# Patient Record
Sex: MALE | Race: WHITE | NOT HISPANIC OR LATINO | Employment: OTHER | URBAN - METROPOLITAN AREA
[De-identification: names, ages, dates, MRNs, and addresses within clinical notes are randomized per-mention and may not be internally consistent; named-entity substitution may affect disease eponyms.]

---

## 2018-06-28 ENCOUNTER — TRANSCRIBE ORDERS (OUTPATIENT)
Dept: ADMINISTRATIVE | Facility: HOSPITAL | Age: 83
End: 2018-06-28

## 2018-06-28 ENCOUNTER — OFFICE VISIT (OUTPATIENT)
Dept: LAB | Facility: HOSPITAL | Age: 83
End: 2018-06-28
Payer: MEDICARE

## 2018-06-28 ENCOUNTER — APPOINTMENT (OUTPATIENT)
Dept: LAB | Facility: HOSPITAL | Age: 83
End: 2018-06-28
Payer: MEDICARE

## 2018-06-28 ENCOUNTER — HOSPITAL ENCOUNTER (OUTPATIENT)
Dept: RADIOLOGY | Facility: HOSPITAL | Age: 83
Discharge: HOME | End: 2018-06-28
Payer: MEDICARE

## 2018-06-28 ENCOUNTER — HOSPITAL ENCOUNTER (OUTPATIENT)
Dept: NON INVASIVE DIAGNOSTICS | Facility: HOSPITAL | Age: 83
Discharge: HOME | End: 2018-06-28
Payer: MEDICARE

## 2018-06-28 DIAGNOSIS — Z00.00 PHYSICAL EXAM: ICD-10-CM

## 2018-06-28 DIAGNOSIS — Z00.00 ROUTINE GENERAL MEDICAL EXAMINATION AT A HEALTH CARE FACILITY: Primary | ICD-10-CM

## 2018-06-28 DIAGNOSIS — Z00.00 PHYSICAL EXAM: Primary | ICD-10-CM

## 2018-06-28 DIAGNOSIS — Z00.00 ROUTINE GENERAL MEDICAL EXAMINATION AT A HEALTH CARE FACILITY: ICD-10-CM

## 2018-06-28 LAB
ALBUMIN SERPL BCP-MCNC: 3.4 G/DL (ref 3.5–5.7)
ALP SERPL-CCNC: 81 U/L (ref 55–165)
ALT SERPL W P-5'-P-CCNC: 21 U/L (ref 7–52)
ANION GAP SERPL CALCULATED.3IONS-SCNC: 5 MMOL/L (ref 4–13)
AST SERPL W P-5'-P-CCNC: 21 U/L (ref 13–39)
ATRIAL RATE: 60 BPM
BILIRUB DIRECT SERPL-MCNC: 0.1 MG/DL (ref 0–0.2)
BILIRUB SERPL-MCNC: 0.3 MG/DL (ref 0.2–1)
BUN SERPL-MCNC: 9 MG/DL (ref 7–25)
CALCIUM SERPL-MCNC: 9.3 MG/DL (ref 8.6–10.5)
CHLORIDE SERPL-SCNC: 100 MMOL/L (ref 98–107)
CHOLEST SERPL-MCNC: 162 MG/DL (ref 0–200)
CO2 SERPL-SCNC: 33 MMOL/L (ref 21–31)
CREAT SERPL-MCNC: 0.89 MG/DL (ref 0.7–1.3)
EST. AVERAGE GLUCOSE BLD GHB EST-MCNC: 171 MG/DL
GFR SERPL CREATININE-BSD FRML MDRD: 80 ML/MIN/1.73SQ M
GLUCOSE P FAST SERPL-MCNC: 175 MG/DL (ref 65–99)
HBA1C MFR BLD: 7.6 % (ref 4.2–6.3)
HDLC SERPL-MCNC: 73 MG/DL (ref 40–60)
LDLC SERPL CALC-MCNC: 73 MG/DL (ref 0–100)
NONHDLC SERPL-MCNC: 89 MG/DL
P AXIS: 8 DEGREES
POTASSIUM SERPL-SCNC: 3.7 MMOL/L (ref 3.5–5.5)
PR INTERVAL: 202 MS
PROT SERPL-MCNC: 7.4 G/DL (ref 6.4–8.9)
PSA SERPL-MCNC: 1 NG/ML (ref 0–4)
QRS AXIS: -50 DEGREES
QRSD INTERVAL: 116 MS
QT INTERVAL: 440 MS
QTC INTERVAL: 440 MS
SODIUM SERPL-SCNC: 138 MMOL/L (ref 134–143)
T WAVE AXIS: 23 DEGREES
TRIGL SERPL-MCNC: 82 MG/DL (ref 44–166)
VENTRICULAR RATE: 60 BPM

## 2018-06-28 PROCEDURE — G0103 PSA SCREENING: HCPCS

## 2018-06-28 PROCEDURE — 83036 HEMOGLOBIN GLYCOSYLATED A1C: CPT

## 2018-06-28 PROCEDURE — 93005 ELECTROCARDIOGRAM TRACING: CPT

## 2018-06-28 PROCEDURE — 36415 COLL VENOUS BLD VENIPUNCTURE: CPT

## 2018-06-28 PROCEDURE — 80053 COMPREHEN METABOLIC PANEL: CPT

## 2018-06-28 PROCEDURE — 71046 X-RAY EXAM CHEST 2 VIEWS: CPT

## 2018-06-28 PROCEDURE — 82248 BILIRUBIN DIRECT: CPT

## 2018-06-28 PROCEDURE — 80061 LIPID PANEL: CPT

## 2018-08-28 ENCOUNTER — APPOINTMENT (OUTPATIENT)
Dept: LAB | Facility: HOSPITAL | Age: 83
End: 2018-08-28
Payer: MEDICARE

## 2018-08-28 ENCOUNTER — TRANSCRIBE ORDERS (OUTPATIENT)
Dept: ADMINISTRATIVE | Facility: HOSPITAL | Age: 83
End: 2018-08-28

## 2018-08-28 DIAGNOSIS — E13.9 OTHER SPECIFIED DIABETES MELLITUS WITHOUT COMPLICATION, WITHOUT LONG-TERM CURRENT USE OF INSULIN (HCC): Primary | ICD-10-CM

## 2018-08-28 DIAGNOSIS — E13.9 OTHER SPECIFIED DIABETES MELLITUS WITHOUT COMPLICATION, WITHOUT LONG-TERM CURRENT USE OF INSULIN (HCC): ICD-10-CM

## 2018-08-28 LAB
EST. AVERAGE GLUCOSE BLD GHB EST-MCNC: 174 MG/DL
GLUCOSE P FAST SERPL-MCNC: 109 MG/DL (ref 65–99)
HBA1C MFR BLD: 7.7 % (ref 4.2–6.3)

## 2018-08-28 PROCEDURE — 83036 HEMOGLOBIN GLYCOSYLATED A1C: CPT

## 2018-08-28 PROCEDURE — 82947 ASSAY GLUCOSE BLOOD QUANT: CPT

## 2018-08-28 PROCEDURE — 36415 COLL VENOUS BLD VENIPUNCTURE: CPT

## 2018-09-10 ENCOUNTER — TRANSCRIBE ORDERS (OUTPATIENT)
Dept: ADMINISTRATIVE | Facility: HOSPITAL | Age: 83
End: 2018-09-10

## 2018-09-10 DIAGNOSIS — R19.4 CHANGE IN BOWEL HABIT: ICD-10-CM

## 2018-09-10 DIAGNOSIS — R63.4 ABNORMAL WEIGHT LOSS: Primary | ICD-10-CM

## 2018-09-13 ENCOUNTER — HOSPITAL ENCOUNTER (OUTPATIENT)
Dept: CT IMAGING | Facility: HOSPITAL | Age: 83
Discharge: HOME | End: 2018-09-13
Attending: PHYSICIAN ASSISTANT
Payer: MEDICARE

## 2018-09-13 DIAGNOSIS — R19.4 CHANGE IN BOWEL HABIT: ICD-10-CM

## 2018-09-13 DIAGNOSIS — R63.4 ABNORMAL WEIGHT LOSS: ICD-10-CM

## 2018-09-13 PROCEDURE — 74178 CT ABD&PLV WO CNTR FLWD CNTR: CPT

## 2018-09-13 RX ADMIN — IOHEXOL 80 ML: 350 INJECTION, SOLUTION INTRAVENOUS at 09:58

## 2018-09-14 ENCOUNTER — TRANSCRIBE ORDERS (OUTPATIENT)
Dept: ADMINISTRATIVE | Facility: HOSPITAL | Age: 83
End: 2018-09-14

## 2018-09-14 ENCOUNTER — APPOINTMENT (OUTPATIENT)
Dept: LAB | Facility: HOSPITAL | Age: 83
End: 2018-09-14
Payer: MEDICARE

## 2018-09-14 DIAGNOSIS — E10.8 TYPE 1 DIABETES MELLITUS WITH COMPLICATION (HCC): ICD-10-CM

## 2018-09-14 DIAGNOSIS — R63.4 WEIGHT LOSS: ICD-10-CM

## 2018-09-14 DIAGNOSIS — R63.4 WEIGHT LOSS: Primary | ICD-10-CM

## 2018-09-14 LAB
ERYTHROCYTE [DISTWIDTH] IN BLOOD BY AUTOMATED COUNT: 14.5 % (ref 11.6–15.1)
HCT VFR BLD AUTO: 37.2 % (ref 42–52)
HGB BLD-MCNC: 12.2 G/DL (ref 12–17)
MCH RBC QN AUTO: 31.4 PG (ref 26.8–34.3)
MCHC RBC AUTO-ENTMCNC: 32.9 G/DL (ref 31.4–37.4)
MCV RBC AUTO: 95 FL (ref 82–98)
PLATELET # BLD AUTO: 312 THOUSANDS/UL (ref 149–390)
PMV BLD AUTO: 9.1 FL (ref 8.9–12.7)
RBC # BLD AUTO: 3.9 MILLION/UL (ref 3.88–5.62)
T3 SERPL-MCNC: 1 NG/ML (ref 0.6–1.8)
T4 SERPL-MCNC: 7.4 UG/DL (ref 4.7–13.3)
TSH SERPL DL<=0.05 MIU/L-ACNC: 1.55 UIU/ML (ref 0.45–5.33)
WBC # BLD AUTO: 7.3 THOUSAND/UL (ref 4.31–10.16)

## 2018-09-14 PROCEDURE — 36415 COLL VENOUS BLD VENIPUNCTURE: CPT

## 2018-09-14 PROCEDURE — 84443 ASSAY THYROID STIM HORMONE: CPT

## 2018-09-14 PROCEDURE — 84436 ASSAY OF TOTAL THYROXINE: CPT

## 2018-09-14 PROCEDURE — 84480 ASSAY TRIIODOTHYRONINE (T3): CPT

## 2018-09-14 PROCEDURE — 85027 COMPLETE CBC AUTOMATED: CPT

## 2018-09-17 ENCOUNTER — TELEPHONE (OUTPATIENT)
Dept: GASTROENTEROLOGY | Facility: CLINIC | Age: 83
End: 2018-09-17

## 2018-09-17 NOTE — TELEPHONE ENCOUNTER
Patient will be new to the practice  Per ana [facility], the pt has to be seen as soon as possible for an EUS consult  Please assist in scheduling the pt

## 2018-09-19 ENCOUNTER — APPOINTMENT (OUTPATIENT)
Dept: LAB | Facility: MEDICAL CENTER | Age: 83
End: 2018-09-19
Attending: INTERNAL MEDICINE
Payer: MEDICARE

## 2018-09-19 ENCOUNTER — OFFICE VISIT (OUTPATIENT)
Dept: GASTROENTEROLOGY | Facility: MEDICAL CENTER | Age: 83
End: 2018-09-19
Payer: MEDICARE

## 2018-09-19 VITALS
WEIGHT: 121 LBS | HEIGHT: 67 IN | TEMPERATURE: 96.8 F | HEART RATE: 71 BPM | BODY MASS INDEX: 18.99 KG/M2 | DIASTOLIC BLOOD PRESSURE: 82 MMHG | SYSTOLIC BLOOD PRESSURE: 126 MMHG

## 2018-09-19 DIAGNOSIS — K86.9 PANCREATIC LESION: ICD-10-CM

## 2018-09-19 DIAGNOSIS — K86.9 PANCREATIC LESION: Primary | ICD-10-CM

## 2018-09-19 LAB — CEA SERPL-MCNC: 2.1 NG/ML (ref 0–3)

## 2018-09-19 PROCEDURE — 82378 CARCINOEMBRYONIC ANTIGEN: CPT

## 2018-09-19 PROCEDURE — 86301 IMMUNOASSAY TUMOR CA 19-9: CPT

## 2018-09-19 PROCEDURE — 99204 OFFICE O/P NEW MOD 45 MIN: CPT | Performed by: INTERNAL MEDICINE

## 2018-09-19 PROCEDURE — 36415 COLL VENOUS BLD VENIPUNCTURE: CPT

## 2018-09-19 RX ORDER — ASPIRIN 325 MG
325 TABLET ORAL DAILY
COMMUNITY
End: 2018-11-12

## 2018-09-19 NOTE — LETTER
September 19, 2018     Nick Zamora, [address] [facility]    Patient: Ivan Cobb   YOB: 1935   Date of Visit: 9/19/2018       Dear Dr Christina Grajeda: Thank you for referring Maria L Decker to me for evaluation  Below are my notes for this consultation  If you have questions, please do not hesitate to call me  I look forward to following your patient along with you           Sincerely,        Aida Adames MD        CC: Georgette Sever, MD

## 2018-09-19 NOTE — ASSESSMENT & PLAN NOTE
This gentleman has a pancreatic lesion seen on imaging study  I reviewed the CT scan myself and with the patient as well  It appears that his pancreatic duct is dilated and there is a lesion at the tail end of the pancreas  I would recommend doing an EUS with FN A  This can be scheduled for this week possibly on Friday  The procedure was discussed with the patient at length and he is agreeable to proceed  The possible risks and complications were discussed with the patient as well

## 2018-09-19 NOTE — PROGRESS NOTES
Assessment/Plan:    Pancreatic lesion  This gentleman has a pancreatic lesion seen on imaging study  I reviewed the CT scan myself and with the patient as well  It appears that his pancreatic duct is dilated and there is a lesion at the tail end of the pancreas  I would recommend doing an EUS with FN A  This can be scheduled for this week possibly on Friday  The procedure was discussed with the patient at length and he is agreeable to proceed  The possible risks and complications were discussed with the patient as well  Diagnoses and all orders for this visit:    Pancreatic lesion  -     Case request operating room: LINEAR ENDOSCOPIC U/S; Standing  -     Cancer antigen 19-9; Future  -     CEA; Future  -     Case request operating room: LINEAR ENDOSCOPIC U/S    Other orders  -     metFORMIN (GLUCOPHAGE) 500 mg tablet; daily  -     aspirin 325 mg tablet; Take 325 mg by mouth daily  -     Diet NPO; Sips with meds; Standing  -     Void on call to OR; Standing  -     Insert peripheral IV; Standing          Subjective:      Patient ID: Nasir Jones is a 82 y o  male  HPI    24-year-old gentleman who presents to us for evaluation for a pancreatic lesion was seen on imaging study  He was seen by GI regarding weight loss  He has had a significant amount of weight loss approximately 40-50 lb over the past few months  He also has been diagnosed with new diabetes  He does not have any abdominal pain  No nausea vomiting  No diarrhea constipation rectal bleeding  He does not have history of alcoholism or smoking  He has had no history of pancreatitis  His brother passed away from pancreatic cancer  He has never had an endoscopy or colonoscopy done      The following portions of the patient's history were reviewed and updated as appropriate: allergies, current medications, past family history, past medical history, past social history, past surgical history and problem list     Review of Systems Constitutional: Positive for unexpected weight change (Weight loss)  HENT: Negative  Eyes: Negative  Respiratory: Negative  Cardiovascular: Negative  Gastrointestinal:        See HPI   Endocrine: Negative  Genitourinary: Negative  Musculoskeletal: Negative  Skin: Negative  Allergic/Immunologic: Negative  Neurological: Negative  Hematological: Negative  Psychiatric/Behavioral: Negative  All other systems reviewed and are negative  Objective:      /82 (BP Location: Left arm, Patient Position: Sitting, Cuff Size: Adult)   Pulse 71   Temp (!) 96 8 °F (36 °C) (Tympanic)   Ht 5' 7" (1 702 m)   Wt 54 9 kg (121 lb)   BMI 18 95 kg/m²          Physical Exam   Constitutional: He is oriented to person, place, and time  Vital signs are normal  He appears well-developed and well-nourished  HENT:   Head: Normocephalic and atraumatic  Eyes: Conjunctivae are normal  Pupils are equal, round, and reactive to light  No scleral icterus  Neck: Normal range of motion  Cardiovascular: Normal rate, regular rhythm and normal heart sounds  Pulmonary/Chest: Effort normal and breath sounds normal  No respiratory distress  Abdominal: Soft  Normal appearance and bowel sounds are normal  He exhibits no distension, no ascites and no mass  There is no hepatosplenomegaly  There is no tenderness  No hernia  Musculoskeletal: Normal range of motion  Lymphadenopathy:     He has no cervical adenopathy  Neurological: He is alert and oriented to person, place, and time  Skin: Skin is warm  Psychiatric: He has a normal mood and affect   His behavior is normal  Thought content normal

## 2018-09-20 ENCOUNTER — ANESTHESIA EVENT (OUTPATIENT)
Dept: GASTROENTEROLOGY | Facility: HOSPITAL | Age: 83
End: 2018-09-20
Payer: MEDICARE

## 2018-09-20 ENCOUNTER — TELEPHONE (OUTPATIENT)
Dept: GASTROENTEROLOGY | Facility: CLINIC | Age: 83
End: 2018-09-20

## 2018-09-20 LAB — CANCER AG19-9 SERPL-ACNC: 16 U/ML (ref 0–35)

## 2018-09-20 NOTE — ANESTHESIA PREPROCEDURE EVALUATION
Review of Systems/Medical History  Patient summary reviewed        Cardiovascular   Pulmonary       GI/Hepatic            Endo/Other  Diabetes ,      GYN       Hematology   Musculoskeletal       Neurology   Psychology           Physical Exam    Airway  Comment: Poor mouth opening  Mallampati score: III  TM Distance: >3 FB  Neck ROM: full     Dental       Cardiovascular      Pulmonary      Other Findings        Anesthesia Plan  ASA Score- 2     Anesthesia Type- IV sedation with anesthesia with ASA Monitors  Additional Monitors:   Airway Plan:         Plan Factors-    Induction- intravenous  Postoperative Plan-     Informed Consent- Anesthetic plan and risks discussed with patient  I personally reviewed this patient with the CRNA  Discussed and agreed on the Anesthesia Plan with the CRNA  Romaine Carvajal

## 2018-09-20 NOTE — TELEPHONE ENCOUNTER
EUS scheduled with Dr Kidd in [place] on 9/21/2018  I gave Tom Chen (spouse)verbal instructions  Per Dr Kidd pt can have clears up to 10am  Pt Diabetic

## 2018-09-21 ENCOUNTER — ANESTHESIA (OUTPATIENT)
Dept: GASTROENTEROLOGY | Facility: HOSPITAL | Age: 83
End: 2018-09-21
Payer: MEDICARE

## 2018-09-21 ENCOUNTER — HOSPITAL ENCOUNTER (OUTPATIENT)
Facility: HOSPITAL | Age: 83
Setting detail: OUTPATIENT SURGERY
Discharge: HOME | End: 2018-09-21
Attending: INTERNAL MEDICINE | Admitting: INTERNAL MEDICINE
Payer: MEDICARE

## 2018-09-21 VITALS
HEIGHT: 67 IN | DIASTOLIC BLOOD PRESSURE: 81 MMHG | TEMPERATURE: 97 F | BODY MASS INDEX: 18.99 KG/M2 | SYSTOLIC BLOOD PRESSURE: 154 MMHG | OXYGEN SATURATION: 100 % | WEIGHT: 121 LBS | HEART RATE: 50 BPM | RESPIRATION RATE: 16 BRPM

## 2018-09-21 DIAGNOSIS — K86.9 PANCREATIC LESION: Primary | ICD-10-CM

## 2018-09-21 DIAGNOSIS — K86.9 PANCREATIC LESION: ICD-10-CM

## 2018-09-21 LAB — GLUCOSE SERPL-MCNC: 131 MG/DL (ref 65–140)

## 2018-09-21 PROCEDURE — 88173 CYTOPATH EVAL FNA REPORT: CPT | Performed by: INTERNAL MEDICINE

## 2018-09-21 PROCEDURE — 82948 REAGENT STRIP/BLOOD GLUCOSE: CPT

## 2018-09-21 PROCEDURE — 88342 IMHCHEM/IMCYTCHM 1ST ANTB: CPT | Performed by: PATHOLOGY

## 2018-09-21 PROCEDURE — 88305 TISSUE EXAM BY PATHOLOGIST: CPT | Performed by: PATHOLOGY

## 2018-09-21 PROCEDURE — 43238 EGD US FINE NEEDLE BX/ASPIR: CPT | Performed by: INTERNAL MEDICINE

## 2018-09-21 PROCEDURE — 88341 IMHCHEM/IMCYTCHM EA ADD ANTB: CPT | Performed by: PATHOLOGY

## 2018-09-21 PROCEDURE — 88361 TUMOR IMMUNOHISTOCHEM/COMPUT: CPT | Performed by: PATHOLOGY

## 2018-09-21 PROCEDURE — 43239 EGD BIOPSY SINGLE/MULTIPLE: CPT | Performed by: INTERNAL MEDICINE

## 2018-09-21 PROCEDURE — 88172 CYTP DX EVAL FNA 1ST EA SITE: CPT | Performed by: INTERNAL MEDICINE

## 2018-09-21 PROCEDURE — 88313 SPECIAL STAINS GROUP 2: CPT | Performed by: PATHOLOGY

## 2018-09-21 RX ORDER — PROPOFOL 10 MG/ML
INJECTION, EMULSION INTRAVENOUS AS NEEDED
Status: DISCONTINUED | OUTPATIENT
Start: 2018-09-21 | End: 2018-09-21 | Stop reason: SURG

## 2018-09-21 RX ORDER — CIPROFLOXACIN 2 MG/ML
400 INJECTION, SOLUTION INTRAVENOUS ONCE
Status: DISCONTINUED | OUTPATIENT
Start: 2018-09-21 | End: 2018-09-21 | Stop reason: HOSPADM

## 2018-09-21 RX ORDER — SODIUM CHLORIDE 9 MG/ML
100 INJECTION, SOLUTION INTRAVENOUS CONTINUOUS
Status: DISCONTINUED | OUTPATIENT
Start: 2018-09-21 | End: 2018-09-21 | Stop reason: HOSPADM

## 2018-09-21 RX ORDER — PROPOFOL 10 MG/ML
INJECTION, EMULSION INTRAVENOUS CONTINUOUS PRN
Status: DISCONTINUED | OUTPATIENT
Start: 2018-09-21 | End: 2018-09-21 | Stop reason: SURG

## 2018-09-21 RX ORDER — CIPROFLOXACIN 500 MG/1
500 TABLET, FILM COATED ORAL EVERY 12 HOURS SCHEDULED
Qty: 6 TABLET | Refills: 0 | Status: SHIPPED | OUTPATIENT
Start: 2018-09-21 | End: 2018-09-24

## 2018-09-21 RX ADMIN — PROPOFOL 70 MG: 10 INJECTION, EMULSION INTRAVENOUS at 12:35

## 2018-09-21 RX ADMIN — SODIUM CHLORIDE 100 ML/HR: 0.9 INJECTION, SOLUTION INTRAVENOUS at 12:06

## 2018-09-21 RX ADMIN — PROPOFOL 100 MCG/KG/MIN: 10 INJECTION, EMULSION INTRAVENOUS at 12:37

## 2018-09-21 RX ADMIN — SODIUM CHLORIDE: 0.9 INJECTION, SOLUTION INTRAVENOUS at 12:32

## 2018-09-21 RX ADMIN — CIPROFLOXACIN 400 MG: 2 INJECTION INTRAVENOUS at 12:40

## 2018-09-21 NOTE — ANESTHESIA POSTPROCEDURE EVALUATION
Post-Op Assessment Note      CV Status:  Stable    Mental Status:  Alert and awake    Hydration Status:  Euvolemic    PONV Controlled:  Controlled    Airway Patency:  Patent    Post Op Vitals Reviewed: Yes          Staff: CRNA, Anesthesiologist           /90 (09/21/18 1350)    Temp     Pulse 59 (09/21/18 1350)   Resp 16 (09/21/18 1350)    SpO2 100 % (09/21/18 1350)

## 2018-09-21 NOTE — OP NOTE
OPERATIVE REPORT  PATIENT NAME: Raphael Richards    :  1935  MRN: [id number]  Pt Location: [facility] ROOM 01    SURGERY DATE: 2018    Surgeon(s) and Role:     Mis Beckwith MD - Primary    Preop Diagnosis:  Pancreatic lesion [K86 9]    Post-Op Diagnosis Codes:     * Pancreatic lesion [K86 9]    Procedure(s) (LRB):  LINEAR ENDOSCOPIC U/S (N/A)    Specimen(s):  ID Type Source Tests Collected by Time Destination   1 : pancreatic tail lesion Other FNA NON-GYNECOLOGIC CYTOLOGY, FINE NEEDLE ASPIRATION Darshana Joseph MD 2018 1252    2 : gastric body, gastritis, r/o h pylori Tissue Stomach TISSUE EXAM Darshana Joseph MD 2018 1256    3 : esophagitis Tissue Esophagus TISSUE EXAM Darshana Joseph MD 2018 1258      ENDOSCOPIC ULTRASOUND    SEDATION: Monitored anesthesia care, check anesthesia records    ASA Class: 3    INDICATIONS:  Pancreatic lesion  CONSENT:  Informed consent was obtained for the procedure, including sedation after explaining the risks and benefits of the procedure  Risks including but not limited to bleeding, perforation, infection, and missed lesion  PREPARATION:   Telemetry, pulse oximetry, blood pressure were monitored throughout the procedure  Patient was identified by myself both verbally and by visual inspection of ID band  Ciprofloxacin was given prior to the procedure  DESCRIPTION:   Patient was placed in the left lateral decubitus position and was sedated with the above medication  The gastroscope was introduced in to the oropharynx and the esophagus was intubated under direct visualization  Scope was passed down the esophagus up to 2nd part of the duodenum  A careful inspection was made as the gastroscope was withdrawn, including a retroflexed view of the stomach; findings and interventions are described below  FINDINGS:    EGD FINDINGS:     #1  Esophagus- mild esophagitis seen in the mid and distal esophagus with sloughing of the mucosa  Biopsies were done      #2  Stomach- stomach appeared to be normal   However the antrum was angulated  Biopsies were done in the gastric body  #3  Duodenum- the antrum was angulated but no evidence of mass or ulcer  The duodenal bulb and the 2nd portion were normal   The ampulla could not be clearly visualized however no mass was seen  EUS FINDINGS:  The linear echo endoscope was used  The pancreatic parenchyma was visualized and appeared to be hyperechoic and atrophic  The pancreatic duct was dilated throughout its length measuring approximately 7 mm  It tapered 2 words the tail where it led to a hypoechoic triangular lesion measuring 3 5 cm  There was a cystic component in the middle of the lesion as well  The splenic artery ran adjacent to this lesion  This was sampled using FNA 25 gauge needle and 5 passes were obtained  In the head of the pancreas the duct had hyperechoic foci which could represent either mucin globules or stones  No mass lesion was seen  The common bile duct was normal caliber  It measured 4 mm  The visualized areas of the liver was normal   The gallbladder was normal   The celiac axis was normal without any lymphadenopathy  IMPRESSIONS:      1  Pancreatic duct dilation with pancreatic atrophy, hyperechoic material within the head of the pancreas as well as a lesion in the tail of the pancreas  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

## 2018-09-21 NOTE — DISCHARGE INSTR - AVS FIRST PAGE
OPERATIVE REPORT  PATIENT NAME: Yoselyn Early    :  1935  MRN: [id number]  Pt Location: [facility] ROOM 01    SURGERY DATE: 2018    Surgeon(s) and Role:     Rosalinda Trotter MD - Primary    Preop Diagnosis:  Pancreatic lesion [K86 9]    Post-Op Diagnosis Codes:     * Pancreatic lesion [K86 9]    Procedure(s) (LRB):  LINEAR ENDOSCOPIC U/S (N/A)    Specimen(s):  ID Type Source Tests Collected by Time Destination   1 : pancreatic tail lesion Other FNA NON-GYNECOLOGIC CYTOLOGY, FINE NEEDLE ASPIRATION Tani Pitts MD 2018 1252    2 : gastric body, gastritis, r/o h pylori Tissue Stomach TISSUE EXAM Tani Pitts MD 2018 1256    3 : esophagitis Tissue Esophagus TISSUE EXAM Tani Pitts MD 2018 1258      ENDOSCOPIC ULTRASOUND    SEDATION: Monitored anesthesia care, check anesthesia records    ASA Class: 3    INDICATIONS:  Pancreatic lesion  CONSENT:  Informed consent was obtained for the procedure, including sedation after explaining the risks and benefits of the procedure  Risks including but not limited to bleeding, perforation, infection, and missed lesion  PREPARATION:   Telemetry, pulse oximetry, blood pressure were monitored throughout the procedure  Patient was identified by myself both verbally and by visual inspection of ID band  Ciprofloxacin was given prior to the procedure  DESCRIPTION:   Patient was placed in the left lateral decubitus position and was sedated with the above medication  The gastroscope was introduced in to the oropharynx and the esophagus was intubated under direct visualization  Scope was passed down the esophagus up to 2nd part of the duodenum  A careful inspection was made as the gastroscope was withdrawn, including a retroflexed view of the stomach; findings and interventions are described below  FINDINGS:    EGD FINDINGS:     #1  Esophagus- mild esophagitis seen in the mid and distal esophagus with sloughing of the mucosa  Biopsies were done      #2  Stomach- stomach appeared to be normal   However the antrum was angulated  Biopsies were done in the gastric body  #3  Duodenum- the antrum was angulated but no evidence of mass or ulcer  The duodenal bulb and the 2nd portion were normal   The ampulla could not be clearly visualized however no mass was seen  EUS FINDINGS:  The linear echo endoscope was used  The pancreatic parenchyma was visualized and appeared to be hyperechoic and atrophic  The pancreatic duct was dilated throughout its length measuring approximately 7 mm  It tapered 2 words the tail where it led to a hypoechoic triangular lesion measuring 3 5 cm  There was a cystic component in the middle of the lesion as well  The splenic artery ran adjacent to this lesion  This was sampled using FNA 25 gauge needle and 5 passes were obtained  In the head of the pancreas the duct had hyperechoic foci which could represent either mucin globules or stones  No mass lesion was seen  The common bile duct was normal caliber  It measured 4 mm  The visualized areas of the liver was normal   The gallbladder was normal   The celiac axis was normal without any lymphadenopathy  IMPRESSIONS:      1  Pancreatic duct dilation with pancreatic atrophy, hyperechoic material within the head of the pancreas as well as a lesion in the tail of the pancreas  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

## 2018-09-21 NOTE — H&P (VIEW-ONLY)
Assessment/Plan:    Pancreatic lesion  This gentleman has a pancreatic lesion seen on imaging study  I reviewed the CT scan myself and with the patient as well  It appears that his pancreatic duct is dilated and there is a lesion at the tail end of the pancreas  I would recommend doing an EUS with FN A  This can be scheduled for this week possibly on Friday  The procedure was discussed with the patient at length and he is agreeable to proceed  The possible risks and complications were discussed with the patient as well  Diagnoses and all orders for this visit:    Pancreatic lesion  -     Case request operating room: LINEAR ENDOSCOPIC U/S; Standing  -     Cancer antigen 19-9; Future  -     CEA; Future  -     Case request operating room: LINEAR ENDOSCOPIC U/S    Other orders  -     metFORMIN (GLUCOPHAGE) 500 mg tablet; daily  -     aspirin 325 mg tablet; Take 325 mg by mouth daily  -     Diet NPO; Sips with meds; Standing  -     Void on call to OR; Standing  -     Insert peripheral IV; Standing          Subjective:      Patient ID: Quinten Grullon is a 82 y o  male  HPI    79-year-old gentleman who presents to us for evaluation for a pancreatic lesion was seen on imaging study  He was seen by GI regarding weight loss  He has had a significant amount of weight loss approximately 40-50 lb over the past few months  He also has been diagnosed with new diabetes  He does not have any abdominal pain  No nausea vomiting  No diarrhea constipation rectal bleeding  He does not have history of alcoholism or smoking  He has had no history of pancreatitis  His brother passed away from pancreatic cancer  He has never had an endoscopy or colonoscopy done      The following portions of the patient's history were reviewed and updated as appropriate: allergies, current medications, past family history, past medical history, past social history, past surgical history and problem list     Review of Systems Constitutional: Positive for unexpected weight change (Weight loss)  HENT: Negative  Eyes: Negative  Respiratory: Negative  Cardiovascular: Negative  Gastrointestinal:        See HPI   Endocrine: Negative  Genitourinary: Negative  Musculoskeletal: Negative  Skin: Negative  Allergic/Immunologic: Negative  Neurological: Negative  Hematological: Negative  Psychiatric/Behavioral: Negative  All other systems reviewed and are negative  Objective:      /82 (BP Location: Left arm, Patient Position: Sitting, Cuff Size: Adult)   Pulse 71   Temp (!) 96 8 °F (36 °C) (Tympanic)   Ht 5' 7" (1 702 m)   Wt 54 9 kg (121 lb)   BMI 18 95 kg/m²          Physical Exam   Constitutional: He is oriented to person, place, and time  Vital signs are normal  He appears well-developed and well-nourished  HENT:   Head: Normocephalic and atraumatic  Eyes: Conjunctivae are normal  Pupils are equal, round, and reactive to light  No scleral icterus  Neck: Normal range of motion  Cardiovascular: Normal rate, regular rhythm and normal heart sounds  Pulmonary/Chest: Effort normal and breath sounds normal  No respiratory distress  Abdominal: Soft  Normal appearance and bowel sounds are normal  He exhibits no distension, no ascites and no mass  There is no hepatosplenomegaly  There is no tenderness  No hernia  Musculoskeletal: Normal range of motion  Lymphadenopathy:     He has no cervical adenopathy  Neurological: He is alert and oriented to person, place, and time  Skin: Skin is warm  Psychiatric: He has a normal mood and affect   His behavior is normal  Thought content normal

## 2018-09-22 ENCOUNTER — APPOINTMENT (EMERGENCY)
Dept: RADIOLOGY | Facility: HOSPITAL | Age: 83
End: 2018-09-22
Payer: MEDICARE

## 2018-09-22 ENCOUNTER — HOSPITAL ENCOUNTER (EMERGENCY)
Facility: HOSPITAL | Age: 83
Discharge: HOME | End: 2018-09-22
Payer: MEDICARE

## 2018-09-22 VITALS
RESPIRATION RATE: 18 BRPM | HEIGHT: 67 IN | TEMPERATURE: 98 F | HEART RATE: 62 BPM | BODY MASS INDEX: 18.99 KG/M2 | OXYGEN SATURATION: 97 % | SYSTOLIC BLOOD PRESSURE: 159 MMHG | DIASTOLIC BLOOD PRESSURE: 87 MMHG | WEIGHT: 121 LBS

## 2018-09-22 DIAGNOSIS — K62.5 BRIGHT RED RECTAL BLEEDING: Primary | ICD-10-CM

## 2018-09-22 LAB
ABO GROUP BLD: NORMAL
ALBUMIN SERPL BCP-MCNC: 2.9 G/DL (ref 3.5–5.7)
ALP SERPL-CCNC: 70 U/L (ref 55–165)
ALT SERPL W P-5'-P-CCNC: 11 U/L (ref 7–52)
ANION GAP SERPL CALCULATED.3IONS-SCNC: 6 MMOL/L (ref 4–13)
APTT PPP: 28 SECONDS (ref 24–36)
AST SERPL W P-5'-P-CCNC: 14 U/L (ref 13–39)
BASOPHILS # BLD AUTO: 0 THOUSANDS/ΜL (ref 0–0.1)
BASOPHILS NFR BLD AUTO: 0 % (ref 0–2)
BILIRUB SERPL-MCNC: 0.4 MG/DL (ref 0.2–1)
BLD GP AB SCN SERPL QL: NEGATIVE
BUN SERPL-MCNC: 11 MG/DL (ref 7–25)
CALCIUM SERPL-MCNC: 8.3 MG/DL (ref 8.6–10.5)
CHLORIDE SERPL-SCNC: 101 MMOL/L (ref 98–107)
CO2 SERPL-SCNC: 28 MMOL/L (ref 21–31)
CREAT SERPL-MCNC: 0.85 MG/DL (ref 0.7–1.3)
EOSINOPHIL # BLD AUTO: 0.2 THOUSAND/ΜL (ref 0–0.61)
EOSINOPHIL NFR BLD AUTO: 2 % (ref 0–5)
ERYTHROCYTE [DISTWIDTH] IN BLOOD BY AUTOMATED COUNT: 14.2 % (ref 11.5–14.5)
GFR SERPL CREATININE-BSD FRML MDRD: 82 ML/MIN/1.73SQ M
GLUCOSE SERPL-MCNC: 240 MG/DL (ref 65–99)
HCT VFR BLD AUTO: 36.1 % (ref 36.5–49.3)
HGB BLD-MCNC: 12 G/DL (ref 14–18)
INR PPP: 1.03 (ref 0.9–1.5)
LIPASE SERPL-CCNC: <10 U/L (ref 11–82)
LYMPHOCYTES # BLD AUTO: 1.7 THOUSANDS/ΜL (ref 0.6–4.47)
LYMPHOCYTES NFR BLD AUTO: 19 % (ref 21–51)
MCH RBC QN AUTO: 31.8 PG (ref 26–34)
MCHC RBC AUTO-ENTMCNC: 33.3 G/DL (ref 31–37)
MCV RBC AUTO: 96 FL (ref 81–99)
MONOCYTES # BLD AUTO: 0.6 THOUSAND/ΜL (ref 0.17–1.22)
MONOCYTES NFR BLD AUTO: 7 % (ref 2–12)
NEUTROPHILS # BLD AUTO: 6.6 THOUSANDS/ΜL (ref 1.4–6.5)
NEUTS SEG NFR BLD AUTO: 73 % (ref 42–75)
NRBC BLD AUTO-RTO: 0 /100 WBCS
PLATELET # BLD AUTO: 308 THOUSANDS/UL (ref 149–390)
PMV BLD AUTO: 8.7 FL (ref 8.6–11.7)
POTASSIUM SERPL-SCNC: 4.1 MMOL/L (ref 3.5–5.5)
PROT SERPL-MCNC: 6.4 G/DL (ref 6.4–8.9)
PROTHROMBIN TIME: 12 SECONDS (ref 10.1–12.9)
RBC # BLD AUTO: 3.77 MILLION/UL (ref 4.3–5.9)
RH BLD: NEGATIVE
SODIUM SERPL-SCNC: 135 MMOL/L (ref 134–143)
SPECIMEN EXPIRATION DATE: NORMAL
WBC # BLD AUTO: 9.2 THOUSAND/UL (ref 4.8–10.8)

## 2018-09-22 PROCEDURE — C9113 INJ PANTOPRAZOLE SODIUM, VIA: HCPCS

## 2018-09-22 PROCEDURE — 86900 BLOOD TYPING SEROLOGIC ABO: CPT

## 2018-09-22 PROCEDURE — 80053 COMPREHEN METABOLIC PANEL: CPT

## 2018-09-22 PROCEDURE — 86901 BLOOD TYPING SEROLOGIC RH(D): CPT

## 2018-09-22 PROCEDURE — 85730 THROMBOPLASTIN TIME PARTIAL: CPT

## 2018-09-22 PROCEDURE — 85610 PROTHROMBIN TIME: CPT

## 2018-09-22 PROCEDURE — 99285 EMERGENCY DEPT VISIT HI MDM: CPT

## 2018-09-22 PROCEDURE — 93005 ELECTROCARDIOGRAM TRACING: CPT

## 2018-09-22 PROCEDURE — 85025 COMPLETE CBC W/AUTO DIFF WBC: CPT

## 2018-09-22 PROCEDURE — 86850 RBC ANTIBODY SCREEN: CPT

## 2018-09-22 PROCEDURE — 96365 THER/PROPH/DIAG IV INF INIT: CPT

## 2018-09-22 PROCEDURE — 71045 X-RAY EXAM CHEST 1 VIEW: CPT

## 2018-09-22 PROCEDURE — 36415 COLL VENOUS BLD VENIPUNCTURE: CPT

## 2018-09-22 PROCEDURE — 83690 ASSAY OF LIPASE: CPT

## 2018-09-22 RX ORDER — SODIUM CHLORIDE 9 MG/ML
125 INJECTION, SOLUTION INTRAVENOUS CONTINUOUS
Status: DISCONTINUED | OUTPATIENT
Start: 2018-09-22 | End: 2018-09-23 | Stop reason: HOSPADM

## 2018-09-22 RX ORDER — OMEPRAZOLE 20 MG/1
20 CAPSULE, DELAYED RELEASE ORAL 2 TIMES DAILY
Qty: 28 CAPSULE | Refills: 0 | Status: SHIPPED | OUTPATIENT
Start: 2018-09-22 | End: 2018-10-10

## 2018-09-22 RX ORDER — PANTOPRAZOLE SODIUM 40 MG/1
40 INJECTION, POWDER, FOR SOLUTION INTRAVENOUS ONCE
Status: COMPLETED | OUTPATIENT
Start: 2018-09-22 | End: 2018-09-22

## 2018-09-22 RX ADMIN — PANTOPRAZOLE SODIUM 40 MG: 40 INJECTION, POWDER, FOR SOLUTION INTRAVENOUS at 21:53

## 2018-09-22 RX ADMIN — SODIUM CHLORIDE 8 MG/HR: 9 INJECTION, SOLUTION INTRAVENOUS at 21:53

## 2018-09-22 RX ADMIN — SODIUM CHLORIDE 125 ML/HR: 0.9 INJECTION, SOLUTION INTRAVENOUS at 21:53

## 2018-09-23 NOTE — DISCHARGE INSTRUCTIONS
Gastrointestinal Bleeding   WHAT YOU NEED TO KNOW:   Gastrointestinal (GI) bleeding may occur in any part of your digestive tract  This includes your esophagus, stomach, intestines, rectum, or anus  Bleeding may be mild to severe  Your bleeding may begin suddenly, or start slowly and last for a longer period of time  Bleeding that lasts for a longer period of time is called chronic GI bleeding  DISCHARGE INSTRUCTIONS:   Call 911 for any of the following:   · You have shortness of breath or trouble breathing  · You faint or lose consciousness  · You have chest pain  Return to the emergency department if:   · You feel dizzy or are too weak to stand  · Your heart is beating faster than usual      · You vomit blood, or your vomit looks like coffee grounds  · You have blood in your bowel movement  · You have abdominal pain or swelling  Contact your healthcare provider if:   · You have bowel movements that are tarry or black  · You have nausea or are vomiting  · You have heartburn  · You have questions or concerns about your condition or care  Activity:  Rest as directed  Ask when you can return to your usual activities, such as work  Slowly do more each day  Nutrition:  Ask if you need to be on a special diet  A special diet can help treat GI conditions and prevent problems such as GI bleeding  Eat small meals more often while your digestive system heals  Avoid or limit caffeine and spicy foods  Also avoid foods that cause heartburn, nausea, or diarrhea  Prevent GI bleeding:   · Manage GI conditions as directed  Examples of GI conditions include gastroesophageal reflux, peptic ulcer disease, and ulcerative colitis  Take all medicines for these conditions as directed  · Limit or do not take NSAIDs  Ask your healthcare provider if it is safe for you to take NSAIDs  NSAIDs can increase your risk for ulcers and GI bleeding  · Do not drink alcohol    Alcohol can cause ulcers and esophageal varices  Esophageal varices are swollen blood vessels in your esophagus  Over time the blood vessels become weak and may bleed  · Do not smoke  Nicotine and other chemicals in cigarettes and cigars can increase your risk for ulcers  Ask your healthcare provider for information if you currently smoke and need help to quit  E-cigarettes or smokeless tobacco still contain nicotine  Talk to your healthcare provider before you use these products  Follow up with your healthcare provider as directed: You may need to return for a colonoscopy, endoscopy, or other tests  These tests can make sure you do not have more bleeding  Write down your questions so you remember to ask them during your visits  © 2017 [facility] Information is for End User's use only and may not be sold, redistributed or otherwise used for commercial purposes  All illustrations and images included in CareNotes® are the copyrighted property of [employer]  or [employer]  The above information is an  only  It is not intended as medical advice for individual conditions or treatments  Talk to your doctor, nurse or pharmacist before following any medical regimen to see if it is safe and effective for you  Gastrointestinal Bleeding   WHAT YOU NEED TO KNOW:   What do I need to know about gastrointestinal (GI) bleeding? GI bleeding may occur in any part of your digestive tract  This includes your esophagus, stomach, intestines, rectum, or anus  Bleeding may be mild to severe  Your bleeding may begin suddenly, or start slowly and last for a longer period of time  Bleeding that lasts for a longer period of time is called chronic GI bleeding  What causes GI bleeding? The cause of your GI bleeding may not be known   The following are common causes:  · Inflammation, ulcers, or infection in your digestive tract    · Swollen blood vessels in your digestive tract that break open and bleed    · Tears in the lining of your esophagus caused by forceful, repeated vomiting    · Crohn disease, colitis, cancer, or diverticulosis     · Hemorrhoids or a tear in the lining of your anus  What are the signs and symptoms of GI bleeding? Symptoms depend on where the bleeding is, what is causing it, and how much blood you have lost  You may have any of the following:  · Blood in your vomit, or vomit that looks like coffee grounds     · Dark or bright red blood in your bowel movements    · Bleeding from your rectum    · Cramping or pain in your abdomen    · Fatigue, weakness, or dizziness    · Shortness of breath    · Pale skin or gums, and sweaty or clammy skin    · Faster heartbeat than usual     · Urinating less than usual or not at all    · Fainting or loss of consciousness  How is GI bleeding diagnosed? You may need treatment and monitoring in the hospital  Tell the healthcare provider if you take blood thinner medicine  You may need medicine to reverse the effects of blood thinner medicine  You may need any of the following to find the cause of GI bleeding:  · Blood tests  may be done to measure your blood cell levels  This information will tell healthcare providers how much blood you have lost  Blood tests will also check for infection and get information about your overall health  · A sample of your bowel movement  can be tested for blood or infection  · X-ray or CT  pictures may show bleeding or problems in your digestive tract  Contrast liquid may be given to help your digestive tract show up better in pictures  Tell a healthcare provider if you have ever had an allergic reaction to contrast liquid  · An endoscopy  is a procedure to find the cause of bleeding in your esophagus, stomach, or small intestine  A capsule endoscopy may be done as an outpatient procedure  Ask your healthcare provider for more information about a capsule endoscopy       · A colonoscopy  is a procedure to find the cause of bleeding in your intestines or rectum  How is GI bleeding treated? Your bleeding may get better without treatment  If bleeding is severe or causes symptoms, you may need any of the following:  · Treatment during endoscopy or colonoscopy  may be done  Medicine may be injected into your esophagus, stomach, or intestines to stop bleeding  Heat or an electrical current may also be applied to stop bleeding  Other procedures, such as banding, may be used  Banding uses a plastic band to cut off the blood supply to a blood vessel  This stops the bleeding in your digestive tract  · Surgery  may be needed to find and stop GI bleeding  What can I do to prevent GI bleeding? · Manage GI conditions as directed  Examples of GI conditions include gastroesophageal reflux, peptic ulcer disease, and ulcerative colitis  Take all medicines for these conditions as directed  · Limit or do not take NSAIDs  Ask your healthcare provider if it is safe for you to take NSAIDs  NSAIDs can increase your risk for ulcers and GI bleeding  · Do not drink alcohol  Alcohol can cause ulcers and esophageal varices  Esophageal varices are swollen blood vessels in your esophagus  Over time the blood vessels become weak and may bleed  · Do not smoke  Nicotine and other chemicals in cigarettes and cigars can increase your risk for ulcers  Ask your healthcare provider for information if you currently smoke and need help to quit  E-cigarettes or smokeless tobacco still contain nicotine  Talk to your healthcare provider before you use these products  Call 911 for any of the following:   · You have shortness of breath or trouble breathing  · You faint or lose consciousness  · You have chest pain  When should I seek immediate care? · You feel dizzy or are too weak to stand  · Your heart is beating faster than usual      · You vomit blood, or your vomit looks like coffee grounds      · You have blood in your bowel movement  · You have abdominal pain or swelling  When should I contact my healthcare provider? · You have bowel movements that are tarry or black  · You have nausea or are vomiting  · You have heartburn  · You have questions or concerns about your condition or care  CARE AGREEMENT:   You have the right to help plan your care  Learn about your health condition and how it may be treated  Discuss treatment options with your caregivers to decide what care you want to receive  You always have the right to refuse treatment  The above information is an  only  It is not intended as medical advice for individual conditions or treatments  Talk to your doctor, nurse or pharmacist before following any medical regimen to see if it is safe and effective for you  © 2017 [facility] Information is for End User's use only and may not be sold, redistributed or otherwise used for commercial purposes  All illustrations and images included in CareNotes® are the copyrighted property of [employer] M [employer] Inc  or [employer]  Rectal Bleeding   WHAT YOU NEED TO KNOW:   Rectal bleeding can be caused by constipation, hemorrhoids, or anal fissures  It may also be caused by polyps, tumors, or medical conditions, such as colitis or diverticulitis  DISCHARGE INSTRUCTIONS:   Medicines:   · Pain medicine: You may be given medicine to take away or decrease pain  Do not wait until the pain is severe before you take your medicine  · Iron supplement:  Iron helps your body make more red blood cells  · Steroids: This medicine decreases inflammation in your rectum  It may be applied as a cream, ointment, or lotion  · Take your medicine as directed  Contact your healthcare provider if you think your medicine is not helping or if you have side effects  Tell him of her if you are allergic to any medicine  Keep a list of the medicines, vitamins, and herbs you take   Include the amounts, and when and why you take them  Bring the list or the pill bottles to follow-up visits  Carry your medicine list with you in case of an emergency  Follow up with your healthcare provider as directed:  Write down your questions so you remember to ask them during your visits  Drink liquids as directed:  Ask your healthcare provider how much liquid to drink each day and which liquids are best for you  This will help prevent dehydration and constipation  Contact your healthcare provider if:   · You have a fever  · Your rectal bleeding stopped for a time, but has started again  · You have nausea  · You have cold, sweaty, pale skin  · You have changes in your bowel movements, such as diarrhea  · You have questions or concerns about your condition or care  Return to the emergency department if:   · You are breathing faster than usual     · You are dizzy, lightheaded, or feel faint  · You are confused or cannot think clearly  · You urinate less than usual or not at all  · Your rectal bleeding is constant or heavy  · You have severe abdominal pain or cramping  © 2017 [facility] Information is for End User's use only and may not be sold, redistributed or otherwise used for commercial purposes  All illustrations and images included in CareNotes® are the copyrighted property of [employer] M [employer]  or [employer]  The above information is an  only  It is not intended as medical advice for individual conditions or treatments  Talk to your doctor, nurse or pharmacist before following any medical regimen to see if it is safe and effective for you  Rectal Bleeding   WHAT YOU NEED TO KNOW:   What can cause rectal bleeding?    · Constipation    · Hemorrhoids (swollen blood vessels in your rectum)    · Anal fissures (tears in the tissue inside your anus)    · Medical conditions, such as cancer, colitis, or diverticulitis     · Growths, such as tumors or polyps    · Medical treatments, such as radiation or rectal surgery  What increases my risk for rectal bleeding? · Older age    · Certain medicines, such as blood thinners and NSAIDs    · Medical conditions, such as inflammatory bowel disease, liver disease, or HIV  What other signs and symptoms may happen with rectal bleeding? You may have pain in your rectum or anus  You may also have abdominal pain or cramping  How is the cause of rectal bleeding diagnosed? · Rectal exam:  Your healthcare provider may gently insert a gloved finger into your anus  He will collect a bowel movement sample and send it to a lab for tests  · Blood tests: You may need blood taken to check for anemia (low amount of red blood cells)  · CT scan: This test is also called a CAT scan  An x-ray machine uses a computer to take pictures of the organs and blood vessels in your abdomen  The pictures may show problems that could cause bleeding  You may be given a dye before the pictures are taken to help healthcare providers see the pictures better  Tell the healthcare provider if you have ever had an allergic reaction to contrast dye  · Colonoscopy: This is a procedure to look inside your lower bowel  It may show where the bleeding is coming from and what is causing it  A tube with a light on the end will be put into your anus and then moved into your colon  If your healthcare provider finds a growth, he may remove it  · Endoscopy: This is a procedure to look inside your upper bowel  It may show where the bleeding is coming from and what is causing it  A tube with a light on the end is inserted into your throat and moved down into your stomach and upper bowel  If your healthcare provider finds a growth, he may remove it  He may put a shot of medicine in bleeding areas to narrow the blood vessels and stop the bleeding  Heat, laser, or electric currents may also be used to make the blood clot    How is rectal bleeding treated? · Medicine:      ¨ Pain medicine: You may be given a prescription medicine to decrease pain  Do not wait until the pain is severe before you take this medicine  ¨ Vasoconstrictors: This medicine decreases the size of your blood vessels and may help stop the bleeding  ¨ Iron supplement:  Iron helps your body make more red blood cells  ¨ Steroids: This medicine decreases inflammation in your rectum  It may be applied as a cream, ointment, or lotion  · IV:  You may need an IV if you are dehydrated and need extra liquids  · Blood transfusion:  You will get whole or parts of blood through an IV during a transfusion  Blood is tested for diseases, such as hepatitis and HIV, to be sure it is safe  · Surgery: You may need surgery to remove hemorrhoids, tumors, or polyps  What are the risks of rectal bleeding? · You may have abdominal pain or damage to nearby organs and blood vessels with surgery  Even with treatment, rectal bleeding may continue  Or, it may go away for a time and start again  · Without treatment, you may continue to have pain and cramping  You may develop anemia  You may need a blood transfusion  You may lose a large amount of blood  This can be life-threatening  How can I manage my symptoms? Ask your healthcare provider how much liquid to drink each day and which liquids are best for you  This will help prevent dehydration and constipation  When should I contact my healthcare provider? · You have a fever  · Your rectal bleeding stopped for a time, but has started again  · You have nausea  · You have cold, sweaty, pale skin  · You have changes in your bowel movements, such as diarrhea  · You have questions or concerns about your condition or care  When should I seek immediate care or call 911? · You are breathing faster than usual     · You are dizzy, lightheaded, or feel faint  · You are confused or cannot think clearly      · You urinate less than usual or not at all  · Your rectal bleeding is constant or heavy  · You have severe abdominal pain or cramping  CARE AGREEMENT:   You have the right to help plan your care  Learn about your health condition and how it may be treated  Discuss treatment options with your caregivers to decide what care you want to receive  You always have the right to refuse treatment  The above information is an  only  It is not intended as medical advice for individual conditions or treatments  Talk to your doctor, nurse or pharmacist before following any medical regimen to see if it is safe and effective for you  © 2017 [address] [employer] Information is for End User's use only and may not be sold, redistributed or otherwise used for commercial purposes  All illustrations and images included in CareNotes® are the copyrighted property of [employer]  or [employer]

## 2018-09-23 NOTE — ED PROVIDER NOTES
History  Chief Complaint   Patient presents with    Rectal Bleeding     Pt had endoscopy for biopsy of possible panctreatic CA  About 30 mins ago, pt had BM with bright red blood and also blood in his underwear  Pt has no hx of GI bleed  Jerman Benton is an 53-year-old male was brought to the emergency department by his daughter due to rectal bleeding that started today  Patient denies constipation or diarrhea  There was no abdominal pain  Patient underwent esophageal biopsy yesterday  Procedure was uneventful  Patient denies vomiting  History provided by:  Patient   used: No    Black or Bloody Stool   Quality:  Bright red  Amount:  Scant  Duration: Today  Timing:  Constant  Chronicity:  New  Context: defecation    Similar prior episodes: no    Relieved by:  Nothing  Worsened by:  Nothing  Ineffective treatments:  None tried  Associated symptoms: no abdominal pain, no dizziness, no epistaxis, no fever, no hematemesis, no light-headedness, no loss of consciousness, no recent illness and no vomiting    Risk factors: anticoagulant use        Prior to Admission Medications   Prescriptions Last Dose Informant Patient Reported? Taking? Multiple Vitamins-Minerals (CENTRUM SILVER PO)   Yes Yes   Sig: Take 1 tablet by mouth daily   aspirin 325 mg tablet   Yes Yes   Sig: Take 325 mg by mouth daily   ciprofloxacin (CIPRO) 500 mg tablet   No Yes   Sig: Take 1 tablet (500 mg total) by mouth every 12 (twelve) hours for 3 days   metFORMIN (GLUCOPHAGE) 500 mg tablet   Yes Yes   Sig: daily      Facility-Administered Medications: None       Past Medical History:   Diagnosis Date    Diabetes mellitus ([facility] [address] )     Type II    Weight loss        Past Surgical History:   Procedure Laterality Date    [place] EDG US EXAM SURGICAL ALTER STOM DUODENUM/JEJUNUM N/A 9/21/2018    Procedure: LINEAR ENDOSCOPIC U/S;  Surgeon: Julio Mann MD;  Location: [facility];   Service: Gastroenterology    VASCULAR SURGERY History reviewed  No pertinent family history  I have reviewed and agree with the history as documented  Social History   Substance Use Topics    Smoking status: Former Smoker     Years: 25 00    Smokeless tobacco: Never Used      Comment: quit about 20 years ago as of 9/21/2018    Alcohol use Yes      Comment: 1 beer a day        Review of Systems   Constitutional: Negative for fever  HENT: Negative for nosebleeds  Eyes: Negative  Respiratory: Negative  Gastrointestinal: Positive for blood in stool  Negative for abdominal pain, hematemesis and vomiting  Endocrine: Negative  Genitourinary: Negative  Musculoskeletal: Negative  Skin: Negative  Allergic/Immunologic: Negative  Neurological: Negative for dizziness, loss of consciousness and light-headedness  Hematological: Negative  Psychiatric/Behavioral: Negative  Physical Exam  Physical Exam   Constitutional: He is oriented to person, place, and time  He appears well-developed  He appears cachectic  No distress  HENT:   Head: Normocephalic and atraumatic  Right Ear: External ear normal    Left Ear: External ear normal    Nose: Nose normal    Mouth/Throat: Oropharynx is clear and moist  No oropharyngeal exudate  Eyes: Conjunctivae and EOM are normal  Pupils are equal, round, and reactive to light  Right eye exhibits no discharge  Left eye exhibits no discharge  No scleral icterus  Neck: Normal range of motion  Neck supple  No tracheal deviation present  No thyromegaly present  Cardiovascular: Normal rate, regular rhythm, normal heart sounds and intact distal pulses  Pulmonary/Chest: Effort normal and breath sounds normal  No respiratory distress  Abdominal: Soft  Bowel sounds are normal  He exhibits no distension  There is no tenderness  Genitourinary: Rectal exam shows guaiac positive stool  Musculoskeletal: Normal range of motion  He exhibits no edema, tenderness or deformity     Lymphadenopathy: He has no cervical adenopathy  Neurological: He is alert and oriented to person, place, and time  No cranial nerve deficit or sensory deficit  He exhibits normal muscle tone  Coordination normal    Skin: Skin is warm and dry  No rash noted  He is not diaphoretic  No erythema  No pallor  Psychiatric: He has a normal mood and affect  His behavior is normal  Judgment and thought content normal    Nursing note and vitals reviewed        Vital Signs  ED Triage Vitals [09/22/18 2058]   Temperature Pulse Respirations Blood Pressure SpO2   98 °F (36 7 °C) 86 18 154/77 97 %      Temp Source Heart Rate Source Patient Position - Orthostatic VS BP Location FiO2 (%)   Temporal Monitor -- Left arm --      Pain Score       No Pain           Vitals:    09/22/18 2058   BP: 154/77   Pulse: 86       Visual Acuity      ED Medications  Medications   sodium chloride 0 9 % infusion (125 mL/hr Intravenous New Bag 9/22/18 2153)   pantoprazole (PROTONIX) 80 mg in sodium chloride 0 9 % 100 mL infusion (8 mg/hr Intravenous New Bag 9/22/18 2153)   pantoprazole (PROTONIX) injection 40 mg (40 mg Intravenous Given 9/22/18 2153)       Diagnostic Studies  Results Reviewed     Procedure Component Value Units Date/Time    Comprehensive metabolic panel [[id number]]  (Abnormal) Collected:  09/22/18 2123    Lab Status:  Final result Specimen:  Blood from Arm, Right Updated:  09/22/18 2154     Sodium 135 mmol/L      Potassium 4 1 mmol/L      Chloride 101 mmol/L      CO2 28 mmol/L      ANION GAP 6 mmol/L      BUN 11 mg/dL      Creatinine 0 85 mg/dL      Glucose 240 (H) mg/dL      Calcium 8 3 (L) mg/dL      AST 14 U/L      ALT 11 U/L      Alkaline Phosphatase 70 U/L      Total Protein 6 4 g/dL      Albumin 2 9 (L) g/dL      Total Bilirubin 0 40 mg/dL      eGFR 82 ml/min/1 73sq m     Narrative:         National Kidney Disease Education Program recommendations are as follows:  GFR calculation is accurate only with a steady state creatinine  Chronic Kidney disease less than 60 ml/min/1 73 sq  meters  Kidney failure less than 15 ml/min/1 73 sq  meters      Lipase [[id number]]  (Abnormal) Collected:  09/22/18 2123    Lab Status:  Final result Specimen:  Blood from Arm, Right Updated:  09/22/18 2154     Lipase <10 (L) u/L     Protime-INR [[id number]]  (Normal) Collected:  09/22/18 2123    Lab Status:  Final result Specimen:  Blood from Arm, Right Updated:  09/22/18 2153     Protime 12 0 seconds      INR 1 03    APTT [[id number]]  (Normal) Collected:  09/22/18 2123    Lab Status:  Final result Specimen:  Blood from Arm, Right Updated:  09/22/18 2153     PTT 28 seconds     CBC and differential [[id number]]  (Abnormal) Collected:  09/22/18 2123    Lab Status:  Final result Specimen:  Blood from Arm, Right Updated:  09/22/18 2133     WBC 9 20 Thousand/uL      RBC 3 77 (L) Million/uL      Hemoglobin 12 0 (L) g/dL      Hematocrit 36 1 (L) %      MCV 96 fL      MCH 31 8 pg      MCHC 33 3 g/dL      RDW 14 2 %      MPV 8 7 fL      Platelets 138 Thousands/uL      nRBC 0 /100 WBCs      Neutrophils Relative 73 %      Lymphocytes Relative 19 (L) %      Monocytes Relative 7 %      Eosinophils Relative 2 %      Basophils Relative 0 %      Neutrophils Absolute 6 60 (H) Thousands/µL      Lymphocytes Absolute 1 70 Thousands/µL      Monocytes Absolute 0 60 Thousand/µL      Eosinophils Absolute 0 20 Thousand/µL      Basophils Absolute 0 00 Thousands/µL                  XR chest 1 view portable   ED Interpretation by Meenu Faria MD (09/22 2208)   No infiltrate                 Procedures  ECG 12 Lead Documentation  Date/Time: 9/22/2018 9:31 PM  Performed by: JONATHAN Montague  Authorized by: JONATHAN Montague     Indications / Diagnosis:  Rectal bleeding  ECG reviewed by me, the ED Provider: yes    Patient location:  ED  Previous ECG:     Previous ECG:  Compared to current    Comparison ECG info:  June 28, 2018    Comparison to cardiac monitor: Yes    Interpretation:     Interpretation: abnormal    Rate:     ECG rate:  65 beats per minute    ECG rate assessment: normal    Rhythm:     Rhythm: sinus rhythm    Ectopy:     Ectopy: none    QRS:     QRS axis:  Normal    QRS intervals:  Normal  Conduction:     Conduction: abnormal      Abnormal conduction: incomplete RBBB and LAFB    ST segments:     ST segments:  Non-specific  T waves:     T waves: non-specific             Phone Contacts  ED Phone Contact    ED Course  ED Course as of Sep 22 2220   Sat Sep 22, 2018   2211 Patient is resting comfortably on the stretcher with no further rectal bleeding noted  Daughters at the bedside and I discussed the results of the hemoglobin and hematocrit the results with the patient and the daughter  At this time Protonix IV still infusing but we will finish that and discharge the patient  I advised the patient to come back to the emergency department if the rectal bleeding becomes worse  The patient and his daughter agreed                                  MDM  Number of Diagnoses or Management Options  Bright red rectal bleeding: new and requires workup     Amount and/or Complexity of Data Reviewed  Clinical lab tests: ordered and reviewed  Tests in the radiology section of CPT®: ordered and reviewed  Tests in the medicine section of CPT®: ordered and reviewed  Decide to obtain previous medical records or to obtain history from someone other than the patient: yes  Obtain history from someone other than the patient: yes  Review and summarize past medical records: yes  Independent visualization of images, tracings, or specimens: yes    Risk of Complications, Morbidity, and/or Mortality  Presenting problems: moderate  Diagnostic procedures: moderate  Management options: moderate    Patient Progress  Patient progress: improved    CritCare Time    Disposition  Final diagnoses:   Bright red rectal bleeding     Time reflects when diagnosis was documented in both MDM as applicable and the Disposition within this note Time User Action Codes Description Comment    9/22/2018 10:16 PM Miranda Ferreira Add [K62 5] Bright red rectal bleeding       ED Disposition     ED Disposition Condition Comment    Discharge  Brody Beckwith discharge to home/self care  Condition at discharge: Good        Follow-up Information     Follow up With Specialties Details Why [address] [place] [address], DO Family Medicine In 2 days  [address] [place] [address]  [phone]            Patient's Medications   Discharge Prescriptions    OMEPRAZOLE (PRILOSEC) 20 MG DELAYED RELEASE CAPSULE    Take 1 capsule (20 mg total) by mouth 2 (two) times a day for 14 days       Start Date: 9/22/2018 End Date: 10/6/2018       Order Dose: 20 mg       Quantity: 28 capsule    Refills: 0     No discharge procedures on file      ED Provider  Electronically Signed by           Chris Abernathy MD  09/22/18 0106

## 2018-09-24 ENCOUNTER — TELEPHONE (OUTPATIENT)
Dept: GASTROENTEROLOGY | Facility: CLINIC | Age: 83
End: 2018-09-24

## 2018-09-24 LAB
ATRIAL RATE: 65 BPM
P AXIS: -18 DEGREES
PR INTERVAL: 216 MS
QRS AXIS: -43 DEGREES
QRSD INTERVAL: 112 MS
QT INTERVAL: 414 MS
QTC INTERVAL: 430 MS
T WAVE AXIS: 43 DEGREES
VENTRICULAR RATE: 65 BPM

## 2018-09-24 NOTE — TELEPHONE ENCOUNTER
Dr Mj Canchola pt    pts wife call in to advise the day after Shadi's EUS on 9/21 he started experiencing rectal bleeding  Please call back to discuss   [phone]

## 2018-09-24 NOTE — TELEPHONE ENCOUNTER
SHARATHI: Spoke with patients wife [facility]  Pt h/o pancreatic lesion    Patient was in ED after EUS on 9/21  He experienced BRBPR on 9/22/2018  This has resolved since his ED visit and he is feeling well  They will call back if this symptom reoccurs

## 2018-09-27 ENCOUNTER — TELEPHONE (OUTPATIENT)
Dept: GASTROENTEROLOGY | Facility: AMBULARY SURGERY CENTER | Age: 83
End: 2018-09-27

## 2018-09-27 NOTE — TELEPHONE ENCOUNTER
Tissue exam dated 9/21 just needs to be reviewed  Should patient be taking something instead of his asprin? Please advise, thank you

## 2018-09-27 NOTE — TELEPHONE ENCOUNTER
DR MCKEON'S PT    Pt spouse called requesting the biopsy results  Caller also stated pt went to the ER for rectal bleeding, they took him off of Asprin and would like to know if pt should be taking another medication instead

## 2018-09-27 NOTE — TELEPHONE ENCOUNTER
Florentino Grant,    The pathology lab called and let me know that the FNA sample had to be sent out for special staining as there was a lot of inflammation making a diagnosis difficult  The esophageal biopsy showed a papilloma - would you like to call him with this result or may I call him back? His spouse called earlier for the result  Thanks!     José Miguel Ramon

## 2018-10-01 NOTE — TELEPHONE ENCOUNTER
Please let her know that the sample had to be sent out, we still don't have a result  I'm not sure why the pt is on aspirin & she should check with his PCP regarding that    Jayjay Sanchez

## 2018-10-01 NOTE — TELEPHONE ENCOUNTER
Pt's daughter Rani Boss called looking for results from pt's EUS  Rani Garcia also stated pt wanted to know if he should go back on the aspirin or should he be taking something different since the ED has instructed him to stop taking aspirin  Rani Garcia also stated that they are still waiting for Dr Tom Haas' office to call them   Rani Garcia would like to be called to please advise and can be reached at [phone]

## 2018-10-02 ENCOUNTER — TELEPHONE (OUTPATIENT)
Dept: GASTROENTEROLOGY | Facility: CLINIC | Age: 83
End: 2018-10-02

## 2018-10-02 NOTE — TELEPHONE ENCOUNTER
----- Message from Davida Cartwright RN sent at 10/2/2018 11:59 AM EDT -----  Can you find out what happened to pt's cytology results from his 9/21 procedure? There is nothing in Media or Labs  He's probably going to see Dr vJ Villalobos in 2 weeks  Thank you!

## 2018-10-05 ENCOUNTER — TELEPHONE (OUTPATIENT)
Dept: GASTROENTEROLOGY | Facility: CLINIC | Age: 83
End: 2018-10-05

## 2018-10-05 DIAGNOSIS — K86.9 PANCREATIC LESION: Primary | ICD-10-CM

## 2018-10-05 NOTE — TELEPHONE ENCOUNTER
The FNA was (-) for cancer  I will discuss w/ Dr Bonnie Jones if he wants any further testing    Christian Yost

## 2018-10-05 NOTE — TELEPHONE ENCOUNTER
Left message for patient's daughter  I would recommend IgG4 and follow up with Dr Amanda Chilel  Thanks

## 2018-10-05 NOTE — TELEPHONE ENCOUNTER
Pt's daughter Lucy Rios called again looking for the results asking if someone can please give them a call back as they are getting very anxious

## 2018-10-09 ENCOUNTER — HOSPITAL ENCOUNTER (OUTPATIENT)
Dept: MRI IMAGING | Facility: HOSPITAL | Age: 83
Discharge: HOME | End: 2018-10-09
Payer: MEDICARE

## 2018-10-09 DIAGNOSIS — K86.9 PANCREATIC LESION: ICD-10-CM

## 2018-10-09 PROCEDURE — 74183 MRI ABD W/O CNTR FLWD CNTR: CPT

## 2018-10-09 PROCEDURE — A9585 GADOBUTROL INJECTION: HCPCS | Performed by: INTERNAL MEDICINE

## 2018-10-09 RX ADMIN — GADOBUTROL 5.5 ML: 604.72 INJECTION INTRAVENOUS at 12:29

## 2018-10-10 ENCOUNTER — TELEPHONE (OUTPATIENT)
Dept: GASTROENTEROLOGY | Facility: AMBULARY SURGERY CENTER | Age: 83
End: 2018-10-10

## 2018-10-10 ENCOUNTER — PREP FOR PROCEDURE (OUTPATIENT)
Dept: GASTROENTEROLOGY | Facility: HOSPITAL | Age: 83
End: 2018-10-10

## 2018-10-10 DIAGNOSIS — K20.90 ESOPHAGITIS: Primary | ICD-10-CM

## 2018-10-10 RX ORDER — OMEPRAZOLE 20 MG/1
20 CAPSULE, DELAYED RELEASE ORAL DAILY
Qty: 30 CAPSULE | Refills: 2 | Status: SHIPPED | OUTPATIENT
Start: 2018-10-10 | End: 2018-11-12

## 2018-10-10 NOTE — TELEPHONE ENCOUNTER
DR Indira Fitzgerald called requesting to speak to doctor regarding pt care  [phone]   Dr epifanio mejiat

## 2018-10-12 ENCOUNTER — TELEPHONE (OUTPATIENT)
Dept: GASTROENTEROLOGY | Facility: CLINIC | Age: 83
End: 2018-10-12

## 2018-10-12 NOTE — TELEPHONE ENCOUNTER
----- Message from Rhonda Vazquez MD sent at 10/10/2018  4:14 PM EDT -----  Please schedule EGD with me in 3 months  This can be done up at minus  The order is in

## 2018-10-12 NOTE — TELEPHONE ENCOUNTER
EGD scheduled with Dr Kidd in [place] on 1/15/2019  I gave pt verbal instructions/mailed  Pt is Diabetic

## 2018-10-15 PROBLEM — D37.8 NEOPLASM OF UNCERTAIN BEHAVIOR OF TAIL OF PANCREAS: Status: ACTIVE | Noted: 2018-09-19

## 2018-10-15 PROBLEM — E11.9 DIABETES MELLITUS (HCC): Status: ACTIVE | Noted: 2018-10-15

## 2018-10-15 PROBLEM — R63.4 WEIGHT LOSS: Status: ACTIVE | Noted: 2018-10-15

## 2018-10-16 ENCOUNTER — APPOINTMENT (OUTPATIENT)
Dept: LAB | Facility: CLINIC | Age: 83
End: 2018-10-16
Payer: MEDICARE

## 2018-10-16 ENCOUNTER — TRANSCRIBE ORDERS (OUTPATIENT)
Dept: LAB | Facility: CLINIC | Age: 83
End: 2018-10-16

## 2018-10-16 ENCOUNTER — OFFICE VISIT (OUTPATIENT)
Dept: SURGICAL ONCOLOGY | Facility: CLINIC | Age: 83
End: 2018-10-16
Payer: MEDICARE

## 2018-10-16 VITALS
RESPIRATION RATE: 16 BRPM | TEMPERATURE: 96.8 F | HEIGHT: 67 IN | HEART RATE: 73 BPM | SYSTOLIC BLOOD PRESSURE: 140 MMHG | DIASTOLIC BLOOD PRESSURE: 82 MMHG | BODY MASS INDEX: 19.78 KG/M2 | WEIGHT: 126 LBS

## 2018-10-16 DIAGNOSIS — D37.8 NEOPLASM OF UNCERTAIN BEHAVIOR OF TAIL OF PANCREAS: Primary | ICD-10-CM

## 2018-10-16 DIAGNOSIS — K86.9 PANCREATIC LESION: ICD-10-CM

## 2018-10-16 LAB
BASOPHILS # BLD AUTO: 0.04 THOUSANDS/ΜL (ref 0–0.1)
BASOPHILS NFR BLD AUTO: 1 % (ref 0–1)
EOSINOPHIL # BLD AUTO: 0.19 THOUSAND/ΜL (ref 0–0.61)
EOSINOPHIL NFR BLD AUTO: 3 % (ref 0–6)
ERYTHROCYTE [DISTWIDTH] IN BLOOD BY AUTOMATED COUNT: 13.6 % (ref 11.6–15.1)
HCT VFR BLD AUTO: 38.5 % (ref 36.5–49.3)
HGB BLD-MCNC: 12.4 G/DL (ref 12–17)
IMM GRANULOCYTES # BLD AUTO: 0.02 THOUSAND/UL (ref 0–0.2)
IMM GRANULOCYTES NFR BLD AUTO: 0 % (ref 0–2)
LYMPHOCYTES # BLD AUTO: 1.79 THOUSANDS/ΜL (ref 0.6–4.47)
LYMPHOCYTES NFR BLD AUTO: 23 % (ref 14–44)
MCH RBC QN AUTO: 31.4 PG (ref 26.8–34.3)
MCHC RBC AUTO-ENTMCNC: 32.2 G/DL (ref 31.4–37.4)
MCV RBC AUTO: 98 FL (ref 82–98)
MONOCYTES # BLD AUTO: 0.65 THOUSAND/ΜL (ref 0.17–1.22)
MONOCYTES NFR BLD AUTO: 8 % (ref 4–12)
NEUTROPHILS # BLD AUTO: 5.02 THOUSANDS/ΜL (ref 1.85–7.62)
NEUTS SEG NFR BLD AUTO: 65 % (ref 43–75)
NRBC BLD AUTO-RTO: 0 /100 WBCS
PLATELET # BLD AUTO: 273 THOUSANDS/UL (ref 149–390)
PMV BLD AUTO: 10.7 FL (ref 8.9–12.7)
RBC # BLD AUTO: 3.95 MILLION/UL (ref 3.88–5.62)
WBC # BLD AUTO: 7.71 THOUSAND/UL (ref 4.31–10.16)

## 2018-10-16 PROCEDURE — 82787 IGG 1 2 3 OR 4 EACH: CPT

## 2018-10-16 PROCEDURE — 85025 COMPLETE CBC W/AUTO DIFF WBC: CPT

## 2018-10-16 PROCEDURE — 36415 COLL VENOUS BLD VENIPUNCTURE: CPT

## 2018-10-16 PROCEDURE — 99205 OFFICE O/P NEW HI 60 MIN: CPT | Performed by: SURGERY

## 2018-10-16 PROCEDURE — 82784 ASSAY IGA/IGD/IGG/IGM EACH: CPT

## 2018-10-16 NOTE — PROGRESS NOTES
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        Chief Complaint   Patient presents with    New patient consultation     Pt is here for consultation regarding panc tail mass found on recent imaging  Pt reports decreased appetite and 40lb weight loss over pass several months  Return in about 2 weeks (around 10/30/2018)  No history exists  History of Present Illness:   51-year-old male who had an approximately 40 lb weight loss  He underwent a CT on September 13, 2018  In indeterminate mass was seen in the tail of the pancreas near the splenic hilum  Patient underwent MRI on October 9, 2018  This was concerning for a pancreatic tail mass involving the splenic hilum  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  No metastasis were seen  The mass measured 3 6 x 2 2 cm  The main pancreatic duct was dilated up to 7 mm  I personally reviewed the films  He comes in now to discuss further therapy  He underwent EUS on September 21, 2018  This revealed pancreatic duct dilatation  There was hyperechoic material in the head of the pancreas  A pancreatic tail lesion was seen measuring up to 3 5 cm in maximal dimension  There was a cystic component in the middle  Biopsy was performed  Pathology was negative for malignant cells  There was a concern for autoimmune pancreatitis  CEA was normal at 2 1  CA 19-9 was normal at 16  He does have a family history of pancreas cancer  No abdominal pain, nausea or vomiting  He is doing better at this time  He has gained approximately 5 lb      Review of Systems  Complete ROS Surg Onc:   Constitutional: The patient denies new or recent history of general fatigue,  no change in appetite  There is a 40 lb weight loss  Eyes: No complaints of visual problems, no scleral icterus  ENT: no complaints of ear pain, no hoarseness, no difficulty swallowing,  no tinnitus and no new masses in head, oral cavity, or neck  Cardiovascular: No complaints of chest pain, no palpitations, no ankle edema  Respiratory: No complaints of shortness of breath, no cough  Gastrointestinal: No complaints of jaundice, no bloody stools, no pale stools  Genitourinary: No complaints of dysuria, no hematuria, no nocturia, no frequent urination, no urethral discharge  Musculoskeletal: No complaints of weakness, paralysis, joint stiffness or arthralgias  Integumentary: No complaints of rash, no new lesions  Neurological: No complaints of convulsions, no seizures, no dizziness  Hematologic/Lymphatic: No complaints of easy bruising  Endocrine:  No hot or cold intolerance  No polydipsia, polyphagia, or polyuria  Allergy/immunology:  No environmental allergies  No food allergies  Not immunocompromised  Skin:  No pallor or rash  No wound  Patient Active Problem List   Diagnosis    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    Esophagitis    Diabetes mellitus ([address] )    Weight loss     Past Medical History:   Diagnosis Date    Diabetes mellitus ([address] )     Type II    Weight loss      Past Surgical History:   Procedure Laterality Date    IN EDG US EXAM SURGICAL ALTER STOM DUODENUM/JEJUNUM N/A 9/21/2018    Procedure: LINEAR ENDOSCOPIC U/S;  Surgeon: Cristhian Bingham MD;  [facility];   Service: Gastroenterology    VASCULAR SURGERY      WRIST GANGLION EXCISION       Family History   Problem Relation Age of Onset    No Known Problems Mother     No Known Problems Father     Pancreatic cancer Brother 61    Breast cancer Daughter 36        38s     Social History     Social History    Marital status: /Civil [employer]     Spouse name: N/A    Number of children: N/A    Years of education: N/A     Occupational History    Not on file  Social History Main Topics    Smoking status: Former Smoker     Years: 25 00    Smokeless tobacco: Never Used      Comment: quit about 20 years ago as of 9/21/2018    Alcohol use Yes      Comment: 1 beer a day    Drug use: No    Sexual activity: Not on file     Other Topics Concern    Not on file     Social History Narrative    No narrative on file       Current Outpatient Prescriptions:     metFORMIN (GLUCOPHAGE) 500 mg tablet, daily, Disp: , Rfl:     Multiple Vitamins-Minerals (CENTRUM SILVER PO), Take 1 tablet by mouth daily, Disp: , Rfl:     omeprazole (PriLOSEC) 20 mg delayed release capsule, Take 1 capsule (20 mg total) by mouth daily for 30 days, Disp: 30 capsule, Rfl: 2    aspirin 325 mg tablet, Take 325 mg by mouth daily, Disp: , Rfl:   No Known Allergies  Vitals:    10/16/18 1259   BP: 140/82   Pulse: 73   Resp: 16   Temp: (!) 96 8 °F (36 °C)       Physical Exam   Constitutional: General appearance: The Patient is well-developed and well-nourished who appears the stated age in no acute distress  Patient is pleasant and talkative  HEENT:  Normocephalic  Sclerae are anicteric  Mucous membranes are moist  Neck is supple without adenopathy  No JVD  Chest: The lungs are clear to auscultation  Cardiac: Heart is regular rate  Abdomen: Abdomen is soft, non-tender, non-distended and without masses  Extremities: There is no clubbing or cyanosis  There is no edema  Symmetric  Neuro: Grossly nonfocal  Gait is normal      Lymphatic: No evidence of cervical adenopathy bilaterally  No evidence of axillary adenopathy bilaterally  No evidence of inguinal adenopathy bilaterally  Skin: Warm, anicteric  Psych:  Patient is pleasant and talkative    Breasts:      Pathology:   Non-gynecologic cytology  Final result 9/21/2018   Final Diagnosis A-C  Pancreas, Tail Lesion:  Specimen Adequacy: Satisfactory for evaluation      Interpretation: Negative for malignant cells     Diagnosis: Inflammatory process  See note     Note: The aspirate is composed of foamy histiocytes and neutrophils with associated small vessels suggestive of granulation tissue  Immunohistochemical stains submitted for review confirm the inflammatory nature of the specimen (CD68, CD45, CD10, vimentin positive; beta-catenin, PAX8 and EMA negative)  No epithelial cells are present except for rare cells of gastric epithelium  No pancreatic tissue is sampled  These findings appear to correlate with the imaging description of a rind-like mass surrounding the pancreatic tail  Although this biopsy is not diagnostic of a specific entity, autoimmune pancreatitis warrants clinical consideration      This case was sent to Urban Soulier, MD for expert consultation  Dr Kalee Schuler, a pathology expert at [facility], has reviewed this case and above are exactly her diagnoses  I agree with Dr Fabio Viveros opinion, her original report is on file in the Medical Records Department, [place], [place]  Labs:      Imaging  Xr Chest 1 View Portable    Result Date: 9/22/2018  Narrative: INDICATION:  GI bleed post esophageal biopsy, no chest complaints  ORDERING PROVIDER:  JONATHAN PORTER  TECHNIQUE:  Frontal chest was obtained at 22:01 hours  COMPARISON:  06/28/2018 XR  FINDINGS:  The cardiomediastinal silhouette is normal in size  There is no consolidation or atelectasis in either lung  There are no pleural effusions  There is no pneumothorax  No acute osseous process  Impression: No acute pulmonary abnormality  Signed by Phillip Monteiro MD    Mri Abdomen W Wo Contrast And Mrcp    Result Date: 10/11/2018  Narrative: INDICATION:  Disease of pancreas, unspecified  Excessive weight loss, 40 pounds in 6-12 months   ORDERING PROVIDER: Helio Tipton  TECHNIQUE:  Multiplanar unenhanced MRCP imaging followed by dynamic enhanced imaging through the abdomen following intravenous contrast   3D reformatted images used for MRCP  Gadavist, 5 5 mL was administered intravenously  COMPARISON:  CT abdomen and pelvis 09/13/2018  FINDINGS: 1 cm cyst in the caudate lobe of the liver  Noncirrhotic liver without fatty infiltration  Several cysts identified in the left kidney  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  No focal masses in the kidneys, spleen, adrenal glands, and pancreas  No ascites or lymphadenopathy in the upper abdomen  Contracted gallbladder  No biliary dilatation  The main pancreatic duct is diffusely dilated measuring up to 7 mm without side branch dilatation  Common duct measures 3 mm and tapers smoothly without strictures or filling defects  There is no intrahepatic biliary dilatation  No pancreatic divisum noted  As noted on the recent CT scan there is an enhancing soft tissue density with a central cystic region along the undersurface of the tail the pancreas and abutting the splenic hilum which measures approximately 3 6 x 2 2 cm  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  Impression: Findings concerning for a pancreatic tail mass involving the splenic hilum  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  No definite metastatic disease  The pancreatic duct is diffusely dilated into the head of the pancreas of unclear etiology  Signed by Kim Robbins MD    I reviewed the above laboratory and imaging data  Discussion/Summary:   33-year-old male with a 3 5 cm pancreatic tail mass  I have recommended obtaining an IgG 4 level  This will hopefully rule out autoimmune pancreatitis  If this is negative, I would consider surgical resection with laparoscopic distal pancreatectomy    His spleen may also need to be removed as part of this procedure  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

## 2018-10-16 NOTE — LETTER
October 16, 2018     Valerie Landon, [address] [facility]    Patient: Burney Oppenheim   YOB: 1935   Date of Visit: 10/16/2018       Dear Dr Angy Jain: Thank you for referring Palmer Romberg to me for evaluation  Below are my notes for this consultation  If you have questions, please do not hesitate to call me  I look forward to following your patient along with you  Sincerely,        Leidy Stark MD        CC: MD Leidy Cordova MD  10/16/2018  1:52 PM  Sign at close encounter               Surgical Oncology Consult       [address] [facility]  1935  [id number]  [address] [facility] SURGICAL ONCOLOGY [address] [address]    Diagnoses and all orders for this visit:    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        Chief Complaint   Patient presents with    New patient consultation     Pt is here for consultation regarding panc tail mass found on recent imaging  Pt reports decreased appetite and 40lb weight loss over pass several months  Return in about 2 weeks (around 10/30/2018)  No history exists  History of Present Illness:   82-year-old male who had an approximately 40 lb weight loss  He underwent a CT on September 13, 2018  In indeterminate mass was seen in the tail of the pancreas near the splenic hilum  Patient underwent MRI on October 9, 2018  This was concerning for a pancreatic tail mass involving the splenic hilum  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  No metastasis were seen  The mass measured 3 6 x 2 2 cm  The main pancreatic duct was dilated up to 7 mm  I personally reviewed the films  He comes in now to discuss further therapy  He underwent EUS on September 21, 2018  This revealed pancreatic duct dilatation    There was hyperechoic material in the head of the pancreas  A pancreatic tail lesion was seen measuring up to 3 5 cm in maximal dimension  There was a cystic component in the middle  Biopsy was performed  Pathology was negative for malignant cells  There was a concern for autoimmune pancreatitis  CEA was normal at 2 1  CA 19-9 was normal at 16  He does have a family history of pancreas cancer  No abdominal pain, nausea or vomiting  He is doing better at this time  He has gained approximately 5 lb  Review of Systems  Complete ROS Surg Onc:   Constitutional: The patient denies new or recent history of general fatigue,  no change in appetite  There is a 40 lb weight loss  Eyes: No complaints of visual problems, no scleral icterus  ENT: no complaints of ear pain, no hoarseness, no difficulty swallowing,  no tinnitus and no new masses in head, oral cavity, or neck  Cardiovascular: No complaints of chest pain, no palpitations, no ankle edema  Respiratory: No complaints of shortness of breath, no cough  Gastrointestinal: No complaints of jaundice, no bloody stools, no pale stools  Genitourinary: No complaints of dysuria, no hematuria, no nocturia, no frequent urination, no urethral discharge  Musculoskeletal: No complaints of weakness, paralysis, joint stiffness or arthralgias  Integumentary: No complaints of rash, no new lesions  Neurological: No complaints of convulsions, no seizures, no dizziness  Hematologic/Lymphatic: No complaints of easy bruising  Endocrine:  No hot or cold intolerance  No polydipsia, polyphagia, or polyuria  Allergy/immunology:  No environmental allergies  No food allergies  Not immunocompromised  Skin:  No pallor or rash  No wound            Patient Active Problem List   Diagnosis    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 )    Weight loss     Past Medical History:   Diagnosis Date    Diabetes mellitus ([address] )     Type II    Weight loss Past Surgical History:   Procedure Laterality Date    MS EDG US EXAM SURGICAL ALTER STOM DUODENUM/JEJUNUM N/A 9/21/2018    Procedure: LINEAR ENDOSCOPIC U/S;  Surgeon: Kate Dias MD;  [facility]; Service: Gastroenterology    VASCULAR SURGERY      WRIST GANGLION EXCISION       Family History   Problem Relation Age of Onset    No Known Problems Mother     No Known Problems Father     Pancreatic cancer Brother 61    Breast cancer Daughter 36        38s     Social History     Social History    Marital status: /Civil Union     Spouse name: N/A    Number of children: N/A    Years of education: N/A     Occupational History    Not on file  Social History Main Topics    Smoking status: Former Smoker     Years: 25 00    Smokeless tobacco: Never Used      Comment: quit about 20 years ago as of 9/21/2018    Alcohol use Yes      Comment: 1 beer a day    Drug use: No    Sexual activity: Not on file     Other Topics Concern    Not on file     Social History Narrative    No narrative on file       Current Outpatient Prescriptions:     metFORMIN (GLUCOPHAGE) 500 mg tablet, daily, Disp: , Rfl:     Multiple Vitamins-Minerals (CENTRUM SILVER PO), Take 1 tablet by mouth daily, Disp: , Rfl:     omeprazole (PriLOSEC) 20 mg delayed release capsule, Take 1 capsule (20 mg total) by mouth daily for 30 days, Disp: 30 capsule, Rfl: 2    aspirin 325 mg tablet, Take 325 mg by mouth daily, Disp: , Rfl:   No Known Allergies  Vitals:    10/16/18 1259   BP: 140/82   Pulse: 73   Resp: 16   Temp: (!) 96 8 °F (36 °C)       Physical Exam   Constitutional: General appearance: The Patient is well-developed and well-nourished who appears the stated age in no acute distress  Patient is pleasant and talkative  HEENT:  Normocephalic  Sclerae are anicteric  Mucous membranes are moist  Neck is supple without adenopathy  No JVD  Chest: The lungs are clear to auscultation  Cardiac: Heart is regular rate  Abdomen: Abdomen is soft, non-tender, non-distended and without masses  Extremities: There is no clubbing or cyanosis  There is no edema  Symmetric  Neuro: Grossly nonfocal  Gait is normal      Lymphatic: No evidence of cervical adenopathy bilaterally  No evidence of axillary adenopathy bilaterally  No evidence of inguinal adenopathy bilaterally  Skin: Warm, anicteric  Psych:  Patient is pleasant and talkative  Breasts:      Pathology:   Non-gynecologic cytology  Final result 9/21/2018   Final Diagnosis   A-C  Pancreas, Tail Lesion:  Specimen Adequacy: Satisfactory for evaluation      Interpretation: Negative for malignant cells     Diagnosis: Inflammatory process  See note     Note: The aspirate is composed of foamy histiocytes and neutrophils with associated small vessels suggestive of granulation tissue  Immunohistochemical stains submitted for review confirm the inflammatory nature of the specimen (CD68, CD45, CD10, vimentin positive; beta-catenin, PAX8 and EMA negative)  No epithelial cells are present except for rare cells of gastric epithelium  No pancreatic tissue is sampled  These findings appear to correlate with the imaging description of a rind-like mass surrounding the pancreatic tail  Although this biopsy is not diagnostic of a specific entity, autoimmune pancreatitis warrants clinical consideration      This case was sent to Nicolasa Stout MD for expert consultation  Dr Selena Ovalles, a pathology expert at [facility], has reviewed this case and above are exactly her diagnoses  I agree with Dr Teja Suazo opinion, her original report is on file in the Medical Records Department, [place], [place]  Labs:      Imaging  Xr Chest 1 View Portable    Result Date: 9/22/2018  Narrative: INDICATION:  GI bleed post esophageal biopsy, no chest complaints  ORDERING PROVIDER:  JONATHAN PORTER   TECHNIQUE:  Frontal chest was obtained at 22:01 hours  COMPARISON:  06/28/2018 XR  FINDINGS:  The cardiomediastinal silhouette is normal in size  There is no consolidation or atelectasis in either lung  There are no pleural effusions  There is no pneumothorax  No acute osseous process  Impression: No acute pulmonary abnormality  Signed by Dung Luevano MD    Mri Abdomen W Wo Contrast And Mrcp    Result Date: 10/11/2018  Narrative: INDICATION:  Disease of pancreas, unspecified  Excessive weight loss, 40 pounds in 6-12 months  ORDERING PROVIDER:  Anthony Mayer  TECHNIQUE:  Multiplanar unenhanced MRCP imaging followed by dynamic enhanced imaging through the abdomen following intravenous contrast   3D reformatted images used for MRCP  Gadavist, 5 5 mL was administered intravenously  COMPARISON:  CT abdomen and pelvis 09/13/2018  FINDINGS: 1 cm cyst in the caudate lobe of the liver  Noncirrhotic liver without fatty infiltration  Several cysts identified in the left kidney  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  No focal masses in the kidneys, spleen, adrenal glands, and pancreas  No ascites or lymphadenopathy in the upper abdomen  Contracted gallbladder  No biliary dilatation  The main pancreatic duct is diffusely dilated measuring up to 7 mm without side branch dilatation  Common duct measures 3 mm and tapers smoothly without strictures or filling defects  There is no intrahepatic biliary dilatation  No pancreatic divisum noted  As noted on the recent CT scan there is an enhancing soft tissue density with a central cystic region along the undersurface of the tail the pancreas and abutting the splenic hilum which measures approximately 3 6 x 2 2 cm  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  Impression: Findings concerning for a pancreatic tail mass involving the splenic hilum    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  No definite metastatic disease  The pancreatic duct is diffusely dilated into the head of the pancreas of unclear etiology  Signed by Cierra Dias MD    I reviewed the above laboratory and imaging data  Discussion/Summary:   82-year-old male with a 3 5 cm pancreatic tail mass  I have recommended obtaining an IgG 4 level  This will hopefully rule out autoimmune pancreatitis  If this is negative, I would consider surgical resection with laparoscopic distal pancreatectomy  His spleen may also need to be removed as part of this procedure  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

## 2018-10-18 LAB
IGG SERPL-MCNC: 1304 MG/DL (ref 700–1600)
IGG1 SER-MCNC: 781 MG/DL (ref 248–810)
IGG2 SER-MCNC: 436 MG/DL (ref 130–555)
IGG3 SER-MCNC: 110 MG/DL (ref 15–102)
IGG4 SER-MCNC: 42 MG/DL (ref 2–96)

## 2018-10-30 ENCOUNTER — TRANSCRIBE ORDERS (OUTPATIENT)
Dept: LAB | Facility: CLINIC | Age: 83
End: 2018-10-30

## 2018-10-30 ENCOUNTER — OFFICE VISIT (OUTPATIENT)
Dept: SURGICAL ONCOLOGY | Facility: CLINIC | Age: 83
End: 2018-10-30
Payer: MEDICARE

## 2018-10-30 ENCOUNTER — APPOINTMENT (OUTPATIENT)
Dept: LAB | Facility: CLINIC | Age: 83
End: 2018-10-30
Payer: MEDICARE

## 2018-10-30 VITALS
BODY MASS INDEX: 19.46 KG/M2 | SYSTOLIC BLOOD PRESSURE: 142 MMHG | DIASTOLIC BLOOD PRESSURE: 80 MMHG | TEMPERATURE: 97.8 F | WEIGHT: 124 LBS | HEIGHT: 67 IN | RESPIRATION RATE: 16 BRPM | HEART RATE: 69 BPM

## 2018-10-30 DIAGNOSIS — Z01.818 PREOP EXAMINATION: Primary | ICD-10-CM

## 2018-10-30 DIAGNOSIS — Z01.818 PREOP EXAMINATION: ICD-10-CM

## 2018-10-30 DIAGNOSIS — D37.8 NEOPLASM OF UNCERTAIN BEHAVIOR OF TAIL OF PANCREAS: Primary | ICD-10-CM

## 2018-10-30 LAB
ALBUMIN SERPL BCP-MCNC: 2.8 G/DL (ref 3.5–5)
ALP SERPL-CCNC: 83 U/L (ref 46–116)
ALT SERPL W P-5'-P-CCNC: 21 U/L (ref 12–78)
ANION GAP SERPL CALCULATED.3IONS-SCNC: 9 MMOL/L (ref 4–13)
APTT PPP: 31 SECONDS (ref 24–36)
AST SERPL W P-5'-P-CCNC: 22 U/L (ref 5–45)
BASOPHILS # BLD AUTO: 0.03 THOUSANDS/ΜL (ref 0–0.1)
BASOPHILS NFR BLD AUTO: 0 % (ref 0–1)
BILIRUB SERPL-MCNC: 0.2 MG/DL (ref 0.2–1)
BUN SERPL-MCNC: 9 MG/DL (ref 5–25)
CALCIUM SERPL-MCNC: 8.8 MG/DL (ref 8.3–10.1)
CHLORIDE SERPL-SCNC: 103 MMOL/L (ref 100–108)
CO2 SERPL-SCNC: 29 MMOL/L (ref 21–32)
CREAT SERPL-MCNC: 0.76 MG/DL (ref 0.6–1.3)
EOSINOPHIL # BLD AUTO: 0.09 THOUSAND/ΜL (ref 0–0.61)
EOSINOPHIL NFR BLD AUTO: 1 % (ref 0–6)
ERYTHROCYTE [DISTWIDTH] IN BLOOD BY AUTOMATED COUNT: 13.4 % (ref 11.6–15.1)
EST. AVERAGE GLUCOSE BLD GHB EST-MCNC: 134 MG/DL
GFR SERPL CREATININE-BSD FRML MDRD: 86 ML/MIN/1.73SQ M
GLUCOSE SERPL-MCNC: 163 MG/DL (ref 65–140)
HBA1C MFR BLD: 6.3 % (ref 4.2–6.3)
HCT VFR BLD AUTO: 38.7 % (ref 36.5–49.3)
HGB BLD-MCNC: 12.5 G/DL (ref 12–17)
IMM GRANULOCYTES # BLD AUTO: 0.02 THOUSAND/UL (ref 0–0.2)
IMM GRANULOCYTES NFR BLD AUTO: 0 % (ref 0–2)
INR PPP: 0.92 (ref 0.86–1.17)
LYMPHOCYTES # BLD AUTO: 1.46 THOUSANDS/ΜL (ref 0.6–4.47)
LYMPHOCYTES NFR BLD AUTO: 20 % (ref 14–44)
MCH RBC QN AUTO: 31.9 PG (ref 26.8–34.3)
MCHC RBC AUTO-ENTMCNC: 32.3 G/DL (ref 31.4–37.4)
MCV RBC AUTO: 99 FL (ref 82–98)
MONOCYTES # BLD AUTO: 0.6 THOUSAND/ΜL (ref 0.17–1.22)
MONOCYTES NFR BLD AUTO: 8 % (ref 4–12)
NEUTROPHILS # BLD AUTO: 5.29 THOUSANDS/ΜL (ref 1.85–7.62)
NEUTS SEG NFR BLD AUTO: 71 % (ref 43–75)
NRBC BLD AUTO-RTO: 0 /100 WBCS
PLATELET # BLD AUTO: 308 THOUSANDS/UL (ref 149–390)
PMV BLD AUTO: 10.6 FL (ref 8.9–12.7)
POTASSIUM SERPL-SCNC: 3.7 MMOL/L (ref 3.5–5.3)
PROT SERPL-MCNC: 7.1 G/DL (ref 6.4–8.2)
PROTHROMBIN TIME: 12.1 SECONDS (ref 11.8–14.2)
RBC # BLD AUTO: 3.92 MILLION/UL (ref 3.88–5.62)
SODIUM SERPL-SCNC: 141 MMOL/L (ref 136–145)
WBC # BLD AUTO: 7.49 THOUSAND/UL (ref 4.31–10.16)

## 2018-10-30 PROCEDURE — 85610 PROTHROMBIN TIME: CPT | Performed by: SURGERY

## 2018-10-30 PROCEDURE — 36415 COLL VENOUS BLD VENIPUNCTURE: CPT | Performed by: SURGERY

## 2018-10-30 PROCEDURE — 83036 HEMOGLOBIN GLYCOSYLATED A1C: CPT | Performed by: SURGERY

## 2018-10-30 PROCEDURE — 85025 COMPLETE CBC W/AUTO DIFF WBC: CPT | Performed by: SURGERY

## 2018-10-30 PROCEDURE — 86901 BLOOD TYPING SEROLOGIC RH(D): CPT | Performed by: SURGERY

## 2018-10-30 PROCEDURE — 85730 THROMBOPLASTIN TIME PARTIAL: CPT | Performed by: SURGERY

## 2018-10-30 PROCEDURE — 86900 BLOOD TYPING SEROLOGIC ABO: CPT | Performed by: SURGERY

## 2018-10-30 PROCEDURE — 86850 RBC ANTIBODY SCREEN: CPT | Performed by: SURGERY

## 2018-10-30 PROCEDURE — 80053 COMPREHEN METABOLIC PANEL: CPT | Performed by: SURGERY

## 2018-10-30 PROCEDURE — 99215 OFFICE O/P EST HI 40 MIN: CPT | Performed by: SURGERY

## 2018-10-30 NOTE — LETTER
October 30, 2018     Anival Mora, [facility]    Patient: Abe Jung   YOB: 1935   Date of Visit: 10/30/2018       Dear Dr Fredi Collins: Thank you for referring Veto Delatorre to me for evaluation  Below are my notes for this consultation  If you have questions, please do not hesitate to call me  I look forward to following your patient along with you  Sincerely,        Evelyne Sandhu MD        CC: MD Evelyne Cook MD  10/30/2018 12:25 PM  Sign at close encounter               Surgical Oncology Follow Up       [address] [facility]  1935  [id number]  [address] [facility] SURGICAL ONCOLOGY [address] [address]    Diagnoses and all orders for this visit:    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  -     Case request operating room: LAPAROSCOPIC PANCREATECTOMY DISTAL, POSSIBLE OPEN, SPLENECTOMY; Standing  -     Prepare RBC  -     Type and screen  -     Comprehensive metabolic panel  -     CBC and differential  -     APTT  -     Protime-INR  -     HEMOGLOBIN A1C W/ EAG ESTIMATION  -     Ambulatory referral to [facility]; Future    Other orders  -     Incentive spirometry; Standing  -     Insert and maintain IV line; Standing  -     Void On-Call to O R ; Standing  -     Place sequential compression device; Standing  -     Nursing communcation Please give pre-op Carbohydrate drink to patient 2-4 hours prior to surgery (Drink is provided by [facility]); Standing  -     ceFAZolin (ANCEF) 1,000 mg in dextrose 5 % 100 mL IVPB; Infuse 1,000 mg into a venous catheter once         Chief Complaint   Patient presents with    Follow-up     Pt is here for further discuss treatment plan  No Follow-up on file  No history exists             History of Present Illness: 73-year-old male who had an approximately 40 lb weight loss  He underwent a CT on September 13, 2018  In indeterminate mass was seen in the tail of the pancreas near the splenic hilum  Patient underwent MRI on October 9, 2018  This was concerning for a pancreatic tail mass involving the splenic hilum  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  No metastasis were seen  The mass measured 3 6 x 2 2 cm  The main pancreatic duct was dilated up to 7 mm  I personally reviewed the films  He underwent EUS on September 21, 2018  This revealed pancreatic duct dilatation  There was hyperechoic material in the head of the pancreas  A pancreatic tail lesion was seen measuring up to 3 5 cm in maximal dimension  There was a cystic component in the middle  Biopsy was performed  Pathology was negative for malignant cells  There was a concern for autoimmune pancreatitis  IgG 4 was ultimately normal   His IgG 3 was only mildly elevated  CEA was normal at 2 1  CA 19-9 was normal at 16  He does have a family history of pancreas cancer  No abdominal pain, nausea or vomiting  He is doing better at this time  his weight is stable  Review of Systems  Complete ROS Surg Onc:   Complete ROS Surg Onc:   Constitutional: The patient denies new or recent history of general fatigue,  no change in appetite  His weight is stable, but he has lost weight  Eyes: No complaints of visual problems, no scleral icterus  ENT: no complaints of ear pain, no hoarseness, no difficulty swallowing,  no tinnitus and no new masses in head, oral cavity, or neck  Cardiovascular: No complaints of chest pain, no palpitations, no ankle edema  Respiratory: No complaints of shortness of breath, no cough  Gastrointestinal: No complaints of jaundice, no bloody stools, no pale stools  Genitourinary: No complaints of dysuria, no hematuria, no nocturia, no frequent urination, no urethral discharge     Musculoskeletal: No complaints of weakness, paralysis, joint stiffness or arthralgias  Integumentary: No complaints of rash, no new lesions  Neurological: No complaints of convulsions, no seizures, no dizziness  Hematologic/Lymphatic: No complaints of easy bruising  Endocrine:  No hot or cold intolerance  No polydipsia, polyphagia, or polyuria  Allergy/immunology:  No environmental allergies  No food allergies  Not immunocompromised  Skin:  No pallor or rash  No wound  Patient Active Problem List   Diagnosis    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    Esophagitis    Diabetes mellitus ([facility] )    Weight loss     Past Medical History:   Diagnosis Date    Diabetes mellitus ([address] )     Type II    Weight loss      Past Surgical History:   Procedure Laterality Date    [place] EDG US EXAM SURGICAL ALTER STOM DUODENUM/JEJUNUM N/A 9/21/2018    Procedure: LINEAR ENDOSCOPIC U/S;  Surgeon: Toni Mcgrath MD;  [facility]; Service: Gastroenterology    VASCULAR SURGERY      WRIST GANGLION EXCISION       Family History   Problem Relation Age of Onset    No Known Problems Mother     No Known Problems Father     Pancreatic cancer Brother 61    Breast cancer Daughter 36        38s     Social History     Social History    Marital status: /Civil Union     Spouse name: N/A    Number of children: N/A    Years of education: N/A     Occupational History    Not on file       Social History Main Topics    Smoking status: Former Smoker     Years: 25 00    Smokeless tobacco: Never Used      Comment: quit about 20 years ago as of 9/21/2018    Alcohol use Yes      Comment: 1 beer a day    Drug use: No    Sexual activity: Not on file     Other Topics Concern    Not on file     Social History Narrative    No narrative on file       Current Outpatient Prescriptions:     aspirin 325 mg tablet, Take 325 mg by mouth daily, Disp: , Rfl:     metFORMIN (GLUCOPHAGE) 500 mg tablet, daily, Disp: , Rfl:     Multiple Vitamins-Minerals (CENTRUM SILVER PO), Take 1 tablet by mouth daily, Disp: , Rfl:     omeprazole (PriLOSEC) 20 mg delayed release capsule, Take 1 capsule (20 mg total) by mouth daily for 30 days, Disp: 30 capsule, Rfl: 2  No Known Allergies  Vitals:    10/30/18 1133   BP: 142/80   Pulse: 69   Resp: 16   Temp: 97 8 °F (36 6 °C)       Physical Exam  Constitutional: General appearance: The Patient is well-developed and well-nourished who appears the stated age in no acute distress  Patient is pleasant and talkative  HEENT:  Normocephalic  Sclerae are anicteric  Mucous membranes are moist  Neck is supple without adenopathy  No JVD  Chest: The lungs are clear to auscultation  Cardiac: Heart is regular rate  Abdomen: Abdomen is soft, non-tender, non-distended and without masses  Extremities: There is no clubbing or cyanosis  There is no edema  Symmetric  Neuro: Grossly nonfocal  Gait is normal      Lymphatic: No evidence of cervical adenopathy bilaterally  No evidence of axillary adenopathy bilaterally  No evidence of inguinal adenopathy bilaterally  Skin: Warm, anicteric  Psych:  Patient is pleasant and talkative  Breasts:        Pathology:  [unfilled]    Labs:   Ref Range & Units 10/16/18  1:58 PM Flag   IgG 1 248 - 810 mg/dL 781     IgG 2 130 - 555 mg/dL 436     IgG 3 15 - 102 mg/dL 110   H    IgG 4 2 - 96 mg/dL 42     IgG 700 - 1600 mg/dL 1304           Imaging  Mri Abdomen W Wo Contrast And Mrcp    Result Date: 10/11/2018  Narrative: INDICATION:  Disease of pancreas, unspecified  Excessive weight loss, 40 pounds in 6-12 months  ORDERING PROVIDER:  Herb Mcneill  TECHNIQUE:  Multiplanar unenhanced MRCP imaging followed by dynamic enhanced imaging through the abdomen following intravenous contrast   3D reformatted images used for MRCP  Gadavist, 5 5 mL was administered intravenously  COMPARISON:  CT abdomen and pelvis 09/13/2018  FINDINGS: 1 cm cyst in the caudate lobe of the liver  Noncirrhotic liver without fatty infiltration  Several cysts identified in the left kidney  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  No focal masses in the kidneys, spleen, adrenal glands, and pancreas  No ascites or lymphadenopathy in the upper abdomen  Contracted gallbladder  No biliary dilatation  The main pancreatic duct is diffusely dilated measuring up to 7 mm without side branch dilatation  Common duct measures 3 mm and tapers smoothly without strictures or filling defects  There is no intrahepatic biliary dilatation  No pancreatic divisum noted  As noted on the recent CT scan there is an enhancing soft tissue density with a central cystic region along the undersurface of the tail the pancreas and abutting the splenic hilum which measures approximately 3 6 x 2 2 cm  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  Impression: Findings concerning for a pancreatic tail mass involving the splenic hilum  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  No definite metastatic disease  The pancreatic duct is diffusely dilated into the head of the pancreas of unclear etiology  Signed by Jeramy Christensen MD    I reviewed the above laboratory and imaging data  Discussion/Summary: 53-year-old male with a 3 5 cm pancreatic tail mass  His biopsies were benign  His IgG 4 level is normal  His imaging findings are 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 and this lesion may be related to IPMN  The main duct is only 7 mm in maximal dimension, so this will need to be observed  I would not recommend total pancreatectomy based on the imaging findings  We discussed treatment options including observation versus resection  I have recommended resection based on the size of this lesion and the fact that he has never had pancreatitis    He does have a family history of pancreas cancer this is certainly concerning for an underlying malignancy  If he chose observation, and this was malignant then a potentially curable state at this time would progressed to an on curable situation  After some discussion, he would like to have this surgically removed  I explained the risks of laparoscopic distal pancreatectomy/splenectomy, possible open surgery to include bleeding, infection, recurrence, need for further surgery, wound complications, adjacent organ injury, pancreatic fistula, sepsis, mi, DVT, stroke, pulmonary embolism, and death  Informed consent was obtained  We will schedule this at our earliest mutual convenience once he has obtained medical clearance  He and his family are agreeable to this    All their questions were answered

## 2018-10-30 NOTE — PROGRESS NOTES
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  -     Case request operating room: LAPAROSCOPIC PANCREATECTOMY DISTAL, POSSIBLE OPEN, SPLENECTOMY; Standing  -     Prepare RBC  -     Type and screen  -     Comprehensive metabolic panel  -     CBC and differential  -     APTT  -     Protime-INR  -     HEMOGLOBIN A1C W/ EAG ESTIMATION  -     Ambulatory referral to [facility]; Future    Other orders  -     Incentive spirometry; Standing  -     Insert and maintain IV line; Standing  -     Void On-Call to O R ; Standing  -     Place sequential compression device; Standing  -     Nursing communcation Please give pre-op Carbohydrate drink to patient 2-4 hours prior to surgery (Drink is provided by [facility]); Standing  -     ceFAZolin (ANCEF) 1,000 mg in dextrose 5 % 100 mL IVPB; Infuse 1,000 mg into a venous catheter once         Chief Complaint   Patient presents with    Follow-up     Pt is here for further discuss treatment plan  No Follow-up on file  No history exists  History of Present Illness: 79-year-old male who had an approximately 40 lb weight loss  He underwent a CT on September 13, 2018  In indeterminate mass was seen in the tail of the pancreas near the splenic hilum  Patient underwent MRI on October 9, 2018  This was concerning for a pancreatic tail mass involving the splenic hilum  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  No metastasis were seen  The mass measured 3 6 x 2 2 cm  The main pancreatic duct was dilated up to 7 mm  I personally reviewed the films  He underwent EUS on September 21, 2018    This revealed pancreatic duct dilatation  There was hyperechoic material in the head of the pancreas  A pancreatic tail lesion was seen measuring up to 3 5 cm in maximal dimension  There was a cystic component in the middle  Biopsy was performed  Pathology was negative for malignant cells  There was a concern for autoimmune pancreatitis  IgG 4 was ultimately normal   His IgG 3 was only mildly elevated  CEA was normal at 2 1  CA 19-9 was normal at 16  He does have a family history of pancreas cancer  No abdominal pain, nausea or vomiting  He is doing better at this time  his weight is stable  Review of Systems  Complete ROS Surg Onc:   Complete ROS Surg Onc:   Constitutional: The patient denies new or recent history of general fatigue,  no change in appetite  His weight is stable, but he has lost weight  Eyes: No complaints of visual problems, no scleral icterus  ENT: no complaints of ear pain, no hoarseness, no difficulty swallowing,  no tinnitus and no new masses in head, oral cavity, or neck  Cardiovascular: No complaints of chest pain, no palpitations, no ankle edema  Respiratory: No complaints of shortness of breath, no cough  Gastrointestinal: No complaints of jaundice, no bloody stools, no pale stools  Genitourinary: No complaints of dysuria, no hematuria, no nocturia, no frequent urination, no urethral discharge  Musculoskeletal: No complaints of weakness, paralysis, joint stiffness or arthralgias  Integumentary: No complaints of rash, no new lesions  Neurological: No complaints of convulsions, no seizures, no dizziness  Hematologic/Lymphatic: No complaints of easy bruising  Endocrine:  No hot or cold intolerance  No polydipsia, polyphagia, or polyuria  Allergy/immunology:  No environmental allergies  No food allergies  Not immunocompromised  Skin:  No pallor or rash  No wound          Patient Active Problem List   Diagnosis    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    Esophagitis    Diabetes mellitus ([facility] )    Weight loss     Past Medical History:   Diagnosis Date    Diabetes mellitus ([facility] )     Type II    Weight loss      Past Surgical History:   Procedure Laterality Date    [place] EDG US EXAM SURGICAL ALTER STOM DUODENUM/JEJUNUM N/A 9/21/2018    Procedure: LINEAR ENDOSCOPIC U/S;  Surgeon: Shantal Carroll MD;  Location:  GI LAB; Service: Gastroenterology    VASCULAR SURGERY      WRIST GANGLION EXCISION       Family History   Problem Relation Age of Onset    No Known Problems Mother     No Known Problems Father     Pancreatic cancer Brother 61    Breast cancer Daughter 36        38s     Social History     Social History    Marital status: /Civil Union     Spouse name: N/A    Number of children: N/A    Years of education: N/A     Occupational History    Not on file  Social History Main Topics    Smoking status: Former Smoker     Years: 25 00    Smokeless tobacco: Never Used      Comment: quit about 20 years ago as of 9/21/2018    Alcohol use Yes      Comment: 1 beer a day    Drug use: No    Sexual activity: Not on file     Other Topics Concern    Not on file     Social History Narrative    No narrative on file       Current Outpatient Prescriptions:     aspirin 325 mg tablet, Take 325 mg by mouth daily, Disp: , Rfl:     metFORMIN (GLUCOPHAGE) 500 mg tablet, daily, Disp: , Rfl:     Multiple Vitamins-Minerals (CENTRUM SILVER PO), Take 1 tablet by mouth daily, Disp: , Rfl:     omeprazole (PriLOSEC) 20 mg delayed release capsule, Take 1 capsule (20 mg total) by mouth daily for 30 days, Disp: 30 capsule, Rfl: 2  No Known Allergies  Vitals:    10/30/18 1133   BP: 142/80   Pulse: 69   Resp: 16   Temp: 97 8 °F (36 6 °C)       Physical Exam  Constitutional: General appearance: The Patient is well-developed and well-nourished who appears the stated age in no acute distress  Patient is pleasant and talkative  HEENT:  Normocephalic    Sclerae are anicteric  Mucous membranes are moist  Neck is supple without adenopathy  No JVD  Chest: The lungs are clear to auscultation  Cardiac: Heart is regular rate  Abdomen: Abdomen is soft, non-tender, non-distended and without masses  Extremities: There is no clubbing or cyanosis  There is no edema  Symmetric  Neuro: Grossly nonfocal  Gait is normal      Lymphatic: No evidence of cervical adenopathy bilaterally  No evidence of axillary adenopathy bilaterally  No evidence of inguinal adenopathy bilaterally  Skin: Warm, anicteric  Psych:  Patient is pleasant and talkative  Breasts:        Pathology:  [unfilled]    Labs:   Ref Range & Units 10/16/18  1:58 PM Flag   IgG 1 248 - 810 mg/dL 781     IgG 2 130 - 555 mg/dL 436     IgG 3 15 - 102 mg/dL 110   H    IgG 4 2 - 96 mg/dL 42     IgG 700 - 1600 mg/dL 1304           Imaging  Mri Abdomen W Wo Contrast And Mrcp    Result Date: 10/11/2018  Narrative: INDICATION:  Disease of pancreas, unspecified  Excessive weight loss, 40 pounds in 6-12 months  ORDERING PROVIDER:  Jude Christie  TECHNIQUE:  Multiplanar unenhanced MRCP imaging followed by dynamic enhanced imaging through the abdomen following intravenous contrast   3D reformatted images used for MRCP  Gadavist, 5 5 mL was administered intravenously  COMPARISON:  CT abdomen and pelvis 09/13/2018  FINDINGS: 1 cm cyst in the caudate lobe of the liver  Noncirrhotic liver without fatty infiltration  Several cysts identified in the left kidney  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  No focal masses in the kidneys, spleen, adrenal glands, and pancreas  No ascites or lymphadenopathy in the upper abdomen  Contracted gallbladder  No biliary dilatation  The main pancreatic duct is diffusely dilated measuring up to 7 mm without side branch dilatation  Common duct measures 3 mm and tapers smoothly without strictures or filling defects  There is no intrahepatic biliary dilatation     No pancreatic divisum noted  As noted on the recent CT scan there is an enhancing soft tissue density with a central cystic region along the undersurface of the tail the pancreas and abutting the splenic hilum which measures approximately 3 6 x 2 2 cm  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  Impression: Findings concerning for a pancreatic tail mass involving the splenic hilum  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  No definite metastatic disease  The pancreatic duct is diffusely dilated into the head of the pancreas of unclear etiology  Signed by Stevan Vaughn MD    I reviewed the above laboratory and imaging data  Discussion/Summary: 59-year-old male with a 3 5 cm pancreatic tail mass  His biopsies were benign  His IgG 4 level is normal  His imaging findings are 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 and this lesion may be related to IPMN  The main duct is only 7 mm in maximal dimension, so this will need to be observed  I would not recommend total pancreatectomy based on the imaging findings  We discussed treatment options including observation versus resection  I have recommended resection based on the size of this lesion and the fact that he has never had pancreatitis  He does have a family history of pancreas cancer this is certainly concerning for an underlying malignancy  If he chose observation, and this was malignant then a potentially curable state at this time would progressed to an on curable situation  After some discussion, he would like to have this surgically removed  I explained the risks of laparoscopic distal pancreatectomy/splenectomy, possible open surgery to include bleeding, infection, recurrence, need for further surgery, wound complications, adjacent organ injury, pancreatic fistula, sepsis, mi, DVT, stroke, pulmonary embolism, and death    Informed consent was obtained  We will schedule this at our earliest mutual convenience once he has obtained medical clearance  He and his family are agreeable to this    All their questions were answered

## 2018-10-31 LAB
ABO GROUP BLD: NORMAL
BLD GP AB SCN SERPL QL: NEGATIVE
RH BLD: NEGATIVE
SPECIMEN EXPIRATION DATE: NORMAL

## 2018-11-07 ENCOUNTER — HOSPITAL ENCOUNTER (OUTPATIENT)
Dept: SURGERY | Facility: HOSPITAL | Age: 83
Discharge: HOME | End: 2018-11-07
Payer: MEDICARE

## 2018-11-07 VITALS
HEART RATE: 70 BPM | HEIGHT: 67 IN | TEMPERATURE: 96.9 F | OXYGEN SATURATION: 96 % | RESPIRATION RATE: 18 BRPM | BODY MASS INDEX: 19.46 KG/M2 | WEIGHT: 124 LBS | DIASTOLIC BLOOD PRESSURE: 86 MMHG | SYSTOLIC BLOOD PRESSURE: 187 MMHG

## 2018-11-07 PROCEDURE — G0009 ADMIN PNEUMOCOCCAL VACCINE: HCPCS

## 2018-11-07 PROCEDURE — 90670 PCV13 VACCINE IM: CPT | Performed by: SURGERY

## 2018-11-07 PROCEDURE — 90734 MENACWYD/MENACWYCRM VACC IM: CPT | Performed by: SURGERY

## 2018-11-07 PROCEDURE — 90472 IMMUNIZATION ADMIN EACH ADD: CPT

## 2018-11-07 RX ADMIN — NEISSERIA MENINGITIDIS GROUP A CAPSULAR POLYSACCHARIDE DIPHTHERIA TOXOID CONJUGATE ANTIGEN, NEISSERIA MENINGITIDIS GROUP C CAPSULAR POLYSACCHARIDE DIPHTHERIA TOXOID CONJUGATE ANTIGEN, NEISSERIA MENINGITIDIS GROUP Y CAPSULAR POLYSACCHARIDE DIPHTHERIA TOXOID CONJUGATE ANTIGEN, AND NEISSERIA MENINGITIDIS GROUP W-135 CAPSULAR POLYSACCHARIDE DIPHTHERIA TOXOID CONJUGATE ANTIGEN 0.5 ML: 4; 4; 4; 4 INJECTION, SOLUTION INTRAMUSCULAR at 12:31

## 2018-11-07 RX ADMIN — PNEUMOCOCCAL 13-VALENT CONJUGATE VACCINE 0.5 ML: 2.2; 2.2; 2.2; 2.2; 2.2; 4.4; 2.2; 2.2; 2.2; 2.2; 2.2; 2.2; 2.2 INJECTION, SUSPENSION INTRAMUSCULAR at 12:54

## 2018-11-07 NOTE — DISCHARGE INSTRUCTIONS
Meningococcal Vaccine for Adults   WHAT YOU NEED TO KNOW:   What is the meningococcal vaccine? The meningococcal vaccine is an injection given to protect you from certain types of meningococcal disease  Meningococcal disease is an infection caused by meningococci bacteria  The infection may cause serious disease, such as meningitis  Meningitis causes swelling of the fluid and lining that covers your brain and spinal cord  Meningococcal disease is spread from person to person through the air  The vaccine begins to protect you 1 to 2 weeks after you get it  The vaccine may protect you for 3 to 5 years  When should I get the meningococcal vaccine? Your healthcare provider will tell you how many doses you need and when to get each dose  You will need at least 1 dose if you have a low risk for meningococcal disease  You will need 2 or more doses if you have a high risk  You will also need a booster dose every 5 years if you have a high risk  Any of the following can increase your risk for meningococcal disease:  · A damaged or removed spleen    · Persistent complement component deficiency (PCCD)    · You take a medicine called eculizumab (S  ·   · oliris®)    · HIV infection    · Working as a microbiologist who is exposed to meningococcus germs    ·  service    · Living in or traveling to areas where meningococcal infection is common    · Exposure to the infection during an outbreak of the disease    · Living in student housing if you did not receive the vaccine on or after your 16th birthday  Who should not get the meningococcal vaccine or should wait to get it? · You should not get the vaccine  if you have had an allergic reaction to the vaccine or any component of the vaccine, such as thimerosal (mercury)  · You should wait to get the vaccine  if you are sick or have a fever of 101°F (38 3°C) or higher  What are the risks of the meningococcal vaccine?   The most common problems are redness, warmth, swelling, or pain where the shot was given  You may feel tired, or you may get a headache, mild fever, or chills  You may also have muscle or joint pain, or nausea or diarrhea  These symptoms may last up to 7 days  Rarely, you may have a severe allergic reaction to the vaccine  This can be life-threatening  Call 911 for any of the following:   · Your mouth and throat are swollen  · You are wheezing or have trouble breathing  · You have chest pain or your heart is beating faster than normal for you  · You feel like you are going to faint  When should I seek immediate care? · Your face is red or swollen  · You have hives that spread over your body  · You feel weak or dizzy  When should I contact my healthcare provider? · You have increased pain, redness, or swelling around the area where the shot was given  · You have questions or concerns about the meningococcal vaccine  CARE AGREEMENT:   You have the right to help plan your care  Learn about your health condition and how it may be treated  Discuss treatment options with your caregivers to decide what care you want to receive  You always have the right to refuse treatment  The above information is an  only  It is not intended as medical advice for individual conditions or treatments  Talk to your doctor, nurse or pharmacist before following any medical regimen to see if it is safe and effective for you  © 2017 [address] Information is for End User's use only and may not be sold, redistributed or otherwise used for commercial purposes  All illustrations and images included in CareNotes® are the copyrighted property of [employer]  or Schuyler Bernardo  Pneumococcal Vaccine for Adults   WHAT YOU NEED TO KNOW:   What is the pneumococcal vaccine? The pneumococcal vaccine is an injection given to protect you from pneumococcal disease   Pneumococcal disease is an infection caused by pneumococcal bacteria  The infection may cause pneumonia or an ear infection  Pneumococcal disease is spread from person to person through coughing and sneezing  You may be given the pneumococcal conjugate vaccine (PCV) or the pneumococcal polysaccharide vaccine (PPSV)  Who should get the pneumococcal vaccine? Adults aged 72 years or older  usually receive 1 dose of each pneumococcal vaccine at least 1 year apart  Your healthcare provider will tell you if you need more vaccine doses and when to get them  Adults aged 23 to 59 at high risk  for pneumococcal disease will need 1 or more doses of the vaccine  If you are Native [facility] or [facility], ask your healthcare provider if you need the vaccine  Any of the following can increase your risk for pneumococcal disease:     A damaged or removed spleen, or sickle cell disease    A weak immune system caused by conditions such as HIV, cancer, or kidney failure    A cerebrospinal fluid leak    Heart, lung, or liver disease, alcoholism, or diabetes    A cochlear implant    Lung problems from asthma or from smoking cigarettes    Living in a nursing home or long-term care facility  Who should not get the pneumococcal vaccine or should wait to get it? You should not get the vaccine  if you have had an allergic reaction to it or other vaccines in the past      You should wait to get the vaccine  if you are sick or have a fever  What are the risks of the pneumococcal vaccine? The area where the vaccine was given may be red, tender, or swollen  You may get a fever and have muscle pain  You may still get pneumococcal disease, even after you get the vaccine  You may have an allergic reaction to the vaccine  This can be life-threatening  Women should get the vaccine before they become pregnant, if possible  Talk to your healthcare provider about risks to you or your baby if you get the vaccine while you are pregnant  Call 911 for any of the following:    Your mouth and throat are swollen  You are wheezing or have trouble breathing  You have chest pain or your heart is beating faster than normal for you  You feel like you are going to faint  When should I seek immediate care? Your face is red or swollen  You have hives that spread over your body  You feel weak or dizzy  When should I contact my healthcare provider? You have a fever  You have swollen or painful lymph nodes in your neck  You have increased pain, redness, or swelling around the area where the shot was given  You have questions or concerns about the pneumococcal vaccine  CARE AGREEMENT:   You have the right to help plan your care  Learn about your health condition and how it may be treated  Discuss treatment options with your caregivers to decide what care you want to receive  You always have the right to refuse treatment  The above information is an  only  It is not intended as medical advice for individual conditions or treatments  Talk to your doctor, nurse or pharmacist before following any medical regimen to see if it is safe and effective for you  © 2017 [address] Information is for End User's use only and may not be sold, redistributed or otherwise used for commercial purposes  All illustrations and images included in CareNotes® are the copyrighted property of A D A M [employer] Inc  or Schuyler Chang

## 2018-11-07 NOTE — PROGRESS NOTES
Patient ordered: Frank Flores and ActHib  ActiHib not in stock  Spoke with Dr Jalil Cortez office regarding unavailability of ActHib  Provider said ok to be given after procedure  Visited patient and discussed treatment plan change  Patient had no concerns at current time  Instructed patient to contact provider with any future questions or concerns

## 2018-11-12 ENCOUNTER — ANESTHESIA EVENT (OUTPATIENT)
Dept: PERIOP | Facility: HOSPITAL | Age: 83
DRG: 421 | End: 2018-11-12
Payer: MEDICARE

## 2018-11-19 ENCOUNTER — ANESTHESIA (OUTPATIENT)
Dept: PERIOP | Facility: HOSPITAL | Age: 83
DRG: 421 | End: 2018-11-19
Payer: MEDICARE

## 2018-11-19 ENCOUNTER — HOSPITAL ENCOUNTER (INPATIENT)
Facility: HOSPITAL | Age: 83
LOS: 4 days | Discharge: HOME HEALTH | DRG: 421 | End: 2018-11-23
Attending: SURGERY | Admitting: SURGERY
Payer: MEDICARE

## 2018-11-19 DIAGNOSIS — D37.8 NEOPLASM OF UNCERTAIN BEHAVIOR OF TAIL OF PANCREAS: ICD-10-CM

## 2018-11-19 LAB
ABO GROUP BLD: NORMAL
ANION GAP SERPL CALCULATED.3IONS-SCNC: 7 MMOL/L (ref 4–13)
BASE EXCESS BLDA CALC-SCNC: -3 MMOL/L (ref -2–3)
BASE EXCESS BLDA CALC-SCNC: 1 MMOL/L (ref -2–3)
BASOPHILS # BLD MANUAL: 0 THOUSAND/UL (ref 0–0.1)
BASOPHILS NFR MAR MANUAL: 0 % (ref 0–1)
BLD GP AB SCN SERPL QL: NEGATIVE
BUN SERPL-MCNC: 11 MG/DL (ref 5–25)
CA-I BLD-SCNC: 1.03 MMOL/L (ref 1.12–1.32)
CA-I BLD-SCNC: 1.14 MMOL/L (ref 1.12–1.32)
CALCIUM SERPL-MCNC: 7.2 MG/DL (ref 8.3–10.1)
CHLORIDE SERPL-SCNC: 109 MMOL/L (ref 100–108)
CO2 SERPL-SCNC: 23 MMOL/L (ref 21–32)
CREAT SERPL-MCNC: 0.82 MG/DL (ref 0.6–1.3)
EOSINOPHIL # BLD MANUAL: 0 THOUSAND/UL (ref 0–0.4)
EOSINOPHIL NFR BLD MANUAL: 0 % (ref 0–6)
ERYTHROCYTE [DISTWIDTH] IN BLOOD BY AUTOMATED COUNT: 15.9 % (ref 11.6–15.1)
GFR SERPL CREATININE-BSD FRML MDRD: 83 ML/MIN/1.73SQ M
GLUCOSE SERPL-MCNC: 163 MG/DL (ref 65–140)
GLUCOSE SERPL-MCNC: 192 MG/DL (ref 65–140)
GLUCOSE SERPL-MCNC: 195 MG/DL (ref 65–140)
GLUCOSE SERPL-MCNC: 247 MG/DL (ref 65–140)
HCO3 BLDA-SCNC: 22.3 MMOL/L (ref 24–30)
HCO3 BLDA-SCNC: 26 MMOL/L (ref 24–30)
HCT VFR BLD AUTO: 34 % (ref 36.5–49.3)
HCT VFR BLD CALC: 28 % (ref 36.5–49.3)
HCT VFR BLD CALC: 32 % (ref 36.5–49.3)
HGB BLD-MCNC: 10.8 G/DL (ref 12–17)
HGB BLDA-MCNC: 10.9 G/DL (ref 12–17)
HGB BLDA-MCNC: 9.5 G/DL (ref 12–17)
LYMPHOCYTES # BLD AUTO: 0.15 THOUSAND/UL (ref 0.6–4.47)
LYMPHOCYTES # BLD AUTO: 1 % (ref 14–44)
MCH RBC QN AUTO: 31.1 PG (ref 26.8–34.3)
MCHC RBC AUTO-ENTMCNC: 31.8 G/DL (ref 31.4–37.4)
MCV RBC AUTO: 98 FL (ref 82–98)
MONOCYTES # BLD AUTO: 0.46 THOUSAND/UL (ref 0–1.22)
MONOCYTES NFR BLD: 3 % (ref 4–12)
NEUTROPHILS # BLD MANUAL: 14.86 THOUSAND/UL (ref 1.85–7.62)
NEUTS BAND NFR BLD MANUAL: 2 % (ref 0–8)
NEUTS SEG NFR BLD AUTO: 94 % (ref 43–75)
NRBC BLD AUTO-RTO: 0 /100 WBCS
PCO2 BLD: 24 MMOL/L (ref 21–32)
PCO2 BLD: 27 MMOL/L (ref 21–32)
PCO2 BLD: 41 MM HG (ref 42–50)
PCO2 BLD: 42.9 MM HG (ref 42–50)
PH BLD: 7.34 [PH] (ref 7.3–7.4)
PH BLD: 7.39 [PH] (ref 7.3–7.4)
PLATELET # BLD AUTO: 134 THOUSANDS/UL (ref 149–390)
PLATELET BLD QL SMEAR: ADEQUATE
PMV BLD AUTO: 11 FL (ref 8.9–12.7)
PO2 BLD: 129 MM HG (ref 35–45)
PO2 BLD: 137 MM HG (ref 35–45)
POIKILOCYTOSIS BLD QL SMEAR: PRESENT
POLYCHROMASIA BLD QL SMEAR: PRESENT
POTASSIUM BLD-SCNC: 3.3 MMOL/L (ref 3.5–5.3)
POTASSIUM BLD-SCNC: 3.5 MMOL/L (ref 3.5–5.3)
POTASSIUM SERPL-SCNC: 4.2 MMOL/L (ref 3.5–5.3)
RBC # BLD AUTO: 3.47 MILLION/UL (ref 3.88–5.62)
RBC MORPH BLD: PRESENT
RH BLD: NEGATIVE
SAO2 % BLD FROM PO2: 99 % (ref 95–98)
SAO2 % BLD FROM PO2: 99 % (ref 95–98)
SODIUM BLD-SCNC: 140 MMOL/L (ref 136–145)
SODIUM BLD-SCNC: 142 MMOL/L (ref 136–145)
SODIUM SERPL-SCNC: 139 MMOL/L (ref 136–145)
SPECIMEN EXPIRATION DATE: NORMAL
SPECIMEN SOURCE: ABNORMAL
SPECIMEN SOURCE: ABNORMAL
WBC # BLD AUTO: 15.48 THOUSAND/UL (ref 4.31–10.16)

## 2018-11-19 PROCEDURE — 0FBG0ZX EXCISION OF PANCREAS, OPEN APPROACH, DIAGNOSTIC: ICD-10-PCS | Performed by: SURGERY

## 2018-11-19 PROCEDURE — 88342 IMHCHEM/IMCYTCHM 1ST ANTB: CPT | Performed by: PATHOLOGY

## 2018-11-19 PROCEDURE — 48100 BIOPSY OF PANCREAS OPEN: CPT | Performed by: SURGERY

## 2018-11-19 PROCEDURE — 86850 RBC ANTIBODY SCREEN: CPT | Performed by: SURGERY

## 2018-11-19 PROCEDURE — 04L20ZZ OCCLUSION OF GASTRIC ARTERY, OPEN APPROACH: ICD-10-PCS | Performed by: SURGERY

## 2018-11-19 PROCEDURE — P9021 RED BLOOD CELLS UNIT: HCPCS

## 2018-11-19 PROCEDURE — 88313 SPECIAL STAINS GROUP 2: CPT | Performed by: PATHOLOGY

## 2018-11-19 PROCEDURE — 82948 REAGENT STRIP/BLOOD GLUCOSE: CPT

## 2018-11-19 PROCEDURE — 88307 TISSUE EXAM BY PATHOLOGIST: CPT | Performed by: PATHOLOGY

## 2018-11-19 PROCEDURE — 88341 IMHCHEM/IMCYTCHM EA ADD ANTB: CPT | Performed by: PATHOLOGY

## 2018-11-19 PROCEDURE — 88331 PATH CONSLTJ SURG 1 BLK 1SPC: CPT | Performed by: PATHOLOGY

## 2018-11-19 PROCEDURE — 86901 BLOOD TYPING SEROLOGIC RH(D): CPT | Performed by: SURGERY

## 2018-11-19 PROCEDURE — 85007 BL SMEAR W/DIFF WBC COUNT: CPT | Performed by: SURGERY

## 2018-11-19 PROCEDURE — 84132 ASSAY OF SERUM POTASSIUM: CPT

## 2018-11-19 PROCEDURE — 82330 ASSAY OF CALCIUM: CPT

## 2018-11-19 PROCEDURE — 30233N1 TRANSFUSION OF NONAUTOLOGOUS RED BLOOD CELLS INTO PERIPHERAL VEIN, PERCUTANEOUS APPROACH: ICD-10-PCS | Performed by: SURGERY

## 2018-11-19 PROCEDURE — 80048 BASIC METABOLIC PNL TOTAL CA: CPT | Performed by: SURGERY

## 2018-11-19 PROCEDURE — 86920 COMPATIBILITY TEST SPIN: CPT

## 2018-11-19 PROCEDURE — 82803 BLOOD GASES ANY COMBINATION: CPT

## 2018-11-19 PROCEDURE — 82947 ASSAY GLUCOSE BLOOD QUANT: CPT

## 2018-11-19 PROCEDURE — 84295 ASSAY OF SERUM SODIUM: CPT

## 2018-11-19 PROCEDURE — C9290 INJ, BUPIVACAINE LIPOSOME: HCPCS | Performed by: ANESTHESIOLOGY

## 2018-11-19 PROCEDURE — 85014 HEMATOCRIT: CPT

## 2018-11-19 PROCEDURE — 85027 COMPLETE CBC AUTOMATED: CPT | Performed by: SURGERY

## 2018-11-19 PROCEDURE — 86900 BLOOD TYPING SEROLOGIC ABO: CPT | Performed by: SURGERY

## 2018-11-19 RX ORDER — SODIUM CHLORIDE 9 MG/ML
INJECTION, SOLUTION INTRAVENOUS CONTINUOUS PRN
Status: DISCONTINUED | OUTPATIENT
Start: 2018-11-19 | End: 2018-11-19 | Stop reason: SURG

## 2018-11-19 RX ORDER — MAGNESIUM HYDROXIDE 1200 MG/15ML
LIQUID ORAL AS NEEDED
Status: DISCONTINUED | OUTPATIENT
Start: 2018-11-19 | End: 2018-11-19 | Stop reason: HOSPADM

## 2018-11-19 RX ORDER — ROCURONIUM BROMIDE 10 MG/ML
INJECTION, SOLUTION INTRAVENOUS AS NEEDED
Status: DISCONTINUED | OUTPATIENT
Start: 2018-11-19 | End: 2018-11-19 | Stop reason: SURG

## 2018-11-19 RX ORDER — MEPERIDINE HYDROCHLORIDE 25 MG/ML
12.5 INJECTION INTRAMUSCULAR; INTRAVENOUS; SUBCUTANEOUS
Status: DISCONTINUED | OUTPATIENT
Start: 2018-11-19 | End: 2018-11-19 | Stop reason: HOSPADM

## 2018-11-19 RX ORDER — METOCLOPRAMIDE HYDROCHLORIDE 5 MG/ML
10 INJECTION INTRAMUSCULAR; INTRAVENOUS ONCE AS NEEDED
Status: DISCONTINUED | OUTPATIENT
Start: 2018-11-19 | End: 2018-11-19 | Stop reason: HOSPADM

## 2018-11-19 RX ORDER — SODIUM CHLORIDE 9 MG/ML
100 INJECTION, SOLUTION INTRAVENOUS CONTINUOUS
Status: DISCONTINUED | OUTPATIENT
Start: 2018-11-19 | End: 2018-11-20

## 2018-11-19 RX ORDER — HYDROMORPHONE HCL/PF 1 MG/ML
0.4 SYRINGE (ML) INJECTION
Status: DISCONTINUED | OUTPATIENT
Start: 2018-11-19 | End: 2018-11-19 | Stop reason: HOSPADM

## 2018-11-19 RX ORDER — EPHEDRINE SULFATE 50 MG/ML
INJECTION, SOLUTION INTRAVENOUS AS NEEDED
Status: DISCONTINUED | OUTPATIENT
Start: 2018-11-19 | End: 2018-11-19 | Stop reason: SURG

## 2018-11-19 RX ORDER — ONDANSETRON 2 MG/ML
INJECTION INTRAMUSCULAR; INTRAVENOUS AS NEEDED
Status: DISCONTINUED | OUTPATIENT
Start: 2018-11-19 | End: 2018-11-19 | Stop reason: SURG

## 2018-11-19 RX ORDER — SODIUM CHLORIDE, SODIUM LACTATE, POTASSIUM CHLORIDE, CALCIUM CHLORIDE 600; 310; 30; 20 MG/100ML; MG/100ML; MG/100ML; MG/100ML
75 INJECTION, SOLUTION INTRAVENOUS CONTINUOUS
Status: DISCONTINUED | OUTPATIENT
Start: 2018-11-19 | End: 2018-11-19

## 2018-11-19 RX ORDER — LABETALOL HYDROCHLORIDE 5 MG/ML
5 INJECTION, SOLUTION INTRAVENOUS ONCE
Status: COMPLETED | OUTPATIENT
Start: 2018-11-19 | End: 2018-11-19

## 2018-11-19 RX ORDER — ALBUMIN, HUMAN INJ 5% 5 %
SOLUTION INTRAVENOUS CONTINUOUS PRN
Status: DISCONTINUED | OUTPATIENT
Start: 2018-11-19 | End: 2018-11-19 | Stop reason: SURG

## 2018-11-19 RX ORDER — MAGNESIUM SULFATE HEPTAHYDRATE 40 MG/ML
INJECTION, SOLUTION INTRAVENOUS AS NEEDED
Status: DISCONTINUED | OUTPATIENT
Start: 2018-11-19 | End: 2018-11-19 | Stop reason: SURG

## 2018-11-19 RX ORDER — BUPIVACAINE HYDROCHLORIDE 2.5 MG/ML
INJECTION, SOLUTION INFILTRATION; PERINEURAL AS NEEDED
Status: DISCONTINUED | OUTPATIENT
Start: 2018-11-19 | End: 2018-11-19 | Stop reason: SURG

## 2018-11-19 RX ORDER — HYDROMORPHONE HCL/PF 1 MG/ML
0.5 SYRINGE (ML) INJECTION
Status: DISCONTINUED | OUTPATIENT
Start: 2018-11-19 | End: 2018-11-22

## 2018-11-19 RX ORDER — FENTANYL CITRATE 50 UG/ML
INJECTION, SOLUTION INTRAMUSCULAR; INTRAVENOUS AS NEEDED
Status: DISCONTINUED | OUTPATIENT
Start: 2018-11-19 | End: 2018-11-19 | Stop reason: SURG

## 2018-11-19 RX ORDER — PROPOFOL 10 MG/ML
INJECTION, EMULSION INTRAVENOUS AS NEEDED
Status: DISCONTINUED | OUTPATIENT
Start: 2018-11-19 | End: 2018-11-19 | Stop reason: SURG

## 2018-11-19 RX ORDER — LIDOCAINE HYDROCHLORIDE 10 MG/ML
INJECTION, SOLUTION INFILTRATION; PERINEURAL AS NEEDED
Status: DISCONTINUED | OUTPATIENT
Start: 2018-11-19 | End: 2018-11-19 | Stop reason: SURG

## 2018-11-19 RX ORDER — HYDRALAZINE HYDROCHLORIDE 20 MG/ML
INJECTION INTRAMUSCULAR; INTRAVENOUS AS NEEDED
Status: DISCONTINUED | OUTPATIENT
Start: 2018-11-19 | End: 2018-11-19 | Stop reason: SURG

## 2018-11-19 RX ORDER — DOCUSATE SODIUM 100 MG/1
100 CAPSULE, LIQUID FILLED ORAL 2 TIMES DAILY
Status: DISCONTINUED | OUTPATIENT
Start: 2018-11-19 | End: 2018-11-23 | Stop reason: HOSPADM

## 2018-11-19 RX ORDER — HYDROMORPHONE HCL/PF 1 MG/ML
0.2 SYRINGE (ML) INJECTION
Status: DISCONTINUED | OUTPATIENT
Start: 2018-11-19 | End: 2018-11-19 | Stop reason: HOSPADM

## 2018-11-19 RX ORDER — ONDANSETRON 2 MG/ML
4 INJECTION INTRAMUSCULAR; INTRAVENOUS EVERY 4 HOURS PRN
Status: DISCONTINUED | OUTPATIENT
Start: 2018-11-19 | End: 2018-11-23 | Stop reason: HOSPADM

## 2018-11-19 RX ORDER — CALCIUM CHLORIDE 100 MG/ML
INJECTION INTRAVENOUS; INTRAVENTRICULAR AS NEEDED
Status: DISCONTINUED | OUTPATIENT
Start: 2018-11-19 | End: 2018-11-19 | Stop reason: SURG

## 2018-11-19 RX ORDER — HYDROMORPHONE HYDROCHLORIDE 2 MG/ML
INJECTION, SOLUTION INTRAMUSCULAR; INTRAVENOUS; SUBCUTANEOUS AS NEEDED
Status: DISCONTINUED | OUTPATIENT
Start: 2018-11-19 | End: 2018-11-19 | Stop reason: SURG

## 2018-11-19 RX ORDER — ONDANSETRON 2 MG/ML
4 INJECTION INTRAMUSCULAR; INTRAVENOUS ONCE AS NEEDED
Status: DISCONTINUED | OUTPATIENT
Start: 2018-11-19 | End: 2018-11-19 | Stop reason: HOSPADM

## 2018-11-19 RX ADMIN — SUGAMMADEX 400 MG: 100 INJECTION, SOLUTION INTRAVENOUS at 14:50

## 2018-11-19 RX ADMIN — PROPOFOL 75 MG: 10 INJECTION, EMULSION INTRAVENOUS at 11:03

## 2018-11-19 RX ADMIN — CALCIUM CHLORIDE 0.2 G: 100 INJECTION PARENTERAL at 14:49

## 2018-11-19 RX ADMIN — PROPOFOL 20 MG: 10 INJECTION, EMULSION INTRAVENOUS at 15:13

## 2018-11-19 RX ADMIN — ROCURONIUM BROMIDE 40 MG: 10 INJECTION INTRAVENOUS at 11:03

## 2018-11-19 RX ADMIN — MAGNESIUM SULFATE IN WATER 1 G: 40 INJECTION, SOLUTION INTRAVENOUS at 13:44

## 2018-11-19 RX ADMIN — SODIUM CHLORIDE, SODIUM LACTATE, POTASSIUM CHLORIDE, AND CALCIUM CHLORIDE: .6; .31; .03; .02 INJECTION, SOLUTION INTRAVENOUS at 11:34

## 2018-11-19 RX ADMIN — HYDROMORPHONE HYDROCHLORIDE 0.5 MG: 2 INJECTION, SOLUTION INTRAMUSCULAR; INTRAVENOUS; SUBCUTANEOUS at 12:50

## 2018-11-19 RX ADMIN — FENTANYL CITRATE 50 MCG: 50 INJECTION, SOLUTION INTRAMUSCULAR; INTRAVENOUS at 11:03

## 2018-11-19 RX ADMIN — BUPIVACAINE 20 ML: 13.3 INJECTION, SUSPENSION, LIPOSOMAL INFILTRATION at 17:32

## 2018-11-19 RX ADMIN — ROCURONIUM BROMIDE 20 MG: 10 INJECTION INTRAVENOUS at 11:50

## 2018-11-19 RX ADMIN — ALBUMIN HUMAN: 0.05 INJECTION, SOLUTION INTRAVENOUS at 13:04

## 2018-11-19 RX ADMIN — LIDOCAINE HYDROCHLORIDE 50 MG: 10 INJECTION, SOLUTION INFILTRATION; PERINEURAL at 11:03

## 2018-11-19 RX ADMIN — PHENYLEPHRINE HYDROCHLORIDE 50 MCG/MIN: 10 INJECTION INTRAVENOUS at 12:57

## 2018-11-19 RX ADMIN — ONDANSETRON 4 MG: 2 INJECTION INTRAMUSCULAR; INTRAVENOUS at 14:38

## 2018-11-19 RX ADMIN — SODIUM CHLORIDE, SODIUM LACTATE, POTASSIUM CHLORIDE, AND CALCIUM CHLORIDE: .6; .31; .03; .02 INJECTION, SOLUTION INTRAVENOUS at 10:56

## 2018-11-19 RX ADMIN — PROPOFOL 30 MG: 10 INJECTION, EMULSION INTRAVENOUS at 11:13

## 2018-11-19 RX ADMIN — BUPIVACAINE HYDROCHLORIDE 30 ML: 2.5 INJECTION, SOLUTION INFILTRATION; PERINEURAL at 15:13

## 2018-11-19 RX ADMIN — EPHEDRINE SULFATE 15 MG: 50 INJECTION, SOLUTION INTRAMUSCULAR; INTRAVENOUS; SUBCUTANEOUS at 12:13

## 2018-11-19 RX ADMIN — PROPOFOL 50 MG: 10 INJECTION, EMULSION INTRAVENOUS at 14:59

## 2018-11-19 RX ADMIN — ALBUMIN HUMAN: 0.05 INJECTION, SOLUTION INTRAVENOUS at 13:05

## 2018-11-19 RX ADMIN — ROCURONIUM BROMIDE 20 MG: 10 INJECTION INTRAVENOUS at 12:45

## 2018-11-19 RX ADMIN — FENTANYL CITRATE 50 MCG: 50 INJECTION, SOLUTION INTRAMUSCULAR; INTRAVENOUS at 11:15

## 2018-11-19 RX ADMIN — SODIUM CHLORIDE: 0.9 INJECTION, SOLUTION INTRAVENOUS at 11:30

## 2018-11-19 RX ADMIN — CEFAZOLIN SODIUM 1000 MG: 2 SOLUTION INTRAVENOUS at 11:25

## 2018-11-19 RX ADMIN — HYDROMORPHONE HYDROCHLORIDE 0.5 MG: 2 INJECTION, SOLUTION INTRAMUSCULAR; INTRAVENOUS; SUBCUTANEOUS at 11:42

## 2018-11-19 RX ADMIN — LABETALOL HYDROCHLORIDE 5 MG: 5 INJECTION, SOLUTION INTRAVENOUS at 15:37

## 2018-11-19 RX ADMIN — SODIUM CHLORIDE: 9 INJECTION INTRAMUSCULAR; INTRAVENOUS; SUBCUTANEOUS at 16:00

## 2018-11-19 RX ADMIN — SODIUM CHLORIDE: 0.9 INJECTION, SOLUTION INTRAVENOUS at 13:54

## 2018-11-19 RX ADMIN — PROPOFOL 25 MG: 10 INJECTION, EMULSION INTRAVENOUS at 11:04

## 2018-11-19 RX ADMIN — HYDRALAZINE HYDROCHLORIDE 10 MG: 20 INJECTION INTRAMUSCULAR; INTRAVENOUS at 11:58

## 2018-11-19 RX ADMIN — ALBUMIN HUMAN: 0.05 INJECTION, SOLUTION INTRAVENOUS at 13:00

## 2018-11-19 NOTE — ANESTHESIA PROCEDURE NOTES
Arterial Line Insertion  Date/Time: 11/19/2018 11:17 AM  Performed by: Anthony French  Authorized by: Larissa Barrientos   Consent: Verbal consent obtained  Written consent obtained  Risks and benefits: risks, benefits and alternatives were discussed  Consent given by: patient  Patient understanding: patient states understanding of the procedure being performed  Patient consent: the patient's understanding of the procedure matches consent given  Procedure consent: procedure consent matches procedure scheduled  Relevant documents: relevant documents present and verified  Patient identity confirmed: arm band  Preparation: Patient was prepped and draped in the usual sterile fashion  Indications: hemodynamic monitoring  Orientation:  Right  Location: radial artery  Sedation:  Patient sedated: yes  Analgesia: see MAR for details  Vitals: Vital signs were monitored during sedation  Procedure Details:  Kanu's test normal: yes  Needle gauge: 20  Number of attempts: 1    Post-procedure:  Post-procedure: dressing applied  Waveform: good waveform  Post-procedure CNS: unchanged    Comments: Placed by JACOBO Martin

## 2018-11-19 NOTE — OP NOTE
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]    Procedure(s) (LRB):  pancreatic biopsy (N/A)  LAPAROTOMY EXPLORATORY (N/A)   Diagnostic laparoscopy    Specimen(s):  ID Type Source Tests Collected by Time Destination   1 : Pancreatic Biospy Tissue Pancreas TISSUE EXAM Chilo Raines MD 2018 1346    2 : Pancreatic Biospy Tissue Pancreas TISSUE EXAM Chilo Raines MD 2018 1411        Estimated Blood Loss:   450 mL    Drains:  Closed/Suction Drain Left LUQ Bulb 19 Fr  (Active)   Site Description Unable to view 2018  3:24 PM   Dressing Status Clean;Dry; Intact 2018  3:24 PM   Drainage Appearance Serosanguineous 2018  4:00 PM   Status To bulb suction 2018  4:00 PM   Output (mL) 20 mL 2018  4:00 PM   Number of days: 0       NG/OG/Enteral Tube 18 Fr Left nares (Active)   Placement Reverification Auscultation 2018  3:24 PM   Site Assessment Intact; Clean;Dry 2018  3:24 PM   Status Suction-low continuous 2018  3:24 PM   Drainage Appearance None 2018  3:24 PM   Number of days: 0       Urethral Catheter Latex 16 Fr  (Active)   Site Assessment Clean;Skin intact; Patent 2018  3:24 PM   Collection Container Standard drainage bag 2018  3:24 PM   Securement Method Securing device (Describe) 2018  3:24 PM   Number of days: 0       Anesthesia Type:   General w/ Epidural    Operative Indications:  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 [D408]  82-year-old male with a mass in the tail of the pancreas  He has a family history of pancreas cancer    He wished to have resection  Risks and benefits were explained  Informed consent was obtained  Patient was brought to the operating room  Operative Findings:  Firm mass in the tail of the pancreas  Firm mass that appeared to be involving his middle colic vessels as well as the pancreas  Frozen section on the biopsy revealed lymphocytes and no pancreatic tissue  Autoimmune pancreatitis was a possibility  No evidence of metastatic disease  Complications:   None    Procedure and Technique:  Patient was brought to the operating room and identified  General anesthesia was achieved  Patient was placed in the lithotomy position after a Lofton catheter was placed  Patient was then prepped and draped in the usual sterile fashion  A time-out was performed  We initially started by placing our incision for our hand port  Using sharp dissection this was taken through the skin, through the fascia into peritoneal cavity  We started to divide the gastrocolic omentum in an avascular plane     Once this was started we placed our hand port and under direct vision placed a 12 mm left mid abdominal port and 2 5 mm left upper quadrant ports  We started by completely mobilizing the stomach off the spleen by dividing the short gastrics  This was done with the Harmonic scalpel  There was some bleeding that was clipped and ultimately this had to be suture ligated with 3 0 Prolene suture  At this point a portion of the stomach was still densely adherent to the pancreas  And when we were able to visualize this through the hand port incision it was unclear if the stomach was going to be able to be dissected away from the pancreas  Also on the CT the mass appeared to be in the hilum of the spleen but the more firm mass appeared to be in the body of the pancreas and was involving the middle colic vessels   At this point I could not even identify a suitable place to divide the pancreas if distal pancreatectomy was going to be performed  I felt resecting this could be somewhat risky as he was already being transfused for some bleeding when the short gastrics were being taken down  At this time we aborted the laparoscopic portion of the procedure and opened  Started to mobilize the stomach a little more away from the pancreas  At this time there was some more bleeding from some short gastrics these were ultimately clipped and suture ligated with 3 0 Prolene suture  At this time I felt the best course of action would be biopsy of 1 of these masses  There was no evidence of metastatic disease and there was noted definitive diagnosis of cancer  I did not think he would tolerate resection based on the intraoperative findings  I did call Dr Delvis Corbett into the operating room  He did scrub in and he concurred that biopsy would be the safest approach and resection would be extremely difficult for the patient to recover from based on these findings  At this time, several Brad-Cut biopsies of the firm mass were done through the pancreas  Frozen section was negative  At this time since there was a question of autoimmune pancreatitis several more cores were obtained and these were sent for permanent section  The wound was now copiously irrigated and there was excellent hemostasis  FloSeal was placed in the hilum of the spleen as well as the area of the short gastrics and our biopsy site  A 19 Western Marlen Gomez drain was brought out through 1 of the 5 mm port sites and placed adjacent to the pancreas  This was secured to the skin using 3 0 nylon suture  The Bookwalter retractor was removed  Fascia was approximated with 1 PDS suture starting at either end of the incision and secured centrally  Subcutaneous tissue was irrigated  Skin was approximated with a running 4 Monocryl suture in subcuticular fashion  The 12 mm port site was closed with 0 Vicryl suture in a figure-of-eight fashion    All port site incisions were closed with 4 Monocryl suture in a subcuticular fashion and histocryl was used on the skin  Patient was extubated having tolerated the procedure well and taken to the recovery room in stable condition  I was present and participated in all aspects of this procedure         I was present for the entire procedure    Patient Disposition:  PACU     SIGNATURE: Sedrick Aden MD  DATE: November 19, 2018  TIME: 4:07 PM

## 2018-11-19 NOTE — H&P (VIEW-ONLY)
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  -     Case request operating room: LAPAROSCOPIC PANCREATECTOMY DISTAL, POSSIBLE OPEN, SPLENECTOMY; Standing  -     Prepare RBC  -     Type and screen  -     Comprehensive metabolic panel  -     CBC and differential  -     APTT  -     Protime-INR  -     HEMOGLOBIN A1C W/ EAG ESTIMATION  -     Ambulatory referral to [facility]; Future    Other orders  -     Incentive spirometry; Standing  -     Insert and maintain IV line; Standing  -     Void On-Call to O R ; Standing  -     Place sequential compression device; Standing  -     Nursing communcation Please give pre-op Carbohydrate drink to patient 2-4 hours prior to surgery (Drink is provided by [facility]); Standing  -     ceFAZolin (ANCEF) 1,000 mg in dextrose 5 % 100 mL IVPB; Infuse 1,000 mg into a venous catheter once         Chief Complaint   Patient presents with    Follow-up     Pt is here for further discuss treatment plan  No Follow-up on file  No history exists  History of Present Illness: 82-year-old male who had an approximately 40 lb weight loss  He underwent a CT on September 13, 2018  In indeterminate mass was seen in the tail of the pancreas near the splenic hilum  Patient underwent MRI on October 9, 2018  This was concerning for a pancreatic tail mass involving the splenic hilum  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  No metastasis were seen  The mass measured 3 6 x 2 2 cm  The main pancreatic duct was dilated up to 7 mm  I personally reviewed the films  He underwent EUS on September 21, 2018    This revealed pancreatic duct dilatation  There was hyperechoic material in the head of the pancreas  A pancreatic tail lesion was seen measuring up to 3 5 cm in maximal dimension  There was a cystic component in the middle  Biopsy was performed  Pathology was negative for malignant cells  There was a concern for autoimmune pancreatitis  IgG 4 was ultimately normal   His IgG 3 was only mildly elevated  CEA was normal at 2 1  CA 19-9 was normal at 16  He does have a family history of pancreas cancer  No abdominal pain, nausea or vomiting  He is doing better at this time  his weight is stable  Review of Systems  Complete ROS Surg Onc:   Complete ROS Surg Onc:   Constitutional: The patient denies new or recent history of general fatigue,  no change in appetite  His weight is stable, but he has lost weight  Eyes: No complaints of visual problems, no scleral icterus  ENT: no complaints of ear pain, no hoarseness, no difficulty swallowing,  no tinnitus and no new masses in head, oral cavity, or neck  Cardiovascular: No complaints of chest pain, no palpitations, no ankle edema  Respiratory: No complaints of shortness of breath, no cough  Gastrointestinal: No complaints of jaundice, no bloody stools, no pale stools  Genitourinary: No complaints of dysuria, no hematuria, no nocturia, no frequent urination, no urethral discharge  Musculoskeletal: No complaints of weakness, paralysis, joint stiffness or arthralgias  Integumentary: No complaints of rash, no new lesions  Neurological: No complaints of convulsions, no seizures, no dizziness  Hematologic/Lymphatic: No complaints of easy bruising  Endocrine:  No hot or cold intolerance  No polydipsia, polyphagia, or polyuria  Allergy/immunology:  No environmental allergies  No food allergies  Not immunocompromised  Skin:  No pallor or rash  No wound          Patient Active Problem List   Diagnosis    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    Esophagitis    Diabetes mellitus ([facility] [address] )    Weight loss     Past Medical History:   Diagnosis Date    Diabetes mellitus ([facility] [address] )     Type II    Weight loss      Past Surgical History:   Procedure Laterality Date    [place] EDG US EXAM SURGICAL ALTER STOM DUODENUM/JEJUNUM N/A 9/21/2018    Procedure: LINEAR ENDOSCOPIC U/S;  Surgeon: Marleny Asher MD;  Location:  GI LAB; Service: Gastroenterology    VASCULAR SURGERY      WRIST GANGLION EXCISION       Family History   Problem Relation Age of Onset    No Known Problems Mother     No Known Problems Father     Pancreatic cancer Brother 61    Breast cancer Daughter 36        38s     Social History     Social History    Marital status: /Civil Union     Spouse name: N/A    Number of children: N/A    Years of education: N/A     Occupational History    Not on file  Social History Main Topics    Smoking status: Former Smoker     Years: 25 00    Smokeless tobacco: Never Used      Comment: quit about 20 years ago as of 9/21/2018    Alcohol use Yes      Comment: 1 beer a day    Drug use: No    Sexual activity: Not on file     Other Topics Concern    Not on file     Social History Narrative    No narrative on file       Current Outpatient Prescriptions:     aspirin 325 mg tablet, Take 325 mg by mouth daily, Disp: , Rfl:     metFORMIN (GLUCOPHAGE) 500 mg tablet, daily, Disp: , Rfl:     Multiple Vitamins-Minerals (CENTRUM SILVER PO), Take 1 tablet by mouth daily, Disp: , Rfl:     omeprazole (PriLOSEC) 20 mg delayed release capsule, Take 1 capsule (20 mg total) by mouth daily for 30 days, Disp: 30 capsule, Rfl: 2  No Known Allergies  Vitals:    10/30/18 1133   BP: 142/80   Pulse: 69   Resp: 16   Temp: 97 8 °F (36 6 °C)       Physical Exam  Constitutional: General appearance: The Patient is well-developed and well-nourished who appears the stated age in no acute distress  Patient is pleasant and talkative  HEENT:  Normocephalic    Sclerae are anicteric  Mucous membranes are moist  Neck is supple without adenopathy  No JVD  Chest: The lungs are clear to auscultation  Cardiac: Heart is regular rate  Abdomen: Abdomen is soft, non-tender, non-distended and without masses  Extremities: There is no clubbing or cyanosis  There is no edema  Symmetric  Neuro: Grossly nonfocal  Gait is normal      Lymphatic: No evidence of cervical adenopathy bilaterally  No evidence of axillary adenopathy bilaterally  No evidence of inguinal adenopathy bilaterally  Skin: Warm, anicteric  Psych:  Patient is pleasant and talkative  Breasts:        Pathology:  [unfilled]    Labs:   Ref Range & Units 10/16/18  1:58 PM Flag   IgG 1 248 - 810 mg/dL 781     IgG 2 130 - 555 mg/dL 436     IgG 3 15 - 102 mg/dL 110   H    IgG 4 2 - 96 mg/dL 42     IgG 700 - 1600 mg/dL 1304           Imaging  Mri Abdomen W Wo Contrast And Mrcp    Result Date: 10/11/2018  Narrative: INDICATION:  Disease of pancreas, unspecified  Excessive weight loss, 40 pounds in 6-12 months  ORDERING PROVIDER:  Bassam Nava  TECHNIQUE:  Multiplanar unenhanced MRCP imaging followed by dynamic enhanced imaging through the abdomen following intravenous contrast   3D reformatted images used for MRCP  Gadavist, 5 5 mL was administered intravenously  COMPARISON:  CT abdomen and pelvis 09/13/2018  FINDINGS: 1 cm cyst in the caudate lobe of the liver  Noncirrhotic liver without fatty infiltration  Several cysts identified in the left kidney  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  No focal masses in the kidneys, spleen, adrenal glands, and pancreas  No ascites or lymphadenopathy in the upper abdomen  Contracted gallbladder  No biliary dilatation  The main pancreatic duct is diffusely dilated measuring up to 7 mm without side branch dilatation  Common duct measures 3 mm and tapers smoothly without strictures or filling defects  There is no intrahepatic biliary dilatation     No pancreatic divisum noted  As noted on the recent CT scan there is an enhancing soft tissue density with a central cystic region along the undersurface of the tail the pancreas and abutting the splenic hilum which measures approximately 3 6 x 2 2 cm  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  Impression: Findings concerning for a pancreatic tail mass involving the splenic hilum  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  No definite metastatic disease  The pancreatic duct is diffusely dilated into the head of the pancreas of unclear etiology  Signed by Sherwin Viveros MD    I reviewed the above laboratory and imaging data  Discussion/Summary: 82-year-old male with a 3 5 cm pancreatic tail mass  His biopsies were benign  His IgG 4 level is normal  His imaging findings are 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 and this lesion may be related to IPMN  The main duct is only 7 mm in maximal dimension, so this will need to be observed  I would not recommend total pancreatectomy based on the imaging findings  We discussed treatment options including observation versus resection  I have recommended resection based on the size of this lesion and the fact that he has never had pancreatitis  He does have a family history of pancreas cancer this is certainly concerning for an underlying malignancy  If he chose observation, and this was malignant then a potentially curable state at this time would progressed to an on curable situation  After some discussion, he would like to have this surgically removed  I explained the risks of laparoscopic distal pancreatectomy/splenectomy, possible open surgery to include bleeding, infection, recurrence, need for further surgery, wound complications, adjacent organ injury, pancreatic fistula, sepsis, mi, DVT, stroke, pulmonary embolism, and death    Informed consent was obtained  We will schedule this at our earliest mutual convenience once he has obtained medical clearance  He and his family are agreeable to this    All their questions were answered

## 2018-11-19 NOTE — ANESTHESIA PREPROCEDURE EVALUATION
Review of Systems/Medical History  Patient summary reviewed  Chart reviewed  No history of anesthetic complications     Cardiovascular  Negative cardio ROS Exercise tolerance (METS): >4,     Pulmonary  Negative pulmonary ROS        GI/Hepatic    GI malignancy,   Comment: Pancreatic mass, 40-50 lb weight loss over a year, denies associated abdominal pain, GERD, or early satiety    Completed carbohydrate drink at 9 am 11/19     Negative  ROS        Endo/Other  Diabetes type 2 Oral agent,      GYN       Hematology  Negative hematology ROS      Musculoskeletal  Negative musculoskeletal ROS        Neurology  Negative neurology ROS      Psychology   Negative psychology ROS              Physical Exam    Airway    Mallampati score: I  TM Distance: >3 FB  Neck ROM: full     Dental       Cardiovascular  Comment: Negative ROS,     Pulmonary      Other Findings        Anesthesia Plan  ASA Score- 3     Anesthesia Type- general and regional with ASA Monitors  Additional Monitors: arterial line  Airway Plan: ETT  Comment: PLAN: GETA, 2 PIVs, post induction arterial line  Discussed postoperative TAP vs rectus sheath blocks if procedure converted to open  Carmen Askew MD, have personally seen and evaluated the patient prior to anesthetic care  I have reviewed the pre-anesthetic record, medical history, allergies, medications and any other medical records if appropriate to the anesthetic care  Risks/benefits and alternatives discussed with patient including possible PONV, sore throat, and possibility of rare anesthetic and surgical emergencies  If a CRNA is involved in the case, I have reviewed the CRNA assessment, if present, and agree        Plan Factors-  Patient did not smoke on day of surgery  Induction- intravenous  Postoperative Plan- Plan for postoperative opioid use  Planned trial extubation    Informed Consent- Anesthetic plan and risks discussed with patient    I personally reviewed this patient with the CRNA  Discussed and agreed on the Anesthesia Plan with the DIMITRI Ledezma

## 2018-11-19 NOTE — ANESTHESIA PROCEDURE NOTES
Peripheral Block    Patient location during procedure: OR  Start time: 11/19/2018 3:13 PM  Reason for block: at surgeon's request and post-op pain management  Staffing  Anesthesiologist: Ciarra Lyle  Performed: anesthesiologist   Preanesthetic Checklist  Completed: patient identified, site marked, surgical consent, pre-op evaluation, timeout performed, IV checked, risks and benefits discussed and monitors and equipment checked  Peripheral Block  Patient position: supine  Prep: ChloraPrep  Patient monitoring: continuous pulse ox and frequent blood pressure checks  Anesthesia block type: rectus sheath  Laterality: bilateral  Injection technique: single-shot  Procedures: ultrasound guided  Ultrasound permanent image saved  Local infiltration: bupivacaine  Infiltration strength: 0 25 %  Dose: 30 mL  Needle  Needle type: Stimuplex   Needle gauge: 22 G  Needle length: 10 cm  Needle localization: ultrasound guidance  Assessment  Injection assessment: incremental injection, local visualized surrounding nerve on ultrasound and negative aspiration for heme  Paresthesia pain: none  Heart rate change: no  Slow fractionated injection: no  Post-procedure:  site cleaned  patient tolerated the procedure well with no immediate complications  Additional Notes  Bilateral rectus sheath blocks with 15 cc 0 25% bupivacaine and 10 cc exparel per side  Ultrasound images placed in chart

## 2018-11-20 ENCOUNTER — APPOINTMENT (INPATIENT)
Dept: RADIOLOGY | Facility: HOSPITAL | Age: 83
DRG: 421 | End: 2018-11-20
Payer: MEDICARE

## 2018-11-20 LAB
ANION GAP SERPL CALCULATED.3IONS-SCNC: 8 MMOL/L (ref 4–13)
BASOPHILS # BLD AUTO: 0.01 THOUSANDS/ΜL (ref 0–0.1)
BASOPHILS NFR BLD AUTO: 0 % (ref 0–1)
BUN SERPL-MCNC: 10 MG/DL (ref 5–25)
CALCIUM SERPL-MCNC: 7.3 MG/DL (ref 8.3–10.1)
CHLORIDE SERPL-SCNC: 109 MMOL/L (ref 100–108)
CO2 SERPL-SCNC: 24 MMOL/L (ref 21–32)
CREAT SERPL-MCNC: 0.83 MG/DL (ref 0.6–1.3)
EOSINOPHIL # BLD AUTO: 0 THOUSAND/ΜL (ref 0–0.61)
EOSINOPHIL NFR BLD AUTO: 0 % (ref 0–6)
ERYTHROCYTE [DISTWIDTH] IN BLOOD BY AUTOMATED COUNT: 16.3 % (ref 11.6–15.1)
GFR SERPL CREATININE-BSD FRML MDRD: 83 ML/MIN/1.73SQ M
GLUCOSE SERPL-MCNC: 155 MG/DL (ref 65–140)
HCT VFR BLD AUTO: 34.1 % (ref 36.5–49.3)
HGB BLD-MCNC: 10.8 G/DL (ref 12–17)
IMM GRANULOCYTES # BLD AUTO: 0.04 THOUSAND/UL (ref 0–0.2)
IMM GRANULOCYTES NFR BLD AUTO: 0 % (ref 0–2)
LYMPHOCYTES # BLD AUTO: 0.96 THOUSANDS/ΜL (ref 0.6–4.47)
LYMPHOCYTES NFR BLD AUTO: 8 % (ref 14–44)
MAGNESIUM SERPL-MCNC: 1.9 MG/DL (ref 1.6–2.6)
MCH RBC QN AUTO: 31.2 PG (ref 26.8–34.3)
MCHC RBC AUTO-ENTMCNC: 31.7 G/DL (ref 31.4–37.4)
MCV RBC AUTO: 99 FL (ref 82–98)
MONOCYTES # BLD AUTO: 0.91 THOUSAND/ΜL (ref 0.17–1.22)
MONOCYTES NFR BLD AUTO: 7 % (ref 4–12)
NEUTROPHILS # BLD AUTO: 10.5 THOUSANDS/ΜL (ref 1.85–7.62)
NEUTS SEG NFR BLD AUTO: 85 % (ref 43–75)
NRBC BLD AUTO-RTO: 0 /100 WBCS
PLATELET # BLD AUTO: 117 THOUSANDS/UL (ref 149–390)
PMV BLD AUTO: 11.5 FL (ref 8.9–12.7)
POTASSIUM SERPL-SCNC: 4.6 MMOL/L (ref 3.5–5.3)
RBC # BLD AUTO: 3.46 MILLION/UL (ref 3.88–5.62)
SODIUM SERPL-SCNC: 141 MMOL/L (ref 136–145)
WBC # BLD AUTO: 12.42 THOUSAND/UL (ref 4.31–10.16)

## 2018-11-20 PROCEDURE — G8988 SELF CARE GOAL STATUS: HCPCS

## 2018-11-20 PROCEDURE — G8978 MOBILITY CURRENT STATUS: HCPCS

## 2018-11-20 PROCEDURE — 97167 OT EVAL HIGH COMPLEX 60 MIN: CPT

## 2018-11-20 PROCEDURE — G8979 MOBILITY GOAL STATUS: HCPCS

## 2018-11-20 PROCEDURE — 71045 X-RAY EXAM CHEST 1 VIEW: CPT

## 2018-11-20 PROCEDURE — 80048 BASIC METABOLIC PNL TOTAL CA: CPT | Performed by: SURGERY

## 2018-11-20 PROCEDURE — 94760 N-INVAS EAR/PLS OXIMETRY 1: CPT

## 2018-11-20 PROCEDURE — G8987 SELF CARE CURRENT STATUS: HCPCS

## 2018-11-20 PROCEDURE — 99024 POSTOP FOLLOW-UP VISIT: CPT | Performed by: SURGERY

## 2018-11-20 PROCEDURE — 85025 COMPLETE CBC W/AUTO DIFF WBC: CPT | Performed by: SURGERY

## 2018-11-20 PROCEDURE — 97163 PT EVAL HIGH COMPLEX 45 MIN: CPT

## 2018-11-20 PROCEDURE — 83735 ASSAY OF MAGNESIUM: CPT | Performed by: SURGERY

## 2018-11-20 RX ORDER — OXYCODONE HYDROCHLORIDE 10 MG/1
10 TABLET ORAL EVERY 4 HOURS PRN
Status: DISCONTINUED | OUTPATIENT
Start: 2018-11-20 | End: 2018-11-23 | Stop reason: HOSPADM

## 2018-11-20 RX ORDER — ACETAMINOPHEN 325 MG/1
650 TABLET ORAL EVERY 6 HOURS PRN
Status: DISCONTINUED | OUTPATIENT
Start: 2018-11-20 | End: 2018-11-23 | Stop reason: HOSPADM

## 2018-11-20 RX ORDER — OXYCODONE HYDROCHLORIDE 5 MG/1
5 TABLET ORAL EVERY 4 HOURS PRN
Status: DISCONTINUED | OUTPATIENT
Start: 2018-11-20 | End: 2018-11-23 | Stop reason: HOSPADM

## 2018-11-20 RX ORDER — DEXTROSE, SODIUM CHLORIDE, AND POTASSIUM CHLORIDE 5; .45; .15 G/100ML; G/100ML; G/100ML
100 INJECTION INTRAVENOUS CONTINUOUS
Status: DISCONTINUED | OUTPATIENT
Start: 2018-11-20 | End: 2018-11-20

## 2018-11-20 RX ADMIN — DOCUSATE SODIUM 100 MG: 100 CAPSULE, LIQUID FILLED ORAL at 08:42

## 2018-11-20 RX ADMIN — DOCUSATE SODIUM 100 MG: 100 CAPSULE, LIQUID FILLED ORAL at 17:57

## 2018-11-20 RX ADMIN — DEXTROSE, SODIUM CHLORIDE, AND POTASSIUM CHLORIDE 100 ML/HR: 5; .45; .15 INJECTION INTRAVENOUS at 06:32

## 2018-11-20 RX ADMIN — DEXTROSE, SODIUM CHLORIDE, AND POTASSIUM CHLORIDE 100 ML/HR: 5; .45; .15 INJECTION INTRAVENOUS at 16:45

## 2018-11-20 RX ADMIN — ACETAMINOPHEN 650 MG: 325 TABLET, FILM COATED ORAL at 15:40

## 2018-11-20 RX ADMIN — ENOXAPARIN SODIUM 40 MG: 40 INJECTION SUBCUTANEOUS at 08:43

## 2018-11-20 NOTE — OCCUPATIONAL THERAPY NOTE
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    Esophagitis    [facility] mellitus ([facility] [address] [facility]    Weight loss     Past Medical History  Past Medical History:   Diagnosis Date    Diabetes mellitus ([address] )     Type II    Weight loss      Past Surgical History  Past Surgical History:   Procedure Laterality Date    [place] EDG US EXAM SURGICAL ALTER STOM DUODENUM/JEJUNUM N/A 9/21/2018    Procedure: LINEAR ENDOSCOPIC U/S;  Surgeon: Mg Staton MD;  Location: [facility]; Service: Gastroenterology    TONSILLECTOMY      VASCULAR SURGERY      phlebitis many years ago    WRIST GANGLION EXCISION           11/20/18 0918   Note Type   Note type Eval only   Restrictions/Precautions   Weight Bearing Precautions Per Order No   Other Precautions Bed Alarm;Multiple lines;Telemetry; Fall Risk   Pain Assessment   Pain Assessment No/denies pain   Pain Score No Pain   Home Living   Type of Home House  (1STE, sunken living room with 1 step)   Home Layout One level   Bathroom Shower/Tub (Two bathrooms: 1 with walk-in, other tub/shower combo)   Bathroom Toilet Standard   Bathroom Equipment Grab bars in shower; [address] (Pt did not use DME PTA)   Additional Comments Pt lives in a [facility] with 1STE and sunken living room with 1 step  Prior Function   Level of [place] Independent with ADLs and functional mobility   Lives With Spouse   ADL Assistance Independent   IADLs Independent   Falls in the last 6 months 1 to 4   Vocational Retired   Comments Pt lives with his wife and was I with ADLs, IADLs and functional mobility PTA    Pt had one fall when his dog pulled him on the leash   Lifestyle   Autonomy Pt was I with ADLs, IADLs and functional mobility PTA   Reciprocal Relationships Wife   Service to Others Worked as bell   Psychosocial   Psychosocial (WDL) X   Patient Behaviors/Mood Anxious   Subjective   Subjective Pt initially saying "not today" upon therapist entry, however brightens with approach  Pt c/o shoulder stiffness   ADL   Eating Assistance 7  Independent   Grooming Assistance 5  Supervision/Setup   UB Bathing Assistance 4  Minimal Assistance   LB Bathing Assistance 2  Maximal Assistance   UB Dressing Assistance 4  Minimal [address] 2  Maximal Assistance   LB Dressing Deficit Don/doff R sock; Don/doff L sock   Toileting Assistance  4  Minimal Assistance   Bed Mobility   Supine to Sit 5  Supervision   Additional items Increased time required;Assist x 1   Sit to Supine 5  Supervision   Additional items Increased time required   Transfers   Sit to Stand 4  Minimal assistance   Additional items Verbal cues; Increased time required;Assist x 1  (VCs for safe hand placement)   Stand to Sit 4  Minimal assistance   Additional items Assist x 1; Increased time required   Toilet transfer 3  Moderate assistance   Additional items Assist x 1; Increased time required;Standard toilet;Verbal cues  (Grab bar on left)   Functional Mobility   Functional Mobility 4  Minimal assistance   Additional Comments Ambulated from bed to bathroom with rw and Mark    Pt req VCs for safe use of walker and assistance to manage lines   Additional items Rolling walker   Balance   Static Sitting Fair +   Dynamic Sitting Fair   Static Standing Fair   Dynamic Standing Fair -   Ambulatory Fair -   Activity Tolerance   Activity Tolerance Patient limited by fatigue   Medical Staff Made Aware Per RN Sasha Jung, pt medically cleared for OT eval   Nurse Made Aware Yes   RUE Assessment   RUE Assessment WFL   LUE Assessment   LUE Assessment WFL   Hand Function   Gross Motor Coordination Functional   Fine Motor Coordination Functional   Sensation   Light Touch No apparent deficits   Sharp/Dull No apparent deficits   Stereognosis No apparent deficits   Proprioception   Proprioception No apparent deficits   Cognition   Overall Cognitive Status WFL   Arousal/Participation Alert; Cooperative;Responsive   Attention Within functional limits   Orientation Level Oriented X4   Memory Within functional limits   Following Commands Follows one step commands without difficulty   Assessment   Limitation Decreased ADL status; Decreased endurance;Decreased self-care trans;Decreased high-level ADLs   Prognosis Fair   Assessment Pt is a 82year old male seen for OT eval s/p admission to [facility] for a pancreatic biopsy following an approximately 40lb weight loss and MRI showing concern for 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  Comorbidities include a PMH of esophagitis, and DM  Pt with active OT orders and up and OOBAT tolerated orders  Pt lives with his wife in a 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  Pt is currently demonstrating the following occupational deficits: Mark UB bathing, maxA LB bathing, Mark UB dressing, maxA LB dressing, Mark toileting, and min-modA for functional transfers  These deficits that are impacting pt's baseline areas of occupation are a result of the following impairments: pain, endurance, activity tolerance, functional mobility, balance and decreased I w/ ADLS/IADLS  The following Occupational Performance Areas to address include: grooming, bathing/shower, toilet hygiene, dressing and functional mobility  Pt scored overall 55/100 on the Barthel Index  Based on the aforementioned OT evaluation, functional performance deficits, and assessments, pt has been identified as a high complexity evaluation  Recommend home with family support upon D/C   Pt to continue to benefit from acute immediate OT services to address the following goals 3-5x/week to  w/in 7-10 days:    Plan   Treatment Interventions ADL retraining;Functional transfer training; Endurance training;Equipment evaluation/education;Continued evaluation; Energy conservation; Activityengagement   Goal Expiration Date 11/30/18   OT Frequency 3-5x/wk   Recommendation   OT Discharge Recommendation Home with family support   Equipment Recommended Raised toilet seat   OT - OK to Discharge Yes   Barthel Index   Feeding 10   Bathing 0   Grooming Score 5   Dressing Score 5   Bladder Score 10   Bowels Score 10   Toilet Use Score 5   Transfers (Bed/Chair) Score 10   Mobility (Level Surface) Score 0   Stairs Score 0   Barthel Index Score 55     Goals:  Pt will complete all functional transfers at [place] with use of DME and AD as necessary  Pt will complete LBD at supervision with use of DME and AD as necessary  Pt will complete UB and LB bathing at supervision with use of DME and AD as necessary      Johana Tse, HARLEY, OTR/L

## 2018-11-20 NOTE — PROGRESS NOTES
PGY1 Post-Op Check Note     S:    Pt doing well post-operatively  Denies any complaints  O:     Vitals:    11/19/18 1830   BP: 145/79   Pulse: 74   Resp: 18   Temp: 98 2 °F (36 8 °C)   SpO2: 100%        I/O       11/18 0701 - 11/19 0700 11/19 0701 - 11/20 0700    P  O   0    I V  (mL/kg)  5300 (94 3)    Blood  350    IV Piggyback  500    Total Intake(mL/kg)  6150 (109 4)    Urine (mL/kg/hr)  475    Drains  100    Blood  550    Total Output   1125    Net   +5025                PE:  GEN: NAD  HEENT: MMM  CV: RRR  Lung: normal effort  Ab: Soft, NT/ND, incisions c/d/i, R KERLINE with serosanguinous output  Extrem: No CCE  Neuro:  A+Ox3, motor and sensation grossly intact       Lab Results   Component Value Date    WBC 7 49 10/30/2018    HGB 9 5 (L) 11/19/2018    HCT 28 (L) 11/19/2018    MCV 99 (H) 10/30/2018     10/30/2018     Lab Results   Component Value Date    GLUCOSE 192 (H) 11/19/2018    CALCIUM 8 8 10/30/2018    K 3 7 10/30/2018    CO2 24 11/19/2018     10/30/2018    BUN 9 10/30/2018    CREATININE 0 76 10/30/2018           A/P:   81 y/o M s/p exploratory laparotomy, pancreatic mass biopsies  --NPO/NGT  --NS @125  --Pain control w/ PCA  --[place]  --A-line

## 2018-11-20 NOTE — MEDICAL STUDENT
Progress Note - General Surgery   Ruth Vazquez 82 y o  male MRN: [id number]  Unit/Bed#: [facility] 633-01 Encounter: [id number]    Assessment:  82 y o  M post-op day #1 from exploratory laparotomy with pancreatic mass biopsies    Plan:  F/u AM labs  NPO/NGT  Continue KERLINE drain  Continue PCA for pain control  Continue IVF  D/c Lofton  Incentive spirometry  Encourage ambulation  F/u PT/OT consult  F/u case management  DVT prophylaxis: lovenox and SCDs    Subjective/Objective   Subjective: No acute events overnight  Pt currently has no abdominal pain  He has not been passing flatus and has not had a BM  Pt has not yet ambulated  He denies N/V/D, CP/SOB, fever, chills  Objective:    Blood pressure 148/78, pulse 74, temperature 98 1 °F (36 7 °C), temperature source Oral, resp  rate 18, height 5' 7" (1 702 m), weight 56 2 kg (124 lb), SpO2 99 %  ,Body mass index is 19 42 kg/m²        Intake/Output Summary (Last 24 hours) at 11/20/18 0514  Last data filed at 11/20/18 0319   Gross per 24 hour   Intake             6150 ml   Output             1775 ml   Net             4375 ml       Invasive Devices     Peripheral Intravenous Line            Peripheral IV 09/22/18 Right Antecubital 58 days    Peripheral IV 11/19/18 Left Arm less than 1 day    Peripheral IV 11/19/18 Right Arm less than 1 day          Drain            Closed/Suction Drain Left LUQ Bulb 19 Fr  less than 1 day    NG/OG/Enteral Tube 18 Fr Left nares less than 1 day    Urethral Catheter Latex 16 Fr  less than 1 day                Physical Exam:  Gen: NAD  CV: RRR, no murmur  Lung: no respiratory distress, CTAB  Abd: incision sites dry and intact without drainage, abd soft, NT, ND, bowel sounds quiet, KERLINE drain in place draining serosanguinous fluid, NG tube in place with minimal fluid in canister  : Lofton in place draining clear urine  Neuro: A&Ox3    Lab, Imaging and other studies:  CBC:   Lab Results   Component Value Date    WBC 15 48 (H) 11/19/2018    HGB 10 8 (L) 11/19/2018    HCT 34 0 (L) 11/19/2018    MCV 98 11/19/2018     (L) 11/19/2018    MCH 31 1 11/19/2018    MCHC 31 8 11/19/2018    RDW 15 9 (H) 11/19/2018    MPV 11 0 11/19/2018    NRBC 0 11/19/2018   , CMP:   Lab Results   Component Value Date    SODIUM 139 11/19/2018    K 4 2 11/19/2018     (H) 11/19/2018    CO2 23 11/19/2018    CO2 24 11/19/2018    BUN 11 11/19/2018    CREATININE 0 82 11/19/2018    GLUCOSE 192 (H) 11/19/2018    CALCIUM 7 2 (L) 11/19/2018    EGFR 83 11/19/2018   Tissue exam 11/19 preliminary result: dense fibrotic tissue with dystrophic calcifications and foci of lymphocytic infiltrate, no pancreatic tissue identified, no definitive evidence of carcinoma identified    VTE Pharmacologic Prophylaxis: Enoxaparin (Lovenox)  VTE Mechanical Prophylaxis: sequential compression device

## 2018-11-20 NOTE — RESTORATIVE TECHNICIAN NOTE
Restorative Specialist Mobility Note       Activity: Ambulate in diehl, Ambulate in room, Bathroom privileges, Dangle, Stand at bedside (Educated/encouraged pt to ambulate with assistance 3-4 x's/day  Bed alarm on   Pt callbell, phone/tray within reach )     Assistive Device: Front wheel walker          Dunia GARCIA, Restorative Technician, [employer] [facility]

## 2018-11-20 NOTE — PROGRESS NOTES
Progress Note - Oncology Surgery   [facility]  82 y o  male MRN: [id number]  Unit/Bed#: [facility] 633-01 Encounter: [id number]    Assessment:  83M with neoplasm of tail of pancreas now POD #1 s/p lap converted open pancreatic mass biopsies and exploratory laparotomy    Plan:  Continue NPO with NG tube  Continue PCA  Discontinue Lofton  Switch IV fluids to D5 half normal saline +20 of potassium at 100    Subjective/Objective   Chief Complaint:     Subjective:  No acute events overnight  Patient denies nausea or vomiting  No flatus or bowel movements at this time  Objective:     Blood pressure 148/78, pulse 74, temperature 98 1 °F (36 7 °C), temperature source Oral, resp  rate 18, height 5' 7" (1 702 m), weight 56 2 kg (124 lb), SpO2 99 %  ,Body mass index is 19 42 kg/m²  Intake/Output Summary (Last 24 hours) at 11/20/18 0555  Last data filed at 11/20/18 0319   Gross per 24 hour   Intake             6150 ml   Output             1775 ml   Net             4375 ml       Invasive Devices     Peripheral Intravenous Line            Peripheral IV 09/22/18 Right Antecubital 58 days    Peripheral IV 11/19/18 Left Arm less than 1 day    Peripheral IV 11/19/18 Right Arm less than 1 day          Drain            Closed/Suction Drain Left LUQ Bulb 19 Fr  less than 1 day    NG/OG/Enteral Tube 18 Fr Left nares less than 1 day    Urethral Catheter Latex 16 Fr  less than 1 day                Physical Exam: General: AAOx3  Respiratory: BS b/l  Abdomen: Soft, NT, no rebound/guarding  Incision c/d/i  KERLINE:  With 220 cc serosanguineous out  Heart: RRR, S1s2  Ext: Warm no cyanosis       Lab, Imaging and other studies:  I have personally reviewed pertinent lab results    , CBC:   Lab Results   Component Value Date    WBC 15 48 (H) 11/19/2018    HGB 10 8 (L) 11/19/2018    HCT 34 0 (L) 11/19/2018    MCV 98 11/19/2018     (L) 11/19/2018    MCH 31 1 11/19/2018    MCHC 31 8 11/19/2018    RDW 15 9 (H) 11/19/2018    MPV 11 0 11/19/2018 NRBC 0 11/19/2018   , CMP:   Lab Results   Component Value Date    SODIUM 139 11/19/2018    K 4 2 11/19/2018     (H) 11/19/2018    CO2 23 11/19/2018    CO2 24 11/19/2018    BUN 11 11/19/2018    CREATININE 0 82 11/19/2018    GLUCOSE 192 (H) 11/19/2018    CALCIUM 7 2 (L) 11/19/2018    EGFR 83 11/19/2018     VTE Pharmacologic Prophylaxis: Heparin  VTE Mechanical Prophylaxis: sequential compression device

## 2018-11-20 NOTE — PHYSICAL THERAPY NOTE
Physical Therapy Initial Evaluation   Sukumar Lozano, PT   11/20/18 1056   Note Type   Note type Eval only   Pain Assessment   Pain Assessment No/denies pain   Pain Score No Pain   Home Living   Type of Home House  (1 RADHA)   Home Layout One level;Stairs to enter without rails  (with sunken living room with 1 step into LR  )   Bathroom Shower/Tub Tub/shower unit  (2 BR's  2nd with walk-in shower  )   [address] bars in shower; [address]; [address]  (From father  No DME use PTA  Pt indep  )   Additional Comments Wife in good health  Pt does not need to physically assist her at home  Prior Function   Level of [place] Independent with ADLs and functional mobility   Lives With Spouse   Receives Help From (Has not needed help  Used  last week  )   ADL Assistance Independent   IADLs Independent   Falls in the last 6 months 1 to 4  (Dog pulled leash   )   Vocational Retired   Comments Retired   Walks dog regularly up until about 1-2 wks ago  Restrictions/Precautions   Weight Bearing Precautions Per Order No   Braces or Orthoses Other (Comment)  (none)   Other Precautions Bed Alarm; Chair Alarm;Multiple lines;Telemetry; Fall Risk   General   Additional Pertinent History dx: adm for ex-lap pancreatic tail mass removal 11/19/18  PMH: DM, 40 lb wt loss, pancreatic tail mass  Family/Caregiver Present No   Cognition   Overall Cognitive Status WFL   Arousal/Participation Alert   Attention Within functional limits   Orientation Level Oriented X4   Memory Within functional limits   Following Commands Follows one step commands with increased time or repetition   Comments Decreased safety awareness with re: to RW use      RUE Assessment   RUE Assessment WFL   LUE Assessment   LUE Assessment WFL   RLE Assessment   RLE Assessment WNL   LLE Assessment   LLE Assessment WNL   Coordination Movements are Fluid and Coordinated 1   Sensation WFL   Bed Mobility   Supine to Sit 5  Supervision   Additional items Assist x 1; Increased time required   Additional Comments Pt remained seated in recliner with chair alarm engaged upon completion of PT eval      Transfers   Sit to Stand 5  Supervision   Additional items Assist x 1;Verbal cues   Stand to Sit 5  Supervision   Additional items Assist x 1;Verbal cues   Toilet transfer 4  Minimal assistance   Additional items Assist x 1; Increased time required  (VC's due to poor safety with re: to use of RW  )   Additional Comments Pt reported he preferred use of RW over [facility] or no device  Pt also appeared to need RW at this time  Ambulation/Elevation   Gait pattern Excessively slow; Step to;Short stride   Gait Assistance 5  Supervision   Additional items Assist x 1;Verbal cues   Assistive Device Rolling walker   Distance 120'  (Pt declined ambulation of any greater distance  )   Balance   Static Sitting Fair +   Dynamic Sitting Fair   Static Standing Fair   Dynamic Standing Fair -   Ambulatory Fair -   Endurance Deficit   Endurance Deficit Yes   Endurance Deficit Description fatigue   Activity Tolerance   Activity Tolerance Patient limited by fatigue   Medical Staff Made Aware RN consented to allow pt to participate in PT eval      Nurse Made Aware Yes, RN made aware of slight pt wheezing with ambulation also  ( made aware of PT rec's for d/c  )   Assessment   Prognosis Good   Problem List Decreased endurance; Impaired balance;Decreased mobility; Decreased safety awareness;Decreased skin integrity   Assessment Pt is 82 y o  male seen for PT evaluation s/p admit to [facility] on 11/19/2018 w/ 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  Pt underwent ex-lap , removal of pancreatic tail mass on 11/18/18  PT consulted to assess pt's functional mobility and d/c needs  Order placed for PT eval and tx, w/ up and OOB as tolerated order  Comorbidities affecting pt's physical performance at time of assessment include: pt with hx of DM&  40 lb recent weight loss  PTA, pt was independent w/ all functional mobility w/ no need for an assistive device for ambulation ( although he has several walker from his father), ambulates community distances and elevations, has 1 RADHA, lives w/ his elderly wife in 1 level home, retired and up until several weeks ago walked his 120 lb dog daily 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    Personal factors affecting pt at time of IE include: ambulating w/ assistive device, stairs to enter home, inability to navigate community distances, limited home support, positive fall history, inability to perform IADLs and is functioning at a below baseline level of mobility at this time  Please find objective findings from PT assessment regarding body systems outlined above with impairments and limitations including impaired balance, decreased endurance, gait deviations, decreased functional mobility tolerance, decreased safety awareness, SOB upon exertion and had slight wheezing with ambulation ( O2 sat's 94% on room air ) The following objective measures performed on IE also reveal limitations: Barthel Index: 55/100  Pt's clinical presentation is currently unstable/unpredictable seen in pt's presentation of pt having a below baseline level of mobility, pt with the need for telemetry, pt with pending and abnormal lab values, pt with limited assistance available to him at home ( wife 81 yo)  Pt to benefit from continued PT tx to address deficits as defined above and maximize level of functional independent mobility and consistency  From PT/mobility standpoint, recommendation at time of d/c would be Home PT with family support pending progress in order to facilitate return to PLOF  Goals   Patient Goals To go home when I'm ready      STG Expiration Date 11/30/18   Short Term Goal #1 Pt completes bed mobility activities independently   Pt completes transfers independently with good safety awareness noted with use of RW  Pt ambulates  independently 250' with RW, good safety awareness/ technique  Pt ambulates up/down 1 step independently with RW  Pt independent in completion of seated LE ex's for DVT prevention  Treatment Day 0   Plan   Treatment/Interventions Functional transfer training;LE strengthening/ROM; Patient/family training;Equipment eval/education; Bed mobility;Gait training;Spoke to nursing;Spoke to case management   PT Frequency Other (Comment)  (3-5x/wk)   Recommendation   Recommendation Home PT; Home with family support   Equipment Recommended Es Espinoza  (Has his own RW at home already    )   PT - OK to Discharge No   Barthel Index   Feeding 10   Bathing 0   Grooming Score 5   Dressing Score 5   Bladder Score 10   Bowels Score 10   Toilet Use Score 5   Transfers (Bed/Chair) Score 10   Mobility (Level Surface) Score 0   Stairs Score 0   Barthel Index Score 55

## 2018-11-20 NOTE — UTILIZATION REVIEW
Initial Clinical Review    Age/Sex: 82 y o  male    Surgery Date: 11/19/2018    Procedure:   pancreatic biopsy (N/A)  LAPAROTOMY EXPLORATORY (N/A)   Diagnostic laparoscopy    Anesthesia:  General     Admission Orders: Date/Time/Statement: 11/19/18 @ 1516     Orders Placed This Encounter   Procedures    Inpatient Admission     Standing Status:   Standing     Number of Occurrences:   1     Order Specific Question:   Admitting Physician     Answer:   Chao Hess     Order Specific Question:   Level of Care     Answer:   Level 2 Stepdown / HOT [14]     Order Specific Question:   Bed request comments     Answer:   p6 if able     Order Specific Question:   Estimated length of stay     Answer:   More than 2 Midnights     Order Specific Question:   Certification     Answer:   I certify that inpatient services are medically necessary for this patient for a duration of greater than two midnights  See H&P and MD Progress Notes for additional information about the patient's course of treatment         Vital Signs: /80   Pulse 84   Temp 98 4 °F (36 9 °C)   Resp 18   Ht 5' 7" (1 702 m)   Wt 56 2 kg (124 lb)   SpO2 99%   BMI 19 42 kg/m²     Scheduled Meds:  Current Facility-Administered Medications:  acetaminophen 650 mg Oral Q6H PRN    docusate sodium 100 mg Oral BID    enoxaparin 40 mg Subcutaneous Daily    HYDROmorphone 0 5 mg Intravenous Q1H PRN    ondansetron 4 mg Intravenous Q4H PRN    oxyCODONE 10 mg Oral Q4H PRN    oxyCODONE 5 mg Oral Q4H PRN      Continuous Infusions:  dextrose 5 % and sodium chloride 0 45 % with KCl 20 mEq/L 100 mL/hr       Nursing orders - VS q4 - post opcare - UP & OOB as toelrated - Lofton cath  D/c am 11/20 - voiding trial - KERLINE drain - SCD's to le's-  Diet clear liquids  - PT/OT eval

## 2018-11-20 NOTE — PLAN OF CARE
Problem: OCCUPATIONAL THERAPY ADULT  Goal: Performs self-care activities at highest level of function for planned discharge setting  See evaluation for individualized goals  Treatment Interventions: ADL retraining, Functional transfer training, Endurance training, Equipment evaluation/education, Continued evaluation, Energy conservation, Activityengagement  Equipment Recommended: Raised toilet seat       See flowsheet documentation for full assessment, interventions and recommendations  Limitation: Decreased ADL status, Decreased endurance, Decreased self-care trans, Decreased high-level ADLs  Prognosis: Fair  Assessment: Pt is a 82year old male seen for OT eval s/p admission to [facility] for a pancreatic biopsy following an approximately 40lb weight loss and MRI showing concern for 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  Comorbidities include a PMH of esophagitis, and DM  Pt with active OT orders and up and OOBAT tolerated orders  Pt lives with his wife in a [facility] with 1STE and a sunken living room with 1 step  Pt was I with ADLs, IADLS, and functional mobility without use of DME PTA  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  Pt is currently demonstrating the following occupational deficits: Mark UB bathing, maxA LB bathing, Mark UB dressing, maxA LB dressing, Mark toileting, and min-modA for functional transfers  These deficits that are impacting pt's baseline areas of occupation are a result of the following impairments: pain, endurance, activity tolerance, functional mobility, balance and decreased I w/ ADLS/IADLS  The following Occupational Performance Areas to address include: grooming, bathing/shower, toilet hygiene, dressing and functional mobility  Pt scored overall 55/100 on the Barthel Index  Based on the aforementioned OT evaluation, functional performance deficits, and assessments, pt has been identified as a high complexity evaluation   Recommend home with family support upon D/C   Pt to continue to benefit from acute immediate OT services to address the following goals 3-5x/week to  w/in 7-10 days:      OT Discharge Recommendation: Home with family support  OT - OK to Discharge: Yes      Comments: HARLEY Perez, OTR/L

## 2018-11-20 NOTE — SOCIAL WORK
Met with pt, pt's wife, and dtr and discussed role of CM  Pt lives with his wife in a 1-story home with ramp at entrance  Pt is independent in ADLs  Pt denies having any DME or prior HHC  Preference for pharmacy is [facility] in [place], [place]  No MH/D&A tx hx  No POA  Main contact: Wife- Fermin Vasquez ([phone])  CM reviewed d/c planning process including the following: identifying help at home, patient preference for d/c planning needs, Discharge Lounge, [facility] Meds to Bed program, availability of treatment team to discuss questions or concerns patient and/or family may have regarding understanding medications and recognizing signs and symptoms once discharged  CM also encouraged patient to follow up with all recommended appointments after discharge  Patient advised of importance for patient and family to participate in managing patients medical well being  Patient/caregiver received discharge checklist  Content reviewed  Patient/caregiver encouraged to participate in discharge plan of care prior to discharge home  CM discussed HHC recommendations  CM offered choice of [facility]  agency  Pt requesting referral to [facility]  Referral made via [place]

## 2018-11-21 LAB
AMYLASE FLD QL: 22 U/L
ANION GAP SERPL CALCULATED.3IONS-SCNC: 4 MMOL/L (ref 4–13)
BASOPHILS # BLD AUTO: 0.01 THOUSANDS/ΜL (ref 0–0.1)
BASOPHILS NFR BLD AUTO: 0 % (ref 0–1)
BUN SERPL-MCNC: 11 MG/DL (ref 5–25)
CALCIUM SERPL-MCNC: 8.4 MG/DL (ref 8.3–10.1)
CHLORIDE SERPL-SCNC: 109 MMOL/L (ref 100–108)
CO2 SERPL-SCNC: 26 MMOL/L (ref 21–32)
CREAT SERPL-MCNC: 0.55 MG/DL (ref 0.6–1.3)
EOSINOPHIL # BLD AUTO: 0.01 THOUSAND/ΜL (ref 0–0.61)
EOSINOPHIL NFR BLD AUTO: 0 % (ref 0–6)
ERYTHROCYTE [DISTWIDTH] IN BLOOD BY AUTOMATED COUNT: 15.9 % (ref 11.6–15.1)
GFR SERPL CREATININE-BSD FRML MDRD: 98 ML/MIN/1.73SQ M
GLUCOSE SERPL-MCNC: 111 MG/DL (ref 65–140)
HCT VFR BLD AUTO: 30.1 % (ref 36.5–49.3)
HGB BLD-MCNC: 9.9 G/DL (ref 12–17)
IMM GRANULOCYTES # BLD AUTO: 0.1 THOUSAND/UL (ref 0–0.2)
IMM GRANULOCYTES NFR BLD AUTO: 1 % (ref 0–2)
LYMPHOCYTES # BLD AUTO: 1.11 THOUSANDS/ΜL (ref 0.6–4.47)
LYMPHOCYTES NFR BLD AUTO: 10 % (ref 14–44)
MCH RBC QN AUTO: 31.5 PG (ref 26.8–34.3)
MCHC RBC AUTO-ENTMCNC: 32.9 G/DL (ref 31.4–37.4)
MCV RBC AUTO: 96 FL (ref 82–98)
MONOCYTES # BLD AUTO: 0.5 THOUSAND/ΜL (ref 0.17–1.22)
MONOCYTES NFR BLD AUTO: 5 % (ref 4–12)
NEUTROPHILS # BLD AUTO: 9.13 THOUSANDS/ΜL (ref 1.85–7.62)
NEUTS SEG NFR BLD AUTO: 84 % (ref 43–75)
NRBC BLD AUTO-RTO: 0 /100 WBCS
PLATELET # BLD AUTO: 105 THOUSANDS/UL (ref 149–390)
PMV BLD AUTO: 12.3 FL (ref 8.9–12.7)
POTASSIUM SERPL-SCNC: 4 MMOL/L (ref 3.5–5.3)
RBC # BLD AUTO: 3.14 MILLION/UL (ref 3.88–5.62)
SODIUM SERPL-SCNC: 139 MMOL/L (ref 136–145)
WBC # BLD AUTO: 10.86 THOUSAND/UL (ref 4.31–10.16)

## 2018-11-21 PROCEDURE — 80048 BASIC METABOLIC PNL TOTAL CA: CPT | Performed by: SURGERY

## 2018-11-21 PROCEDURE — 85025 COMPLETE CBC W/AUTO DIFF WBC: CPT | Performed by: SURGERY

## 2018-11-21 PROCEDURE — 99024 POSTOP FOLLOW-UP VISIT: CPT | Performed by: SURGERY

## 2018-11-21 PROCEDURE — 82150 ASSAY OF AMYLASE: CPT | Performed by: SURGERY

## 2018-11-21 RX ADMIN — DOCUSATE SODIUM 100 MG: 100 CAPSULE, LIQUID FILLED ORAL at 18:14

## 2018-11-21 RX ADMIN — Medication 1 SPRAY: at 12:24

## 2018-11-21 RX ADMIN — DOCUSATE SODIUM 100 MG: 100 CAPSULE, LIQUID FILLED ORAL at 09:53

## 2018-11-21 RX ADMIN — ENOXAPARIN SODIUM 40 MG: 40 INJECTION SUBCUTANEOUS at 09:53

## 2018-11-21 NOTE — PROGRESS NOTES
Progress Note - Oncology Surgery   Sondra Castillo 82 y o  male MRN: [id number]  Unit/Bed#: [facility] 633-01 Encounter: [id number]    Assessment:  82-year-old male with a pancreatic mass, status post exploratory laparotomy with biopsy    Plan:  Pain control as needed  Advanced diet as tolerated, patient can attempt to eat what he needs  Out of bed and ambulation  Dispo planning  Consider EKG for irregular heartbeat felt on exam   - if continues would recommend inpatient cardiology consult versus outpatient follow-up with PCP as patient has no history of heart disease or arrhythmia    Subjective/Objective   Chief Complaint:     Subjective:  Patient had an episode of desaturation overnight requiring him to be put on 4 L of oxygen nasal cannula  Other than this patient had no other events  He was satting at 99% on 4 L this morning turned on to 2 L and still satting at 97% patient is on oxygen at home  No nausea or vomiting, or fevers or chills  Patient states that he is passing some flatus however having no bowel movements  Objective:     Blood pressure 127/70, pulse 74, temperature 100 04 °F (37 8 °C), resp  rate (!) 24, height 5' 7" (1 702 m), weight 56 2 kg (124 lb), SpO2 97 %  ,Body mass index is 19 42 kg/m²  Intake/Output Summary (Last 24 hours) at 11/21/18 7016  Last data filed at 11/21/18 7549   Gross per 24 hour   Intake          2486 87 ml   Output             2652 ml   Net          -165 13 ml       Invasive Devices     Peripheral Intravenous Line            Peripheral IV 09/22/18 Right Antecubital 59 days    Peripheral IV 11/19/18 Left Arm 1 day    Peripheral IV 11/19/18 Right Arm 1 day          Drain            Closed/Suction Drain Left LUQ Bulb 19 Fr  1 day                Physical Exam: General: AAOx3  Respiratory: BS b/l, no respiratory distress, clear to auscultation  Abdomen: Soft, NT, no rebound/guarding   Incision c/d/i  KERLINE - 277 cc ss output  Heart:  Irregular pulse felt on radial pulse exam, S1 and S2 auscultated, tachycardic to 118  Ext: Warm no cyanosis       Lab, Imaging and other studies:  I have personally reviewed pertinent lab results    , CBC:   Lab Results   Component Value Date    WBC 12 42 (H) 11/20/2018    HGB 10 8 (L) 11/20/2018    HCT 34 1 (L) 11/20/2018    MCV 99 (H) 11/20/2018     (L) 11/20/2018    MCH 31 2 11/20/2018    MCHC 31 7 11/20/2018    RDW 16 3 (H) 11/20/2018    MPV 11 5 11/20/2018    NRBC 0 11/20/2018   , CMP:   Lab Results   Component Value Date    SODIUM 141 11/20/2018    K 4 6 11/20/2018     (H) 11/20/2018    CO2 24 11/20/2018    BUN 10 11/20/2018    CREATININE 0 83 11/20/2018    CALCIUM 7 3 (L) 11/20/2018    EGFR 83 11/20/2018     VTE Pharmacologic Prophylaxis: Heparin  VTE Mechanical Prophylaxis: sequential compression device

## 2018-11-21 NOTE — PROGRESS NOTES
Subjective: [facility] paged for concern over increasing oxygen requirement (3L NC) and decreased SaO2 (mid-80s from mid/low 90s)  Patient is s/p open pancreatic biopsy (11/19)  NGT removed earlier today and started on clear liquid diet, IVF still @ 100cc/hr  Arrived bedside to assess patient who is sitting upright comfortably in bed on 3L NC  Patient complaining of persistent bilateral shoulder pain since procedure  He does sound hoarse and states his voice has been like this since the procedure  Denies chest pain or shortness of breath  Has been tolerating clear liquid diet  Denies fever/chills  Objective:  Temp:  [99 14 °F (37 3 °C)] 99 14 °F (37 3 °C)  HR:  [83-93] 83  Resp:  [20-28] 28  BP: (103-143)/(55-67) 103/55  I/O       11/19 0701 - 11/20 0700 11/20 0701 - 11/21 0700    P  O  0 720    I V  (mL/kg) 5300 2 (94 3) 1326 7 (23 6)    Blood 350     NG/GT  0    IV Piggyback 500     Total Intake(mL/kg) 6150 2 (109 4) 2046 7 (36 4)    Urine (mL/kg/hr) 1375 850 (1 1)    Drains 100 242    Blood 550     Total Output 2025 1092    Net +4125 2 +954 7              Physical exam  GEN: NAD  +Hoarseness  HEENT: MMM  CV: RRR  Chest: normal effort  No increased work of breathing  No crepitus  Ab: Soft, NT/ND  Midline incision c/d/i  KERLINE drain with SS drainage, emptied and returned to suction  Extrem: No lower extremity edema or calf tenderness  Neuro:  A+Ox3, motor and sensation grossly intact    Assessment/Plan:   Increase to 4L NC  Respiratory protocol ordered  Portable CXR  D/c IVF  Emptied KERLINE drain and returned to bulb suction  Encourage incentive spirometry

## 2018-11-21 NOTE — RESTORATIVE TECHNICIAN NOTE
Restorative Specialist Mobility Note       Activity: Ambulate in diehl, Ambulate in room, Bathroom privileges, Chair, Dangle, Stand at bedside (Educated/encouraged pt to ambulate with assistance 3-4 x's/day  Chair alarm on   Pt callbell, phone/tray within reach )     Assistive Device: Front wheel walker       [employer] BS, Restorative Technician, [employer]

## 2018-11-22 LAB
ABO GROUP BLD BPU: NORMAL
ABO GROUP BLD BPU: NORMAL
ANION GAP SERPL CALCULATED.3IONS-SCNC: 5 MMOL/L (ref 4–13)
BPU ID: NORMAL
BPU ID: NORMAL
BUN SERPL-MCNC: 10 MG/DL (ref 5–25)
CALCIUM SERPL-MCNC: 8.3 MG/DL (ref 8.3–10.1)
CHLORIDE SERPL-SCNC: 106 MMOL/L (ref 100–108)
CO2 SERPL-SCNC: 26 MMOL/L (ref 21–32)
CREAT SERPL-MCNC: 0.49 MG/DL (ref 0.6–1.3)
CROSSMATCH: NORMAL
CROSSMATCH: NORMAL
ERYTHROCYTE [DISTWIDTH] IN BLOOD BY AUTOMATED COUNT: 14.9 % (ref 11.6–15.1)
GFR SERPL CREATININE-BSD FRML MDRD: 102 ML/MIN/1.73SQ M
GLUCOSE SERPL-MCNC: 100 MG/DL (ref 65–140)
HCT VFR BLD AUTO: 30.4 % (ref 36.5–49.3)
HGB BLD-MCNC: 9.8 G/DL (ref 12–17)
MCH RBC QN AUTO: 30.9 PG (ref 26.8–34.3)
MCHC RBC AUTO-ENTMCNC: 32.2 G/DL (ref 31.4–37.4)
MCV RBC AUTO: 96 FL (ref 82–98)
PLATELET # BLD AUTO: 104 THOUSANDS/UL (ref 149–390)
PMV BLD AUTO: 12 FL (ref 8.9–12.7)
POTASSIUM SERPL-SCNC: 3.7 MMOL/L (ref 3.5–5.3)
RBC # BLD AUTO: 3.17 MILLION/UL (ref 3.88–5.62)
SODIUM SERPL-SCNC: 137 MMOL/L (ref 136–145)
UNIT DISPENSE STATUS: NORMAL
UNIT DISPENSE STATUS: NORMAL
UNIT PRODUCT CODE: NORMAL
UNIT PRODUCT CODE: NORMAL
UNIT RH: NORMAL
UNIT RH: NORMAL
WBC # BLD AUTO: 8.43 THOUSAND/UL (ref 4.31–10.16)

## 2018-11-22 PROCEDURE — 85027 COMPLETE CBC AUTOMATED: CPT | Performed by: SURGERY

## 2018-11-22 PROCEDURE — 80048 BASIC METABOLIC PNL TOTAL CA: CPT | Performed by: SURGERY

## 2018-11-22 RX ORDER — POTASSIUM CHLORIDE 20 MEQ/1
40 TABLET, EXTENDED RELEASE ORAL ONCE
Status: COMPLETED | OUTPATIENT
Start: 2018-11-22 | End: 2018-11-22

## 2018-11-22 RX ORDER — OXYCODONE HYDROCHLORIDE AND ACETAMINOPHEN 5; 325 MG/1; MG/1
1 TABLET ORAL EVERY 4 HOURS PRN
Qty: 20 TABLET | Refills: 0 | Status: SHIPPED | OUTPATIENT
Start: 2018-11-22 | End: 2018-12-02

## 2018-11-22 RX ADMIN — DOCUSATE SODIUM 100 MG: 100 CAPSULE, LIQUID FILLED ORAL at 17:37

## 2018-11-22 RX ADMIN — DOCUSATE SODIUM 100 MG: 100 CAPSULE, LIQUID FILLED ORAL at 09:28

## 2018-11-22 RX ADMIN — POTASSIUM CHLORIDE 40 MEQ: 1500 TABLET, EXTENDED RELEASE ORAL at 13:02

## 2018-11-22 RX ADMIN — ENOXAPARIN SODIUM 40 MG: 40 INJECTION SUBCUTANEOUS at 09:28

## 2018-11-22 NOTE — PROGRESS NOTES
Progress Note - Oncology Surgery   David Alonso 82 y o  male MRN: [id number]  Unit/Bed#: [facility] 633-01 Encounter: [id number]    Assessment:  51-year-old male with a pancreatic mass status post exploratory laparotomy with biopsy    Plan:  Continue Advance Diet as tolerated  KERLINE Amylase normal  Wean O2  Dispo Planning - possible [facility]NA if patient needs to have drain on discharge    Subjective/Objective   Chief Complaint:     Subjective:  No acute events overnight  Patient is passing flatus now, states lots of it  The patient denies having any bowel movements  States that he is ambulating well, no issues  Pain is well controlled    Objective:     Blood pressure 151/94, pulse 96, temperature 97 6 °F (36 4 °C), resp  rate 18, height 5' 7" (1 702 m), weight 56 2 kg (124 lb), SpO2 97 %  ,Body mass index is 19 42 kg/m²  Intake/Output Summary (Last 24 hours) at 11/22/18 0718  Last data filed at 11/22/18 0654   Gross per 24 hour   Intake              660 ml   Output             2055 ml   Net            -1395 ml       Invasive Devices     Peripheral Intravenous Line            Peripheral IV 09/22/18 Right Antecubital 60 days    Peripheral IV 11/19/18 Left Arm 2 days    Peripheral IV 11/19/18 Right Arm 2 days          Drain            Closed/Suction Drain Left LUQ Bulb 19 Fr  2 days                Physical Exam: General: AAOx3  Respiratory: BS b/l  Abdomen: Soft, NT, no rebound/guarding  Incision c/d/i  KERLINE drain approximately 105 cc serosanguineous, mostly serous  Heart: RRR, S1s2  Ext: Warm no cyanosis       Lab, Imaging and other studies:  I have personally reviewed pertinent lab results    , CBC:   Lab Results   Component Value Date    WBC 8 43 11/22/2018    HGB 9 8 (L) 11/22/2018    HCT 30 4 (L) 11/22/2018    MCV 96 11/22/2018     (L) 11/22/2018    MCH 30 9 11/22/2018    MCHC 32 2 11/22/2018    RDW 14 9 11/22/2018    MPV 12 0 11/22/2018   , CMP:   Lab Results   Component Value Date    SODIUM 137 11/22/2018    K 3 7 11/22/2018     11/22/2018    CO2 26 11/22/2018    BUN 10 11/22/2018    CREATININE 0 49 (L) 11/22/2018    CALCIUM 8 3 11/22/2018    EGFR 102 11/22/2018     VTE Pharmacologic Prophylaxis: Heparin  VTE Mechanical Prophylaxis: sequential compression device

## 2018-11-23 VITALS
TEMPERATURE: 98.6 F | OXYGEN SATURATION: 96 % | SYSTOLIC BLOOD PRESSURE: 156 MMHG | DIASTOLIC BLOOD PRESSURE: 93 MMHG | BODY MASS INDEX: 19.46 KG/M2 | WEIGHT: 124 LBS | HEART RATE: 89 BPM | RESPIRATION RATE: 19 BRPM | HEIGHT: 67 IN

## 2018-11-23 LAB
ANION GAP SERPL CALCULATED.3IONS-SCNC: 4 MMOL/L (ref 4–13)
BASOPHILS # BLD AUTO: 0.01 THOUSANDS/ΜL (ref 0–0.1)
BASOPHILS NFR BLD AUTO: 0 % (ref 0–1)
BUN SERPL-MCNC: 12 MG/DL (ref 5–25)
CALCIUM SERPL-MCNC: 8.3 MG/DL (ref 8.3–10.1)
CHLORIDE SERPL-SCNC: 104 MMOL/L (ref 100–108)
CO2 SERPL-SCNC: 27 MMOL/L (ref 21–32)
CREAT SERPL-MCNC: 0.52 MG/DL (ref 0.6–1.3)
EOSINOPHIL # BLD AUTO: 0.02 THOUSAND/ΜL (ref 0–0.61)
EOSINOPHIL NFR BLD AUTO: 0 % (ref 0–6)
ERYTHROCYTE [DISTWIDTH] IN BLOOD BY AUTOMATED COUNT: 14.6 % (ref 11.6–15.1)
GFR SERPL CREATININE-BSD FRML MDRD: 100 ML/MIN/1.73SQ M
GLUCOSE SERPL-MCNC: 131 MG/DL (ref 65–140)
HCT VFR BLD AUTO: 29.7 % (ref 36.5–49.3)
HGB BLD-MCNC: 9.6 G/DL (ref 12–17)
IMM GRANULOCYTES # BLD AUTO: 0.04 THOUSAND/UL (ref 0–0.2)
IMM GRANULOCYTES NFR BLD AUTO: 1 % (ref 0–2)
LYMPHOCYTES # BLD AUTO: 0.94 THOUSANDS/ΜL (ref 0.6–4.47)
LYMPHOCYTES NFR BLD AUTO: 13 % (ref 14–44)
MCH RBC QN AUTO: 31.3 PG (ref 26.8–34.3)
MCHC RBC AUTO-ENTMCNC: 32.3 G/DL (ref 31.4–37.4)
MCV RBC AUTO: 97 FL (ref 82–98)
MONOCYTES # BLD AUTO: 0.6 THOUSAND/ΜL (ref 0.17–1.22)
MONOCYTES NFR BLD AUTO: 9 % (ref 4–12)
NEUTROPHILS # BLD AUTO: 5.48 THOUSANDS/ΜL (ref 1.85–7.62)
NEUTS SEG NFR BLD AUTO: 77 % (ref 43–75)
NRBC BLD AUTO-RTO: 0 /100 WBCS
PMV BLD AUTO: 11.8 FL (ref 8.9–12.7)
POTASSIUM SERPL-SCNC: 3.6 MMOL/L (ref 3.5–5.3)
RBC # BLD AUTO: 3.07 MILLION/UL (ref 3.88–5.62)
SODIUM SERPL-SCNC: 135 MMOL/L (ref 136–145)
WBC # BLD AUTO: 7.09 THOUSAND/UL (ref 4.31–10.16)

## 2018-11-23 PROCEDURE — 85025 COMPLETE CBC W/AUTO DIFF WBC: CPT | Performed by: SURGERY

## 2018-11-23 PROCEDURE — 99024 POSTOP FOLLOW-UP VISIT: CPT | Performed by: SURGERY

## 2018-11-23 PROCEDURE — 80048 BASIC METABOLIC PNL TOTAL CA: CPT | Performed by: SURGERY

## 2018-11-23 RX ADMIN — DOCUSATE SODIUM 100 MG: 100 CAPSULE, LIQUID FILLED ORAL at 09:08

## 2018-11-23 NOTE — PROGRESS NOTES
Patient informed he was running low grade temperature of 100 3  He refused offer of tylenol  Will continue to monitor temperature

## 2018-11-23 NOTE — DISCHARGE INSTRUCTIONS
Please call the office when you leave to schedule an appointment to be seen in 2 weeks    Activity:    Do not lift more than 10 pounds (a gallon of milk) for 1-2 weeks post-operatively                 Walking is encouraged  Normal daily activities including climbing steps are okay  Do not engage in strenuous activity or contact sports for 4-6 weeks post-operatively    Diet:   You may return to your normal heart healthy diet  Wound Care: It is okay to shower  Wash incision gently with soap and water and pat dry  Do not soak incisions in bath water or swim for two weeks  Do not apply any creams or ointments  Pain Medication:   Please take as directed  No driving while taking narcotic pain medications    Other:  If you have questions after discharge please call the office      If you have increased pain, fever >101 5, increased drainage, redness or a bad smell at your surgery site, please call us immediately or come directly to the Emergency Room

## 2018-11-23 NOTE — PROGRESS NOTES
Progress Note - Oncology Surgery   Jordyn Perfect 82 y o  male MRN: [id number]  Unit/Bed#: [facility] 633-01 Encounter: [id number]    Assessment:  61-year-old male with a pancreatic mass status post exploratory laparotomy with biopsy    Plan:  Anticipating discharge home today  Continue carb controlled diet  Out of bed/ambulate  Encourage incentive spirometry  DVT prophylaxis    Subjective/Objective   Chief Complaint:     Subjective:  Patient with low-grade temperature of 100 3 at approximately 10:00 p m  However, he states that he is feeling much better this morning  Denies abdominal pain  Tolerating diet  Denies chest pain shortness of breath  Objective:     Blood pressure 136/84, pulse 99, temperature 98 3 °F (36 8 °C), temperature source Oral, resp  rate 19, height 5' 7" (1 702 m), weight 56 2 kg (124 lb), SpO2 96 %  ,Body mass index is 19 42 kg/m²  Intake/Output Summary (Last 24 hours) at 11/23/18 0557  Last data filed at 11/23/18 0446   Gross per 24 hour   Intake              600 ml   Output             1415 ml   Net             -815 ml       Invasive Devices     Peripheral Intravenous Line            Peripheral IV 09/22/18 Right Antecubital 61 days    Peripheral IV 11/19/18 Left Arm 3 days                Physical Exam:   General: AAOx3, comfortable  Respiratory:  Normal effort, no increased work of breathing  Abdomen: Soft, NT, no rebound/guarding   Incision c/d/i  Heart: RRR, S1s2  Ext: Warm no cyanosis       Lab, Imaging and other studies:  Lab Results   Component Value Date    GLUCOSE 192 (H) 11/19/2018    CALCIUM 8 3 11/22/2018    K 3 7 11/22/2018    CO2 26 11/22/2018     11/22/2018    BUN 10 11/22/2018    CREATININE 0 49 (L) 11/22/2018     Lab Results   Component Value Date    WBC 8 43 11/22/2018    HGB 9 8 (L) 11/22/2018    HCT 30 4 (L) 11/22/2018    MCV 96 11/22/2018     (L) 11/22/2018     VTE Pharmacologic Prophylaxis: Heparin  VTE Mechanical Prophylaxis: sequential compression device

## 2018-11-27 DIAGNOSIS — K20.90 ESOPHAGITIS: Primary | ICD-10-CM

## 2018-11-27 RX ORDER — OMEPRAZOLE 20 MG/1
20 CAPSULE, DELAYED RELEASE ORAL DAILY
Qty: 30 CAPSULE | Refills: 2 | Status: ON HOLD | OUTPATIENT
Start: 2018-11-27 | End: 2019-01-19

## 2018-11-27 NOTE — TELEPHONE ENCOUNTER
Called and spoke to visiting nurse, let them know that Omeprazole was sent to patient's pharmacy, and for patient to take Omeprazole until his visit with Dr Felipa Eden  She demonstrated understanding

## 2018-11-27 NOTE — TELEPHONE ENCOUNTER
Please let him know he had esophagitis on EGD in September  He can continue omeprazole until he sees Dr Cindy Pacheco  I will send to pharmacy   Thank you

## 2018-11-27 NOTE — TELEPHONE ENCOUNTER
Spoke to pts daughter Jannette Lowery pt is rescheduled and she would like a follow up with [facility] so I scheduled pt for 12/24/18 in [facility] would like a call back regarding her fathers omeprazole and should he still be taking it ? Pt states there were no refills and threw bottle away  Please advise?  [phone]

## 2018-12-01 NOTE — DISCHARGE SUMMARY
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]    Discharge Diagnosis: Same    Attending: Dr Rochelle Montes De Oca Physician(s): None    Procedures Performed:   1  Diagnostic laparoscopy, hand-assisted,  pancreatic biopsy    Pathology:   Benign pancreatic tissue with chronic pancreatitis (acinar atrophy, dense fibrosis, islet aggregation) and separate fragment of lymphoid tissue  Portion of calcified vessel wall  No evidence of malignancy  History of Present Illness:  79-year-old male who had an approximately 40 lb weight loss  Frank Mercury underwent a CT on September 13, 2018   An indeterminate mass was seen in the tail of the pancreas near the splenic hilum   Patient underwent MRI on October 9, 2018   This was concerning for a pancreatic tail mass involving the splenic hilum   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   No metastasis were seen   The mass measured 3 6 x 2 2 cm   The main pancreatic duct was dilated up to 7 mm   I personally reviewed the films   He underwent EUS on September 21, 2018   This revealed pancreatic duct dilatation   There was hyperechoic material in the head of the pancreas   A pancreatic tail lesion was seen measuring up to 3 5 cm in maximal dimension   There was a cystic component in the middle   Biopsy was performed  Pathology was negative for malignant cells  Donnell Has was a concern for autoimmune pancreatitis  IgG 4 was ultimately normal   His IgG 3 was only mildly elevated  Claudette Bushy was normal at 2 1   CA 19-9 was normal at 12   He does have a family history of pancreas cancer   No abdominal pain, nausea or vomiting   He is doing better at this time   His weight is stable      Hospital Course:   Patient was taken to the operating room on 11/19/2018 with Dr Jv Villalobos for diagnostic laparoscopy with pancreatic biopsy  Operative findings include a firm mass in the tail of the pancreas that appeared to be involving the middle colic vessels as well as the pancreas  Frozen section of the biopsy revealed lymphocytes with no pancreatic tissue  Autoimmune pancreatitis was a possibility  No evidence of metastatic disease was evident  The procedure was without complication and the patient was extubated and taken to the PACU in stable condition  For further details of the procedure please refer to the full operative report  Postoperatively, the patient had required some supplemental oxygen that was subsequently weaned without difficulty, his diet was advanced swiftly and his pain was well controlled  A KERLINE amylase was checked on postoperative day 2 and was found to be normal   By postoperative day 3, the drain output was minimal and subsequently removed  At this time we turned our attention to discharge planning  On postoperative day 4, the day of discharge, the patient's pain was well controlled, he was tolerating his diet, he was ambulating without difficulty, and voiding spontaneously  The patient was discharged with instructions to follow up with Dr Louis Romo in the office for review of his final pathology  Condition at Discharge: good     Discharge instructions/Information to patient and family:   See after visit summary for information provided to patient and family  Provisions for Follow-Up Care:  See after visit summary for information related to follow-up care and any pertinent home health orders  Disposition: Home    Planned Readmission: No    Discharge Statement   I spent 15 minutes discharging the patient  This time was spent on the day of discharge  I had direct contact with the patient on the day of discharge  Additional documentation is required if more than 30 minutes were spent on discharge       Discharge Medications:  See after visit summary for reconciled discharge medications provided to patient and family

## 2018-12-03 PROBLEM — K85.90 PANCREATITIS: Status: ACTIVE | Noted: 2018-12-03

## 2018-12-03 PROBLEM — K86.1 OTHER CHRONIC PANCREATITIS (HCC): Status: ACTIVE | Noted: 2018-12-03

## 2018-12-04 ENCOUNTER — OFFICE VISIT (OUTPATIENT)
Dept: SURGICAL ONCOLOGY | Facility: CLINIC | Age: 83
End: 2018-12-04

## 2018-12-04 VITALS
HEIGHT: 67 IN | DIASTOLIC BLOOD PRESSURE: 86 MMHG | SYSTOLIC BLOOD PRESSURE: 144 MMHG | RESPIRATION RATE: 16 BRPM | WEIGHT: 122 LBS | TEMPERATURE: 98.6 F | BODY MASS INDEX: 19.15 KG/M2

## 2018-12-04 DIAGNOSIS — K86.1 OTHER CHRONIC PANCREATITIS (HCC): Primary | ICD-10-CM

## 2018-12-04 PROCEDURE — 99024 POSTOP FOLLOW-UP VISIT: CPT | Performed by: SURGERY

## 2018-12-04 NOTE — LETTER
December 4, 2018     Darya Fabian, [address] [facility]    Patient: Jennifer Jeffery   YOB: 1935   Date of Visit: 12/4/2018       Dear Dr Jalen Mills: Thank you for referring Luís Ferrer to me for evaluation  Below are my notes for this consultation  If you have questions, please do not hesitate to call me  I look forward to following your patient along with you  Sincerely,        Miquel Waldron MD        CC: MD Miquel Simeon MD  12/4/2018  4:19 PM  Sign at close encounter               Surgical Oncology Follow Up       [address] [facility]  1935  [id number]  [address] [facility] SURGICAL ONCOLOGY [address] [address]    Diagnoses and all orders for this visit:    Other chronic pancreatitis ([facility] [address] )  -     MRI abdomen w wo contrast; Future  -     BUN; Future  -     Creatinine, serum; Future        Chief Complaint   Patient presents with    Post-op     Pt is here for post-op visit  Return in about 2 months (around 2/4/2019) for Office Visit  No history exists  History of Present Illness:  Patient returns in follow-up of his exploratory laparotomy with pancreatic biopsy  Final pathology was benign  It was not felt that he would tolerate resection based on the intraoperative findings  He comes in now to discuss further therapy  He is not having any significant pain, nausea or vomiting  He is feeling tired and his appetite is only fair  Review of Systems  Complete ROS Surg Onc:   Complete ROS Surg Onc:   Constitutional: The patient has fatigue and a poor appetite  Eyes: No complaints of visual problems, no scleral icterus  ENT: no complaints of ear pain, no hoarseness, no difficulty swallowing,  no tinnitus and no new masses in head, oral cavity, or neck  Cardiovascular: No complaints of chest pain, no palpitations, no ankle edema  Respiratory: No complaints of shortness of breath, no cough  Gastrointestinal: No complaints of jaundice, no bloody stools, no pale stools  Genitourinary: No complaints of dysuria, no hematuria, no nocturia, no frequent urination, no urethral discharge  Musculoskeletal: No complaints of weakness, paralysis, joint stiffness or arthralgias  Integumentary: No complaints of rash, no new lesions  Neurological: No complaints of convulsions, no seizures, no dizziness  Hematologic/Lymphatic: No complaints of easy bruising  Endocrine:  No hot or cold intolerance  No polydipsia, polyphagia, or polyuria  Allergy/immunology:  No environmental allergies  No food allergies  Not immunocompromised  Skin:  No pallor or rash  No wound  Patient Active Problem List   Diagnosis    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    Esophagitis    Diabetes mellitus ([facility] [address] )    Weight loss    Other chronic pancreatitis ([facility] [address] )     Past Medical History:   Diagnosis Date    Diabetes mellitus ([facility] [address] )     Type II    Weight loss      Past Surgical History:   Procedure Laterality Date    LAPAROTOMY N/A 11/19/2018    Procedure: LAPAROTOMY EXPLORATORY;  Surgeon: Dory Holden MD;  [facility]: [facility];  Service: Surgical Oncology    CO EDG US EXAM SURGICAL ALTER STOM DUODENUM/JEJUNUM N/A 9/21/2018    Procedure: LINEAR ENDOSCOPIC U/S;  Surgeon: Sandy Block MD;  Location: [facility];   Service: Gastroenterology    CO LAP,DIAGNOSTIC ABDOMEN N/A 11/19/2018    Procedure: pancreatic biopsy;  Surgeon: Dory Holden MD;  Location: [facility];  Service: Surgical Oncology    TONSILLECTOMY      VASCULAR SURGERY      phlebitis many years ago    WRIST GANGLION EXCISION       Family History   Problem Relation Age of Onset    No Known Problems Mother     No Known Problems Father     Pancreatic cancer Brother 61    Breast cancer Daughter 39 45s     Social History     Social History    Marital status: /Civil Union     Spouse name: N/A    Number of children: N/A    Years of education: N/A     Occupational History    Not on file  Social History Main Topics    Smoking status: Former Smoker     Years: 25 00    Smokeless tobacco: Never Used      Comment: quit about 20 years ago as of 9/21/2018    Alcohol use Yes      Comment: 1 beer a day    Drug use: No    Sexual activity: Not on file     Other Topics Concern    Not on file     Social History Narrative    No narrative on file       Current Outpatient Prescriptions:     metFORMIN (GLUCOPHAGE) 500 mg tablet, daily, Disp: , Rfl:     omeprazole (PriLOSEC) 20 mg delayed release capsule, Take 1 capsule (20 mg total) by mouth daily, Disp: 30 capsule, Rfl: 2  No Known Allergies  Vitals:    12/04/18 1533   BP: 144/86   Resp: 16   Temp: 98 6 °F (37 °C)       Physical Exam  Constitutional: General appearance: The Patient is well-developed and well-nourished who appears the stated age in no acute distress  Patient is pleasant and talkative  HEENT:  Normocephalic  Sclerae are anicteric  Mucous membranes are moist      Abdomen: Abdomen is soft, non-tender, non-distended and without masses  Incision is C/D/I  Extremities: There is no clubbing or cyanosis  There is no edema  Symmetric  Neuro: Grossly nonfocal  Gait is normal       Skin: Warm, anicteric  Psych:  Patient is pleasant and talkative  Breasts:        Pathology:  Final Diagnosis   A  & B  Pancreas, biopsy:  - Benign pancreatic tissue with chronic pancreatitis (acinar atrophy, dense fibrosis, islet aggregation) and separate    fragment of lymphoid tissue  Portion of calcified vessel wall  No evidence of malignancy    - See note       Interpretation performed at [address]      Electronically signed by Neptali Gallagher DO on 11/29/2018 at  2:19 PM   Preliminary result electronically signed by Liz Valadez DO on 11/23/2018 at 10:57 AM   Comments: This is an appended report  These results have been appended to a previously preliminary verified report  Comments: This is an appended report  These results have been appended to a previously preliminary verified report  Note   This case was sent to [facility] for expert consultation  Below is exactly Dr Chino Cerrato diagnoses  The original report is on file in the Medical Records Department, [facility], [address] [place] [address]   Interpretation and Diagnosis     Pancreas (biopsy, [id number], 11/19/2018): - Benign pancreatic tissue with chronic pancreatitis (acinar atrophy, dense fibrosis, islet aggregation) and separate fragment of lymphoid tissue  Portion of calcified vessel wall  No evidence of malignancy      Note: Submitted immunostains were reviewed  No increase in IgG4 positive cells or other histologic features of IgG4-related disease (duct centric inflammatory infiltrate, storiform fibrosis, or obliterative phlebitis) are identified      Tawny Emery MD, PhD      Comments: This is an appended report  These results have been appended to a previously preliminary verified report  Labs:      Imaging  Xr Chest Portable    Result Date: 11/20/2018  Narrative: CHEST INDICATION:   SOB, increasing O2 requirement  COMPARISON:  9/22/2018 EXAM PERFORMED/VIEWS:  XR CHEST PORTABLE FINDINGS:  Free intraperitoneal air corresponding with recent surgery  Drain in the left upper quadrant  Cardiomediastinal silhouette appears unremarkable  Minimal bibasilar atelectasis  Small left effusion  No pneumothorax  Osseous structures appear within normal limits for patient age  Impression: Minimal bibasilar atelectasis  Small left effusion  Workstation performed: [id number]     I reviewed the above laboratory and imaging data      Discussion/Summary:  82-year-old male with a 3 5 cm pancreatic tail mass  His biopsies continue to be benign  His IgG 4 level is normal   Based on the intraoperative findings, I do not believe he would tolerate pancreatectomy well  This may be chronic pancreatitis or may still be autoimmune pancreatitis  I recommended that he see Dr Bharati Grant to see if a short course of steroids may be of benefit  Since it has been nearly 2 months since his last imaging I would plan on repeating his imaging to make sure there is no progression of disease  I will tentatively see him back in February assuming he is getting some type of steroid treatment  I also counseled him on eating smaller frequent meals and using nutritional supplements as needed  He and his family are agreeable to this  All their questions were answered

## 2018-12-04 NOTE — PROGRESS NOTES
Surgical Oncology Follow Up       [address] [place] [address],[address] [place] [address]  [facility] [address] [facility]  1935  [id number]  [address] [place] [address]  [facility] SURGICAL ONCOLOGY [address] [address]    Diagnoses and all orders for this visit:    Other chronic pancreatitis ([address] )  -     MRI abdomen w wo contrast; Future  -     BUN; Future  -     Creatinine, serum; Future        Chief Complaint   Patient presents with    Post-op     Pt is here for post-op visit  Return in about 2 months (around 2/4/2019) for Office Visit  No history exists  History of Present Illness:  Patient returns in follow-up of his exploratory laparotomy with pancreatic biopsy  Final pathology was benign  It was not felt that he would tolerate resection based on the intraoperative findings  He comes in now to discuss further therapy  He is not having any significant pain, nausea or vomiting  He is feeling tired and his appetite is only fair  Review of Systems  Complete ROS Surg Onc:   Complete ROS Surg Onc:   Constitutional: The patient has fatigue and a poor appetite  Eyes: No complaints of visual problems, no scleral icterus  ENT: no complaints of ear pain, no hoarseness, no difficulty swallowing,  no tinnitus and no new masses in head, oral cavity, or neck  Cardiovascular: No complaints of chest pain, no palpitations, no ankle edema  Respiratory: No complaints of shortness of breath, no cough  Gastrointestinal: No complaints of jaundice, no bloody stools, no pale stools  Genitourinary: No complaints of dysuria, no hematuria, no nocturia, no frequent urination, no urethral discharge  Musculoskeletal: No complaints of weakness, paralysis, joint stiffness or arthralgias  Integumentary: No complaints of rash, no new lesions     Neurological: No complaints of convulsions, no seizures, no dizziness  Hematologic/Lymphatic: No complaints of easy bruising  Endocrine:  No hot or cold intolerance  No polydipsia, polyphagia, or polyuria  Allergy/immunology:  No environmental allergies  No food allergies  Not immunocompromised  Skin:  No pallor or rash  No wound  Patient Active Problem List   Diagnosis    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    Esophagitis    [facility]    Weight loss    Other chronic pancreatitis ([facility] )     Past Medical History:   Diagnosis Date    Diabetes mellitus ([facility] )     Type II    Weight loss      Past Surgical History:   Procedure Laterality Date    LAPAROTOMY N/A 11/19/2018    Procedure: LAPAROTOMY EXPLORATORY;  Surgeon: Braden Fuentes MD;  Location: [facility];  Service: Surgical Oncology    OK EDG US EXAM SURGICAL ALTER STOM DUODENUM/JEJUNUM N/A 9/21/2018    Procedure: LINEAR ENDOSCOPIC U/S;  Surgeon: Cathryn Bradley MD;  Location: [facility]; Service: Gastroenterology    OK LAP,DIAGNOSTIC ABDOMEN N/A 11/19/2018    Procedure: pancreatic biopsy;  Surgeon: Braden Fuentes MD;  [facility];  Service: Surgical Oncology    TONSILLECTOMY      VASCULAR SURGERY      phlebitis many years ago    WRIST GANGLION EXCISION       Family History   Problem Relation Age of Onset    No Known Problems Mother     No Known Problems Father     Pancreatic cancer Brother 61    Breast cancer Daughter 36        38s     Social History     Social History    Marital status: /Civil Union     Spouse name: N/A    Number of children: N/A    Years of education: N/A     Occupational History    Not on file       Social History Main Topics    Smoking status: Former Smoker     Years: 25 00    Smokeless tobacco: Never Used      Comment: quit about 20 years ago as of 9/21/2018    Alcohol use Yes      Comment: 1 beer a day    Drug use: No    Sexual activity: Not on file     Other Topics Concern    Not on file     Social History Narrative    No narrative on file       Current Outpatient Prescriptions:     metFORMIN (GLUCOPHAGE) 500 mg tablet, daily, Disp: , Rfl:     omeprazole (PriLOSEC) 20 mg delayed release capsule, Take 1 capsule (20 mg total) by mouth daily, Disp: 30 capsule, Rfl: 2  No Known Allergies  Vitals:    12/04/18 1533   BP: 144/86   Resp: 16   Temp: 98 6 °F (37 °C)       Physical Exam  Constitutional: General appearance: The Patient is well-developed and well-nourished who appears the stated age in no acute distress  Patient is pleasant and talkative  HEENT:  Normocephalic  Sclerae are anicteric  Mucous membranes are moist      Abdomen: Abdomen is soft, non-tender, non-distended and without masses  Incision is C/D/I  Extremities: There is no clubbing or cyanosis  There is no edema  Symmetric  Neuro: Grossly nonfocal  Gait is normal       Skin: Warm, anicteric  Psych:  Patient is pleasant and talkative  Breasts:        Pathology:  Final Diagnosis   A  & B  Pancreas, biopsy:  - Benign pancreatic tissue with chronic pancreatitis (acinar atrophy, dense fibrosis, islet aggregation) and separate    fragment of lymphoid tissue  Portion of calcified vessel wall  No evidence of malignancy  - See note       Interpretation performed at [address]      Electronically signed by Walter Barreto DO on 11/29/2018 at  2:19 PM   Preliminary result electronically signed by Walter Barreto DO on 11/23/2018 at 10:57 AM   Comments: This is an appended report  These results have been appended to a previously preliminary verified report  Comments: This is an appended report  These results have been appended to a previously preliminary verified report  Note   This case was sent to [facility] for expert consultation  Below is exactly Dr Nhung Christianson diagnoses    The original report is on file in the Medical Records Department, [address] [facility]  Interpretation and Diagnosis     Pancreas (biopsy, [id number], 11/19/2018): - Benign pancreatic tissue with chronic pancreatitis (acinar atrophy, dense fibrosis, islet aggregation) and separate fragment of lymphoid tissue  Portion of calcified vessel wall  No evidence of malignancy      Note: Submitted immunostains were reviewed  No increase in IgG4 positive cells or other histologic features of IgG4-related disease (duct centric inflammatory infiltrate, storiform fibrosis, or obliterative phlebitis) are identified      Silva Edmond MD, PhD      Comments: This is an appended report  These results have been appended to a previously preliminary verified report  Labs:      Imaging  Xr Chest Portable    Result Date: 11/20/2018  Narrative: CHEST INDICATION:   SOB, increasing O2 requirement  COMPARISON:  9/22/2018 EXAM PERFORMED/VIEWS:  XR CHEST PORTABLE FINDINGS:  Free intraperitoneal air corresponding with recent surgery  Drain in the left upper quadrant  Cardiomediastinal silhouette appears unremarkable  Minimal bibasilar atelectasis  Small left effusion  No pneumothorax  Osseous structures appear within normal limits for patient age  Impression: Minimal bibasilar atelectasis  Small left effusion  Workstation performed: [id number]     I reviewed the above laboratory and imaging data  Discussion/Summary:  51-year-old male with a 3 5 cm pancreatic tail mass  His biopsies continue to be benign  His IgG 4 level is normal   Based on the intraoperative findings, I do not believe he would tolerate pancreatectomy well  This may be chronic pancreatitis or may still be autoimmune pancreatitis  I recommended that he see Dr Felipa Eden to see if a short course of steroids may be of benefit  Since it has been nearly 2 months since his last imaging I would plan on repeating his imaging to make sure there is no progression of disease    I will tentatively see him back in February assuming he is getting some type of steroid treatment  I also counseled him on eating smaller frequent meals and using nutritional supplements as needed  He and his family are agreeable to this  All their questions were answered

## 2018-12-07 ENCOUNTER — LAB REQUISITION (OUTPATIENT)
Dept: LAB | Facility: HOSPITAL | Age: 83
End: 2018-12-07
Payer: MEDICARE

## 2018-12-07 DIAGNOSIS — K86.1 OTHER CHRONIC PANCREATITIS (HCC): ICD-10-CM

## 2018-12-07 LAB
BUN SERPL-MCNC: 8 MG/DL (ref 7–25)
CREAT SERPL-MCNC: 0.57 MG/DL (ref 0.7–1.3)
GFR SERPL CREATININE-BSD FRML MDRD: 96 ML/MIN/1.73SQ M

## 2018-12-07 PROCEDURE — 82565 ASSAY OF CREATININE: CPT | Performed by: SURGERY

## 2018-12-07 PROCEDURE — 84520 ASSAY OF UREA NITROGEN: CPT | Performed by: SURGERY

## 2018-12-14 ENCOUNTER — HOSPITAL ENCOUNTER (OUTPATIENT)
Dept: MRI IMAGING | Facility: HOSPITAL | Age: 83
Discharge: HOME | End: 2018-12-14
Payer: MEDICARE

## 2018-12-14 DIAGNOSIS — K86.1 OTHER CHRONIC PANCREATITIS (HCC): ICD-10-CM

## 2018-12-14 PROCEDURE — A9585 GADOBUTROL INJECTION: HCPCS | Performed by: RADIOLOGY

## 2018-12-14 PROCEDURE — 74183 MRI ABD W/O CNTR FLWD CNTR: CPT

## 2018-12-14 RX ADMIN — GADOBUTROL 5 ML: 604.72 INJECTION INTRAVENOUS at 16:41

## 2018-12-17 DIAGNOSIS — D37.8 NEOPLASM OF UNCERTAIN BEHAVIOR OF TAIL OF PANCREAS: Primary | ICD-10-CM

## 2018-12-31 ENCOUNTER — TELEPHONE (OUTPATIENT)
Dept: GASTROENTEROLOGY | Facility: AMBULARY SURGERY CENTER | Age: 83
End: 2018-12-31

## 2018-12-31 NOTE — TELEPHONE ENCOUNTER
DR GOEL'S PT    Pt daughter Soraida May) called requesting advise regarding ot state of health  Pt is experiencing loss of appetite, weakness, and breathing heavily

## 2018-12-31 NOTE — TELEPHONE ENCOUNTER
Patient is 82 yrs old with history of benign lesion of pancreatic tail and diabetes  Patient daughter called in original encounter, ramana spoke with patient's son  Patient has been having yellow liquid diarrhea for at least 2 days  "He can not keep food in - every time he eats he has diarrhea " Son states he is "weaker than normal" and is mostly sleeping  He has a "wet" cough and has periods of tachypnea  No pain and unknown fever  Patient has no means to check his sugar - he is taking metformin  Due to patient's symptoms, I suggested he go to the ER  Son agrees

## 2019-01-01 ENCOUNTER — APPOINTMENT (EMERGENCY)
Dept: RADIOLOGY | Facility: HOSPITAL | Age: 84
DRG: 853 | End: 2019-01-01
Payer: MEDICARE

## 2019-01-01 ENCOUNTER — HOSPITAL ENCOUNTER (INPATIENT)
Facility: HOSPITAL | Age: 84
LOS: 18 days | Discharge: NURSING, REHAB OR LONG-TERM CARE | DRG: 853 | End: 2019-01-19
Attending: EMERGENCY MEDICINE | Admitting: INTERNAL MEDICINE
Payer: MEDICARE

## 2019-01-01 DIAGNOSIS — K86.1 OTHER CHRONIC PANCREATITIS (HCC): ICD-10-CM

## 2019-01-01 DIAGNOSIS — I48.92 ATRIAL FLUTTER (HCC): ICD-10-CM

## 2019-01-01 DIAGNOSIS — R06.02 SHORTNESS OF BREATH: ICD-10-CM

## 2019-01-01 DIAGNOSIS — D47.2 MGUS (MONOCLONAL GAMMOPATHY OF UNKNOWN SIGNIFICANCE): ICD-10-CM

## 2019-01-01 DIAGNOSIS — Z43.1 ENCOUNTER FOR PEG (PERCUTANEOUS ENDOSCOPIC GASTROSTOMY) (HCC): ICD-10-CM

## 2019-01-01 DIAGNOSIS — R09.02 HYPOXEMIA: ICD-10-CM

## 2019-01-01 DIAGNOSIS — E86.0 DEHYDRATION: ICD-10-CM

## 2019-01-01 DIAGNOSIS — A41.9 SEVERE SEPSIS (HCC): Primary | ICD-10-CM

## 2019-01-01 DIAGNOSIS — R65.20 SEVERE SEPSIS (HCC): Primary | ICD-10-CM

## 2019-01-01 DIAGNOSIS — R13.12 OROPHARYNGEAL DYSPHAGIA: Chronic | ICD-10-CM

## 2019-01-01 DIAGNOSIS — J18.9 PNEUMONIA OF BOTH LOWER LOBES DUE TO INFECTIOUS ORGANISM: ICD-10-CM

## 2019-01-01 DIAGNOSIS — E43 SEVERE PROTEIN-CALORIE MALNUTRITION (HCC): ICD-10-CM

## 2019-01-01 DIAGNOSIS — E11.8 TYPE 2 DIABETES MELLITUS WITH COMPLICATION, WITHOUT LONG-TERM CURRENT USE OF INSULIN (HCC): ICD-10-CM

## 2019-01-01 DIAGNOSIS — D37.8 NEOPLASM OF UNCERTAIN BEHAVIOR OF TAIL OF PANCREAS: ICD-10-CM

## 2019-01-01 DIAGNOSIS — K20.90 ESOPHAGITIS: ICD-10-CM

## 2019-01-01 DIAGNOSIS — J18.9 BILATERAL PNEUMONIA: ICD-10-CM

## 2019-01-01 DIAGNOSIS — R13.10 DYSPHAGIA, UNSPECIFIED TYPE: ICD-10-CM

## 2019-01-01 DIAGNOSIS — R65.20 SEVERE SEPSIS (HCC): ICD-10-CM

## 2019-01-01 DIAGNOSIS — E11.9 TYPE 2 DIABETES MELLITUS WITHOUT COMPLICATION, WITHOUT LONG-TERM CURRENT USE OF INSULIN (HCC): ICD-10-CM

## 2019-01-01 DIAGNOSIS — A41.9 SEVERE SEPSIS (HCC): ICD-10-CM

## 2019-01-01 LAB
ALBUMIN SERPL BCP-MCNC: 1.9 G/DL (ref 3.5–5)
ALP SERPL-CCNC: 101 U/L (ref 46–116)
ALT SERPL W P-5'-P-CCNC: 34 U/L (ref 12–78)
ANION GAP SERPL CALCULATED.3IONS-SCNC: 10 MMOL/L (ref 4–13)
ANISOCYTOSIS BLD QL SMEAR: PRESENT
APTT PPP: 34 SECONDS (ref 26–38)
AST SERPL W P-5'-P-CCNC: 45 U/L (ref 5–45)
BASE EX.OXY STD BLDV CALC-SCNC: 53.7 % (ref 60–80)
BASE EXCESS BLDV CALC-SCNC: -0.9 MMOL/L
BASOPHILS # BLD MANUAL: 0 THOUSAND/UL (ref 0–0.1)
BASOPHILS NFR MAR MANUAL: 0 % (ref 0–1)
BILIRUB SERPL-MCNC: 0.47 MG/DL (ref 0.2–1)
BILIRUB UR QL STRIP: NEGATIVE
BUN SERPL-MCNC: 28 MG/DL (ref 5–25)
BURR CELLS BLD QL SMEAR: PRESENT
CALCIUM SERPL-MCNC: 8.1 MG/DL (ref 8.3–10.1)
CHLORIDE SERPL-SCNC: 95 MMOL/L (ref 100–108)
CLARITY UR: ABNORMAL
CO2 SERPL-SCNC: 26 MMOL/L (ref 21–32)
COLOR UR: ABNORMAL
CREAT SERPL-MCNC: 0.99 MG/DL (ref 0.6–1.3)
EOSINOPHIL # BLD MANUAL: 0 THOUSAND/UL (ref 0–0.4)
EOSINOPHIL NFR BLD MANUAL: 0 % (ref 0–6)
ERYTHROCYTE [DISTWIDTH] IN BLOOD BY AUTOMATED COUNT: 14.3 % (ref 11.6–15.1)
GFR SERPL CREATININE-BSD FRML MDRD: 71 ML/MIN/1.73SQ M
GLUCOSE SERPL-MCNC: 202 MG/DL (ref 65–140)
GLUCOSE UR STRIP-MCNC: NEGATIVE MG/DL
HCO3 BLDV-SCNC: 24.1 MMOL/L (ref 24–30)
HCT VFR BLD AUTO: 29 % (ref 36.5–49.3)
HGB BLD-MCNC: 9.4 G/DL (ref 12–17)
HGB UR QL STRIP.AUTO: NEGATIVE
HYPERCHROMIA BLD QL SMEAR: PRESENT
INR PPP: 1.04 (ref 0.86–1.17)
KETONES UR STRIP-MCNC: NEGATIVE MG/DL
LACTATE SERPL-SCNC: 3.7 MMOL/L (ref 0.5–2)
LEUKOCYTE ESTERASE UR QL STRIP: NEGATIVE
LIPASE SERPL-CCNC: 15 U/L (ref 73–393)
LYMPHOCYTES # BLD AUTO: 0.3 THOUSAND/UL (ref 0.6–4.47)
LYMPHOCYTES # BLD AUTO: 2 % (ref 14–44)
MCH RBC QN AUTO: 29.9 PG (ref 26.8–34.3)
MCHC RBC AUTO-ENTMCNC: 32.4 G/DL (ref 31.4–37.4)
MCV RBC AUTO: 92 FL (ref 82–98)
MONOCYTES # BLD AUTO: 0.15 THOUSAND/UL (ref 0–1.22)
MONOCYTES NFR BLD: 1 % (ref 4–12)
MYELOCYTES NFR BLD MANUAL: 1 % (ref 0–1)
NEUTROPHILS # BLD MANUAL: 14.45 THOUSAND/UL (ref 1.85–7.62)
NEUTS BAND NFR BLD MANUAL: 3 % (ref 0–8)
NEUTS SEG NFR BLD AUTO: 93 % (ref 43–75)
NITRITE UR QL STRIP: NEGATIVE
NRBC BLD AUTO-RTO: 0 /100 WBCS
O2 CT BLDV-SCNC: 9.4 ML/DL
PCO2 BLDV: 41.1 MM HG (ref 42–50)
PH BLDV: 7.39 [PH] (ref 7.3–7.4)
PH UR STRIP.AUTO: 6 [PH] (ref 4.5–8)
PLATELET # BLD AUTO: 210 THOUSANDS/UL (ref 149–390)
PLATELET BLD QL SMEAR: ADEQUATE
PMV BLD AUTO: 11.1 FL (ref 8.9–12.7)
PO2 BLDV: 31.8 MM HG (ref 35–45)
POIKILOCYTOSIS BLD QL SMEAR: PRESENT
POLYCHROMASIA BLD QL SMEAR: PRESENT
POTASSIUM SERPL-SCNC: 3.3 MMOL/L (ref 3.5–5.3)
PROCALCITONIN SERPL-MCNC: 17.1 NG/ML
PROT SERPL-MCNC: 6.8 G/DL (ref 6.4–8.2)
PROT UR STRIP-MCNC: ABNORMAL MG/DL
PROTHROMBIN TIME: 13.7 SECONDS (ref 11.8–14.2)
RBC # BLD AUTO: 3.14 MILLION/UL (ref 3.88–5.62)
RBC MORPH BLD: PRESENT
SODIUM SERPL-SCNC: 131 MMOL/L (ref 136–145)
SP GR UR STRIP.AUTO: 1.02 (ref 1–1.03)
TOXIC GRANULES BLD QL SMEAR: PRESENT
UROBILINOGEN UR QL STRIP.AUTO: 1 E.U./DL
WBC # BLD AUTO: 15.05 THOUSAND/UL (ref 4.31–10.16)

## 2019-01-01 PROCEDURE — 85730 THROMBOPLASTIN TIME PARTIAL: CPT | Performed by: EMERGENCY MEDICINE

## 2019-01-01 PROCEDURE — 82805 BLOOD GASES W/O2 SATURATION: CPT | Performed by: EMERGENCY MEDICINE

## 2019-01-01 PROCEDURE — 87449 NOS EACH ORGANISM AG IA: CPT | Performed by: INTERNAL MEDICINE

## 2019-01-01 PROCEDURE — 96360 HYDRATION IV INFUSION INIT: CPT

## 2019-01-01 PROCEDURE — 87631 RESP VIRUS 3-5 TARGETS: CPT | Performed by: INTERNAL MEDICINE

## 2019-01-01 PROCEDURE — 85007 BL SMEAR W/DIFF WBC COUNT: CPT | Performed by: EMERGENCY MEDICINE

## 2019-01-01 PROCEDURE — 99223 1ST HOSP IP/OBS HIGH 75: CPT | Performed by: INTERNAL MEDICINE

## 2019-01-01 PROCEDURE — 84145 PROCALCITONIN (PCT): CPT | Performed by: EMERGENCY MEDICINE

## 2019-01-01 PROCEDURE — 85027 COMPLETE CBC AUTOMATED: CPT | Performed by: EMERGENCY MEDICINE

## 2019-01-01 PROCEDURE — 36415 COLL VENOUS BLD VENIPUNCTURE: CPT | Performed by: EMERGENCY MEDICINE

## 2019-01-01 PROCEDURE — 83690 ASSAY OF LIPASE: CPT | Performed by: EMERGENCY MEDICINE

## 2019-01-01 PROCEDURE — 93005 ELECTROCARDIOGRAM TRACING: CPT

## 2019-01-01 PROCEDURE — 80053 COMPREHEN METABOLIC PANEL: CPT | Performed by: EMERGENCY MEDICINE

## 2019-01-01 PROCEDURE — 87040 BLOOD CULTURE FOR BACTERIA: CPT | Performed by: EMERGENCY MEDICINE

## 2019-01-01 PROCEDURE — 83605 ASSAY OF LACTIC ACID: CPT | Performed by: EMERGENCY MEDICINE

## 2019-01-01 PROCEDURE — 99285 EMERGENCY DEPT VISIT HI MDM: CPT

## 2019-01-01 PROCEDURE — 81001 URINALYSIS AUTO W/SCOPE: CPT

## 2019-01-01 PROCEDURE — 85610 PROTHROMBIN TIME: CPT | Performed by: EMERGENCY MEDICINE

## 2019-01-01 PROCEDURE — 71046 X-RAY EXAM CHEST 2 VIEWS: CPT

## 2019-01-01 RX ORDER — HEPARIN SODIUM 5000 [USP'U]/ML
5000 INJECTION, SOLUTION INTRAVENOUS; SUBCUTANEOUS EVERY 8 HOURS SCHEDULED
Status: DISCONTINUED | OUTPATIENT
Start: 2019-01-02 | End: 2019-01-19 | Stop reason: HOSPADM

## 2019-01-01 RX ORDER — PANTOPRAZOLE SODIUM 40 MG/1
40 TABLET, DELAYED RELEASE ORAL
Status: DISCONTINUED | OUTPATIENT
Start: 2019-01-02 | End: 2019-01-10

## 2019-01-01 RX ADMIN — CEFTRIAXONE 1000 MG: 1 INJECTION, POWDER, FOR SOLUTION INTRAMUSCULAR; INTRAVENOUS at 23:07

## 2019-01-01 RX ADMIN — SODIUM CHLORIDE 1000 ML: 0.9 INJECTION, SOLUTION INTRAVENOUS at 21:44

## 2019-01-02 ENCOUNTER — TELEPHONE (OUTPATIENT)
Dept: GASTROENTEROLOGY | Facility: MEDICAL CENTER | Age: 84
End: 2019-01-02

## 2019-01-02 DIAGNOSIS — Z71.89 COMPLEX CARE COORDINATION: Primary | ICD-10-CM

## 2019-01-02 PROBLEM — E87.6 HYPOKALEMIA: Status: ACTIVE | Noted: 2019-01-02

## 2019-01-02 PROBLEM — J96.90 RESPIRATORY FAILURE (HCC): Status: ACTIVE | Noted: 2019-01-02

## 2019-01-02 PROBLEM — R13.10 DYSPHAGIA: Status: ACTIVE | Noted: 2019-01-02

## 2019-01-02 PROBLEM — N17.9 ACUTE KIDNEY INJURY (HCC): Status: ACTIVE | Noted: 2019-01-02

## 2019-01-02 LAB
ANION GAP SERPL CALCULATED.3IONS-SCNC: 6 MMOL/L (ref 4–13)
ATRIAL RATE: 95 BPM
BACTERIA UR QL AUTO: ABNORMAL /HPF
BASOPHILS # BLD MANUAL: 0 THOUSAND/UL (ref 0–0.1)
BASOPHILS NFR MAR MANUAL: 0 % (ref 0–1)
BILIRUB UR QL STRIP: NEGATIVE
BUN SERPL-MCNC: 23 MG/DL (ref 5–25)
BURR CELLS BLD QL SMEAR: PRESENT
C DIFF TOX GENS STL QL NAA+PROBE: NORMAL
CALCIUM SERPL-MCNC: 7.4 MG/DL (ref 8.3–10.1)
CHLORIDE SERPL-SCNC: 100 MMOL/L (ref 100–108)
CLARITY UR: ABNORMAL
CO2 SERPL-SCNC: 26 MMOL/L (ref 21–32)
COLOR UR: YELLOW
CREAT SERPL-MCNC: 0.62 MG/DL (ref 0.6–1.3)
EOSINOPHIL # BLD MANUAL: 0 THOUSAND/UL (ref 0–0.4)
EOSINOPHIL NFR BLD MANUAL: 0 % (ref 0–6)
ERYTHROCYTE [DISTWIDTH] IN BLOOD BY AUTOMATED COUNT: 14.2 % (ref 11.6–15.1)
FINE GRAN CASTS URNS QL MICRO: ABNORMAL /LPF
FLUAV AG SPEC QL: NORMAL
FLUBV AG SPEC QL: NORMAL
GFR SERPL CREATININE-BSD FRML MDRD: 93 ML/MIN/1.73SQ M
GLUCOSE SERPL-MCNC: 136 MG/DL (ref 65–140)
GLUCOSE SERPL-MCNC: 145 MG/DL (ref 65–140)
GLUCOSE SERPL-MCNC: 160 MG/DL (ref 65–140)
GLUCOSE UR STRIP-MCNC: NEGATIVE MG/DL
HCT VFR BLD AUTO: 26.3 % (ref 36.5–49.3)
HGB BLD-MCNC: 8.6 G/DL (ref 12–17)
HGB UR QL STRIP.AUTO: ABNORMAL
HYALINE CASTS #/AREA URNS LPF: ABNORMAL /LPF
HYPERCHROMIA BLD QL SMEAR: PRESENT
KETONES UR STRIP-MCNC: NEGATIVE MG/DL
L PNEUMO1 AG UR QL IA.RAPID: NEGATIVE
LACTATE SERPL-SCNC: 1.7 MMOL/L (ref 0.5–2)
LACTATE SERPL-SCNC: 2.1 MMOL/L (ref 0.5–2)
LEUKOCYTE ESTERASE UR QL STRIP: NEGATIVE
LYMPHOCYTES # BLD AUTO: 0.31 THOUSAND/UL (ref 0.6–4.47)
LYMPHOCYTES # BLD AUTO: 3 % (ref 14–44)
MAGNESIUM SERPL-MCNC: 1.7 MG/DL (ref 1.6–2.6)
MCH RBC QN AUTO: 30.2 PG (ref 26.8–34.3)
MCHC RBC AUTO-ENTMCNC: 32.7 G/DL (ref 31.4–37.4)
MCV RBC AUTO: 92 FL (ref 82–98)
MONOCYTES # BLD AUTO: 0.1 THOUSAND/UL (ref 0–1.22)
MONOCYTES NFR BLD: 1 % (ref 4–12)
MUCOUS THREADS UR QL AUTO: ABNORMAL
NEUTROPHILS # BLD MANUAL: 9.77 THOUSAND/UL (ref 1.85–7.62)
NEUTS BAND NFR BLD MANUAL: 1 % (ref 0–8)
NEUTS SEG NFR BLD AUTO: 94 % (ref 43–75)
NITRITE UR QL STRIP: NEGATIVE
NON-SQ EPI CELLS URNS QL MICRO: ABNORMAL /HPF
NRBC BLD AUTO-RTO: 0 /100 WBCS
P AXIS: 61 DEGREES
PH UR STRIP.AUTO: 6 [PH] (ref 4.5–8)
PLASMA CELLS NFR BLD: 1 % (ref 0–0)
PLATELET # BLD AUTO: 177 THOUSANDS/UL (ref 149–390)
PLATELET # BLD AUTO: 199 THOUSANDS/UL (ref 149–390)
PLATELET BLD QL SMEAR: ADEQUATE
PMV BLD AUTO: 10.9 FL (ref 8.9–12.7)
PMV BLD AUTO: 10.9 FL (ref 8.9–12.7)
POIKILOCYTOSIS BLD QL SMEAR: PRESENT
POLYCHROMASIA BLD QL SMEAR: PRESENT
POTASSIUM SERPL-SCNC: 3.1 MMOL/L (ref 3.5–5.3)
PR INTERVAL: 172 MS
PROCALCITONIN SERPL-MCNC: 13.89 NG/ML
PROT UR STRIP-MCNC: ABNORMAL MG/DL
QRS AXIS: -50 DEGREES
QRSD INTERVAL: 118 MS
QT INTERVAL: 340 MS
QTC INTERVAL: 427 MS
RBC # BLD AUTO: 2.85 MILLION/UL (ref 3.88–5.62)
RBC #/AREA URNS AUTO: ABNORMAL /HPF
RBC MORPH BLD: PRESENT
RSV B RNA SPEC QL NAA+PROBE: NORMAL
S PNEUM AG UR QL: POSITIVE
SODIUM SERPL-SCNC: 132 MMOL/L (ref 136–145)
SP GR UR STRIP.AUTO: 1.01 (ref 1–1.03)
T WAVE AXIS: 67 DEGREES
UROBILINOGEN UR QL STRIP.AUTO: 0.2 E.U./DL
VENTRICULAR RATE: 95 BPM
WBC # BLD AUTO: 10.28 THOUSAND/UL (ref 4.31–10.16)
WBC #/AREA URNS AUTO: ABNORMAL /HPF

## 2019-01-02 PROCEDURE — 99222 1ST HOSP IP/OBS MODERATE 55: CPT | Performed by: INTERNAL MEDICINE

## 2019-01-02 PROCEDURE — 92610 EVALUATE SWALLOWING FUNCTION: CPT

## 2019-01-02 PROCEDURE — 85007 BL SMEAR W/DIFF WBC COUNT: CPT | Performed by: INTERNAL MEDICINE

## 2019-01-02 PROCEDURE — G8996 SWALLOW CURRENT STATUS: HCPCS

## 2019-01-02 PROCEDURE — 93010 ELECTROCARDIOGRAM REPORT: CPT | Performed by: INTERNAL MEDICINE

## 2019-01-02 PROCEDURE — 36415 COLL VENOUS BLD VENIPUNCTURE: CPT | Performed by: INTERNAL MEDICINE

## 2019-01-02 PROCEDURE — 82948 REAGENT STRIP/BLOOD GLUCOSE: CPT

## 2019-01-02 PROCEDURE — 83605 ASSAY OF LACTIC ACID: CPT | Performed by: INTERNAL MEDICINE

## 2019-01-02 PROCEDURE — 87493 C DIFF AMPLIFIED PROBE: CPT | Performed by: INTERNAL MEDICINE

## 2019-01-02 PROCEDURE — 99232 SBSQ HOSP IP/OBS MODERATE 35: CPT | Performed by: PHYSICIAN ASSISTANT

## 2019-01-02 PROCEDURE — G8997 SWALLOW GOAL STATUS: HCPCS

## 2019-01-02 PROCEDURE — 85049 AUTOMATED PLATELET COUNT: CPT | Performed by: INTERNAL MEDICINE

## 2019-01-02 PROCEDURE — 94760 N-INVAS EAR/PLS OXIMETRY 1: CPT

## 2019-01-02 PROCEDURE — 84145 PROCALCITONIN (PCT): CPT | Performed by: INTERNAL MEDICINE

## 2019-01-02 PROCEDURE — 85027 COMPLETE CBC AUTOMATED: CPT | Performed by: INTERNAL MEDICINE

## 2019-01-02 PROCEDURE — 83735 ASSAY OF MAGNESIUM: CPT | Performed by: INTERNAL MEDICINE

## 2019-01-02 PROCEDURE — 80048 BASIC METABOLIC PNL TOTAL CA: CPT | Performed by: INTERNAL MEDICINE

## 2019-01-02 RX ORDER — OMEGA-3S/DHA/EPA/FISH OIL/D3 300MG-1000
400 CAPSULE ORAL DAILY
Status: DISCONTINUED | OUTPATIENT
Start: 2019-01-02 | End: 2019-01-19 | Stop reason: HOSPADM

## 2019-01-02 RX ORDER — POTASSIUM CHLORIDE 20 MEQ/1
40 TABLET, EXTENDED RELEASE ORAL ONCE
Status: DISCONTINUED | OUTPATIENT
Start: 2019-01-02 | End: 2019-01-03

## 2019-01-02 RX ORDER — ALBUTEROL SULFATE 2.5 MG/3ML
2.5 SOLUTION RESPIRATORY (INHALATION) EVERY 4 HOURS PRN
Status: DISCONTINUED | OUTPATIENT
Start: 2019-01-02 | End: 2019-01-06

## 2019-01-02 RX ADMIN — HEPARIN SODIUM 5000 UNITS: 5000 INJECTION INTRAVENOUS; SUBCUTANEOUS at 00:19

## 2019-01-02 RX ADMIN — PANCRELIPASE 24000 UNITS: 24000; 76000; 120000 CAPSULE, DELAYED RELEASE PELLETS ORAL at 16:24

## 2019-01-02 RX ADMIN — HEPARIN SODIUM 5000 UNITS: 5000 INJECTION INTRAVENOUS; SUBCUTANEOUS at 05:39

## 2019-01-02 RX ADMIN — CEFTRIAXONE 1000 MG: 1 INJECTION, POWDER, FOR SOLUTION INTRAMUSCULAR; INTRAVENOUS at 23:40

## 2019-01-02 RX ADMIN — INSULIN LISPRO 1 UNITS: 100 INJECTION, SOLUTION INTRAVENOUS; SUBCUTANEOUS at 16:24

## 2019-01-02 RX ADMIN — HEPARIN SODIUM 5000 UNITS: 5000 INJECTION INTRAVENOUS; SUBCUTANEOUS at 21:30

## 2019-01-02 RX ADMIN — PANTOPRAZOLE SODIUM 40 MG: 40 TABLET, DELAYED RELEASE ORAL at 05:39

## 2019-01-02 RX ADMIN — AZITHROMYCIN MONOHYDRATE 500 MG: 500 INJECTION, POWDER, LYOPHILIZED, FOR SOLUTION INTRAVENOUS at 00:03

## 2019-01-02 NOTE — ASSESSMENT & PLAN NOTE
· With recent hospitalization in November for surgical procedure, however still would be considered low risk Hcap  · Strep pneumo antigen positive   Will d/c azithromycin and continue with Rocephin

## 2019-01-02 NOTE — ASSESSMENT & PLAN NOTE
· Per biopsy report from surgical procedure 11/2018 "benign pancreatic tissue with chronic pancreatitis      · Patient with weight loss and diarrhea after procedure    Follows with Dr Marisa King and was to follow up 12/24/2018, however missed appointment  · Will consult GI for evaluation given worsening of symptoms  · Given recent hospitalization will check C diff PCR

## 2019-01-02 NOTE — RESPIRATORY THERAPY NOTE
RT Protocol Note  Jordyn Major 82 y o  male MRN: [id number]  Unit/Bed#: ED 23 Encounter: [id number]    Assessment    Principal Problem:    Sepsis ([facility] )  Active Problems:    Diabetes mellitus ([facility] )    Weight loss    Other chronic pancreatitis (HCC)    Pneumonia    Acute kidney injury ([facility] )      Home Pulmonary Medications:  NA       Past Medical History:   Diagnosis Date    Diabetes mellitus ([place] [address] )     Type II    Weight loss      Social History     Social History    Marital status: /Civil Union     Spouse name: N/A    Number of children: N/A    Years of education: N/A     Social History Main Topics    Smoking status: Former Smoker     Years: 25 00    Smokeless tobacco: Never Used      Comment: quit about 20 years ago as of 9/21/2018    Alcohol use Yes      Comment: 1 beer a day    Drug use: No    Sexual activity: Not Asked     Other Topics Concern    None     Social History Narrative    None       Subjective         Objective    Physical Exam:   Assessment Type: Assess only  General Appearance: Awake  Respiratory Pattern: Normal  Chest Assessment: Chest expansion symmetrical  Bilateral Breath Sounds: Diminished, Clear    Vitals:  Blood pressure 130/63, pulse 77, temperature (!) 97 4 °F (36 3 °C), temperature source Oral, resp  rate (!) 30, weight 51 kg (112 lb 7 oz), SpO2 94 %  Imaging and other studies: I have personally reviewed pertinent reports  Plan    Respiratory Plan: No distress/Pulmonary history        Resp Comments: Pt not in any respiratory distress noted at this time  Breathe sounds are diminished and clear  Pt is r/o flu and pneumonia  No home use of respiratory medications or pulmonary history  Will start pt on udns prn at this time

## 2019-01-02 NOTE — ASSESSMENT & PLAN NOTE
· Await CXR final read, but appears bilateral opacities appearing as infiltrates    Also with procalcitonin 17 1 on admission  · With recent hospitalization in November for surgical procedure, however still would be considered low risk Hcap  · Received azithromycin and ceftriaxone in ED, and will continue for now  · Follow-up blood cultures x2, check Legionella and strep urine antigens, sputum culture  · Will check influenza/RSV PCR  · Respiratory protocol, incentive spirometry  · Trend WBC, temperature curve

## 2019-01-02 NOTE — ASSESSMENT & PLAN NOTE
· Per biopsy report from surgical procedure 11/2018 "benign pancreatic tissue with chronic pancreatitis      · Patient with weight loss and diarrhea after procedure    Follows with Dr Jessica Galarza and was to follow up 12/24/2018, however missed appointment  · Suspect diarrhea may be in part due to malabsorption  · F/U C diff PCR  · GI consulted

## 2019-01-02 NOTE — UTILIZATION REVIEW
Initial Clinical Review    Admission: Date/Time/Statement: 1/1/19 @ 2256     Orders Placed This Encounter   Procedures    Inpatient Admission (expected length of stay for this patient is greater than two midnights)     Standing Status:   Standing     Number of Occurrences:   1     Order Specific Question:   Admitting Physician     Answer:   Janifer Felty [[id number]]     Order Specific Question:   Level of Care     Answer:   Med Surg [[id number]]     Order Specific Question:   Estimated length of stay     Answer:   More than 2 Midnights     Order Specific Question:   Certification     Answer:   I certify that inpatient services are medically necessary for this patient for a duration of greater than two midnights  See H&P and MD Progress Notes for additional information about the patient's course of treatment  ED: Date/Time/Mode of Arrival:   ED Arrival Information     Expected Arrival Acuity Means of Arrival Escorted By Service Admission Type    - 1/1/2019 20:55 Emergent Wheelchair Self General Medicine Emergency    Arrival Complaint    dehydration,weakness        Chief Complaint:   Chief Complaint   Patient presents with    Weakness - Generalized     pt started about a week ago deteriorating and becoming increasingly weak  pt family reports he has not been eating or drinking   Dehydration     History of Illness: Edie Tang is a 82 y o  male who has past medical history significant for pancreatic mass suspected chronic pancreatitis versus autoimmune pancreatitis and type 2 diabetes  He presents from home with worsening weight loss of approximately 5 weeks and also with worsening cough that was described as wet and nonproductive associated with some worsening shortness of breath  Per family he did not receive a flu shot this year, and reportedly some sick contacts were present at home  He denies any fever or chills at home  He does admit to diarrhea described as yellowish" without blood being seen it    In ED, patient was noted to desaturate to as low as 84% requiring up to 5 L nasal cannula  He received 1L NS, and received treatment with azithromycin and ceftriaxone for presumed pneumonia        ED Vital Signs:   ED Triage Vitals   Temperature Pulse Respirations Blood Pressure SpO2   01/01/19 2108 01/01/19 2105 01/01/19 2105 01/01/19 2105 01/01/19 2105   (!) 97 4 °F (36 3 °C) 96 18 143/68 94 %      Temp Source Heart Rate Source Patient Position - Orthostatic VS BP Location FiO2 (%)   01/01/19 2108 01/01/19 2105 01/01/19 2105 01/01/19 2105 --   Oral Monitor Lying Right arm       Pain Score       01/01/19 2105       No Pain        Wt Readings from Last 1 Encounters:   01/01/19 51 kg (112 lb 7 oz)     Vital Signs (abnormal):   01/02/19 0400  --  72   27  110/59  96 %  None (Room air)  Lying   01/02/19 0300  --  78   30  109/57  96 %  Nasal cannula  Lying   01/02/19 0130  --  84   28  114/58  95 %  Nasal cannula  Lying   01/01/19 2300  --  94   30  134/68   85 %  --  --   01/01/19 2108   97 4 °F (36 3 °C)  --  --  --  --  --  --     Abnormal Labs:   01/01/19 2136   pH, Evert 7 386    pCO2, Evert 41 1     pO2, Evert 31 8     HCO3, Evert 24 1    Base Excess, Evert -0 9    O2 Content, Evert 9 4    O2 HGB, VENOUS 53 7       Na 131, 132  K 3 3, 3 1  Cl 95  BUN 28  Glucose 202  Randy 8 1, 7 4  Alb 1 9  Lipase 15  Lactic acid 3 7, 2 1  WBC 15 05, 10 28  H/H 9 4/29 0, 8 6/26 3  Procalcitonin 17 10, 13 89    Color, UA Libby   Clarity, UA Cloudy   SL AMB SPECIFIC GRAVITY_URINE 1 020   Glucose, UA Negat    Ketones, UA Negat    Blood, UA Negat    Nitrite, UA Negat    Leukocytes, UA Negat      pH, UA 6 0   POCT URINE PROTEIN 100 (  Bilirubin, UA Negat    SL AMB POCT UROBILINOGEN 1 0   RBC, UA 2-4   WBC, UA 2-4   Bacteria, UA Moder       Hyaline Casts, UA 0-3   Fine granular casts 0-     +strep pneumoniae urine     Diagnostic Test Results:     1/1 CXR - Lower lobe infiltrates bilaterally     ED Treatment:   Medication Administration from 01/01/2019 2055 to 01/02/2019 5965    Date/Time Order Dose Route Action   01/01/2019 2144 sodium chloride 0 9 % bolus 1,000 mL 1,000 mL Intravenous New Bag   01/01/2019 2307 ceftriaxone (ROCEPHIN) 1 g/50 mL in dextrose IVPB 1,000 mg Intravenous New Bag   01/02/2019 0003 azithromycin (ZITHROMAX) 500 mg in sodium chloride 0 9% 250mL IVPB 500 mg 500 mg Intravenous New Bag   01/02/2019 0539 pantoprazole (PROTONIX) EC tablet 40 mg 40 mg Oral Given   01/02/2019 0539 heparin (porcine) subcutaneous injection 5,000 Units 5,000 Units Subcutaneous Given   01/02/2019 0019 heparin (porcine) subcutaneous injection 5,000 Units 5,000 Units Subcutaneous Given        Past Medical/Surgical History: Active Ambulatory Problems     Diagnosis Date Noted    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    Esophagitis 10/10/2018    Diabetes mellitus ([facility] ) 10/15/2018    Weight loss 10/15/2018    Other chronic pancreatitis ([place] [address] ) 12/03/2018     Resolved Ambulatory Problems     Diagnosis Date Noted    No Resolved Ambulatory Problems     Past Medical History:   Diagnosis Date    Diabetes mellitus ([facility] )     Weight loss      Admitting Diagnosis: Dehydration [E86 0]  Severe protein-calorie malnutrition (HCC) [E43]  Weakness [R53 1]  Bilateral pneumonia [J18 9]  [facility] ) [A41 9, R65 20]  Other chronic pancreatitis ([place] [address] ) [K86 1]  Type 2 diabetes mellitus with complication, without long-term current use of insulin ([place] [address] ) [E11 8]    Age/Sex: 82 y o  male    Assessment/Plan:   Pneumonia   Assessment & Plan     · Await CXR final read, but appears bilateral opacities appearing as infiltrates    Also with procalcitonin 17 1 on admission  · With recent hospitalization in November for surgical procedure, however still would be considered low risk Hcap  · Received azithromycin and ceftriaxone in ED, and will continue for now  · Follow-up blood cultures x2, check Legionella and strep urine antigens, sputum culture  · Will check influenza/RSV PCR  · Respiratory protocol, incentive spirometry  · Trend WBC, temperature curve    * Sepsis ([address] )   Assessment & Plan     · As evidenced by tachycardia and leukocytosis present on admission with pneumonia as suspected source  Also with lactate 3 7  · Receiving 1 L normal saline at present time  · Trend lactic until less than 2, treatment for suspected pneumonia as below    Acute kidney injury [facility])   Assessment & Plan     · Present on admission, Cr 0 99 with baseline 0 5  In setting of presumed pneumonia and also with diarrhea reported  · Is presently receiving 1 L normal saline  · Trend creatinine daily    Other chronic pancreatitis [facility])   Assessment & Plan     · Per biopsy report from surgical procedure 11/2018 "benign pancreatic tissue with chronic pancreatitis      · Patient with weight loss and diarrhea after procedure  Follows with Dr Constantine Kent and was to follow up 12/24/2018, however missed appointment  · Will consult GI for evaluation given worsening of symptoms  · Given recent hospitalization will check C diff PCR    Weight loss   Assessment & Plan     · Per family weight loss of approximately 12 lb over 5 weeks with associated diarrhea  · Will obtain nutrition consult, dietary supplements in the meantime      Diabetes mellitus [facility])   Assessment & Plan             Lab Results   Component Value Date     HGBA1C 6 3 10/30/2018      Hold home metformin, began SSI with Accu-Cheks while inpatient  Carb controlled diet, however will liberalize secondary to cachexia          VTE Prophylaxis: Heparin  / sequential compression device   Code Status: Level 1 - Full Code as discussed with patient at bedside  Anticipated Length of Stay:  Patient will be admitted on an Inpatient basis with an anticipated length of stay of  Greater than 2 midnights  Justification for Hospital Stay: Please see detailed plans noted above      Admission Orders:  Scheduled Meds:   Current Facility-Administered Medications:  albuterol 2 5 mg Nebulization Q4H PRN   cefTRIAXone 1,000 mg Intravenous Q24H   And      azithromycin 500 mg Intravenous Q24H   heparin (porcine) 5,000 Units Subcutaneous Q8H [facility]   pantoprazole 40 mg Oral Early Morning   potassium chloride 40 mEq Oral Once     PRN Meds:   albuterol    SCDs  Incentive spirometry hourly [place]  Up w/ assist   Sputum c&S   C diff toxin   Influenza culture  ADA diet   Cons GI

## 2019-01-02 NOTE — TELEPHONE ENCOUNTER
Dr Amber Avilez pts daughter Rony Vickers called wanting to let Dr Constantine Kent know that pt has been hospitalized and would like to speak to someone regarding pt's situation   Rony Vickers can be reached at [phone] cell or work [phone]

## 2019-01-02 NOTE — ASSESSMENT & PLAN NOTE
Lab Results   Component Value Date    HGBA1C 6 3 10/30/2018       Blood Sugar Average: Last 72 hrs:     · Hold home metformin, began SSI with Accu-Cheks while inpatient    Carb controlled diet, however will liberalize secondary to cachexia

## 2019-01-02 NOTE — ASSESSMENT & PLAN NOTE
· Per family weight loss of approximately 12 lb over 5 weeks with associated diarrhea  · Will obtain nutrition consult, dietary supplements in the meantime

## 2019-01-02 NOTE — ASSESSMENT & PLAN NOTE
· Present on admission, Cr 0 99 with baseline 0 5    In setting of presumed pneumonia and also with diarrhea reported  · Is presently receiving 1 L normal saline  · Trend creatinine daily

## 2019-01-02 NOTE — ASSESSMENT & PLAN NOTE
· Present on admission, Cr 0 99 with baseline 0 5    In setting of presumed pneumonia and also with diarrhea reported  · Creatinine improved with IVFs

## 2019-01-02 NOTE — DISCHARGE INSTR - DIET
Trials of puree and thin liquid with SLP ONLY  Small tsps alternating food/liquid  Swallowing exercises

## 2019-01-02 NOTE — CONSULTS
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    Esophagitis    Diabetes mellitus ([facility] )    Weight loss    Other chronic pancreatitis ([facility]    Sepsis ([facility]    Pneumonia    Acute kidney injury ([facility] )       A/P:   Pancreatic mass  -H/o pancreatic mass; -ve for malignancy on biopsy, negative for autoimmune pancreatitis; dilation of pancreatic duct; Normal CA 19-9 and CEA  S/p EUS with biopsy and diagnostic laparotomy with biopsy  -Repeat CT scan of the abdomen scheduled  -Patient has a h/o diarrhea with steatorrhea, does not have diarrhea currently  -Have started creon 16640Q BID; if patient develops constipation can go down on the dose  -Vit D supplementation  -Patient is scheduled for outpatient follow up with Dr Claire Glaser where he will receive a repeat EUS+ possible colonoscopy  Patient was initially not agreeable to a colonoscopy but after discussion with him due to his significant ongoing weight loss he said he will consider it  -No further interventions at this time; outpatient appointment has been scheduled      HPI: Mima Pereyra is a 82 y o  male who presents with PMH of pancreatic mass, Diabetes mellitus on metformin who is currently admitted for pneumonia (positive S pneumo antigen)  He was diagnosed with diabetes earlier this year and had significant weight loss which prompted a CT scan to diagnose an occult malignancy  A mass was found in the tail of the pancreas near the hilum- 3 6x2 2 cms  Pancreatic duct was found to be dilated to upto 7mm   He underwent an EUS with biopsy which showed dilated ducts that tapered towards the tail- mild esophagitis  He also underwent an MRI that showed similar findings  He underwent a diagnostic laparotomy with pancreatic biopsy which showed no evidence of metastatic disease  Biopsy was negative for malignancy, autoimmune pancreatitis, CEA and CA 19-9 was normal  He underwent a diagnostic laparotomy with pancreatic biopsy which showed no evidence of a metastatic disease  Currently the patient has no abdominal pain, nausea, vomiting or diarrhea  He does report occasional diarrhea in the past  He is still losing weight and has lost about 40 lbs in the last few months and an additional 10 pounds in the last month  His stools as reported by his wife are yellow and float in the toilet  He has not had diarrhea recently, no blood in the stools  History obtained from patient    PMH:  Past Medical History:   Diagnosis Date    Diabetes mellitus [facility] )     Type II    Weight loss         PSH:  Past Surgical History:   Procedure Laterality Date    LAPAROTOMY N/A 11/19/2018    Procedure: LAPAROTOMY EXPLORATORY;  Surgeon: Mandie Cavazos MD;  [facility];  Service: Surgical Oncology    UT EDG US EXAM SURGICAL ALTER STOM DUODENUM/JEJUNUM N/A 9/21/2018    Procedure: LINEAR ENDOSCOPIC U/S;  Surgeon: Elkin Bear MD;  Location[facility];   Service: Gastroenterology    UT LAP,DIAGNOSTIC ABDOMEN N/A 11/19/2018    Procedure: pancreatic biopsy;  Surgeon: Mandie Cavazos MD;  [facility];  Service: Surgical Oncology    TONSILLECTOMY      VASCULAR SURGERY      phlebitis many years ago    WRIST GANGLION EXCISION         Allergies: No Known Allergies    Family History:   Family History   Problem Relation Age of Onset    No Known Problems Mother     No Known Problems Father     Pancreatic cancer Brother 61    Breast cancer Daughter 36        38s       Social History:   History   Smoking Status    Former Smoker    Years: 25 00   Smokeless Tobacco    Never Used Comment: quit about 20 years ago as of 9/21/2018    History   Alcohol Use    Yes     Comment: 1 beer a day    History   Drug Use No        Home Medications:   Prior to Admission medications    Medication Sig Start Date End Date Taking? Authorizing Provider   metFORMIN (GLUCOPHAGE) 500 mg tablet daily 9/5/18  Yes Historical Provider, MD   omeprazole (PriLOSEC) 20 mg delayed release capsule Take 1 capsule (20 mg total) by mouth daily 11/27/18  Yes Gibson Ziegler PA-C       ROS:   Review of Systems   Respiratory: Positive for cough and shortness of breath  Gastrointestinal: Positive for diarrhea  All other systems reviewed and are negative  Vitals:   Vitals:    01/02/19 0400 01/02/19 0500 01/02/19 0600 01/02/19 0714   BP: 110/59 116/59 111/70 139/64   BP Location: Left arm Left arm Left arm Right arm   Pulse: 72 74 78 71   Resp: (!) 27 22 22 20   Temp:    98 3 °F (36 8 °C)   TempSrc:    Oral   SpO2: 96% 96% 97% 95%   Weight:         Temp  Min: 97 4 °F (36 3 °C)  Max: 98 3 °F (36 8 °C)  IBW: -88 kg  Body mass index is 17 61 kg/m²  PHYSICAL EXAM:  Physical Exam   Constitutional: He is oriented to person, place, and time  He appears cachectic  HENT:   Head: Normocephalic  Eyes: Pupils are equal, round, and reactive to light  Cardiovascular: Normal rate, regular rhythm and normal heart sounds  Pulmonary/Chest:   Coarse breath sounds   Abdominal: Soft  Bowel sounds are normal    Scaphoid shape  Laparotomy scar seen  Non tender   Neurological: He is alert and oriented to person, place, and time  Skin: Skin is warm         Labs:     Results from last 7 days  Lab Units 01/02/19  0431 01/02/19  0023 01/01/19  2136   WBC Thousand/uL 10 28*  --  15 05*   HEMOGLOBIN g/dL 8 6*  --  9 4*   HEMATOCRIT % 26 3*  --  29 0*   PLATELETS Thousands/uL 177 199 210   MONO PCT % 1*  --  1*       Results from last 7 days  Lab Units 01/02/19  0431 01/01/19  2137   POTASSIUM mmol/L 3 1* 3 3*   CHLORIDE mmol/L 100 95* CO2 mmol/L 26 26   BUN mg/dL 23 28*   CREATININE mg/dL 0 62 0 99   CALCIUM mg/dL 7 4* 8 1*   ALK PHOS U/L  --  101   ALT U/L  --  34   AST U/L  --  45       Results from last 7 days  Lab Units 01/02/19  0431   MAGNESIUM mg/dL 1 7     No results found for: PHOS     Results from last 7 days  Lab Units 01/01/19  2137   INR  1 04   PTT seconds 34     No results found for: TROPONINT  ABG:No results found for: PHART, WWG0LZL, PO2ART, IBK6ZQI, D1EAISAX, BEART, SOURCE    Imaging: I have personally reviewed pertinent reports  EKG: This was personally reviewed by myself     Micro:  Blood Culture: No results found for: BLOODCX  Urine Culture: No results found for: URINECX  Sputum Culture: No components found for: SPUTUMCX  Wound Culure: No results found for: Sujey Coleman MD  1/2/2019 11:55 AM

## 2019-01-02 NOTE — ASSESSMENT & PLAN NOTE
· As evidenced by tachycardia and leukocytosis present on admission with pneumonia as suspected source  Also with lactate 3 7    · Receiving 1 L normal saline at present time  · Trend lactic until less than 2, treatment for suspected pneumonia as below

## 2019-01-02 NOTE — H&P
H&P- Sondra Castillo 1935, 82 y o  male MRN: [id number]    Unit/Bed#: ED 23 Encounter: [id number]    Primary Care Provider: Vianey Kahn DO   Date and time admitted to hospital: 1/1/2019  8:59 PM        Pneumonia   Assessment & Plan    · Await CXR final read, but appears bilateral opacities appearing as infiltrates  Also with procalcitonin 17 1 on admission  · With recent hospitalization in November for surgical procedure, however still would be considered low risk Hcap  · Received azithromycin and ceftriaxone in ED, and will continue for now  · Follow-up blood cultures x2, check Legionella and strep urine antigens, sputum culture  · Will check influenza/RSV PCR  · Respiratory protocol, incentive spirometry  · Trend WBC, temperature curve     * Sepsis ([facility] )   Assessment & Plan    · As evidenced by tachycardia and leukocytosis present on admission with pneumonia as suspected source  Also with lactate 3 7  · Receiving 1 L normal saline at present time  · Trend lactic until less than 2, treatment for suspected pneumonia as below     Acute kidney injury [facility])   Assessment & Plan    · Present on admission, Cr 0 99 with baseline 0 5  In setting of presumed pneumonia and also with diarrhea reported  · Is presently receiving 1 L normal saline  · Trend creatinine daily     Other chronic pancreatitis [facility])   Assessment & Plan    · Per biopsy report from surgical procedure 11/2018 "benign pancreatic tissue with chronic pancreatitis      · Patient with weight loss and diarrhea after procedure    Follows with Dr Bharati Grant and was to follow up 12/24/2018, however missed appointment  · Will consult GI for evaluation given worsening of symptoms  · Given recent hospitalization will check C diff PCR     Weight loss   Assessment & Plan    · Per family weight loss of approximately 12 lb over 5 weeks with associated diarrhea  · Will obtain nutrition consult, dietary supplements in the meantime     Diabetes mellitus ([facility] ) Assessment & Plan    Lab Results   Component Value Date    HGBA1C 6 3 10/30/2018       No results for input(s): POCGLU in the last 72 hours  Blood Sugar Average: Last 72 hrs:     Hold home metformin, began SSI with Accu-Cheks while inpatient  Carb controlled diet, however will liberalize secondary to cachexia         VTE Prophylaxis: Heparin  / sequential compression device   Code Status: Level 1 - Full Code as discussed with patient at bedside  POLST: There is no POLST form on file for this patient (pre-hospital)    Anticipated Length of Stay:  Patient will be admitted on an Inpatient basis with an anticipated length of stay of  Greater than 2 midnights  Justification for Hospital Stay: Please see detailed plans noted above  Chief Complaint:     Weight loss/weakness  History of Present Illness:  Isabell Mcgarry is a 82 y o  male who has past medical history significant for pancreatic mass suspected chronic pancreatitis versus autoimmune pancreatitis and type 2 diabetes  He presents from home with worsening weight loss of approximately 5 weeks and also with worsening cough that was described as wet and nonproductive associated with some worsening shortness of breath  Per family he did not receive a flu shot this year, and reportedly some sick contacts were present at home  He denies any fever or chills at home  He does admit to diarrhea described as yellowish" without blood being seen it  In ED, patient was noted to desaturate to as low as 84% requiring up to 5 L nasal cannula  He received 1L NS, and received treatment with azithromycin and ceftriaxone for presumed pneumonia  Currently, patient is reporting he does not have shortness of breath or abdominal pain  He continues to report cough, however remains nonproductive      Review of Systems:    Constitutional:  Denies fever or chills but reports fatigue and weight loss  Eyes:  Denies change in visual acuity   HENT:  Denies nasal congestion or sore throat   Respiratory:  Denies shortness of breath but reports cough  Cardiovascular:  Denies chest pain or edema   GI:  Denies abdominal pain, nausea, vomiting, bloody stools but reports diarrhea  :  Denies dysuria   Musculoskeletal:  Denies back pain or joint pain   Integument:  Denies rash   Neurologic:  Denies headache, focal weakness or sensory changes   Endocrine:  Denies polyuria or polydipsia   Lymphatic:  Denies swollen glands   Psychiatric:  Denies depression or anxiety     Past Medical and Surgical History:   Past Medical History:   Diagnosis Date    Diabetes mellitus ([facility] )     Type II    Weight loss      Past Surgical History:   Procedure Laterality Date    LAPAROTOMY N/A 11/19/2018    Procedure: LAPAROTOMY EXPLORATORY;  Surgeon: Sueellen Habermann, MD;  [facility];  Service: Surgical Oncology    DC EDG US EXAM SURGICAL ALTER STOM DUODENUM/JEJUNUM N/A 9/21/2018    Procedure: LINEAR ENDOSCOPIC U/S;  Surgeon: Mg Staton MD;  [facility];   Service: Gastroenterology    DC LAP,DIAGNOSTIC ABDOMEN N/A 11/19/2018    Procedure: pancreatic biopsy;  Surgeon: Sueellen Habermann, MD;  [facility];  Service: Surgical Oncology    TONSILLECTOMY      VASCULAR SURGERY      phlebitis many years ago    WRIST GANGLION EXCISION         Meds/Allergies:    Current Facility-Administered Medications:     azithromycin (ZITHROMAX) 500 mg in sodium chloride 0 9% 250mL IVPB 500 mg, 500 mg, Intravenous, Once, Nakul Quan MD, 500 mg at 01/02/19 0003    ceftriaxone (ROCEPHIN) 1 g/50 mL in dextrose IVPB, 1,000 mg, Intravenous, Q24H **AND** azithromycin (ZITHROMAX) 500 mg in sodium chloride 0 9% 250mL IVPB 500 mg, 500 mg, Intravenous, Q24H, Dalia Graves MD    heparin (porcine) subcutaneous injection 5,000 Units, 5,000 Units, Subcutaneous, Q8H [address] **AND** Platelet count, , , Once, Dalia Graves MD    pantoprazole (PROTONIX) EC tablet 40 mg, 40 mg, Oral, Early Morning, Dalia Graves MD    Current Outpatient Prescriptions:     metFORMIN (GLUCOPHAGE) 500 mg tablet, daily, Disp: , Rfl:     omeprazole (PriLOSEC) 20 mg delayed release capsule, Take 1 capsule (20 mg total) by mouth daily, Disp: 30 capsule, Rfl: 2      Allergies: No Known Allergies  History:  Marital Status: /Civil Union   Occupation: Retired  Patient Pre-hospital Living Situation: Home with family  Patient Pre-hospital Level of Mobility: not discussed  Patient Pre-hospital Diet Restrictions: None  Substance Use History:   History   Alcohol Use    Yes     Comment: 1 beer a day     History   Smoking Status    Former Smoker    Years: 25 00   Smokeless Tobacco    Never Used     Comment: quit about 20 years ago as of 9/21/2018     History   Drug Use No       Family History:  Family History   Problem Relation Age of Onset    No Known Problems Mother     No Known Problems Father     Pancreatic cancer Brother 61    Breast cancer Daughter 36        38s       Physical Exam:     Vitals:   Blood Pressure: 130/63 (01/02/19 0003)  Pulse: 77 (01/02/19 0003)  Temperature: (!) 97 4 °F (36 3 °C) (01/01/19 2108)  Temp Source: Oral (01/01/19 2108)  Respirations: (!) 30 (01/02/19 0003)  Weight - Scale: 51 kg (112 lb 7 oz) (01/01/19 2106)  SpO2: 94 % (01/02/19 0003)    Constitutional:  Cachetic, no acute distress, non-toxic appearance   Eyes:  PERRL, conjunctiva normal   HENT:  Atraumatic, external ears normal, nose normal, oropharynx mildly dry, no pharyngeal exudates  Neck- normal range of motion, no tenderness, supple   Respiratory:  No respiratory distress, normal breath sounds, no rales, no wheezing   Cardiovascular:  Normal rate, normal rhythm, no murmurs, no gallops, no rubs   GI:  Soft, nondistended, normal bowel sounds, nontender, no organomegaly, no mass, no rebound, no guarding   :  No costovertebral angle tenderness   Musculoskeletal:  No edema, no tenderness, no deformities   Back- no tenderness  Integument:  Well hydrated, no rash   Lymphatic:  No lymphadenopathy noted   Neurologic:  Alert &awake, communicative, CN 2-12 normal, normal motor function, normal sensory function, no focal deficits noted   Psychiatric:  Speech and behavior appropriate       Lab Results: I have personally reviewed pertinent reports  Results from last 7 days  Lab Units 01/01/19  2136   WBC Thousand/uL 15 05*   HEMOGLOBIN g/dL 9 4*   HEMATOCRIT % 29 0*   PLATELETS Thousands/uL 210   LYMPHO PCT % 2*   MONO PCT % 1*   EOS PCT % 0       Results from last 7 days  Lab Units 01/01/19  2137   POTASSIUM mmol/L 3 3*   CHLORIDE mmol/L 95*   CO2 mmol/L 26   BUN mg/dL 28*   CREATININE mg/dL 0 99   CALCIUM mg/dL 8 1*   ALK PHOS U/L 101   ALT U/L 34   AST U/L 45       Results from last 7 days  Lab Units 01/01/19  2137   INR  1 04       EKG: Sinus rhythm with sinus arrhythmia, incomplete RBBB    Imaging: I have personally reviewed pertinent reports  and I have personally reviewed pertinent films in PACS    CXR 01/01/2019:  Await radiologist read, however appears new bilateral opacities concerning for pneumonia    Mri Abdomen W Wo Contrast    Result Date: 12/16/2018  Narrative: INDICATION:  Chronic pancreatitis  ORDERING PROVIDER:  Pramod Dozier  TECHNIQUE:  Multiplanar unenhanced MR imaging followed by dynamic enhanced MR imaging through the abdomen following intravenous contrast   Gadavist 5 mL was administered intravenously  COMPARISON:  MR abdomen 10/9/18  FINDINGS: Liver morphology is normal   No cholelithiasis or cholecystitis  No biliary dilatation  The common bile duct is patent without filling defect, dilatation, or stricture identified  There are a few scattered bilateral subcentimeter renal cortical cysts seen in both kidneys, with no suspicious renal mass or hydronephrosis detected  Again seen is the patient's diffusely abnormal pancreas, which has diffuse scattered main pancreatic ductal ectasia and diffuse pancreatic atrophy   Finding in the tail of the pancreas on today's examination appears much more cystic, and fluid-filled than what was seen on prior examinations, now measuring roughly 4 1 x 3 4 cm on axial images 13 and 14 of series 9001  There is some developing rim enhancement about this collection, could reflect a developing pancreatic tail pseudocyst   Please note that the majority of the pulse sequences are rather significantly degraded by extensive patient motion during the study, I would recommend following this patient with CT instead of MRI in the future  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 complication of the patient's prior episodes of pancreatitis  No focal masses in the spleen and adrenal glands  No lymphadenopathy or ascites  There is some mild patchy left basilar atelectasis at the left lung base  Impression: 1  Finding in the patient's pancreatic tail is much more cystic and fluid-filled on today's examination than on the prior studies, favored to reflect a developing pseudocyst now measuring 4 1 x 3 4 cm  Cystic neoplasm seems much less likely based upon the appearance now  The patient's splenic vein remains occluded and there may be some very mild residual inflammatory changes about the pancreatic tail  Please note the majority of these pulse sequence images are degraded by significant motion, would recommend following this patient with CT in the future, not MRI  Recommend a follow-up CT in 6-8 weeks to reevaluate, and confirm ongoing evolution of this pancreatic tail cystic finding and rule out any underlying lesion  2  No cholelithiasis or cholecystitis  No biliary dilatation  The common bile duct is patent without filling defect, dilatation, or stricture identified  3    Moderate diffuse pancreatic parenchymal atrophy, with associated diffuse main pancreatic ductal ectasia, likely from old episodes of pancreatitis   Signed by Earl Palma        ** Please Note: Dragon 360 Dictation voice to text software was used in the creation of this document   **

## 2019-01-02 NOTE — ED ATTENDING ATTESTATION
Giovani Reyes MD, saw and evaluated the patient  All available labs and X-rays were ordered by me or the resident and have been reviewed by myself  I discussed the patient with the resident / non-physician and agree with the resident's / non-physician practitioner's findings and plan as documented in the resident's / non-physician practicitioner's note, except where noted  At this point, I agree with the current assessment done in the ED  Chief Complaint   Patient presents with    Weakness - Generalized     pt started about a week ago deteriorating and becoming increasingly weak  pt family reports he has not been eating or drinking   Dehydration     This is an 72-year-old male presenting for evaluation of weight loss  History is primarily from the daughter, son-in-law, wife  From what I can understand the patient has been diagnosed the pancreatic mass  They want to do a surgery but were unable to do the surgery for some reason  Instead the 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  This was done about 5 weeks ago  He since that even before then though has been having gradually decreasing appetite, increased weight loss  His weight has shifted down words to the latest being 111 lb  He feels dehydrated, feels weak  He has had the appearance of more tachypnea  Lastly he has been having cough for the last couple days after he was in contact with multiple sick people at home  The cough sounds wet  He denies any rashes  Denies new medications  He was supposed to have follow-up with GI on December 24th but went to the wrong location for his appointment and so missed it  Because he was much worse in terms of his hydration status and overall appearance he was brought in today for evaluation    PMH:  - Weight loss  - DM  - pancreatitis  PSH:  - Laparotomy  - pancreatic buipsy  - vascular surgery  - EGD  Former smoekr  Daily alcohol  No drugs  PE:  Vitals: 01/04/19 2000 01/04/19 2229 01/05/19 0759 01/05/19 1108   BP:  161/88 155/75 126/77   BP Location:  Right arm     Pulse:  90 95 92   Resp:  18 18 18   Temp:  97 7 °F (36 5 °C) 97 5 °F (36 4 °C) 98 3 °F (36 8 °C)   TempSrc:  Oral     SpO2: 97% 92% 95% 91%   Weight:       Height:       General: VSS, NAD, awake, alert  cachexia, well-developed  Appears stated age  Speaking normally in full sentences  Head: Normocephalic, atraumatic, nontender  Eyes: PERRL, EOM-I  No diplopia  No hyphema  No subconjunctival hemorrhages  Symmetrical lids  ENT: Atraumatic external nose and ears  Dry mucous membranes  No malocclusion  No stridor  Normal phonation  No drooling  Normal swallowing  Neck: Symmetric, trachea midline  No JVD  CV: RRR  +S1/S2  No murmurs or gallops  Peripheral pulses +2 throughout  No chest wall tenderness  Lungs:   Unlabored No retractions  CTAB, lungs sounds equal bilateral    +tachypnea (mild)  Abd: +BS, soft, NT/ND  Incision vertical in the abdomen is healing well  MSK:   FROM   Back:   No rashes  Skin: Dry, intact  Neuro: AAOx3, GCS 15, CN II-XII grossly intact  Motor grossly intact  Psychiatric/Behavioral: Appropriate mood and affect   Exam: deferred  A:  - cachexia  - weight loss  - anorexia  - dehydration  - belly pain  P:  - Labs  - Fluids  - Urine  - discussed with Medicine, GI  - 13 point ROS was performed and all are normal unless stated in the history above  - Nursing note reviewed  Vitals reviewed  - Orders placed by myself and/or advanced practitioner / resident     - Previous chart was reviewed  - No language barrier    - History obtained from patient  - There are no limitations to the history obtained  - Critical care time: Not applicable for this patient  Final Diagnosis:  1  Severe sepsis (HCC)    2  Dehydration    3  Bilateral pneumonia    4  Severe protein-calorie malnutrition ([address] )    5  Other chronic pancreatitis ([address] )    6   Type 2 diabetes mellitus with complication, without long-term current use of insulin ([facility] [address] )    7  Dysphagia, unspecified type        ED Course as of Jan 05 1516   Tue Jan 01, 2019   2217 Potassium: (!) 3 3   2217 Glucose, Random: (!) 202   2217 BUN: (!) 28   2217 BUN/Creat supports dehydration    Creatinine: 0 99   2217 Base Excess, Evert: -0 9   2217 Lipase: (!) 15   2225 LACTIC ACID: (!!) 3 7     Medications   pantoprazole (PROTONIX) EC tablet 40 mg (40 mg Oral Not Given 1/5/19 0527)   heparin (porcine) subcutaneous injection 5,000 Units (5,000 Units Subcutaneous Given 1/5/19 1334)   ceftriaxone (ROCEPHIN) 1 g/50 mL in dextrose IVPB (1,000 mg Intravenous New Bag 1/4/19 2353)   albuterol inhalation solution 2 5 mg (not administered)   cholecalciferol (VITAMIN D3) tablet 400 Units (400 Units Oral Not Given 1/5/19 0810)   pancrelipase (Lip-Prot-Amyl) (CREON) delayed release capsule 24,000 Units (24,000 Units Oral Not Given 1/5/19 0811)   dextrose 5 % and sodium chloride 0 9 % infusion (50 mL/hr Intravenous New Bag 1/5/19 0843)   insulin lispro (HumaLOG) 100 units/mL subcutaneous injection 1-5 Units (1 Units Subcutaneous Not Given 1/5/19 1332)   potassium chloride 20 mEq IVPB (premix) (20 mEq Intravenous New Bag 1/5/19 1334)   atropine 1 mg/10 mL injection 0 5 mg (0 5 mg Intravenous Not Given 1/5/19 1342)   atropine 1 mg/10 mL injection 0 5 mg (not administered)   potassium phosphate 21 mmol in sodium chloride 0 9 % 250 mL infusion (not administered)   sodium chloride 0 9 % bolus 1,000 mL (0 mL Intravenous Stopped 1/1/19 2358)   ceftriaxone (ROCEPHIN) 1 g/50 mL in dextrose IVPB (0 mg Intravenous Stopped 1/1/19 2340)   azithromycin (ZITHROMAX) 500 mg in sodium chloride 0 9% 250mL IVPB 500 mg (0 mg Intravenous Stopped 1/2/19 0150)   barium sulfate (LIQUID E-Z-PAQUE) 60 % oral suspension 355 mL (250 mL Oral Given 1/3/19 0845)   barium sulfate 98 % oral suspension 135 mL (100 mL Oral Given 1/3/19 0845)   barium sulfate 60 % cream 2 5 g (2 5 g Oral Given 1/3/19 0845)   potassium chloride 20 mEq IVPB (premix) (0 mEq Intravenous Stopped 1/3/19 1632)   neostigmine (BLOXIVERZ) injection 1 mg (1 mg Intravenous Given 1/5/19 1247)   barium sulfate (LIQUID E-Z-PAQUE) 60 % oral suspension 355 mL (50 mL Oral Given by Other 1/5/19 1300)     FL barium swallow video w speech   Final Result      CT chest wo contrast   Final Result      Groundglass and consolidative airspace opacity throughout the lungs, most dense in the dependent lungs and more severe on the left than the right  Also noted is inspissated mucus within the trachea and the mainstem bronchi bilaterally  The findings are    most suspicious for severe multifocal aspiration pneumonia  Small left greater than right pleural effusions  Findings in the upper abdomen are better evaluated on recent abdominal MR of December 14, 2018  Workstation performed: [id number]         MRI brain wo contrast   Final Result      No acute intracranial ischemia  Advanced microangiopathic changes throughout supratentorial white matter and central karthikeyan  Right mastoid effusion  Workstation performed: [id number]         CT head wo contrast   Final Result      Generalized volume loss with advanced microangiopathic changes of the cerebral white matter  No acute intracranial abnormality  Workstation performed: [id number]         FL barium swallow video w speech   Final Result      FL barium swallow video w speech   Final Result      XR chest pa & lateral   ED Interpretation   Bilateral infiltrates concerning for pneumonia        Final Result      Lower lobe infiltrates bilaterally            Workstation performed: [id number]           Orders Placed This Encounter   Procedures    ED ECG Documentation Only    Blood culture #1    Blood culture #2    Influenza A/B and RSV by PCR    Strep Pneumoniae, Urine    Legionella antigen, urine    Sputum culture and Gram stain    Clostridium difficile toxin by PCR    Pancreatic elastase, fecal    Fecal fat, quantitative    XR chest pa & lateral    FL barium swallow video w speech    CT head wo contrast    FL barium swallow video w speech    MRI brain wo contrast    FL barium swallow video w speech    CT chest wo contrast    CBC and differential    Comprehensive metabolic panel    Lactic acid x2    Procalcitonin    Protime-INR    APTT    UA w Reflex to Microscopic w Reflex to Culture    Blood gas, venous    Lipase    Urine Microscopic    Procalcitonin    Basic metabolic panel    CBC and differential    Magnesium    Platelet count    Lactic acid, plasma    Procalcitonin    CBC and differential    Basic metabolic panel    Acetylcholine receptor, binding    Acetylcholine receptor, blocking    Acetylcholine receptor, modulating    MUSK Antibody    CBC and differential    Basic metabolic panel    Procalcitonin    Basic metabolic panel    CBC and differential    Procalcitonin    CBC    Basic metabolic panel    Magnesium    Phosphorus    Magnesium    Phosphorus    Diet NPO    Nursing communcation Continue IV as ordered  Vick Stanford Notify admitting physician    Notify admitting physician on arrival    Vital Signs per unit routine    Oral care    Nursing Communication Provide patient with Zone Tool for education      Elevate Head of Bed 30 degrees or greater    Incentive spirometry    Up with assistance    Place sequential compression device    Insulin Subcutaneous Notify Physician    Insulin Subcutaneous Instruction    Fingerstick Glucose (POCT)    Neuro checks    Vital Signs    24 Hour Telemetry Monitoring    Level 1-Full Code: all life saving measures are indicated    Inpatient Consult to Case Management    Inpatient Consult to Nutrition Services    Inpatient consult to gastroenterology    Inpatient consult to Neurology    Inpatient consult to Pulmonology    Contact isolation status    Room Service  OT eval and treat    PT eval and treat    Respiratory Protocol    SPEECH Language eval and treatment    EKG RESULTS    ECG 12 lead    ECG 12 lead    Inpatient Admission (expected length of stay for this patient is greater than two midnights)    Bed Request    Bed Request    Aspiration precautions     Labs Reviewed   CBC AND DIFFERENTIAL - Abnormal        Result Value Ref Range Status    WBC 15 05 (*) 4 31 - 10 16 Thousand/uL Final    RBC 3 14 (*) 3 88 - 5 62 Million/uL Final    Hemoglobin 9 4 (*) 12 0 - 17 0 g/dL Final    Hematocrit 29 0 (*) 36 5 - 49 3 % Final    MCV 92  82 - 98 fL Final    MCH 29 9  26 8 - 34 3 pg Final    MCHC 32 4  31 4 - 37 4 g/dL Final    RDW 14 3  11 6 - 15 1 % Final    MPV 11 1  8 9 - 12 7 fL Final    Platelets 168  406 - 390 Thousands/uL Final    nRBC 0  /100 WBCs Final    Comment: This is an appended report  These results have been appended to a previously preliminary verified report  Narrative: This is an appended report  These results have been appended to a previously verified report  COMPREHENSIVE METABOLIC PANEL - Abnormal     Sodium 131 (*) 136 - 145 mmol/L Final    Potassium 3 3 (*) 3 5 - 5 3 mmol/L Final    Chloride 95 (*) 100 - 108 mmol/L Final    CO2 26  21 - 32 mmol/L Final    ANION GAP 10  4 - 13 mmol/L Final    BUN 28 (*) 5 - 25 mg/dL Final    Creatinine 0 99  0 60 - 1 30 mg/dL Final    Comment: Standardized to IDMS reference method    Glucose 202 (*) 65 - 140 mg/dL Final    Comment:   If the patient is fasting, the ADA then defines impaired fasting glucose as > 100 mg/dL and diabetes as > or equal to 123 mg/dL  Specimen collection should occur prior to Sulfasalazine administration due to the potential for falsely depressed results  Specimen collection should occur prior to Sulfapyridine administration due to the potential for falsely elevated results      Calcium 8 1 (*) 8 3 - 10 1 mg/dL Final    AST 45  5 - 45 U/L Final    Comment:   Specimen collection should occur prior to Sulfasalazine administration due to the potential for falsely depressed results  ALT 34  12 - 78 U/L Final    Comment:   Specimen collection should occur prior to Sulfasalazine and/or Sulfapyridine administration due to the potential for falsely depressed results  Alkaline Phosphatase 101  46 - 116 U/L Final    Total Protein 6 8  6 4 - 8 2 g/dL Final    Albumin 1 9 (*) 3 5 - 5 0 g/dL Final    Total Bilirubin 0 47  0 20 - 1 00 mg/dL Final    eGFR 71  ml/min/1 73sq m Final    Narrative:     National Kidney Disease Education Program recommendations are as follows:  GFR calculation is accurate only with a steady state creatinine  Chronic Kidney disease less than 60 ml/min/1 73 sq  meters  Kidney failure less than 15 ml/min/1 73 sq  meters  LACTIC ACID, PLASMA - Abnormal     LACTIC ACID 3 7 (*) 0 5 - 2 0 mmol/L Final    Narrative:     Result may be elevated if tourniquet was used during collection  LACTIC ACID, PLASMA - Abnormal     LACTIC ACID 2 1 (*) 0 5 - 2 0 mmol/L Final    Narrative:     Result may be elevated if tourniquet was used during collection  PROCALCITONIN TEST - Abnormal     Procalcitonin 17 10 (*) <=0 25 ng/ml Final    Comment: Suspected Lower Respiratory Tract Infection (LRTI):  - LESS than or EQUAL to 0 25 ng/mL:   low likelihood for bacterial LRTI; antibiotics DISCOURAGED   - GREATER than 0 25 ng/mL:   increased likelihood for bacterial LRTI; antibiotics ENCOURAGED  Suspected Sepsis:  - Strongly consider initiating antibiotics in ALL UNSTABLE patients  - LESS than or EQUAL to 0 5 ng/mL:   low likelihood for bacterial sepsis; antibiotics DISCOURAGED   - GREATER than 0 5 ng/mL:   increased likelihood for bacterial sepsis; antibiotics ENCOURAGED   - GREATER than 2 ng/mL:   high risk for severe sepsis / septic shock; antibiotics strongly ENCOURAGED  Decisions on antibiotic use should not be based solely on Procalcitonin (PCT) levels   If PCT is low but uncertainty exists with stopping antibiotics, repeat PCT in 6-24 hours to confirm the low level  If antibiotics are administered (regardless if initial PCT was high or low), repeat PCT every 1-2 days to consider early antibiotic cessation (when GREATER than 80% decrease from the peak OR when PCT drops below designated cutoffs, whichever comes first), so long as the infection is NOT one that typically requires prolonged treatment durations (e g , bone/joint infections, endocarditis, Staph  aureus bacteremia)      Situations of FALSE-POSITIVE Procalcitonin values:  1) Newborns < 67 hours old  2) Massive stress from severe trauma / burns, major surgery, acute pancreatitis, cardiogenic / hemorrhagic shock, sickle cell crisis, or other organ perfusion abnormalities  3) Malaria and some Candidal infections  4) Treatment with agents that stimulate cytokines (e g , OKT3, anti-lymphocyte globulins, alemtuzumab, IL-2, granulocyte transfusion [NOT GCSFs])  5) Chronic renal disease causes elevated baseline levels (consider GREATER than 0 75 ng/mL as an abnormal cut-off); initiating HD/CRRT may cause transient decreases  6) Paraneoplastic syndromes from medullary thyroid or SCLC, some forms of vasculitis, and acute enavi-jw-xker disease    Situations of FALSE-NEGATIVE Procalcitonin values:  1) Too early in clinical course for PCT to have reached its peak (may repeat in 6-24 hours to confirm low level)  2) Localized infection WITHOUT systemic (SIRS / sepsis) response (e g , an abscess, osteomyelitis, cystitis)  3) Mycobacteria (e g , Tuberculosis, MAC)  4) Cystic fibrosis exacerbations     UA W REFLEX TO MICROSCOPIC WITH REFLEX TO CULTURE - Abnormal     Color, UA Yellow   Final    Clarity, UA Hazy   Final    Specific [place], UA 1 015  1 003 - 1 030 Final    pH, UA 6 0  4 5 - 8 0 Final    Leukocytes, UA Negative  Negative Final    Nitrite, UA Negative  Negative Final    Protein, UA 30 (1+) (*) Negative mg/dl Final    Glucose, UA Negative  Negative mg/dl Final    Ketones, UA Negative  Negative mg/dl Final    Urobilinogen, UA 0 2  0 2, 1 0 E U /dl E U /dl Final    Bilirubin, UA Negative  Negative Final    Blood, UA Trace (*) Negative Final   BLOOD GAS, VENOUS - Abnormal     pH, Evert 7 386  7 300 - 7 400 Final    pCO2, Evert 41 1 (*) 42 0 - 50 0 mm Hg Final    pO2, Evert 31 8 (*) 35 0 - 45 0 mm Hg Final    HCO3, Evert 24 1  24 - 30 mmol/L Final    Base Excess, Evert -0 9  mmol/L Final    O2 Content, Evert 9 4  ml/dL Final    O2 HGB, VENOUS 53 7 (*) 60 0 - 80 0 % Final   LIPASE - Abnormal     Lipase 15 (*) 73 - 393 u/L Final   URINE MICROSCOPIC - Abnormal     RBC, UA 2-4 (*) None Seen, 0-5 /hpf Final    WBC, UA 2-4 (*) None Seen, 0-5, 5-55, 5-65 /hpf Final    Epithelial Cells Occasional  None Seen, Occasional /hpf Final    Bacteria, UA Moderate (*) None Seen, Occasional /hpf Final    Hyaline Casts, UA 0-3 (*) None Seen /lpf Final    Fine granular casts 0-3  /lpf Final    MUCUS THREADS Occasional (*) None Seen Final   PROCALCITONIN TEST - Abnormal     Procalcitonin 13 89 (*) <=0 25 ng/ml Final    Comment: Suspected Lower Respiratory Tract Infection (LRTI):  - LESS than or EQUAL to 0 25 ng/mL:   low likelihood for bacterial LRTI; antibiotics DISCOURAGED   - GREATER than 0 25 ng/mL:   increased likelihood for bacterial LRTI; antibiotics ENCOURAGED  Suspected Sepsis:  - Strongly consider initiating antibiotics in ALL UNSTABLE patients  - LESS than or EQUAL to 0 5 ng/mL:   low likelihood for bacterial sepsis; antibiotics DISCOURAGED   - GREATER than 0 5 ng/mL:   increased likelihood for bacterial sepsis; antibiotics ENCOURAGED   - GREATER than 2 ng/mL:   high risk for severe sepsis / septic shock; antibiotics strongly ENCOURAGED  Decisions on antibiotic use should not be based solely on Procalcitonin (PCT) levels  If PCT is low but uncertainty exists with stopping antibiotics, repeat PCT in 6-24 hours to confirm the low level   If antibiotics are administered (regardless if initial PCT was high or low), repeat PCT every 1-2 days to consider early antibiotic cessation (when GREATER than 80% decrease from the peak OR when PCT drops below designated cutoffs, whichever comes first), so long as the infection is NOT one that typically requires prolonged treatment durations (e g , bone/joint infections, endocarditis, Staph  aureus bacteremia)      Situations of FALSE-POSITIVE Procalcitonin values:  1) Newborns < 67 hours old  2) Massive stress from severe trauma / burns, major surgery, acute pancreatitis, cardiogenic / hemorrhagic shock, sickle cell crisis, or other organ perfusion abnormalities  3) Malaria and some Candidal infections  4) Treatment with agents that stimulate cytokines (e g , OKT3, anti-lymphocyte globulins, alemtuzumab, IL-2, granulocyte transfusion [NOT GCSFs])  5) Chronic renal disease causes elevated baseline levels (consider GREATER than 0 75 ng/mL as an abnormal cut-off); initiating HD/CRRT may cause transient decreases  6) Paraneoplastic syndromes from medullary thyroid or SCLC, some forms of vasculitis, and acute mrtee-fr-ragg disease    Situations of FALSE-NEGATIVE Procalcitonin values:  1) Too early in clinical course for PCT to have reached its peak (may repeat in 6-24 hours to confirm low level)  2) Localized infection WITHOUT systemic (SIRS / sepsis) response (e g , an abscess, osteomyelitis, cystitis)  3) Mycobacteria (e g , Tuberculosis, MAC)  4) Cystic fibrosis exacerbations     BASIC METABOLIC PANEL - Abnormal     Sodium 132 (*) 136 - 145 mmol/L Final    Potassium 3 1 (*) 3 5 - 5 3 mmol/L Final    Chloride 100  100 - 108 mmol/L Final    CO2 26  21 - 32 mmol/L Final    ANION GAP 6  4 - 13 mmol/L Final    BUN 23  5 - 25 mg/dL Final    Creatinine 0 62  0 60 - 1 30 mg/dL Final    Comment: Standardized to IDMS reference method    Glucose 136  65 - 140 mg/dL Final    Comment:   If the patient is fasting, the ADA then defines impaired fasting glucose as > 100 mg/dL and diabetes as > or equal to 123 mg/dL  Specimen collection should occur prior to Sulfasalazine administration due to the potential for falsely depressed results  Specimen collection should occur prior to Sulfapyridine administration due to the potential for falsely elevated results  Calcium 7 4 (*) 8 3 - 10 1 mg/dL Final    eGFR 93  ml/min/1 73sq m Final    Narrative:     National Kidney Disease Education Program recommendations are as follows:  GFR calculation is accurate only with a steady state creatinine  Chronic Kidney disease less than 60 ml/min/1 73 sq  meters  Kidney failure less than 15 ml/min/1 73 sq  meters  CBC AND DIFFERENTIAL - Abnormal     WBC 10 28 (*) 4 31 - 10 16 Thousand/uL Final    RBC 2 85 (*) 3 88 - 5 62 Million/uL Final    Hemoglobin 8 6 (*) 12 0 - 17 0 g/dL Final    Hematocrit 26 3 (*) 36 5 - 49 3 % Final    MCV 92  82 - 98 fL Final    MCH 30 2  26 8 - 34 3 pg Final    MCHC 32 7  31 4 - 37 4 g/dL Final    RDW 14 2  11 6 - 15 1 % Final    MPV 10 9  8 9 - 12 7 fL Final    Platelets 188  626 - 390 Thousands/uL Final    nRBC 0  /100 WBCs Final    Comment: This is an appended report  These results have been appended to a previously preliminary verified report  Narrative: This is an appended report  These results have been appended to a previously verified report     ED URINE MACROSCOPIC - Abnormal     Color, UA [place]   Final    Clarity, UA Cloudy   Final    pH, UA 6 0  4 5 - 8 0 Final    Leukocytes, UA Negative  Negative Final    Nitrite, UA Negative  Negative Final    Protein,  (2+) (*) Negative mg/dl Final    Glucose, UA Negative  Negative mg/dl Final    Ketones, UA Negative  Negative mg/dl Final    Urobilinogen, UA 1 0  0 2, 1 0 E U /dl E U /dl Final    Bilirubin, UA Negative  Negative Final    Blood, UA Negative  Negative Final    Specific Gravity, UA 1 020  1 003 - 1 030 Final    Narrative:     CLINITEK RESULT   MANUAL DIFFERENTIAL(PHLEBS DO NOT ORDER) - Abnormal     Segmented % 93 (*) 43 - 75 % Final    Bands % 3  0 - 8 % Final    Lymphocytes % 2 (*) 14 - 44 % Final    Monocytes % 1 (*) 4 - 12 % Final    Eosinophils, % 0  0 - 6 % Final    Basophils % 0  0 - 1 % Final    Myelocytes % 1  0 - 1 % Final    Absolute Neutrophils 14 45 (*) 1 85 - 7 62 Thousand/uL Final    Lymphocytes Absolute 0 30 (*) 0 60 - 4 47 Thousand/uL Final    Monocytes Absolute 0 15  0 00 - 1 22 Thousand/uL Final    Eosinophils Absolute 0 00  0 00 - 0 40 Thousand/uL Final    Basophils Absolute 0 00  0 00 - 0 10 Thousand/uL Final    Total Counted     Final    Toxic Granulation Present   Final    RBC Morphology Present   Final    Anisocytosis Present   Final    [place]umming Cells Present   Final    Hypochromia Present   Final    Poikilocytes Present   Final    Polychromasia Present   Final    Platelet Estimate Adequate  Adequate Final   MANUAL DIFFERENTIAL(PHLEBS DO NOT ORDER) - Abnormal     Segmented % 94 (*) 43 - 75 % Final    Bands % 1  0 - 8 % Final    Lymphocytes % 3 (*) 14 - 44 % Final    Monocytes % 1 (*) 4 - 12 % Final    Eosinophils, % 0  0 - 6 % Final    Basophils % 0  0 - 1 % Final    Plasma Cells % 1 (*) 0 - 0 % Final    Absolute Neutrophils 9 77 (*) 1 85 - 7 62 Thousand/uL Final    Lymphocytes Absolute 0 31 (*) 0 60 - 4 47 Thousand/uL Final    Monocytes Absolute 0 10  0 00 - 1 22 Thousand/uL Final    Eosinophils Absolute 0 00  0 00 - 0 40 Thousand/uL Final    Basophils Absolute 0 00  0 00 - 0 10 Thousand/uL Final    Total Counted     Final    RBC Morphology Present   Final    Cumming Cells Present   Final    Hypochromia Present   Final    Poikilocytes Present   Final    Polychromasia Present   Final    Platelet Estimate Adequate  Adequate Final   PROTIME-INR - Normal    Protime 13 7  11 8 - 14 2 seconds Final    INR 1 04  0 86 - 1 17 Final   APTT - Normal    PTT 34  26 - 38 seconds Final    Comment: Therapeutic Heparin Range =  60-90 seconds   PLATELET COUNT - Normal    Platelets 199  149 - 390 Thousands/uL Final    MPV 10 9  8 9 - 12 7 fL Final   LACTIC ACID, PLASMA - Normal    LACTIC ACID 1 7  0 5 - 2 0 mmol/L Final    Narrative:     Result may be elevated if tourniquet was used during collection  MAGNESIUM - Normal    Magnesium 1 7  1 6 - 2 6 mg/dL Final   SPUTUM CULTURE AND GRAM STAIN     Time reflects when diagnosis was documented in both MDM as applicable and the Disposition within this note     Time User Action Codes Description Comment    1/1/2019 10:53 PM [place], Irvin Flax [A41 9,  R65 20] Severe sepsis ([facility] )     1/1/2019 10:53 PM Wilmon Seed Add [E86 0] Dehydration     1/1/2019 10:53 PM Vik, Tatiana Rodriguesy Add [J18 [id number]] Bilateral pneumonia     1/1/2019 10:54 PM Vik, Tatiana Rodriguesy Add [E43] Severe protein-calorie malnutrition ([facility] )     1/1/2019 11:50 PM Colie Hay D Add [K86 1] Other chronic pancreatitis ([place] [address] )     1/1/2019 11:50 PM Colie Hay D Modify [K86 1] Other chronic pancreatitis ([place] [address] )     1/1/2019 11:52 PM Colie Hay D Add [E11 8] Type 2 diabetes mellitus with complication, without long-term current use of insulin ([place] [address] )     1/3/2019  2:00 PM Liz Cortes Add [R13 10] Dysphagia, unspecified type       ED Disposition     ED Disposition Condition Comment    Admit  Case was discussed with AMADEO and the patient's admission status was agreed to be Admission Status: inpatient status to the service of Dr Martínez Life   Follow-up Information    None       Current Discharge Medication List      CONTINUE these medications which have NOT CHANGED    Details   metFORMIN (GLUCOPHAGE) 500 mg tablet daily      omeprazole (PriLOSEC) 20 mg delayed release capsule Take 1 capsule (20 mg total) by mouth daily  Qty: 30 capsule, Refills: 2    Associated Diagnoses: Esophagitis           No discharge procedures on file  Prior to Admission Medications   Prescriptions Last Dose Informant Patient Reported?  Taking?   metFORMIN (GLUCOPHAGE) 500 mg tablet 1/1/2019 at Unknown time  Yes Yes Sig: daily   omeprazole (PriLOSEC) 20 mg delayed release capsule 1/1/2019 at Unknown time  No Yes   Sig: Take 1 capsule (20 mg total) by mouth daily      Facility-Administered Medications: None       Portions of the record may have been created with voice recognition software  Occasional wrong word or "sound a like" substitutions may have occurred due to the inherent limitations of voice recognition software  Read the chart carefully and recognize, using context, where substitutions have occurred      Electronically signed by:  Faye Benavides

## 2019-01-02 NOTE — NURSING NOTE
Patient arrived to unit in bed, patient lying comfortably in bed, vital signs are stable and patient c/o no pain at this time  Will continue to monitor    Jeanne Kurtz RN

## 2019-01-02 NOTE — SOCIAL WORK
CM met with pt, his wife Luis M Salomon, daughter, and son at bedside  CM introduced self and role with dcp  Pt resides with his wife in a single story home with ramp entrance  Pt needs some assistance from his wife with ADLs and uses a RW for ambulation  Pt normally drives but has not since his last admission  Pt wife does not drive  Pt family has been assisting them with transportation  Pt has hx of SL VNA  No hx of STR  No hx of MH or drug/alcohol abuse  Pharmacy is [facility] in [facility]  No POA  Main contact is wife, Luis M Salomon  CM to follow for dcp  CM reviewed d/c planning process including the following: identifying help at home, patient preference for d/c planning needs, Discharge Lounge, [facility] Meds to Bed program, availability of treatment team to discuss questions or concerns patient and/or family may have regarding understanding medications and recognizing signs and symptoms once discharged  CM also encouraged patient to follow up with all recommended appointments after discharge  Patient advised of importance for patient and family to participate in managing patients medical well being

## 2019-01-02 NOTE — SPEECH THERAPY NOTE
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 and type 2 diabetes  He presents from home with worsening weight loss of approximately 5 weeks and also with worsening cough that was described as wet and nonproductive associated with some worsening shortness of breath  Per family he did not receive a flu shot this year, and reportedly some sick contacts were present at home  He denies any fever or chills at home  He does admit to diarrhea described as yellowish" without blood being seen it        In ED, patient was noted to desaturate to as low as 84% requiring up to 5 L nasal cannula  He received 1L NS, and received treatment with azithromycin and ceftriaxone for presumed pneumonia        Currently, patient is reporting he does not have shortness of breath or abdominal pain  He continues to report cough, however remains nonproductive  Nursing staff report patient with coughing episode at lunch meal      Recommendations:  Diet: Regular diet   Liquid: Thin liquids   Meds: As tolerated best  Supervision: Intermittent supervision   Positioning:Upright  Strategies: Pt to take PO/Meds only when fully alert and upright     Oral care: As needed   Aspiration precautions  *Consider video swallow study to assess for and r/o aspiration     Therapy Prognosis: Fair   Prognosis considerations: Complex medical history   Frequency: 3-5x week    Patient's goal: None stated     Consider consult w/:  Nutrition-secondary to poor PO intake       Reason for consult:  R/o aspiration  Determine safest and least restrictive diet  current pna  poor intake  weight loss Precautions:  Contact    Current diet: Regular with thin liquids, but patient has been making choices of soft menu items     Premorbid diet[de-identified] "Soft"     Previous VBS: None noted    O2 requirement: On NC    Voice/Speech: Decreased volume     Social: Lives with wife     Follows commands: WFL                          Oral mech exam:  Full dentition  Full symmetry      Items administered:  Puree solids (patient refused regular solid) with thin liquids  Liquids were taken by straw  Oral stage: Mild  Lip closure: WFL  Mastication: N/A  Bolus formation: WFL  Bolus control: WFL  Transfer: Prolonged  Oral residue: No  Pocketing: No    Pharyngeal stage: Mild  Swallow promptness: Min delayed   Laryngeal rise: WFL  Wet voice: No  Throat clear: No  Cough: Intermittent weak cough, non-productive   Secondary swallows: No  Audible swallows: No    Esophageal stage:  No s/s reported    Aspiration precautions posted    Results d/w:  Pt, nursing, family    Goal(s):  Pt will tolerate least restrictive diet w/out s/s aspiration or oral/pharyngeal difficulties

## 2019-01-02 NOTE — ASSESSMENT & PLAN NOTE
· Severe sepsis, POA, as evidenced by tachycardia, leukocytosis, elevated lactic acid  · Suspected secondary to pneumonia  · Hemodynamically stable  · Procalcitonin markedly elevated but trending down   Continue to trend  · F/U blood cultures

## 2019-01-02 NOTE — PLAN OF CARE
Problem: SLP ADULT - SWALLOWING, IMPAIRED  Goal: Initial SLP swallow eval performed  Outcome: Completed Date Met: 01/02/19

## 2019-01-02 NOTE — ED PROVIDER NOTES
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  He had a pancreatic biopsy with ex or amber laparotomy done 5 weeks ago  He denies any abdominal pain, nausea vomiting or diarrhea, shortness of breath, chest pain, fevers or chills  He says that he feels well, just fatigued  His family also notes that he has had worsening pulse urinary incontinence  He denies any urinary complaints  Physical exam is notable for a cachectic appearing elderly male with tachypnea and a wet cough  Abdominal exam is benign  There is a well well-healing midline in decision from the umbilicus to the xiphoid process  Mucous membranes are dry  Poor skin turgor  Plan:  Concern for pneumonia verses urinary tract infection verses acute on chronic pancreatitis  We will check abdominal labs with pneumonia and UTI workup  We will start with IV fluids in the emergency department  If signs of infection on blood work will initiate antibiotics  Prior to Admission Medications   Prescriptions Last Dose Informant Patient Reported?  Taking?   metFORMIN (GLUCOPHAGE) 500 mg tablet 1/1/2019 at Unknown time  Yes Yes   Sig: daily   omeprazole (PriLOSEC) 20 mg delayed release capsule 1/1/2019 at Unknown time  No Yes   Sig: Take 1 capsule (20 mg total) by mouth daily      Facility-Administered Medications: None       Past Medical History:   Diagnosis Date    [facility]     Type II    Weight loss        Past Surgical History:   Procedure Laterality Date    LAPAROTOMY N/A 11/19/2018    Procedure: LAPAROTOMY EXPLORATORY;  Surgeon: Chilo Raines MD;  [facility]: [facility];  Service: Surgical Oncology    NE EDG US EXAM SURGICAL ALTER STOM DUODENUM/JEJUNUM N/A 9/21/2018    Procedure: LINEAR ENDOSCOPIC U/S;  Surgeon: Nae Gan MD;  Location: [facility]; Service: Gastroenterology    NE LAP,DIAGNOSTIC ABDOMEN N/A 11/19/2018    Procedure: pancreatic biopsy;  Surgeon: Chilo Raines MD;  [facility];  Service: Surgical Oncology    TONSILLECTOMY      VASCULAR SURGERY      phlebitis many years ago    WRIST GANGLION EXCISION         Family History   Problem Relation Age of Onset    No Known Problems Mother     No Known Problems Father     Pancreatic cancer Brother 61    Breast cancer Daughter 36        38s     I have reviewed and agree with the history as documented  Social History   Substance Use Topics    Smoking status: Former Smoker     Years: 25 00    Smokeless tobacco: Never Used      Comment: quit about 20 years ago as of 9/21/2018    Alcohol use Yes      Comment: 1 beer a day        Review of Systems   Constitutional: Positive for appetite change and unexpected weight change  Negative for chills and fever  HENT: Negative for congestion and sore throat  Eyes: Negative for visual disturbance  Respiratory: Positive for cough  Negative for shortness of breath  Cardiovascular: Negative for chest pain and palpitations  Gastrointestinal: Negative for abdominal pain, diarrhea, nausea and vomiting  Genitourinary: Negative for difficulty urinating and dysuria  Musculoskeletal: Negative for myalgias  Skin: Negative for rash  Neurological: Negative for weakness, light-headedness, numbness and headaches         Physical Exam  ED Triage Vitals   Temperature Pulse Respirations Blood Pressure SpO2   01/01/19 2108 01/01/19 2105 01/01/19 2105 01/01/19 2105 01/01/19 2105   (!) 97 4 °F (36 3 °C) 96 18 143/68 94 %      Temp Source Heart Rate Source Patient Position - Orthostatic VS BP Location FiO2 (%)   01/01/19 2108 01/01/19 2105 01/01/19 2105 01/01/19 2105 --   Oral Monitor Lying Right arm       Pain Score       01/01/19 2105       No Pain           Orthostatic Vital Signs  Vitals:    01/02/19 0500 01/02/19 0600 01/02/19 0714 01/02/19 1512   BP: 116/59 111/70 139/64 127/70   Pulse: 74 78 71 73   Patient Position - Orthostatic VS: Lying Lying Lying Lying       Physical Exam   Constitutional: He is oriented to person, place, and time  He appears well-developed and well-nourished  No distress  HENT:   Head: Normocephalic and atraumatic  Mucous membranes are dry   Eyes: Pupils are equal, round, and reactive to light  EOM are normal    Neck: Normal range of motion  Neck supple  Cardiovascular: Normal rate, regular rhythm, normal heart sounds and intact distal pulses  Pulmonary/Chest: Breath sounds normal  He has no wheezes  He has no rales  To get via with wet cough   Abdominal: Soft  Bowel sounds are normal  There is no tenderness  Musculoskeletal: Normal range of motion  He exhibits no edema  Neurological: He is alert and oriented to person, place, and time  No cranial nerve deficit  Skin: Skin is warm and dry  Capillary refill takes 2 to 3 seconds  Poor skin turgor   Nursing note and vitals reviewed        ED Medications  Medications   pantoprazole (PROTONIX) EC tablet 40 mg (40 mg Oral Given 1/2/19 8439)   heparin (porcine) subcutaneous injection 5,000 Units (5,000 Units Subcutaneous Given 1/2/19 2130)   ceftriaxone (ROCEPHIN) 1 g/50 mL in dextrose IVPB (not administered)   albuterol inhalation solution 2 5 mg (not administered)   potassium chloride (K-DUR,KLOR-CON) CR tablet 40 mEq (40 mEq Oral Not Given 1/2/19 1240)   cholecalciferol (VITAMIN D3) tablet 400 Units (400 Units Oral Not Given 1/2/19 1240)   pancrelipase (Lip-Prot-Amyl) (CREON) delayed release capsule 24,000 Units (24,000 Units Oral Given 1/2/19 1624)   insulin lispro (HumaLOG) 100 units/mL subcutaneous injection 1-5 Units (1 Units Subcutaneous Not Given 1/2/19 2123)   sodium chloride 0 9 % bolus 1,000 mL (0 mL Intravenous Stopped 1/1/19 2358)   ceftriaxone (ROCEPHIN) 1 g/50 mL in dextrose IVPB (0 mg Intravenous Stopped 1/1/19 2340)   azithromycin (ZITHROMAX) 500 mg in sodium chloride 0 9% 250mL IVPB 500 mg (0 mg Intravenous Stopped 1/2/19 0150)       Diagnostic Studies  Results Reviewed     Procedure Component Value Units Date/Time    Clostridium difficile toxin by PCR [[id number]]  (Normal) Collected:  01/02/19 0608    Lab Status:  Final result Specimen:  Stool from Per Rectum Updated:  01/02/19 1608     C difficile toxin by PCR NEGATIVE for C difficle toxin by PCR  Influenza A/B and RSV by PCR [[id number]]  (Normal) Collected:  01/01/19 2359    Lab Status:  Final result Specimen:  Nasopharyngeal from Nasopharyngeal Swab Updated:  01/02/19 1336     INFLU A PCR None Detected     INFLU B PCR None Detected     RSV PCR None Detected    Legionella antigen, urine [[id number]]  (Normal) Collected:  01/01/19 2358    Lab Status:  Final result Specimen:  Urine from Urine, Clean Catch Updated:  01/02/19 0939     Legionella Urinary Antigen Negative    Strep Pneumoniae, Urine [[id number]]  (Abnormal) Collected:  01/01/19 2358    Lab Status:  Final result Specimen:  Urine from Urine, Clean Catch Updated:  01/02/19 0938     Strep pneumoniae antigen, urine Positive (A)    CBC and differential [[id number]]  (Abnormal) Collected:  01/02/19 0431    Lab Status:  Final result Specimen:  Blood from Arm, Right Updated:  01/02/19 0540     WBC 10 28 (H) Thousand/uL      RBC 2 85 (L) Million/uL      Hemoglobin 8 6 (L) g/dL      Hematocrit 26 3 (L) %      MCV 92 fL      MCH 30 2 pg      MCHC 32 7 g/dL      RDW 14 2 %      MPV 10 9 fL      Platelets 632 Thousands/uL      nRBC 0 /100 WBCs     Narrative:        This is an appended report  These results have been appended to a previously verified report  Procalcitonin [[id number]]  (Abnormal) Collected:  01/02/19 0431    Lab Status:  Final result Specimen:  Blood from Arm, Right Updated:  01/02/19 0519     Procalcitonin 13 89 (H) ng/ml     Basic metabolic panel [[id number]]  (Abnormal) Collected:  01/02/19 0431    Lab Status:  Final result Specimen:  Blood from Arm, Right Updated:  01/02/19 0501     Sodium 132 (L) mmol/L      Potassium 3 1 (L) mmol/L      Chloride 100 mmol/L      CO2 26 mmol/L      ANION GAP 6 mmol/L      BUN 23 mg/dL      Creatinine 0 62 mg/dL      Glucose 136 mg/dL      Calcium 7 4 (L) mg/dL      eGFR 93 ml/min/1 73sq m     Narrative:         National Kidney Disease Education Program recommendations are as follows:  GFR calculation is accurate only with a steady state creatinine  Chronic Kidney disease less than 60 ml/min/1 73 sq  meters  Kidney failure less than 15 ml/min/1 73 sq  meters  Magnesium [[id number]]  (Normal) Collected:  01/02/19 0431    Lab Status:  Final result Specimen:  Blood from Arm, Right Updated:  01/02/19 0501     Magnesium 1 7 mg/dL     Lactic acid, plasma [[id number]]  (Normal) Collected:  01/02/19 0155    Lab Status:  Final result Specimen:  Blood from Arm, Right Updated:  01/02/19 0229     LACTIC ACID 1 7 mmol/L     Narrative:         Result may be elevated if tourniquet was used during collection      Urine Microscopic [[id number]]  (Abnormal) Collected:  01/01/19 2306    Lab Status:  Final result Specimen:  Urine from Urine, Other Updated:  01/02/19 0059     RBC, UA 2-4 (A) /hpf      WBC, UA 2-4 (A) /hpf      Epithelial Cells Occasional /hpf      Bacteria, UA Moderate (A) /hpf      Hyaline Casts, UA 0-3 (A) /lpf      Fine granular casts 0-3 /lpf      MUCUS THREADS Occasional (A)    Lactic acid x2 [[id number]]  (Abnormal) Collected:  01/01/19 2340    Lab Status:  Final result Specimen:  Blood from Arm, Right Updated:  01/02/19 1623 LACTIC ACID 2 1 (HH) mmol/L     Narrative:         Result may be elevated if tourniquet was used during collection  Platelet count [[id number]]  (Normal) Collected:  01/02/19 0023    Lab Status:  Final result Specimen:  Blood from Arm, Right Updated:  01/02/19 0033     Platelets 956 Thousands/uL      MPV 10 9 fL     UA w Reflex to Microscopic w Reflex to Culture [[id number]]  (Abnormal) Collected:  01/01/19 2308    Lab Status:  Final result Specimen:  Urine from Urine, Clean Catch Updated:  01/02/19 0010     Color, UA Yellow     Clarity, UA Hazy     Specific [place], UA 1 015     pH, UA 6 0     Leukocytes, UA Negative     Nitrite, UA Negative     Protein, UA 30 (1+) (A) mg/dl      Glucose, UA Negative mg/dl      Ketones, UA Negative mg/dl      Urobilinogen, UA 0 2 E U /dl      Bilirubin, UA Negative     Blood, UA Trace (A)    CBC and differential [[id number]]  (Abnormal) Collected:  01/01/19 2136    Lab Status:  Final result Specimen:  Blood from Arm, Right Updated:  01/01/19 2358     WBC 15 05 (H) Thousand/uL      RBC 3 14 (L) Million/uL      Hemoglobin 9 4 (L) g/dL      Hematocrit 29 0 (L) %      MCV 92 fL      MCH 29 9 pg      MCHC 32 4 g/dL      RDW 14 3 %      MPV 11 1 fL      Platelets 823 Thousands/uL      nRBC 0 /100 WBCs     Narrative: This is an appended report  These results have been appended to a previously verified report      Sputum culture and Gram stain [[id number]]     Lab Status:  No result Specimen:  Sputum     ED Urine Macroscopic [[id number]]  (Abnormal) Collected:  01/01/19 2306    Lab Status:  Final result Specimen:  Urine Updated:  01/01/19 2305     Color, UA [place]     Clarity, UA Cloudy     pH, UA 6 0     Leukocytes, UA Negative     Nitrite, UA Negative     Protein,  (2+) (A) mg/dl      Glucose, UA Negative mg/dl      Ketones, UA Negative mg/dl      Urobilinogen, UA 1 0 E U /dl      Bilirubin, UA Negative     Blood, UA Negative     Specific Gravity, UA 1 020    Narrative: CLINITEK RESULT    Procalcitonin [[id number]]  (Abnormal) Collected:  01/01/19 2136    Lab Status:  Final result Specimen:  Blood from Arm, Right Updated:  01/01/19 2235     Procalcitonin 17 10 (H) ng/ml     Lactic acid x2 [[id number]]  (Abnormal) Collected:  01/01/19 2136    Lab Status:  Final result Specimen:  Blood from Arm, Right Updated:  01/01/19 2222     LACTIC ACID 3 7 (HH) mmol/L     Narrative:         Result may be elevated if tourniquet was used during collection  Lipase [[id number]]  (Abnormal) Collected:  01/01/19 2140    Lab Status:  Final result Specimen:  Blood from Arm, Right Updated:  01/01/19 2217     Lipase 15 (L) u/L     Comprehensive metabolic panel [[id number]]  (Abnormal) Collected:  01/01/19 2137    Lab Status:  Final result Specimen:  Blood from Arm, Right Updated:  01/01/19 2215     Sodium 131 (L) mmol/L      Potassium 3 3 (L) mmol/L      Chloride 95 (L) mmol/L      CO2 26 mmol/L      ANION GAP 10 mmol/L      BUN 28 (H) mg/dL      Creatinine 0 99 mg/dL      Glucose 202 (H) mg/dL      Calcium 8 1 (L) mg/dL      AST 45 U/L      ALT 34 U/L      Alkaline Phosphatase 101 U/L      Total Protein 6 8 g/dL      Albumin 1 9 (L) g/dL      Total Bilirubin 0 47 mg/dL      eGFR 71 ml/min/1 73sq m     Narrative:         National Kidney Disease Education Program recommendations are as follows:  GFR calculation is accurate only with a steady state creatinine  Chronic Kidney disease less than 60 ml/min/1 73 sq  meters  Kidney failure less than 15 ml/min/1 73 sq  meters      Protime-INR [[id number]]  (Normal) Collected:  01/01/19 2137    Lab Status:  Final result Specimen:  Blood from Arm, Right Updated:  01/01/19 2206     Protime 13 7 seconds      INR 1 04    APTT [[id number]]  (Normal) Collected:  01/01/19 2137    Lab Status:  Final result Specimen:  Blood from Arm, Right Updated:  01/01/19 2206     PTT 34 seconds     Blood gas, venous [[id number]]  (Abnormal) Collected:  01/01/19 2136    Lab Status:  Final result Specimen:  Blood from Arm, Right Updated:  01/01/19 2155     pH, Evert 7 386     pCO2, Evert 41 1 (L) mm Hg      pO2, Evert 31 8 (L) mm Hg      HCO3, Evert 24 1 mmol/L      Base Excess, Evert -0 9 mmol/L      O2 Content, Evert 9 4 ml/dL      O2 HGB, VENOUS 53 7 (L) %     Blood culture #1 [[id number]] Collected:  01/01/19 2137    Lab Status: In process Specimen:  Blood from Arm, Right Updated:  01/01/19 2145    Blood culture #2 [[id number]] Collected:  01/01/19 2137    Lab Status: In process Specimen:  Blood from Arm, Left Updated:  01/01/19 2145                 XR chest pa & lateral   ED Interpretation by Tony Ladd MD (01/01 2220)   Bilateral infiltrates concerning for pneumonia  Final Result by Melia Linn MD (01/02 2350)      Lower lobe infiltrates bilaterally            Workstation performed: [id number]         FL barium swallow video w speech    (Results Pending)         Procedures  ECG 12 Lead Documentation  Date/Time: 1/2/2019 10:03 PM  Performed by: Lauris Simmonds  Authorized by: Leslie IHGGINS     Interpretation:     Interpretation: non-specific    Rate:     ECG rate:  95    ECG rate assessment: tachycardic    Rhythm:     Rhythm: sinus rhythm    QRS:     QRS intervals:  Normal  Conduction:     Conduction: abnormal      Abnormal conduction: incomplete RBBB    ST segments:     ST segments:  Normal  T waves:     T waves: normal            Phone Consults  ED Phone Contact    ED Course           Identification of Seniors at Risk      Most Recent Value   (ISAR) Identification of Seniors at Risk   Before the illness or injury that brought you to the Emergency, did you need someone to help you on a regular basis? 0 Filed at: 01/01/2019 2104   In the last 24 hours, have you needed more help than usual?  1 Filed at: 01/01/2019 2104   Have you been hospitalized for one or more nights during the past 6 months?   1 Filed at: 01/01/2019 2104   In general, do you see well?  0 Filed at: 01/01/2019 2104   In general, do you have serious problems with your memory? 0 Filed at: 01/01/2019 2104   Do you take more than three different medications every day? 1 Filed at: 01/01/2019 2104   ISAR Score  3 Filed at: 01/01/2019 2104                    Initial Sepsis Screening     Row Name 01/01/19 2222                Is the patient's history suggestive of a new or worsening infection? (!)  Yes (Proceed)  -JS        Suspected source of infection pneumonia  -JS        Are two or more of the following signs & symptoms of infection both present and new to the patient? (!)  Yes (Proceed)  -JS        Indicate SIRS criteria Tachypnea > 20 resp per min; Tachycardia > 90 bpm  -JS        If the answer is yes to both questions, suspicion of sepsis is present          If severe sepsis is present AND tissue hypoperfusion perists in the hour after fluid resuscitation or lactate > 4, the patient meets criteria for SEPTIC SHOCK          Are any of the following organ dysfunction criteria present within 6 hours of suspected infection and SIRS criteria that are NOT considered to be chronic conditions?         Organ dysfunction Lactate > 2 0 mmol/L  -JS        Date of presentation of severe sepsis 01/01/19  -JS        Time of presentation of severe sepsis 2223  -JS        Tissue hypoperfusion persists in the hour after crystalloid fluid administration, evidenced, by either:          Was hypotension present within one hour of the conclusion of crystalloid fluid administration?           Date of presentation of septic shock          Time of presentation of septic shock            User Key  (r) = Recorded By, (t) = Taken By, (c) = Cosigned By    [address] Name Provider Oziel Neely MD Resident           Default Flowsheet Data (last 720 hours)      Sepsis Reassess     Row Name 01/01/19 4385                   Repeat Volume Status and Tissue Perfusion Assessment Performed    Repeat Volume Status and Tissue Perfusion Assessment Performed Yes  -JA           Volume Status and Tissue Perfusion Post Fluid Resuscitation * Must Document All *    Vital Signs Reviewed (HR, RR, BP, T) Yes  -JA        Shock Index Reviewed          Arterial Oxygen Saturation Reviewed (POx, SaO2 or SpO2) Yes (comment %)  -JA        Cardio Regular rate and rhythm;Normal S1/S2; No murmor; No rub or gallop  -JA        Pulmonary Normal effort;Clear to auscultation  -JA        Capillary Refill Brisk  -JA        Peripheral Pulses Dorsalis Pedis  -JA        Dorsalis Pedis +2  -JA        Skin Warm;Dry  -JA        Urine output assessed Adequate  -JA           *OR*   Intensive Monitoring- Must Document One of the Following Four *:    Vital Signs Reviewed          * Central Venous Pressure (CVP or RAP)          * Central Venous Oxygen (SVO2, ScvO2 or Oxygen saturation via central catheter)          * Bedside Cardiovascular US in IVC diameter and % collapse          * Passive Leg Raise OR Crystalloid Challenge            User Key  (r) = Recorded By, (t) = Taken By, (c) = Cosigned By    Initials Name Provider Type    Maria Del Carmen Arriaga MD Physician                MDM  Number of Diagnoses or Management Options  Bilateral pneumonia:   Dehydration:   Severe protein-calorie malnutrition (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 )   Dehydration   Bilateral pneumonia   Severe protein-calorie malnutrition ([facility] )     Time reflects when diagnosis was documented in both MDM as applicable and the Disposition within this note     Time User Action Codes Description Comment    1/1/2019 10:53 PM Gibrna Rosetta Add [A41 9,  R65 20] Severe sepsis ([facility] )     1/1/2019 10:53 PM Gibran Rosetta Add [E86 0] Dehydration     1/1/2019 10:53 PM Sparrow BushTamie William Add [J18 9] Bilateral pneumonia     1/1/2019 10:54 PM Sparrow BushTamieke Add [E43] Severe protein-calorie malnutrition ([place] [address] )     1/1/2019 11:50 PM Lila Archibaldmer D Add [K86 1] Other chronic pancreatitis ([place] [address] )     1/1/2019 11:50 PM Lila Slimmer D Modify [K86 1] Other chronic pancreatitis ([place] [address] )     1/1/2019 11:52 PM Lila Slimmer D Add [E11 8] Type 2 diabetes mellitus with complication, without long-term current use of insulin [facility])       ED Disposition     ED Disposition Condition Comment    Admit  Case was discussed with AMADEO and the patient's admission status was agreed to be Admission Status: inpatient status to the service of Dr Naveen Redmond   Follow-up Information    None         Current Discharge Medication List      CONTINUE these medications which have NOT CHANGED    Details   metFORMIN (GLUCOPHAGE) 500 mg tablet daily      omeprazole (PriLOSEC) 20 mg delayed release capsule Take 1 capsule (20 mg total) by mouth daily  Qty: 30 capsule, Refills: 2    Associated Diagnoses: Esophagitis           No discharge procedures on file  ED Provider  Attending physically available and evaluated Jacobo Nguyen I managed the patient along with the ED Attending      Electronically Signed by         Matty Macias MD  01/02/19 2204

## 2019-01-02 NOTE — MALNUTRITION/BMI
This medical record reflects one or more clinical indicators suggestive of malnutrition and/or morbid obesity  Malnutrition Findings:   Malnutrition type: Chronic illness (past medical history significant for pancreatic mass )  Degree of Malnutrition: Malnutrition of moderate degree (evidenced by 11% wt loss < 3mo, slight depression at [place] region, poor po intake <75% energy intake compared to estimated needs for >1 mo, treated with diet and supplements)  Malnutrition Characteristics: Inadequate energy, Weight loss    BMI Findings:  BMI Classifications: Underweight < 18 5     Body mass index is 17 61 kg/m²  See Nutrition note dated 01/02/2019 for additional details  Completed nutrition assessment is viewable in the nutrition documentation

## 2019-01-02 NOTE — PROGRESS NOTES
Progress Note - Kay Davis 1935, 82 y o  male MRN: [id number]    Unit/Bed#: -01 Encounter: [id number]    Primary Care Provider: Cassidy King DO   Date and time admitted to hospital: 1/1/2019  8:59 PM        * Sepsis [facility])   Assessment & Plan    · Severe sepsis, POA, as evidenced by tachycardia, leukocytosis, elevated lactic acid  · Suspected secondary to pneumonia  · Hemodynamically stable  · Procalcitonin markedly elevated but trending down  Continue to trend  · F/U blood cultures     Pneumonia   Assessment & Plan    · With recent hospitalization in November for surgical procedure, however still would be considered low risk Hcap  · Strep pneumo antigen positive  Will d/c azithromycin and continue with Rocephin     Other chronic pancreatitis [facility])   Assessment & Plan    · Per biopsy report from surgical procedure 11/2018 "benign pancreatic tissue with chronic pancreatitis      · Patient with weight loss and diarrhea after procedure  Follows with Dr She Wilson and was to follow up 12/24/2018, however missed appointment  · Suspect diarrhea may be in part due to malabsorption  · F/U C diff PCR  · GI consulted     Hypokalemia   Assessment & Plan    · Likely due to diarrhea  Replete K     Dysphagia   Assessment & Plan    · Nursing staff reports patient seemed to be choking on food  · Speech eval     Respiratory failure ([address] )   Assessment & Plan    · Acute hypoxic respiratory failure secondary to pneumonia  · Wean O2 as tolerated     Acute kidney injury ([address] )   Assessment & Plan    · Present on admission, Cr 0 99 with baseline 0 5    In setting of presumed pneumonia and also with diarrhea reported  · Creatinine improved with IVFs     Weight loss   Assessment & Plan    · Per family weight loss of approximately 12 lb over 5 weeks with associated diarrhea  · Will obtain nutrition consult, dietary supplements in the meantime     Diabetes mellitus [facility])   Assessment & Plan    Lab Results   Component Value Date    HGBA1C 6 3 10/30/2018       Blood Sugar Average: Last 72 hrs:     · Hold home metformin, began SSI with Accu-Cheks while inpatient  Carb controlled diet, however will liberalize secondary to cachexia       VTE Pharmacologic Prophylaxis:   Pharmacologic: Heparin  Mechanical VTE Prophylaxis in Place: Yes    Patient Centered Rounds: I have performed bedside rounds with nursing staff today  Discussions with Specialists or Other Care Team Provider:      Education and Discussions with Family / Patient: Patient  I offered to call family but patient politely declined  Time Spent for Care: 30 minutes  More than 50% of total time spent on counseling and coordination of care as described above  Current Length of Stay: 1 day(s)    Current Patient Status: Inpatient   Certification Statement: The patient will continue to require additional inpatient hospital stay due to sepsis, pneumonia, respiratory failure    Discharge Plan: Likely not for a few days  Code Status: Level 1 - Full Code      Subjective:   Denies SOB  Still with cough but seems improved  Cough is nonproductive  One episode of diarrhea today  Nursing staff reports patient seemed to be choking on food  Objective:     Vitals:   Temp (24hrs), Av 9 °F (36 6 °C), Min:97 4 °F (36 3 °C), Max:98 3 °F (36 8 °C)    Temp:  [97 4 °F (36 3 °C)-98 3 °F (36 8 °C)] 98 3 °F (36 8 °C)  HR:  [71-96] 71  Resp:  [18-30] 20  BP: (109-143)/(57-70) 139/64  SpO2:  [85 %-97 %] 95 %  Body mass index is 17 61 kg/m²  Input and Output Summary (last 24 hours): Intake/Output Summary (Last 24 hours) at 19 1433  Last data filed at 19 1130   Gross per 24 hour   Intake             1170 ml   Output              400 ml   Net              770 ml       Physical Exam:     Physical Exam   Constitutional: He is oriented to person, place, and time  Cachectic- chronically ill appearing   Appears clinically well and non-toxic   HENT:   Head: Normocephalic and atraumatic  Eyes: No scleral icterus  Neck: Neck supple  Cardiovascular: Normal rate, regular rhythm and normal heart sounds  Pulmonary/Chest: Effort normal  No respiratory distress  +Scattered rhonchi   Abdominal: Soft  Bowel sounds are normal  He exhibits no distension  There is no tenderness  There is no rebound  Musculoskeletal: He exhibits no edema  Neurological: He is alert and oriented to person, place, and time  Skin: Skin is warm and dry  Psychiatric: He has a normal mood and affect  His behavior is normal        Additional Data:     Labs:      Results from last 7 days  Lab Units 01/02/19  0431   WBC Thousand/uL 10 28*   HEMOGLOBIN g/dL 8 6*   HEMATOCRIT % 26 3*   PLATELETS Thousands/uL 177   LYMPHO PCT % 3*   MONO PCT % 1*   EOS PCT % 0       Results from last 7 days  Lab Units 01/02/19  0431 01/01/19  2137   POTASSIUM mmol/L 3 1* 3 3*   CHLORIDE mmol/L 100 95*   CO2 mmol/L 26 26   BUN mg/dL 23 28*   CREATININE mg/dL 0 62 0 99   CALCIUM mg/dL 7 4* 8 1*   ALK PHOS U/L  --  101   ALT U/L  --  34   AST U/L  --  45       Results from last 7 days  Lab Units 01/01/19  2137   INR  1 04       * I Have Reviewed All Lab Data Listed Above  * Additional Pertinent Lab Tests Reviewed:  All Labs Within Last 24 Hours Reviewed    Imaging:    Imaging Reports Reviewed Today Include: Chest x-ray: pneumonia  Imaging Personally Reviewed by Myself Includes:  None    Recent Cultures (last 7 days):       Results from last 7 days  Lab Units 01/01/19  2359 01/01/19  2358   INFLUENZA B PCR  None Detected  --    RSV PCR  None Detected  --    LEGIONELLA URINARY ANTIGEN   --  Negative       Last 24 Hours Medication List:     Current Facility-Administered Medications:  albuterol 2 5 mg Nebulization Q4H PRN Danilo Banerjee MD   cefTRIAXone 1,000 mg Intravenous Q24H Letty Augustine MD   cholecalciferol 400 Units Oral Daily Antoine Wolfe MD   heparin (porcine) 5,000 Units Subcutaneous [facility] Letty Augustine MD insulin lispro 1-5 Units Subcutaneous 4x Daily (AC & HS) Aleks Casillas PA-C   pancrelipase (Lip-Prot-Amyl) 24,000 Units Oral BID With Meals Daryl Garrido MD   pantoprazole 40 mg Oral Early Morning Hope Zhang MD   potassium chloride 40 mEq Oral Once Aleks Casillas PA-C        Today, Patient Was Seen By: Aleks Casillas PA-C    ** Please Note: Dragon 360 Dictation voice to text software may have been used in the creation of this document   **

## 2019-01-02 NOTE — ASSESSMENT & PLAN NOTE
Lab Results   Component Value Date    HGBA1C 6 3 10/30/2018       No results for input(s): POCGLU in the last 72 hours  Blood Sugar Average: Last 72 hrs:     Hold home metformin, began SSI with Accu-Cheks while inpatient    Carb controlled diet, however will liberalize secondary to cachexia

## 2019-01-03 ENCOUNTER — APPOINTMENT (INPATIENT)
Dept: RADIOLOGY | Facility: HOSPITAL | Age: 84
DRG: 853 | End: 2019-01-03
Payer: MEDICARE

## 2019-01-03 PROBLEM — E46 MALNUTRITION (HCC): Status: ACTIVE | Noted: 2019-01-03

## 2019-01-03 LAB
ANION GAP SERPL CALCULATED.3IONS-SCNC: 6 MMOL/L (ref 4–13)
BASOPHILS # BLD AUTO: 0.01 THOUSANDS/ΜL (ref 0–0.1)
BASOPHILS NFR BLD AUTO: 0 % (ref 0–1)
BUN SERPL-MCNC: 15 MG/DL (ref 5–25)
CALCIUM SERPL-MCNC: 8 MG/DL (ref 8.3–10.1)
CHLORIDE SERPL-SCNC: 102 MMOL/L (ref 100–108)
CO2 SERPL-SCNC: 28 MMOL/L (ref 21–32)
CREAT SERPL-MCNC: 0.52 MG/DL (ref 0.6–1.3)
EOSINOPHIL # BLD AUTO: 0.04 THOUSAND/ΜL (ref 0–0.61)
EOSINOPHIL NFR BLD AUTO: 0 % (ref 0–6)
ERYTHROCYTE [DISTWIDTH] IN BLOOD BY AUTOMATED COUNT: 14.1 % (ref 11.6–15.1)
GFR SERPL CREATININE-BSD FRML MDRD: 100 ML/MIN/1.73SQ M
GLUCOSE SERPL-MCNC: 114 MG/DL (ref 65–140)
GLUCOSE SERPL-MCNC: 148 MG/DL (ref 65–140)
GLUCOSE SERPL-MCNC: 234 MG/DL (ref 65–140)
GLUCOSE SERPL-MCNC: 277 MG/DL (ref 65–140)
GLUCOSE SERPL-MCNC: 90 MG/DL (ref 65–140)
HCT VFR BLD AUTO: 29.7 % (ref 36.5–49.3)
HGB BLD-MCNC: 9.4 G/DL (ref 12–17)
IMM GRANULOCYTES # BLD AUTO: 0.06 THOUSAND/UL (ref 0–0.2)
IMM GRANULOCYTES NFR BLD AUTO: 1 % (ref 0–2)
LYMPHOCYTES # BLD AUTO: 0.96 THOUSANDS/ΜL (ref 0.6–4.47)
LYMPHOCYTES NFR BLD AUTO: 10 % (ref 14–44)
MCH RBC QN AUTO: 29.7 PG (ref 26.8–34.3)
MCHC RBC AUTO-ENTMCNC: 31.6 G/DL (ref 31.4–37.4)
MCV RBC AUTO: 94 FL (ref 82–98)
MONOCYTES # BLD AUTO: 0.46 THOUSAND/ΜL (ref 0.17–1.22)
MONOCYTES NFR BLD AUTO: 5 % (ref 4–12)
NEUTROPHILS # BLD AUTO: 8.54 THOUSANDS/ΜL (ref 1.85–7.62)
NEUTS SEG NFR BLD AUTO: 84 % (ref 43–75)
NRBC BLD AUTO-RTO: 0 /100 WBCS
PLATELET # BLD AUTO: 201 THOUSANDS/UL (ref 149–390)
PMV BLD AUTO: 11.4 FL (ref 8.9–12.7)
POTASSIUM SERPL-SCNC: 3.1 MMOL/L (ref 3.5–5.3)
PROCALCITONIN SERPL-MCNC: 5.86 NG/ML
RBC # BLD AUTO: 3.17 MILLION/UL (ref 3.88–5.62)
SODIUM SERPL-SCNC: 136 MMOL/L (ref 136–145)
WBC # BLD AUTO: 10.07 THOUSAND/UL (ref 4.31–10.16)

## 2019-01-03 PROCEDURE — 84238 ASSAY NONENDOCRINE RECEPTOR: CPT | Performed by: PHYSICIAN ASSISTANT

## 2019-01-03 PROCEDURE — 85025 COMPLETE CBC W/AUTO DIFF WBC: CPT | Performed by: PHYSICIAN ASSISTANT

## 2019-01-03 PROCEDURE — 70450 CT HEAD/BRAIN W/O DYE: CPT

## 2019-01-03 PROCEDURE — 82948 REAGENT STRIP/BLOOD GLUCOSE: CPT

## 2019-01-03 PROCEDURE — G8997 SWALLOW GOAL STATUS: HCPCS

## 2019-01-03 PROCEDURE — 92611 MOTION FLUOROSCOPY/SWALLOW: CPT

## 2019-01-03 PROCEDURE — 99233 SBSQ HOSP IP/OBS HIGH 50: CPT | Performed by: PHYSICIAN ASSISTANT

## 2019-01-03 PROCEDURE — 80048 BASIC METABOLIC PNL TOTAL CA: CPT | Performed by: PHYSICIAN ASSISTANT

## 2019-01-03 PROCEDURE — G8996 SWALLOW CURRENT STATUS: HCPCS

## 2019-01-03 PROCEDURE — 83519 RIA NONANTIBODY: CPT | Performed by: PHYSICIAN ASSISTANT

## 2019-01-03 PROCEDURE — 84145 PROCALCITONIN (PCT): CPT | Performed by: PHYSICIAN ASSISTANT

## 2019-01-03 PROCEDURE — 74230 X-RAY XM SWLNG FUNCJ C+: CPT

## 2019-01-03 RX ORDER — POTASSIUM CHLORIDE 14.9 MG/ML
20 INJECTION INTRAVENOUS
Status: COMPLETED | OUTPATIENT
Start: 2019-01-03 | End: 2019-01-03

## 2019-01-03 RX ORDER — POTASSIUM CHLORIDE 20 MEQ/1
40 TABLET, EXTENDED RELEASE ORAL ONCE
Status: DISCONTINUED | OUTPATIENT
Start: 2019-01-03 | End: 2019-01-03

## 2019-01-03 RX ORDER — DEXTROSE AND SODIUM CHLORIDE 5; .9 G/100ML; G/100ML
50 INJECTION, SOLUTION INTRAVENOUS CONTINUOUS
Status: DISPENSED | OUTPATIENT
Start: 2019-01-03 | End: 2019-01-06

## 2019-01-03 RX ADMIN — POTASSIUM CHLORIDE 20 MEQ: 200 INJECTION, SOLUTION INTRAVENOUS at 11:53

## 2019-01-03 RX ADMIN — INSULIN LISPRO 2 UNITS: 100 INJECTION, SOLUTION INTRAVENOUS; SUBCUTANEOUS at 16:34

## 2019-01-03 RX ADMIN — POTASSIUM CHLORIDE 20 MEQ: 200 INJECTION, SOLUTION INTRAVENOUS at 13:36

## 2019-01-03 RX ADMIN — CEFTRIAXONE 1000 MG: 1 INJECTION, POWDER, FOR SOLUTION INTRAMUSCULAR; INTRAVENOUS at 23:25

## 2019-01-03 RX ADMIN — HEPARIN SODIUM 5000 UNITS: 5000 INJECTION INTRAVENOUS; SUBCUTANEOUS at 13:32

## 2019-01-03 RX ADMIN — DEXTROSE AND SODIUM CHLORIDE 50 ML/HR: 5; .9 INJECTION, SOLUTION INTRAVENOUS at 11:04

## 2019-01-03 RX ADMIN — CHOLECALCIFEROL TAB 10 MCG (400 UNIT) 400 UNITS: 10 TAB at 07:57

## 2019-01-03 RX ADMIN — HEPARIN SODIUM 5000 UNITS: 5000 INJECTION INTRAVENOUS; SUBCUTANEOUS at 21:36

## 2019-01-03 RX ADMIN — PANCRELIPASE 24000 UNITS: 24000; 76000; 120000 CAPSULE, DELAYED RELEASE PELLETS ORAL at 07:56

## 2019-01-03 RX ADMIN — PANTOPRAZOLE SODIUM 40 MG: 40 TABLET, DELAYED RELEASE ORAL at 06:12

## 2019-01-03 RX ADMIN — HEPARIN SODIUM 5000 UNITS: 5000 INJECTION INTRAVENOUS; SUBCUTANEOUS at 06:13

## 2019-01-03 NOTE — ASSESSMENT & PLAN NOTE
· With recent hospitalization in November for surgical procedure, however still would be considered low risk Hcap  · Strep pneumo antigen positive   Continue Rocephin  · Procal trending down

## 2019-01-03 NOTE — PROGRESS NOTES
Progress Note - Delonte Solano 1935, 82 y o  male MRN: [id number]    Unit/Bed#: -01 Encounter: [id number]    Primary Care Provider: Lien Bernstein DO   Date and time admitted to hospital: 1/1/2019  8:59 PM  * Sepsis [facility])   Assessment & Plan    · Severe sepsis, POA, as evidenced by tachycardia, leukocytosis, elevated lactic acid  · Suspected secondary to pneumonia  · Hemodynamically stable  · Procalcitonin markedly elevated but trending down  Continue to trend  · Blood cultures negative     Dysphagia   Assessment & Plan    · New onset severe pharyngeal dysphagia  Family also notes dysarthria and notes that his swallowing is okay in the beginning but becomes worse the longer he is eating/chewing  · Speech performed video barium swallow-neurologic cause is suspected  · Remain NPO per speech recs  · CT head negative  · Order lab work to rule out MG  · Consult neurology  · Discussed with family if no reversible cause found and dysphagia not improving then would need feeding tube vs comfort care     Pneumonia   Assessment & Plan    · With recent hospitalization in November for surgical procedure, however still would be considered low risk Hcap  · Strep pneumo antigen positive  Continue Rocephin  · Procal trending down     Other chronic pancreatitis [facility])   Assessment & Plan    · Per biopsy report from surgical procedure 11/2018 "benign pancreatic tissue with chronic pancreatitis      · Patient with weight loss and diarrhea after procedure  Follows with Dr Concha Mcelroy and was to follow up 12/24/2018, however missed appointment  · GI evaluated patient this admission due to diarrhea and weight loss   Patient started on Creon  · Recommended outpatient colonoscopy     Malnutrition [facility])   Assessment & Plan    Malnutrition Findings:   Malnutrition type: Chronic illness (past medical history significant for pancreatic mass )  Degree of Malnutrition: Malnutrition of moderate degree (evidenced by 11% wt loss < 3mo, slight depression at [place] region, poor po intake <75% energy intake compared to estimated needs for >1 mo, treated with diet and supplements)    BMI Findings:  BMI Classifications: Underweight < 18 5     Body mass index is 17 61 kg/m²  Currently NPO  Need to consider enteral nutrition if unable to advance diet     Hypokalemia   Assessment & Plan    · Replete K     Respiratory failure (H[facility])   Assessment & Plan    · Acute hypoxic respiratory failure secondary to pneumonia and probable ongoing aspiration  · Wean O2 as tolerated  · NPO currently     Acute [address] [address] )   Assessment & Plan    · Present on admission, Cr 0 99 with baseline 0 5  In setting of presumed pneumonia and also with diarrhea reported  · Creatinine improved with IVFs     Diabetes mellitus ([facility] [address] )   Assessment & Plan    Lab Results   Component Value Date    HGBA1C 6 3 10/30/2018       Blood Sugar Average: Last 72 hrs:     · Hold home metformin, began SSI with Accu-Cheks while inpatient  · Monitor sugars on dextrose gtt       VTE Pharmacologic Prophylaxis:   Pharmacologic: Heparin  Mechanical VTE Prophylaxis in Place: No patient and family refused  Educated on risk of DVT    Patient Centered Rounds: I have performed bedside rounds with nursing staff today  Discussions with Specialists or Other Care Team Provider: Speech therapy    Education and Discussions with Family / Patient: Discussed in detail with son, sister, and wife at bedside  Time Spent for Care: 1 hour  More than 50% of total time spent on counseling and coordination of care as described above  Current Length of Stay: 2 day(s)    Current Patient Status: Inpatient   Certification Statement: The patient will continue to require additional inpatient hospital stay due to sepsis, pneumonia, dysphagia    Discharge Plan: None yet    Code Status: Level 1 - Full Code      Subjective:   No overnight events  Patient requesting water      Objective:     Vitals:   Temp (24hrs), Av 6 °F (36 4 °C), Min:97 6 °F (36 4 °C), Max:97 7 °F (36 5 °C)    Temp:  [97 6 °F (36 4 °C)-97 7 °F (36 5 °C)] 97 6 °F (36 4 °C)  HR:  [70-73] 70  Resp:  [16-18] 18  BP: (120-127)/(70-74) 120/70  SpO2:  [95 %-97 %] 95 %  Body mass index is 17 61 kg/m²  Input and Output Summary (last 24 hours): Intake/Output Summary (Last 24 hours) at 19 1414  Last data filed at 19 1100   Gross per 24 hour   Intake              410 ml   Output              300 ml   Net              110 ml       Physical Exam:     Physical Exam   Constitutional: He is oriented to person, place, and time  Cachectic and chronically ill appearing  No acute distress   HENT:   Mucous membranes dry   Eyes: Pupils are equal, round, and reactive to light  EOM are normal    Neck: Neck supple  Unable to flex chin to chest   Cardiovascular: Normal rate, regular rhythm and normal heart sounds  Pulmonary/Chest:   Coarse breath sounds throughout   Abdominal: Soft  Bowel sounds are normal  He exhibits no distension  There is no tenderness  There is no rebound  Musculoskeletal:   Muscle wasting calves   Neurological: He is alert and oriented to person, place, and time  Dysmetric with finger to nose on left  Good strength throughout  Movements very slow  Masked facies  Speech thick and difficult to understand  Somewhat increased tone   Skin: Skin is warm and dry  Psychiatric: He has a normal mood and affect         Additional Data:     Labs:      Results from last 7 days  Lab Units 19  0545   WBC Thousand/uL 10 07   HEMOGLOBIN g/dL 9 4*   HEMATOCRIT % 29 7*   PLATELETS Thousands/uL 201   NEUTROS PCT % 84*   LYMPHS PCT % 10*   MONOS PCT % 5   EOS PCT % 0       Results from last 7 days  Lab Units 19  0724  19  2137   POTASSIUM mmol/L 3 1*  < > 3 3*   CHLORIDE mmol/L 102  < > 95*   CO2 mmol/L 28  < > 26   BUN mg/dL 15  < > 28*   CREATININE mg/dL 0 52*  < > 0 99   CALCIUM mg/dL 8 0*  < > 8 1*   ALK PHOS U/L  --   -- 101   ALT U/L  --   --  34   AST U/L  --   --  45   < > = values in this interval not displayed  Results from last 7 days  Lab Units 01/01/19  2137   INR  1 04       * I Have Reviewed All Lab Data Listed Above  * Additional Pertinent Lab Tests Reviewed: All Labs Within Last 24 Hours Reviewed    Imaging:    Imaging Reports Reviewed Today Include: CT head  Imaging Personally Reviewed by Myself Includes:  None    Recent Cultures (last 7 days):       Results from last 7 days  Lab Units 01/02/19  0608 01/01/19  2359 01/01/19  2358 01/01/19 2137   BLOOD CULTURE   --   --   --  No Growth at 24 hrs  No Growth at 24 hrs  INFLUENZA B PCR   --  None Detected  --   --    RSV PCR   --  None Detected  --   --    LEGIONELLA URINARY ANTIGEN   --   --  Negative  --    C DIFF TOXIN B  NEGATIVE for C difficle toxin by PCR    --   --   --        Last 24 Hours Medication List:     Current Facility-Administered Medications:  albuterol 2 5 mg Nebulization Q4H PRN Amy Templeton MD    cefTRIAXone 1,000 mg Intravenous Q24H Jason Dunham MD Last Rate: Stopped (01/03/19 0015)   cholecalciferol 400 Units Oral Daily Roni Mitchell MD    dextrose 5 % and sodium chloride 0 9 % 50 mL/hr Intravenous Continuous Heydi Willis PA-C Last Rate: 50 mL/hr (01/03/19 1104)   heparin (porcine) 5,000 Units Subcutaneous [facility] Jason Dunham MD    insulin lispro 1-5 Units Subcutaneous 4x Daily (AC & HS) Heydi Willis PA-C    pancrelipase (Lip-Prot-Amyl) 24,000 Units Oral BID With Meals Roni Mitchell MD    pantoprazole 40 mg Oral Early Morning Jason Dunham MD    potassium chloride 20 mEq Intravenous Q2H Heydi Willis PA-C Last Rate: 20 mEq (01/03/19 1336)        Today, Patient Was Seen By: Heydi Willis PA-C    ** Please Note: Dragon 360 Dictation voice to text software may have been used in the creation of this document   **

## 2019-01-03 NOTE — ASSESSMENT & PLAN NOTE
Lab Results   Component Value Date    HGBA1C 6 3 10/30/2018       Blood Sugar Average: Last 72 hrs:     · Hold home metformin, began SSI with Accu-Cheks while inpatient    · Monitor sugars on dextrose gtt

## 2019-01-03 NOTE — PROCEDURES
Video Swallow Study      Patient Name: Mima Pereyra  ORICV'B Date: 1/3/2019        Past Medical History  Past Medical History:   Diagnosis Date    Diabetes mellitus ([address] )     Type II    Weight loss         Past Surgical History  Past Surgical History:   Procedure Laterality Date    LAPAROTOMY N/A 11/19/2018    Procedure: LAPAROTOMY EXPLORATORY;  Surgeon: Nitesh Cheema MD;  [facility]: [facility];  Service: Surgical Oncology    DE EDG US EXAM SURGICAL ALTER STOM DUODENUM/JEJUNUM N/A 9/21/2018    Procedure: LINEAR ENDOSCOPIC U/S;  Surgeon: Summer Valente MD;  Location: [facility]; Service: Gastroenterology    DE LAP,DIAGNOSTIC ABDOMEN N/A 11/19/2018    Procedure: pancreatic biopsy;  Surgeon: Nitesh Cheema MD;  [facility]: [facility];  Service: Surgical Oncology    TONSILLECTOMY      VASCULAR SURGERY      phlebitis many years ago    WRIST GANGLION EXCISION       Video Barium Swallow Study    Summary:  Pt presents with severe pharyngeal dysphagia characterized by poor tongue base retraction and drive, swallow delay, absent epiglottic movement/inversion, and a tight UES that allows only minimal amounts of material to pass for each swallow attempt  Incomplete swallow  Although no santa aspiration was observed on this study, suspect at least microaspiration with thin liquids due to prolonged weak cough after trial Significant risk for aspiration noted after each initial swallow attempt due to large amount of stasis throughout the pharynx  After 10+ cued swallows, pt continued to present with at least moderate pyriform retention  Additionally, pt observed to be at risk for poor nutrition/hydration due to significant work required to swallow small bolus (10+ swallow attempts)  Suspect fatigue as an additional risk factor        Recommendations:  Diet: NPO; consider alternate means of nutrition  Meds: via tube/IV  Strategies: Provided moist swabs or small ice chips for oral comfort  Upright position  F/u ST tx: yes; therapeutic po trials of nectar thick liquids and exercises  Therapy Prognosis: guarded  Prognosis considerations: comorbidities, strength, motivation    Consider consult with: Neuro  ? Etiology new onset severe pharyngeal dysphagia with absent epiglottic inversion and poor cricopharyngeal relaxation  Nutrition Svcs  Results reviewed with: pt, nursing, CRNP    If a dedicated assessment of the esophagus is desired, consider esophagram/barium swallow  **Images available for review on PACS**    Patient's goal:  None state      Goals:  1  Pt will tolerate trials of nectar thick liquids by tsp with SLP only using multiple and effortful swallow techniques  2   Pt will perform pharyngeal exercises x 10 ea given min cues to improve pharyngeal swallow function  HPI:  From H&P:  The patient is an 83 y/o male who has past medical history significant for pancreatic mass suspected chronic pancreatitis versus autoimmune pancreatitis and type 2 diabetes  [facility] presents from home with worsening weight loss of approximately 5 weeks and also with worsening cough that was described as wet and nonproductive associated with some worsening shortness of breath   Per family he did not receive a flu shot this year, and reportedly some sick contacts were present at home  [facility] any fever or chills at home  [facility] does admit to diarrhea described as yellowish" without blood being seen it        In ED, patient was noted to desaturate to as low as 84% requiring up to 5 L nasal cannula   He received 1L NS, and received treatment with azithromycin and ceftriaxone for presumed pneumonia        Currently, patient is reporting he does not have shortness of breath or abdominal pain   He continues to report cough, however remains nonproductive   Nursing staff report patient with coughing episode at lunch meal        From Bedside Swallow Eval 1/2/19:  Summary:  Pt presents w/ mild oral pharyngeal dysphagia characterized by delayed a-p transfer time and delayed swallow initiation time with intermittent, weak cough with small sips of thin liquids       Recommendations:  Diet: Regular diet   Liquid: Thin liquids   Meds: As tolerated best  Supervision: Intermittent supervision   Positioning:Upright  Strategies: Pt to take PO/Meds only when fully alert and upright  Oral care: As needed   Aspiration precautions  *Consider video swallow study to assess for and r/o aspiration       Previous VBS:  None here    CXR:  1/1/19 - Lower lobe infiltrates bilaterally    Current Diet:  Regular with thin liquids  Premorbid diet:  Regular/soft with thin liquids  Dentition:  WFLs  O2 requirement:  NC  Vocal Quality/Speech:  Decreased volume; minimal verbal output  Cognitive status:  Flat affect  Awake  Answered simple questions    Consistencies administered: Barium laden applesauce, nectar thick by tsp, thin liquids by cup sip  Pt was seated laterally at 90 degrees  Oral stage:  Lip closure: good  Mastication: na  Bolus formation: decreased  Bolus control: fair  Transfer: prolonged for puree  Residue: mild  Tongue drive:  SEVERELY IMPAIRED    Pharyngeal stage:  Swallow promptness: variably delayed  Spill to valleculae: yes  Spill to pyriforms: no  Epiglottic inversion: ABSENT  Laryngeal rise: fair/good rise, fair anterior excursion  Pharyngeal constriction: fair  Vallecular retention: max for puree; mild to mod for nectar and thin - decreased over time and with multiple cued swallows (and the assistance of gravity)  Pyriform retention: max; decreased to mod with multiple swallows (10+)  Cricopharyngeal relaxation: POOR; minimal material passed through the UES each swallow attempt  Transient penetration: mostly transient for thin liquids  Vocal cords:  Good closure during consecutive swallows  Penetration: thin liquids  Aspiration: no santa aspiration seen    Cannot r/o microaspiration with thin liquids due to significant, prolonged coughing following thin liquid trial not observed with other consistencies  Strategies: multiple swallows, effortful swallow (unable to perform effectively), liquid wash - decreased pharyngeal stasis  Esophageal stage:  Screened briefly; ? Mild hold up at distal esophagus  Limited sample for screening

## 2019-01-03 NOTE — ASSESSMENT & PLAN NOTE
· New onset severe pharyngeal dysphagia  Family also notes dysarthria and notes that his swallowing is okay in the beginning but becomes worse the longer he is eating/chewing    · Speech performed video barium swallow-neurologic cause is suspected  · Remain NPO per speech recs  · CT head negative  · Order lab work to rule out MG  · Consult neurology  · Discussed with family if no reversible cause found and dysphagia not improving then would need feeding tube vs comfort care

## 2019-01-03 NOTE — ASSESSMENT & PLAN NOTE
· Severe sepsis, POA, as evidenced by tachycardia, leukocytosis, elevated lactic acid  · Suspected secondary to pneumonia  · Hemodynamically stable  · Procalcitonin markedly elevated but trending down   Continue to trend  · Blood cultures negative

## 2019-01-03 NOTE — ASSESSMENT & PLAN NOTE
Malnutrition Findings:   Malnutrition type: Chronic illness (past medical history significant for pancreatic mass )  Degree of Malnutrition: Malnutrition of moderate degree (evidenced by 11% wt loss < 3mo, slight depression at [place] region, poor po intake <75% energy intake compared to estimated needs for >1 mo, treated with diet and supplements)    BMI Findings:  BMI Classifications: Underweight < 18 5     Body mass index is 17 61 kg/m²  Currently NPO   Need to consider enteral nutrition if unable to advance diet

## 2019-01-03 NOTE — PLAN OF CARE
Problem: SLP ADULT - SWALLOWING, IMPAIRED  Goal: Advance to least restrictive diet without signs or symptoms of aspiration for planned discharge setting  See evaluation for individualized goals  1   Pt will tolerate trials of nectar thick liquids by tsp with SLP only using multiple and effortful swallow techniques  2   Pt will perform pharyngeal exercises x 10 ea given min cues to improve pharyngeal swallow function    Outcome: Not Progressing

## 2019-01-03 NOTE — ASSESSMENT & PLAN NOTE
· Per biopsy report from surgical procedure 11/2018 "benign pancreatic tissue with chronic pancreatitis      · Patient with weight loss and diarrhea after procedure  Follows with Dr Edda Rodrigues and was to follow up 12/24/2018, however missed appointment  · GI evaluated patient this admission due to diarrhea and weight loss   Patient started on Creon  · Recommended outpatient colonoscopy

## 2019-01-03 NOTE — ASSESSMENT & PLAN NOTE
· Acute hypoxic respiratory failure secondary to pneumonia and probable ongoing aspiration  · Wean O2 as tolerated  · NPO currently

## 2019-01-04 ENCOUNTER — APPOINTMENT (INPATIENT)
Dept: RADIOLOGY | Facility: HOSPITAL | Age: 84
DRG: 853 | End: 2019-01-04
Payer: MEDICARE

## 2019-01-04 LAB
ANION GAP SERPL CALCULATED.3IONS-SCNC: 6 MMOL/L (ref 4–13)
BASOPHILS # BLD MANUAL: 0 THOUSAND/UL (ref 0–0.1)
BASOPHILS NFR MAR MANUAL: 0 % (ref 0–1)
BUN SERPL-MCNC: 10 MG/DL (ref 5–25)
CALCIUM SERPL-MCNC: 8.1 MG/DL (ref 8.3–10.1)
CHLORIDE SERPL-SCNC: 104 MMOL/L (ref 100–108)
CO2 SERPL-SCNC: 28 MMOL/L (ref 21–32)
CREAT SERPL-MCNC: 0.51 MG/DL (ref 0.6–1.3)
EOSINOPHIL # BLD MANUAL: 0 THOUSAND/UL (ref 0–0.4)
EOSINOPHIL NFR BLD MANUAL: 0 % (ref 0–6)
ERYTHROCYTE [DISTWIDTH] IN BLOOD BY AUTOMATED COUNT: 14.3 % (ref 11.6–15.1)
GFR SERPL CREATININE-BSD FRML MDRD: 101 ML/MIN/1.73SQ M
GLUCOSE SERPL-MCNC: 109 MG/DL (ref 65–140)
GLUCOSE SERPL-MCNC: 135 MG/DL (ref 65–140)
GLUCOSE SERPL-MCNC: 147 MG/DL (ref 65–140)
GLUCOSE SERPL-MCNC: 160 MG/DL (ref 65–140)
GLUCOSE SERPL-MCNC: 172 MG/DL (ref 65–140)
HCT VFR BLD AUTO: 31.2 % (ref 36.5–49.3)
HGB BLD-MCNC: 9.8 G/DL (ref 12–17)
LYMPHOCYTES # BLD AUTO: 0.77 THOUSAND/UL (ref 0.6–4.47)
LYMPHOCYTES # BLD AUTO: 7 % (ref 14–44)
MCH RBC QN AUTO: 30.1 PG (ref 26.8–34.3)
MCHC RBC AUTO-ENTMCNC: 31.4 G/DL (ref 31.4–37.4)
MCV RBC AUTO: 96 FL (ref 82–98)
MONOCYTES # BLD AUTO: 0.33 THOUSAND/UL (ref 0–1.22)
MONOCYTES NFR BLD: 3 % (ref 4–12)
NEUTROPHILS # BLD MANUAL: 9.87 THOUSAND/UL (ref 1.85–7.62)
NEUTS SEG NFR BLD AUTO: 90 % (ref 43–75)
NRBC BLD AUTO-RTO: 0 /100 WBCS
PLATELET # BLD AUTO: 213 THOUSANDS/UL (ref 149–390)
PLATELET BLD QL SMEAR: ADEQUATE
PMV BLD AUTO: 11.8 FL (ref 8.9–12.7)
POTASSIUM SERPL-SCNC: 3.5 MMOL/L (ref 3.5–5.3)
PROCALCITONIN SERPL-MCNC: 3.23 NG/ML
RBC # BLD AUTO: 3.26 MILLION/UL (ref 3.88–5.62)
RBC MORPH BLD: NORMAL
SODIUM SERPL-SCNC: 138 MMOL/L (ref 136–145)
WBC # BLD AUTO: 10.97 THOUSAND/UL (ref 4.31–10.16)

## 2019-01-04 PROCEDURE — 94760 N-INVAS EAR/PLS OXIMETRY 1: CPT

## 2019-01-04 PROCEDURE — G8978 MOBILITY CURRENT STATUS: HCPCS

## 2019-01-04 PROCEDURE — G8979 MOBILITY GOAL STATUS: HCPCS

## 2019-01-04 PROCEDURE — 99232 SBSQ HOSP IP/OBS MODERATE 35: CPT | Performed by: INTERNAL MEDICINE

## 2019-01-04 PROCEDURE — 84145 PROCALCITONIN (PCT): CPT | Performed by: PHYSICIAN ASSISTANT

## 2019-01-04 PROCEDURE — 97167 OT EVAL HIGH COMPLEX 60 MIN: CPT

## 2019-01-04 PROCEDURE — 99223 1ST HOSP IP/OBS HIGH 75: CPT | Performed by: PSYCHIATRY & NEUROLOGY

## 2019-01-04 PROCEDURE — 70551 MRI BRAIN STEM W/O DYE: CPT

## 2019-01-04 PROCEDURE — 99233 SBSQ HOSP IP/OBS HIGH 50: CPT | Performed by: PHYSICIAN ASSISTANT

## 2019-01-04 PROCEDURE — G8987 SELF CARE CURRENT STATUS: HCPCS

## 2019-01-04 PROCEDURE — 82948 REAGENT STRIP/BLOOD GLUCOSE: CPT

## 2019-01-04 PROCEDURE — 80048 BASIC METABOLIC PNL TOTAL CA: CPT | Performed by: PHYSICIAN ASSISTANT

## 2019-01-04 PROCEDURE — 85027 COMPLETE CBC AUTOMATED: CPT | Performed by: PHYSICIAN ASSISTANT

## 2019-01-04 PROCEDURE — 92526 ORAL FUNCTION THERAPY: CPT

## 2019-01-04 PROCEDURE — 97163 PT EVAL HIGH COMPLEX 45 MIN: CPT

## 2019-01-04 PROCEDURE — 85007 BL SMEAR W/DIFF WBC COUNT: CPT | Performed by: PHYSICIAN ASSISTANT

## 2019-01-04 PROCEDURE — G8988 SELF CARE GOAL STATUS: HCPCS

## 2019-01-04 RX ADMIN — INSULIN LISPRO 1 UNITS: 100 INJECTION, SOLUTION INTRAVENOUS; SUBCUTANEOUS at 06:05

## 2019-01-04 RX ADMIN — DEXTROSE AND SODIUM CHLORIDE 50 ML/HR: 5; .9 INJECTION, SOLUTION INTRAVENOUS at 07:36

## 2019-01-04 RX ADMIN — INSULIN LISPRO 1 UNITS: 100 INJECTION, SOLUTION INTRAVENOUS; SUBCUTANEOUS at 23:55

## 2019-01-04 RX ADMIN — HEPARIN SODIUM 5000 UNITS: 5000 INJECTION INTRAVENOUS; SUBCUTANEOUS at 21:51

## 2019-01-04 RX ADMIN — HEPARIN SODIUM 5000 UNITS: 5000 INJECTION INTRAVENOUS; SUBCUTANEOUS at 13:22

## 2019-01-04 RX ADMIN — CEFTRIAXONE 1000 MG: 1 INJECTION, POWDER, FOR SOLUTION INTRAMUSCULAR; INTRAVENOUS at 23:53

## 2019-01-04 RX ADMIN — HEPARIN SODIUM 5000 UNITS: 5000 INJECTION INTRAVENOUS; SUBCUTANEOUS at 06:00

## 2019-01-04 NOTE — PLAN OF CARE

## 2019-01-04 NOTE — ASSESSMENT & PLAN NOTE
Malnutrition Findings:   Malnutrition type: Chronic illness (past medical history significant for pancreatic mass )  Degree of Malnutrition: Malnutrition of moderate degree (evidenced by 11% wt loss < 3mo, slight depression at [place] region, poor po intake <75% energy intake compared to estimated needs for >1 mo, treated with diet and supplements)    BMI Findings:  BMI Classifications: Underweight < 18 5     Body mass index is 17 61 kg/m²  Currently NPO   Need to consider enteral nutrition if unable to advance diet

## 2019-01-04 NOTE — ASSESSMENT & PLAN NOTE
· New onset severe pharyngeal dysphagia  Family also notes dysarthria  · Speech performed video barium swallow-neurologic cause is suspected  · Remain NPO per speech recs  · CT head negative  · F/U Acetylcholine receptor antibodies, MuSK antibodies  · Neurology consulted- discussed with neuro today- will do AChE inhibitor trial to evaluate for improvement  Will coordinate with nursing and speech  Also recommending MRI    · Discussed with family if no reversible cause found and dysphagia not improving then would need feeding tube vs comfort care

## 2019-01-04 NOTE — PLAN OF CARE
Problem: OCCUPATIONAL THERAPY ADULT  Goal: Performs self-care activities at highest level of function for planned discharge setting  See evaluation for individualized goals  Treatment Interventions: ADL retraining, Functional transfer training, UE strengthening/ROM, Endurance training, Cognitive reorientation, Patient/family training, Equipment evaluation/education, Compensatory technique education, Activityengagement, Energy conservation          See flowsheet documentation for full assessment, interventions and recommendations  Limitation: Decreased ADL status, Decreased UE ROM, Decreased UE strength, Decreased Safe judgement during ADL, Decreased cognition, Decreased endurance  Prognosis: Fair  Assessment: Pt is a 82 y o  male seen for OT evaluation s/p admit to [facility] on 1/1/2019 w/ Sepsis ([facility] [address] )  Comorbidities affecting pt's functional performance at time of assessment include:sepsis, esophagitis, weight loss, pancreatitis, AMELIA, respiratory failure, dysphagia, malnutrition, and DM  Personal factors affecting pt at time of IE include:difficulty performing ADLS, difficulty performing IADLS , limited insight into deficits, decreased initiation and engagement , health management  and environment  Prior to admission, pt was indep with pt reports of steady decline in functional status since Nov of 2018  Pt was starting to receive help from his dtrs  Upon evaluation: Pt requires mod A for bed mob, TA for ADL fxn, and Mod A for SPT with RW with 5L O2 2* the following deficits impacting occupational performance: weakness, decreased ROM, decreased strength, decreased balance, decreased tolerance, impaired attention, impaired initiation, impaired memory, impaired sequencing, impaired problem solving and decreased safety awareness  Pt to benefit from continued skilled OT tx while in the hospital to address deficits as defined above and maximize level of functional independence w ADL's and functional mobility   Occupational Performance areas to address include: grooming, bathing/shower, toilet hygiene, dressing, functional mobility and clothing management  From OT standpoint, recommendation at time of d/c would be short term rehab         OT Discharge Recommendation: Short Term Rehab

## 2019-01-04 NOTE — ASSESSMENT & PLAN NOTE
· With recent hospitalization in November for surgical procedure, however still would be considered low risk Hcap  · Strep pneumo antigen positive   Continue Rocephin day #4  · Procal trending down

## 2019-01-04 NOTE — PROGRESS NOTES
Progress Note - Mima Pereyra 1935, 82 y o  male MRN: [id number]    Unit/Bed#: -01 Encounter: [id number]    Primary Care Provider: Kristie Gibbs DO   Date and time admitted to hospital: 1/1/2019  8:59 PM    * Sepsis [facility])   Assessment & Plan    · Severe sepsis, POA, as evidenced by tachycardia, leukocytosis, elevated lactic acid  · Suspected secondary to pneumonia  · Hemodynamically stable  · Procalcitonin trending down  · Blood cultures negative     Dysphagia   Assessment & Plan    · New onset severe pharyngeal dysphagia  Family also notes dysarthria  · Speech performed video barium swallow-neurologic cause is suspected  · Remain NPO per speech recs  · CT head negative  · F/U Acetylcholine receptor antibodies, MuSK antibodies  · Neurology consulted- discussed with neuro today- will do AChE inhibitor trial to evaluate for improvement  Will coordinate with nursing and speech  Also recommending MRI  · Discussed with family if no reversible cause found and dysphagia not improving then would need feeding tube vs comfort care     Pneumonia   Assessment & Plan    · With recent hospitalization in November for surgical procedure, however still would be considered low risk Hcap  · Strep pneumo antigen positive  Continue Rocephin day #4  · Procal trending down     Other chronic pancreatitis [facility])   Assessment & Plan    · Per biopsy report from surgical procedure 11/2018 "benign pancreatic tissue with chronic pancreatitis      · Patient with weight loss and diarrhea after procedure  Follows with Dr Claire Glaser and was to follow up 12/24/2018, however missed appointment  · GI evaluated patient this admission due to diarrhea and weight loss   Patient started on Creon  · Recommended outpatient colonoscopy     Malnutrition [facility])   Assessment & Plan    Malnutrition Findings:   Malnutrition type: Chronic illness (past medical history significant for pancreatic mass )  Degree of Malnutrition: Malnutrition of moderate degree (evidenced by 11% wt loss < 3mo, slight depression at [place] region, poor po intake <75% energy intake compared to estimated needs for >1 mo, treated with diet and supplements)    BMI Findings:  BMI Classifications: Underweight < 18 5     Body mass index is 17 61 kg/m²  Currently NPO  Need to consider enteral nutrition if unable to advance diet     Respiratory failure ([facility] )   Assessment & Plan    · Acute hypoxic respiratory failure secondary to pneumonia and probable ongoing aspiration  · Wean O2 as tolerated  · NPO currently     Acute kidney injury ([facility] )   Assessment & Plan    · Present on admission, Cr 0 99 with baseline 0 5  In setting of presumed pneumonia and also with diarrhea reported  · Creatinine improved with IVFs     Diabetes mellitus ([facility] )   Assessment & Plan    Lab Results   Component Value Date    HGBA1C 6 3 10/30/2018       Blood Sugar Average: Last 72 hrs:     · Hold home metformin, began SSI with Accu-Cheks while inpatient  · Monitor sugars on dextrose gtt       VTE Pharmacologic Prophylaxis:   Pharmacologic: Heparin  Mechanical VTE Prophylaxis in Place: Yes    Patient Centered Rounds: Will discuss with RN and speech    Discussions with Specialists or Other Care Team Provider: Discussed plan with neurology team    Education and Discussions with Family / Patient: Patient and family present at bedside  Time Spent for Care: 45 minutes  More than 50% of total time spent on counseling and coordination of care as described above  Current Length of Stay: 3 day(s)    Current Patient Status: Inpatient   Certification Statement: The patient will continue to require additional inpatient hospital stay due to dysphagia, pneumonia, respiratory failure    Discharge Plan: None yet  Pending improvement    Code Status: Level 1 - Full Code      Subjective:   Wants water      Objective:     Vitals:   Temp (24hrs), Av 6 °F (36 4 °C), Min:97 4 °F (36 3 °C), Max:97 9 °F (36 6 °C)    Temp:  [97 4 °F (36 3 °C)-97 9 °F (36 6 °C)] 97 6 °F (36 4 °C)  HR:  [73-80] 75  Resp:  [18] 18  BP: (134-136)/(75-81) 134/76  SpO2:  [90 %-98 %] 98 %  Body mass index is 17 61 kg/m²  Input and Output Summary (last 24 hours): Intake/Output Summary (Last 24 hours) at 01/04/19 1417  Last data filed at 01/04/19 1408   Gross per 24 hour   Intake               50 ml   Output              950 ml   Net             -900 ml       Physical Exam:     Physical Exam   Constitutional: He is oriented to person, place, and time  Cachectic and chronically ill appearing   HENT:   Head: Normocephalic and atraumatic  Eyes: No scleral icterus  Neck: Neck supple  Cardiovascular: Normal rate, regular rhythm and normal heart sounds  Pulmonary/Chest: Effort normal  No respiratory distress  Coarse lung sounds  Very weak cough   Abdominal: Soft  Bowel sounds are normal  He exhibits no distension  There is no tenderness  There is no rebound  Musculoskeletal: He exhibits no edema  Neurological: He is alert and oriented to person, place, and time  Speech dysarthric  Good strength in all extremities  I observed neurologist perform neurologic exam   Skin: Skin is warm and dry  Psychiatric: He has a normal mood and affect         Additional Data:     Labs:      Results from last 7 days  Lab Units 01/04/19  0551 01/03/19  0545   WBC Thousand/uL 10 97* 10 07   HEMOGLOBIN g/dL 9 8* 9 4*   HEMATOCRIT % 31 2* 29 7*   PLATELETS Thousands/uL 213 201   NEUTROS PCT %  --  84*   LYMPHS PCT %  --  10*   LYMPHO PCT % 7*  --    MONOS PCT %  --  5   MONO PCT % 3*  --    EOS PCT % 0 0       Results from last 7 days  Lab Units 01/04/19  0551  01/01/19  2137   POTASSIUM mmol/L 3 5  < > 3 3*   CHLORIDE mmol/L 104  < > 95*   CO2 mmol/L 28  < > 26   BUN mg/dL 10  < > 28*   CREATININE mg/dL 0 51*  < > 0 99   CALCIUM mg/dL 8 1*  < > 8 1*   ALK PHOS U/L  --   --  101   ALT U/L  --   --  34   AST U/L  --   --  45   < > = values in this interval not displayed  Results from last 7 days  Lab Units 01/01/19  2137   INR  1 04       * I Have Reviewed All Lab Data Listed Above  * Additional Pertinent Lab Tests Reviewed: All Labs Within Last 24 Hours Reviewed    Imaging:    Imaging Reports Reviewed Today Include: none  Imaging Personally Reviewed by Myself Includes:  None    Recent Cultures (last 7 days):       Results from last 7 days  Lab Units 01/02/19  0608 01/01/19  2359 01/01/19  2358 01/01/19 2137   BLOOD CULTURE   --   --   --  No Growth at 48 hrs  No Growth at 48 hrs  INFLUENZA B PCR   --  None Detected  --   --    RSV PCR   --  None Detected  --   --    LEGIONELLA URINARY ANTIGEN   --   --  Negative  --    C DIFF TOXIN B  NEGATIVE for C difficle toxin by PCR    --   --   --        Last 24 Hours Medication List:     Current Facility-Administered Medications:  albuterol 2 5 mg Nebulization Q4H PRN Eneida Contreras MD    cefTRIAXone 1,000 mg Intravenous Q24H Meryl Major MD Last Rate: Stopped (01/04/19 0000)   cholecalciferol 400 Units Oral Daily Michelle Cruz MD    dextrose 5 % and sodium chloride 0 9 % 50 mL/hr Intravenous Continuous Elham Levine PA-C Last Rate: 50 mL/hr (01/04/19 0736)   heparin (porcine) 5,000 Units Subcutaneous [facility] Meryl Major MD    insulin lispro 1-5 Units Subcutaneous Q6H Elham Levine PA-C    pancrelipase (Lip-Prot-Amyl) 24,000 Units Oral BID With Meals Michelle Cruz MD    pantoprazole 40 mg Oral Early Morning Meryl Major MD         Today, Patient Was Seen By: Elham Levine PA-C    ** Please Note: Dragon 360 Dictation voice to text software may have been used in the creation of this document   **

## 2019-01-04 NOTE — ASSESSMENT & PLAN NOTE
· Per biopsy report from surgical procedure 11/2018 "benign pancreatic tissue with chronic pancreatitis      · Patient with weight loss and diarrhea after procedure  Follows with Dr Isidra Muñoz and was to follow up 12/24/2018, however missed appointment  · GI evaluated patient this admission due to diarrhea and weight loss   Patient started on Creon  · Recommended outpatient colonoscopy

## 2019-01-04 NOTE — PROGRESS NOTES
Gastroenterology Progress Note   Unit/Bed#: -01 Encounter: [id number]        Eliseo Sanches 82 y o  male [id number]    Hospital Stay Days: 3      Assessment/Plan:    Principal Problem:    Sepsis ([facility] )  Active Problems:    Diabetes mellitus ([facility] )    Weight loss    Other chronic pancreatitis ([facility] )    Pneumonia    Acute kidney injury ([place] [address] )    Respiratory failure (HCC)    Dysphagia    Hypokalemia    Malnutrition (HCC)    Pancreatic mass  -H/o pancreatic mass; -ve for malignancy on biopsy, negative for autoimmune pancreatitis; dilation of pancreatic duct; Normal CA 19-9 and CEA  S/p EUS with biopsy and diagnostic laparotomy with biopsy  -Repeat CT scan of the abdomen scheduled  -Patient has a h/o diarrhea with steatorrhea, does not have diarrhea currently  -Have started creon 53872M BID; if patient develops constipation can go down on the dose  -Vit D supplementation   -Patient is scheduled for outpatient follow up with Dr Lizbeth Gaxiola where he will receive a repeat EUS+ possible colonoscopy    -Due to his severe ongoing weight loss suspicion for an occult malignancy is high-  Patient was initially not agreeable to a colonoscopy but after discussion with him due to his significant ongoing weight loss he said he will consider it  -No further interventions at this time; outpatient appointment has been scheduled  -Fecal elastase  -Quantitative fecal fat        Subjective:   Patient has had no further episodes of diarrhea over he has been NPO due to severe oropharyngeal dysphagia       Vitals: Temp (24hrs), Av 6 °F (36 4 °C), Min:97 4 °F (36 3 °C), Max:97 9 °F (36 6 °C)  Current: Temperature: 97 6 °F (36 4 °C)  Vitals:    19 0703 19 1500 19 2213 19 0714   BP: 120/70 136/75 136/81 134/76   BP Location: Left arm Left arm Left arm Left arm   Pulse: 70 73 80 75   Resp: 18 18 18 18   Temp: 97 6 °F (36 4 °C) 97 9 °F (36 6 °C) (!) 97 4 °F (36 3 °C) 97 6 °F (36 4 °C)   TempSrc: Oral Oral Oral Oral   SpO2: 95% 91% 90% 96%   Weight:       Height:        Body mass index is 17 61 kg/m²  I/O last 24 hours: In: 410 [P O :360; IV Piggyback:50]  Out: 525 [Urine:525]      Physical Exam: Physical Exam   Constitutional: He is oriented to person, place, and time  He appears cachectic  HENT:   Head: Normocephalic and atraumatic  Pulmonary/Chest: He is in respiratory distress  Coarse breath sounds present bilaterally   Abdominal: Soft  Bowel sounds are normal    Scaphoid shaped  Midline laparotomy scar present  No palpable masses  Non tender     Neurological: He is alert and oriented to person, place, and time  Skin: Skin is warm  Psychiatric: He has a normal mood and affect   His behavior is normal  Judgment and thought content normal        Invasive Devices     Peripheral Intravenous Line            Peripheral IV 01/01/19 Right Antecubital 2 days                          Labs:   Recent Results (from the past 24 hour(s))   Fingerstick Glucose (POCT)    Collection Time: 01/03/19 11:07 AM   Result Value Ref Range    POC Glucose 234 (H) 65 - 140 mg/dl   Fingerstick Glucose (POCT)    Collection Time: 01/03/19  3:52 PM   Result Value Ref Range    POC Glucose 277 (H) 65 - 140 mg/dl   Fingerstick Glucose (POCT)    Collection Time: 01/03/19 11:54 PM   Result Value Ref Range    POC Glucose 148 (H) 65 - 140 mg/dl   CBC and differential    Collection Time: 01/04/19  5:51 AM   Result Value Ref Range    WBC 10 97 (H) 4 31 - 10 16 Thousand/uL    RBC 3 26 (L) 3 88 - 5 62 Million/uL    Hemoglobin 9 8 (L) 12 0 - 17 0 g/dL    Hematocrit 31 2 (L) 36 5 - 49 3 %    MCV 96 82 - 98 fL    MCH 30 1 26 8 - 34 3 pg    MCHC 31 4 31 4 - 37 4 g/dL    RDW 14 3 11 6 - 15 1 %    MPV 11 8 8 9 - 12 7 fL    Platelets 483 040 - 103 Thousands/uL    nRBC 0 /100 WBCs   Basic metabolic panel    Collection Time: 01/04/19  5:51 AM   Result Value Ref Range    Sodium 138 136 - 145 mmol/L    Potassium 3 5 3 5 - 5 3 mmol/L    Chloride 104 100 - 108 mmol/L CO2 28 21 - 32 mmol/L    ANION GAP 6 4 - 13 mmol/L    BUN 10 5 - 25 mg/dL    Creatinine 0 51 (L) 0 60 - 1 30 mg/dL    Glucose 147 (H) 65 - 140 mg/dL    Calcium 8 1 (L) 8 3 - 10 1 mg/dL    eGFR 101 ml/min/1 73sq m   Procalcitonin    Collection Time: 01/04/19  5:51 AM   Result Value Ref Range    Procalcitonin 3 23 (H) <=0 25 ng/ml   Manual Differential(PHLEBS Do Not Order)    Collection Time: 01/04/19  5:51 AM   Result Value Ref Range    Segmented % 90 (H) 43 - 75 %    Lymphocytes % 7 (L) 14 - 44 %    Monocytes % 3 (L) 4 - 12 %    Eosinophils, % 0 0 - 6 %    Basophils % 0 0 - 1 %    Absolute Neutrophils 9 87 (H) 1 85 - 7 62 Thousand/uL    Lymphocytes Absolute 0 77 0 60 - 4 47 Thousand/uL    Monocytes Absolute 0 33 0 00 - 1 22 Thousand/uL    Eosinophils Absolute 0 00 0 00 - 0 40 Thousand/uL    Basophils Absolute 0 00 0 00 - 0 10 Thousand/uL    Total Counted      RBC Morphology Normal     Platelet Estimate Adequate Adequate   Fingerstick Glucose (POCT)    Collection Time: 01/04/19  6:02 AM   Result Value Ref Range    POC Glucose 172 (H) 65 - 140 mg/dl       Radiology Results: I have personally reviewed pertinent reports  Other Diagnostic Testing:   I have personally reviewed pertinent reports          Active Meds:   Current Facility-Administered Medications   Medication Dose Route Frequency    albuterol inhalation solution 2 5 mg  2 5 mg Nebulization Q4H PRN    ceftriaxone (ROCEPHIN) 1 g/50 mL in dextrose IVPB  1,000 mg Intravenous Q24H    cholecalciferol (VITAMIN D3) tablet 400 Units  400 Units Oral Daily    dextrose 5 % and sodium chloride 0 9 % infusion  50 mL/hr Intravenous Continuous    heparin (porcine) subcutaneous injection 5,000 Units  5,000 Units Subcutaneous Q8H [address]    insulin lispro (HumaLOG) 100 units/mL subcutaneous injection 1-5 Units  1-5 Units Subcutaneous Q6H    pancrelipase (Lip-Prot-Amyl) (CREON) delayed release capsule 24,000 Units  24,000 Units Oral BID With Meals    pantoprazole (PROTONIX) EC tablet 40 mg  40 mg Oral Early Morning         Roni Mitchell MD

## 2019-01-04 NOTE — OCCUPATIONAL THERAPY NOTE
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    Esophagitis    Diabetes mellitus ([facility] [address] )    Weight loss    Other chronic pancreatitis ([address] )    Sepsis ([address] )    Pneumonia    Acute kidney injury ([facility] [address] )    Respiratory failure ([address] )    Dysphagia    Hypokalemia    Malnutrition ([facility] )     Past Medical History  Past Medical History:   Diagnosis Date    Diabetes mellitus ([facility] [address] )     Type II    Weight loss      Past Surgical History  Past Surgical History:   Procedure Laterality Date    LAPAROTOMY N/A 11/19/2018    Procedure: LAPAROTOMY EXPLORATORY;  Surgeon: Evelyne Sandhu MD;  [facility]: [facility];  Service: Surgical Oncology    KS EDG US EXAM SURGICAL ALTER STOM DUODENUM/JEJUNUM N/A 9/21/2018    Procedure: LINEAR ENDOSCOPIC U/S;  Surgeon: Danelle Eckert MD;  Location: [facility]; Service: Gastroenterology    KS LAP,DIAGNOSTIC ABDOMEN N/A 11/19/2018    Procedure: pancreatic biopsy;  Surgeon: Evelyne Sandhu MD;  Location: [facility];  Service: Surgical Oncology    TONSILLECTOMY      VASCULAR SURGERY      phlebitis many years ago    WRIST GANGLION EXCISION               01/04/19 0830   Note Type   Note type Eval only   Restrictions/Precautions   Other Precautions Fall Risk;Cognitive;Aspiration;Multiple lines;O2  (NPO)   Pain Assessment   Pain Assessment No/denies pain   Pain Score No Pain   Home Living   Type of [address] [place] [address] One level; Able to live on main level with bedroom/bathroom; Performs ADLs on one level   Bathroom Shower/Tub Walk-in shower   Home Equipment Cane   Additional Comments Pt lives in [facility] with no RADHA with  accommodations   Prior Function   Level of [place] (indep fxnl mob, A with ADLs/IADLs since Nov 2018)   Lives With Spouse   Receives Help From Family   ADL Assistance Needs assistance   IADLs Needs assistance   Falls in the last 6 months 0   Comments Pt reports since Nov 2018, a steady functional decline requiring inc A for ADL fxn, IADL fxn, and worsening fxnl mob with no use of AD   Psychosocial   Psychosocial (WDL) WDL   Subjective   Subjective "I havent been able to eat in 4 days"   ADL   Grooming Assistance 2  Maximal Assistance   UB Bathing Assistance 3  Moderate Assistance   LB Bathing Assistance 1  Total Assistance   UB Dressing Assistance 2  Maximal Assistance   LB Dressing Assistance 1  Total [address]  1  Total Assistance   Functional Assistance 3  Moderate Assistance   Additional Comments Pt completed fxnl mob at [facility] level with overall dec endurance/act tolerance  /81 and spO2 on 5L 95%  Pt's BP sitting EOB from supine 172/82  no s/s reported   Activity Tolerance   Activity Tolerance Patient limited by fatigue   Medical Staff Made Aware RAFAEL Lofton; notified of recommendations for heel protectants in bed    Cognition   Overall Cognitive Status Impaired   Assessment   Limitation Decreased ADL status; Decreased UE ROM; Decreased UE strength;Decreased Safe judgement during ADL;Decreased cognition;Decreased endurance   Prognosis Fair   Assessment Pt is a 82 y o  male seen for OT evaluation s/p admit to [facility] on 1/1/2019 w/ Sepsis ([facility] [address] )  Comorbidities affecting pt's functional performance at time of assessment include:sepsis, esophagitis, weight loss, pancreatitis, AMELIA, respiratory failure, dysphagia, malnutrition, and DM  Personal factors affecting pt at time of IE include:difficulty performing ADLS, difficulty performing IADLS , limited insight into deficits, decreased initiation and engagement , health management  and environment  Prior to admission, pt was indep with pt reports of steady decline in functional status since Nov of 2018  Pt was starting to receive help from his dtrs   Upon evaluation: Pt requires mod A for bed mob, TA for ADL fxn, and Mod A for SPT with RW with 5L O2 2* the following deficits impacting occupational performance: weakness, decreased ROM, decreased strength, decreased balance, decreased tolerance, impaired attention, impaired initiation, impaired memory, impaired sequencing, impaired problem solving and decreased safety awareness  Pt to benefit from continued skilled OT tx while in the hospital to address deficits as defined above and maximize level of functional independence w ADL's and functional mobility  Occupational Performance areas to address include: grooming, bathing/shower, toilet hygiene, dressing, functional mobility and clothing management  From OT standpoint, recommendation at time of d/c would be short term rehab  Plan   Treatment Interventions ADL retraining;Functional transfer training;UE strengthening/ROM; Endurance training;Cognitive reorientation;Patient/family training;Equipment evaluation/education; Compensatory technique education; Activityengagement; Energy conservation   Goal Expiration Date 01/14/19   OT Frequency 2-3x/wk;3-5x/wk   Recommendation   OT Discharge Recommendation Short Term Rehab   Barthel Index   Feeding 0  (NPO)   Bathing 0   Grooming Score 0   Dressing Score 5   Bladder Score 5   Bowels Score 10   Toilet Use Score 0   Transfers (Bed/Chair) Score 10   Mobility (Level Surface) Score 0   Stairs Score 0   Barthel Index Score 30   Modified [place] Scale   Modified Daryl Scale 4     1  PT WILL COMPLETE UB/LB DRESSING WHILE SEATED/STANDING WITH SBA     2  PT WILL COMPLETE TOILETING WITH SBA, WITH THOROUGH HYGIENE, USING DME PRN     3  PT WILL COMPLETE BED MOBILITY AND TRANSFER TO EOB WITH SBA, TO INCREASE FUNCTIONAL MOBILITY FOR PARTICIPATION IN ADLS/IADLS     4  PT WILL COMPLETE FUNCTIONAL TRANSFERS ON/OFF ALL SURFACES, INCLUDING TOILET, WITH SBA AND DME PRN TO INCREASE PARTICIPATION IN ADLS/IADLS     5  PT WILL COMPLETE LIGHT GROOMING TASK WHILE STANDING AT SINK FOR 3-5 MINUTES, WITH SBA AND GOOD BALANCE, DME PRN

## 2019-01-04 NOTE — PLAN OF CARE
Problem: PHYSICAL THERAPY ADULT  Goal: Performs mobility at highest level of function for planned discharge setting  See evaluation for individualized goals  Treatment/Interventions: Gait training, Bed mobility, Equipment eval/education, Patient/family training, LE strengthening/ROM, Functional transfer training, Therapeutic exercise, Endurance training  Equipment Recommended: Johnathon Ruiz       See flowsheet documentation for full assessment, interventions and recommendations  Prognosis: Good  Problem List: Decreased mobility, Impaired balance, Decreased endurance, Decreased strength  Assessment: Pt seen for Evaluation with OTR for safety du eto being on oxygen, IV fluids and general debility  Pt recnetly hospitalized Nov 2108 for Pancreatitis and has recieved help at home from family since  Pt was INd with al prior to that  Pt now NPO and on IV fluids after failing VBS  Pt on 5L O2 w/ sats 94%  Supine /80, sitting /82 and O2 sats 95%  Pt has recent wt loss with diarrhea, being treated for Sepsis and pneumonia  Pt has difficulty with bed mobility  Mobility limited by fatigue and weakness  Pt will benefit from continued inpatient rehab upondischarge to maximize his functional [place] and safety  Recommendation: Post acute IP rehab     PT - OK to Discharge: Yes    See flowsheet documentation for full assessment

## 2019-01-04 NOTE — CONSULTS
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, DM who presents to [facility] ED on 01/01/2019 with worsening weight loss, nonproductive cough, and worsening SOB  Neurology consulted for new onset dysphagia  Suspicion for subacute/chronic dysphagia exacerbated by recent pneumonia vs myasthenia gravis vs acute stroke, MRI brain pending  Plan:  -MRI brain pending  -[facility] 01/03/2019 revealed generalized volume loss with advanced microangiopathic changes of the cerebral white matter  -Swallow study revealed severe pharyngeal dysphagia characterized by report tongue base retraction min dive, swallow delay, absent epiglottic movement/inversion, and a tight UES as that allows only minimal amount of material past for each swallow times  -Recommend IV Neostigmine 1 mg then speech evaluation at bedside 20 minutes later  Recommend patient be put on telemetry and have atropine at bedside in case patient developes symptomatic bradycardia  -Pending:  Acetylcholine receptor labs, Anti-MuSK antibody  -Continue following with the GI  -Remain NPO d/t risk of aspiration per speech recommendations, consider feeding tube  -Telemetry  -PT/OT/speech  -Continue supportive care per primary team, notify with changes    History of Present Illness     Reason for Consult / Principal Problem:  New onset Dysphagia    HPI: Jacobo Nguyen is a 82 y o   male with a significant past medical history of 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, DM who presents to [facility] ED on 01/01/2019 with worsening weight loss, nonproductive cough, and worsening SOB       Per chart review, in the ED patient was found to have severe sepsis in the setting of pneumonia, treated with azithromycin and ceftriaxone  Lactate was noted to be elevated at 3 7 initially, and decreasing throughout admission, currently 1 7  Patient was also noted to have loose stools, likely secondary to pancreatic insufficiency vs IBS, GI following  On 01/02/2019, Patient was noted to be choking on food per chart review  Patient developed new onset severe pharyngeal dysphagia  Per chart review, "Family also notes dysarthria and notes that his swallowing is okay in the beginning but becomes worse the longer he is eating/chewing " Neurology consulted for severe pharyngeal dysphagia  Barium swallow study performed and reveals, "Pt presents with severe pharyngeal dysphagia characterized by poor tongue base retraction and drive, swallow delay, absent epiglottic movement/inversion, and a tight UES that allows only minimal amounts of material to pass for each swallow attempt  Incomplete swallow  Although no santa aspiration was observed on this study, suspect at least microaspiration with thin liquids due to prolonged weak cough after trial Significant risk for aspiration noted after each initial swallow attempt due to large amount of stasis throughout the pharynx  After 10+ cued swallows, pt continued to present with at least moderate pyriform retention  Additionally, pt observed to be at risk for poor nutrition/hydration due to significant work required to swallow small bolus (10+ swallow attempts)  Suspect fatigue as an additional risk factor " Connie Rankin, SLP)    Spoke with son-in-law, who states patient has a slurred speech that began a couple of days ago which was the same time that he noticed the dysphagia  He describes the altered speech as some slurring at the end of his words and trailing off in his speech  Spoke with wife who admits she has noticed eyelid drooping, stating this is only when patient is weak  Wife also states patient has had a chronic cough for a few months now       Today patient stated he has had difficulty with swallowing for approximately 1 year  Patient states he feels terrible  Denies CP, SOB, headache, dizziness, vision changes, N/V, abdominal pain, weakness or numbness  Inpatient consult to Neurology  Consult performed by: Silvestre Pap  Consult ordered by: Nirmal Valdivia        Review of Systems  12 point ROS performed, as stated above, all others negative  Historical Information   Past Medical History:   Diagnosis Date    Diabetes mellitus ([facility] [address] )     Type II    Weight loss      Past Surgical History:   Procedure Laterality Date    LAPAROTOMY N/A 11/19/2018    Procedure: LAPAROTOMY EXPLORATORY;  Surgeon: Sueellen Habermann, MD;  [facility];  Service: Surgical Oncology    [facility] EDG US EXAM SURGICAL ALTER STOM DUODENUM/JEJUNUM N/A 9/21/2018    Procedure: LINEAR ENDOSCOPIC U/S;  Surgeon: Mg Staton MD;  Location: [facility]; Service: Gastroenterology    [facility] LAP,DIAGNOSTIC ABDOMEN N/A 11/19/2018    Procedure: pancreatic biopsy;  Surgeon: Sueellen Habermann, MD;  [facility];  Service: Surgical Oncology    TONSILLECTOMY      VASCULAR SURGERY      phlebitis many years ago    WRIST GANGLION EXCISION       Social History   History   Alcohol Use    Yes     Comment: 1 beer a day     History   Drug Use No     History   Smoking Status    Former Smoker    Years: 25 00   Smokeless Tobacco    Never Used     Comment: quit about 20 years ago as of 9/21/2018     Family History:   Family History   Problem Relation Age of Onset    No Known Problems Mother     No Known Problems Father     Pancreatic cancer Brother 61    Breast cancer Daughter 36        38s       Review of previous medical records was completed      Meds/Allergies   all current active meds have been reviewed, current meds:   Current Facility-Administered Medications   Medication Dose Route Frequency    albuterol inhalation solution 2 5 mg  2 5 mg Nebulization Q4H PRN    ceftriaxone (ROCEPHIN) 1 g/50 mL in dextrose IVPB 1,000 mg Intravenous Q24H    cholecalciferol (VITAMIN D3) tablet 400 Units  400 Units Oral Daily    dextrose 5 % and sodium chloride 0 9 % infusion  50 mL/hr Intravenous Continuous    heparin (porcine) subcutaneous injection 5,000 Units  5,000 Units Subcutaneous Q8H [address]    insulin lispro (HumaLOG) 100 units/mL subcutaneous injection 1-5 Units  1-5 Units Subcutaneous Q6H    pancrelipase (Lip-Prot-Amyl) (CREON) delayed release capsule 24,000 Units  24,000 Units Oral BID With Meals    pantoprazole (PROTONIX) EC tablet 40 mg  40 mg Oral Early Morning   , PTA meds:   Prior to Admission Medications   Prescriptions Last Dose Informant Patient Reported? Taking?   metFORMIN (GLUCOPHAGE) 500 mg tablet 1/1/2019 at Unknown time  Yes Yes   Sig: daily   omeprazole (PriLOSEC) 20 mg delayed release capsule 1/1/2019 at Unknown time  No Yes   Sig: Take 1 capsule (20 mg total) by mouth daily      Facility-Administered Medications: None    and     No Known Allergies    Objective   Vitals:Blood pressure 134/76, pulse 75, temperature 97 6 °F (36 4 °C), temperature source Oral, resp  rate 18, height 5' 7" (1 702 m), weight 51 kg (112 lb 7 oz), SpO2 96 %  ,Body mass index is 17 61 kg/m²  Intake/Output Summary (Last 24 hours) at 01/04/19 0940  Last data filed at 01/04/19 0640   Gross per 24 hour   Intake               50 ml   Output              525 ml   Net             -475 ml       Invasive Devices: Invasive Devices     Peripheral Intravenous Line            Peripheral IV 01/01/19 Right Antecubital 2 days                Physical Exam   Constitutional: He appears well-developed and well-nourished  No distress  HENT:   Head: Normocephalic and atraumatic  Neck: Normal range of motion  Neck supple  Cardiovascular: Normal rate  Pulmonary/Chest: Effort normal  No respiratory distress  Abdominal: Soft  Bowel sounds are normal  There is no tenderness     Neurological: He has a normal Finger-Nose-Finger Test    Skin: Skin is warm and dry  He is not diaphoretic  Psychiatric: He has a normal mood and affect  His behavior is normal  Judgment and thought content normal      Neurologic Exam     Mental Status   Patient is alert, sitting up in chair, accompanied by son-in-law  Dysarthria noted in speech, no aphasia  Oriented to person  States he is in [address] [place] [address] Dr to read the clock on the wall  Able to perform simple calculations  Able to name the president and the one prior with some help  Able to answer all questions and follow all commands  Cranial Nerves     CN V   Facial sensation intact  CN VIII   Hearing: impaired    CN IX, X   Palate: symmetric    CN XI   CN XI normal    PERRLA, conjugate gaze, EOMs WNL, no nystagmus noted  Slight lower left facial weakness noted with smile  Very slight tongue deviation to the right, no fasciculations noted     Motor Exam   Overall muscle tone: increased  Right arm pronator drift: absent  Left arm pronator drift: absentGrip strength symmetric  Elbow flexion 5/5, elbow extension 4-/5 on left and 5/5 on right  Right hip flexion 5/5, left hip flexion 4+/5  Hip abduction and adduction 5/5 bilaterally  Unable to accurately assess knee extension and flexion     Sensory Exam   Light touch normal    Right arm vibration: normal  Left arm vibration: normal  Right leg vibration: normal  Left leg vibration: decreased from knee    Gait, Coordination, and Reflexes     Coordination   Finger to nose coordination: normal    Reflexes   Right plantar: normal  Left plantar: normal  Right ankle clonus: absent  Left ankle clonus: absent  BUE reflexes 1+ throughout  BLE reflexes diminished       Lab Results: I have personally reviewed pertinent reports    Recent Results (from the past 24 hour(s))   Fingerstick Glucose (POCT)    Collection Time: 01/03/19 11:07 AM   Result Value Ref Range    POC Glucose 234 (H) 65 - 140 mg/dl   Fingerstick Glucose (POCT)    Collection Time: 01/03/19  3:52 PM   Result Value Ref Range    POC Glucose 277 (H) 65 - 140 mg/dl   Fingerstick Glucose (POCT)    Collection Time: 01/03/19 11:54 PM   Result Value Ref Range    POC Glucose 148 (H) 65 - 140 mg/dl   CBC and differential    Collection Time: 01/04/19  5:51 AM   Result Value Ref Range    WBC 10 97 (H) 4 31 - 10 16 Thousand/uL    RBC 3 26 (L) 3 88 - 5 62 Million/uL    Hemoglobin 9 8 (L) 12 0 - 17 0 g/dL    Hematocrit 31 2 (L) 36 5 - 49 3 %    MCV 96 82 - 98 fL    MCH 30 1 26 8 - 34 3 pg    MCHC 31 4 31 4 - 37 4 g/dL    RDW 14 3 11 6 - 15 1 %    MPV 11 8 8 9 - 12 7 fL    Platelets 624 324 - 353 Thousands/uL    nRBC 0 /100 WBCs   Basic metabolic panel    Collection Time: 01/04/19  5:51 AM   Result Value Ref Range    Sodium 138 136 - 145 mmol/L    Potassium 3 5 3 5 - 5 3 mmol/L    Chloride 104 100 - 108 mmol/L    CO2 28 21 - 32 mmol/L    ANION GAP 6 4 - 13 mmol/L    BUN 10 5 - 25 mg/dL    Creatinine 0 51 (L) 0 60 - 1 30 mg/dL    Glucose 147 (H) 65 - 140 mg/dL    Calcium 8 1 (L) 8 3 - 10 1 mg/dL    eGFR 101 ml/min/1 73sq m   Procalcitonin    Collection Time: 01/04/19  5:51 AM   Result Value Ref Range    Procalcitonin 3 23 (H) <=0 25 ng/ml   Manual Differential(PHLEBS Do Not Order)    Collection Time: 01/04/19  5:51 AM   Result Value Ref Range    Segmented % 90 (H) 43 - 75 %    Lymphocytes % 7 (L) 14 - 44 %    Monocytes % 3 (L) 4 - 12 %    Eosinophils, % 0 0 - 6 %    Basophils % 0 0 - 1 %    Absolute Neutrophils 9 87 (H) 1 85 - 7 62 Thousand/uL    Lymphocytes Absolute 0 77 0 60 - 4 47 Thousand/uL    Monocytes Absolute 0 33 0 00 - 1 22 Thousand/uL    Eosinophils Absolute 0 00 0 00 - 0 40 Thousand/uL    Basophils Absolute 0 00 0 00 - 0 10 Thousand/uL    Total Counted      RBC Morphology Normal     Platelet Estimate Adequate Adequate   Fingerstick Glucose (POCT)    Collection Time: 01/04/19  6:02 AM   Result Value Ref Range    POC Glucose 172 (H) 65 - 140 mg/dl   ]  Imaging Studies: I have personally reviewed pertinent reports and I have personally reviewed pertinent films in PACS  EKG, Pathology, and Other Studies: I have personally reviewed pertinent reports ]    VTE Prophylaxis: Sequential compression device (Venodyne)  and Heparin    Counseling / Coordination of Care  Total time spent today 50 minutes  Greater than 50% of total time was spent with the patient and/or family counseling and/or coordination of care  A description of the counseling/coordination of care:  Patient was seen and evaluated  Discussed with attending  Chart reviewed thoroughly including laboratory and imaging studies    Plan of care discussed with patient and primary team

## 2019-01-04 NOTE — SPEECH THERAPY NOTE
Speech/Language Pathology Progress Note    Patient Name: Ofelia RODRIGUEZ Date: 1/4/2019       Subjective:  Pt awake and alert, noted to have wet vocal quality and wet, nonproductive cough  Objective:  SLP cued pt to cough and attempt to expectorate secretions but he was unable to do so  Due to poor secretion management, PO trials were held  ST session focused on education of pt and family  VBS from yesterday was reviewed and discussed, as well as explanation for recommendations which included neurology consult and possible PEG tube placement  Family's questions were addressed to the best of SLP's abilities  Assessment:  SLP spoke with NATY Wilder via [facility] Text  Neuro is working pt up for possible Myasthenia Gravis  PA requested that ST be available when trial med is given to assess potential improvement in swallow function (only ~30 min window of effectiveness)  Questioned PO trials with repeat VBS, however pt needs to be on tele monitoring with med trial due to possible side effect of bradycardia, therefore he is being transferred to another room with plan for trial in the AM      Plan/Recommendations:  NPO for now  ST to f/u and reassess swallow function during trial of MG meds, ST to coordinate with neurology tomorrow AM 1/5/2019

## 2019-01-04 NOTE — ASSESSMENT & PLAN NOTE
· Severe sepsis, POA, as evidenced by tachycardia, leukocytosis, elevated lactic acid  · Suspected secondary to pneumonia  · Hemodynamically stable  · Procalcitonin trending down  · Blood cultures negative

## 2019-01-04 NOTE — PROGRESS NOTES
Follow-up documented in flow sheet; pt npo due to aspiration risk   Recommendations: if EN planned recommend jevity 1 2, start at 20ml/hr, increase to goal of 60ml/hr until 1440 ml infused; provides 1728kcal, 80gs pro, 1166ml free water; flush with 125ml water q 6 hrs to meet hydration needs

## 2019-01-04 NOTE — PROGRESS NOTES
Discussed with Dr Alexys Roper  Neostigmine 1 mg IV should be administered 20 minutes before repeat swallow eval to evaluate for improvement and possibly diagnose MG  Symptomatic bradycardia can occur with neostigmine although probably unlikely given his current heart rate  However, will need to transfer patient to telemetry floor for monitoring during administration and would have atropine ready at bedside to be safe  I discussed with speech and we will coordinate this for tomorrow  Updated patient's family and nursing staff   Will sign out to weekend team

## 2019-01-04 NOTE — PHYSICAL THERAPY NOTE
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    Esophagitis    [facility] ([facility] [address] [facility]    Weight loss    Other chronic pancreatitis ([facility] [address] [facility]    Sepsis ([facility] [address] [facility]    Pneumonia    Acute kidney injury ([facility] [address] [facility]    Respiratory failure ([facility] [address] [facility]    Dysphagia    Hypokalemia    Malnutrition ([facility] [address] )        Past Medical History  Past Medical History:   Diagnosis Date    Diabetes mellitus ([facility] [address] )     Type II    Weight loss         Past Surgical History  Past Surgical History:   Procedure Laterality Date    LAPAROTOMY N/A 11/19/2018    Procedure: LAPAROTOMY EXPLORATORY;  Surgeon: Waqas Emery MD;  [facility];  Service: Surgical Oncology    [place] EDG US EXAM SURGICAL ALTER STOM DUODENUM/JEJUNUM N/A 9/21/2018    Procedure: LINEAR ENDOSCOPIC U/S;  Surgeon: Kedar Bartlett MD;  [facility]; Service: Gastroenterology    [place] LAP,DIAGNOSTIC ABDOMEN N/A 11/19/2018    Procedure: pancreatic biopsy;  Surgeon: Waqas Emery MD;  [facility];  Service: Surgical Oncology    TONSILLECTOMY      VASCULAR SURGERY      phlebitis many years ago    WRIST GANGLION EXCISION           01/04/19 1100   Note Type   Note type Eval only   Pain Assessment   Pain Assessment No/denies pain   Home Living   Type of [address] One level   Home Equipment Cane;Walker   Prior Function   Level of [place] Needs assistance with ADLs and functional mobility   Lives With Spouse   Receives Help From [facility] in the last 6 months 0   Restrictions/Precautions   Weight Bearing Precautions Per Order No   Other Precautions O2;Fall Risk;Aspiration   General   Additional Pertinent History pancreatitis, NPO 2/2 failed VBS, IV fluids   Family/Caregiver Present No   Cognition   Memory Decreased short term memory; Within functional limits Following Commands Follows one step commands without difficulty   RLE Assessment   RLE Assessment WFL   Strength RLE   RLE Overall Strength 4-/5   LLE Assessment   LLE Assessment WFL   Strength LLE   LLE Overall Strength 4-/5   Coordination   Sensation WFL   Light Touch   RLE Light Touch Grossly intact   LLE Light Touch Grossly intact   Bed Mobility   Rolling R 3  Moderate assistance   Rolling L 3  Moderate assistance   Supine to Sit 2  Maximal assistance   Sit to Supine 2  Maximal assistance   Transfers   Sit to Stand 4  Minimal assistance   Stand to Sit 4  Minimal assistance   Stand pivot 4  Minimal assistance   Additional items Increased time required   Ambulation/Elevation   Gait pattern Step to; Foward flexed; Forward Flexion   Gait Assistance 4  Minimal assist   Assistive Device Rolling walker   Distance 5   Stair Management Assistance Not tested   Balance   Static Sitting Fair +   Dynamic Sitting Fair   Static Standing Fair   Dynamic Standing Fair -   Ambulatory Fair   Endurance Deficit   Endurance Deficit Yes   Endurance Deficit Description fatigued easily   Activity Tolerance   Activity Tolerance Patient limited by fatigue   Assessment   Prognosis Good   Problem List Decreased mobility; Impaired balance;Decreased endurance;Decreased strength   Assessment Pt seen for Evaluation with OTR for safety du eto being on oxygen, IV fluids and general debility  Pt recnetly hospitalized Nov 2108 for Pancreatitis and has recieved help at home from family since  Pt was INd with al prior to that  Pt now NPO and on IV fluids after failing VBS  Pt on 5L O2 w/ sats 94%  Supine /80, sitting /82 and O2 sats 95%  Pt has recent wt loss with diarrhea, being treated for Sepsis and pneumonia  Pt has difficulty with bed mobility  Mobility limited by fatigue and weakness  Pt will benefit from continued inpatient rehab upondischarge to maximize his functional [place] and safety     Goals   Patient Goals To get stronger and be more Independent   [facility] Expiration Date 01/11/19   Short Term Goal #1 Pt will be able to perform bed mobility with Supervision  Pt will be able to transfer bed<> chair with RW and Supervision  Pt will be able to ambulate with RW and Close supervision x 50 ft  Plan   Treatment/Interventions Gait training;Bed mobility; Equipment eval/education;Patient/family training;LE strengthening/ROM; Functional transfer training; Therapeutic exercise; Endurance training   PT Frequency Other (Comment)  (3-5x/wk)   Recommendation   Recommendation Post acute IP rehab   Equipment Recommended Walker   PT - OK to Discharge Yes   Additional Comments when medically stable   Modified Daryl Scale   Modified [place] Scale 4   Barthel Index   Feeding 0   Bathing 0   Grooming Score 0   Dressing Score 5   Bladder Score 10   Bowels Score 10   Toilet Use Score 5   Transfers (Bed/Chair) Score 5   Mobility (Level Surface) Score 0   Stairs Score 0   Barthel Index Score 35

## 2019-01-05 ENCOUNTER — APPOINTMENT (INPATIENT)
Dept: RADIOLOGY | Facility: HOSPITAL | Age: 84
DRG: 853 | End: 2019-01-05
Payer: MEDICARE

## 2019-01-05 PROBLEM — I48.92 ATRIAL FLUTTER (HCC): Status: ACTIVE | Noted: 2019-01-05

## 2019-01-05 PROBLEM — E44.0 MODERATE PROTEIN-CALORIE MALNUTRITION (HCC): Status: ACTIVE | Noted: 2019-01-05

## 2019-01-05 LAB
ANION GAP SERPL CALCULATED.3IONS-SCNC: 7 MMOL/L (ref 4–13)
ATRIAL RATE: 340 BPM
ATRIAL RATE: 348 BPM
BASOPHILS # BLD AUTO: 0.01 THOUSANDS/ΜL (ref 0–0.1)
BASOPHILS NFR BLD AUTO: 0 % (ref 0–1)
BUN SERPL-MCNC: 8 MG/DL (ref 5–25)
CALCIUM SERPL-MCNC: 7.9 MG/DL (ref 8.3–10.1)
CHLORIDE SERPL-SCNC: 104 MMOL/L (ref 100–108)
CO2 SERPL-SCNC: 29 MMOL/L (ref 21–32)
CREAT SERPL-MCNC: 0.42 MG/DL (ref 0.6–1.3)
EOSINOPHIL # BLD AUTO: 0.08 THOUSAND/ΜL (ref 0–0.61)
EOSINOPHIL NFR BLD AUTO: 1 % (ref 0–6)
ERYTHROCYTE [DISTWIDTH] IN BLOOD BY AUTOMATED COUNT: 14.4 % (ref 11.6–15.1)
GFR SERPL CREATININE-BSD FRML MDRD: 109 ML/MIN/1.73SQ M
GLUCOSE SERPL-MCNC: 117 MG/DL (ref 65–140)
GLUCOSE SERPL-MCNC: 125 MG/DL (ref 65–140)
GLUCOSE SERPL-MCNC: 154 MG/DL (ref 65–140)
GLUCOSE SERPL-MCNC: 180 MG/DL (ref 65–140)
GLUCOSE SERPL-MCNC: 198 MG/DL (ref 65–140)
GLUCOSE SERPL-MCNC: 224 MG/DL (ref 65–140)
HCT VFR BLD AUTO: 30.4 % (ref 36.5–49.3)
HGB BLD-MCNC: 9.7 G/DL (ref 12–17)
IMM GRANULOCYTES # BLD AUTO: 0.17 THOUSAND/UL (ref 0–0.2)
IMM GRANULOCYTES NFR BLD AUTO: 2 % (ref 0–2)
LYMPHOCYTES # BLD AUTO: 1.23 THOUSANDS/ΜL (ref 0.6–4.47)
LYMPHOCYTES NFR BLD AUTO: 12 % (ref 14–44)
MAGNESIUM SERPL-MCNC: 1.7 MG/DL (ref 1.6–2.6)
MCH RBC QN AUTO: 30.1 PG (ref 26.8–34.3)
MCHC RBC AUTO-ENTMCNC: 31.9 G/DL (ref 31.4–37.4)
MCV RBC AUTO: 94 FL (ref 82–98)
MONOCYTES # BLD AUTO: 0.74 THOUSAND/ΜL (ref 0.17–1.22)
MONOCYTES NFR BLD AUTO: 7 % (ref 4–12)
NEUTROPHILS # BLD AUTO: 8.36 THOUSANDS/ΜL (ref 1.85–7.62)
NEUTS SEG NFR BLD AUTO: 78 % (ref 43–75)
NRBC BLD AUTO-RTO: 0 /100 WBCS
P AXIS: 233 DEGREES
P AXIS: 65 DEGREES
PHOSPHATE SERPL-MCNC: 2.2 MG/DL (ref 2.3–4.1)
PLATELET # BLD AUTO: 240 THOUSANDS/UL (ref 149–390)
PMV BLD AUTO: 10.9 FL (ref 8.9–12.7)
POTASSIUM SERPL-SCNC: 3.1 MMOL/L (ref 3.5–5.3)
PROCALCITONIN SERPL-MCNC: 1.56 NG/ML
QRS AXIS: -24 DEGREES
QRS AXIS: -37 DEGREES
QRSD INTERVAL: 114 MS
QRSD INTERVAL: 116 MS
QT INTERVAL: 362 MS
QT INTERVAL: 404 MS
QTC INTERVAL: 435 MS
QTC INTERVAL: 486 MS
RBC # BLD AUTO: 3.22 MILLION/UL (ref 3.88–5.62)
SODIUM SERPL-SCNC: 140 MMOL/L (ref 136–145)
T WAVE AXIS: 42 DEGREES
T WAVE AXIS: 50 DEGREES
VENTRICULAR RATE: 87 BPM
VENTRICULAR RATE: 87 BPM
WBC # BLD AUTO: 10.59 THOUSAND/UL (ref 4.31–10.16)

## 2019-01-05 PROCEDURE — 74018 RADEX ABDOMEN 1 VIEW: CPT

## 2019-01-05 PROCEDURE — 84145 PROCALCITONIN (PCT): CPT | Performed by: PHYSICIAN ASSISTANT

## 2019-01-05 PROCEDURE — 71250 CT THORAX DX C-: CPT

## 2019-01-05 PROCEDURE — 84100 ASSAY OF PHOSPHORUS: CPT | Performed by: GENERAL PRACTICE

## 2019-01-05 PROCEDURE — 80048 BASIC METABOLIC PNL TOTAL CA: CPT | Performed by: PHYSICIAN ASSISTANT

## 2019-01-05 PROCEDURE — 93010 ELECTROCARDIOGRAM REPORT: CPT | Performed by: INTERNAL MEDICINE

## 2019-01-05 PROCEDURE — 99233 SBSQ HOSP IP/OBS HIGH 50: CPT | Performed by: GENERAL PRACTICE

## 2019-01-05 PROCEDURE — 93005 ELECTROCARDIOGRAM TRACING: CPT

## 2019-01-05 PROCEDURE — 92611 MOTION FLUOROSCOPY/SWALLOW: CPT

## 2019-01-05 PROCEDURE — 82948 REAGENT STRIP/BLOOD GLUCOSE: CPT

## 2019-01-05 PROCEDURE — 94760 N-INVAS EAR/PLS OXIMETRY 1: CPT

## 2019-01-05 PROCEDURE — 92526 ORAL FUNCTION THERAPY: CPT

## 2019-01-05 PROCEDURE — 85025 COMPLETE CBC W/AUTO DIFF WBC: CPT | Performed by: PHYSICIAN ASSISTANT

## 2019-01-05 PROCEDURE — 74230 X-RAY XM SWLNG FUNCJ C+: CPT

## 2019-01-05 PROCEDURE — 83735 ASSAY OF MAGNESIUM: CPT | Performed by: GENERAL PRACTICE

## 2019-01-05 RX ORDER — ATROPINE SULFATE 0.1 MG/ML
0.5 INJECTION INTRAVENOUS
Status: ACTIVE | OUTPATIENT
Start: 2019-01-05 | End: 2019-01-06

## 2019-01-05 RX ORDER — PYRIDOSTIGMINE BROMIDE 60 MG/1
60 TABLET ORAL 3 TIMES DAILY
Status: DISCONTINUED | OUTPATIENT
Start: 2019-01-05 | End: 2019-01-07

## 2019-01-05 RX ORDER — ATROPINE SULFATE 1 MG/ML
0.5 INJECTION, SOLUTION INTRAMUSCULAR; INTRAVENOUS; SUBCUTANEOUS
Status: DISCONTINUED | OUTPATIENT
Start: 2019-01-05 | End: 2019-01-05

## 2019-01-05 RX ORDER — POTASSIUM CHLORIDE 14.9 MG/ML
20 INJECTION INTRAVENOUS
Status: COMPLETED | OUTPATIENT
Start: 2019-01-05 | End: 2019-01-05

## 2019-01-05 RX ORDER — NEOSTIGMINE METHYLSULFATE 1 MG/ML
1 INJECTION INTRAVENOUS ONCE
Status: COMPLETED | OUTPATIENT
Start: 2019-01-05 | End: 2019-01-05

## 2019-01-05 RX ORDER — ATROPINE SULFATE 0.1 MG/ML
0.5 INJECTION INTRAVENOUS
Status: DISPENSED | OUTPATIENT
Start: 2019-01-05 | End: 2019-01-05

## 2019-01-05 RX ORDER — LANOLIN ALCOHOL/MO/W.PET/CERES
3 CREAM (GRAM) TOPICAL
Status: DISCONTINUED | OUTPATIENT
Start: 2019-01-05 | End: 2019-01-19 | Stop reason: HOSPADM

## 2019-01-05 RX ADMIN — POTASSIUM CHLORIDE 20 MEQ: 200 INJECTION, SOLUTION INTRAVENOUS at 13:34

## 2019-01-05 RX ADMIN — HEPARIN SODIUM 5000 UNITS: 5000 INJECTION INTRAVENOUS; SUBCUTANEOUS at 21:39

## 2019-01-05 RX ADMIN — INSULIN LISPRO 1 UNITS: 100 INJECTION, SOLUTION INTRAVENOUS; SUBCUTANEOUS at 23:36

## 2019-01-05 RX ADMIN — CEFTRIAXONE 1000 MG: 1 INJECTION, POWDER, FOR SOLUTION INTRAMUSCULAR; INTRAVENOUS at 23:37

## 2019-01-05 RX ADMIN — HEPARIN SODIUM 5000 UNITS: 5000 INJECTION INTRAVENOUS; SUBCUTANEOUS at 05:31

## 2019-01-05 RX ADMIN — PYRIDOSTIGMINE BROMIDE 60 MG: 60 TABLET ORAL at 17:46

## 2019-01-05 RX ADMIN — MELATONIN 3 MG: at 21:39

## 2019-01-05 RX ADMIN — PANCRELIPASE 24000 UNITS: 24000; 76000; 120000 CAPSULE, DELAYED RELEASE PELLETS ORAL at 17:45

## 2019-01-05 RX ADMIN — POTASSIUM CHLORIDE 20 MEQ: 200 INJECTION, SOLUTION INTRAVENOUS at 15:39

## 2019-01-05 RX ADMIN — NEOSTIGMINE METHYLSULFATE 1 MG: 1 INJECTION, SOLUTION INTRAVENOUS at 12:47

## 2019-01-05 RX ADMIN — HEPARIN SODIUM 5000 UNITS: 5000 INJECTION INTRAVENOUS; SUBCUTANEOUS at 13:34

## 2019-01-05 RX ADMIN — DEXTROSE AND SODIUM CHLORIDE 50 ML/HR: 5; .9 INJECTION, SOLUTION INTRAVENOUS at 08:43

## 2019-01-05 RX ADMIN — POTASSIUM PHOSPHATE, MONOBASIC AND POTASSIUM PHOSPHATE, DIBASIC 21 MMOL: 224; 236 INJECTION, SOLUTION INTRAVENOUS at 17:47

## 2019-01-05 RX ADMIN — PYRIDOSTIGMINE BROMIDE 60 MG: 60 TABLET ORAL at 21:39

## 2019-01-05 NOTE — QUICK NOTE
IV neostigmine test had minimal effect on swallow  Family is agreeable for NG tube placement until myasthenic labs return, at that time team can revisit discussion for PEG  In the meantime will start Mestinon 60 mg t i d  To be given via NG tube  - monitor for any improvement

## 2019-01-05 NOTE — PROGRESS NOTES
When patient returned to the unit noticed to be in aflutter/afib in 80's  ECG completed  Dr Hayes Pen notified  No new orders at this time  Will continue to monitor

## 2019-01-05 NOTE — PROGRESS NOTES
Progress Note - Rachele Fernandez 1935, 82 y o  male MRN: [id number]    Unit/Bed#: [facility] 530-01 Encounter: [id number]    Primary Care Provider: Adam Delgado DO   Date and time admitted to hospital: 1/1/2019  8:59 PM        * Sepsis [facility])   Assessment & Plan    · Severe sepsis, POA, as evidenced by tachycardia, leukocytosis, elevated lactic acid  · secondary to pneumonia  · Hemodynamically stable  · Procalcitonin trending down  · Blood cultures negative     Dysphagia   Assessment & Plan    · New onset severe pharyngeal dysphagia  Family also notes dysarthria  · Speech performed video barium swallow-neurologic cause is suspected  · Remain NPO per speech recs  · CT head negative  · F/U Acetylcholine receptor antibodies, MuSK antibodies  · Neurology consult appreciated   · Today did repeat VBS 20 min after giving neostigmine that showed minimal improvement  NGT placed and pt will be given Mestinon trial while we await MG testing  · MRI brain nonrevealing  · Discussed with family if no reversible cause found and dysphagia not improving then would need feeding tube vs comfort care  · While awaiting further testing, NGT placed  Atrial flutter ([facility] )   Assessment & Plan    Noted today  Pt is rate controlled at this time w/o meds  Will hold off on BB for now while on Mestinon and w/ nl HR  Check echo  Cards consult  No need for IV heparin as CHADS-Vasc only 3    Per neuro, dysphagia symptoms are not TIA/CVA related       Moderate protein-calorie malnutrition ([address] )   Assessment & Plan    Jevity through NGT    Malnutrition Findings:   Malnutrition type: Chronic illness (past medical history significant for pancreatic mass )  Degree of Malnutrition: Malnutrition of moderate degree (evidenced by 11% wt loss < 3mo, slight depression at [place] region, poor po intake <75% energy intake compared to estimated needs for >1 mo, treated with diet and supplements)    BMI Findings:  BMI Classifications: Underweight < 18 5 Body mass index is 17 61 kg/m²  Respiratory failure (HCC)   Assessment & Plan    · Acute hypoxic respiratory failure secondary to pneumonia and probable ongoing aspiration  · Wean O2 as tolerated  · NPO currently     Acute kidney injury ([facility] [address] )   Assessment & Plan    · Present on admission, Cr 0 99 with baseline 0 5  In setting of presumed pneumonia and also with diarrhea reported  · Creatinine improved with IVFs     Pneumonia   Assessment & Plan    · With recent hospitalization in November for surgical procedure, however still would be considered low risk Hcap  · Strep pneumo antigen positive  Continue Rocephin  · Procal trending down, but pt still on 5L O2  · CT chest today showed severe multifocal PNA  Consulted pulm for recs as pt has severe PNA and only minimally improving with Ropcehin     Other chronic pancreatitis [facility])   Assessment & Plan    · Per biopsy report from surgical procedure 11/2018 "benign pancreatic tissue with chronic pancreatitis      · Patient with weight loss and diarrhea after procedure  Follows with Dr Page Walton and was to follow up 12/24/2018, however missed appointment  · GI evaluated patient this admission due to diarrhea and weight loss  Patient started on Creon  · Recommended outpatient colonoscopy     Weight loss   Assessment & Plan    · Per family weight loss of approximately 12 lb over 5 weeks with associated diarrhea  · Will obtain nutrition consult, placed on Jevity     Diabetes mellitus ([facility] [address] )   Assessment & Plan    Lab Results   Component Value Date    HGBA1C 6 3 10/30/2018       Blood Sugar Average: Last 72 hrs:     · Hold home metformin, began SSI with Accu-Cheks while inpatient  · Monitor sugars on dextrose gtt/Jevity         VTE Pharmacologic Prophylaxis:   Pharmacologic: Heparin  Mechanical VTE Prophylaxis in Place: Yes    Patient Centered Rounds: I have performed bedside rounds with nursing staff today      Discussions with Specialists or Other Care Team Provider: neuro    Education and Discussions with Family / Patient: pt and family    Time Spent for Care: 30 minutes  More than 50% of total time spent on counseling and coordination of care as described above  Current Length of Stay: 4 day(s)    Current Patient Status: Inpatient   Certification Statement: The patient will continue to require additional inpatient hospital stay due to need for NGT    Discharge Plan: pending progress    Code Status: Level 1 - Full Code      Subjective:   Tired  Objective:     Vitals:   Temp (24hrs), Av 8 °F (36 6 °C), Min:97 5 °F (36 4 °C), Max:98 3 °F (36 8 °C)    Temp:  [97 5 °F (36 4 °C)-98 3 °F (36 8 °C)] 98 3 °F (36 8 °C)  HR:  [90-95] 92  Resp:  [18] 18  BP: (126-161)/(75-88) 126/77  SpO2:  [91 %-97 %] 91 %  Body mass index is 17 61 kg/m²  Input and Output Summary (last 24 hours): Intake/Output Summary (Last 24 hours) at 19  Last data filed at 19   Gross per 24 hour   Intake           3357 5 ml   Output              900 ml   Net           2457 5 ml       Physical Exam:     Physical Exam   Constitutional: He is oriented to person, place, and time  No distress  HENT:   Head: Normocephalic and atraumatic  Eyes: Conjunctivae and EOM are normal    Neck: Normal range of motion  Neck supple  Cardiovascular: Normal rate and regular rhythm  Pulmonary/Chest:   rhonchi   Abdominal: Soft  Bowel sounds are normal  He exhibits no distension  There is no tenderness  Musculoskeletal: Normal range of motion  He exhibits no edema  Neurological: He is alert and oriented to person, place, and time  Skin: Skin is warm and dry  He is not diaphoretic         Additional Data:     Labs:      Results from last 7 days  Lab Units 19  0516   WBC Thousand/uL 10 59*   HEMOGLOBIN g/dL 9 7*   HEMATOCRIT % 30 4*   PLATELETS Thousands/uL 240   NEUTROS PCT % 78*   LYMPHS PCT % 12*   MONOS PCT % 7   EOS PCT % 1       Results from last 7 days  Lab Units 01/05/19  0516  01/01/19 2137   POTASSIUM mmol/L 3 1*  < > 3 3*   CHLORIDE mmol/L 104  < > 95*   CO2 mmol/L 29  < > 26   BUN mg/dL 8  < > 28*   CREATININE mg/dL 0 42*  < > 0 99   CALCIUM mg/dL 7 9*  < > 8 1*   ALK PHOS U/L  --   --  101   ALT U/L  --   --  34   AST U/L  --   --  45   < > = values in this interval not displayed  Results from last 7 days  Lab Units 01/01/19 2137   INR  1 04       * I Have Reviewed All Lab Data Listed Above  * Additional Pertinent Lab Tests Reviewed: Sigrid Champagne Admission Reviewed        Recent Cultures (last 7 days):       Results from last 7 days  Lab Units 01/02/19  0608 01/01/19 2359 01/01/19 2358 01/01/19 2137   BLOOD CULTURE   --   --   --  No Growth at 72 hrs  No Growth at 72 hrs     INFLUENZA B PCR   --  None Detected  --   --    RSV PCR   --  None Detected  --   --    LEGIONELLA URINARY ANTIGEN   --   --  Negative  --    C DIFF TOXIN B  NEGATIVE for C difficle toxin by PCR    --   --   --        Last 24 Hours Medication List:     Current Facility-Administered Medications:  albuterol 2 5 mg Nebulization Q4H PRN Eneida Contreras MD    atropine 0 5 mg Intravenous Q5 Min PRN Titus Franci, DO    cefTRIAXone 1,000 mg Intravenous Q24H Meryl Major MD Last Rate: 1,000 mg (01/04/19 3673)   cholecalciferol 400 Units Oral Daily Michelle Cruz MD    dextrose 5 % and sodium chloride 0 9 % 50 mL/hr Intravenous Continuous Titus Franci, DO Last Rate: 50 mL/hr (01/05/19 0843)   heparin (porcine) 5,000 Units Subcutaneous [facility] Meryl Major MD    insulin lispro 1-5 Units Subcutaneous Q6H Elham Levine PA-C    melatonin 3 mg Oral HS Titus Franci, DO    pancrelipase (Lip-Prot-Amyl) 24,000 Units Oral BID With Meals Michelle Cruz MD    pantoprazole 40 mg Oral Early Morning Meryl Major MD    potassium phosphate 21 mmol Intravenous Once Titus Franci, DO Last Rate: 21 mmol (01/05/19 5561)   pyridostigmine 60 mg Oral TID Mauricio Bradley, DO Today, Patient Was Seen By: Jazmine Newman DO    ** Please Note: Dictation voice to text software may have been used in the creation of this document   **

## 2019-01-05 NOTE — SPEECH THERAPY NOTE
F/U re: dysphagia continued  Communicated c PACS ([employer] and pt's [employer]) then Dr Rox Heredia  Pt cleared for VBS in Radiology (order obtained)  RN Manager Kip Edmonds will attend pt and administer medication just prior to VBS  Radiology Technolgists Sandie Anthony and Cathy were briefed on case, aware of need for timely completion of exam after admin of medications  Fluoroscopy unit and UVALDO/recording unit set up in advance to assure timely completion of study  I appreciate support of all involved

## 2019-01-05 NOTE — PROCEDURES
Video Swallow Study      Patient Name: Philomena Ansari  QHKHY'H Date: 1/5/2019        Past Medical History  Past Medical History:   Diagnosis Date    Diabetes mellitus ([address] )     Type II    Weight loss         Past Surgical History  Past Surgical History:   Procedure Laterality Date    LAPAROTOMY N/A 11/19/2018    Procedure: LAPAROTOMY EXPLORATORY;  Surgeon: Giovanny Villar MD;  [facility]: [facility];  Service: Surgical Oncology    VA EDG US EXAM SURGICAL ALTER STOM DUODENUM/JEJUNUM N/A 9/21/2018    Procedure: LINEAR ENDOSCOPIC U/S;  Surgeon: Eusebio Rivers MD;  Location: [facility]; Service: Gastroenterology    VA LAP,DIAGNOSTIC ABDOMEN N/A 11/19/2018    Procedure: pancreatic biopsy;  Surgeon: Giovanny Villar MD;  [facility]: [facility];  Service: Surgical Oncology    TONSILLECTOMY      VASCULAR SURGERY      phlebitis many years ago    WRIST GANGLION EXCISION           Video Barium Swallow Study    Summary:    Pt presents with moderate oral and severe pharyngeal dysphagia, secondary to reduced lingual/base of tongue strength/drive, reduced hyolaryngeal movement, little to no movement of the epiglottis, poor posterior pharyngeal wall constriction, and reduced opening of the upper esophageal sphincter  This results in premature spill into pharynx, delayed swallows, mod-severe oral and pharyngeal residue, inability to fully clear residue with multiple swallows (even with tsp size boluses), and penetration with possible aspiration of thin barium after the swallow  Compensatory strategies were minimally effective at clearing residue  Some swallows attempted were incomplete  Voice and cough are very weak  Pt appears extremely fatigued after completing the test  There is some improvement noted from VBS completed on 1/3/19, however, pt remains at high risk to aspirate and at high risk for dehydration  Recommendations:  Diet: NPO, consider alternate feeding and hydration     Meds: IV or tube  F/u ST tx: Yes  Therapy Prognosis: Guarded  Prognosis considerations: Age, disease process  Results reviewed with: pt, physician (Dr Lakshmi Donovan)    If a dedicated assessment of the esophagus is desired, consider GI consult  Patient's goal:  Wants to be able to eat  Goals:  Pt will tolerate least restrictive diet w/out s/s aspiration or oral/pharyngeal difficulties  Pt is an 83 y/o patient seen today for repeat VBS  VBS was completed on 1/3/19, which revealed incomplete swallows, severe stasis/residue, high risk for aspiration, and incomplete swallows  Pt has been NPO since then  Today pt was given an IV infusion of medicine to assist in determining if pt may have Myasthenia Gravis  VBS was completed within 30 minutes of medication administration as required  RN accompanied pt to Radiology to monitor cardiac status  PMH:  See above  Current Diet:  NPO     Premorbid diet:  Regular and thin liquids    Dentition: adequate    O2 requirement: Room air    Oral mech:  Strength and ROM: Appears generally weak    Vocal Quality/Speech: Weak, breathy, frequent throat clearing on secretions  Cognitive status: Follows commands      Consistencies administered: Barium laden applesauce,  nectar thick, thin liquids  Liquids were administered by tsp  Pt was seated laterally at 90 degrees  Pt viewed in AP position    Oral stage:   Moderate impairment  Lip closure: WFL  Mastication: N/A  Bolus formation: Reduced  Bolus control: Reduced  Transfer: Prolonged, tongue pumping  Residue: Mod-severe    Pharyngeal stage:  Severe impairment  Swallow promptness: Significantly delayed  Spill to valleculae: Yes, consistent  Spill to pyriforms: Yes, consistent  Epiglottic inversion: No full inversion, minimal movement seen  Laryngeal rise: Reduced rise and anterior motion  Pharyngeal constriction: Poor  Vallecular retention: Mod-severe, unable to fully clear  Pyriform retention: Mod-severe, pt unable to fully clear despite swallowing up to 10x, unable to cough and expectorate; clears very small amounts with each swallow  PPW coating: Yes  Osteophytes: C5-7  Transient penetration: Yes, thin liquids  Epiglottic undercoat: Yes  Penetration: Yes, with thin liquids from residue in pyriforms  Aspiration: Possible trace aspiration of thin liquids  Strategies:  Chin tuck, head turn R and L; mildly effective at clearing pharyngeal residue and increasing clearance through the upper esophageal sphincter  Response to aspiration: Throat clear immediately; intermittent coughing throughout eval, could have been from secretions as well, that he was unable to clear prior to study  Screening of Esophageal stage:  Upper esophageal sphincter appears tight, with minimal opening, which could also be secondary to reduced hyolaryngeal movement and and poor base of tongue strength/drive  Only small amounts of food/liquid enter the esophagus at a time, resulting in mod-severe pharyngeal residue  A brief scan of the esophgus did not reveal obvious issues

## 2019-01-05 NOTE — PROGRESS NOTES
Notified by nurse that patient was found with arm between mattress and bed rail and on his knees at bedside  Bed alarm went off  With his arm still in the bed he was not able to fully fall out of bed or hit head based on his position  He was helped back in to bed  On subQ heparin for vte prophylaxis  On my exam he is alert and oriented, speech is slow but this is baseline  Said he wanted to get out of bed and recalls event  Did not hit head  Denies CP, light headedness, syncope  Has no pain  No focal deficits on neuro exam, moving all extremities  Full ROM of neck, no tenderness to palpation of spine  Neuro checks and vital signs q 2 hours x2  Monitor closeley and notify with any changes

## 2019-01-06 ENCOUNTER — APPOINTMENT (INPATIENT)
Dept: NON INVASIVE DIAGNOSTICS | Facility: HOSPITAL | Age: 84
DRG: 853 | End: 2019-01-06
Payer: MEDICARE

## 2019-01-06 LAB
ANION GAP SERPL CALCULATED.3IONS-SCNC: 5 MMOL/L (ref 4–13)
BUN SERPL-MCNC: 8 MG/DL (ref 5–25)
CALCIUM SERPL-MCNC: 7.8 MG/DL (ref 8.3–10.1)
CHLORIDE SERPL-SCNC: 108 MMOL/L (ref 100–108)
CO2 SERPL-SCNC: 29 MMOL/L (ref 21–32)
CREAT SERPL-MCNC: 0.51 MG/DL (ref 0.6–1.3)
ERYTHROCYTE [DISTWIDTH] IN BLOOD BY AUTOMATED COUNT: 14.7 % (ref 11.6–15.1)
GFR SERPL CREATININE-BSD FRML MDRD: 101 ML/MIN/1.73SQ M
GLUCOSE SERPL-MCNC: 171 MG/DL (ref 65–140)
GLUCOSE SERPL-MCNC: 192 MG/DL (ref 65–140)
GLUCOSE SERPL-MCNC: 195 MG/DL (ref 65–140)
GLUCOSE SERPL-MCNC: 218 MG/DL (ref 65–140)
GLUCOSE SERPL-MCNC: 230 MG/DL (ref 65–140)
HCT VFR BLD AUTO: 32.8 % (ref 36.5–49.3)
HGB BLD-MCNC: 10.3 G/DL (ref 12–17)
MAGNESIUM SERPL-MCNC: 2 MG/DL (ref 1.6–2.6)
MCH RBC QN AUTO: 30.2 PG (ref 26.8–34.3)
MCHC RBC AUTO-ENTMCNC: 31.4 G/DL (ref 31.4–37.4)
MCV RBC AUTO: 96 FL (ref 82–98)
PHOSPHATE SERPL-MCNC: 2.8 MG/DL (ref 2.3–4.1)
PLATELET # BLD AUTO: 253 THOUSANDS/UL (ref 149–390)
PMV BLD AUTO: 10.7 FL (ref 8.9–12.7)
POTASSIUM SERPL-SCNC: 4 MMOL/L (ref 3.5–5.3)
PROCALCITONIN SERPL-MCNC: 0.91 NG/ML
RBC # BLD AUTO: 3.41 MILLION/UL (ref 3.88–5.62)
SODIUM SERPL-SCNC: 142 MMOL/L (ref 136–145)
TSH SERPL DL<=0.05 MIU/L-ACNC: 1.73 UIU/ML (ref 0.36–3.74)
WBC # BLD AUTO: 9.29 THOUSAND/UL (ref 4.31–10.16)

## 2019-01-06 PROCEDURE — 93306 TTE W/DOPPLER COMPLETE: CPT

## 2019-01-06 PROCEDURE — 84100 ASSAY OF PHOSPHORUS: CPT | Performed by: GENERAL PRACTICE

## 2019-01-06 PROCEDURE — 93005 ELECTROCARDIOGRAM TRACING: CPT

## 2019-01-06 PROCEDURE — 84145 PROCALCITONIN (PCT): CPT | Performed by: GENERAL PRACTICE

## 2019-01-06 PROCEDURE — 97535 SELF CARE MNGMENT TRAINING: CPT

## 2019-01-06 PROCEDURE — 99232 SBSQ HOSP IP/OBS MODERATE 35: CPT | Performed by: HOSPITALIST

## 2019-01-06 PROCEDURE — 99222 1ST HOSP IP/OBS MODERATE 55: CPT | Performed by: INTERNAL MEDICINE

## 2019-01-06 PROCEDURE — 80048 BASIC METABOLIC PNL TOTAL CA: CPT | Performed by: GENERAL PRACTICE

## 2019-01-06 PROCEDURE — 85027 COMPLETE CBC AUTOMATED: CPT | Performed by: GENERAL PRACTICE

## 2019-01-06 PROCEDURE — 92526 ORAL FUNCTION THERAPY: CPT

## 2019-01-06 PROCEDURE — 93306 TTE W/DOPPLER COMPLETE: CPT | Performed by: INTERNAL MEDICINE

## 2019-01-06 PROCEDURE — 83735 ASSAY OF MAGNESIUM: CPT | Performed by: GENERAL PRACTICE

## 2019-01-06 PROCEDURE — 82948 REAGENT STRIP/BLOOD GLUCOSE: CPT

## 2019-01-06 PROCEDURE — 84443 ASSAY THYROID STIM HORMONE: CPT | Performed by: HOSPITALIST

## 2019-01-06 RX ADMIN — CEFTRIAXONE 1000 MG: 1 INJECTION, POWDER, FOR SOLUTION INTRAMUSCULAR; INTRAVENOUS at 22:10

## 2019-01-06 RX ADMIN — PYRIDOSTIGMINE BROMIDE 60 MG: 60 TABLET ORAL at 22:00

## 2019-01-06 RX ADMIN — INSULIN LISPRO 1 UNITS: 100 INJECTION, SOLUTION INTRAVENOUS; SUBCUTANEOUS at 05:58

## 2019-01-06 RX ADMIN — METRONIDAZOLE 500 MG: 500 INJECTION, SOLUTION INTRAVENOUS at 19:27

## 2019-01-06 RX ADMIN — INSULIN LISPRO 1 UNITS: 100 INJECTION, SOLUTION INTRAVENOUS; SUBCUTANEOUS at 23:57

## 2019-01-06 RX ADMIN — METRONIDAZOLE 500 MG: 500 INJECTION, SOLUTION INTRAVENOUS at 12:35

## 2019-01-06 RX ADMIN — HEPARIN SODIUM 5000 UNITS: 5000 INJECTION INTRAVENOUS; SUBCUTANEOUS at 22:10

## 2019-01-06 RX ADMIN — HEPARIN SODIUM 5000 UNITS: 5000 INJECTION INTRAVENOUS; SUBCUTANEOUS at 14:15

## 2019-01-06 RX ADMIN — PANCRELIPASE 24000 UNITS: 24000; 76000; 120000 CAPSULE, DELAYED RELEASE PELLETS ORAL at 17:29

## 2019-01-06 RX ADMIN — MELATONIN 3 MG: at 22:10

## 2019-01-06 RX ADMIN — PYRIDOSTIGMINE BROMIDE 60 MG: 60 TABLET ORAL at 08:43

## 2019-01-06 RX ADMIN — PANCRELIPASE 24000 UNITS: 24000; 76000; 120000 CAPSULE, DELAYED RELEASE PELLETS ORAL at 08:43

## 2019-01-06 RX ADMIN — HEPARIN SODIUM 5000 UNITS: 5000 INJECTION INTRAVENOUS; SUBCUTANEOUS at 05:55

## 2019-01-06 RX ADMIN — INSULIN LISPRO 2 UNITS: 100 INJECTION, SOLUTION INTRAVENOUS; SUBCUTANEOUS at 17:44

## 2019-01-06 RX ADMIN — PYRIDOSTIGMINE BROMIDE 60 MG: 60 TABLET ORAL at 17:29

## 2019-01-06 RX ADMIN — PANTOPRAZOLE SODIUM 40 MG: 40 TABLET, DELAYED RELEASE ORAL at 05:55

## 2019-01-06 RX ADMIN — CHOLECALCIFEROL TAB 10 MCG (400 UNIT) 400 UNITS: 10 TAB at 08:43

## 2019-01-06 RX ADMIN — INSULIN LISPRO 1 UNITS: 100 INJECTION, SOLUTION INTRAVENOUS; SUBCUTANEOUS at 12:38

## 2019-01-06 NOTE — PROGRESS NOTES
Progress Note - Anibal Castro 1935, 82 y o  male MRN: [id number]    Unit/Bed#: [facility] 530-01 Encounter: [id number]    Primary Care Provider: Abram Lenzt DO   Date and time admitted to hospital: 1/1/2019  8:59 PM        Atrial flutter ([facility] [address] )   Assessment & Plan    briefly  Now NSR  Appreciate cardio input - flutter in setting of sepsis hence cont to monitor, if needed can use BB  Will hold off on BB for now while on Mestinon and w/ nl HR  Check echo  hold off AC due to brief episode, CHADS-Vasc only 3  Per neuro, dysphagia symptoms are not TIA/CVA related       Moderate protein-calorie malnutrition ([facility] [address] )   Assessment & Plan    Jevity through NGT    Malnutrition Findings:   Malnutrition type: Chronic illness (past medical history significant for pancreatic mass )  Degree of Malnutrition: Malnutrition of moderate degree (evidenced by 11% wt loss < 3mo, slight depression at [employer] region, poor po intake <75% energy intake compared to estimated needs for >1 mo, treated with diet and supplements)    BMI Findings:  BMI Classifications: Underweight < 18 5     Body mass index is 17 61 kg/m²  Dysphagia   Assessment & Plan    · New onset severe pharyngeal dysphagia  Family also notes dysarthria  · Speech performed video barium swallow-neurologic cause is suspected  · Remain NPO per speech recs  · CT head negative  · F/U Acetylcholine receptor antibodies, MuSK antibodies  · Neurology consult appreciated   · s/p VBS 20 min after giving neostigmine that showed minimal improvement  NGT placed and pt will be given Mestinon trial while we await MG testing  · MRI brain nonrevealing  · Discussed with family if no reversible cause found and dysphagia not improving then would need feeding tube vs comfort care  · While awaiting further testing, NGT placed       Respiratory failure ([facility] [address] )   Assessment & Plan    · Acute hypoxic respiratory failure secondary to pneumonia and probable ongoing aspiration  · Wean O2 as tolerated  · NPO currently  · Added flagyl     Acute kidney injury [facility])   Assessment & Plan    · Present on admission, Cr 0 99 with baseline 0 5  In setting of presumed pneumonia and also with diarrhea reported  · Creatinine improved with IVFs     Pneumonia   Assessment & Plan    · With recent hospitalization in November for surgical procedure, however still would be considered low risk Hcap  · Strep pneumo antigen positive  Continue Rocephin D-5  · Procal trending down, but pt still on 5L O2  · CT chest today showed severe multifocal aspiration PNA  Appreciate pulm input - agree with adding flagyl     Other chronic pancreatitis [facility])   Assessment & Plan    · Per biopsy report from surgical procedure 11/2018 "benign pancreatic tissue with chronic pancreatitis      · Patient with weight loss and diarrhea after procedure  Follows with Dr Clint Muro and was to follow up 12/24/2018, however missed appointment  · GI evaluated patient this admission due to diarrhea and weight loss  Patient started on Creon  · Recommended outpatient colonoscopy     Weight loss   Assessment & Plan    · Per family weight loss of approximately 12 lb over 5 weeks with associated diarrhea  · Will obtain nutrition consult, placed on Jevity     Diabetes mellitus ([address] )   Assessment & Plan    Lab Results   Component Value Date    HGBA1C 6 3 10/30/2018       Blood Sugar Average: Last 72 hrs:     · Hold home metformin, began SSI with Accu-Cheks while inpatient    · Monitor sugars     * Sepsis (HCC)   Assessment & Plan    · Severe sepsis, POA, as evidenced by tachycardia, leukocytosis, elevated lactic acid  · secondary to pneumonia  · Hemodynamically stable  · Procalcitonin trending down  · Blood cultures negative         [facility] Progress Note  Patient: Krista Booker 82 y o  male   MRN: [id number]  PCP: Aly Vogel DO  Unit/Bed#: [facility] 530-01 Encounter: [id number]  Date Of Visit: 01/06/19    Assessment:    Principal Problem: Sepsis ([facility] )  Active Problems:    Diabetes mellitus ([facility] )    Weight loss    Other chronic pancreatitis (HCC)    Pneumonia    Acute kidney injury ([facility] )    Respiratory failure (HCC)    Dysphagia    Hypokalemia    Malnutrition (HCC)    Moderate protein-calorie malnutrition (HCC)    Atrial flutter (HCC)         VTE Pharmacologic Prophylaxis:   Pharmacologic: Heparin  Mechanical VTE Prophylaxis in Place: Yes    Patient Centered Rounds: I have performed bedside rounds with nursing staff today  Discussions with Specialists or Other Care Team Provider:     Education and Discussions with Family / Patient: patient, daughter at bedside    Time Spent for Care: 30 minutes  More than 50% of total time spent on counseling and coordination of care as described above  Current Length of Stay: 5 day(s)    Current Patient Status: Inpatient   Certification Statement: The patient will continue to require additional inpatient hospital stay due to IV abx, monitor dysphagia & response to treatment    Discharge Plan / Estimated Discharge Date: unclear    Code Status: Level 1 - Full Code      Subjective:   Feels ok, tired of being here    Objective:     Vitals:   Temp (24hrs), Av 9 °F (36 6 °C), Min:97 5 °F (36 4 °C), Max:98 8 °F (37 1 °C)    Temp:  [97 5 °F (36 4 °C)-98 8 °F (37 1 °C)] 98 8 °F (37 1 °C)  HR:  [69-87] 70  Resp:  [18] 18  BP: (132-155)/(65-82) 155/65  SpO2:  [92 %-97 %] 95 %  Body mass index is 17 61 kg/m²  Input and Output Summary (last 24 hours): Intake/Output Summary (Last 24 hours) at 19 1535  Last data filed at 19 1057   Gross per 24 hour   Intake             1760 ml   Output              525 ml   Net             1235 ml       Physical Exam:     Physical Exam   Constitutional: He is oriented to person, place, and time  He appears well-developed and well-nourished  HENT:   Head: Normocephalic and atraumatic     Mouth/Throat: Oropharynx is clear and moist    Eyes: Conjunctivae are normal  Cardiovascular: Normal rate, regular rhythm and normal heart sounds  Exam reveals no gallop  No murmur heard  Pulmonary/Chest: Effort normal and breath sounds normal  No respiratory distress  He has no wheezes  He has no rales  Abdominal: Soft  He exhibits no distension  There is no tenderness  There is no rebound  Musculoskeletal: He exhibits no edema  Neurological: He is alert and oriented to person, place, and time  Vitals reviewed  Additional Data:     Labs:      Results from last 7 days  Lab Units 01/06/19  0427 01/05/19  0516   WBC Thousand/uL 9 29 10 59*   HEMOGLOBIN g/dL 10 3* 9 7*   HEMATOCRIT % 32 8* 30 4*   PLATELETS Thousands/uL 253 240   NEUTROS PCT %  --  78*   LYMPHS PCT %  --  12*   MONOS PCT %  --  7   EOS PCT %  --  1       Results from last 7 days  Lab Units 01/06/19 0427 01/01/19 2137   POTASSIUM mmol/L 4 0  < > 3 3*   CHLORIDE mmol/L 108  < > 95*   CO2 mmol/L 29  < > 26   BUN mg/dL 8  < > 28*   CREATININE mg/dL 0 51*  < > 0 99   CALCIUM mg/dL 7 8*  < > 8 1*   ALK PHOS U/L  --   --  101   ALT U/L  --   --  34   AST U/L  --   --  45   < > = values in this interval not displayed  Results from last 7 days  Lab Units 01/01/19 2137   INR  1 04       * I Have Reviewed All Lab Data Listed Above  * Additional Pertinent Lab Tests Reviewed: All Labs Within Last 24 Hours Reviewed    Imaging:    Imaging Reports Reviewed Today Include:   Imaging Personally Reviewed by Myself Includes:      Recent Cultures (last 7 days):       Results from last 7 days  Lab Units 01/02/19  0608 01/01/19 2359 01/01/19 2358 01/01/19 2137   BLOOD CULTURE   --   --   --  No Growth After 4 Days  No Growth After 4 Days     INFLUENZA B PCR   --  None Detected  --   --    RSV PCR   --  None Detected  --   --    LEGIONELLA URINARY ANTIGEN   --   --  Negative  --    C DIFF TOXIN B  NEGATIVE for C difficle toxin by PCR    --   --   --        Last 24 Hours Medication List:     Current Facility-Administered Medications:  cefTRIAXone 1,000 mg Intravenous Q24H Minnie Garsia MD Last Rate: 1,000 mg (01/05/19 2337)   cholecalciferol 400 Units Oral Daily Perlita Higgins MD    heparin (porcine) 5,000 Units Subcutaneous [facility] Minnie Garsia MD    insulin lispro 1-5 Units Subcutaneous Q6H Liz Dyer PA-C    melatonin 3 mg Oral HS Taz Rajput DO    metroNIDAZOLE 500 mg Intravenous Q8H Spenser Perez MD Last Rate: 500 mg (01/06/19 1235)   pancrelipase (Lip-Prot-Amyl) 24,000 Units Oral BID With Meals Perlita Higgins MD    pantoprazole 40 mg Oral Early Morning Minnie Garsia MD    pyridostigmine 60 mg Oral TID Bipin Bradley DO         Today, Patient Was Seen By: Ned Duncan MD    ** Please Note: This note has been constructed using a voice recognition system   **

## 2019-01-06 NOTE — ASSESSMENT & PLAN NOTE
· Per biopsy report from surgical procedure 11/2018 "benign pancreatic tissue with chronic pancreatitis      · Patient with weight loss and diarrhea after procedure  Follows with Dr Constantine Kent and was to follow up 12/24/2018, however missed appointment  · GI evaluated patient this admission due to diarrhea and weight loss   Patient started on Creon  · Recommended outpatient colonoscopy

## 2019-01-06 NOTE — CONSULTS
Consult Note - Pulmonary   Melissa Roman 82 y o  male MRN: [id number]  Unit/Bed#: [facility] 530-01 Encounter: [id number]      Reason for consultation:   Multifocal pneumonia    Requesting physician: Dr Abdiel Robles and plan:  The patient has the following medical problems:  1  Pneumonia  Bilateral, most probably secondary to aspiration per patient history and imaging  Recommend to add Flagyl to cover anaerobes given the clinical picture, imaging  Continue ceftriaxone to cover strep pneumo  If the patient is not improving clinically with Flagyl, consult ID and consider upgrading to vancomycin for resistant strep pneumo  2   Bilateral pleural effusions  Most probably parapneumonic effusion  Small  No need to drain the effusion at this point  3   Dysphagia  New onset  Being seen and evaluated by Neurology  I recommend to decrease the size of the NG tube to Dobhoff tube for patient's comfort  4   Atrial flutter  Consulted by Cardiology  If this recurs the patient will need anticoagulation due to chads Vasc score 3  Agnes Ryan MD/PhD,  [facility]    History of Present Illness  Melissa Roman is a 82 y o  male with previous medical history of autoimmune pancreatitis, type 2 diabetes mellitus, atrial flutter who present on January 1, 2019 due to shortness of breath and cough  On imaging he found to have multifocal pneumonia which might be secondary to history of aspiration  He was found to be positive for strep pneumo in the urine  Occupational History:   Worked in construction many years    Social History:   Has history of smoking for 10 pack years  Does not drink alcohol   with 4 children  Review of systems  Review of Systems   Constitutional: Positive for chills, fatigue, fever and unexpected weight change  HENT: Negative  Eyes: Negative  Respiratory: Positive for cough and shortness of breath  Cardiovascular: Negative  Gastrointestinal: Positive for abdominal pain  Endocrine: Negative  Genitourinary: Negative  Musculoskeletal: Negative  Skin: Negative  Allergic/Immunologic: Negative  Neurological: Negative  Hematological: Negative  Psychiatric/Behavioral: Negative  Historical Information   Past Medical History:   Diagnosis Date    Diabetes mellitus ([facility] [place] [address] )     Type II    Weight loss      Past Surgical History:   Procedure Laterality Date    LAPAROTOMY N/A 11/19/2018    Procedure: LAPAROTOMY EXPLORATORY;  Surgeon: Abhishek Bhatti MD;  [facility];  Service: Surgical Oncology    AL EDG US EXAM SURGICAL ALTER STOM DUODENUM/JEJUNUM N/A 9/21/2018    Procedure: LINEAR ENDOSCOPIC U/S;  Surgeon: Rony Solomon MD;  Location: [facility];   Service: Gastroenterology    AL LAP,DIAGNOSTIC ABDOMEN N/A 11/19/2018    Procedure: pancreatic biopsy;  Surgeon: Abhishek Bhatti MD;  [facility];  Service: Surgical Oncology    TONSILLECTOMY      VASCULAR SURGERY      phlebitis many years ago    WRIST GANGLION EXCISION       Family History   Problem Relation Age of Onset    No Known Problems Mother     No Known Problems Father     Pancreatic cancer Brother 61    Breast cancer Daughter 36        38s       Meds/Allergies   Current Facility-Administered Medications   Medication Dose Route Frequency    atropine 1 mg/10 mL injection 0 5 mg  0 5 mg Intravenous Q5 Min PRN    ceftriaxone (ROCEPHIN) 1 g/50 mL in dextrose IVPB  1,000 mg Intravenous Q24H    cholecalciferol (VITAMIN D3) tablet 400 Units  400 Units Oral Daily    heparin (porcine) subcutaneous injection 5,000 Units  5,000 Units Subcutaneous Q8H [address]    insulin lispro (HumaLOG) 100 units/mL subcutaneous injection 1-5 Units  1-5 Units Subcutaneous Q6H    melatonin tablet 3 mg  3 mg Oral HS    pancrelipase (Lip-Prot-Amyl) (CREON) delayed release capsule 24,000 Units  24,000 Units Oral BID With Meals    pantoprazole (PROTONIX) EC tablet 40 mg  40 mg Oral Early Morning    pyridostigmine (MESTINON) tablet 60 mg  60 mg Oral TID     Prescriptions Prior to Admission   Medication    metFORMIN (GLUCOPHAGE) 500 mg tablet    omeprazole (PriLOSEC) 20 mg delayed release capsule     No Known Allergies    Vitals: Blood pressure 155/65, pulse 70, temperature 98 8 °F (37 1 °C), resp  rate 18, height 5' 7" (1 702 m), weight 51 kg (112 lb 7 oz), SpO2 95 %  , Body mass index is 17 61 kg/m²  Physical Exam  Physical Exam   Constitutional: He is oriented to person, place, and time  He appears distressed  Thin, very weak, with NG tube  HENT:   Head: Normocephalic and atraumatic  Eyes: Pupils are equal, round, and reactive to light  EOM are normal    Neck: Normal range of motion  Neck supple  No JVD present  Cardiovascular: Normal rate and regular rhythm  Pulmonary/Chest: He is in respiratory distress  He has no wheezes  He has rales  Abdominal: Soft  Musculoskeletal: Normal range of motion  He exhibits edema  Neurological: He is alert and oriented to person, place, and time  Skin: Skin is warm  Psychiatric: He has a normal mood and affect  Intake/Output Summary (Last 24 hours) at 01/06/19 1205  Last data filed at 01/06/19 1057   Gross per 24 hour   Intake             1760 ml   Output              525 ml   Net             1235 ml       Labs: I have personally reviewed pertinent lab results      Results from last 7 days  Lab Units 01/06/19 0427 01/05/19 0516 01/04/19  0551 01/03/19  0545   WBC Thousand/uL 9 29 10 59* 10 97* 10 07   HEMOGLOBIN g/dL 10 3* 9 7* 9 8* 9 4*   HEMATOCRIT % 32 8* 30 4* 31 2* 29 7*   PLATELETS Thousands/uL 253 240 213 201   NEUTROS PCT %  --  78*  --  84*   MONOS PCT %  --  7  --  5   MONO PCT %  --   --  3*  --       Results from last 7 days  Lab Units 01/06/19 0427 01/05/19 0516 01/04/19  0551  01/01/19  2137   POTASSIUM mmol/L 4 0 3 1* 3 5  < > 3 3*   CHLORIDE mmol/L 108 104 104  < > 95*   CO2 mmol/L 29 29 28  < > 26 BUN mg/dL 8 8 10  < > 28*   CREATININE mg/dL 0 51* 0 42* 0 51*  < > 0 99   CALCIUM mg/dL 7 8* 7 9* 8 1*  < > 8 1*   ALK PHOS U/L  --   --   --   --  101   ALT U/L  --   --   --   --  34   AST U/L  --   --   --   --  45   < > = values in this interval not displayed  Results from last 7 days  Lab Units 01/06/19  0427 01/05/19  0516 01/02/19  0431   MAGNESIUM mg/dL 2 0 1 7 1 7       Results from last 7 days  Lab Units 01/06/19  0427 01/05/19  0516   PHOSPHORUS mg/dL 2 8 2 2*        Results from last 7 days  Lab Units 01/01/19  2137   INR  1 04   PTT seconds 34       Results from last 7 days  Lab Units 01/02/19  0155   LACTIC ACID mmol/L 1 7     No results found for: TROPONINI    Imaging and other studies: I have personally reviewed pertinent films in PACS  The patient had chest CT on January 5, 2018 that showed bilateral consolidation in both lower lobes more severe in the left main the right  He has small left and right pleural effusions  He has some secretions in his trachea  This is highly suspicious for aspiration pneumonia      Sammie Jones MD/PhD,  [facility]

## 2019-01-06 NOTE — ASSESSMENT & PLAN NOTE
Lab Results   Component Value Date    HGBA1C 6 3 10/30/2018       Blood Sugar Average: Last 72 hrs:     · Hold home metformin, began SSI with Accu-Cheks while inpatient    · Monitor sugars on dextrose gtt/Jevity

## 2019-01-06 NOTE — ASSESSMENT & PLAN NOTE
· Severe sepsis, POA, as evidenced by tachycardia, leukocytosis, elevated lactic acid  · secondary to pneumonia  · Hemodynamically stable  · Procalcitonin trending down  · Blood cultures negative

## 2019-01-06 NOTE — ASSESSMENT & PLAN NOTE
· Per biopsy report from surgical procedure 11/2018 "benign pancreatic tissue with chronic pancreatitis      · Patient with weight loss and diarrhea after procedure  Follows with Dr Carlos Jacobo and was to follow up 12/24/2018, however missed appointment  · GI evaluated patient this admission due to diarrhea and weight loss   Patient started on Creon  · Recommended outpatient colonoscopy

## 2019-01-06 NOTE — ASSESSMENT & PLAN NOTE
· New onset severe pharyngeal dysphagia  Family also notes dysarthria  · Speech performed video barium swallow-neurologic cause is suspected  · Remain NPO per speech recs  · CT head negative  · F/U Acetylcholine receptor antibodies, MuSK antibodies  · Neurology consult appreciated   · Today did repeat VBS 20 min after giving neostigmine that showed minimal improvement  NGT placed and pt will be given Mestinon trial while we await MG testing  · MRI brain nonrevealing  · Discussed with family if no reversible cause found and dysphagia not improving then would need feeding tube vs comfort care  · While awaiting further testing, NGT placed

## 2019-01-06 NOTE — ASSESSMENT & PLAN NOTE
Jevity through NGT    Malnutrition Findings:   Malnutrition type: Chronic illness (past medical history significant for pancreatic mass )  Degree of Malnutrition: Malnutrition of moderate degree (evidenced by 11% wt loss < 3mo, slight depression at [facility], poor po intake <75% energy intake compared to estimated needs for >1 mo, treated with diet and supplements)    BMI Findings:  BMI Classifications: Underweight < 18 5     Body mass index is 17 61 kg/m²

## 2019-01-06 NOTE — ASSESSMENT & PLAN NOTE
· Per family weight loss of approximately 12 lb over 5 weeks with associated diarrhea  · Will obtain nutrition consult, placed on Jevity

## 2019-01-06 NOTE — ASSESSMENT & PLAN NOTE
briefly  Now NSR  Appreciate cardio input - flutter in setting of sepsis hence cont to monitor, if needed can use BB  Will hold off on BB for now while on Mestinon and w/ nl HR  Check echo  hold off AC due to brief episode, CHADS-Vasc only 3    Per neuro, dysphagia symptoms are not TIA/CVA related

## 2019-01-06 NOTE — ASSESSMENT & PLAN NOTE
Jevity through NGT    Malnutrition Findings:   Malnutrition type: Chronic illness (past medical history significant for pancreatic mass )  Degree of Malnutrition: Malnutrition of moderate degree (evidenced by 11% wt loss < 3mo, slight depression at [facility], poor po intake <75% energy intake compared to estimated needs for >1 mo, treated with diet and supplements)    BMI Findings:  BMI Classifications: Underweight < 18 5     Body mass index is 17 61 kg/m²

## 2019-01-06 NOTE — ASSESSMENT & PLAN NOTE
Lab Results   Component Value Date    HGBA1C 6 3 10/30/2018       Blood Sugar Average: Last 72 hrs:     · Hold home metformin, began SSI with Accu-Cheks while inpatient    · Monitor sugars

## 2019-01-06 NOTE — ASSESSMENT & PLAN NOTE
Noted today  Pt is rate controlled at this time w/o meds  Will hold off on BB for now while on Mestinon and w/ nl HR  Check echo  Cards consult  No need for IV heparin as CHADS-Vasc only 3    Per neuro, dysphagia symptoms are not TIA/CVA related

## 2019-01-06 NOTE — PLAN OF CARE
Problem: DISCHARGE PLANNING - CARE MANAGEMENT  Goal: Discharge to post-acute care or home with appropriate resources  INTERVENTIONS:  - Conduct assessment to determine patient/family and health care team treatment goals, and need for post-acute services based on payer coverage, community resources, and patient preferences, and barriers to discharge  - Address psychosocial, clinical, and financial barriers to discharge as identified in assessment in conjunction with the patient/family and health care team  - Arrange appropriate level of post-acute services according to patient's   needs and preference and payer coverage in collaboration with the physician and health care team  - Communicate with and update the patient/family, physician, and health care team regarding progress on the discharge plan  - Arrange appropriate transportation to post-acute venues  - Pt to d/c with appropriate resources when medically stable  Outcome: Progressing      Problem: Nutrition/Hydration-ADULT  Goal: Nutrient/Hydration intake appropriate for improving, restoring or maintaining nutritional needs  Monitor and assess patient's nutrition/hydration status for malnutrition (ex- brittle hair, bruises, dry skin, pale skin and conjunctiva, muscle wasting, smooth red tongue, and disorientation)  Collaborate with interdisciplinary team and initiate plan and interventions as ordered  Monitor patient's weight and dietary intake as ordered or per policy  Utilize nutrition screening tool and intervene per policy  Determine patient's food preferences and provide high-protein, high-caloric foods as appropriate       INTERVENTIONS:  - Monitor oral intake, urinary output, labs, and treatment plans  - Assess nutrition and hydration status and recommend course of action  - Evaluate amount of meals eaten  - Assist patient with eating if necessary   - Allow adequate time for meals  - Recommend/ encourage appropriate diets, oral nutritional supplements, and vitamin/mineral supplements  - Order, calculate, and assess calorie counts as needed  - Recommend, monitor, and adjust tube feedings and TPN/PPN based on assessed needs  - Assess need for intravenous fluids  - Provide specific nutrition/hydration education as appropriate  - Include patient/family/caregiver in decisions related to nutrition   Outcome: Progressing      Problem: Prexisting or High Potential for Compromised Skin Integrity  Goal: Skin integrity is maintained or improved  INTERVENTIONS:  - Identify patients at risk for skin breakdown  - Assess and monitor skin integrity  - Assess and monitor nutrition and hydration status  - Monitor labs (i e  albumin)  - Assess for incontinence   - Turn and reposition patient  - Assist with mobility/ambulation  - Relieve pressure over bony prominences  - Avoid friction and shearing  - Provide appropriate hygiene as needed including keeping skin clean and dry  - Evaluate need for skin moisturizer/barrier cream  - Collaborate with interdisciplinary team (i e  Nutrition, Rehabilitation, etc )   - Patient/family teaching   Outcome: Progressing      Problem: Potential for Falls  Goal: Patient will remain free of falls  INTERVENTIONS:  - Assess patient frequently for physical needs  -  Identify cognitive and physical deficits and behaviors that affect risk of falls    -  Brinkley fall precautions as indicated by assessment   - Educate patient/family on patient safety including physical limitations  - Instruct patient to call for assistance with activity based on assessment  - Modify environment to reduce risk of injury  - Consider OT/PT consult to assist with strengthening/mobility   Outcome: Progressing

## 2019-01-06 NOTE — ASSESSMENT & PLAN NOTE
· With recent hospitalization in November for surgical procedure, however still would be considered low risk Hcap  · Strep pneumo antigen positive  Continue Rocephin D-5  · Procal trending down, but pt still on 5L O2  · CT chest today showed severe multifocal aspiration PNA   Appreciate pulm input - agree with adding flagyl

## 2019-01-06 NOTE — CONSULTS
Consultation - Electrophysiology-Cardiology (EP)   Aida Bedoya 82 y o  male MRN: [id number]  Unit/Bed#: [facility] 530-01 Encounter: [id number]      Inpatient consult to Cardiology  Consult performed by: Manolo Martel  Consult ordered by: Judi Jones          Assessment/Plan   1  Sepsis    * 2/2 PNA   * WBC has normalized    * he is afebrile    * he remains on rocephin Q24H    * hospitalist team managing     2  Dysphagia    * VBS demonstrating severe dysphagia    * currently has NG tube for parenteral nutrition      3  New onset typical atrial flutter   * asymptomatic    * had typical atrial flutter from 1pm on 1-5-18 till 130AM on 1-6-18 and he is currently now in NSR with PAC's   * his CV is 1 (age >76, T2DM)   * this is all likely in the setting of severe illness, malnutrition and cachexia  I will discuss AC regimen with attending but doubt he needs heparin or lovenox BID for that short of a period of atrial flutter especially with his current medical status  If flutter continues to recur then would reconsider AC  * not a candidate for any invasive procedures to tx flutter    * if he develops RVR which he likely wont would use BB for rate control       History of Present Illness   Physician Requesting Consult: Rody Javier, DO  Reason for Consult / Principal Problem: new onset atrial flutter     HPI: Aida Bedoya is a 82y o  year old male with a history of chronic pancreatitis, T2DM who is HOD#5 after presenting top [facility] due to concerns of unintentional weight loss, SOB and cough  Patient was ultimately diagnosed with sepsis 2/2 pneumonia  Cardiology is being consulted for further evaluation of new onset typical flutter  Patient [facility] ED due to concerns of SOB, cough and unintentional weight loss  On admit patient was found to have PNA and ultimately sepsis   He was placed on antibiotics and admitted to the Hospitalist team  During his hospitalization he developed dysphagia with speech evaluating patient recommending NPO status with swallow study showing severe dysphagia  GI was also consulted who recommended EGD and colonoscopy but due to his current illness these procedures were deferred  However due to his malnutrition patient was recommended to undergo NG tube for parenteral nutrition  Neurology has been consulted for workup of possible myasthenia gravis w/ labs pending  Cardiology has been consulted one night ago due to development of new onset typical atrial flutter  Currently patient has no concerns or complaints  Denies ever having flutter in the past nor has he ever seen a cardiologist  No prior CVA  Review of Systems  ROS as noted above, otherwise 12 point review of systems was performed and is negative  Historical Information   Past Medical History:   Diagnosis Date    Diabetes mellitus ([facility] [address] )     Type II    Weight loss      Past Surgical History:   Procedure Laterality Date    LAPAROTOMY N/A 11/19/2018    Procedure: LAPAROTOMY EXPLORATORY;  Surgeon: Ciro Montaño MD;  [facility];  Service: Surgical Oncology    [place] EDG US EXAM SURGICAL ALTER STOM DUODENUM/JEJUNUM N/A 9/21/2018    Procedure: LINEAR ENDOSCOPIC U/S;  Surgeon: Laya Tavera MD;  [facility];   Service: Gastroenterology    [place] LAP,DIAGNOSTIC ABDOMEN N/A 11/19/2018    Procedure: pancreatic biopsy;  Surgeon: Ciro Montaño MD;  [facility];  Service: Surgical Oncology    TONSILLECTOMY      VASCULAR SURGERY      phlebitis many years ago    WRIST GANGLION EXCISION       History   Alcohol Use    Yes     Comment: 1 beer a day     History   Drug Use No     History   Smoking Status    Former Smoker    Years: 25 00   Smokeless Tobacco    Never Used     Comment: quit about 20 years ago as of 9/21/2018     Family History: non-contributory    Meds/Allergies   Hospital Medications: Current Facility-Administered Medications   Medication Dose Route Frequency    atropine 1 mg/10 mL injection 0 5 mg  0 5 mg Intravenous Q5 Min PRN    ceftriaxone (ROCEPHIN) 1 g/50 mL in dextrose IVPB  1,000 mg Intravenous Q24H    cholecalciferol (VITAMIN D3) tablet 400 Units  400 Units Oral Daily    heparin (porcine) subcutaneous injection 5,000 Units  5,000 Units Subcutaneous Q8H [facility]    insulin lispro (HumaLOG) 100 units/mL subcutaneous injection 1-5 Units  1-5 Units Subcutaneous Q6H    melatonin tablet 3 mg  3 mg Oral HS    pancrelipase (Lip-Prot-Amyl) (CREON) delayed release capsule 24,000 Units  24,000 Units Oral BID With Meals    pantoprazole (PROTONIX) EC tablet 40 mg  40 mg Oral Early Morning    pyridostigmine (MESTINON) tablet 60 mg  60 mg Oral TID     Home Medications:   Prescriptions Prior to Admission   Medication    metFORMIN (GLUCOPHAGE) 500 mg tablet    omeprazole (PriLOSEC) 20 mg delayed release capsule       No Known Allergies    Objective   Vitals: Blood pressure 149/69, pulse 69, temperature 97 9 °F (36 6 °C), resp  rate 18, height 5' 7" (1 702 m), weight 51 kg (112 lb 7 oz), SpO2 92 %  Orthostatic Blood Pressures      Most Recent Value   Blood Pressure  149/69 filed at 01/06/2019 0732   Patient Position - Orthostatic VS  Lying filed at 01/06/2019 0300            Intake/Output Summary (Last 24 hours) at 01/06/19 0918  Last data filed at 01/06/19 0547   Gross per 24 hour   Intake          1903 33 ml   Output              525 ml   Net          1378 33 ml       Invasive Devices     Peripheral Intravenous Line            Peripheral IV 01/04/19 Left Arm 1 day          Drain            NG/OG/Enteral Tube Nasogastric 18 Fr Right nares less than 1 day                Physical Exam   Constitutional: He appears cachectic  He appears ill  Lethargic but easily arouseable    HENT:   Head: Normocephalic and atraumatic  Eyes: Pupils are equal, round, and reactive to light  Neck: Normal range of motion  Cardiovascular: Normal rate and regular rhythm      Pulmonary/Chest: Effort normal and breath sounds normal    Abdominal: Normal appearance  NG intact   Musculoskeletal: He exhibits no edema  Neurological: He is alert  Slurred speech   Skin: Skin is warm and dry  Psychiatric: He has a normal mood and affect  Lab Results: I have personally reviewed pertinent lab results  Results from last 7 days  Lab Units 01/06/19 0427 01/05/19  0516 01/04/19  0551   WBC Thousand/uL 9 29 10 59* 10 97*   HEMOGLOBIN g/dL 10 3* 9 7* 9 8*   HEMATOCRIT % 32 8* 30 4* 31 2*   PLATELETS Thousands/uL 253 240 213       Results from last 7 days  Lab Units 01/06/19 0427 01/05/19  0516 01/04/19  0551   POTASSIUM mmol/L 4 0 3 1* 3 5   CHLORIDE mmol/L 108 104 104   CO2 mmol/L 29 29 28   BUN mg/dL 8 8 10   CREATININE mg/dL 0 51* 0 42* 0 51*   CALCIUM mg/dL 7 8* 7 9* 8 1*       Results from last 7 days  Lab Units 01/01/19  2137   INR  1 04   PTT seconds 34       Results from last 7 days  Lab Units 01/06/19  0427 01/05/19  0516 01/02/19  0431   MAGNESIUM mg/dL 2 0 1 7 1 7       Imaging: I have personally reviewed pertinent reports  ECHO: No results found for this or any previous visit      CATH/STRESS TEST:    EKG:

## 2019-01-06 NOTE — ASSESSMENT & PLAN NOTE
· With recent hospitalization in November for surgical procedure, however still would be considered low risk Hcap  · Strep pneumo antigen positive  Continue Rocephin  · Procal trending down, but pt still on 5L O2  · CT chest today showed severe multifocal PNA    Consulted pulm for recs as pt has severe PNA and only minimally improving with Ropcehin

## 2019-01-06 NOTE — ASSESSMENT & PLAN NOTE
· Acute hypoxic respiratory failure secondary to pneumonia and probable ongoing aspiration  · Wean O2 as tolerated  · NPO currently  · Added flagyl

## 2019-01-06 NOTE — RESPIRATORY THERAPY NOTE
RT Protocol Note  Reva Johnston 82 y o  male MRN: [id number]  Unit/Bed#: [facility] 530-01 Encounter: [id number]    Assessment    Principal Problem:    Sepsis ([place] [address] )  Active Problems:    Diabetes mellitus ([place] [address] )    Weight loss    Other chronic pancreatitis (HCC)    Pneumonia    Acute kidney injury ([place] [address] )    Respiratory failure (HCC)    Dysphagia    Hypokalemia    Malnutrition (HCC)    Moderate protein-calorie malnutrition (HCC)    Atrial flutter ([place] [address] )      Home Pulmonary Medications:    Past Medical History:   Diagnosis Date    Diabetes mellitus ([place] [address] )     Type II    Weight loss      Social History     Social History    Marital status: /Civil Union     Spouse name: N/A    Number of children: N/A    Years of education: N/A     Social History Main Topics    Smoking status: Former Smoker     Years: 25 00    Smokeless tobacco: Never Used      Comment: quit about 20 years ago as of 9/21/2018    Alcohol use Yes      Comment: 1 beer a day    Drug use: No    Sexual activity: Not Asked     Other Topics Concern    None     Social History Narrative    None       Subjective         Objective    Physical Exam:        Vitals:  Blood pressure 149/69, pulse 69, temperature 97 9 °F (36 6 °C), resp  rate 18, height 5' 7" (1 702 m), weight 51 kg (112 lb 7 oz), SpO2 92 %  Imaging and other studies: I have personally reviewed pertinent reports  O2 Device: nasal cannula     Plan    Respiratory Plan: (P) Discontinue Protocol        Resp Comments: (P) Pt  with clear BS  No udn needed x 4 days  Will d/c udn prn and resp   protocol

## 2019-01-06 NOTE — PLAN OF CARE
Problem: OCCUPATIONAL THERAPY ADULT  Goal: Performs self-care activities at highest level of function for planned discharge setting  See evaluation for individualized goals  Treatment Interventions: ADL retraining, Functional transfer training, UE strengthening/ROM, Endurance training, Cognitive reorientation, Patient/family training, Equipment evaluation/education, Compensatory technique education, Activityengagement, Energy conservation          See flowsheet documentation for full assessment, interventions and recommendations  Outcome: Progressing  Limitation: Decreased ADL status, Decreased UE ROM, Decreased UE strength, Decreased Safe judgement during ADL, Decreased cognition, Decreased endurance  Prognosis: Fair  Assessment: Pt participated in occupational therapy with focus on activity tolerance, UB self-care, functional transfers/stand pivot transfers and bed mob  Pt cleared by RN/Violet for pt participation in occupational therapy  Pt received sitting out of bed to bedside chair and agreeable to therapy following pt Identifiers confirmed  Pt family members/ wife, two daughter and grandson present supportive throughout session  Pt required Min A for grooming/brushing teeth 2* pt decreased activity tolerance and pt overall strength  Pt able to complete Min A x 2 for stand pivot transfer from chair to pt bed 2* pt decreased strength  Pt will require in-pt rehab to continue to address pt above noted deficits which currently impair pt ADL and functional mob       OT Discharge Recommendation: Short Term Rehab

## 2019-01-06 NOTE — SPEECH THERAPY NOTE
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    Esophagitis    Diabetes mellitus ([address]    Weight loss    Other chronic pancreatitis ([address] )    Sepsis ([address] )    Pneumonia    Acute kidney injury ([address] )    Respiratory failure ([address]    Dysphagia    Hypokalemia    Malnutrition ([address]    Moderate protein-calorie malnutrition (HCC)    Atrial flutter ([address] )        Past Medical History  Past Medical History:   Diagnosis Date    Diabetes mellitus ([address] )     Type II    Weight loss         Past Surgical History  Past Surgical History:   Procedure Laterality Date    LAPAROTOMY N/A 11/19/2018    Procedure: LAPAROTOMY EXPLORATORY;  Surgeon: Adela Brown MD;  [facility]: [facility];  Service: Surgical Oncology    NV EDG US EXAM SURGICAL ALTER STOM DUODENUM/JEJUNUM N/A 9/21/2018    Procedure: LINEAR ENDOSCOPIC U/S;  Surgeon: Jerardo Luevano MD;  Location: [facility]; Service: Gastroenterology    NV LAP,DIAGNOSTIC ABDOMEN N/A 11/19/2018    Procedure: pancreatic biopsy;  Surgeon: Adela Brown MD;  [facility];  Service: Surgical Oncology    TONSILLECTOMY      VASCULAR SURGERY      phlebitis many years ago    WRIST GANGLION EXCISION           Subjective:  Patient became alert for the SLP  Patient's wife and daughter were present at the time of the evaluation  He was responding to basic questions presented to him  Objective:  SLP did oral care prior to trials of pureed textures  Patient was able to maintain alertness during therapy session  SLP presented patient with 1/4 tsp of puree  Patient was able to retrieve the puree from the spoon, but had decreased manipulation of the bolus  SLP provided cues for patient to swallow the pureed textures  He was able to swallow when given cues, but initiation was delayed   Wet vocal quality noted  Assessment:  Patient still presenting with a weak voice and wet vocal quality with minimal trials  SLP provided education to the patient's family and nursing staff  Plan/Recommendations:  Continue NPO   ST to continue to follow

## 2019-01-06 NOTE — OCCUPATIONAL THERAPY NOTE
Occupational Therapy Treatment Note       01/06/19 1512   Restrictions/Precautions   Weight Bearing Precautions Per Order No   Other Precautions Fall Risk;O2;Aspiration   Pain Assessment   Pain Assessment No/denies pain   Pain Score No Pain   ADL   Where Assessed Chair   Grooming Assistance 4  Minimal Assistance   Grooming Deficit Teeth care   Bed Mobility   Sit to Supine 2  Maximal assistance   Transfers   Sit to Stand 4  Minimal assistance   Additional items Assist x 2   Stand to Sit 4  Minimal assistance   Additional items Assist x 2   Stand pivot 4  Minimal assistance   Additional items Assist x 2   Cognition   Overall Cognitive Status Impaired   Orientation Level Oriented to person;Oriented to place   Memory Decreased short term memory; Within functional limits   Following Commands Follows one step commands without difficulty   Assessment   Assessment Pt participated in occupational therapy with focus on activity tolerance, UB self-care, functional transfers/stand pivot transfers and bed mob  Pt cleared by RN/Violet for pt participation in occupational therapy  Pt received sitting out of bed to bedside chair and agreeable to therapy following pt Identifiers confirmed  Pt family members/ wife, two daughter and grandson present supportive throughout session  Pt required Min A for grooming/brushing teeth 2* pt decreased activity tolerance and pt overall strength  Pt able to complete Min A x 2 for stand pivot transfer from chair to pt bed 2* pt decreased strength  Pt will require in-pt rehab to continue to address pt above noted deficits which currently impair pt ADL and functional mob     Plan   Treatment Interventions ADL retraining   Goal Expiration Date 01/14/19   OT Frequency 2-3x/wk   Recommendation   OT Discharge Recommendation Short Term Rehab   Barthel Index   Feeding 0   Bathing 0   Grooming Score 0   Dressing Score 5   Bladder Score 10   Bowels Score 10   Toilet Use Score 5   Transfers (Bed/Chair) Score 5   Mobility (Level Surface) Score 0   Stairs Score 0   Barthel Index Score 35   Modified [place] Scale   Modified Daryl Scale 4     Umm ANDREWS

## 2019-01-06 NOTE — ASSESSMENT & PLAN NOTE
· New onset severe pharyngeal dysphagia  Family also notes dysarthria  · Speech performed video barium swallow-neurologic cause is suspected  · Remain NPO per speech recs  · CT head negative  · F/U Acetylcholine receptor antibodies, MuSK antibodies  · Neurology consult appreciated   · s/p VBS 20 min after giving neostigmine that showed minimal improvement  NGT placed and pt will be given Mestinon trial while we await MG testing  · MRI brain nonrevealing  · Discussed with family if no reversible cause found and dysphagia not improving then would need feeding tube vs comfort care  · While awaiting further testing, NGT placed

## 2019-01-07 LAB
ACHR BIND AB SER-SCNC: <0.03 NMOL/L (ref 0–0.24)
BACTERIA BLD CULT: NORMAL
BACTERIA BLD CULT: NORMAL
GLUCOSE SERPL-MCNC: 151 MG/DL (ref 65–140)
GLUCOSE SERPL-MCNC: 215 MG/DL (ref 65–140)
GLUCOSE SERPL-MCNC: 225 MG/DL (ref 65–140)
GLUCOSE SERPL-MCNC: 230 MG/DL (ref 65–140)

## 2019-01-07 PROCEDURE — 99232 SBSQ HOSP IP/OBS MODERATE 35: CPT | Performed by: INTERNAL MEDICINE

## 2019-01-07 PROCEDURE — 82948 REAGENT STRIP/BLOOD GLUCOSE: CPT

## 2019-01-07 PROCEDURE — 99232 SBSQ HOSP IP/OBS MODERATE 35: CPT | Performed by: HOSPITALIST

## 2019-01-07 PROCEDURE — 82656 EL-1 FECAL QUAL/SEMIQ: CPT | Performed by: GENERAL PRACTICE

## 2019-01-07 PROCEDURE — 99233 SBSQ HOSP IP/OBS HIGH 50: CPT | Performed by: PHYSICIAN ASSISTANT

## 2019-01-07 RX ORDER — POLYVINYL ALCOHOL 14 MG/ML
1 SOLUTION/ DROPS OPHTHALMIC AS NEEDED
Status: DISCONTINUED | OUTPATIENT
Start: 2019-01-07 | End: 2019-01-19 | Stop reason: HOSPADM

## 2019-01-07 RX ADMIN — METRONIDAZOLE 500 MG: 500 INJECTION, SOLUTION INTRAVENOUS at 11:15

## 2019-01-07 RX ADMIN — PYRIDOSTIGMINE BROMIDE 60 MG: 60 TABLET ORAL at 08:04

## 2019-01-07 RX ADMIN — HEPARIN SODIUM 5000 UNITS: 5000 INJECTION INTRAVENOUS; SUBCUTANEOUS at 22:10

## 2019-01-07 RX ADMIN — METRONIDAZOLE 500 MG: 500 INJECTION, SOLUTION INTRAVENOUS at 20:27

## 2019-01-07 RX ADMIN — PANCRELIPASE 24000 UNITS: 24000; 76000; 120000 CAPSULE, DELAYED RELEASE PELLETS ORAL at 08:02

## 2019-01-07 RX ADMIN — METRONIDAZOLE 500 MG: 500 INJECTION, SOLUTION INTRAVENOUS at 05:26

## 2019-01-07 RX ADMIN — CEFTRIAXONE 1000 MG: 1 INJECTION, POWDER, FOR SOLUTION INTRAMUSCULAR; INTRAVENOUS at 22:24

## 2019-01-07 RX ADMIN — HEPARIN SODIUM 5000 UNITS: 5000 INJECTION INTRAVENOUS; SUBCUTANEOUS at 13:16

## 2019-01-07 RX ADMIN — INSULIN LISPRO 1 UNITS: 100 INJECTION, SOLUTION INTRAVENOUS; SUBCUTANEOUS at 23:52

## 2019-01-07 RX ADMIN — PANCRELIPASE 24000 UNITS: 24000; 76000; 120000 CAPSULE, DELAYED RELEASE PELLETS ORAL at 16:00

## 2019-01-07 RX ADMIN — CHOLECALCIFEROL TAB 10 MCG (400 UNIT) 400 UNITS: 10 TAB at 08:03

## 2019-01-07 RX ADMIN — HEPARIN SODIUM 5000 UNITS: 5000 INJECTION INTRAVENOUS; SUBCUTANEOUS at 05:26

## 2019-01-07 RX ADMIN — MELATONIN 3 MG: at 22:10

## 2019-01-07 RX ADMIN — INSULIN LISPRO 1 UNITS: 100 INJECTION, SOLUTION INTRAVENOUS; SUBCUTANEOUS at 06:32

## 2019-01-07 RX ADMIN — INSULIN LISPRO 2 UNITS: 100 INJECTION, SOLUTION INTRAVENOUS; SUBCUTANEOUS at 13:16

## 2019-01-07 RX ADMIN — INSULIN LISPRO 1 UNITS: 100 INJECTION, SOLUTION INTRAVENOUS; SUBCUTANEOUS at 18:11

## 2019-01-07 NOTE — ASSESSMENT & PLAN NOTE
Jevity through NGT    Malnutrition Findings:   Malnutrition type: Chronic illness (past medical history significant for pancreatic mass )  Degree of Malnutrition: Malnutrition of moderate degree (evidenced by 11% wt loss < 3mo, slight depression at [place] region, poor po intake <75% energy intake compared to estimated needs for >1 mo, treated with diet and supplements)    BMI Findings:  BMI Classifications: Underweight < 18 5     Body mass index is 17 61 kg/m²

## 2019-01-07 NOTE — ASSESSMENT & PLAN NOTE
· Per biopsy report from surgical procedure 11/2018 "benign pancreatic tissue with chronic pancreatitis      · Patient with weight loss and diarrhea after procedure  Follows with Dr Huong Garcia and was to follow up 12/24/2018, however missed appointment  · GI evaluated patient this admission due to diarrhea and weight loss   Patient started on Creon  · Recommended outpatient colonoscopy

## 2019-01-07 NOTE — PROGRESS NOTES
Progress Note - Vickie Thomas 1935, 82 y o  male MRN: [id number]    Unit/Bed#: [facility] 530-01 Encounter: [id number]    Primary Care Provider: Chaka Costa DO   Date and time admitted to hospital: 1/1/2019  8:59 PM        Atrial flutter ([facility] )   Assessment & Plan    briefly  Now NSR  Appreciate cardio input - flutter in setting of sepsis hence cont to monitor, if needed can use BB  Will hold off on BB for now while on Mestinon and w/ nl HR  Check echo - EF of 60% with grade 1 diastolic dysfunction  hold off AC due to brief episode, CHADS-Vasc 3  Per neuro, dysphagia symptoms are not TIA/CVA related       Moderate protein-calorie malnutrition ([facility] )   Assessment & Plan    Jevity through NGT    Malnutrition Findings:   Malnutrition type: Chronic illness (past medical history significant for pancreatic mass )  Degree of Malnutrition: Malnutrition of moderate degree (evidenced by 11% wt loss < 3mo, slight depression at [place] region, poor po intake <75% energy intake compared to estimated needs for >1 mo, treated with diet and supplements)    BMI Findings:  BMI Classifications: Underweight < 18 5     Body mass index is 17 61 kg/m²  Dysphagia   Assessment & Plan    · New onset severe pharyngeal dysphagia  Family also notes dysarthria  · Speech performed video barium swallow-neurologic cause is suspected  · Remain NPO per speech recs  · CT head negative  · F/U Acetylcholine receptor antibodies, MuSK antibodies  · Neurology consult appreciated   · s/p VBS 20 min after giving neostigmine that showed minimal improvement  NGT placed and pt will be given Mestinon trial while we await MG testing  · MRI brain nonrevealing  · Discussed with family if no reversible cause found and dysphagia not improving then would need feeding tube vs comfort care  · Neurology discontinuing Mestinon as no clinical improvement do  · Antibody results still pending    Briefly brought up discussion about possible irreversible and non treatable etiology of his dysphagia and need for PEG tube placement versus options about comfort care and advised his wife and the patient to think about it     Respiratory failure [facility])   Assessment & Plan    · Acute hypoxic respiratory failure secondary to pneumonia and probable ongoing aspiration  · Wean O2 as tolerated  · NPO currently  · Added flagyl     Acute kidney injury ([facility] )   Assessment & Plan    · Present on admission, Cr 0 99 with baseline 0 5  In setting of presumed pneumonia and also with diarrhea reported  · Creatinine improved with IVFs     Pneumonia   Assessment & Plan    · With recent hospitalization in November for surgical procedure, however still would be considered low risk Hcap  · Strep pneumo antigen positive  Continue Rocephin D-6/7  · Procal trending down, but pt still on O2  · CT chest showed severe multifocal aspiration PNA  Appreciate pulm input - agree with adding flagyl D-2     Other chronic pancreatitis [facility])   Assessment & Plan    · Per biopsy report from surgical procedure 11/2018 "benign pancreatic tissue with chronic pancreatitis      · Patient with weight loss and diarrhea after procedure  Follows with Dr Reyes Simas and was to follow up 12/24/2018, however missed appointment  · GI evaluated patient this admission due to diarrhea and weight loss  Patient started on Creon  · Recommended outpatient colonoscopy     Weight loss   Assessment & Plan    · Per family weight loss of approximately 12 lb over 5 weeks with associated diarrhea  · Will obtain nutrition consult, placed on Jevity     Diabetes mellitus ([facility] )   Assessment & Plan    Lab Results   Component Value Date    HGBA1C 6 3 10/30/2018       Blood Sugar Average: Last 72 hrs:     · Hold home metformin, began SSI with Accu-Cheks while inpatient    · Monitor sugars     * Sepsis (HCC)   Assessment & Plan    · Severe sepsis, POA, as evidenced by tachycardia, leukocytosis, elevated lactic acid  · secondary to pneumonia  · Hemodynamically stable  · Procalcitonin trending down  · Blood cultures negative         [facility] Internal Medicine Progress Note  Patient: Sondra Castillo 82 y o  male   MRN: [id number]  PCP: Vianey Kahn DO  Unit/Bed#: [facility] 530-01 Encounter: [id number]  Date Of Visit: 19    Assessment:    Principal Problem:    Sepsis ([address] )  Active Problems:    Diabetes mellitus ([address] )    Weight loss    Other chronic pancreatitis (HCC)    Pneumonia    Acute kidney injury ([address] )    Respiratory failure ([address] )    Dysphagia    Hypokalemia    Malnutrition (HCC)    Moderate protein-calorie malnutrition ([address] )    Atrial flutter ([address] )           VTE Pharmacologic Prophylaxis:   Pharmacologic: Heparin  Mechanical VTE Prophylaxis in Place: Yes    Patient Centered Rounds: I have performed bedside rounds with nursing staff today  Discussions with Specialists or Other Care Team Provider:     Education and Discussions with Family / Patient:  Patient, wife, sister and brother-in-law    Time Spent for Care: 30 minutes  More than 50% of total time spent on counseling and coordination of care as described above  Current Length of Stay: 6 day(s)    Current Patient Status: Inpatient   Certification Statement: The patient will continue to require additional inpatient hospital stay due to IV antibiotics, definitive plan on nutrition    Discharge Plan / Estimated Discharge Date:  Next few days    Code Status: Level 1 - Full Code      Subjective:   Feeling weak, unable to bring up sputum    Objective:     Vitals:   Temp (24hrs), Av °F (36 7 °C), Min:97 7 °F (36 5 °C), Max:98 4 °F (36 9 °C)    Temp:  [97 7 °F (36 5 °C)-98 4 °F (36 9 °C)] 98 1 °F (36 7 °C)  HR:  [66-76] 76  Resp:  [18-20] 18  BP: (123-162)/(58-78) 162/78  SpO2:  [94 %-97 %] 97 %  Body mass index is 17 61 kg/m²  Input and Output Summary (last 24 hours):        Intake/Output Summary (Last 24 hours) at 19 1641  Last data filed at 19 1439   Gross per 24 hour   Intake             2108 ml   Output             1040 ml   Net             1068 ml       Physical Exam:     Physical Exam   Constitutional: He is oriented to person, place, and time  He appears well-developed and well-nourished  HENT:   Head: Normocephalic and atraumatic  Mouth/Throat: Oropharynx is clear and moist    Cardiovascular: Normal rate, regular rhythm and normal heart sounds  Pulmonary/Chest: Effort normal  No respiratory distress  He has no wheezes  He has no rales  Abdominal: Soft  He exhibits no distension  There is no tenderness  Musculoskeletal: He exhibits no edema  Neurological: He is alert and oriented to person, place, and time  Vitals reviewed  Additional Data:     Labs:      Results from last 7 days  Lab Units 01/06/19  0427 01/05/19  0516   WBC Thousand/uL 9 29 10 59*   HEMOGLOBIN g/dL 10 3* 9 7*   HEMATOCRIT % 32 8* 30 4*   PLATELETS Thousands/uL 253 240   NEUTROS PCT %  --  78*   LYMPHS PCT %  --  12*   MONOS PCT %  --  7   EOS PCT %  --  1       Results from last 7 days  Lab Units 01/06/19  0427  01/01/19  2137   POTASSIUM mmol/L 4 0  < > 3 3*   CHLORIDE mmol/L 108  < > 95*   CO2 mmol/L 29  < > 26   BUN mg/dL 8  < > 28*   CREATININE mg/dL 0 51*  < > 0 99   CALCIUM mg/dL 7 8*  < > 8 1*   ALK PHOS U/L  --   --  101   ALT U/L  --   --  34   AST U/L  --   --  45   < > = values in this interval not displayed  Results from last 7 days  Lab Units 01/01/19  2137   INR  1 04       * I Have Reviewed All Lab Data Listed Above  * Additional Pertinent Lab Tests Reviewed: All Labs Within Last 24 Hours Reviewed    Imaging:    Imaging Reports Reviewed Today Include:   Imaging Personally Reviewed by Myself Includes:      Recent Cultures (last 7 days):       Results from last 7 days  Lab Units 01/02/19  0608 01/01/19  2359 01/01/19  2358 01/01/19 2137   BLOOD CULTURE   --   --   --  No Growth After 5 Days  No Growth After 5 Days     INFLUENZA B PCR   --  None Detected --   --    RSV PCR   --  None Detected  --   --    LEGIONELLA URINARY ANTIGEN   --   --  Negative  --    C DIFF TOXIN B  NEGATIVE for C difficle toxin by PCR    --   --   --        Last 24 Hours Medication List:     Current Facility-Administered Medications:  cefTRIAXone 1,000 mg Intravenous Q24H Eliseo James MD Last Rate: 1,000 mg (01/06/19 2210)   cholecalciferol 400 Units Oral Daily Angela Edmonds MD    heparin (porcine) 5,000 Units Subcutaneous [facility] Eliseo James MD    insulin lispro 1-5 Units Subcutaneous Q6H Roda Eisenmenger, PA-C    melatonin 3 mg Oral HS Delvin Alexander DO    metroNIDAZOLE 500 mg Intravenous Q8H Solomon Martinez MD Last Rate: Stopped (01/07/19 1214)   pancrelipase (Lip-Prot-Amyl) 24,000 Units Oral BID With Meals Angela Edmonds MD    pantoprazole 40 mg Oral Early Morning Eliseo James MD         Today, Patient Was Seen By: Channing Mendieta MD    ** Please Note: This note has been constructed using a voice recognition system   **

## 2019-01-07 NOTE — ASSESSMENT & PLAN NOTE
· With recent hospitalization in November for surgical procedure, however still would be considered low risk Hcap  · Strep pneumo antigen positive  Continue Rocephin D-6/7  · Procal trending down, but pt still on O2  · CT chest showed severe multifocal aspiration PNA   Appreciate pulm input - agree with adding flagyl D-2

## 2019-01-07 NOTE — PROGRESS NOTES
Progress Note - Pulmonary   David Alonso 82 y o  male MRN: [id number]  Unit/Bed#: [facility] 530-01 Encounter: [id number]    Assessment/Plan:    1  Acute hypoxic respiratory failure secondary to aspiration pneumonia       *  present on admission- 84%, patient currently has no home O2 requirement       *  currently on 3 L-, 96%, please continue to titrate accordingly to keep O2 sats greater than 88%       *  encourage pulmonary toileting, IS, deep breathing coughing, flutter, OOB as tolerated    2  Bilateral Pneumonia likely secondary to aspiration      *  Continue day#6 ceftriaxone, day#2- Flagyl      *  procalcitonin trending down 0 91, WBC unremarkable      *  UA positive for strep pneumoniae- patient was not continued to clinically improve, may consider ID consult or resistant strep pneumonia      *  sputum still needs be collected    3  Bilateral pleural effusion likely uncomplicated parapneumonic       *  will continue to monitor for any increasing acute signs of respiratory failure      *  no need for thoracentesis at this time    4  Dysphagia      *  VBS suggest neurological cause for dysphagia      *  patient remains NPO with NG tube      *  neurology consulted    Chief Complaint:    "I'm still having a difficult time breathing"    Subjective:    Yanna Powers stated that he still having significant issues with his respiratory status  Patient reports he is not at his respiratory baseline  He currently denies fever, chills, night sweats, hemoptysis, chest pain, palpitations, headache, or leg swelling  Objective:    Vitals: Blood pressure 142/74, pulse 66, temperature 97 7 °F (36 5 °C), temperature source Oral, resp  rate 18, height 5' 7" (1 702 m), weight 51 kg (112 lb 7 oz), SpO2 96 %  3L,Body mass index is 17 61 kg/m²        Intake/Output Summary (Last 24 hours) at 01/07/19 1305  Last data filed at 01/07/19 1214   Gross per 24 hour   Intake             1628 ml   Output             1089 ml   Net              539 ml Invasive Devices     Peripheral Intravenous Line            Peripheral IV 01/04/19 Left Arm 2 days          Drain            NG/OG/Enteral Tube Nasogastric 18 Fr Right nares 1 day    External Urinary Catheter Medium less than 1 day                Physical Exam: Physical Exam   Constitutional: He is oriented to person, place, and time  He appears cachectic  He is cooperative  HENT:   Head: Normocephalic and atraumatic  NG tube   Neck: Normal range of motion  Neck supple  Cardiovascular: Normal rate and regular rhythm  Exam reveals no gallop and no friction rub  No murmur heard  Pulmonary/Chest: Accessory muscle usage present  No tachypnea and no bradypnea  He is in respiratory distress  He has no decreased breath sounds  He has no wheezes  He has no rhonchi  He has rales  He exhibits no tenderness  Abdominal: Soft  Bowel sounds are normal  He exhibits no distension  There is no tenderness  Musculoskeletal: Normal range of motion  He exhibits no edema or deformity  Neurological: He is alert and oriented to person, place, and time  Skin: Skin is warm and dry  Psychiatric: He has a normal mood and affect  His behavior is normal        Labs:  I have personally reviewed pertinent lab results 01/06/2019    Imaging and other studies: I have personally reviewed pertinent films in PACS     CT chest 01/05/2019-ground-glass and consolidative airspace opacities throughout the lungs most and in the dependent lung and more severe on the left than the right

## 2019-01-07 NOTE — ASSESSMENT & PLAN NOTE
briefly  Now NSR  Appreciate cardio input - flutter in setting of sepsis hence cont to monitor, if needed can use BB  Will hold off on BB for now while on Mestinon and w/ nl HR  Check echo - EF of 60% with grade 1 diastolic dysfunction  hold off AC due to brief episode, CHADS-Vasc 3    Per neuro, dysphagia symptoms are not TIA/CVA related

## 2019-01-07 NOTE — UTILIZATION REVIEW
Continued Stay Review    Date:  19 ACUTE MED SURG LEVEL OF CARE    Vital Signs:   Temp (24hrs), Av 8 °F (36 6 °C), Min:97 5 °F (36 4 °C), Max:98 3 °F (36 8 °C)   Temp:  [97 5 °F (36 4 °C)-98 3 °F (36 8 °C)] 98 3 °F (36 8 °C)  HR:  [90-95] 92  Resp:  [18] 18  BP: (126-161)/(75-88) 126/77  SpO2:  [91 %-97 %] 91 %  Body mass index is 17 61 kg/m²         Input and Output Summary (last 24 hours):   Last data filed at 19    Gross per 24 hour   Intake           3357 5 ml   Output              900 ml   Net           2457 5 ml       Diet Enteral/Parenteral; Tube Feeding No Oral Diet; Jevity 1 2 Randy; Continuous; 60; 125; Water; Every 6 hours       Continuous IV Infusions:     IVF dextrose 5 % and sodium chloride 0 9 % infusion at 50 cc/hr      Medications:   Scheduled Meds:   Current Facility-Administered Medications:  cefTRIAXone 1,000 mg Intravenous Q24H  Last Rate: 1,000 mg    cholecalciferol 400 Units Oral Daily     heparin (porcine) 5,000 Units Subcutaneous Q8H [address]     insulin lispro 1-5 Units Subcutaneous Q6H     melatonin 3 mg Oral HS     metroNIDAZOLE 500 mg Intravenous Q8H  Last Rate: Stopped    pancrelipase (Lip-Prot-Amyl) 24,000 Units Oral BID With Meals     pantoprazole 40 mg Oral Early Morning     pyridostigmine 60 mg Oral TID         PRN Meds:       LABS/Diagnostic Results:   Results from last 7 days  Lab Units 19  0516   WBC Thousand/uL 10 59*   HEMOGLOBIN g/dL 9 7*   HEMATOCRIT % 30 4*   PLATELETS Thousands/uL 240   NEUTROS PCT % 78*   LYMPHS PCT % 12*   MONOS PCT % 7   EOS PCT % 1       Results from last 7 days  Lab Units 19  0516   19  2137   POTASSIUM mmol/L 3 1*  < > 3 3*   CHLORIDE mmol/L 104  < > 95*   CO2 mmol/L 29  < > 26   BUN mg/dL 8  < > 28*   CREATININE mg/dL 0 42*  < > 0 99   CALCIUM mg/dL 7 9*  < > 8 1*   ALK PHOS U/L  --   --  101   ALT U/L  --   --  34   AST U/L  --   --  45     Results from last 7 days  Lab Units 19  2137   INR   1 04      Microbiology  Results from last 7 days  Lab Units 01/02/19  0608 01/01/19  2359 01/01/19  2358 01/01/19  2137   BLOOD CULTURE    --   --   --  No Growth at 72 hrs  No Growth at 72 hrs  INFLUENZA B PCR    --  None Detected  --   --    RSV PCR    --  None Detected  --   --    LEGIONELLA URINARY ANTIGEN    --   --  Negative  --    C DIFF TOXIN B   NEGATIVE for C difficle toxin by PCR    --   --         Age/Sex: 82 y o  male       Assessment/Plan:  * Sepsis ([facility] )   Assessment & Plan     · Severe sepsis, POA, as evidenced by tachycardia, leukocytosis, elevated lactic acid  · secondary to pneumonia  · Hemodynamically stable  · Procalcitonin trending down  · Blood cultures negative      Dysphagia   Assessment & Plan     · New onset severe pharyngeal dysphagia  Family also notes dysarthria  · Speech performed video barium swallow-neurologic cause is suspected  · Remain NPO per speech recs  · CT head negative  · F/U Acetylcholine receptor antibodies, MuSK antibodies  · Neurology consult appreciated   · Today did repeat VBS 20 min after giving neostigmine that showed minimal improvement  NGT placed and pt will be given Mestinon trial while we await MG testing  · MRI brain nonrevealing  · Discussed with family if no reversible cause found and dysphagia not improving then would need feeding tube vs comfort care  · While awaiting further testing, NGT placed       Atrial flutter ([facility] )   Assessment & Plan    Noted today  Pt is rate controlled at this time w/o meds  Will hold off on BB for now while on Mestinon and w/ nl HR  Check echo  Cards consult  No need for IV heparin as CHADS-Vasc only 3    Per neuro, dysphagia symptoms are not TIA/CVA related         Moderate protein-calorie malnutrition ([facility] )   Assessment & Plan     Jevity through NGT     Malnutrition Findings:   Malnutrition type: Chronic illness (past medical history significant for pancreatic mass )  Degree of Malnutrition: Malnutrition of moderate degree (evidenced by 11% wt loss < 3mo, slight depression at [place] [facility], poor po intake <75% energy intake compared to estimated needs for >1 mo, treated with diet and supplements)     BMI Findings:  BMI Classifications: Underweight < 18 5     Body mass index is 17 61 kg/m²           Respiratory failure (HCC)   Assessment & Plan     · Acute hypoxic respiratory failure secondary to pneumonia and probable ongoing aspiration  · Wean O2 as tolerated  · NPO currently      Acute kidney injury ([facility] )   Assessment & Plan     · Present on admission, Cr 0 99 with baseline 0 5  In setting of presumed pneumonia and also with diarrhea reported  · Creatinine improved with IVFs      Pneumonia   Assessment & Plan     · With recent hospitalization in November for surgical procedure, however still would be considered low risk Hcap  · Strep pneumo antigen positive  Continue Rocephin  · Procal trending down, but pt still on 5L O2  · CT chest today showed severe multifocal PNA  Consulted pulm for recs as pt has severe PNA and only minimally improving with Ropcehin      Other chronic pancreatitis [facility])   Assessment & Plan     · Per biopsy report from surgical procedure 11/2018 "benign pancreatic tissue with chronic pancreatitis      · Patient with weight loss and diarrhea after procedure  Follows with Dr She Wilson and was to follow up 12/24/2018, however missed appointment  · GI evaluated patient this admission due to diarrhea and weight loss  Patient started on Creon  · Recommended outpatient colonoscopy      Weight loss   Assessment & Plan     · Per family weight loss of approximately 12 lb over 5 weeks with associated diarrhea  · Will obtain nutrition consult, placed on Jevity      Diabetes mellitus ([facility] )   Assessment & Plan             Lab Results   Component Value Date     HGBA1C 6 3 10/30/2018         Blood Sugar Average: Last 72 hrs:     · Hold home metformin, began SSI with Accu-Cheks while inpatient    · Monitor sugars on dextrose gtt/Jevity            VTE Pharmacologic Prophylaxis:   Pharmacologic: Heparin  Mechanical VTE Prophylaxis in Place: Yes     Current Length of Stay: 4 day(s)     Current Patient Status: Inpatient   Certification Statement: The patient will continue to require additional inpatient hospital stay due to need for NGT           Discharge Plan:   ANTICIPATE DISCHARGE TO INPPATIENT REHAB - WHEN MEDICALLY CLEARED    *PER PT 1/4/19  Plan   Treatment/Interventions Gait training;Bed mobility; Equipment eval/education;Patient/family training;LE strengthening/ROM; Functional transfer training; Therapeutic exercise; Endurance training   PT Frequency Other (Comment)  (3-5x/wk)   Recommendation   Recommendation Post acute IP rehab     CASE MANAGEMENT FOLLOWING CLOSELY FOR ALL DISCHARGE NEEDS

## 2019-01-07 NOTE — SPEECH THERAPY NOTE
Speech/Language Pathology Progress Note    Patient Name: Sonia Rob  [id number] Date: 1/7/2019     SLP spoke with pt's RN  Pt had repeat VBS with trial of MG med, however there was minimal improvement in swallow function  Neurology continuing their workup  As pt continues to have significant dysphagia, no PO trials offered today  NGT in place for nutrition  ST will continue to follow case and reassess as appropriate

## 2019-01-07 NOTE — PROGRESS NOTES
Progress Note - Neurology   Isabell Mcgarry 82 y o  male MRN: [id number]  Unit/Bed#: [facility] 530-01 Encounter: [id number]    Assessment:  1  Approximate 1 year history of dysphagia and 6 month history of dysarthria  Also with 40-50 lb unintentional weight loss over the past year  No improvement with trial of neostigmine or Mestinon  Question alternate neuromuscular disease, e g , bulbar ALS, though the patient is not exhibiting fasciculations  Small acute/subacute right hemispheric infarct would not account for his current symptoms  2  Non malignant pancreatic mass, followed by GI, felt to be on the basis of chronic pancreatitis  Plan:  1  As no improvement with trial of Mestinon, will discontinue  Still awaiting pending myasthenia gravis panel  2  If able, would like EMG for confirmation of suspected neuromuscular disease  May need to get this on an outpatient basis  Would prefer it performed by neuromuscular specialist   3  The family and patient to consider placement of PEG tube as patient is still NPO per speech therapy  Subjective:   Patient is an 82-year-old male with past medical history of non malignant pancreatic mass and diabetes being further evaluated for approximate 1 year history of dysphagia and approximate 6 month history of dysarthria  He has also had a 40-50 lb unintentional weight loss in the past year  Video barium swallow study revealed severe pharyngeal dysphagia with swallow delay, absent epiglottic movement and tight UES  He has had trial on IV neostigmine, with no significant improvement  He has since been trialed on Mestinon 60 mg t i d , again with no significant improvement with regard to his dysphagia or dysarthria  He says that he has been evaluated by neurologist Alcides, though I can find no record of this in Epic  He also believes he has had an EMG study  Currently, feeding by NG tube  There is consideration for PEG    CT chest with ground-glass and consolidative airspace opacities throughout the lungs suspicious for severe multifocal aspiration pneumonia, and small left greater than right pleural effusions  MRI brain with no acute ischemia by radiologist report (by personal review, does appear to have acute/subacute small right hemisphere infarct, which would not account for current symptoms) but does demonstrate 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  Review of Systems   Constitutional: Positive for unexpected weight change (40-50 lbs in the last year)  HENT: Positive for trouble swallowing (x 1 year per patient) and voice change (x 6 months per patient)  Eyes: Negative  Denies diplopia   Respiratory: Positive for shortness of breath  Cardiovascular: Negative  Gastrointestinal: Negative  Endocrine: Negative  Genitourinary: Negative  Musculoskeletal: Negative  Skin: Negative for rash  Allergic/Immunologic: Negative  Neurological: Positive for weakness (generalized)  As above  Hematological: Negative  Psychiatric/Behavioral: Negative  Vitals: Blood pressure 142/74, pulse 66, temperature 97 7 °F (36 5 °C), temperature source Oral, resp  rate 18, height 5' 7" (1 702 m), weight 51 kg (112 lb 7 oz), SpO2 96 %  Physical Exam:   Thin in appearance with generalized decreased muscle bulk  NG tube in place  Patient is alert and pleasantly interactive  No obvious symbolic language difficulty in general conversation  Does demonstrate with moderate dysarthria with quiet, muffled voice  Fully oriented  No obvious tongue or body fasciculations  Tone normal   Asymmetric uvula, unchanged  Left facial asymmetry noted  Pupils small, left slightly smaller than right, but both round and reactive to light  No ptosis with prolonged upward gaze testing  No double vision with EOM testing  Good  strength bilaterally    Strength appeared full with bilateral biceps flexion but did demonstrate weakness with triceps extension bilaterally  Mild bilateral psoas weakness with good strength in the bilateral quadriceps and with ankle dorsiflexion and plantar flexion  Sensory testing grossly intact to pin and light touch throughout  Diffusely hyporeflexic throughout  Toe responses appeared downgoing bilaterally  Lab, Imaging and other studies:   CBC:   Results from last 7 days  Lab Units 01/06/19 0427 01/05/19 0516 01/04/19  0551   WBC Thousand/uL 9 29 10 59* 10 97*   RBC Million/uL 3 41* 3 22* 3 26*   HEMOGLOBIN g/dL 10 3* 9 7* 9 8*   HEMATOCRIT % 32 8* 30 4* 31 2*   MCV fL 96 94 96   PLATELETS Thousands/uL 253 240 213   , BMP/CMP:   Results from last 7 days  Lab Units 01/06/19 0427 01/05/19 0516 01/04/19  0551  01/01/19  2137   SODIUM mmol/L 142 140 138  < > 131*   POTASSIUM mmol/L 4 0 3 1* 3 5  < > 3 3*   CHLORIDE mmol/L 108 104 104  < > 95*   CO2 mmol/L 29 29 28  < > 26   BUN mg/dL 8 8 10  < > 28*   CREATININE mg/dL 0 51* 0 42* 0 51*  < > 0 99   CALCIUM mg/dL 7 8* 7 9* 8 1*  < > 8 1*   AST U/L  --   --   --   --  45   ALT U/L  --   --   --   --  34   ALK PHOS U/L  --   --   --   --  101   EGFR ml/min/1 73sq m 101 109 101  < > 71   < > = values in this interval not displayed  , TSH:   Results from last 7 days  Lab Units 01/06/19 0427   TSH 3RD GENERATON uIU/mL 1 730    I have personally reviewed pertinent reports  and I have personally reviewed pertinent films in PACS    Counseling / Coordination of Care  I spent a total of 30 min with the patient with greater than 50% of that time spent counseling and coordinating his care, specifically discussing his diagnosis, additional tests, and discussing with team, as detailed above

## 2019-01-07 NOTE — PROGRESS NOTES
General Cardiology   Progress Note -  Team One   Kera Giang 82 y o  male MRN: [id number]    Unit/Bed#: [facility] 530-01 Encounter: [id number]    Assessment/ Plan    Paroxysmal atrial flutter  Likely in the setting of sepsis/acute illness and malnutrition  Not a candidate for invasive management of flutter  [id number], he is a poor AC candidate due to current medical status  If the flutter recurs, would consider starting AC  Recommend use of BB if needed for rate control  TSH checked- WNL  Echo- EF 60%, no regional wall motion abnormalities, grade 1 diastolic dysfunction, mild TR with peak PA pressure 42 mmHg suggesting mild pulmonary htn  Dilatation of ascending aorta, 3 9 cm  Tele reviewed- no recurrence, sinus rhythm/sinus bradycardia with PACs      Sepsis   Secondary to pneumonia- possible aspiration pna  He remains afebrile  IV ceftriaxone and flagyl per primary team    Dysphagia  New onset- CT and MRI brain nonrevealing  Currently receiving tube feeds through NGT for nutrition    Acute hypoxic respiratory failure  Secondary to pneumonia  Currently requiring 3LNC    AMELIA, resolved  On admission, likely secondary to diarrhea  Creatinine now at baseline, 0 51 after receiving IVF    Subjective  Review of Systems   Constitution: Positive for weakness and weight loss  Negative for malaise/fatigue  Cardiovascular: Negative for chest pain, dyspnea on exertion, irregular heartbeat, leg swelling, palpitations and syncope  Respiratory: Negative for cough, shortness of breath and sputum production  Gastrointestinal: Negative for bloating, nausea and vomiting  Neurological: Negative for dizziness and light-headedness  All other systems reviewed and are negative  Objective:   Vitals: Blood pressure 142/74, pulse 66, temperature 97 7 °F (36 5 °C), temperature source Oral, resp  rate 18, height 5' 7" (1 702 m), weight 51 kg (112 lb 7 oz), SpO2 96 %  ,     Body mass index is 17 61 kg/m²  ,     Systolic (86ZXU), Av , Min:123 , XGF:175     Diastolic (70WIU), LSS:71, Min:58, Max:74      Intake/Output Summary (Last 24 hours) at 19 1231  Last data filed at 19 1214   Gross per 24 hour   Intake             1728 ml   Output             1089 ml   Net              639 ml     Weight (last 2 days)     None        Telemetry Review: No significant arrhythmias seen on telemetry review  Sinus rhythm/sinus bradycardia with PACs    Physical Exam   Constitutional: He is oriented to person, place, and time  He appears cachectic  He has a sickly appearance  HENT:   NGT present   Neck: Neck supple  No JVD present  Cardiovascular: Normal rate, normal heart sounds and intact distal pulses  Exam reveals no gallop and no friction rub  No murmur heard  Pulmonary/Chest: Effort normal  No respiratory distress  Fine bibasilar crackles; 3LNC   Abdominal: Soft  Bowel sounds are normal  He exhibits no distension  Musculoskeletal: He exhibits no edema  Neurological: He is alert and oriented to person, place, and time  Skin: Skin is warm and dry  Psychiatric: He has a normal mood and affect       LABORATORY RESULTS      CBC with diff:   Results from last 7 days  Lab Units 19  0516 19  0551 19  0545 19  0431 19  0023 19  2136   WBC Thousand/uL 9 29 10 59* 10 97* 10 07 10 28*  --  15 05*   HEMOGLOBIN g/dL 10 3* 9 7* 9 8* 9 4* 8 6*  --  9 4*   HEMATOCRIT % 32 8* 30 4* 31 2* 29 7* 26 3*  --  29 0*   MCV fL 96 94 96 94 92  --  92   PLATELETS Thousands/uL 253 240 213 201 177 199 210   MCH pg 30 2 30 1 30 1 29 7 30 2  --  29 9   MCHC g/dL 31 4 31 9 31 4 31 6 32 7  --  32 4   RDW % 14 7 14 4 14 3 14 1 14 2  --  14 3   MPV fL 10 7 10 9 11 8 11 4 10 9 10 9 11 1   NRBC AUTO /100 WBCs  --  0 0 0 0  --  0     CMP:  Results from last 7 days  Lab Units 19  04219  0516 19  0551 19  0724 19  0431 19  2137   POTASSIUM mmol/L 4 0 3 1* 3 5 3 1* 3 1* 3 3* CHLORIDE mmol/L 108 104 104 102 100 95*   CO2 mmol/L 29 29 28 28 26 26   BUN mg/dL 8 8 10 15 23 28*   CREATININE mg/dL 0 51* 0 42* 0 51* 0 52* 0 62 0 99   CALCIUM mg/dL 7 8* 7 9* 8 1* 8 0* 7 4* 8 1*   AST U/L  --   --   --   --   --  45   ALT U/L  --   --   --   --   --  34   ALK PHOS U/L  --   --   --   --   --  101   EGFR ml/min/1 73sq m 101 109 101 100 93 71       BMP:  Results from last 7 days  Lab Units 19  0427 19  0516 19  0551 19  0724 19  0431 19  2137   POTASSIUM mmol/L 4 0 3 1* 3 5 3 1* 3 1* 3 3*   CHLORIDE mmol/L 108 104 104 102 100 95*   CO2 mmol/L 29 29 28 28 26 26   BUN mg/dL 8 8 10 15 23 28*   CREATININE mg/dL 0 51* 0 42* 0 51* 0 52* 0 62 0 99   CALCIUM mg/dL 7 8* 7 9* 8 1* 8 0* 7 4* 8 1*     No results found for: NTBNP      Results from last 7 days  Lab Units 19  0427 19  0516 19  0431   MAGNESIUM mg/dL 2 0 1 7 1 7      Results from last 7 days  Lab Units 19  0427   TSH 3RD GENERATON uIU/mL 1 730       Results from last 7 days  Lab Units 19  2137   INR  1 04     Lipid Profile:   No results found for: CHOL  Lab Results   Component Value Date    HDL 73 (H) 2018     Lab Results   Component Value Date    LDLCALC 73 2018     Lab Results   Component Value Date    TRIG 82 2018     Cardiac testing:   Results for orders placed during the hospital encounter of 19   Echo complete with contrast if indicated    Jaylin Hein [address]  [facility], [address] [facility]  [phone]    Transthoracic Echocardiogram  2D, M-mode, Doppler, and Color Doppler    Study date:  2019    Patient: Lani Oliveros  MR number: [id number]  Account number: [de-identified]  : 1935  Age: 82 years  Gender: Male  Status: Inpatient  Location: Bedside  Height: 67 in  Weight: 111 8 lb  BP: 146/ 69 mmHg    Indications: Atrial Flutter Atrial fibrillation      Diagnoses: I48 1 - Atrial tiffanie    Sonographer:  AYANA Alaniz  Primary Physician:  Kayden Arriola DO  Referring Physician:  Tracy Hill MD  Group:  [facility]  Interpreting Physician:  Cony Davis MD    SUMMARY    LEFT VENTRICLE:  Systolic function was normal  Ejection fraction was estimated to be 60 %  There were no regional wall motion abnormalities  Doppler parameters were consistent with abnormal left ventricular relaxation (grade 1 diastolic dysfunction)  LEFT ATRIUM:  The atrium was dilated  MITRAL VALVE:  There was mild annular calcification  TRICUSPID VALVE:  There was mild regurgitation  Estimated peak PA pressure was 42 mmHg  The findings suggest mild pulmonary hypertension  AORTA:  There was dilatation of the ascending aorta, with a maximum measured diameter of 3 9 cm  HISTORY: PRIOR HISTORY: A flutter, Pancreatitis, Respiratory failure    PROCEDURE: The procedure was performed at the bedside  This was a routine study  The transthoracic approach was used  The study included complete 2D imaging, M-mode, complete spectral Doppler, and color Doppler  The heart rate was 62 bpm,  at the start of the study  Images were obtained from the parasternal, apical, subcostal, and suprasternal notch acoustic windows  Image quality was adequate  LEFT VENTRICLE: Size was normal  Systolic function was normal  Ejection fraction was estimated to be 60 %  There were no regional wall motion abnormalities  Wall thickness was normal  DOPPLER: There was an increased relative contribution  of atrial contraction to ventricular filling  Doppler parameters were consistent with abnormal left ventricular relaxation (grade 1 diastolic dysfunction)  RIGHT VENTRICLE: The size was normal  Systolic function was normal  Wall thickness was normal     LEFT ATRIUM: The atrium was dilated  RIGHT ATRIUM: Size was normal     MITRAL VALVE: There was mild annular calcification   Valve structure was normal  There was normal leaflet separation  DOPPLER: The transmitral velocity was within the normal range  There was no evidence for stenosis  There was trace  regurgitation  AORTIC VALVE: The valve was trileaflet  Leaflets exhibited normal thickness, mild calcification, normal cuspal separation, and sclerosis  DOPPLER: Transaortic velocity was within the normal range  There was no evidence for stenosis  There  was no significant regurgitation  TRICUSPID VALVE: The valve structure was normal  There was normal leaflet separation  DOPPLER: The transtricuspid velocity was within the normal range  There was no evidence for stenosis  There was mild regurgitation  Estimated peak PA  pressure was 42 mmHg  The findings suggest mild pulmonary hypertension  PULMONIC VALVE: Leaflets exhibited normal thickness, no calcification, and normal cuspal separation  DOPPLER: The transpulmonic velocity was within the normal range  There was trace regurgitation  PERICARDIUM: There was no pericardial effusion  The pericardium was normal in appearance  AORTA: There was dilatation of the ascending aorta, with a maximum measured diameter of 3 9 cm  SYSTEMIC VEINS: IVC: The inferior vena cava was normal in size      SYSTEM MEASUREMENT TABLES    2D  %FS: 28 97 %  Ao Diam: 3 15 cm  EDV(Teich): 62 71 ml  EF(Teich): 56 4 %  ESV(Teich): 27 34 ml  IVSd: 0 91 cm  LA Area: 23 79 cm2  LA Diam: 3 78 cm  LVEDV MOD A4C: 37 23 ml  LVEF MOD A4C: 57 61 %  LVESV MOD A4C: 15 78 ml  LVIDd: 3 82 cm  LVIDs: 2 71 cm  LVLd A4C: 7 71 cm  LVLs A4C: 6 59 cm  LVPWd: 0 85 cm  RA Area: 19 1 cm2  RVIDd: 3 39 cm  SV MOD A4C: 21 45 ml  SV(Teich): 35 37 ml    CW  TR Vmax: 3 08 m/s  TR maxP 93 mmHg    MM  TAPSE: 2 77 cm    PW  E': 0 06 m/s  E/E': 11 82  MV A Louie: 0 9 m/s  MV Dec [place]: 3 42 m/s2  MV DecT: 217 08 ms  MV E Louie: 0 74 m/s  MV E/A Ratio: 0 82  MV PHT: 62 95 ms  MVA By PHT: 3 49 cm2    [facility]    Prepared and electronically signed by    Wood Melendez MD  Signed 06-Jan-2019 13:16:10       No results found for this or any previous visit  No results found for this or any previous visit  No procedure found  No results found for this or any previous visit  Meds/Allergies   all current active meds have been reviewed and current meds:   Current Facility-Administered Medications   Medication Dose Route Frequency    ceftriaxone (ROCEPHIN) 1 g/50 mL in dextrose IVPB  1,000 mg Intravenous Q24H    cholecalciferol (VITAMIN D3) tablet 400 Units  400 Units Oral Daily    heparin (porcine) subcutaneous injection 5,000 Units  5,000 Units Subcutaneous Q8H [address]    insulin lispro (HumaLOG) 100 units/mL subcutaneous injection 1-5 Units  1-5 Units Subcutaneous Q6H    melatonin tablet 3 mg  3 mg Oral HS    metroNIDAZOLE (FLAGYL) IVPB (premix) 500 mg  500 mg Intravenous Q8H    pancrelipase (Lip-Prot-Amyl) (CREON) delayed release capsule 24,000 Units  24,000 Units Oral BID With Meals    pantoprazole (PROTONIX) EC tablet 40 mg  40 mg Oral Early Morning    pyridostigmine (MESTINON) tablet 60 mg  60 mg Oral TID     Prescriptions Prior to Admission   Medication    metFORMIN (GLUCOPHAGE) 500 mg tablet    omeprazole (PriLOSEC) 20 mg delayed release capsule      Assessment:  Principal Problem:    Sepsis ([address] )  Active Problems:    Diabetes mellitus (HCC)    Weight loss    Other chronic pancreatitis (HCC)    Pneumonia    Acute kidney injury (HCC)    Respiratory failure (HCC)    Dysphagia    Hypokalemia    Malnutrition (HCC)    Moderate protein-calorie malnutrition (HCC)    Atrial flutter ([address] )    Counseling / Coordination of Care  Total floor / unit time spent today 20 minutes  Greater than 50% of total time was spent with the patient and / or family counseling and / or coordination of care  ** Please Note: Dragon 360 Dictation voice to text software may have been used in the creation of this document  **

## 2019-01-07 NOTE — ASSESSMENT & PLAN NOTE
· New onset severe pharyngeal dysphagia  Family also notes dysarthria  · Speech performed video barium swallow-neurologic cause is suspected  · Remain NPO per speech recs  · CT head negative  · F/U Acetylcholine receptor antibodies, MuSK antibodies  · Neurology consult appreciated   · s/p VBS 20 min after giving neostigmine that showed minimal improvement  NGT placed and pt will be given Mestinon trial while we await MG testing  · MRI brain nonrevealing  · Discussed with family if no reversible cause found and dysphagia not improving then would need feeding tube vs comfort care  · Neurology discontinuing Mestinon as no clinical improvement do  · Antibody results still pending    Briefly brought up discussion about possible irreversible and non treatable etiology of his dysphagia and need for PEG tube placement versus options about comfort care and advised his wife and the patient to think about it

## 2019-01-08 ENCOUNTER — APPOINTMENT (INPATIENT)
Dept: RADIOLOGY | Facility: HOSPITAL | Age: 84
DRG: 853 | End: 2019-01-08
Payer: MEDICARE

## 2019-01-08 PROBLEM — N17.9 ACUTE KIDNEY INJURY (HCC): Status: RESOLVED | Noted: 2019-01-02 | Resolved: 2019-01-08

## 2019-01-08 LAB
ANION GAP SERPL CALCULATED.3IONS-SCNC: 3 MMOL/L (ref 4–13)
BUN SERPL-MCNC: 11 MG/DL (ref 5–25)
CALCIUM SERPL-MCNC: 7.5 MG/DL (ref 8.3–10.1)
CHLORIDE SERPL-SCNC: 100 MMOL/L (ref 100–108)
CO2 SERPL-SCNC: 35 MMOL/L (ref 21–32)
CREAT SERPL-MCNC: 0.46 MG/DL (ref 0.6–1.3)
CRP SERPL QL: 25 MG/L
FOLATE SERPL-MCNC: 18 NG/ML (ref 3.1–17.5)
GFR SERPL CREATININE-BSD FRML MDRD: 105 ML/MIN/1.73SQ M
GLUCOSE SERPL-MCNC: 238 MG/DL (ref 65–140)
GLUCOSE SERPL-MCNC: 256 MG/DL (ref 65–140)
GLUCOSE SERPL-MCNC: 262 MG/DL (ref 65–140)
GLUCOSE SERPL-MCNC: 273 MG/DL (ref 65–140)
MUSK AB SER IA-ACNC: <1 U/ML
POTASSIUM SERPL-SCNC: 4.3 MMOL/L (ref 3.5–5.3)
SODIUM SERPL-SCNC: 138 MMOL/L (ref 136–145)
TSH SERPL DL<=0.05 MIU/L-ACNC: 2.64 UIU/ML (ref 0.36–3.74)
VIT B12 SERPL-MCNC: 424 PG/ML (ref 100–900)

## 2019-01-08 PROCEDURE — 99232 SBSQ HOSP IP/OBS MODERATE 35: CPT | Performed by: INTERNAL MEDICINE

## 2019-01-08 PROCEDURE — 86038 ANTINUCLEAR ANTIBODIES: CPT | Performed by: PHYSICIAN ASSISTANT

## 2019-01-08 PROCEDURE — 86235 NUCLEAR ANTIGEN ANTIBODY: CPT | Performed by: PHYSICIAN ASSISTANT

## 2019-01-08 PROCEDURE — 71045 X-RAY EXAM CHEST 1 VIEW: CPT

## 2019-01-08 PROCEDURE — 84166 PROTEIN E-PHORESIS/URINE/CSF: CPT | Performed by: PATHOLOGY

## 2019-01-08 PROCEDURE — 97530 THERAPEUTIC ACTIVITIES: CPT

## 2019-01-08 PROCEDURE — 84166 PROTEIN E-PHORESIS/URINE/CSF: CPT | Performed by: PHYSICIAN ASSISTANT

## 2019-01-08 PROCEDURE — 86335 IMMUNFIX E-PHORSIS/URINE/CSF: CPT | Performed by: ANESTHESIOLOGY

## 2019-01-08 PROCEDURE — 86618 LYME DISEASE ANTIBODY: CPT | Performed by: PHYSICIAN ASSISTANT

## 2019-01-08 PROCEDURE — 97110 THERAPEUTIC EXERCISES: CPT

## 2019-01-08 PROCEDURE — 86592 SYPHILIS TEST NON-TREP QUAL: CPT | Performed by: PHYSICIAN ASSISTANT

## 2019-01-08 PROCEDURE — 82746 ASSAY OF FOLIC ACID SERUM: CPT | Performed by: PHYSICIAN ASSISTANT

## 2019-01-08 PROCEDURE — 82948 REAGENT STRIP/BLOOD GLUCOSE: CPT

## 2019-01-08 PROCEDURE — 82175 ASSAY OF ARSENIC: CPT | Performed by: PHYSICIAN ASSISTANT

## 2019-01-08 PROCEDURE — 99233 SBSQ HOSP IP/OBS HIGH 50: CPT | Performed by: PHYSICIAN ASSISTANT

## 2019-01-08 PROCEDURE — 86335 IMMUNFIX E-PHORSIS/URINE/CSF: CPT | Performed by: PATHOLOGY

## 2019-01-08 PROCEDURE — 82300 ASSAY OF CADMIUM: CPT | Performed by: PHYSICIAN ASSISTANT

## 2019-01-08 PROCEDURE — 84165 PROTEIN E-PHORESIS SERUM: CPT | Performed by: PHYSICIAN ASSISTANT

## 2019-01-08 PROCEDURE — 84443 ASSAY THYROID STIM HORMONE: CPT | Performed by: PHYSICIAN ASSISTANT

## 2019-01-08 PROCEDURE — 82710 FATS/LIPIDS FECES QUANT: CPT | Performed by: INTERNAL MEDICINE

## 2019-01-08 PROCEDURE — 83825 ASSAY OF MERCURY: CPT | Performed by: PHYSICIAN ASSISTANT

## 2019-01-08 PROCEDURE — 83655 ASSAY OF LEAD: CPT | Performed by: PHYSICIAN ASSISTANT

## 2019-01-08 PROCEDURE — 86334 IMMUNOFIX E-PHORESIS SERUM: CPT | Performed by: ANESTHESIOLOGY

## 2019-01-08 PROCEDURE — 84165 PROTEIN E-PHORESIS SERUM: CPT | Performed by: PATHOLOGY

## 2019-01-08 PROCEDURE — 86140 C-REACTIVE PROTEIN: CPT | Performed by: PHYSICIAN ASSISTANT

## 2019-01-08 PROCEDURE — 82607 VITAMIN B-12: CPT | Performed by: PHYSICIAN ASSISTANT

## 2019-01-08 PROCEDURE — 94762 N-INVAS EAR/PLS OXIMTRY CONT: CPT

## 2019-01-08 PROCEDURE — 86334 IMMUNOFIX E-PHORESIS SERUM: CPT | Performed by: PATHOLOGY

## 2019-01-08 PROCEDURE — 86255 FLUORESCENT ANTIBODY SCREEN: CPT | Performed by: PHYSICIAN ASSISTANT

## 2019-01-08 PROCEDURE — 80048 BASIC METABOLIC PNL TOTAL CA: CPT | Performed by: HOSPITALIST

## 2019-01-08 RX ADMIN — HEPARIN SODIUM 5000 UNITS: 5000 INJECTION INTRAVENOUS; SUBCUTANEOUS at 22:13

## 2019-01-08 RX ADMIN — HEPARIN SODIUM 5000 UNITS: 5000 INJECTION INTRAVENOUS; SUBCUTANEOUS at 13:26

## 2019-01-08 RX ADMIN — MELATONIN 3 MG: at 22:13

## 2019-01-08 RX ADMIN — INSULIN LISPRO 2 UNITS: 100 INJECTION, SOLUTION INTRAVENOUS; SUBCUTANEOUS at 13:26

## 2019-01-08 RX ADMIN — INSULIN LISPRO 2 UNITS: 100 INJECTION, SOLUTION INTRAVENOUS; SUBCUTANEOUS at 06:16

## 2019-01-08 RX ADMIN — METRONIDAZOLE 500 MG: 500 INJECTION, SOLUTION INTRAVENOUS at 13:26

## 2019-01-08 RX ADMIN — INSULIN LISPRO 2 UNITS: 100 INJECTION, SOLUTION INTRAVENOUS; SUBCUTANEOUS at 19:00

## 2019-01-08 RX ADMIN — POLYVINYL ALCOHOL 1 DROP: 14 SOLUTION/ DROPS OPHTHALMIC at 06:17

## 2019-01-08 RX ADMIN — CEFTRIAXONE 1000 MG: 1 INJECTION, POWDER, FOR SOLUTION INTRAMUSCULAR; INTRAVENOUS at 22:58

## 2019-01-08 RX ADMIN — PANCRELIPASE 24000 UNITS: 24000; 76000; 120000 CAPSULE, DELAYED RELEASE PELLETS ORAL at 08:05

## 2019-01-08 RX ADMIN — METRONIDAZOLE 500 MG: 500 INJECTION, SOLUTION INTRAVENOUS at 21:00

## 2019-01-08 RX ADMIN — PANCRELIPASE 24000 UNITS: 24000; 76000; 120000 CAPSULE, DELAYED RELEASE PELLETS ORAL at 16:30

## 2019-01-08 RX ADMIN — CHOLECALCIFEROL TAB 10 MCG (400 UNIT) 400 UNITS: 10 TAB at 08:05

## 2019-01-08 RX ADMIN — HEPARIN SODIUM 5000 UNITS: 5000 INJECTION INTRAVENOUS; SUBCUTANEOUS at 06:16

## 2019-01-08 RX ADMIN — METRONIDAZOLE 500 MG: 500 INJECTION, SOLUTION INTRAVENOUS at 04:50

## 2019-01-08 NOTE — PROGRESS NOTES
Patient was transitioned to the chair this afternoon  Since then, patient's O2 level has been 87-91%  His oxygen has been 3 liters; however, I had to increase it due to his o2 saturation dropping  Patient currently 88% on 6 liters of O2  Respiratory paged

## 2019-01-08 NOTE — ASSESSMENT & PLAN NOTE
· Per family weight loss of approximately 12 lb over 5 weeks with associated diarrhea  ·  nutrition consult, placed on Jevity

## 2019-01-08 NOTE — PROGRESS NOTES
Patient complaining of nasal dryness and also requests to blow his nose  I educated patient on the possibility of the tube potentially coming out if he blows his nose  Humidified air added for dryness

## 2019-01-08 NOTE — PLAN OF CARE
Problem: PHYSICAL THERAPY ADULT  Goal: Performs mobility at highest level of function for planned discharge setting  See evaluation for individualized goals  Treatment/Interventions: Gait training, Bed mobility, Equipment eval/education, Patient/family training, LE strengthening/ROM, Functional transfer training, Therapeutic exercise, Endurance training  Equipment Recommended: Ana Shipman       See flowsheet documentation for full assessment, interventions and recommendations  Prognosis: Fair  Problem List: Decreased strength, Decreased range of motion, Decreased endurance, Impaired balance, Decreased mobility, Decreased coordination, Impaired judgement, Decreased safety awareness  Assessment: Pt seen for skilled PT tx session consisting of therex/ theract  Pt noted to be incontinent of stool upon arrival- performed rolling in bed w/ modA x1 and bedrails for pericare and gown change- pt reporting that he was unable to tell he was incontinent  Supine<>sit w/ modA x1; sitting EOB x approx 14 mins - performed reaching and seated balance/ marching and LAQ 2x10 w/ rests b/t sets; pt xferred to chair w/ Mark via stand pivot- sit<>stnads forme chair x2 w/ Mark- pt noted to be more fatigued and SOB following - rest taken- prior to attempt at ambulation and Spo2 on 3L spot checked and noted to be 86-87%- cued for deep breathing- slow to recover and O2 increased to 4L w/ RN aware/ present / Molly Showers- Spo2 - only increasing to  89%  Gait deferred due to same- pt set up in chair w/ RN present and respiratory paged  All needs in reach and in care of nursing         Recommendation: Post acute IP rehab     PT - OK to Discharge: Yes    See flowsheet documentation for full assessment

## 2019-01-08 NOTE — ASSESSMENT & PLAN NOTE
· With recent hospitalization in November for surgical procedure, however still would be considered low risk Hcap  · Strep pneumo antigen positive  On Rocephin day 7/7  · Continue with Flagyl day 3    · Procal trending down, but pt still on O2  · CT chest showed severe multifocal aspiration PNA   Appreciate pulm

## 2019-01-08 NOTE — SOCIAL WORK
CM met with pt daughter and wife all aware PT and OT recommendation for snf   Cm provided and reviewed preferred providers list   Cm provided contact information and daughter will review list and call cm with snf choices

## 2019-01-08 NOTE — PHYSICAL THERAPY NOTE
PHYSICAL THERAPY TREATMENT       01/08/19 1400   Pain Assessment   Pain Assessment No/denies pain   Pain Score No Pain   Restrictions/Precautions   Weight Bearing Precautions Per Order No   Other Precautions Multiple lines;Telemetry;O2;Fall Risk  (NG; O2; incontinent and risk for skin breakdown )   General   Chart Reviewed Yes   Family/Caregiver Present Yes  (initially present- leaves to eat during session )   Cognition   Overall Cognitive Status Impaired   Orientation Level Oriented to person;Oriented to place   Memory Decreased recall of precautions;Decreased recall of recent events;Decreased short term memory   Following Commands Follows one step commands without difficulty   Subjective   Subjective pt agreeable to participate- noted to be incontinent- pt staes that he was unaware and not able to tell he was incontinent of stool  Bed Mobility   Rolling R 3  Moderate assistance   Additional items Assist x 1;Bedrails;Verbal cues   Rolling L 3  Moderate assistance   Additional items Assist x 1;Verbal cues; Bedrails   Supine to Sit 3  Moderate assistance   Additional items Assist x 1; Increased time required;Verbal cues   Transfers   Sit to Stand 4  Minimal assistance   Additional items Assist x 1  (w/ A of another for lines)   Stand to Sit 4  Minimal assistance   Additional items Assist x 1   Stand pivot 4  Minimal assistance   Additional items Assist x 1   Additional Comments following  therx and xfer to chair pt noted to be SOB- slow to recover w/ rest- Spo2 checked on3L and noted to be 86-87%- slow to recover- (RN made aware) and session terminated / gait deferred    Ambulation/Elevation   Gait pattern (deferred 2* decreased Spo2  )   Balance   Static Sitting Fair +   Dynamic Sitting Fair -   Static Standing Fair   Dynamic Standing Poor +   Endurance Deficit   Endurance Deficit Yes   Endurance Deficit Description easily fatigues- SOB w/ decreased Spo2 w/ activity    Activity Tolerance   Activity Tolerance Patient limited by fatigue;Treatment limited secondary to medical complications (Comment); Other (Comment)  (see annotation above )   Medical Staff Made Aware RN- Gabriella   Exercises   Hip Flexion Sitting;5 reps;Right;Left  (x3 at EOB )   Knee AROM Long Arc Quad Sitting;10 reps;Right;Left  (EOB )   Ankle Pumps Sitting;10 reps;Bilateral  (x3 EOB )   Assessment   Prognosis Fair   Problem List Decreased strength;Decreased range of motion;Decreased endurance; Impaired balance;Decreased mobility; Decreased coordination; Impaired judgement;Decreased safety awareness   Assessment Pt seen for skilled PT tx session consisting of therex/ theract  Pt noted to be incontinent of stool upon arrival- performed rolling in bed w/ modA x1 and bedrails for pericare and gown change- pt reporting that he was unable to tell he was incontinent  Supine<>sit w/ modA x1; sitting EOB x approx 14 mins - performed reaching and seated balance/ marching and LAQ 2x10 w/ rests b/t sets; pt xferred to chair w/ Mark via stand pivot- sit<>stnads forme chair x2 w/ Mark- pt noted to be more fatigued and SOB following - rest taken- prior to attempt at ambulation and Spo2 on 3L spot checked and noted to be 86-87%- cued for deep breathing- slow to recover and O2 increased to 4L w/ RN aware/ present / Jose Eduardo Garsia- Spo2 - only increasing to  89%  Gait deferred due to same- pt set up in chair w/ RN present and respiratory paged  All needs in reach and in care of nursing    Goals   Patient Goals "get back to doing everything"    [facility] Expiration Date 01/11/19   Treatment Day 1   Plan   Treatment/Interventions Functional transfer training;LE strengthening/ROM; Therapeutic exercise; Endurance training;Cognitive reorientation;Patient/family training;Equipment eval/education; Bed mobility;Spoke to nursing;Spoke to case management; Family   Progress Slow progress, decreased activity tolerance   PT Frequency (3-5x/wk )   Recommendation   Recommendation Post acute IP rehab   Equipment Recommended Wheelchair;Walker   PT - OK to Discharge Yes   Additional Comments to rehab when medically cleared      Dilcia Gerardo, PT

## 2019-01-08 NOTE — PROGRESS NOTES
Per previous nurse, patient's daughter Rony Vickers would like to be contacted to be updated on patient's care and info  I sent a message to Dr Theresa Craft with [employer]

## 2019-01-08 NOTE — PROGRESS NOTES
Progress Note - Pulmonary   Burney Oppenheim 82 y o  male MRN: [id number]  Unit/Bed#: [facility] 826-01 Encounter: [id number]    Assessment/Plan:    1  Acute hypoxic respiratory failure secondary to aspiration pneumonia      *  patient does not require home O2, currently on 3L-95%      *  continue titrate accordingly keeping O2 greater than 80%      *  continue to encourage pulmonary toileting, IS, deep breathing and coughing, flutter, consider pulmonary vest if tube feeding is on hold for 15 min or greater OOB as tolerated    2  Bilateral pneumonia likely secondary to aspiration      *  patient is significant rhonchorous breath sounds throughout, currently no indication for steroids      *  ceftriaxone day#7, Flagyl day#2      *  WBC unremarkable, afebrile, procalcitonin trending down to 0 91      *  UA positive for strep pneumoniae- patient was not continued to clinically improve, may consider ID consult or resistant strep pneumonia       *  sputum still lead to be collected    3  Bilateral pleural effusions likely uncomplicated parapneumonic      *  patient is currently showing no acute signs of respiratory distress      *  will continue to monitor and assess for any needs for thoracentesis    4  Dysphagia      *  VBS suggest neurological cause for dysphagia      *  patient repeat DBS with minimal improvement, neurology continues to follow      *  patient remains NPO with NG tube      *  Myasthenia gravis panel pending      *  family considering PEG tube        Chief Complaint:    "I want to get back into bed"    Subjective: We was comfortably sitting in the chair  He reports that he is having a difficult time clearing his mucus  He states that he feels weak and lethargic overall fatigue  Patient currently denies fever, chills, night sweats, hemoptysis, chest pain, palpitations, headache, or leg swelling      Objective:    Vitals: Blood pressure 154/79, pulse 71, temperature 97 5 °F (36 4 °C), temperature source Axillary, resp  rate 20, height 5' 7" (1 702 m), weight 50 8 kg (112 lb), SpO2 95 %  3L,Body mass index is 17 54 kg/m²  Intake/Output Summary (Last 24 hours) at 01/08/19 1433  Last data filed at 01/08/19 1431   Gross per 24 hour   Intake              405 ml   Output             1800 ml   Net            -1395 ml       Invasive Devices     Peripheral Intravenous Line            Peripheral IV 01/07/19 Left Arm less than 1 day          Drain            NG/OG/Enteral Tube Nasogastric 18 Fr Right nares 2 days    External Urinary Catheter Medium 1 day                Physical Exam:   Physical Exam   Constitutional: He is oriented to person, place, and time  He appears well-developed and well-nourished  He appears cachectic  HENT:   Head: Normocephalic and atraumatic  Neck: Normal range of motion  Neck supple  No JVD present  Cardiovascular: Normal rate and regular rhythm  Exam reveals no gallop and no friction rub  No murmur heard  Pulmonary/Chest: No accessory muscle usage or stridor  No tachypnea and no bradypnea  He is in respiratory distress  He has no decreased breath sounds  He has no wheezes  He has rhonchi in the right upper field, the right middle field, the right lower field, the left upper field, the left middle field and the left lower field  He has no rales  He exhibits no tenderness  Abdominal: Soft  Bowel sounds are normal  He exhibits no distension  There is no tenderness  Musculoskeletal: Normal range of motion  He exhibits no edema or deformity  Neurological: He is alert and oriented to person, place, and time  Skin: Skin is warm and dry  Psychiatric: He has a normal mood and affect  His behavior is normal        Labs:    I have personally reviewed pertinent lab results CMP:   Lab Results   Component Value Date    SODIUM 138 01/08/2019    K 4 3 01/08/2019     01/08/2019    CO2 35 (H) 01/08/2019    BUN 11 01/08/2019    CREATININE 0 46 (L) 01/08/2019    CALCIUM 7 5 (L) 01/08/2019    EGFR 105 01/08/2019     Imaging and other studies: I have personally reviewed pertinent films in PACS     CT chest 01/05/2019-ground-glass and consolidative airspace opacities throughout the lungs most and in the dependent lung and more severe on the left than the right

## 2019-01-08 NOTE — PROGRESS NOTES
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, though not sure the 2 are connected  Timeline less suggestive of ALS and presentation not entirely consistent with myasthenia gravis (so far, AChR binding antibody negative)  We had extensive conversation with the family regarding goals of care  They will discuss further, but at this point would want everything done including PEG tube for nutrition while we are working the patient up  For now, patient has NG tube in place  Plan:  1  We will have to see if we are able to obtain contrast MRI with thin slices through the brainstem  2  Additional workup to include autoimmune and other labs, including Lyme serology, SPEP, urine immunofixation, heavy metal screen, TSH, RPR, B12, folate, ESR, CRP, Anca, NORMA, SSA and SSB  3  Remainder of myasthenia gravis panel pending  4  Consideration for cervical spine imaging should the above studies be unrevealing  5  Will also discuss with neuromuscular subspecialist         Discussed in detail with Dr Carmen Anaya and patient's family at bedside and with patient's daughter Vazquez bhakta over the phone  Subjective:   Patient is an 21-year-old male with 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 and newly (approximately 6 months ago) diagnosis adult onset diabetes being further evaluated for dysphagia, dysarthria, and generalized weakness  We were able to obtain additional history regarding the patient's dysarthria and dysphagia from the patient's family, who was at bedside, and from his daughter over the phone    For the past year, the patient's wife has noticed a gurgling sound when the patient sleeps but has not noticed any dysarthria or been told about any trouble swallowing (despite 40-50 lb weight loss over the past year, originally attributed to his newly diagnosed diabetes) until his surgery for the pancreatic mass in November 2018  It was after that surgery that he started to have a significant problem swallowing and slurred speech  These issues have been worsening since that time  They have not noticed any significant extremity weakness until a few weeks leading up to his admission here where he was generally weak, which was worsening  Review of Systems 12 point review of systems negative except for as noted above in subjective  Vitals: Blood pressure 154/79, pulse 71, temperature 97 5 °F (36 4 °C), temperature source Axillary, resp  rate 20, height 5' 7" (1 702 m), weight 74 3 kg (163 lb 12 8 oz), SpO2 95 %  Physical Exam:   Patient appears stated age and is in no acute distress, resting comfortably in bed  Appears brighter today  NG tube in place  Alert, pleasantly interactive  No obvious symbolic language difficulty in general conversation but does have significant dysarthria with muffled slurred speech  No increased fatigue with prolonged motor testing in the upper or lower extremities  No ocular fatigue or ptosis with prolonged upward gaze testing  Unable to complete remainder of physical exam at this time  Dr Elvia Perkins to perform additional neurologic exam     Lab, Imaging and other studies:   AChR binding antibody negative  Remainder of MG panel pending  Counseling / Coordination of Care  I spent a total of 30 min with the patient with greater than 50% of that time spent counseling and coordinating his care, specifically discussing his diagnosis, additional tests, and discussing with team, as detailed above

## 2019-01-08 NOTE — ASSESSMENT & PLAN NOTE
· Per biopsy report from surgical procedure 11/2018 "benign pancreatic tissue with chronic pancreatitis      · Patient with weight loss and diarrhea after procedure  Follows with Dr Adali Sheikh and was to follow up 12/24/2018, however missed appointment  · GI evaluated patient this admission due to diarrhea and weight loss   Patient started on Creon  · Recommended outpatient colonoscopy

## 2019-01-08 NOTE — ASSESSMENT & PLAN NOTE
· Severe sepsis, POA, as evidenced by tachycardia, leukocytosis, elevated lactic acid  · secondary to pneumonia  · Hemodynamically stable  · Procalcitonin trending down  · Blood cultures negative  · Management of pneumonia as below

## 2019-01-09 ENCOUNTER — APPOINTMENT (INPATIENT)
Dept: NON INVASIVE DIAGNOSTICS | Facility: HOSPITAL | Age: 84
DRG: 853 | End: 2019-01-09
Payer: MEDICARE

## 2019-01-09 LAB
ACHR BLOCK AB/ACHR TOTAL SFR SER: 22 % (ref 0–25)
ANION GAP SERPL CALCULATED.3IONS-SCNC: 4 MMOL/L (ref 4–13)
B BURGDOR IGG SER IA-ACNC: 0.1
B BURGDOR IGM SER IA-ACNC: 0.28
BASOPHILS # BLD AUTO: 0.02 THOUSANDS/ΜL (ref 0–0.1)
BASOPHILS NFR BLD AUTO: 0 % (ref 0–1)
BUN SERPL-MCNC: 11 MG/DL (ref 5–25)
CALCIUM SERPL-MCNC: 7.6 MG/DL (ref 8.3–10.1)
CHLORIDE SERPL-SCNC: 101 MMOL/L (ref 100–108)
CHOLEST SERPL-MCNC: 59 MG/DL (ref 50–200)
CO2 SERPL-SCNC: 34 MMOL/L (ref 21–32)
CREAT SERPL-MCNC: 0.39 MG/DL (ref 0.6–1.3)
ELASTASE PANC STL-MCNT: 54 UG ELAST./G
EOSINOPHIL # BLD AUTO: 0.16 THOUSAND/ΜL (ref 0–0.61)
EOSINOPHIL NFR BLD AUTO: 2 % (ref 0–6)
ERYTHROCYTE [DISTWIDTH] IN BLOOD BY AUTOMATED COUNT: 14.8 % (ref 11.6–15.1)
ERYTHROCYTE [SEDIMENTATION RATE] IN BLOOD: 73 MM/HOUR (ref 0–10)
EST. AVERAGE GLUCOSE BLD GHB EST-MCNC: 157 MG/DL
GFR SERPL CREATININE-BSD FRML MDRD: 113 ML/MIN/1.73SQ M
GLUCOSE SERPL-MCNC: 107 MG/DL (ref 65–140)
GLUCOSE SERPL-MCNC: 186 MG/DL (ref 65–140)
GLUCOSE SERPL-MCNC: 194 MG/DL (ref 65–140)
GLUCOSE SERPL-MCNC: 256 MG/DL (ref 65–140)
GLUCOSE SERPL-MCNC: 284 MG/DL (ref 65–140)
GLUCOSE SERPL-MCNC: 310 MG/DL (ref 65–140)
HBA1C MFR BLD: 7.1 % (ref 4.2–6.3)
HCT VFR BLD AUTO: 30.6 % (ref 36.5–49.3)
HDLC SERPL-MCNC: 33 MG/DL (ref 40–60)
HGB BLD-MCNC: 9.4 G/DL (ref 12–17)
IMM GRANULOCYTES # BLD AUTO: 0.09 THOUSAND/UL (ref 0–0.2)
IMM GRANULOCYTES NFR BLD AUTO: 1 % (ref 0–2)
LDLC SERPL CALC-MCNC: 19 MG/DL (ref 0–100)
LYMPHOCYTES # BLD AUTO: 1.13 THOUSANDS/ΜL (ref 0.6–4.47)
LYMPHOCYTES NFR BLD AUTO: 12 % (ref 14–44)
MCH RBC QN AUTO: 29.9 PG (ref 26.8–34.3)
MCHC RBC AUTO-ENTMCNC: 30.7 G/DL (ref 31.4–37.4)
MCV RBC AUTO: 98 FL (ref 82–98)
MONOCYTES # BLD AUTO: 0.54 THOUSAND/ΜL (ref 0.17–1.22)
MONOCYTES NFR BLD AUTO: 6 % (ref 4–12)
NEUTROPHILS # BLD AUTO: 7.61 THOUSANDS/ΜL (ref 1.85–7.62)
NEUTS SEG NFR BLD AUTO: 79 % (ref 43–75)
NRBC BLD AUTO-RTO: 0 /100 WBCS
PLATELET # BLD AUTO: 295 THOUSANDS/UL (ref 149–390)
PMV BLD AUTO: 10.6 FL (ref 8.9–12.7)
POTASSIUM SERPL-SCNC: 4.2 MMOL/L (ref 3.5–5.3)
PROCALCITONIN SERPL-MCNC: 0.13 NG/ML
RBC # BLD AUTO: 3.14 MILLION/UL (ref 3.88–5.62)
RPR SER QL: NORMAL
RYE IGE QN: NEGATIVE
SODIUM SERPL-SCNC: 139 MMOL/L (ref 136–145)
TRIGL SERPL-MCNC: 36 MG/DL
WBC # BLD AUTO: 9.55 THOUSAND/UL (ref 4.31–10.16)

## 2019-01-09 PROCEDURE — 80061 LIPID PANEL: CPT | Performed by: INTERNAL MEDICINE

## 2019-01-09 PROCEDURE — 94762 N-INVAS EAR/PLS OXIMTRY CONT: CPT

## 2019-01-09 PROCEDURE — 94640 AIRWAY INHALATION TREATMENT: CPT

## 2019-01-09 PROCEDURE — 80048 BASIC METABOLIC PNL TOTAL CA: CPT | Performed by: INTERNAL MEDICINE

## 2019-01-09 PROCEDURE — 85025 COMPLETE CBC W/AUTO DIFF WBC: CPT | Performed by: INTERNAL MEDICINE

## 2019-01-09 PROCEDURE — 99232 SBSQ HOSP IP/OBS MODERATE 35: CPT | Performed by: INTERNAL MEDICINE

## 2019-01-09 PROCEDURE — 83036 HEMOGLOBIN GLYCOSYLATED A1C: CPT | Performed by: PSYCHIATRY & NEUROLOGY

## 2019-01-09 PROCEDURE — 84145 PROCALCITONIN (PCT): CPT | Performed by: INTERNAL MEDICINE

## 2019-01-09 PROCEDURE — 94660 CPAP INITIATION&MGMT: CPT

## 2019-01-09 PROCEDURE — 94760 N-INVAS EAR/PLS OXIMETRY 1: CPT

## 2019-01-09 PROCEDURE — 99233 SBSQ HOSP IP/OBS HIGH 50: CPT | Performed by: PSYCHIATRY & NEUROLOGY

## 2019-01-09 PROCEDURE — 82948 REAGENT STRIP/BLOOD GLUCOSE: CPT

## 2019-01-09 PROCEDURE — 85652 RBC SED RATE AUTOMATED: CPT | Performed by: PHYSICIAN ASSISTANT

## 2019-01-09 RX ORDER — ALBUTEROL SULFATE 2.5 MG/3ML
2.5 SOLUTION RESPIRATORY (INHALATION) EVERY 4 HOURS PRN
Status: DISCONTINUED | OUTPATIENT
Start: 2019-01-09 | End: 2019-01-15

## 2019-01-09 RX ORDER — ATORVASTATIN CALCIUM 40 MG/1
40 TABLET, FILM COATED ORAL
Status: DISCONTINUED | OUTPATIENT
Start: 2019-01-09 | End: 2019-01-19 | Stop reason: HOSPADM

## 2019-01-09 RX ORDER — ACETAMINOPHEN 325 MG/1
650 TABLET ORAL EVERY 6 HOURS PRN
Status: DISCONTINUED | OUTPATIENT
Start: 2019-01-09 | End: 2019-01-17

## 2019-01-09 RX ORDER — ASPIRIN 81 MG/1
81 TABLET, CHEWABLE ORAL DAILY
Status: DISCONTINUED | OUTPATIENT
Start: 2019-01-09 | End: 2019-01-17

## 2019-01-09 RX ADMIN — METRONIDAZOLE 500 MG: 500 INJECTION, SOLUTION INTRAVENOUS at 04:53

## 2019-01-09 RX ADMIN — CHOLECALCIFEROL TAB 10 MCG (400 UNIT) 400 UNITS: 10 TAB at 08:35

## 2019-01-09 RX ADMIN — METRONIDAZOLE 500 MG: 500 INJECTION, SOLUTION INTRAVENOUS at 11:51

## 2019-01-09 RX ADMIN — ATORVASTATIN CALCIUM 40 MG: 40 TABLET, FILM COATED ORAL at 16:52

## 2019-01-09 RX ADMIN — METRONIDAZOLE 500 MG: 500 INJECTION, SOLUTION INTRAVENOUS at 20:05

## 2019-01-09 RX ADMIN — HEPARIN SODIUM 5000 UNITS: 5000 INJECTION INTRAVENOUS; SUBCUTANEOUS at 05:59

## 2019-01-09 RX ADMIN — INSULIN LISPRO 2 UNITS: 100 INJECTION, SOLUTION INTRAVENOUS; SUBCUTANEOUS at 11:51

## 2019-01-09 RX ADMIN — INSULIN LISPRO 2 UNITS: 100 INJECTION, SOLUTION INTRAVENOUS; SUBCUTANEOUS at 06:01

## 2019-01-09 RX ADMIN — ASPIRIN 81 MG 81 MG: 81 TABLET ORAL at 08:35

## 2019-01-09 RX ADMIN — ACETAMINOPHEN 650 MG: 325 TABLET, FILM COATED ORAL at 23:22

## 2019-01-09 RX ADMIN — INSULIN LISPRO 3 UNITS: 100 INJECTION, SOLUTION INTRAVENOUS; SUBCUTANEOUS at 18:16

## 2019-01-09 RX ADMIN — HEPARIN SODIUM 5000 UNITS: 5000 INJECTION INTRAVENOUS; SUBCUTANEOUS at 14:30

## 2019-01-09 RX ADMIN — PANCRELIPASE 24000 UNITS: 24000; 76000; 120000 CAPSULE, DELAYED RELEASE PELLETS ORAL at 16:52

## 2019-01-09 RX ADMIN — HEPARIN SODIUM 5000 UNITS: 5000 INJECTION INTRAVENOUS; SUBCUTANEOUS at 22:08

## 2019-01-09 RX ADMIN — ALBUTEROL SULFATE 2.5 MG: 2.5 SOLUTION RESPIRATORY (INHALATION) at 22:02

## 2019-01-09 RX ADMIN — PANCRELIPASE 24000 UNITS: 24000; 76000; 120000 CAPSULE, DELAYED RELEASE PELLETS ORAL at 08:35

## 2019-01-09 RX ADMIN — CEFTRIAXONE 1000 MG: 1 INJECTION, POWDER, FOR SOLUTION INTRAMUSCULAR; INTRAVENOUS at 23:21

## 2019-01-09 RX ADMIN — MELATONIN 3 MG: at 23:22

## 2019-01-09 NOTE — ASSESSMENT & PLAN NOTE
Jevity through NGT    Malnutrition Findings:   Malnutrition type: Chronic illness (past medical history significant for pancreatic mass )  Degree of Malnutrition: Malnutrition of moderate degree (evidenced by 11% wt loss < 3mo, slight depression at Tenriism region, poor po intake <75% energy intake compared to estimated needs for >1 mo, treated with diet and supplements)    BMI Findings:  BMI Classifications: Underweight < 18 5     Body mass index is 17 4 kg/m²

## 2019-01-09 NOTE — UTILIZATION REVIEW
Continued Stay Review    Date: 01/09/19  Vital Signs: /69 (BP Location: Right arm)   Pulse 81   Temp 98 2 °F (36 8 °C) (Oral)   Resp 19   Ht 5' 7" (1 702 m)   Wt 50 4 kg (111 lb 1 6 oz) Comment: standing weight  SpO2 95%   BMI 17 40 kg/m²   Assessment/Plan:   Sepsis (HCC)   Assessment & Plan     · Severe sepsis, POA, as evidenced by tachycardia, leukocytosis, elevated lactic acid  · secondary to pneumonia  · Hemodynamically stable  · Procalcitonin trending down  · Blood cultures negative  · Management of pneumonia as below       Pneumonia   Assessment & Plan     · With recent hospitalization in November for surgical procedure, however still would be considered low risk HCAP, component of aspiration  · Strep pneumo antigen positive  Completed 7 day course of Rocephin  · Currently on Flagyl day 4   · Patient is afebrile without any leukocytosis and as of 1/6 procalcitonin was trending down as well>> but he is persistently requiring oxygen and his lung exam does not seem to be improving>> will obtain procalcitonin levels, if persistently high>> will involve Infectious Diseases for their input regarding possible resistant Streptococcus infection  · CT chest showed severe multifocal aspiration PNA  · Appreciate Pulmonary input       Dysphagia   Assessment & Plan     · New onset severe pharyngeal dysphagia  Family also notes dysarthria  · Speech performed video barium swallow-neurologic cause is suspected  · Remain NPO per speech recs  · CT head negative  · F/U Acetylcholine receptor binding antibodies and MuSK antibodies are negative  Acetylcholine receptor modulating and blocking antibodies pending  · Neurology consult appreciated   · s/p VBS 20 min after giving neostigmine that showed minimal improvement  NGT placed and pt will be given Mestinon trial while we await MG testing  · MRI brain shows advanced microvascular changes    Now patient is scheduled for MRI brain with and without contrast with focus to brainstem area for suspicion of brainstem stroke     · Discussed with daughter Yaneli Connolly on phone, as per her, she went like to proceed with PEG tube if needed but she would like to talk with her mother 4st   Will discuss with patient's wife this afternoon when she is here  · Considering ENT evaluation  · Mestinon discontinued per Neurology as no improvement was noted  · NORMA and RPR are normal   ESR is elevated  Pending other inflammatory/ rheumatology workup          Atrial flutter (HCC)   Assessment & Plan     briefly  Now NSR  Appreciate cardio input - flutter in setting of sepsis hence cont to monitor, if needed can use BB  Will hold off on BB for now while on Mestinon and w/ nl HR  Check echo - EF of 60% with grade 1 diastolic dysfunction  hold off AC due to brief episode, CHADS-Vasc 3  Per neuro, dysphagia symptoms are not TIA/CVA related         Moderate protein-calorie malnutrition ([address] )   Assessment & Plan     Jevity through NGT     Malnutrition Findings:   Malnutrition type: Chronic illness (past medical history significant for pancreatic mass )  Degree of Malnutrition: Malnutrition of moderate degree (evidenced by 11% wt loss < 3mo, slight depression at Gnosticism region, poor po intake <75% energy intake compared to estimated needs for >1 mo, treated with diet and supplements)     BMI Findings:  BMI Classifications: Underweight < 18 5     Body mass index is 17 4 kg/m²           Respiratory failure (HCC)   Assessment & Plan     · Acute hypoxic respiratory failure secondary to pneumonia and probable ongoing aspiration  · Wean O2 as tolerated  · NPO currently  · Rest as above       Other chronic pancreatitis [facility])   Assessment & Plan     · Per biopsy report from surgical procedure 11/2018 "benign pancreatic tissue with chronic pancreatitis      · Patient with weight loss and diarrhea after procedure    Follows with Dr Tali Kirby and was to follow up 12/24/2018, however missed appointment  · GI evaluated patient this admission due to diarrhea and weight loss  Patient started on Creon  · Recommended outpatient colonoscopy      Weight loss   Assessment & Plan     · Per family weight loss of approximately 12 lb over 5 weeks with associated diarrhea  ·  nutrition consult, placed on Jevity      Diabetes mellitus ([address] )   Assessment & Plan             Lab Results   Component Value Date     HGBA1C 7 1 (H) 01/09/2019         Blood Sugar Average: Last 72 hrs:     · Hold home metformin, began SSI with Accu-Cheks while inpatient  · Monitor sugars         VTE Pharmacologic Prophylaxis:   Pharmacologic: Enoxaparin (Lovenox)  Mechanical VTE Prophylaxis in Place: Yes    Current Patient Status: Inpatient   Certification Statement: The patient will continue to require additional inpatient hospital stay due to Not medically stable     Discharge Plan:  When medically stable    Medications:   Scheduled Meds:   Current Facility-Administered Medications:  aspirin 81 mg Oral Daily   atorvastatin 40 mg Oral Daily With Dinner   cefTRIAXone 1,000 mg Intravenous Q24H   cholecalciferol 400 Units Oral Daily   heparin (porcine) 5,000 Units Subcutaneous Q8H [address]   insulin lispro 1-5 Units Subcutaneous Q6H   melatonin 3 mg Oral HS   metroNIDAZOLE 500 mg Intravenous Q8H   pancrelipase (Lip-Prot-Amyl) 24,000 Units Oral BID With Meals   pantoprazole 40 mg Oral Early Morning   polyvinyl alcohol 1 drop Both Eyes PRN     Continuous Infusions:    PRN Meds: polyvinyl alcohol  Pertinent Labs/Diagnostic Results:   HGB 9 4, HCT 30 6  CO2 34  CREAT 0 39  CA 7 6  GLUCOSE 186  CXR:  Persistent bibasilar infiltrates  Age/Sex: 82 y o  male   Discharge Plan: TBD    [address] Utilization Review Department  Phone: [phone]; Fax [phone]  [email or web address]  ATTENTION: Please call with any questions or concerns to [phone]  and carefully listen to the prompts so that you are directed to the right person  Send all requests for admission clinical reviews, approved or denied determinations and any other requests to fax [phone]   All voicemails are confidential

## 2019-01-09 NOTE — PROGRESS NOTES
Progress Note - Pulmonary   Tracy Romeo 82 y o  male MRN: [id number]  Unit/Bed#: [facility] 826-01 Encounter: [id number]    Assessment/Plan:    1  Acute hypoxic respiratory failure secondary to aspiration pneumonia      *  patient's nasal cannula following desatted down to the 70's, still requiring a significant amount of oxygen 5L-95%      *  continue to titrate O2 accordingly trying to keep O2 greater than 88%       *  encourage pulmonary toileting, IIS, deep breathing coughing, flutter    2  Bilateral pneumonia likely secondary to aspiration      *  patient continues to have significant rhonchorous breath sounds throughout with minimal improvement with antibiotic therapy      * complete antibiotics ceftriaxone day 7/7, complete Flagyl day 3/3       * WBC unremarkable, afebrile, procalcitonin trending down to 0 91      *  UA positive for strep pneumoniae- patient was not continued to clinically improve, may consider ID consult or resistant strep pneumonia       *  sputum still needs to be collected    3  Bilateral pleural effusions likely a complicated parapneumonic      *  repeat chest x-ray showed stable bilateral pleural effusion with no significant change      *  may consider 1 time Lasix if respiratory status continues to decline    4  Dysphagia      *  VBS suggests neurological case for dysphagia      *  MRI pending      *  patient remains NPO with NG tube        Chief Complaint:    "I really do not know how I feel"    Subjective:    Shadi appeared weak and lethargic  He was sitting in his chair his stated that he wanted to go back to bed  His lips appear dry and lips cracked  Patient was visibly using accessory muscles to breathe  Patient currently denies fever, chills, wheezing, hemoptysis, night sweats, leg swelling, or chest pain  Objective:    Vitals: Blood pressure 121/69, pulse 81, temperature 98 2 °F (36 8 °C), temperature source Oral, resp   rate 19, height 5' 7" (1 702 m), weight 50 4 kg (111 lb 1 6 oz), SpO2 95 %  5L,Body mass index is 17 4 kg/m²  Intake/Output Summary (Last 24 hours) at 01/09/19 1221  Last data filed at 01/09/19 0931   Gross per 24 hour   Intake             1611 ml   Output             1275 ml   Net              336 ml       Invasive Devices     Peripheral Intravenous Line            Peripheral IV 01/07/19 Left Arm 1 day          Drain            NG/OG/Enteral Tube Nasogastric 18 Fr Right nares 3 days    External Urinary Catheter Medium 2 days                Physical Exam:   Physical Exam   Constitutional: He is oriented to person, place, and time  He appears cachectic  HENT:   Head: Normocephalic and atraumatic  NG tube   Neck: Normal range of motion  Neck supple  No JVD present  Pulmonary/Chest: Accessory muscle usage present  No stridor  No tachypnea and no bradypnea  He is in respiratory distress  He has no decreased breath sounds  He has no wheezes  He has rhonchi in the right middle field, the right lower field, the left middle field and the left lower field  He has no rales  He exhibits no tenderness  Abdominal: Soft  Bowel sounds are normal  He exhibits no distension  There is no tenderness  Musculoskeletal: Normal range of motion  He exhibits no edema or deformity  Neurological: He is alert and oriented to person, place, and time  Skin: Skin is warm and dry  Psychiatric: He has a normal mood and affect  His behavior is normal        Labs:    I have personally reviewed pertinent lab results CBC:   Lab Results   Component Value Date    WBC 9 55 01/09/2019    HGB 9 4 (L) 01/09/2019    HCT 30 6 (L) 01/09/2019    MCV 98 01/09/2019     01/09/2019    MCH 29 9 01/09/2019    MCHC 30 7 (L) 01/09/2019    RDW 14 8 01/09/2019    MPV 10 6 01/09/2019    NRBC 0 01/09/2019   , CMP:   Lab Results   Component Value Date    SODIUM 139 01/09/2019    K 4 2 01/09/2019     01/09/2019    CO2 34 (H) 01/09/2019    BUN 11 01/09/2019    CREATININE 0 39 (L) 01/09/2019 CALCIUM 7 6 (L) 01/09/2019    EGFR 113 01/09/2019     Imaging and other studies: I have personally reviewed pertinent films in PACS     CT chest 01/05/2019-ground-glass and consolidative airspace opacities throughout the lungs most and in the dependent lung and more severe on the left than the right

## 2019-01-09 NOTE — ASSESSMENT & PLAN NOTE
· Now at baseline  · Present on admission, Cr 0 99 with baseline 0 5    In setting of presumed pneumonia and also with diarrhea reported  · Creatinine improved with IVFs

## 2019-01-09 NOTE — ASSESSMENT & PLAN NOTE
· New onset severe pharyngeal dysphagia  Family also notes dysarthria  · Speech performed video barium swallow-neurologic cause is suspected  · Remain NPO per speech recs  · CT head negative  · F/U Acetylcholine receptor binding antibodies and MuSK antibodies are negative  Acetylcholine receptor modulating and blocking antibodies pending  · Neurology consult appreciated   · s/p VBS 20 min after giving neostigmine that showed minimal improvement  NGT placed and pt will be given Mestinon trial while we await MG testing  · MRI brain shows advanced microvascular changes  Now patient is scheduled for MRI brain with and without contrast with focus to brainstem area for suspicion of brainstem stroke     · Discussed with daughter Malissa Ferguson on phone, as per her, she went like to proceed with PEG tube if needed but she would like to talk with her mother 4st   Will discuss with patient's wife this afternoon when she is here  · Considering ENT evaluation  · Mestinon discontinued per Neurology as no improvement was noted  · NORMA and RPR are normal   ESR is elevated  Pending other inflammatory/ rheumatology workup

## 2019-01-09 NOTE — RESPIRATORY THERAPY NOTE
RT Protocol Note  Jordyn Major 82 y o  male MRN: [id number]  Unit/Bed#: [facility] 826-01 Encounter: [id number]    Assessment    Principal Problem:    Sepsis ([facility] )  Active Problems:    Diabetes mellitus ([facility] )    Weight loss    Other chronic pancreatitis (HCC)    Pneumonia    Respiratory failure (HCC)    Dysphagia    Hypokalemia    Malnutrition (HCC)    Moderate protein-calorie malnutrition (HCC)    Atrial flutter (HCC)      Home Pulmonary Medications:  none       Past Medical History:   Diagnosis Date    Diabetes mellitus ([facility] )     Type II    Weight loss      Social History     Social History    Marital status: /Civil Union     Spouse name: N/A    Number of children: N/A    Years of education: N/A     Social History Main Topics    Smoking status: Former Smoker     Years: 25 00    Smokeless tobacco: Never Used      Comment: quit about 20 years ago as of 9/21/2018    Alcohol use Yes      Comment: 1 beer a day    Drug use: No    Sexual activity: Not Asked     Other Topics Concern    None     Social History Narrative    None       Subjective         Objective    Physical Exam:   Assessment Type: Assess only  General Appearance: Awake, Alert  Respiratory Pattern: Normal  Chest Assessment: Chest expansion symmetrical  Bilateral Breath Sounds: Diminished  R Breath Sounds: Diminished  L Breath Sounds: Diminished  Cough: None    Vitals:  Blood pressure 121/69, pulse 81, temperature 98 2 °F (36 8 °C), temperature source Oral, resp  rate 19, height 5' 7" (1 702 m), weight 50 4 kg (111 lb 1 6 oz), SpO2 95 %  Imaging and other studies: I have personally reviewed pertinent reports  O2 Device: 6LNC     Plan    Respiratory Plan: (P) No distress/Pulmonary history        Resp Comments: (P) Pt evaluated per resp protocol  Pt was admitted with sepsis  There is no pulmonary hx in pt's med rec and he denies pulmonary hx though pt is positive for pneumonia    At this time pt is on 6lpm NC and appears comfortable  There is no current indication for regularly scheduled bronchodilators  Will plan to order pt on albuteral 2 5mg/nss q4prn @ this time

## 2019-01-09 NOTE — ASSESSMENT & PLAN NOTE
· New onset severe pharyngeal dysphagia  Family also notes dysarthria  · Speech performed video barium swallow-neurologic cause is suspected  · Remain NPO per speech recs  · CT head negative  · F/U Acetylcholine receptor antibodies, MuSK antibodies  · Neurology consult appreciated   · s/p VBS 20 min after giving neostigmine that showed minimal improvement  NGT placed and pt will be given Mestinon trial while we await MG testing  · MRI brain shows advanced microvascular changes  · Discussed with daughter Brad Joseph on phone, as per her, she went like to proceed with PEG tube if needed but she would like to talk with her mother 1st   · Mestinon discontinued per Neurology as no improvement was noted  · Rheumatological workup as per Neurology    · Neurology considering MRI with contrast

## 2019-01-09 NOTE — PROGRESS NOTES
Progress Note - Sonia Rob 1935, 82 y o  male MRN: [id number]    Unit/Bed#: [facility] 826-01 Encounter: [id number]    Primary Care Provider: Latanya Walker DO   Date and time admitted to hospital: 1/1/2019  8:59 PM        * Sepsis [facility])   Assessment & Plan    · Severe sepsis, POA, as evidenced by tachycardia, leukocytosis, elevated lactic acid  · secondary to pneumonia  · Hemodynamically stable  · Procalcitonin trending down  · Blood cultures negative  · Management of pneumonia as below  Pneumonia   Assessment & Plan    · With recent hospitalization in November for surgical procedure, however still would be considered low risk Hcap  · Strep pneumo antigen positive  On Rocephin day 7/7  · Continue with Flagyl day 3    · Procal trending down, but pt still on O2  · CT chest showed severe multifocal aspiration PNA  Appreciate pulm      Dysphagia   Assessment & Plan    · New onset severe pharyngeal dysphagia  Family also notes dysarthria  · Speech performed video barium swallow-neurologic cause is suspected  · Remain NPO per speech recs  · CT head negative  · F/U Acetylcholine receptor antibodies, MuSK antibodies  · Neurology consult appreciated   · s/p VBS 20 min after giving neostigmine that showed minimal improvement  NGT placed and pt will be given Mestinon trial while we await MG testing  · MRI brain shows advanced microvascular changes  · Discussed with daughter Nir Adjutant on phone, as per her, she went like to proceed with PEG tube if needed but she would like to talk with her mother 1st   · Mestinon discontinued per Neurology as no improvement was noted  · Rheumatological workup as per Neurology    · Neurology considering MRI with contrast      Atrial flutter [facility])   Assessment & Plan    briefly  Now NSR  Appreciate cardio input - flutter in setting of sepsis hence cont to monitor, if needed can use BB  Will hold off on BB for now while on Mestinon and w/ nl HR  Check echo - EF of 60% with grade 1 diastolic dysfunction  hold off AC due to brief episode, CHADS-Vasc 3  Per neuro, dysphagia symptoms are not TIA/CVA related       Moderate protein-calorie malnutrition ([facility] [address] )   Assessment & Plan    Jevity through NGT    Malnutrition Findings:   Malnutrition type: Chronic illness (past medical history significant for pancreatic mass )  Degree of Malnutrition: Malnutrition of moderate degree (evidenced by 11% wt loss < 3mo, slight depression at [place] region, poor po intake <75% energy intake compared to estimated needs for >1 mo, treated with diet and supplements)    BMI Findings:  BMI Classifications: Underweight < 18 5     Body mass index is 17 54 kg/m²  Respiratory failure ([facility] [address] )   Assessment & Plan    · Acute hypoxic respiratory failure secondary to pneumonia and probable ongoing aspiration  · Wean O2 as tolerated  · NPO currently  · Added flagyl     Other chronic pancreatitis [facility])   Assessment & Plan    · Per biopsy report from surgical procedure 11/2018 "benign pancreatic tissue with chronic pancreatitis      · Patient with weight loss and diarrhea after procedure  Follows with Dr She Wilson and was to follow up 12/24/2018, however missed appointment  · GI evaluated patient this admission due to diarrhea and weight loss  Patient started on Creon  · Recommended outpatient colonoscopy     Weight loss   Assessment & Plan    · Per family weight loss of approximately 12 lb over 5 weeks with associated diarrhea  ·  nutrition consult, placed on Jevity     Diabetes mellitus ([facility] )   Assessment & Plan    Lab Results   Component Value Date    HGBA1C 6 3 10/30/2018       Blood Sugar Average: Last 72 hrs:     · Hold home metformin, began SSI with Accu-Cheks while inpatient  · Monitor sugars     Acute kidney injury (HCC)resolved as of 1/8/2019   Assessment & Plan    · Now at baseline  · Present on admission, Cr 0 99 with baseline 0 5    In setting of presumed pneumonia and also with diarrhea reported  · Creatinine improved with IVFs         VTE Pharmacologic Prophylaxis:   Pharmacologic: Heparin  Mechanical VTE Prophylaxis in Place: Yes    Patient Centered Rounds: I have performed bedside rounds with nursing staff today  Discussions with Specialists or Other Care Team Provider: TIERRA    Education and Discussions with Family / Patient: plan of care, patient at bedside and daughter Alyse Headings on phone    Time Spent for Care: 30 minutes  More than 50% of total time spent on counseling and coordination of care as described above  Current Length of Stay: 7 day(s)    Current Patient Status: Inpatient   Certification Statement: The patient will continue to require additional inpatient hospital stay due to Not medically stable    Discharge Plan:  When medically stable    Code Status: Level 1 - Full Code      Subjective:   Patient remains on 3 L oxygen this morning  He denies any acute discomfort and feels fatigued  I try to have discussion regarding nutrition with him but he requested me to talk to his family  Spoke with daughter Nicky Sauer at length  Objective:     Vitals:   Temp (24hrs), Av 6 °F (36 4 °C), Min:97 1 °F (36 2 °C), Max:98 1 °F (36 7 °C)    Temp:  [97 1 °F (36 2 °C)-98 1 °F (36 7 °C)] 97 1 °F (36 2 °C)  HR:  [60-71] 60  Resp:  [20-30] 30  BP: (143-154)/(67-79) 144/67  SpO2:  [91 %-97 %] 97 %  Body mass index is 17 54 kg/m²  Input and Output Summary (last 24 hours): Intake/Output Summary (Last 24 hours) at 19 1907  Last data filed at 19 1431   Gross per 24 hour   Intake              405 ml   Output             1550 ml   Net            -1145 ml       Physical Exam:     Physical Exam   Constitutional: He is oriented to person, place, and time  No distress  Eyes: Pupils are equal, round, and reactive to light  Cardiovascular: Normal rate, regular rhythm and normal heart sounds  No murmur heard  Pulmonary/Chest: No respiratory distress  He has no wheezes  He has no rales     Breath sounds bilaterally decreased   Abdominal: Soft  Bowel sounds are normal  He exhibits no distension  There is no tenderness  Musculoskeletal: He exhibits no edema  Neurological: He is alert and oriented to person, place, and time  No focal motor deficits on gross neuro exam   Slurring of the speech noted   Skin: Skin is warm  Additional Data:     Labs:      Results from last 7 days  Lab Units 01/06/19  0427 01/05/19  0516  01/02/19  0431   WBC Thousand/uL 9 29 10 59*  < > 10 28*   HEMOGLOBIN g/dL 10 3* 9 7*  < > 8 6*   HEMATOCRIT % 32 8* 30 4*  < > 26 3*   PLATELETS Thousands/uL 253 240  < > 177   BANDS PCT %  --   --   --  1   NEUTROS PCT %  --  78*  < >  --    LYMPHS PCT %  --  12*  < >  --    LYMPHO PCT   --   --   < > 3*   MONOS PCT %  --  7  < >  --    MONO PCT   --   --   < > 1*   EOS PCT %  --  1  < > 0   < > = values in this interval not displayed  Results from last 7 days  Lab Units 01/08/19  0541  01/01/19  2137   SODIUM mmol/L 138  < > 131*   POTASSIUM mmol/L 4 3  < > 3 3*   CHLORIDE mmol/L 100  < > 95*   CO2 mmol/L 35*  < > 26   BUN mg/dL 11  < > 28*   CREATININE mg/dL 0 46*  < > 0 99   ANION GAP mmol/L 3*  < > 10   CALCIUM mg/dL 7 5*  < > 8 1*   ALBUMIN g/dL  --   --  1 9*   TOTAL BILIRUBIN mg/dL  --   --  0 47   ALK PHOS U/L  --   --  101   ALT U/L  --   --  34   AST U/L  --   --  45   GLUCOSE RANDOM mg/dL 238*  < > 202*   < > = values in this interval not displayed      Results from last 7 days  Lab Units 01/01/19  2137   INR  1 04       Results from last 7 days  Lab Units 01/08/19  1849 01/08/19  1202 01/08/19  0611 01/07/19  2338 01/07/19  1752 01/07/19  1225 01/07/19  0630 01/06/19  2348 01/06/19  1741 01/06/19  1230 01/06/19  0557 01/05/19  2331   POC GLUCOSE mg/dl 262* 256* 273* 215* 151* 230* 225* 218* 230* 195* 192* 224*           Results from last 7 days  Lab Units 01/06/19  0427 01/05/19  5987 01/04/19  0551 01/03/19  0545 01/02/19  0431 01/02/19  0155 01/01/19  2341 01/01/19  2136   LACTIC ACID mmol/L  --   --   --   --   --  1 7 2 1* 3 7*   PROCALCITONIN ng/ml 0 91* 1 56* 3 23* 5 86* 13 89*  --   --  17 10*           * I Have Reviewed All Lab Data Listed Above  * Additional Pertinent Lab Tests Reviewed: All Labs Within Last 24 Hours Reviewed    Imaging:    XR abdomen 1 vw portable   Final Result by Dung Barker MD (01/06 0710)      Enteric tube tip in the region of gastroesophageal junction with sidehole approximately 10 cm proximal to GE junction  Workstation performed: [id number]         FL barium swallow video w speech   Final Result by Ebonie Monaco, DOCUMENTATION (01/05 1441)      CT chest wo contrast   Final Result by Dung Barker MD (01/05 1249)      Groundglass and consolidative airspace opacity throughout the lungs, most dense in the dependent lungs and more severe on the left than the right  Also noted is inspissated mucus within the trachea and the mainstem bronchi bilaterally  The findings are    most suspicious for severe multifocal aspiration pneumonia  Small left greater than right pleural effusions  Findings in the upper abdomen are better evaluated on recent abdominal MR of December 14, 2018  Workstation performed: [id number]         MRI brain wo contrast   Final Result by Dung Barker MD (01/04 1701)      No acute intracranial ischemia  Advanced microangiopathic changes throughout supratentorial white matter and central karthikeyan  Right mastoid effusion  Workstation performed: [id number]         CT head wo contrast   Final Result by Chandler Cameron MD (01/03 1114)      Generalized volume loss with advanced microangiopathic changes of the cerebral white matter  No acute intracranial abnormality                    Workstation performed: [id number]         FL barium swallow video w speech   Final Result by  (01/03 1034)      FL barium swallow video w speech   Final Result by Ebonie Monaco, DOCUMENTATION (01/03 0919) XR chest pa & lateral   ED Interpretation by Tony Ladd MD (01/01 2220)   Bilateral infiltrates concerning for pneumonia  Final Result by Melia Linn MD (87/04 9023)      Lower lobe infiltrates bilaterally            Workstation performed: [id number]             Recent Cultures (last 7 days):       Results from last 7 days  Lab Units 01/02/19  0608 01/01/19  2359 01/01/19  2358 01/01/19  2137   BLOOD CULTURE   --   --   --  No Growth After 5 Days  No Growth After 5 Days  INFLUENZA B PCR   --  None Detected  --   --    RSV PCR   --  None Detected  --   --    LEGIONELLA URINARY ANTIGEN   --   --  Negative  --    C DIFF TOXIN B  NEGATIVE for C difficle toxin by PCR    --   --   --        Last 24 Hours Medication List:     Current Facility-Administered Medications:  cefTRIAXone 1,000 mg Intravenous Q24H Trisha Bruno MD Last Rate: Stopped (01/07/19 1154)   cholecalciferol 400 Units Oral Daily Dianelys Valencia MD    heparin (porcine) 5,000 Units Subcutaneous [facility] Trisha Bruno MD    insulin lispro 1-5 Units Subcutaneous Q6H Clark Celestin PA-C    melatonin 3 mg Oral HS Janie Navarro DO    metroNIDAZOLE 500 mg Intravenous Q8H Sylvia French MD Last Rate: 500 mg (01/08/19 1326)   pancrelipase (Lip-Prot-Amyl) 24,000 Units Oral BID With Meals Dianelys Valencia MD    pantoprazole 40 mg Oral Early Morning Trisha Bruno MD    polyvinyl alcohol 1 drop Both Eyes PRN Sindy Rodriguez PA-C         Today, Patient Was Seen By: Anna Solis MD    ** Please Note: Dictation voice to text software may have been used in the creation of this document   **

## 2019-01-09 NOTE — PROGRESS NOTES
Pt noted to have removed his nasal cannula - Spo2 77% on ra  O2 applied at 6 LPM  Unable to sustain Spo2 greater than 84%  Non-rebreather applied at 15 lpm and respiratory at bedside  Tachypneic but denies SOB  Alyson Cortes PA-C notified  Portable CXR and continuous pulse ox ordered

## 2019-01-09 NOTE — ASSESSMENT & PLAN NOTE
Jevity through NGT    Malnutrition Findings:   Malnutrition type: Chronic illness (past medical history significant for pancreatic mass )  Degree of Malnutrition: Malnutrition of moderate degree (evidenced by 11% wt loss < 3mo, slight depression at [place] region, poor po intake <75% energy intake compared to estimated needs for >1 mo, treated with diet and supplements)    BMI Findings:  BMI Classifications: Underweight < 18 5     Body mass index is 17 54 kg/m²

## 2019-01-09 NOTE — ASSESSMENT & PLAN NOTE
· Per biopsy report from surgical procedure 11/2018 "benign pancreatic tissue with chronic pancreatitis      · Patient with weight loss and diarrhea after procedure  Follows with Dr Clint Muro and was to follow up 12/24/2018, however missed appointment  · GI evaluated patient this admission due to diarrhea and weight loss   Patient started on Creon  · Recommended outpatient colonoscopy

## 2019-01-09 NOTE — ASSESSMENT & PLAN NOTE
· Acute hypoxic respiratory failure secondary to pneumonia and probable ongoing aspiration  · Wean O2 as tolerated  · NPO currently  · Rest as above

## 2019-01-09 NOTE — ASSESSMENT & PLAN NOTE
· With recent hospitalization in November for surgical procedure, however still would be considered low risk HCAP, component of aspiration  · Strep pneumo antigen positive  Completed 7 day course of Rocephin  · Currently on Flagyl day 4   · Patient is afebrile without any leukocytosis and as of 1/6 procalcitonin was trending down as well>> but he is persistently requiring oxygen and his lung exam does not seem to be improving>> will obtain procalcitonin levels, if persistently high>> will involve Infectious Diseases for their input regarding possible resistant Streptococcus infection  · CT chest showed severe multifocal aspiration PNA  · Appreciate Pulmonary input

## 2019-01-09 NOTE — PROGRESS NOTES
Conflicting weights in flowsheet  Pt did standing weight today 1/9/19 and was 111 1lbs  Documented in flowsheet

## 2019-01-09 NOTE — PROGRESS NOTES
Patient evaluated for hypoxia  He was on 6L NC but was found with his NC removed and pulse ox in the 70s  He was placed back on nasal cannula and eventually on non-rebreather with sats in the low 90s  When I saw patient his saturations were increased to high 90s on non-rebreather  He appears slightly tachypneic but denies any trouble breathing  Lungs with decreased breath sounds, scattered rhonchi  Weaned back down to NC with O2 saturations around 94%  Plan: Check chest x-ray, continuous pulse ox, close monitoring

## 2019-01-09 NOTE — PHYSICAL THERAPY NOTE
Physical Therapy Cancellation Note    Therapist attempted to work with patient today however patient refusing physical therapy stating "not today, tomorrow " Patient educated multiple times on importance of increased activity with patient continuing to refuse  Will continue to follow up with patient as appropriate    Lucina Haynes, PTA

## 2019-01-09 NOTE — PROGRESS NOTES
Progress Note - Philomena Ansari 1935, 82 y o  male MRN: [id number]    Unit/Bed#: [facility] 826-01 Encounter: [id number]    Primary Care Provider: Pasquale Shah DO   Date and time admitted to hospital: 1/1/2019  8:59 PM        * Sepsis [facility])   Assessment & Plan    · Severe sepsis, POA, as evidenced by tachycardia, leukocytosis, elevated lactic acid  · secondary to pneumonia  · Hemodynamically stable  · Procalcitonin trending down  · Blood cultures negative  · Management of pneumonia as below  Pneumonia   Assessment & Plan    · With recent hospitalization in November for surgical procedure, however still would be considered low risk HCAP, component of aspiration  · Strep pneumo antigen positive  Completed 7 day course of Rocephin  · Currently on Flagyl day 4   · Patient is afebrile without any leukocytosis and as of 1/6 procalcitonin was trending down as well>> but he is persistently requiring oxygen and his lung exam does not seem to be improving>> will obtain procalcitonin levels, if persistently high>> will involve Infectious Diseases for their input regarding possible resistant Streptococcus infection  · CT chest showed severe multifocal aspiration PNA  · Appreciate Pulmonary input  Dysphagia   Assessment & Plan    · New onset severe pharyngeal dysphagia  Family also notes dysarthria  · Speech performed video barium swallow-neurologic cause is suspected  · Remain NPO per speech recs  · CT head negative  · F/U Acetylcholine receptor binding antibodies and MuSK antibodies are negative  Acetylcholine receptor modulating and blocking antibodies pending  · Neurology consult appreciated   · s/p VBS 20 min after giving neostigmine that showed minimal improvement  NGT placed and pt will be given Mestinon trial while we await MG testing  · MRI brain shows advanced microvascular changes    Now patient is scheduled for MRI brain with and without contrast with focus to brainstem area for suspicion of brainstem stroke     · Discussed with daughter Emilee Nolasco on phone, as per her, she went like to proceed with PEG tube if needed but she would like to talk with her mother 4st   Will discuss with patient's wife this afternoon when she is here  · Considering ENT evaluation  · Mestinon discontinued per Neurology as no improvement was noted  · NORMA and RPR are normal   ESR is elevated  Pending other inflammatory/ rheumatology workup  Atrial flutter ([facility] [address] )   Assessment & Plan    briefly  Now NSR  Appreciate cardio input - flutter in setting of sepsis hence cont to monitor, if needed can use BB  Will hold off on BB for now while on Mestinon and w/ nl HR  Check echo - EF of 60% with grade 1 diastolic dysfunction  hold off AC due to brief episode, CHADS-Vasc 3  Per neuro, dysphagia symptoms are not TIA/CVA related       Moderate protein-calorie malnutrition ([facility] [address] )   Assessment & Plan    Jevity through NGT    Malnutrition Findings:   Malnutrition type: Chronic illness (past medical history significant for pancreatic mass )  Degree of Malnutrition: Malnutrition of moderate degree (evidenced by 11% wt loss < 3mo, slight depression at [employer] region, poor po intake <75% energy intake compared to estimated needs for >1 mo, treated with diet and supplements)    BMI Findings:  BMI Classifications: Underweight < 18 5     Body mass index is 17 4 kg/m²  Respiratory failure (HCC)   Assessment & Plan    · Acute hypoxic respiratory failure secondary to pneumonia and probable ongoing aspiration  · Wean O2 as tolerated  · NPO currently  · Rest as above  Other chronic pancreatitis [facility])   Assessment & Plan    · Per biopsy report from surgical procedure 11/2018 "benign pancreatic tissue with chronic pancreatitis      · Patient with weight loss and diarrhea after procedure    Follows with Dr Estela Macdonald and was to follow up 12/24/2018, however missed appointment  · GI evaluated patient this admission due to diarrhea and weight loss  Patient started on Creon  · Recommended outpatient colonoscopy     Weight loss   Assessment & Plan    · Per family weight loss of approximately 12 lb over 5 weeks with associated diarrhea  ·  nutrition consult, placed on Jevity     Diabetes mellitus ([address] )   Assessment & Plan    Lab Results   Component Value Date    HGBA1C 7 1 (H) 2019       Blood Sugar Average: Last 72 hrs:     · Hold home metformin, began SSI with Accu-Cheks while inpatient  · Monitor sugars       VTE Pharmacologic Prophylaxis:   Pharmacologic: Enoxaparin (Lovenox)  Mechanical VTE Prophylaxis in Place: Yes    Patient Centered Rounds: I have performed bedside rounds with nursing staff today  Discussions with Specialists or Other Care Team Provider:      Education and Discussions with Family / Patient:  Discussed plan of care with the patient and also with his daughter on phone    Time Spent for Care: 30 minutes  More than 50% of total time spent on counseling and coordination of care as described above  Current Length of Stay: 8 day(s)    Current Patient Status: Inpatient   Certification Statement: The patient will continue to require additional inpatient hospital stay due to Not medically stable    Discharge Plan:  When medically stable    Code Status: Level 1 - Full Code      Subjective:   Patient appears comfortable  Overnight events of hypoxia noted  Remains on 6 L nasal cannula this morning  Denies any pain or discomfort  He is scheduled for MRI  Objective:     Vitals:   Temp (24hrs), Av 8 °F (36 6 °C), Min:97 1 °F (36 2 °C), Max:98 2 °F (36 8 °C)    Temp:  [97 1 °F (36 2 °C)-98 2 °F (36 8 °C)] 98 2 °F (36 8 °C)  HR:  [60-98] 81  Resp:  [19-30] 19  BP: (121-157)/(64-69) 121/69  SpO2:  [77 %-97 %] 95 %  Body mass index is 17 4 kg/m²  Input and Output Summary (last 24 hours):        Intake/Output Summary (Last 24 hours) at 19 1336  Last data filed at 19 1304   Gross per 24 hour   Intake 1611 ml   Output             1275 ml   Net              336 ml       Physical Exam:     Physical Exam  Constitutional: He is oriented to person, place, and time  No distress  Eyes: Pupils are equal, round, and reactive to light  Cardiovascular: Normal rate, regular rhythm and normal heart sounds  No murmur heard  Pulmonary/Chest: No respiratory distress  He has no wheezes  Breath sounds bilaterally decreased  occasional rhonchi heard  Abdominal: Soft  Bowel sounds are normal  He exhibits no distension  There is no tenderness  Musculoskeletal: He exhibits no edema  Neurological: He is alert and oriented to person, place, and time  No focal motor deficits on gross neuro exam   Slurring of the speech noted   Skin: Skin is warm  Additional Data:     Labs:      Results from last 7 days  Lab Units 01/09/19  0508   WBC Thousand/uL 9 55   HEMOGLOBIN g/dL 9 4*   HEMATOCRIT % 30 6*   PLATELETS Thousands/uL 295   NEUTROS PCT % 79*   LYMPHS PCT % 12*   MONOS PCT % 6   EOS PCT % 2       Results from last 7 days  Lab Units 01/09/19  0508   SODIUM mmol/L 139   POTASSIUM mmol/L 4 2   CHLORIDE mmol/L 101   CO2 mmol/L 34*   BUN mg/dL 11   CREATININE mg/dL 0 39*   ANION GAP mmol/L 4   CALCIUM mg/dL 7 6*   GLUCOSE RANDOM mg/dL 186*           Results from last 7 days  Lab Units 01/09/19  1150 01/09/19  0600 01/08/19  2326 01/08/19  1849 01/08/19  1202 01/08/19  0611 01/07/19  2338 01/07/19  1752 01/07/19  1225 01/07/19  0630 01/06/19  2348 01/06/19  1741   POC GLUCOSE mg/dl 256* 284* 107 262* 256* 273* 215* 151* 230* 225* 218* 230*       Results from last 7 days  Lab Units 01/09/19  0942   HEMOGLOBIN A1C % 7 1*       Results from last 7 days  Lab Units 01/06/19  0427 01/05/19  0516 01/04/19  0551 01/03/19  0545   PROCALCITONIN ng/ml 0 91* 1 56* 3 23* 5 86*           * I Have Reviewed All Lab Data Listed Above  * Additional Pertinent Lab Tests Reviewed:  All Labs Within Last 24 Hours Reviewed    Imaging:    XR chest portable   Final Result by Rocael Tomlinson MD (01/08 2054)      Persistent bibasilar infiltrates  Workstation performed: [id number]         XR abdomen 1 vw portable   Final Result by Eriberto Askew MD (01/06 0980)      Enteric tube tip in the region of gastroesophageal junction with sidehole approximately 10 cm proximal to GE junction  Workstation performed: [id number]         FL barium swallow video w speech   Final Result by Noam Alcocer, DOCUMENTATION (01/05 1441)      CT chest wo contrast   Final Result by Eriberto Askew MD (01/05 1249)      Groundglass and consolidative airspace opacity throughout the lungs, most dense in the dependent lungs and more severe on the left than the right  Also noted is inspissated mucus within the trachea and the mainstem bronchi bilaterally  The findings are    most suspicious for severe multifocal aspiration pneumonia  Small left greater than right pleural effusions  Findings in the upper abdomen are better evaluated on recent abdominal MR of December 14, 2018  Workstation performed: [id number]         MRI brain wo contrast   Final Result by Eriberto Askew MD (01/04 1701)      No acute intracranial ischemia  Advanced microangiopathic changes throughout supratentorial white matter and central karthikeyan  Right mastoid effusion  Workstation performed: [id number]         CT head wo contrast   Final Result by Lady Salgado MD (01/03 1114)      Generalized volume loss with advanced microangiopathic changes of the cerebral white matter  No acute intracranial abnormality                    Workstation performed: [id number]         FL barium swallow video w speech   Final Result by  (01/03 1034)      FL barium swallow video w speech   Final Result by SYSTEMCRISTINE, DOCUMENTATION (01/03 0919)      XR chest pa & lateral   ED Interpretation by Lory Fair MD (01/01 2220)   Bilateral infiltrates concerning for pneumonia  Final Result by tG Zaldivar MD (01/02 3428)      Lower lobe infiltrates bilaterally            Workstation performed: [id number]         MRI brain w wo contrast    (Results Pending)   MRA and or MRV head wo contrast    (Results Pending)   MRA carotids wo and w contrast    (Results Pending)     Recent Cultures (last 7 days):           Last 24 Hours Medication List:     Current Facility-Administered Medications:  aspirin 81 mg Oral Daily Radha Krause MD    atorvastatin 40 mg Oral Daily With Marquis Prado MD    cefTRIAXone 1,000 mg Intravenous Q24H Johana Riggs MD Last Rate: 1,000 mg (01/08/19 2258)   cholecalciferol 400 Units Oral Daily Remington Corbett MD    heparin (porcine) 5,000 Units Subcutaneous [facility] Johana Riggs MD    insulin lispro 1-5 Units Subcutaneous Q6H Alyson Cortes PA-C    melatonin 3 mg Oral HS Lisa Avilez DO    metroNIDAZOLE 500 mg Intravenous Q8H Francie Silverman MD Last Rate: 500 mg (01/09/19 1151)   pancrelipase (Lip-Prot-Amyl) 24,000 Units Oral BID With Meals Remington Corbett MD    pantoprazole 40 mg Oral Early Morning Johana Riggs MD    polyvinyl alcohol 1 drop Both Eyes PRN Salina Hale PA-C         Today, Patient Was Seen By: Alberto Terry MD    ** Please Note: Dictation voice to text software may have been used in the creation of this document   **

## 2019-01-09 NOTE — ASSESSMENT & PLAN NOTE
Lab Results   Component Value Date    HGBA1C 7 1 (H) 01/09/2019       Blood Sugar Average: Last 72 hrs:     · Hold home metformin, began SSI with Accu-Cheks while inpatient    · Monitor sugars

## 2019-01-10 ENCOUNTER — APPOINTMENT (INPATIENT)
Dept: RADIOLOGY | Facility: HOSPITAL | Age: 84
DRG: 853 | End: 2019-01-10
Payer: MEDICARE

## 2019-01-10 ENCOUNTER — APPOINTMENT (INPATIENT)
Dept: NON INVASIVE DIAGNOSTICS | Facility: HOSPITAL | Age: 84
DRG: 853 | End: 2019-01-10
Payer: MEDICARE

## 2019-01-10 PROBLEM — E87.6 HYPOKALEMIA: Status: RESOLVED | Noted: 2019-01-02 | Resolved: 2019-01-10

## 2019-01-10 PROBLEM — J96.01 ACUTE RESPIRATORY FAILURE WITH HYPOXIA (HCC): Status: ACTIVE | Noted: 2019-01-02

## 2019-01-10 PROBLEM — I48.92 ATRIAL FLUTTER (HCC): Status: RESOLVED | Noted: 2019-01-05 | Resolved: 2019-01-10

## 2019-01-10 PROBLEM — D72.829 LEUKOCYTOSIS: Status: ACTIVE | Noted: 2019-01-03

## 2019-01-10 PROBLEM — R13.12 OROPHARYNGEAL DYSPHAGIA: Chronic | Status: ACTIVE | Noted: 2019-01-02

## 2019-01-10 PROBLEM — R13.12 OROPHARYNGEAL DYSPHAGIA: Status: ACTIVE | Noted: 2019-01-02

## 2019-01-10 LAB
ACHR MOD AB/ACHR TOTAL SFR SER: <12 % (ref 0–20)
ALBUMIN SERPL BCP-MCNC: 1.7 G/DL (ref 3.5–5)
ALP SERPL-CCNC: 105 U/L (ref 46–116)
ALT SERPL W P-5'-P-CCNC: 20 U/L (ref 12–78)
ANION GAP SERPL CALCULATED.3IONS-SCNC: 4 MMOL/L (ref 4–13)
AST SERPL W P-5'-P-CCNC: 22 U/L (ref 5–45)
BASE EX.OXY STD BLDV CALC-SCNC: 81 % (ref 60–80)
BASE EXCESS BLDA CALC-SCNC: 8 MMOL/L (ref -2–3)
BASE EXCESS BLDV CALC-SCNC: 8.5 MMOL/L
BASOPHILS # BLD MANUAL: 0 THOUSAND/UL (ref 0–0.1)
BASOPHILS NFR MAR MANUAL: 0 % (ref 0–1)
BILIRUB DIRECT SERPL-MCNC: 0.14 MG/DL (ref 0–0.2)
BILIRUB SERPL-MCNC: 0.28 MG/DL (ref 0.2–1)
BUN SERPL-MCNC: 12 MG/DL (ref 5–25)
CA-I BLD-SCNC: 1.06 MMOL/L (ref 1.12–1.32)
CA-I BLD-SCNC: 1.14 MMOL/L (ref 1.12–1.32)
CALCIUM SERPL-MCNC: 7.4 MG/DL (ref 8.3–10.1)
CHLORIDE SERPL-SCNC: 98 MMOL/L (ref 100–108)
CO2 SERPL-SCNC: 35 MMOL/L (ref 21–32)
CREAT SERPL-MCNC: 0.62 MG/DL (ref 0.6–1.3)
ENA SS-A AB SER-ACNC: <0.2 AI (ref 0–0.9)
ENA SS-B AB SER-ACNC: 0.3 AI (ref 0–0.9)
EOSINOPHIL # BLD MANUAL: 0 THOUSAND/UL (ref 0–0.4)
EOSINOPHIL NFR BLD MANUAL: 0 % (ref 0–6)
ERYTHROCYTE [DISTWIDTH] IN BLOOD BY AUTOMATED COUNT: 15 % (ref 11.6–15.1)
GFR SERPL CREATININE-BSD FRML MDRD: 93 ML/MIN/1.73SQ M
GLUCOSE SERPL-MCNC: 130 MG/DL (ref 65–140)
GLUCOSE SERPL-MCNC: 169 MG/DL (ref 65–140)
GLUCOSE SERPL-MCNC: 202 MG/DL (ref 65–140)
GLUCOSE SERPL-MCNC: 225 MG/DL (ref 65–140)
GLUCOSE SERPL-MCNC: 229 MG/DL (ref 65–140)
HCO3 BLDA-SCNC: 33.7 MMOL/L (ref 24–30)
HCO3 BLDV-SCNC: 35.8 MMOL/L (ref 24–30)
HCT VFR BLD AUTO: 34.9 % (ref 36.5–49.3)
HCT VFR BLD CALC: 35 % (ref 36.5–49.3)
HGB BLD-MCNC: 10.6 G/DL (ref 12–17)
HGB BLDA-MCNC: 11.9 G/DL (ref 12–17)
LYMPHOCYTES # BLD AUTO: 1.11 THOUSAND/UL (ref 0.6–4.47)
LYMPHOCYTES # BLD AUTO: 4 % (ref 14–44)
MCH RBC QN AUTO: 29.9 PG (ref 26.8–34.3)
MCHC RBC AUTO-ENTMCNC: 30.4 G/DL (ref 31.4–37.4)
MCV RBC AUTO: 99 FL (ref 82–98)
MONOCYTES # BLD AUTO: 0.55 THOUSAND/UL (ref 0–1.22)
MONOCYTES NFR BLD: 2 % (ref 4–12)
NEUTROPHILS # BLD MANUAL: 26.03 THOUSAND/UL (ref 1.85–7.62)
NEUTS SEG NFR BLD AUTO: 94 % (ref 43–75)
NON VENT CPAP: ABNORMAL
NRBC BLD AUTO-RTO: 0 /100 WBCS
O2 CT BLDV-SCNC: 12.9 ML/DL
PCO2 BLD: 35 MMOL/L (ref 21–32)
PCO2 BLD: 50.3 MM HG (ref 42–50)
PCO2 BLDV: 64 MM HG (ref 42–50)
PH BLD: 7.43 [PH] (ref 7.3–7.4)
PH BLDV: 7.37 [PH] (ref 7.3–7.4)
PLATELET # BLD AUTO: 377 THOUSANDS/UL (ref 149–390)
PLATELET BLD QL SMEAR: ADEQUATE
PMV BLD AUTO: 10.6 FL (ref 8.9–12.7)
PO2 BLD: 82 MM HG (ref 35–45)
PO2 BLDV: 50.6 MM HG (ref 35–45)
POTASSIUM BLD-SCNC: 4.3 MMOL/L (ref 3.5–5.3)
POTASSIUM SERPL-SCNC: 4.6 MMOL/L (ref 3.5–5.3)
PROT SERPL-MCNC: 6.3 G/DL (ref 6.4–8.2)
RBC # BLD AUTO: 3.54 MILLION/UL (ref 3.88–5.62)
RBC MORPH BLD: NORMAL
SAO2 % BLD FROM PO2: 96 % (ref 95–98)
SODIUM BLD-SCNC: 139 MMOL/L (ref 136–145)
SODIUM SERPL-SCNC: 137 MMOL/L (ref 136–145)
SPECIMEN SOURCE: ABNORMAL
WBC # BLD AUTO: 27.69 THOUSAND/UL (ref 4.31–10.16)

## 2019-01-10 PROCEDURE — 82330 ASSAY OF CALCIUM: CPT | Performed by: PHYSICIAN ASSISTANT

## 2019-01-10 PROCEDURE — 82330 ASSAY OF CALCIUM: CPT

## 2019-01-10 PROCEDURE — 85014 HEMATOCRIT: CPT

## 2019-01-10 PROCEDURE — 94664 DEMO&/EVAL PT USE INHALER: CPT

## 2019-01-10 PROCEDURE — 87081 CULTURE SCREEN ONLY: CPT | Performed by: STUDENT IN AN ORGANIZED HEALTH CARE EDUCATION/TRAINING PROGRAM

## 2019-01-10 PROCEDURE — 83519 RIA NONANTIBODY: CPT | Performed by: PHYSICIAN ASSISTANT

## 2019-01-10 PROCEDURE — 82948 REAGENT STRIP/BLOOD GLUCOSE: CPT

## 2019-01-10 PROCEDURE — 71045 X-RAY EXAM CHEST 1 VIEW: CPT

## 2019-01-10 PROCEDURE — 86255 FLUORESCENT ANTIBODY SCREEN: CPT

## 2019-01-10 PROCEDURE — 84597 ASSAY OF VITAMIN K: CPT | Performed by: PHYSICIAN ASSISTANT

## 2019-01-10 PROCEDURE — 82525 ASSAY OF COPPER: CPT | Performed by: PHYSICIAN ASSISTANT

## 2019-01-10 PROCEDURE — 82947 ASSAY GLUCOSE BLOOD QUANT: CPT

## 2019-01-10 PROCEDURE — 99233 SBSQ HOSP IP/OBS HIGH 50: CPT | Performed by: ANESTHESIOLOGY

## 2019-01-10 PROCEDURE — 85007 BL SMEAR W/DIFF WBC COUNT: CPT | Performed by: INTERNAL MEDICINE

## 2019-01-10 PROCEDURE — 82390 ASSAY OF CERULOPLASMIN: CPT | Performed by: PHYSICIAN ASSISTANT

## 2019-01-10 PROCEDURE — 94668 MNPJ CHEST WALL SBSQ: CPT

## 2019-01-10 PROCEDURE — 82803 BLOOD GASES ANY COMBINATION: CPT

## 2019-01-10 PROCEDURE — 84295 ASSAY OF SERUM SODIUM: CPT

## 2019-01-10 PROCEDURE — 82805 BLOOD GASES W/O2 SATURATION: CPT | Performed by: STUDENT IN AN ORGANIZED HEALTH CARE EDUCATION/TRAINING PROGRAM

## 2019-01-10 PROCEDURE — 99233 SBSQ HOSP IP/OBS HIGH 50: CPT | Performed by: PSYCHIATRY & NEUROLOGY

## 2019-01-10 PROCEDURE — 84132 ASSAY OF SERUM POTASSIUM: CPT

## 2019-01-10 PROCEDURE — 80076 HEPATIC FUNCTION PANEL: CPT | Performed by: NURSE PRACTITIONER

## 2019-01-10 PROCEDURE — 84446 ASSAY OF VITAMIN E: CPT | Performed by: PHYSICIAN ASSISTANT

## 2019-01-10 PROCEDURE — 82306 VITAMIN D 25 HYDROXY: CPT | Performed by: PHYSICIAN ASSISTANT

## 2019-01-10 PROCEDURE — 94150 VITAL CAPACITY TEST: CPT

## 2019-01-10 PROCEDURE — 84590 ASSAY OF VITAMIN A: CPT | Performed by: PHYSICIAN ASSISTANT

## 2019-01-10 PROCEDURE — 94660 CPAP INITIATION&MGMT: CPT

## 2019-01-10 PROCEDURE — 80048 BASIC METABOLIC PNL TOTAL CA: CPT | Performed by: INTERNAL MEDICINE

## 2019-01-10 PROCEDURE — 87040 BLOOD CULTURE FOR BACTERIA: CPT | Performed by: NURSE PRACTITIONER

## 2019-01-10 PROCEDURE — 94760 N-INVAS EAR/PLS OXIMETRY 1: CPT

## 2019-01-10 PROCEDURE — 86341 ISLET CELL ANTIBODY: CPT

## 2019-01-10 PROCEDURE — 85027 COMPLETE CBC AUTOMATED: CPT | Performed by: INTERNAL MEDICINE

## 2019-01-10 RX ORDER — FUROSEMIDE 10 MG/ML
INJECTION INTRAMUSCULAR; INTRAVENOUS
Status: COMPLETED
Start: 2019-01-10 | End: 2019-01-10

## 2019-01-10 RX ORDER — LIDOCAINE 50 MG/G
1 PATCH TOPICAL DAILY
Status: DISCONTINUED | OUTPATIENT
Start: 2019-01-10 | End: 2019-01-19 | Stop reason: HOSPADM

## 2019-01-10 RX ORDER — FUROSEMIDE 10 MG/ML
20 INJECTION INTRAMUSCULAR; INTRAVENOUS ONCE
Status: COMPLETED | OUTPATIENT
Start: 2019-01-10 | End: 2019-01-10

## 2019-01-10 RX ADMIN — METRONIDAZOLE 500 MG: 500 INJECTION, SOLUTION INTRAVENOUS at 17:09

## 2019-01-10 RX ADMIN — INSULIN LISPRO 2 UNITS: 100 INJECTION, SOLUTION INTRAVENOUS; SUBCUTANEOUS at 05:46

## 2019-01-10 RX ADMIN — VANCOMYCIN HYDROCHLORIDE 750 MG: 750 INJECTION, SOLUTION INTRAVENOUS at 16:04

## 2019-01-10 RX ADMIN — CEFEPIME HYDROCHLORIDE 1000 MG: 1 INJECTION, POWDER, FOR SOLUTION INTRAMUSCULAR; INTRAVENOUS at 15:18

## 2019-01-10 RX ADMIN — ATORVASTATIN CALCIUM 40 MG: 40 TABLET, FILM COATED ORAL at 16:06

## 2019-01-10 RX ADMIN — HEPARIN SODIUM 5000 UNITS: 5000 INJECTION INTRAVENOUS; SUBCUTANEOUS at 22:07

## 2019-01-10 RX ADMIN — INSULIN LISPRO 1 UNITS: 100 INJECTION, SOLUTION INTRAVENOUS; SUBCUTANEOUS at 17:10

## 2019-01-10 RX ADMIN — HEPARIN SODIUM 5000 UNITS: 5000 INJECTION INTRAVENOUS; SUBCUTANEOUS at 06:25

## 2019-01-10 RX ADMIN — PIPERACILLIN SODIUM,TAZOBACTAM SODIUM 4.5 G: 4; .5 INJECTION, POWDER, FOR SOLUTION INTRAVENOUS at 03:45

## 2019-01-10 RX ADMIN — VANCOMYCIN HYDROCHLORIDE 750 MG: 750 INJECTION, SOLUTION INTRAVENOUS at 05:44

## 2019-01-10 RX ADMIN — INSULIN LISPRO 1 UNITS: 100 INJECTION, SOLUTION INTRAVENOUS; SUBCUTANEOUS at 00:40

## 2019-01-10 RX ADMIN — ACETAMINOPHEN 650 MG: 325 TABLET, FILM COATED ORAL at 13:53

## 2019-01-10 RX ADMIN — ASPIRIN 81 MG 81 MG: 81 TABLET ORAL at 08:56

## 2019-01-10 RX ADMIN — METRONIDAZOLE 500 MG: 500 INJECTION, SOLUTION INTRAVENOUS at 08:56

## 2019-01-10 RX ADMIN — FUROSEMIDE 20 MG: 10 INJECTION, SOLUTION INTRAMUSCULAR; INTRAVENOUS at 00:52

## 2019-01-10 RX ADMIN — HEPARIN SODIUM 5000 UNITS: 5000 INJECTION INTRAVENOUS; SUBCUTANEOUS at 14:08

## 2019-01-10 RX ADMIN — MELATONIN 3 MG: at 22:07

## 2019-01-10 NOTE — RESPIRATORY THERAPY NOTE
RT  Management Note  Sonia Rob 82 y o  male MRN: [id number]  Unit/Bed#: MICU 02 Encounter: [id number]      Daily Screen       1/10/2019 1440             NIF: (P)  -40 cm H2O            Physical Exam:   Assessment Type: (P) Assess only  General Appearance: (P) Alert, Awake  Respiratory Pattern: (P) Normal  Chest Assessment: (P) Chest expansion symmetrical  Bilateral Breath Sounds: (P) Diminished  Cough: (P) Strong, Moist, Non-productive  O2 Device: (P) HFNC      Resp Comments: (P) con't on high flow as ordered, issa well, mechanics done as ordered, VC= 1 5liters, NIF -40

## 2019-01-10 NOTE — PROGRESS NOTES
Progress Note - Neurology   Jose Call 82 y o  male MRN: [id number]  Unit/Bed#: MICU 02 Encounter: [id number]    Assessment:  82-year-old male with history of dysphagia and dysarthria, possibly evolving since August 2018 (previously reported as over 1 year, and then since his surgery in November 2018)  Myasthenia gravis panel all back in negative  Need consider the possibility pancreatic insufficiency as potential cause of vitamin/copper deficiency  In addition, still need to consider the diagnosis of brainstem stroke in the setting of his pseudocyst removal surgery in November 2018  Unfortunately, unable to obtain MRI brain at this time due to patient's unstable respiratory condition and worsening pneumonia/severe sepsis  Plan:  1  Patient now on Zosyn and vancomycin per primary team for worsening pneumonia  2  Continues with NG tube for feeds as patient's family considering placement of PEG  3  Patient to continue with speech therapy once medically stable to do so  4  Will obtain MRI brain w/wo with thin cuts through the brainstem when stable  5   Additional laboratory blood work to include vitamins A, D, E, and K; serum copper and ceruloplasmin; ionized calcium; serum paraneoplastic panel to be sent to [facility] clinic, along with voltage gated calcium channel antibody and voltage gated potassium in channel antibody  Discussed with Dr Qian Puri  Neurology will remain available to advise  Subjective:   Patient transferred from medical floor to ICU due to respiratory failure, likely due to worsening pneumonia  Denies diarrhea but apparently had 1 episode yesterday  Says he is feeling tired today as he was up at 3 in the morning  Continues with NG tube with patient's family considering PEG  Chest x-ray with new left perihilar opacities suspicious for pneumonia with increasing left pleural effusion and stable bibasilar densities  Remainder of MG panel now back and all negative    Unable to obtain MRI brain due to patient's in stable condition  Review of Systems 12 point review of systems reviewed and negative except as indicated above  Vitals: Blood pressure 136/78, pulse 88, temperature 100 4 °F (38 °C), temperature source Rectal, resp  rate (!) 39, height 5' 7" (1 702 m), weight 51 6 kg (113 lb 12 1 oz), SpO2 99 %  Physical Exam:   Patient appears comfortable, lying in bed with nasal pillows for oxygenation  Does appear brighter today  Rhythm regular  Lungs with diffusely rhonchorous breath sounds throughout  Abdomen soft nontender with bowel sounds present  Alert and pleasantly interactive  No obvious symbolic language difficulty but continues to have significant dysarthria  Fully oriented  EOMs tested and intact  Left facial asymmetry      Lab, Imaging and other studies:   CBC:   Results from last 7 days  Lab Units 01/10/19  0522 01/10/19  0125 01/09/19  0508 01/06/19  0427   WBC Thousand/uL 27 69*  --  9 55 9 29   RBC Million/uL 3 54*  --  3 14* 3 41*   HEMOGLOBIN g/dL 10 6*  --  9 4* 10 3*   I STAT HEMOGLOBIN g/dl  --  11 9*  --   --    HEMATOCRIT % 34 9*  --  30 6* 32 8*   HEMATOCRIT, ISTAT %  --  35*  --   --    MCV fL 99*  --  98 96   PLATELETS Thousands/uL 377  --  295 253   , BMP/CMP:   Results from last 7 days  Lab Units 01/10/19  0522 01/10/19  0125 01/09/19  0508 01/08/19  0541   SODIUM mmol/L 137  --  139 138   POTASSIUM mmol/L 4 6  --  4 2 4 3   CHLORIDE mmol/L 98*  --  101 100   CO2 mmol/L 35*  --  34* 35*   CO2, I-STAT mmol/L  --  35*  --   --    BUN mg/dL 12  --  11 11   CREATININE mg/dL 0 62  --  0 39* 0 46*   GLUCOSE, ISTAT mg/dl  --  202*  --   --    CALCIUM mg/dL 7 4*  --  7 6* 7 5*   EGFR ml/min/1 73sq m 93  --  113 105      Counseling / Coordination of Care  I spent a total of 30 min with the patient with greater than 50% of that time spent counseling and coordinating his care, specifically discussing his diagnosis, additional tests, and discussing with team, as detailed above

## 2019-01-10 NOTE — OCCUPATIONAL THERAPY NOTE
OT CANCEL NOTE    Pt chart reviewed  Pt is not currently appropriate for OT treatment 2* to poor respiratory status  Will hold initial OT evaluation  Will continue to follow pt on caseload and see pt when medically stable and as clinically appropriate      Ivelisse SOUZA, OTR/L

## 2019-01-10 NOTE — RESPIRATORY THERAPY NOTE
RT Note  Sonia Rob 82 y o  male MRN: [id number]  Unit/Bed#: MICU 02 Encounter: [id number]        Resp Comments: (P) Pt transported to MICU bed 2 at this time  Pt continues on bipap 16/10 and 100%  Will continue to monitor

## 2019-01-10 NOTE — PROGRESS NOTES
After receiving pt tx, pt sat decreased to 80s fully maxed out on hi flow at this time, no room to titrate  Paged [facility], spoke to [place], order obtained for BiPAP and critical care consulted  Pt placed on BiPAP, pt remained in the 80s  Jose Ayala with Critical Care came to see the pt, pt was then upgraded to CC, report given to MICU RN tx to MICU

## 2019-01-10 NOTE — RESPIRATORY THERAPY NOTE
RT Management Note  Burney Oppenheim 82 y o  male MRN: [id number]  Unit/Bed#: [facility] 02 Encounter: [id number]        Physical Exam:   Assessment Type: Assess only  General Appearance: Alert, Awake  Respiratory Pattern: Normal  Chest Assessment: Chest expansion symmetrical  Bilateral Breath Sounds: Diminished  O2 Device: bipap      Resp Comments: con't on bipap as ordered, weaning as tolerated   current settings 10/5 80%

## 2019-01-10 NOTE — PROGRESS NOTES
Progress Note - Neurology   Mera Briones 82 y o  male MRN: [id number]  Unit/Bed#: [facility] 826-01 Encounter: [id number]    Assessment/Plan:  Dysphagia: brainstem stroke vs  CN palsy NOS vs  Motor neuron disease with bulbar onset and respiratory involvement  MG initially considered, no improvement on mestinon, and no fatigability with extraocular movements or upper extremities on exam     -NG tube feeds, plan to place PEG  -repeat MRI brain, w/wo contrast, thin cuts through brainstem  -MRA H&N  -start ASA & atorvastatin, send A1c & lipid panel  -EMG as outpatient  -neuropathy labs pending: TSH 2 64, A1c 7 1, RPR negative , B12 424, folate 18, ESR 73, Crp 25, CRP, ANCA pending, NORMA negative, SSA pending, SSB pending, SPEP pending, Lyme negative   -MG labs: Ach binding negative    PNA, HCAP, possible aspiration  -c/w metronidazole  -completed rocephin x7d    Subjective:   Mild improvement in voice and level of alertness today  Wife and daughter at the bedside  Re-established timeline of events  Patient began experiencing weight loss around January 2018  Initial workup for weight loss was August 2018, at which time patient had already lost 60 lbs, and he was diagnosed with DM  Started on metformin  Dysphagia initially noticed by wife August 2018  Patient unable to recall  Wife states he was eating only several mouthfuls and stopping at the time, and had frequent coughing with swallowing  Dysphagia progressed and voice grew raspy  Dysphagia became more prominent in November 2018 around time of patient's laparotomy for pseudocyst resection  Has continued to worsen in phonation, dysphagia, and dysarthria  Has had mild improvement this week with NGT feeds and IV antibiotics  ROS:  Endorses one episode of loose stools  Denies headache, double vision, chest pain, shortness of breath, dysuria, urinary incontinence, abdominal pain, fevers, chills      Vitals: Blood pressure 136/78, pulse 68, temperature 98 °F (36 7 °C), temperature source Oral, resp  rate 19, height 5' 7" (1 702 m), weight 50 4 kg (111 lb 1 6 oz), SpO2 95 %  ,Body mass index is 17 4 kg/m²  Physical Exam:  GEN: resting in bed  Cachectic appearing  Temporal wasting  Diffuse musce wasting  PULM: course breath sounds  mod respiratory distress, using accessory muscles  CV: S1, S2, RRR, normal pulses  No bruits  GI: laparotomy scar noted  Soft, non-tender  HEENT: non-traumatic  SKIN: no rashes  NEURO:   Awake, alert, following cross body commands  Able to calculate  Able to spell own name  Poor insight  Unable to give comprehensive history  Language: decreased fluency  Normal naming and repetition  CN: PERRLA, no papilledema on fundoscopy, full visual fields and EOMI (no fatiguing of upgaze), no ptosis, no tongue fasciculations, palate rise is symmetric, does not protrude tongue beyond labio-dental border, dysarthria, and hypophonia  MOTOR: diffusely atrophic, no fasciculations, normal tone  Deltoids 4/5, biceps, triceps, wrist ext/flex and  are 5/5  Hip flexors 4/5  Knee extension, ankle plantar/dorsi flexion 5/5  SENSORY: symmetric pinprick throughout  COORDINATION: normal FNF  REFLEXES: 2++ biceps, triceps, patellar, 1+ achilles, toes bilaterally downgoing  Negative Price, pectoral jerks, suprapatellar, and cross-adductors        Lab, Imaging and other studies:   CBC:   Results from last 7 days  Lab Units 01/09/19  0508 01/06/19  0427 01/05/19  0516   WBC Thousand/uL 9 55 9 29 10 59*   RBC Million/uL 3 14* 3 41* 3 22*   HEMOGLOBIN g/dL 9 4* 10 3* 9 7*   HEMATOCRIT % 30 6* 32 8* 30 4*   MCV fL 98 96 94   PLATELETS Thousands/uL 295 253 240   , BMP/CMP:   Results from last 7 days  Lab Units 01/09/19  0508 01/08/19  0541 01/06/19  0427   SODIUM mmol/L 139 138 142   POTASSIUM mmol/L 4 2 4 3 4 0   CHLORIDE mmol/L 101 100 108   CO2 mmol/L 34* 35* 29   BUN mg/dL 11 11 8   CREATININE mg/dL 0 39* 0 46* 0 51*   CALCIUM mg/dL 7 6* 7  5* 7 8*   EGFR ml/min/1 73sq m 113 105 101     VTE Prophylaxis: Heparin    Counseling / Coordination of Care  Total time spent today 45 minutes  Greater than 50% of total time was spent with the patient and / or family counseling and / or coordination of care   A description of the counseling / coordination of care: discussion of care plan

## 2019-01-10 NOTE — PROGRESS NOTES
Progress Note - [address] [facility] 82 y o  male MRN: [id number]  Unit/Bed#: MICU 02 Encounter: [id number]    Attending Physician: Ludmila Calzada DO      ______________________________________________________________________  Assessment and Plan:   Principal Problem:    Sepsis [facility])  Active Problems:    Pneumonia    Diabetes mellitus ([facility] [address] )    Weight loss    Other chronic pancreatitis ([facility] [address] )    Respiratory failure ([facility] [address] )    Dysphagia    Hypokalemia    Malnutrition ([facility] [address] )    Moderate protein-calorie malnutrition ([facility] [address] )    Atrial flutter ([facility] [address] )  Resolved Problems:    Acute kidney injury ([facility] [address] )      Neuro:   -patient is alert, oriented to person, place, time, situation  GCS 15  -4/5 strength bilateral UEs, LEs  -intact sensation, cranial nerves II-XII intact     CV:   -atrial fibrillation prior to arrival in ICU, spontaneously converted to sinus rhythm   -MAP >65, SBP >110, hemodynamically stable     Pulm:   -acute hypoxic and hypercapneic respiratory failure, 2/2 aspiration pneumonia and/or mucus plug, consider bronchoscopy if fails to improve  -titrate off bipap to HFNC, monitor  -chest XR unclear pneumonia vs edema, diuresed last night, will repeat pm CXR    GI:   -pancreatitic insufficiency 2/2 chronic pancreatitis, continue Creon  -s/p exploratory lap 11/19  -NG tube    :   -UOP stable, renal function stable     F/E/N:   -replete prn   -elevated bicarb, pending blood gas    ID:   Admitted 1/1 for   -bilateral lower lobe infiltrates, completed 7d of rocephin, currently on day 4 of flagyl, cefepime and vancomycin today, 1 dose zosyn this morning       Heme:   -anemia, chronic, stable   -subq heparin, SCDs DVT prophylaxis     Endo:   -insulin sliding scale     Msk/Skin:   -frequent repositioning, once off bipap ambulate with assistance as tolerated    Disposition:   -if stable today, transition to floor     Code Status: Level 1 - Full Code    Counseling / Coordination of Care  Total Critical Care time spent 30 minutes excluding procedures, teaching and family updates  ______________________________________________________________________    Chief Complaint: n/a    24 Hour Events: desat to [de-identified] while on med surg floor, improvement initially with HFNC however desat again to 80s, placed on bipap, accepted to ICU for monitoring  Review of Systems   Constitutional: Negative  Negative for chills and fever  HENT: Negative  Eyes: Negative  Respiratory: Positive for cough  Negative for choking, chest tightness, shortness of breath, wheezing and stridor  Cardiovascular: Negative  Negative for chest pain, palpitations and leg swelling  Gastrointestinal: Negative  Negative for abdominal pain, blood in stool, constipation, diarrhea, nausea and vomiting  Endocrine: Negative  Genitourinary: Negative  Negative for difficulty urinating, frequency and urgency  Musculoskeletal: Negative  Skin: Negative  Allergic/Immunologic: Negative  Neurological: Negative  Negative for light-headedness and headaches  Hematological: Negative  Psychiatric/Behavioral: Negative       ______________________________________________________________________    Physical Exam:   Physical Exam   Constitutional: He is oriented to person, place, and time  He appears well-developed and well-nourished  HENT:   Head: Normocephalic and atraumatic  Nose: Nose normal    Mouth/Throat: Oropharynx is clear and moist    Eyes: Pupils are equal, round, and reactive to light  Conjunctivae and EOM are normal  No scleral icterus  Neck: Neck supple  No JVD present  Cardiovascular: Normal rate, regular rhythm and normal heart sounds  No murmur heard  Pulmonary/Chest: Effort normal and breath sounds normal  No stridor  No respiratory distress  He has no wheezes  He has no rales  Abdominal: Soft  Bowel sounds are normal  He exhibits no distension  There is no tenderness  There is no guarding     Midline abdominal scar, healing, no overlying erythema or drainage   Musculoskeletal: Normal range of motion  He exhibits no edema  Neurological: He is alert and oriented to person, place, and time  No cranial nerve deficit or sensory deficit  He exhibits normal muscle tone  Skin: Skin is warm and dry  Capillary refill takes less than 2 seconds  ______________________________________________________________________  Vitals:    01/10/19 0411 01/10/19 0737 01/10/19 1000 01/10/19 1111   BP: 136/78      BP Location:       Pulse: 88      Resp: (!) 39      Temp:       TempSrc:       SpO2: 99% 95% 96% 99%   Weight:       Height:           Temperature:   Temp (24hrs), Av 4 °F (36 9 °C), Min:97 4 °F (36 3 °C), Max:100 4 °F (38 °C)    Current Temperature: 100 4 °F (38 °C)  Weights:   IBW: 66 1 kg    Body mass index is 17 82 kg/m²  Weight (last 2 days)     Date/Time   Weight    01/10/19 0317  51 6 (113 76)    19 1032  50 4 (111 1)    Weight: standing weight at 19 1032    19 1425  50 8 (112)    Weight: suspect inaccurate bed scale weight, asked PCA to obtain new weight at 19 1425            Hemodynamic Monitoring:  N/A     Non-Invasive/Invasive Ventilation Settings:  Respiratory    Lab Data (Last 4 hours)    None         O2/Vent Data (Last 4 hours)      01/10 1000 01/10 1111        Non-Invasive Ventilation Mode HFNC HFNC                No results found for: PHART, QPL9OND, PO2ART, UQY1LBO, L1NAJHSI, BEART, SOURCE  SpO2: SpO2: 99 %  Intake and Outputs:  I/O        07 -  0700 701 - 01/10 0700    P  O  0 0    I V  (mL/kg)  30 (0 6)    NG/ 50    IV Piggyback 100 100    Feedings 1086     Total Intake(mL/kg) 1716 (33 8) 180 (3 5)    Urine (mL/kg/hr) 1275 (1) 2270 (1 8)    Stool 0     Total Output 1275 2270    Net +441 -2090          Unmeasured Stool Occurrence 1 x 3 x        UOP: >0 5 ml/kg/hr   Nutrition:        Diet Orders            Start     Ordered    19 0000  Diet NPO; Sips with meds  Diet effective midnight     Question Answer Comment   Diet Type NPO    NPO Except: Sips with meds        01/10/19 0322    01/05/19 1530  Diet Enteral/Parenteral; Tube Feeding No Oral Diet; Jevity 1 2 Randy; Continuous; 60; 125; Water; Every 6 hours  Diet effective now     Comments:  Please start at 20/hr and titrate up 10/hr q4h to goal rate of 60/hr   Question Answer Comment   Diet Type Enteral/Parenteral    Enteral/Parenteral Tube Feeding No Oral Diet    Tube Feeding Formula: Jevity 1 2 Randy    Bolus/Cyclic/Continuous Continuous    Tube Feeding Goal Rate (mL/hr): 60    Tube Feeding water flush (mL): 125    Water Flush type: Water    Water flush frequency: Every 6 hours    RD to adjust diet per protocol?  Yes        01/05/19 1530    01/03/19 0728  Room Service  Once     Question:  Type of Service  Answer:  Room Service - Appropriate with Assistance    01/03/19 0727          Labs:     Results from last 7 days  Lab Units 01/10/19  0522 01/10/19  0125 01/09/19  0508 01/06/19 0427 01/05/19  0516 01/04/19  0551   WBC Thousand/uL 27 69*  --  9 55 9 29 10 59* 10 97*   HEMOGLOBIN g/dL 10 6*  --  9 4* 10 3* 9 7* 9 8*   I STAT HEMOGLOBIN g/dl  --  11 9*  --   --   --   --    HEMATOCRIT % 34 9*  --  30 6* 32 8* 30 4* 31 2*   HEMATOCRIT, ISTAT %  --  35*  --   --   --   --    PLATELETS Thousands/uL 377  --  295 253 240 213   NEUTROS PCT %  --   --  79*  --  78*  --    MONOS PCT %  --   --  6  --  7  --    MONO PCT % 2*  --   --   --   --  3*       Results from last 7 days  Lab Units 01/10/19  0522 01/10/19  0125 01/09/19  0508 01/08/19  0541 01/06/19  0427 01/05/19  0516 01/04/19  0551   SODIUM mmol/L 137  --  139 138 142 140 138   POTASSIUM mmol/L 4 6  --  4 2 4 3 4 0 3 1* 3 5   CHLORIDE mmol/L 98*  --  101 100 108 104 104   CO2 mmol/L 35*  --  34* 35* 29 29 28   CO2, I-STAT mmol/L  --  35*  --   --   --   --   --    ANION GAP mmol/L 4  --  4 3* 5 7 6   BUN mg/dL 12  --  11 11 8 8 10   CREATININE mg/dL 0 62  --  0 39* 0 46* 0 51* 0 42* 0 51*   CALCIUM mg/dL 7 4*  --  7 6* 7 5* 7 8* 7 9* 8 1*       Results from last 7 days  Lab Units 01/06/19  0427 01/05/19  0516   MAGNESIUM mg/dL 2 0 1 7   PHOSPHORUS mg/dL 2 8 2 2*                  ABG:No results found for: PHART, OAR6VCV, PO2ART, UCH3KJP, H6SCKLFM, BEART, SOURCE  VBG:    Results from last 7 days  Lab Units 01/10/19  0645   PH AUTUMN  7 365   PCO2 AUTUMN mm Hg 64 0*   PO2 AUTUMN mm Hg 50 6*   HCO3 AUTUMN mmol/L 35 8*   BASE EXC AUTUMN mmol/L 8 5       Results from last 7 days  Lab Units 01/09/19  1330 01/06/19  0427 01/05/19  0516 01/04/19  0551   PROCALCITONIN ng/ml 0 13 0 91* 1 56* 3 23*     No results found for: [facility]   Imaging:  I have personally reviewed pertinent reports  XR chest portable   Final Result by Gloria Maya MD (01/10 9713)      New left perihilar opacity suspicious for pneumonia with increasing left pleural effusion  Bibasilar densities are stable  The study was marked in [facility] for immediate notification  Workstation performed: [id number]         XR chest portable   Final Result by Jovon Becker MD (01/08 2054)      Persistent bibasilar infiltrates  Workstation performed: [id number]         XR abdomen 1 vw portable   Final Result by Katherin Barber MD (01/06 5497)      Enteric tube tip in the region of gastroesophageal junction with sidehole approximately 10 cm proximal to GE junction  Workstation performed: [id number]         FL barium swallow video w speech   Final Result by JOSH Yung (01/05 9161)      CT chest wo contrast   Final Result by Katherin Barber MD (01/05 6187)      Groundglass and consolidative airspace opacity throughout the lungs, most dense in the dependent lungs and more severe on the left than the right  Also noted is inspissated mucus within the trachea and the mainstem bronchi bilaterally  The findings are    most suspicious for severe multifocal aspiration pneumonia        Small left greater than right pleural effusions  Findings in the upper abdomen are better evaluated on recent abdominal MR of December 14, 2018  Workstation performed: [id number]         MRI brain wo contrast   Final Result by Eduardo Sullivan MD (01/04 1701)      No acute intracranial ischemia  Advanced microangiopathic changes throughout supratentorial white matter and central karthikeyan  Right mastoid effusion  Workstation performed: [id number]         CT head wo contrast   Final Result by Johanny Estrada MD (01/03 1114)      Generalized volume loss with advanced microangiopathic changes of the cerebral white matter  No acute intracranial abnormality  Workstation performed: [id number]         FL barium swallow video w speech   Final Result by  (01/03 1034)      FL barium swallow video w speech   Final Result by JOSH HENDERSON (01/03 0919)      XR chest pa & lateral   ED Interpretation by Aliza Heredia MD (01/01 2220)   Bilateral infiltrates concerning for pneumonia  Final Result by Angel Cedillo MD (40/48 3041)      Lower lobe infiltrates bilaterally            Workstation performed: [id number]         XR chest portable ICU    (Results Pending)       Micro:       Allergies: No Known Allergies  Medications:   Scheduled Meds:    Current Facility-Administered Medications:  acetaminophen 650 mg Oral Q6H PRN Angeli Ge PA-C   albuterol 2 5 mg Nebulization Q4H PRN Walt Stafford MD   aspirin 81 mg Oral Daily Helena Bazzi MD   atorvastatin 40 mg Oral Daily With Guera Silveira MD   cholecalciferol 400 Units Oral Daily Jerry Lenz MD   heparin (porcine) 5,000 Units Subcutaneous [facility] Jonh Lynne MD   insulin lispro 1-5 Units Subcutaneous Q6H [address] DEMI [place]   melatonin 3 mg Oral HS Mo De Luna DO   omeprazole (PRILOSEC) suspension 2 mg/mL 20 mg Oral Early Morning DEMI [place]   pancrelipase (Lip-Prot-Amyl) 24,000 Units Oral BID With Meals Raul Archibald MD   polyvinyl alcohol 1 drop Both Eyes PRN Ekaterina Ag PA-C     Continuous Infusions:   PRN Meds:    acetaminophen 650 mg Q6H PRN   albuterol 2 5 mg Q4H PRN   polyvinyl alcohol 1 drop PRN     VTE Pharmacologic Prophylaxis: Heparin  VTE Mechanical Prophylaxis: sequential compression device  Invasive lines and devices: Invasive Devices     Peripheral Intravenous Line            Peripheral IV 01/09/19 Right Wrist 1 day    Peripheral IV 01/10/19 Right Arm less than 1 day          Drain            NG/OG/Enteral Tube Nasogastric 18 Fr Right nares 4 days    External Urinary Catheter Small less than 1 day                     Portions of the record may have been created with voice recognition software  Occasional wrong word or "sound a like" substitutions may have occurred due to the inherent limitations of voice recognition software  Read the chart carefully and recognize, using context, where substitutions have occurred      Anaid Stakes, DO

## 2019-01-10 NOTE — PHYSICAL THERAPY NOTE
Physical Therapy Cancellation Note- pt medically unstable for PT due to respiratory status and now on bipap  Will follow for ability to work with pt    Dennis Awan PT

## 2019-01-10 NOTE — PROGRESS NOTES
When turning patient, patient desaturated to 80% on 6L, patient placed on non-rebreather, unable to maintain o2 sat on non-rebreather, pulse ox was 83% and patient was not tolerating mask  Paged respiratory and SLIM  Spoke with [facility] NATY, patient was placed on high flow nasal cannula at 100% by respiratory, pulse ox now 92%   SLIM paged to increase level of care due to patient's o2 requirements on high flow

## 2019-01-10 NOTE — PROGRESS NOTES
Vancomycin Assessment    Burney Oppenheim is a 82 y o  male who is currently receiving vancomycin pharmacy-to-dose for Pneumonia     Relevant clinical data and objective history reviewed:  Creatinine   Date Value Ref Range Status   01/10/2019 0 62 0 60 - 1 30 mg/dL Final     Comment:     Standardized to IDMS reference method   2019 0 39 (L) 0 60 - 1 30 mg/dL Final     Comment:     Standardized to IDMS reference method   2019 0 46 (L) 0 60 - 1 30 mg/dL Final     Comment:     Standardized to IDMS reference method     /67   Pulse 84   Temp 98 °F (36 7 °C) (Oral)   Resp (!) 31   Ht 5' 7" (1 702 m)   Wt 51 6 kg (113 lb 12 1 oz)   SpO2 97%   BMI 17 82 kg/m²   I/O last 3 completed shifts: In: 2359 [I V :30; NG/GT:300; IV Piggyback:200; Feedings:1086]  Out: 2645 [Urine:2645]  Lab Results   Component Value Date/Time    BUN 12 01/10/2019 05:22 AM    WBC 27 69 (H) 01/10/2019 05:22 AM    HGB 10 6 (L) 01/10/2019 05:22 AM    HCT 34 9 (L) 01/10/2019 05:22 AM    MCV 99 (H) 01/10/2019 05:22 AM     01/10/2019 05:22 AM     Temp Readings from Last 3 Encounters:   01/10/19 98 °F (36 7 °C) (Oral)   18 98 6 °F (37 °C)   18 98 6 °F (37 °C)     Vanco Assessment:  1  Indication/Cultures/Status: Pneumonia   1/10 Blood cultures: in process   1/10 MRSA culture: in process    Procalcitonin 0 13  2  Renal function: SCr 0 62; CrCl ~58 mL/min; UOP decreasing  3  Days of therapy: 1  4  Current dose: 750 mg x 1  5  Last level: n/a  6  Goal trough: 15-20    Vanco Plan:  1  New dosin mg IV q12h to start now (not loaded, not on pressors)  2  Next level: Plan for trough as patient approaches steady state prior to the 4th dose on  at approximately 1530  3  Recommendations: Repeat procalcitonin, if remains negative, strongly consider discontinuing antibiotics  Follow up MRSA culture for possible de-escalation      Pharmacy will continue to follow closely for s/sx of nephrotoxicity, infusion reactions and appropriateness of therapy  BMP and CBC will be ordered per protocol  We will continue to follow the patients culture results and clinical progress daily      Cynthia Guillory, PharmD, [address] [facility]  Critical Care Clinical Pharmacist

## 2019-01-10 NOTE — PROGRESS NOTES
Acceptance note - [address] [facility] 82 y o  male MRN: [id number]  Unit/Bed#: [facility] 501-01 Encounter: [id number]    Assessment:  28-year-old male admitted with severe sepsis with multilobar pneumonia with worsening acute hypoxic and hypercapnic respiratory failure  Acute hypoxic and hypercapnic respiratory failure  Severe sepsis  Multi lobar pneumonia  Dysphagia  Atrial flutter  Pancreatic mass with chronic pancreatitis  Moderate protein calorie malnutrition  Recent weight loss  Diabetes mellitus  Normocytic anemia      Plan:           Neuro:    · GCS 15  · no sedation  · Continue acetaminophen 650 mg q 6 hours p r n  For mild pain, headaches, fever, melatonin at bedtime  · Appreciate Neurology input - pending MRA/MRV of head and carotids  · Workup for myasthenia gravis thus far negative, will follow up antibodies  · PT/OT evaluation                 CV:    · Continue to monitor on telemetry  · currently in sinus rhythm  · will avoid beta blockade until myasthenia gravis is completely ruled out if needed  · Suggest Lasix as needed for pleural effusions the patient appears dry overall  · Continue atorvastatin 40 mg p o  Daily, aspirin 81 mg daily  · Echocardiogram pending                   Lung:   · Continue BiPAP at 16/10 at 100% oxygen  · Continue to monitor pulse ox  · Pending worsening respiratory status, patient may require endotracheal intubation  · Continue respiratory protocol, albuterol nebulizer Q 4 p r n   For wheezing and shortness of breath                 GI:   · Continue pantoprazole 40 mg daily  · Continue pancrelipase 26192 units twice daily for pancreatic insufficiency                 FEN:   · Patient currently on tube feeds at goal of Jevity 1 2 at 60 cc/hour with free water flushes 125 cc q 6 hours - will hold for now in case patient needs to be intubated  · Patient NPO given dysphagia                 :   · Continue condom catheter and monitor I/O's closely  · Acute kidney injury resolved, continue to trend creatinine  · Goal potassium greater than 4, magnesium greater than 2, phosphorus greater than 3                 ID:  · Influenza/RSV/Legionella antigen/blood cultures x2 negative  · Urine strep pneumo antigen positive  · Continue vancomycin and Zosyn - will monitor renal function closely  · Pending MRSA culture, discontinue vancomycin if negative                 Heme:   · Hemoglobin 9 4 with MCV 98                 Endo:   · Continue sliding scale insulin q 6 hours with fingerstick glucoses q 6 hours  · Continue vitamin-D                            Msk/Skin:   · Patient cachectic likely in the setting of deconditioning                 Disposition:  Admit to MICU for continuous BiPAP and respiratory monitoring  Chief Complaint:  Acute hypoxic hypercapnic respiratory failure    HPI:  History obtained per chart review  Patient is an 82-year-old male past medical history significant for benign pancreatic mass with chronic pancreatitis and type 2 diabetes  Patient was originally admitted to [facility] on 01/01/2019 for worsening nonproductive cough and worsening weight loss  On admission, patient was found to have bilateral opacities on chest x-ray and procalcitonin of 17 1  Patient was diagnosed with pneumonia and was originally treated with azithromycin and ceftriaxone  Urine strep antigen was positive  Blood cultures were negative x2  Patient admitted for severe sepsis patient was also tachycardic and leukocytoclastic with a lactate of 3 7 which since cleared  Patient also found to have an acute kidney injury with creatinine of 0 99 with baseline of 0 5 which is since resolved  Pulmonology was consulted who added Flagyl to the antibiotic course  CT chest showed severe multifocal aspiration pneumonia  Given new pharyngeal dysphagia the BS was ordered which showed a possible neurologic source of dysphagia  CT of the head was negative    Neurology was consulted who performed a workup for myasthenia gravis however thus far testing has been negative  Patient did have be BS 20 min after administration of new state mean which did show minimal improvement  A trial dose of Mestinon was performed however was discontinued as patient did not show improvement  Original MRI of the brain showed advanced microvascular changes  Patient is scheduled to have rate MRI with and without contrast with focus to brainstem area for suspicion of brainstem stroke  While hospitalized, patient had a brief run of atrial flutter for which Cardiology was consulted  Atrial flutter was deemed likely secondary to severe sepsis  Echocardiogram was performed which showed EF of 60% with a grade 1 diastolic dysfunction  On the night of 01/10/2019, patient desaturated to 80% on 6 L by nasal cannula and required non-rebreather mask however patient's pulse ox only recover to 83%  Patient was started on high-flow nasal cannula at 100% with recovery of pulse ox to 92%, however around 2:30 a m  On 01/10/2019, patient saturation decreased to 80% on high-flow oxygen 100%  Patient was ordered BiPAP and transferred to MICU for closer Respiratory monitoring and continuous BiPAP  Physical Exam:     Vitals:    19 2202 19 2335 19 2351 01/10/19 0030   BP: 114/68 113/67     BP Location: Left arm      Pulse: 77 (!) 109     Resp: (!) 28 22     Temp: 97 9 °F (36 6 °C) (!) 97 4 °F (36 3 °C)     TempSrc: Oral Oral     SpO2: 92% 97% 96% 90%   Weight:       Height:                 Temperature:   Temp (24hrs), Av 9 °F (36 6 °C), Min:97 4 °F (36 3 °C), Max:98 2 °F (36 8 °C)    Current: Temperature: (!) 97 4 °F (36 3 °C)    Weights:   IBW: 66 1 kg    Body mass index is 17 4 kg/m²    Weight (last 2 days)     Date/Time   Weight    19 1032  50 4 (111 1)    Weight: standing weight at 19 1032    19 1425  50 8 (112)    Weight: suspect inaccurate bed scale weight, asked PCA to obtain new weight at 01/08/19 1425            Physical Exam   Constitutional: He appears well-developed  No distress  BMI 17 4, cachectic appearing   HENT:   Head: Normocephalic and atraumatic  Mouth/Throat: No oropharyngeal exudate  Eyes: Conjunctivae are normal  No scleral icterus  Neck: Normal range of motion  Neck supple  No thyromegaly present  Cardiovascular: Normal rate, regular rhythm, normal heart sounds and intact distal pulses  Exam reveals no gallop and no friction rub  No murmur heard  Pulmonary/Chest:   Tachypneic on BiPAP, rhonchorous breath sounds heard best at bilateral lung bases   Abdominal: Soft  Bowel sounds are normal  He exhibits no distension  There is no tenderness  Genitourinary:   Genitourinary Comments: Condom catheter   Musculoskeletal: Normal range of motion  He exhibits no edema or deformity  Lymphadenopathy:     He has no cervical adenopathy  Neurological: He is alert  No cranial nerve deficit  He exhibits normal muscle tone  Alert and oriented to person place time and situation   Skin: Skin is warm and dry  No rash noted  He is not diaphoretic  No erythema  Psychiatric: His behavior is normal    Nursing note and vitals reviewed  Hemodynamic Monitoring:  N/A     Non-Invasive/Invasive Ventilation Settings:  Respiratory    Lab Data (Last 4 hours)    None         O2/Vent Data (Last 4 hours)      01/09 2335 01/10 0030        Non-Invasive Ventilation Mode HFNC BiPAP                No results found for: PHART, JFV1XKJ, PO2ART, EQU6HQA, B9FJNWIH, BEART, SOURCE  SpO2: SpO2: 96 %    Intake and Outputs:  I/O       01/08 0701 - 01/09 0700 01/09 0701 - 01/10 0700    P  O  0 0    NG/ 50    IV Piggyback 100     Feedings 1086     Total Intake(mL/kg) 1716 (33 8) 50 (1)    Urine (mL/kg/hr) 1275 (1) 1500 (1 2)    Stool 0     Total Output 1275 1500    Net +441 -1450          Unmeasured Stool Occurrence 1 x 3 x        UOP: 83 cc/hour   Nutrition:        Diet Orders            Start Ordered    01/05/19 1530  Diet Enteral/Parenteral; Tube Feeding No Oral Diet; Jevity 1 2 Randy; Continuous; 60; 125; Water; Every 6 hours  Diet effective now     Comments:  Please start at 20/hr and titrate up 10/hr q4h to goal rate of 60/hr   Question Answer Comment   Diet Type Enteral/Parenteral    Enteral/Parenteral Tube Feeding No Oral Diet    Tube Feeding Formula: Jevity 1 2 Randy    Bolus/Cyclic/Continuous Continuous    Tube Feeding Goal Rate (mL/hr): 60    Tube Feeding water flush (mL): 125    Water Flush type: Water    Water flush frequency: Every 6 hours    RD to adjust diet per protocol? Yes        01/05/19 1530    01/03/19 0728  Room Service  Once     Question:  Type of Service  Answer:  Room Service - Appropriate with Assistance    01/03/19 0727        TF currently running at 60/hour with a goal of 60   Formula:Jevity 1 2    Labs:     Results from last 7 days  Lab Units 01/10/19  0125 01/09/19  0508 01/06/19  0427 01/05/19  0516 01/04/19  0551 01/03/19  0545   WBC Thousand/uL  --  9 55 9 29 10 59* 10 97* 10 07   HEMOGLOBIN g/dL  --  9 4* 10 3* 9 7* 9 8* 9 4*   I STAT HEMOGLOBIN g/dl 11 9*  --   --   --   --   --    HEMATOCRIT %  --  30 6* 32 8* 30 4* 31 2* 29 7*   HEMATOCRIT, ISTAT % 35*  --   --   --   --   --    PLATELETS Thousands/uL  --  295 253 240 213 201   NEUTROS PCT %  --  79*  --  78*  --  84*   MONOS PCT %  --  6  --  7  --  5   MONO PCT %  --   --   --   --  3*  --       Results from last 7 days  Lab Units 01/10/19  0125 01/09/19  0508 01/08/19  0541 01/06/19  0427   SODIUM mmol/L  --  139 138 142   POTASSIUM mmol/L  --  4 2 4 3 4 0   CHLORIDE mmol/L  --  101 100 108   CO2 mmol/L  --  34* 35* 29   CO2, I-STAT mmol/L 35*  --   --   --    BUN mg/dL  --  11 11 8   CREATININE mg/dL  --  0 39* 0 46* 0 51*   CALCIUM mg/dL  --  7 6* 7 5* 7 8*   GLUCOSE, ISTAT mg/dl 202*  --   --   --        Results from last 7 days  Lab Units 01/06/19  0427 01/05/19  0516   MAGNESIUM mg/dL 2 0 1 7       Results from last 7 days  Lab Units 01/06/19  0427 01/05/19  0516   PHOSPHORUS mg/dL 2 8 2 2*              No results found for: TROPONINI    Imaging:  I have personally reviewed pertinent reports  EKG: No EKG from last 24 hr     Micro:  Lab Results   Component Value Date    BLOODCX No Growth After 5 Days  01/01/2019    BLOODCX No Growth After 5 Days  01/01/2019       Allergies: No Known Allergies    Medications:   Scheduled Meds:  Current Facility-Administered Medications:  acetaminophen 650 mg Oral Q6H PRN Angeli Ge PA-C   albuterol 2 5 mg Nebulization Q4H PRN Dominick Garcia MD   aspirin 81 mg Oral Daily John Mon MD   atorvastatin 40 mg Oral Daily With Chandana Cortez MD   cholecalciferol 400 Units Oral Daily Janette William MD   heparin (porcine) 5,000 Units Subcutaneous [facility] John Montero MD   insulin lispro 1-5 Units Subcutaneous Q6H Nickie Velez PA-C   melatonin 3 mg Oral HS [place] Cowing,    pancrelipase (Lip-Prot-Amyl) 24,000 Units Oral BID With Meals Janette William MD   pantoprazole 40 mg Oral Early Morning John Montero MD   piperacillin-tazobactam 4 5 g Intravenous Once [employer], DO   polyvinyl alcohol 1 drop Both Eyes PRN Nataliia Rodríguez PA-C   vancomycin 15 mg/kg Intravenous Once [employer], DO     Continuous Infusions:   PRN Meds:    acetaminophen 650 mg Q6H PRN   albuterol 2 5 mg Q4H PRN   polyvinyl alcohol 1 drop PRN       VTE Pharmacologic Prophylaxis: Heparin  VTE Mechanical Prophylaxis: sequential compression device    Invasive lines and devices: Invasive Devices     Peripheral Intravenous Line            Peripheral IV 01/09/19 Right Wrist less than 1 day          Drain            NG/OG/Enteral Tube Nasogastric 18 Fr Right nares 4 days    External Urinary Catheter Medium 2 days                      Code Status: Level 1 - Full Code     Portions of the record may have been created with voice recognition software    Occasional wrong word or "sound a like" substitutions may have occurred due to the inherent limitations of voice recognition software  Read the chart carefully and recognize, using context, where substitutions have occurred       Nicolasa Garcia MD

## 2019-01-10 NOTE — PROGRESS NOTES
Patient ordered SD level 1 awaiting bed placement   Patient's daughter Francesca Bacon was updated of transfer

## 2019-01-11 ENCOUNTER — APPOINTMENT (INPATIENT)
Dept: NON INVASIVE DIAGNOSTICS | Facility: HOSPITAL | Age: 84
DRG: 853 | End: 2019-01-11
Payer: MEDICARE

## 2019-01-11 PROBLEM — R77.8 ABNORMAL SPEP: Status: ACTIVE | Noted: 2019-01-11

## 2019-01-11 PROBLEM — J96.01 ACUTE RESPIRATORY FAILURE WITH HYPOXIA (HCC): Status: RESOLVED | Noted: 2019-01-02 | Resolved: 2019-01-11

## 2019-01-11 LAB
ALBUMIN SERPL ELPH-MCNC: 1.95 G/DL (ref 3.5–5)
ALBUMIN SERPL ELPH-MCNC: 33.7 % (ref 52–65)
ALBUMIN UR ELPH-MCNC: 14.9 %
ALPHA1 GLOB MFR UR ELPH: 2.8 %
ALPHA1 GLOB SERPL ELPH-MCNC: 0.46 G/DL (ref 0.1–0.4)
ALPHA1 GLOB SERPL ELPH-MCNC: 7.9 % (ref 2.5–5)
ALPHA2 GLOB MFR UR ELPH: 2 %
ALPHA2 GLOB SERPL ELPH-MCNC: 0.63 G/DL (ref 0.4–1.2)
ALPHA2 GLOB SERPL ELPH-MCNC: 10.8 % (ref 7–13)
ANION GAP SERPL CALCULATED.3IONS-SCNC: 4 MMOL/L (ref 4–13)
ANION GAP SERPL CALCULATED.3IONS-SCNC: 5 MMOL/L (ref 4–13)
ARSENIC BLD-MCNC: 5 UG/L (ref 2–23)
B-GLOBULIN MFR UR ELPH: 7.3 %
BASOPHILS # BLD AUTO: 0.04 THOUSANDS/ΜL (ref 0–0.1)
BASOPHILS NFR BLD AUTO: 0 % (ref 0–1)
BETA GLOB ABNORMAL SERPL ELPH-MCNC: 0.34 G/DL (ref 0.4–0.8)
BETA1 GLOB SERPL ELPH-MCNC: 5.8 % (ref 5–13)
BETA2 GLOB SERPL ELPH-MCNC: 9 % (ref 2–8)
BETA2+GAMMA GLOB SERPL ELPH-MCNC: 0.52 G/DL (ref 0.2–0.5)
BILIRUB UR QL STRIP: NEGATIVE
BUN SERPL-MCNC: 9 MG/DL (ref 5–25)
BUN SERPL-MCNC: 9 MG/DL (ref 5–25)
C-ANCA TITR SER IF: NORMAL TITER
CADMIUM BLD-MCNC: NORMAL UG/L (ref 0–1.2)
CALCIUM SERPL-MCNC: 7.5 MG/DL (ref 8.3–10.1)
CALCIUM SERPL-MCNC: 7.8 MG/DL (ref 8.3–10.1)
CERULOPLASMIN SERPL-MCNC: 17.1 MG/DL (ref 16–31)
CHLORIDE SERPL-SCNC: 101 MMOL/L (ref 100–108)
CHLORIDE SERPL-SCNC: 95 MMOL/L (ref 100–108)
CLARITY UR: CLEAR
CO2 SERPL-SCNC: 32 MMOL/L (ref 21–32)
CO2 SERPL-SCNC: 33 MMOL/L (ref 21–32)
COLOR UR: YELLOW
CREAT SERPL-MCNC: 0.46 MG/DL (ref 0.6–1.3)
CREAT SERPL-MCNC: 0.56 MG/DL (ref 0.6–1.3)
EOSINOPHIL # BLD AUTO: 0.11 THOUSAND/ΜL (ref 0–0.61)
EOSINOPHIL NFR BLD AUTO: 1 % (ref 0–6)
ERYTHROCYTE [DISTWIDTH] IN BLOOD BY AUTOMATED COUNT: 15.6 % (ref 11.6–15.1)
GAMMA GLOB ABNORMAL SERPL ELPH-MCNC: 1.9 G/DL (ref 0.5–1.6)
GAMMA GLOB MFR UR ELPH: 73 %
GAMMA GLOB SERPL ELPH-MCNC: 32.8 % (ref 12–22)
GFR SERPL CREATININE-BSD FRML MDRD: 105 ML/MIN/1.73SQ M
GFR SERPL CREATININE-BSD FRML MDRD: 97 ML/MIN/1.73SQ M
GLUCOSE SERPL-MCNC: 104 MG/DL (ref 65–140)
GLUCOSE SERPL-MCNC: 120 MG/DL (ref 65–140)
GLUCOSE SERPL-MCNC: 124 MG/DL (ref 65–140)
GLUCOSE SERPL-MCNC: 168 MG/DL (ref 65–140)
GLUCOSE SERPL-MCNC: 342 MG/DL (ref 65–140)
GLUCOSE SERPL-MCNC: 93 MG/DL (ref 65–140)
GLUCOSE UR STRIP-MCNC: NEGATIVE MG/DL
HCT VFR BLD AUTO: 26.7 % (ref 36.5–49.3)
HGB BLD-MCNC: 8 G/DL (ref 12–17)
HGB UR QL STRIP.AUTO: NEGATIVE
IGG/ALB SER: 0.51 {RATIO} (ref 1.1–1.8)
IMM GRANULOCYTES # BLD AUTO: 0.1 THOUSAND/UL (ref 0–0.2)
IMM GRANULOCYTES NFR BLD AUTO: 1 % (ref 0–2)
INTERPRETATION UR IFE-IMP: NORMAL
INTERPRETATION UR IFE-IMP: NORMAL
KETONES UR STRIP-MCNC: NEGATIVE MG/DL
LEAD BLD-MCNC: 2 UG/DL (ref 0–4)
LEUKOCYTE ESTERASE UR QL STRIP: NEGATIVE
LYMPHOCYTES # BLD AUTO: 1.05 THOUSANDS/ΜL (ref 0.6–4.47)
LYMPHOCYTES NFR BLD AUTO: 10 % (ref 14–44)
M PEAK ID 2: 8.9 %
M PROTEIN 1 MFR SERPL ELPH: 8.7 %
M PROTEIN 1 SERPL ELPH-MCNC: 0.5 G/DL
M PROTEIN 2 SERPL ELPH-MCNC: 0.52 G/DL
MAGNESIUM SERPL-MCNC: 1.8 MG/DL (ref 1.6–2.6)
MCH RBC QN AUTO: 29.7 PG (ref 26.8–34.3)
MCHC RBC AUTO-ENTMCNC: 30 G/DL (ref 31.4–37.4)
MCV RBC AUTO: 99 FL (ref 82–98)
MERCURY BLD-MCNC: NORMAL UG/L (ref 0–14.9)
MONOCYTES # BLD AUTO: 0.43 THOUSAND/ΜL (ref 0.17–1.22)
MONOCYTES NFR BLD AUTO: 4 % (ref 4–12)
MRSA NOSE QL CULT: NORMAL
MYELOPEROXIDASE AB SER IA-ACNC: <9 U/ML (ref 0–9)
NEUTROPHILS # BLD AUTO: 8.37 THOUSANDS/ΜL (ref 1.85–7.62)
NEUTS SEG NFR BLD AUTO: 84 % (ref 43–75)
NITRITE UR QL STRIP: NEGATIVE
NRBC BLD AUTO-RTO: 0 /100 WBCS
P-ANCA ATYPICAL TITR SER IF: NORMAL TITER
P-ANCA TITR SER IF: NORMAL TITER
PH UR STRIP.AUTO: 8 [PH] (ref 4.5–8)
PLATELET # BLD AUTO: 270 THOUSANDS/UL (ref 149–390)
PMV BLD AUTO: 10.9 FL (ref 8.9–12.7)
POTASSIUM SERPL-SCNC: 3.9 MMOL/L (ref 3.5–5.3)
POTASSIUM SERPL-SCNC: 4.3 MMOL/L (ref 3.5–5.3)
PROCALCITONIN SERPL-MCNC: 0.3 NG/ML
PROT PATTERN UR ELPH-IMP: ABNORMAL
PROT SERPL-MCNC: 5.8 G/DL (ref 6.4–8.2)
PROT UR STRIP-MCNC: NEGATIVE MG/DL
PROT UR-MCNC: 19 MG/DL
PROTEINASE3 AB SER IA-ACNC: <3.5 U/ML (ref 0–3.5)
RBC # BLD AUTO: 2.69 MILLION/UL (ref 3.88–5.62)
SODIUM SERPL-SCNC: 132 MMOL/L (ref 136–145)
SODIUM SERPL-SCNC: 138 MMOL/L (ref 136–145)
SP GR UR STRIP.AUTO: 1.01 (ref 1–1.03)
UROBILINOGEN UR QL STRIP.AUTO: 0.2 E.U./DL
WBC # BLD AUTO: 10.1 THOUSAND/UL (ref 4.31–10.16)

## 2019-01-11 PROCEDURE — 97168 OT RE-EVAL EST PLAN CARE: CPT

## 2019-01-11 PROCEDURE — 99222 1ST HOSP IP/OBS MODERATE 55: CPT | Performed by: INTERNAL MEDICINE

## 2019-01-11 PROCEDURE — G8988 SELF CARE GOAL STATUS: HCPCS

## 2019-01-11 PROCEDURE — 94760 N-INVAS EAR/PLS OXIMETRY 1: CPT

## 2019-01-11 PROCEDURE — G8987 SELF CARE CURRENT STATUS: HCPCS

## 2019-01-11 PROCEDURE — 93970 EXTREMITY STUDY: CPT

## 2019-01-11 PROCEDURE — 82948 REAGENT STRIP/BLOOD GLUCOSE: CPT

## 2019-01-11 PROCEDURE — 97110 THERAPEUTIC EXERCISES: CPT | Performed by: PHYSICAL THERAPIST

## 2019-01-11 PROCEDURE — 94668 MNPJ CHEST WALL SBSQ: CPT

## 2019-01-11 PROCEDURE — 81003 URINALYSIS AUTO W/O SCOPE: CPT | Performed by: NURSE PRACTITIONER

## 2019-01-11 PROCEDURE — 83883 ASSAY NEPHELOMETRY NOT SPEC: CPT | Performed by: INTERNAL MEDICINE

## 2019-01-11 PROCEDURE — 85025 COMPLETE CBC W/AUTO DIFF WBC: CPT | Performed by: NURSE PRACTITIONER

## 2019-01-11 PROCEDURE — 99232 SBSQ HOSP IP/OBS MODERATE 35: CPT | Performed by: PSYCHIATRY & NEUROLOGY

## 2019-01-11 PROCEDURE — G8979 MOBILITY GOAL STATUS: HCPCS | Performed by: PHYSICAL THERAPIST

## 2019-01-11 PROCEDURE — 92526 ORAL FUNCTION THERAPY: CPT

## 2019-01-11 PROCEDURE — G8978 MOBILITY CURRENT STATUS: HCPCS | Performed by: PHYSICAL THERAPIST

## 2019-01-11 PROCEDURE — 93970 EXTREMITY STUDY: CPT | Performed by: SURGERY

## 2019-01-11 PROCEDURE — 84145 PROCALCITONIN (PCT): CPT | Performed by: NURSE PRACTITIONER

## 2019-01-11 PROCEDURE — 97164 PT RE-EVAL EST PLAN CARE: CPT | Performed by: PHYSICAL THERAPIST

## 2019-01-11 PROCEDURE — 83735 ASSAY OF MAGNESIUM: CPT | Performed by: NURSE PRACTITIONER

## 2019-01-11 PROCEDURE — 80048 BASIC METABOLIC PNL TOTAL CA: CPT | Performed by: NURSE PRACTITIONER

## 2019-01-11 PROCEDURE — 99233 SBSQ HOSP IP/OBS HIGH 50: CPT | Performed by: INTERNAL MEDICINE

## 2019-01-11 PROCEDURE — 94660 CPAP INITIATION&MGMT: CPT

## 2019-01-11 RX ORDER — SODIUM CHLORIDE, SODIUM LACTATE, POTASSIUM CHLORIDE, CALCIUM CHLORIDE 600; 310; 30; 20 MG/100ML; MG/100ML; MG/100ML; MG/100ML
50 INJECTION, SOLUTION INTRAVENOUS CONTINUOUS
Status: DISCONTINUED | OUTPATIENT
Start: 2019-01-11 | End: 2019-01-12

## 2019-01-11 RX ORDER — POTASSIUM CHLORIDE 20MEQ/15ML
20 LIQUID (ML) ORAL ONCE
Status: DISCONTINUED | OUTPATIENT
Start: 2019-01-11 | End: 2019-01-11

## 2019-01-11 RX ORDER — MAGNESIUM SULFATE HEPTAHYDRATE 40 MG/ML
2 INJECTION, SOLUTION INTRAVENOUS ONCE
Status: COMPLETED | OUTPATIENT
Start: 2019-01-11 | End: 2019-01-11

## 2019-01-11 RX ADMIN — HEPARIN SODIUM 5000 UNITS: 5000 INJECTION INTRAVENOUS; SUBCUTANEOUS at 22:45

## 2019-01-11 RX ADMIN — VANCOMYCIN HYDROCHLORIDE 750 MG: 750 INJECTION, SOLUTION INTRAVENOUS at 04:37

## 2019-01-11 RX ADMIN — SODIUM CHLORIDE, SODIUM LACTATE, POTASSIUM CHLORIDE, AND CALCIUM CHLORIDE 50 ML/HR: .6; .31; .03; .02 INJECTION, SOLUTION INTRAVENOUS at 20:29

## 2019-01-11 RX ADMIN — CEFEPIME HYDROCHLORIDE 1000 MG: 1 INJECTION, POWDER, FOR SOLUTION INTRAMUSCULAR; INTRAVENOUS at 22:44

## 2019-01-11 RX ADMIN — MAGNESIUM SULFATE HEPTAHYDRATE 2 G: 40 INJECTION, SOLUTION INTRAVENOUS at 11:06

## 2019-01-11 RX ADMIN — METRONIDAZOLE 500 MG: 500 INJECTION, SOLUTION INTRAVENOUS at 18:50

## 2019-01-11 RX ADMIN — METRONIDAZOLE 500 MG: 500 INJECTION, SOLUTION INTRAVENOUS at 08:11

## 2019-01-11 RX ADMIN — METRONIDAZOLE 500 MG: 500 INJECTION, SOLUTION INTRAVENOUS at 03:01

## 2019-01-11 RX ADMIN — LIDOCAINE 1 PATCH: 50 PATCH CUTANEOUS at 08:11

## 2019-01-11 RX ADMIN — CEFEPIME HYDROCHLORIDE 1000 MG: 1 INJECTION, POWDER, FOR SOLUTION INTRAMUSCULAR; INTRAVENOUS at 03:54

## 2019-01-11 RX ADMIN — HEPARIN SODIUM 5000 UNITS: 5000 INJECTION INTRAVENOUS; SUBCUTANEOUS at 06:03

## 2019-01-11 RX ADMIN — HEPARIN SODIUM 5000 UNITS: 5000 INJECTION INTRAVENOUS; SUBCUTANEOUS at 14:24

## 2019-01-11 NOTE — NUTRITION
If unable to safely advance PO diet after VBS study (1/12) then consider placing keofeed tube with enteral nutrition

## 2019-01-11 NOTE — RESTORATIVE TECHNICIAN NOTE
Restorative Specialist Mobility Note       Activity: Stand at bedside (Pt left sitting in chair with call bell, phone, and tray within reach  Chair alarm on  )     Assistive Device: Other (Comment) (HHA x1)        Repositioned: Up in chair, Sitting              Anti-Embolism Device On:  Bilateral, Sequential compression devices, below knee

## 2019-01-11 NOTE — PHYSICAL THERAPY NOTE
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    Esophagitis    Type 2 diabetes mellitus without complication, without long-term current use of insulin (HCC)    Weight loss    Other [facility] [address] [facility]    Sepsis ([facility] [address] [facility]    Pneumonia    Acute respiratory failure with hypoxia (HCC)    Oropharyngeal dysphagia    Leukocytosis    Moderate protein-calorie malnutrition (HCC)       Past Medical History:   Diagnosis Date    Diabetes mellitus ([facility] [facility] )     Type II    Weight loss        Past Surgical History:   Procedure Laterality Date    LAPAROTOMY N/A 11/19/2018    Procedure: LAPAROTOMY EXPLORATORY;  Surgeon: Brit Palacio MD;  Location: [facility];  Service: Surgical Oncology    VA EDG US EXAM SURGICAL ALTER STOM DUODENUM/JEJUNUM N/A 9/21/2018    Procedure: LINEAR ENDOSCOPIC U/S;  Surgeon: Marleny Asher MD;  Location: [facility]; Service: Gastroenterology    [facility] LAP,DIAGNOSTIC ABDOMEN N/A 11/19/2018    Procedure: pancreatic biopsy;  Surgeon: Brit Palacio MD;  Location: [facility];  Service: Surgical Oncology    TONSILLECTOMY      VASCULAR SURGERY      phlebitis many years ago    WRIST GANGLION EXCISION        01/11/19 1051   Note Type   Note type Re-eval;Eval/Treat   Pain Assessment   Pain Assessment No/denies pain   Home Living   Additional Comments see initial eval   Prior Function   Comments see initial eval     Restrictions/Precautions   Weight Bearing Precautions Per Order No   Other Precautions Agitated; Chair Alarm; Bed Alarm;Multiple lines;Telemetry;O2;Fall Risk   General   Additional Pertinent History pt transferred to micu on 1/10 due to hypoxia   now on O2 at 3L by NC   Family/Caregiver Present Yes   Cognition   Overall Cognitive Status Impaired   Orientation Level Oriented X4   RUE Assessment   RUE Assessment X  (strength 4/5)   LUE Assessment   LUE Assessment X  (strength 4/5)   RLE Assessment   RLE Assessment X  (strength 4/5)   LLE Assessment   LLE Assessment X  (strength 4/5)   Coordination   Movements are Fluid and Coordinated 1   Sensation WFL   Light Touch   RLE Light Touch Grossly intact   LLE Light Touch Grossly intact   Bed Mobility   Rolling R 4  Minimal assistance   Additional items Assist x 1; Increased time required;Verbal cues;LE management   Rolling L 4  Minimal assistance   Additional items Assist x 1; Increased time required;Verbal cues;LE management   Supine to Sit 4  Minimal assistance   Additional items Assist x 1; Increased time required;Verbal cues;LE management   Transfers   Sit to Stand 3  Moderate assistance   Additional items Assist x 1; Increased time required;Verbal cues   Stand to Sit 3  Moderate assistance   Additional items Assist x 1; Increased time required;Verbal cues   Ambulation/Elevation   Gait pattern Decreased foot clearance; Excessively slow; Short stride   Gait Assistance 4  Minimal assist   Additional items Verbal cues; Tactile cues; Assist x 2   Assistive Device (arm in arm)   Distance 6'   Balance   Static Sitting Fair   Dynamic Sitting Poor +   Static Standing Poor +   Dynamic Standing Poor +   Ambulatory Poor +   Endurance Deficit   Endurance Deficit Yes   Endurance Deficit Description fatigue   Activity Tolerance   Activity Tolerance Treatment limited secondary to medical complications (Comment)   Medical Staff Made Aware OT   Nurse Made Aware yes   Assessment   Prognosis Fair   Problem List Decreased strength;Decreased endurance; Impaired balance;Decreased mobility; Decreased safety awareness   Goals   Patient Goals stay in bed   STG Expiration Date 01/25/19   Short Term Goal #1 bed mobility with supervision  transfers mod i using rw  amb with least restrictive device for > 150', initiate stair training  demonstrate good safety practices  improve activity tolerance to 45 minutes      Treatment Day 1   Plan   Treatment/Interventions Functional transfer training;LE strengthening/ROM; Elevations; Therapeutic exercise; Endurance training;Cognitive reorientation;Patient/family training;Equipment eval/education; Bed mobility;Gait training;Spoke to nursing;OT   PT Frequency (3-5x/wk)   Recommendation   Recommendation Post acute IP rehab;Short-term skilled PT   PT - OK to Discharge Yes  (rehab)   Modified Daryl Scale   Modified Daryl Scale 4   Barthel Index   Feeding 10   Bathing 0   Grooming Score 5   Dressing Score 5   Bladder Score 0   Bowels Score 10   Toilet Use Score 0   Transfers (Bed/Chair) Score 10   Mobility (Level Surface) Score 0   Stairs Score 0   Barthel Index Score 40   Time In:1040  Time Out:1051  Total Time: 11 minutes      S:  Not happy to be OOB  O:  There ex ble sitting in chair  arom tkr exercises  Pt tolerated well  spO2 sensor not working  Nursing notified  Pt using O2 at 3L  A:  Tolerated activity without increase in respiratory demand     P:  Continue progressive mobilization and strengthening     Anthony Case, PT

## 2019-01-11 NOTE — OCCUPATIONAL THERAPY NOTE
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    Esophagitis    Type 2 diabetes mellitus without complication, without long-term current use of insulin (HCC)    Weight loss    Other chronic pancreatitis ([facility]    Sepsis ([facility] )    Pneumonia    Acute respiratory failure with hypoxia (HCC)    Oropharyngeal dysphagia    Leukocytosis    Moderate protein-calorie malnutrition ([facility] )     Past Medical History  Past Medical History:   Diagnosis Date    Diabetes mellitus ([facility] )     Type II    Weight loss      Past Surgical History  Past Surgical History:   Procedure Laterality Date    LAPAROTOMY N/A 11/19/2018    Procedure: LAPAROTOMY EXPLORATORY;  Surgeon: Giovanny Villar MD;  [facility];  Service: Surgical Oncology    NM EDG US EXAM SURGICAL ALTER STOM DUODENUM/JEJUNUM N/A 9/21/2018    Procedure: LINEAR ENDOSCOPIC U/S;  Surgeon: Eusebio Rivers MD;  Location: [facility]; Service: Gastroenterology    NM LAP,DIAGNOSTIC ABDOMEN N/A 11/19/2018    Procedure: pancreatic biopsy;  Surgeon: Giovanny Villar MD;  [facility];  Service: Surgical Oncology    TONSILLECTOMY      VASCULAR SURGERY      phlebitis many years ago    WRIST GANGLION EXCISION             01/11/19 1045   Note Type   Note type Re-eval   Restrictions/Precautions   Weight Bearing Precautions Per Order No   Other Precautions Agitated; Chair Alarm; Bed Alarm;Multiple lines;Telemetry;O2;Fall Risk;Pain   Pain Assessment   Pain Assessment No/denies pain   Pain Score No Pain   Home Living   Type of Home House   Home Layout One level   Bathroom Shower/Tub Walk-in shower   Home Equipment Walker;Cane   Additional Comments See initial OT eval   Prior Function   Level of [place] Needs assistance with ADLs and functional mobility   Lives With Spouse   Receives Help From Family   ADL Assistance Needs assistance   IADLs Needs assistance   Falls in the last 6 months 0   Comments see initial OT eval   Lifestyle   Autonomy Assist for ADLS/IADLS, transfers and functional mobility PTA   Reciprocal Relationships Pt lives w/ spouse   Service to Others Does not work   [address] Enjoys being w/ family   Psychosocial   Psychosocial (WDL) X   Patient Behaviors/Mood Labile;Irritable   Subjective   Subjective "It's bedtime I want to sleep in bed not get up"   ADL   Eating Assistance 7  [place] [address]  [address] 3  Moderate Assistance   Functional Deficit Steadying;Verbal cueing;Supervision/safety; Increased time to complete   Bed Mobility   Supine to Sit 4  Minimal assistance   Additional items Assist x 1;HOB elevated; Increased time required;Verbal cues   Sit to Supine Unable to assess   Additional Comments Pt went from supine to sit w/ Min A x1 for safety/balance and HOB elevated for assist  Pt sat EOb w/ Min A for sitting balance/trunk control   Transfers   Sit to Stand 3  Moderate assistance   Additional items Assist x 1; Increased time required;Verbal cues   Stand to Sit 3  Moderate assistance   Additional items Assist x 1; Increased time required;Verbal cues   Stand pivot 3  Moderate assistance   Additional items Assist x 1; Increased time required;Verbal cues   Additional Comments Pt performed sit-stand from EOB w/ Mod A X1 for safety/balance and HHA   Then performed SPT from EOB to chair w/ Mod A for steadying, HHA   Functional Mobility   Functional Mobility 3  Moderate assistance   Additional Comments Pt required Mod A x1 for support in standing using HHA for few small steps from EOB to chair   Additional items Hand hold assistance Balance   Static Sitting Fair   Dynamic Sitting Poor +   Static Standing Poor +   Ambulatory Poor   Activity Tolerance   Activity Tolerance Patient limited by fatigue   Medical Staff Made Aware PTZaira   Nurse Made Aware yes   RUE Assessment   RUE Assessment WFL   LUE Assessment   LUE Assessment WFL   Hand Function   Gross Motor Coordination Functional   Fine Motor Coordination Functional   Cognition   Overall Cognitive Status Impaired   Arousal/Participation Responsive; Cooperative   Attention Attends with cues to redirect   Orientation Level Oriented X4   Memory Decreased recall of recent events;Decreased recall of precautions   Following Commands Follows one step commands without difficulty   Comments Pt is cooperative; emotional labile and partially agitated; required encouragement to participate; has decreased safety awareness and understanding of deficits   Assessment   Limitation Decreased ADL status; Decreased UE strength;Decreased Safe judgement during ADL;Decreased endurance;Decreased cognition;Decreased high-level ADLs; Decreased self-care trans   Prognosis Fair   Assessment Pt is a 81 y/o male seen for OT re-reval s/p acute respiratory failure w/ decreased in O2 saturation on 100% high flow  Pt transferred to MICU and placed on bipap  Pt now back on nasal cannula  Pt initially admitted 1/1/19 w/ sepsis  Pt seen for initial OT evaluation on 1/4/19 and performed at a level of Max A UB ADLS, Total A LB ADLS, and did not perform transfers/functional mobility at the time  Pt currently performing @ level of S UB ADLS, Min A LB ADLS, Mod A transfers/functional mobility w/ HHA  Pt remains limited 2* decreased endurance, activity tolerance, standing balance, forward functional reach, decreased understanding of deficits and safety awareness, decreased functional mobility, decreased strength, and decreased I w/ ADLS/IADLS compared to previous level of functioning   Continue to recommend STR upon D/C when medically stable  Pt continues to benefit from immediate inpatient skilled OT services 3-5x/wk  Will continue to follow to address goals stated below to be met within 10-14 days:   Goals   Patient Goals to get back to bed and sleep   LTG Time Frame 10-14   Long Term Goal #1 see below listed goals   Plan   Treatment Interventions ADL retraining;Functional transfer training;UE strengthening/ROM; Endurance training;Cognitive reorientation;Patient/family training;Equipment evaluation/education; Compensatory technique education;Continued evaluation; Energy conservation; Activityengagement   Goal Expiration Date 01/25/19   OT Frequency 3-5x/wk   Recommendation   OT Discharge Recommendation Short Term Rehab   OT - OK to Discharge Yes  (when medically stable)   Barthel Index   Feeding 10   Bathing 0   Grooming Score 5   Dressing Score 5   Bladder Score 0   Bowels Score 10   Toilet Use Score 0   Transfers (Bed/Chair) Score 10   Mobility (Level Surface) Score 0   Stairs Score 0   Barthel Index Score 40   Modified [place] Scale   Modified Daryl Scale 4      GOALS    1) Pt will improve activity tolerance to G for min 30 min txment sessions for increase engagement in functional tasks    2) Pt will complete UB/LB dressing/self care w/ S using adaptive device and DME as needed    3) Pt will complete bathing w/ S w/ use of AE and DME as needed    4) Pt will complete toileting w/ S w/ G hygiene/thoroughness using DME as needed    5) Pt will improve functional transfers to Mod I on/off all surfaces using DME as needed w/ G balance/safety     6) Pt will improve functional mobility during ADL/IADL/leisure tasks to Mod I using DME as needed w/ G balance/safety     7) Pt will be attentive 100% of the time during ongoing cognitive assessment w/ G participation to assist w/ safe d/c planning/recommendations    8) Pt will demonstrate G carryover of pt/caregiver education and training as appropriate w/o cues w/ good tolerance to increase safety during functional tasks    9) Pt will demonstrate 100% carryover of energy conservation techniques t/o functional I/ADL/leisure tasks w/o cues s/p skilled education to increase endurance during functional tasks       Reji Verdin MS, OTR/L

## 2019-01-11 NOTE — SPEECH THERAPY NOTE
Speech/Language Pathology Progress Note    Patient Name: Tracy Romeo  GVQOX'D Date: 1/11/2019     Educational ST session - pt now off BiPap and self-removed keofeed  Pt sitting up in chair at bedside on nasal cannula O2  Voice is clear but remains weak  Family requesting repeat swallow evaluation  SLP discussed with pt/family and RN, due to severe dysphagia identified on previous VBS studies, bedside swallow evaluation is not recommended due to high risk for aspiration  Family was re-educated on results of prior VBS, and video images were shown to pt's daughter as she had not seen them before  Discussed possible repeat VBS, however educated that there is likelihood of results being the same, as no underlying cause for dysphagia was identified and therefore no treatment given  Physician in agreement for repeat VBS, which will be completed tomorrow 1/12/2019  Family aware they may need to make decision for PEG tube pending results of VBS

## 2019-01-11 NOTE — PROGRESS NOTES
Progress Note - [facility] 82 y o  male MRN: [id number]  Unit/Bed#: MICU 02 Encounter: [id number]    Attending Physician: Gurmeet Snow, DO      ______________________________________________________________________  Assessment and Plan:   Principal Problem:    Acute respiratory failure with hypoxia ([facility] )  Active Problems:    Sepsis ([facility] )    Pneumonia    Oropharyngeal dysphagia    Type 2 diabetes mellitus without complication, without long-term current use of insulin ([facility])    Leukocytosis    Other chronic pancreatitis ([facility])    Moderate protein-calorie malnutrition ([facility] )  Resolved Problems:    Acute kidney injury ([facility] )    Hypokalemia    Atrial flutter ([facility])        Neuro:   -GCS 15, strength normal, equal bilaterally, sensation intact, cranial nerves grossly intact, no dysarthria   -patient is sleeping, but easily aroused, oriented x4    CV:   -hemodynamically stable     Pulm:   -course breath sounds bilaterally, currently on 4L NC maintaining O2sat >92%  -chest PT, incentive spirometry     GI:   -pancreatic insufficiency in setting of chronic pancreatitis, continue creon   -patient removed his NG tube, currently is NPO   Replace NG tube  -continue omeprazole, add IV option until NG tube in place     :   -UOP 0 7cc/kg/hr  -condom catheter     F/E/N:   -acute hyponatremia, hypochloremia, IVF NS today   -replete electrolytes prn   -hypoalbuminemia, hypocalcemia corrected for hypoalbuminemia is 9 4 and wnl    ID:   -admitted 1/1 for severe sepsis 2/2 pneumonia, completed 7d course of rocephin, strep pneumo urine +  -possible aspiration in setting of oropharyngeal dysphagia, continue cefepime, vanc and flagyl  -pending blood cultures, MRSA cultures  -d/c vancomycin if MRSA neg  -procalcitonin elevated   -afebrile   -leukocytosis resolved, left shift present   -urine neg for infection    Heme:  -anemia in setting of chronic anemia, stable   -transfuse hgb <7  -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   -SCDs, subq heparin    Endo:   -insulin sliding scale, hx pancreatic endocrine insufficiency   -insulin infusion if unable to maintain blood glucose <180     Msk/Skin:   -ambulate with assistance as tolerated, PT/OT     Disposition:   -dispo to level 2 stepdown, observe in ICU for several hours to ensure he is maintaining O2 sat while on NC    Code Status: Level 1 - Full Code    Counseling / Coordination of Care  Total Critical Care time spent 30 minutes excluding procedures, teaching and family updates  ______________________________________________________________________    Chief Complaint: no complaints    24 Hour Events:     Review of Systems  ______________________________________________________________________    Physical Exam:   Physical Exam   Constitutional: He is oriented to person, place, and time  Vital signs are normal  No distress  Cachetic appearing male, appears stated age   HENT:   Head: Normocephalic and atraumatic  Right Ear: External ear normal    Left Ear: External ear normal    Nose: Nose normal    Mouth/Throat: Mucous membranes are dry  Eyes: Pupils are equal, round, and reactive to light  Conjunctivae and EOM are normal  No scleral icterus  Neck: No JVD present  Cardiovascular: Normal rate, regular rhythm and normal heart sounds  No murmur heard  Pulmonary/Chest: Effort normal and breath sounds normal  No stridor  Course bilateral breath sounds   Abdominal: Soft  Bowel sounds are normal  He exhibits no distension  There is no tenderness  There is no guarding  Musculoskeletal: Normal range of motion  He exhibits no edema  Neurological: He is alert and oriented to person, place, and time  No cranial nerve deficit or sensory deficit  He exhibits normal muscle tone  Skin: Skin is warm  Capillary refill takes less than 2 seconds  He is not diaphoretic  Psychiatric: He has a normal mood and affect   His behavior is normal  Thought content normal          ______________________________________________________________________  Vitals:    19 0115 19 0328 19 0526 19 0756   BP: 163/83      BP Location: Left arm      Pulse: 72      Resp: (!) 28      Temp: 98 °F (36 7 °C)  98 8 °F (37 1 °C)    TempSrc: Oral  Oral    SpO2: 95% 100%  100%   Weight:       Height:           Temperature:   Temp (24hrs), Av 1 °F (36 7 °C), Min:97 7 °F (36 5 °C), Max:98 8 °F (37 1 °C)    Current Temperature: 98 8 °F (37 1 °C)  Weights:   IBW: 66 1 kg    Body mass index is 17 82 kg/m²  Weight (last 2 days)     Date/Time   Weight    01/10/19 0317  51 6 (113 76)    19 1032  50 4 (111 1)    Weight: standing weight at 19 1032            Hemodynamic Monitoring:  N/A     Non-Invasive/Invasive Ventilation Settings:  Respiratory    Lab Data (Last 4 hours)    None         O2/Vent Data (Last 4 hours)    None              No results found for: PHART, WGK9EST, PO2ART, BWR7KCW, G8RRGCSC, BEART, SOURCE  SpO2: SpO2: 100 %  Intake and Outputs:  I/O       701 - 01/10 0700 01/10 0701 - 01/11 07 0700    P  O  0      I V  (mL/kg) 30 (0 6) 240 (4 7)     NG/GT 50      IV Piggyback 100 850     Total Intake(mL/kg) 180 (3 5) 1090 (21 1)     Urine (mL/kg/hr) 2270 (1 8) 895 (0 7)     Total Output 2270 895      Net -2090 +195             Unmeasured Stool Occurrence 3 x 1 x         UOP: 0 7 ml/kg/hr   Nutrition:        Diet Orders            Start     Ordered    19 0000  Diet NPO; Sips with meds  Diet effective midnight     Question Answer Comment   Diet Type NPO    NPO Except:  Sips with meds        01/10/19 0322    19 0728  Room Service  Once     Question:  Type of Service  Answer:  Room Service - Appropriate with Assistance    19 0727          Labs:     Results from last 7 days  Lab Units 19  0541 01/10/19  0522 01/10/19  0125 19  0508 19  0427 19  0516   WBC Thousand/uL 10 10 27 69*  --  9 55 9 29 10 59*   HEMOGLOBIN g/dL 8 0* 10 6*  --  9 4* 10 3* 9 7*   I STAT HEMOGLOBIN g/dl  --   --  11 9*  --   --   --    HEMATOCRIT % 26 7* 34 9*  --  30 6* 32 8* 30 4*   HEMATOCRIT, ISTAT %  --   --  35*  --   --   --    PLATELETS Thousands/uL 270 377  --  295 253 240   NEUTROS PCT % 84*  --   --  79*  --  78*   MONOS PCT % 4  --   --  6  --  7   MONO PCT %  --  2*  --   --   --   --        Results from last 7 days  Lab Units 01/11/19  0541 01/10/19  0522 01/10/19  0125 01/09/19  0508 01/08/19  0541 01/06/19  0427 01/05/19  0516   SODIUM mmol/L 132* 137  --  139 138 142 140   POTASSIUM mmol/L 3 9 4 6  --  4 2 4 3 4 0 3 1*   CHLORIDE mmol/L 95* 98*  --  101 100 108 104   CO2 mmol/L 32 35*  --  34* 35* 29 29   CO2, I-STAT mmol/L  --   --  35*  --   --   --   --    ANION GAP mmol/L 5 4  --  4 3* 5 7   BUN mg/dL 9 12  --  11 11 8 8   CREATININE mg/dL 0 56* 0 62  --  0 39* 0 46* 0 51* 0 42*   CALCIUM mg/dL 7 5* 7 4*  --  7 6* 7 5* 7 8* 7 9*   ALT U/L  --  20  --   --   --   --   --    AST U/L  --  22  --   --   --   --   --    ALK PHOS U/L  --  105  --   --   --   --   --    ALBUMIN g/dL  --  1 7*  --   --   --   --   --    TOTAL BILIRUBIN mg/dL  --  0 28  --   --   --   --   --        Results from last 7 days  Lab Units 01/11/19  0541 01/06/19  0427 01/05/19  0516   MAGNESIUM mg/dL 1 8 2 0 1 7   PHOSPHORUS mg/dL  --  2 8 2 2*                  ABG:No results found for: PHART, RIR9YQS, PO2ART, SMF2CFR, X6KVHQVK, BEART, SOURCE  VBG:    Results from last 7 days  Lab Units 01/10/19  0645   PH AUTUMN  7 365   PCO2 AUTUMN mm Hg 64 0*   PO2 AUTUMN mm Hg 50 6*   HCO3 AUTUMN mmol/L 35 8*   BASE EXC AUTUMN mmol/L 8 5       Results from last 7 days  Lab Units 01/11/19  0541 01/09/19  1330 01/06/19  0427 01/05/19  0516   PROCALCITONIN ng/ml 0 30* 0 13 0 91* 1 56*     No results found for: [facility]   Imaging:  I have personally reviewed pertinent reports      XR chest portable ICU   Final Result by Ricardo Denton DO (01/11 2776) Slight progression of bilateral infiltrates and bilateral pleural effusions, left side greater than right  Workstation performed: [id number]         XR chest portable   Final Result by Priscilla Heaton MD (01/10 4056)      New left perihilar opacity suspicious for pneumonia with increasing left pleural effusion  Bibasilar densities are stable  The study was marked in [facility] for immediate notification  Workstation performed: [id number]         XR chest portable   Final Result by Sid Hagen MD (01/08 2054)      Persistent bibasilar infiltrates  Workstation performed: [id number]         XR abdomen 1 vw portable   Final Result by Negrita Banerjee MD (01/06 7240)      Enteric tube tip in the region of gastroesophageal junction with sidehole approximately 10 cm proximal to GE junction  Workstation performed: [id number]         FL barium swallow video w speech   Final Result by JOSH Jesus (01/05 1441)      CT chest wo contrast   Final Result by Negrita Banerjee MD (01/05 1249)      Groundglass and consolidative airspace opacity throughout the lungs, most dense in the dependent lungs and more severe on the left than the right  Also noted is inspissated mucus within the trachea and the mainstem bronchi bilaterally  The findings are    most suspicious for severe multifocal aspiration pneumonia  Small left greater than right pleural effusions  Findings in the upper abdomen are better evaluated on recent abdominal MR of December 14, 2018  Workstation performed: [id number]         MRI brain wo contrast   Final Result by Negrita Banerjee MD (01/04 1701)      No acute intracranial ischemia  Advanced microangiopathic changes throughout supratentorial white matter and central karthikeyan  Right mastoid effusion        Workstation performed: [id number]         CT head wo contrast   Final Result by Luis Gonzales MD (01/03 1114)      Generalized volume loss with advanced microangiopathic changes of the cerebral white matter  No acute intracranial abnormality  Workstation performed: [id number]         FL barium swallow video w speech   Final Result by  (01/03 1034)      FL barium swallow video w speech   Final Result by JOSH HENDERSON (01/03 0919)      XR chest pa & lateral   ED Interpretation by Melida Ryan MD (01/01 2220)   Bilateral infiltrates concerning for pneumonia        Final Result by Saba Lewis MD (01/02 0713)      Lower lobe infiltrates bilaterally            Workstation performed: [id number]         VAS lower limb venous duplex study, complete bilateral    (Results Pending)         Allergies: No Known Allergies  Medications:   Scheduled Meds:    Current Facility-Administered Medications:  acetaminophen 650 mg Oral Q6H PRN Angeli Ge PA-C    albuterol 2 5 mg Nebulization Q4H PRN Alexandru Benson MD    aspirin 81 mg Oral Daily Kulwinder Khan MD    atorvastatin 40 mg Oral Daily With Luis Barber MD    cefepime 1,000 mg Intravenous Q12H DEMI Hernandes Last Rate: Stopped (01/11/19 0355)   cholecalciferol 400 Units Oral Daily David Rousseau MD    heparin (porcine) 5,000 Units Subcutaneous [facility] Ashley Fournier MD    insulin lispro 1-5 Units Subcutaneous Q6H [address] DEMI Langford    lidocaine 1 patch Topical Daily DEMI Munguia    melatonin 3 mg Oral HS Lonza Javier, DO    metroNIDAZOLE 500 mg Intravenous Q8H DEMI Hernandes Last Rate: 500 mg (01/11/19 0811)   omeprazole (PRILOSEC) suspension 2 mg/mL 20 mg Oral Early Morning DEMI Langford    pancrelipase (Lip-Prot-Amyl) 24,000 Units Oral BID With Meals David Rousseau MD    polyvinyl alcohol 1 drop Both Eyes PRN Sotero Segura PA-C    vancomycin 12 5 mg/kg Intravenous Q12H Anatoly Tesoriero DO Last Rate: Stopped (01/11/19 0513)     Continuous Infusions:   PRN Meds:    acetaminophen 650 mg Q6H PRN albuterol 2 5 mg Q4H PRN   polyvinyl alcohol 1 drop PRN     VTE Pharmacologic Prophylaxis: Heparin  VTE Mechanical Prophylaxis: sequential compression device  Invasive lines and devices: Invasive Devices     Peripheral Intravenous Line            Peripheral IV 01/09/19 Right Wrist 1 day    Peripheral IV 01/10/19 Right Arm 1 day          Drain            External Urinary Catheter Small 1 day                     Portions of the record may have been created with voice recognition software  Occasional wrong word or "sound a like" substitutions may have occurred due to the inherent limitations of voice recognition software  Read the chart carefully and recognize, using context, where substitutions have occurred      Jimi Lira DO

## 2019-01-11 NOTE — PLAN OF CARE
Problem: PHYSICAL THERAPY ADULT  Goal: Performs mobility at highest level of function for planned discharge setting  See evaluation for individualized goals  Treatment/Interventions: Gait training, Bed mobility, Equipment eval/education, Patient/family training, LE strengthening/ROM, Functional transfer training, Therapeutic exercise, Endurance training  Equipment Recommended: Thierno Nance       See flowsheet documentation for full assessment, interventions and recommendations  Outcome: Progressing  Prognosis: Fair  Problem List: Decreased strength, Decreased endurance, Impaired balance, Decreased mobility, Decreased safety awareness  Assessment: Pt seen for skilled PT tx session consisting of therex/ theract  Pt noted to be incontinent of stool upon arrival- performed rolling in bed w/ modA x1 and bedrails for pericare and gown change- pt reporting that he was unable to tell he was incontinent  Supine<>sit w/ modA x1; sitting EOB x approx 14 mins - performed reaching and seated balance/ marching and LAQ 2x10 w/ rests b/t sets; pt xferred to chair w/ Mark via stand pivot- sit<>stnads forme chair x2 w/ Mark- pt noted to be more fatigued and SOB following - rest taken- prior to attempt at ambulation and Spo2 on 3L spot checked and noted to be 86-87%- cued for deep breathing- slow to recover and O2 increased to 4L w/ RN aware/ present / Anabel Adam- Spo2 - only increasing to  89%  Gait deferred due to same- pt set up in chair w/ RN present and respiratory paged  All needs in reach and in care of nursing         Recommendation: Post acute IP rehab, Short-term skilled PT     PT - OK to Discharge: Yes (rehab)    See flowsheet documentation for full assessment

## 2019-01-11 NOTE — PROGRESS NOTES
Vancomycin Sign Off Note    Tuan Church is a 82 y o  male who was receiving vancomycin therapy  Vancomycin has been discontinued by the primary service  Pharmacy will sign off at this point  Please call pharmacy with additional questions  Thank you,  Elizabeth Larsen, PharmD, [facility] Pharmacist

## 2019-01-11 NOTE — PROGRESS NOTES
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  Hematology following  Plan:  1  Patient to have repeat VBS now that NG tube has been removed  2  Await pending laboratory blood work including vitamin a, E, D, K, serum copper  3  Patient to have outpatient evaluation of gammopathy of undetermined significance  4  Once stable, will obtain repeat MRI brain with and without contrast     Neurology will remain available to advise  Subjective:   Patient has not been in good mood today and removed his NG tube last night  No acute events overnight  Continues to be dysarthric, though less so since removal of NG tube  Has not had repeat swallow evaluation since removing tube  Review of Systems 12 point review of systems reviewed and negative except as indicated above  Vitals: Blood pressure 115/66, pulse 76, temperature 97 8 °F (36 6 °C), temperature source Oral, resp  rate 20, height 5' 7" (1 702 m), weight 51 6 kg (113 lb 12 1 oz), SpO2 96 %  Physical Exam:   Patient encountered sitting up in bed without NG tube in place  No acute distress  Appears brighter today  Alert, pleasantly interactive though at times with irritable edge  Dysarthria modestly improved with NG tube absent  PERRLA, no papilledema  Able to protrude tongue beyond labio-dental border today  Tongue, palate, uvula midline    Deltoids 4/5 strength but otherwise full strength throughout the upper extremities bilaterally  Able to move lower extremities symmetrically  Reflexes 2++ biceps, triceps, patellar, 1+ Achilles, toe response is downgoing bilaterally  No exaggerated jaw jerk  Lab, Imaging and other studies:  ionized calcium 1 06, ceruloplasmin 17 1, SPEP with elevated IgG kappa and lambda  Sjogren's antibodies negative, RPR nonreactive, Lyme serology negative, heavy metal screen negative  Counseling / Coordination of Care  I spent a total of 25 min with the patient with greater than 50% of that time spent counseling and coordinating his care, specifically discussing his diagnosis, additional tests, and discussing with team, as detailed above

## 2019-01-11 NOTE — PLAN OF CARE
Nutrition/Hydration-ADULT     Nutrient/Hydration intake appropriate for improving, restoring or maintaining nutritional needs Not Progressing          DISCHARGE PLANNING - CARE MANAGEMENT     Discharge to post-acute care or home with appropriate resources Progressing        Potential for Falls     Patient will remain free of falls Progressing        Prexisting or High Potential for Compromised Skin Integrity     Skin integrity is maintained or improved Progressing        RESPIRATORY - ADULT     Achieves optimal ventilation and oxygenation Progressing

## 2019-01-11 NOTE — PROGRESS NOTES
Report called to [facility] on p9  Patient to be transported via stretcher by transport team with o2  Family at bedside and updated on pending transfer

## 2019-01-11 NOTE — RESPIRATORY THERAPY NOTE
RT Management Note  David Alonso 82 y o  male MRN: [id number]  Unit/Bed#: MICU 02 Encounter: [id number]      Daily Screen       1/10/2019 1440             NIF: -40 cm H2O            Physical Exam:   Cough: Moist, Weak  O2 Device: (P) n/c      Resp Comments: (P) placed on 4 liters n/c, issa well, no resp distress, VS stable, will con't to monitor

## 2019-01-11 NOTE — PROGRESS NOTES
Progress Note - ICU Transfer to SD/MS tele   Emma Simmons 82 y o  male MRN: [id number]  [address] [facility]   Unit/Bed#: MICU 86 Encounter: [id number]    Code Status: Level 1 - Full Code  POA:    POLST:      Reason for ICU admission: acute hypoxic respiratory failure    Active problems:   Principal Problem (Resolved):    Acute respiratory failure with hypoxia ([address] )  Active Problems:    Sepsis ([address] )    Pneumonia    Oropharyngeal dysphagia    Type 2 diabetes mellitus without complication, without long-term current use of insulin (HCC)    Leukocytosis    Other chronic pancreatitis (HCC)    Moderate protein-calorie malnutrition (HCC)    Abnormal SPEP  Resolved Problems:    Acute kidney injury ([address] )    Hypokalemia    Atrial flutter ([address] )      Consultants:   Heme-onc  Neurology    History of Present Illness:  Patient is an 81yo M with pmhx progressive weight loss, oropharyngeal dysphagia, generalized weakness, endocrine and exocrine pancreatic insufficiency in the setting of chronic pancreatitis, 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, who originally presented to the emergency department on January 1st 2019 for dehydration and failure to thrive, cough, severe sepsis 2/2 pneumonia  As an inpatient he was tx with 7d course of Rocephin  He has questionable aspiration event (s) per his family, and early on 1/10 developed acute hypoxia not amendable to HFNC/Bipap, was transferred to ICU for acute hypoxic respiratory failure  Summary of clinical course:   Patient was gradually weaned off bipap, tolerated HFNC overnight last night, and is now maintaining O2 sat >92% with 2L NC  He removed his NG tube overnight, and is currently NPO pending speech evaluation  He continues on day 2 cefepime/flagyl/vancomycin, pending cultures  Plan is to repeat his procalcitonin in the morning am 1/12, and if normal, d/c antibiotic therapy at this time       Recent or scheduled procedures:   S/p ex lap 11/2018    Outstanding/pending diagnostics:   1  Repeat blood cultures   2  Monoclonal proteins-urine    3  Anti-neutrophilic cytoplasmic antibody   4  Fecal fat, quant   5  Paraneoplastic Ab, serum-misc test  6  Vitamin A, K, E, copper level, vitamin D panel   7  11/12 am procalcitonin, BMP, CBC  8  Immunoglobulin free LT chains, blood   9  MRI studies     Cultures:   Blood cultures x2 are pending   MRSA culture neg  C  Difficile neg  Influenza A/B, RSV neg  Legionella antigen, urine neg   Strep pneumo urine, positive on 1/1/19  Initial blood cultures on 1/1/19 neg       Mobilization Plan:   Ambulate with assistance as tolerate  PT/OT eval and tx    Nutrition Plan:   NPO pending eval by speech    VTE Pharmacologic Prophylaxis: Heparin  VTE Mechanical Prophylaxis: sequential compression device    Discharge Plan:   Patient should be ready for discharge to med surg on 1/11/2019     Initial Physical Therapy Recommendations:   Treatment/Interventions: Gait training, Bed mobility, Equipment eval/education, Patient/family training, LE strengthening/ROM, Functional transfer training, Therapeutic exercise, Endurance training  Equipment Recommended: Walker    Initial Occupational Therapy Recommendations:    Continue to recommend STR upon D/C when medically stable  Pt continues to benefit from immediate inpatient skilled OT services 3-5x/wk  Will continue to follow to address goals stated below to be met within 10-14 days:  OT Discharge Recommendation: Short Term Rehab  OT - OK to Discharge: Yes (when medically stable)    Initial /Plan:   N/A    Home medications that are not reordered and reason why:   N/A     Specific Diagnosis Plan:    Pneumonia: Source: aspiration, Antibiotics: flagyl, cefepime, Length: day 2, if procalcitonin negative tomorrow, d/c antibiotic therapy at this time   Need for Infectious Disease consult: no    Hyperglycemia:  Subcutaneous insulin, sliding scale algorithm 2    Need for Endocrine consult: no    Spoke with Dr Heather Myers regarding transfer  Please call me with any questions or concerns  Portions of the record may have been created with voice recognition software  Occasional wrong word or "sound a like" substitutions may have occurred due to the inherent limitations of voice recognition software  Read the chart carefully and recognize, using context, where substitutions have occurred      Maggie Rooney, DO

## 2019-01-11 NOTE — CONSULTS
Oncology Consult Note  Chuy Navarro 82 y o  male MRN: [id number]  Unit/Bed#: MICU 02 Encounter: [id number]      Presenting Complaint:  Consult requested for abnormal serum protein electrophoresis    History of Presenting Illness:  27-year-old  male with history for diabetes mellitus type 2 diagnosed 6 months ago, chronic pancreatitis, the family reported weight loss over the past 6 months about 50 lb, imaging studies showed a mass in the distant pancreatic area,, with dilatation of the pancreatic duct, normal CA 19-9, CEA, status post EUS with biopsy and diagnostic laparotomy and biopsy, biopsy was negative for malignancy or autoimmune pancreatitis, laparotomy showed no evidence of metastatic disease, after that the patient had fatigue, diarrhea, non digested food, steatorrhea    He was admitted to the hospital with hypoxemia, dyspnea, CT scan showed bilateral infiltrate in the lung consistent with possible multifocal pneumonia/pneumonitis from aspiration  He is NPO, status post PEG tube placement    For the workup of the fatigue neurology consult of 10, 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, 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    Urine protein electrophoresis pending workup was negative for Sjogren antibodies, RPR, Lyme disease, normal vitamin B12, folate, TSH, heavy metal screen    MRI of the brain showed advanced microangiopathic changes throughout the supratentorial white matter    CT scan showed bilateral pulmonary infiltrate, urine antigen for pneumococcus pneumonia was positive    Echo Doppler of the heart showed no abnormal wall to suggest amyloidosis    The patient is in the room with his wife and his daughter    According to the daughter he denies any fever, chills, night sweats, dysuria, hematuria, melena, hematochezia        Review of Systems - As stated in the HPI otherwise the fourteen point review of systems was negative      Past Medical History:   Diagnosis Date    Diabetes mellitus ([facility] [address] )     Type II    Weight loss        Social History     Social History    Marital status: /Civil Union     Spouse name: N/A    Number of children: N/A    Years of education: N/A     Social History Main Topics    Smoking status: Former Smoker     Years: 25 00    Smokeless tobacco: Never Used      Comment: quit about 20 years ago as of 9/21/2018    Alcohol use Yes      Comment: 1 beer a day    Drug use: No    Sexual activity: Not Asked     Other Topics Concern    None     Social History Narrative    None       Family History   Problem Relation Age of Onset    No Known Problems Mother     No Known Problems Father     Pancreatic cancer Brother 61    Breast cancer Daughter 36        38s       No Known Allergies      Current Facility-Administered Medications:     acetaminophen (TYLENOL) tablet 650 mg, 650 mg, Oral, Q6H PRN, Angeli Ge PA-C, 650 mg at 01/10/19 1353    albuterol inhalation solution 2 5 mg, 2 5 mg, Nebulization, Q4H PRN, Manjinder Fabian MD, 2 5 mg at 01/09/19 2202    aspirin chewable tablet 81 mg, 81 mg, Oral, Daily, Katarina Crawford MD, Stopped at 01/11/19 0811    atorvastatin (LIPITOR) tablet 40 mg, 40 mg, Oral, Daily With Leticia Pop MD, 40 mg at 01/10/19 1606    cefepime (MAXIPIME) 1,000 mg in dextrose 5 % 50 mL IVPB, 1,000 mg, Intravenous, Q12H, DEMI MOHR, Stopped at 01/11/19 0355    cholecalciferol (VITAMIN D3) tablet 400 Units, 400 Units, Oral, Daily, Chetan Shukla MD, Stopped at 01/11/19 0811    heparin (porcine) subcutaneous injection 5,000 Units, 5,000 Units, Subcutaneous, Q8H [address], 5,000 Units at 01/11/19 0603 **AND** Platelet count, , , Once, Kristy Mcdowell MD    insulin lispro (HumaLOG) 100 units/mL subcutaneous injection 1-5 Units, 1-5 Units, Subcutaneous, Q6H [address], 1 Units at 01/10/19 1710 **AND** Fingerstick Glucose (POCT), , , Q6H, , DEMI    lidocaine (LIDODERM) 5 % patch 1 patch, 1 patch, Topical, Daily, DEMI Rae, 1 patch at 01/11/19 0811    magnesium sulfate 2 g/50 mL IVPB (premix) 2 g, 2 g, Intravenous, Once, DEMI Rae, 2 g at 01/11/19 1106    melatonin tablet 3 mg, 3 mg, Oral, HS, Mo De Luna DO, 3 mg at 01/10/19 2207    metroNIDAZOLE (FLAGYL) IVPB (premix) 500 mg, 500 mg, Intravenous, Q8H, DEMI Rae, Stopped at 01/11/19 0841    omeprazole (PRILOSEC) suspension 2 mg/mL, 20 mg, Oral, Early Morning, DEMI Langford    pancrelipase (Lip-Prot-Amyl) (CREON) delayed release capsule 24,000 Units, 24,000 Units, Oral, BID With Meals, Jerry Lenz MD, Stopped at 01/11/19 0811    polyvinyl alcohol (LIQUIFILM TEARS) 1 4 % ophthalmic solution 1 drop, 1 drop, Both Eyes, PRN, Td Schwab PA-C, 1 drop at 01/08/19 0617    vancomycin (VANCOCIN) IVPB (premix) 750 mg, 12 5 mg/kg, Intravenous, Q12H, Mary Burnette DO, Stopped at 01/11/19 0551      /67   Pulse 66   Temp 98 5 °F (36 9 °C) (Oral)   Resp 20   Ht 5' 7" (1 702 m)   Wt 51 6 kg (113 lb 12 1 oz)   SpO2 100%   BMI 17 82 kg/m²       General Appearance:    Alert, oriented, chronically ill        Eyes:    PERRL   Ears:    Normal external ear canals, both ears   Nose:   Nares normal, septum midline   Throat:   Mucosa moist  Pharynx without injection  Neck:   Supple       Lungs:     Distant breath sounds bilaterally   Chest Wall:    No tenderness or deformity    Heart:    Regular rate and rhythm       Abdomen:     Soft, non-tender, bowel sounds +, no organomegaly           Extremities:   Extremities no cyanosis or edema       Skin:   no rash or icterus      Lymph nodes:   Cervical, supraclavicular, and axillary nodes normal   Neurologic:   CNII-XII intact, normal strength, sensation and reflexes     Throughout               Recent Results (from the past 48 hour(s))   Fingerstick Glucose (POCT)    Collection Time: 01/09/19 11:50 AM   Result Value Ref Range    POC Glucose 256 (H) 65 - 140 mg/dl   Procalcitonin    Collection Time: 01/09/19  1:30 PM   Result Value Ref Range    Procalcitonin 0 13 <=0 25 ng/ml   Fingerstick Glucose (POCT)    Collection Time: 01/09/19  5:44 PM   Result Value Ref Range    POC Glucose 310 (H) 65 - 140 mg/dl   Fingerstick Glucose (POCT)    Collection Time: 01/09/19 11:49 PM   Result Value Ref Range    POC Glucose 194 (H) 65 - 140 mg/dl   POCT Blood Gas (CG8+)    Collection Time: 01/10/19  1:25 AM   Result Value Ref Range    ph, Evert ISTAT 7 433 (H) 7 300 - 7 400    pCO2, Evert i-STAT 50 3 (H) 42 0 - 50 0 mm HG    pO2, Evert i-STAT 82 0 (H) 35 0 - 45 0 mm HG    BE, i-STAT 8 (H) -2 - 3 mmol/L    HCO3, Evert i-STAT 33 7 (H) 24 0 - 30 0 mmol/L    CO2, i-STAT 35 (H) 21 - 32 mmol/L    O2 Sat, i-STAT 96 95 - 98 %    SODIUM, I-STAT 139 136 - 145 mmol/l    Potassium, i-STAT 4 3 3 5 - 5 3 mmol/L    Calcium, Ionized i-STAT 1 14 1 12 - 1 32 mmol/L    Hct, i-STAT 35 (L) 36 5 - 49 3 %    Hgb, i-STAT 11 9 (L) 12 0 - 17 0 g/dl    Glucose, i-STAT 202 (H) 65 - 140 mg/dl    Specimen Type VENOUS    Fingerstick Glucose (POCT)    Collection Time: 01/10/19  5:21 AM   Result Value Ref Range    POC Glucose 229 (H) 65 - 140 mg/dl   Basic metabolic panel    Collection Time: 01/10/19  5:22 AM   Result Value Ref Range    Sodium 137 136 - 145 mmol/L    Potassium 4 6 3 5 - 5 3 mmol/L    Chloride 98 (L) 100 - 108 mmol/L    CO2 35 (H) 21 - 32 mmol/L    ANION GAP 4 4 - 13 mmol/L    BUN 12 5 - 25 mg/dL    Creatinine 0 62 0 60 - 1 30 mg/dL    Glucose 225 (H) 65 - 140 mg/dL    Calcium 7 4 (L) 8 3 - 10 1 mg/dL    eGFR 93 ml/min/1 73sq m   CBC and differential    Collection Time: 01/10/19  5:22 AM   Result Value Ref Range    WBC 27 69 (H) 4 31 - 10 16 Thousand/uL    RBC 3 54 (L) 3 88 - 5 62 Million/uL    Hemoglobin 10 6 (L) 12 0 - 17 0 g/dL    Hematocrit 34 9 (L) 36 5 - 49 3 %    MCV 99 (H) 82 - 98 fL    MCH 29 9 26 8 - 34 3 pg    MCHC 30 4 (L) 31 4 - 37 4 g/dL    RDW 15 0 11 6 - 15 1 %    MPV 10 6 8 9 - 12 7 fL    Platelets 890 205 - 220 Thousands/uL    nRBC 0 /100 WBCs   Manual Differential(PHLEBS Do Not Order)    Collection Time: 01/10/19  5:22 AM   Result Value Ref Range    Segmented % 94 (H) 43 - 75 %    Lymphocytes % 4 (L) 14 - 44 %    Monocytes % 2 (L) 4 - 12 %    Eosinophils, % 0 0 - 6 %    Basophils % 0 0 - 1 %    Absolute Neutrophils 26 03 (H) 1 85 - 7 62 Thousand/uL    Lymphocytes Absolute 1 11 0 60 - 4 47 Thousand/uL    Monocytes Absolute 0 55 0 00 - 1 22 Thousand/uL    Eosinophils Absolute 0 00 0 00 - 0 40 Thousand/uL    Basophils Absolute 0 00 0 00 - 0 10 Thousand/uL    Total Counted      RBC Morphology Normal     Platelet Estimate Adequate Adequate   Hepatic function panel    Collection Time: 01/10/19  5:22 AM   Result Value Ref Range    Total Bilirubin 0 28 0 20 - 1 00 mg/dL    Bilirubin, Direct 0 14 0 00 - 0 20 mg/dL    Alkaline Phosphatase 105 46 - 116 U/L    AST 22 5 - 45 U/L    ALT 20 12 - 78 U/L    Total Protein 6 3 (L) 6 4 - 8 2 g/dL    Albumin 1 7 (L) 3 5 - 5 0 g/dL   Blood gas, venous    Collection Time: 01/10/19  6:45 AM   Result Value Ref Range    pH, Evert 7 365 7 300 - 7 400    pCO2, Evert 64 0 (H) 42 0 - 50 0 mm Hg    pO2, Evert 50 6 (H) 35 0 - 45 0 mm Hg    HCO3, Evert 35 8 (H) 24 - 30 mmol/L    Base Excess, Evert 8 5 mmol/L    O2 Content, Evert 12 9 ml/dL    O2 HGB, VENOUS 81 0 (H) 60 0 - 80 0 %    Non Vent type- CPAP CPAP    Fingerstick Glucose (POCT)    Collection Time: 01/10/19 12:02 PM   Result Value Ref Range    POC Glucose 130 65 - 140 mg/dl   Calcium, ionized    Collection Time: 01/10/19  2:26 PM   Result Value Ref Range    Calcium, Ionized 1 06 (L) 1 12 - 1 32 mmol/L   Ceruloplasmin    Collection Time: 01/10/19  2:31 PM   Result Value Ref Range    Ceruloplasmin 17 1 16 0 - 31 0 mg/dL   Fingerstick Glucose (POCT)    Collection Time: 01/10/19  5:03 PM   Result Value Ref Range    POC Glucose 169 (H) 65 - 140 mg/dl   Fingerstick Glucose (POCT)    Collection Time: 01/10/19 11:45 PM   Result Value Ref Range    POC Glucose 168 (H) 65 - 140 mg/dl   UA w Reflex to Microscopic w Reflex to Culture    Collection Time: 01/11/19  3:07 AM   Result Value Ref Range    Color, UA Yellow     Clarity, UA Clear     Specific [place], UA 1 011 1 003 - 1 030    pH, UA 8 0 4 5 - 8 0    Leukocytes, UA Negative Negative    Nitrite, UA Negative Negative    Protein, UA Negative Negative mg/dl    Glucose, UA Negative Negative mg/dl    Ketones, UA Negative Negative mg/dl    Urobilinogen, UA 0 2 0 2, 1 0 E U /dl E U /dl    Bilirubin, UA Negative Negative    Blood, UA Negative Negative   CBC and differential    Collection Time: 01/11/19  5:41 AM   Result Value Ref Range    WBC 10 10 4 31 - 10 16 Thousand/uL    RBC 2 69 (L) 3 88 - 5 62 Million/uL    Hemoglobin 8 0 (L) 12 0 - 17 0 g/dL    Hematocrit 26 7 (L) 36 5 - 49 3 %    MCV 99 (H) 82 - 98 fL    MCH 29 7 26 8 - 34 3 pg    MCHC 30 0 (L) 31 4 - 37 4 g/dL    RDW 15 6 (H) 11 6 - 15 1 %    MPV 10 9 8 9 - 12 7 fL    Platelets 254 230 - 126 Thousands/uL    nRBC 0 /100 WBCs    Neutrophils Relative 84 (H) 43 - 75 %    Immat GRANS % 1 0 - 2 %    Lymphocytes Relative 10 (L) 14 - 44 %    Monocytes Relative 4 4 - 12 %    Eosinophils Relative 1 0 - 6 %    Basophils Relative 0 0 - 1 %    Neutrophils Absolute 8 37 (H) 1 85 - 7 62 Thousands/µL    Immature Grans Absolute 0 10 0 00 - 0 20 Thousand/uL    Lymphocytes Absolute 1 05 0 60 - 4 47 Thousands/µL    Monocytes Absolute 0 43 0 17 - 1 22 Thousand/µL    Eosinophils Absolute 0 11 0 00 - 0 61 Thousand/µL    Basophils Absolute 0 04 0 00 - 0 10 Thousands/µL   Basic metabolic panel    Collection Time: 01/11/19  5:41 AM   Result Value Ref Range    Sodium 132 (L) 136 - 145 mmol/L    Potassium 3 9 3 5 - 5 3 mmol/L    Chloride 95 (L) 100 - 108 mmol/L    CO2 32 21 - 32 mmol/L    ANION GAP 5 4 - 13 mmol/L    BUN 9 5 - 25 mg/dL    Creatinine 0 56 (L) 0 60 - 1 30 mg/dL    Glucose 342 (H) 65 - 140 mg/dL    Calcium 7 5 (L) 8 3 - 10 1 mg/dL    eGFR 97 ml/min/1 73sq m   Procalcitonin    Collection Time: 01/11/19  5:41 AM   Result Value Ref Range    Procalcitonin 0 30 (H) <=0 25 ng/ml   Magnesium    Collection Time: 01/11/19  5:41 AM   Result Value Ref Range    Magnesium 1 8 1 6 - 2 6 mg/dL   Fingerstick Glucose (POCT)    Collection Time: 01/11/19  5:53 AM   Result Value Ref Range    POC Glucose 124 65 - 140 mg/dl   Basic metabolic panel    Collection Time: 01/11/19 10:31 AM   Result Value Ref Range    Sodium 138 136 - 145 mmol/L    Potassium 4 3 3 5 - 5 3 mmol/L    Chloride 101 100 - 108 mmol/L    CO2 33 (H) 21 - 32 mmol/L    ANION GAP 4 4 - 13 mmol/L    BUN 9 5 - 25 mg/dL    Creatinine 0 46 (L) 0 60 - 1 30 mg/dL    Glucose 120 65 - 140 mg/dL    Calcium 7 8 (L) 8 3 - 10 1 mg/dL    eGFR 105 ml/min/1 73sq m         Xr Chest Portable    Result Date: 1/10/2019  Narrative: CHEST INDICATION:   Hypoxia  COMPARISON:  1/8/2019 EXAM PERFORMED/VIEWS:  XR CHEST PORTABLE FINDINGS:  Nasogastric tube projects over the stomach  Heart shadow is enlarged but unchanged from prior exam  There is new airspace disease in the left perihilar region  Patchy bibasilar density is stable  There is a left pleural effusion which appears increased from the prior study  Osseous structures appear within normal limits for patient age  Impression: New left perihilar opacity suspicious for pneumonia with increasing left pleural effusion  Bibasilar densities are stable  The study was marked in [facility] for immediate notification  Workstation performed: [id number]     Xr Chest Portable    Result Date: 1/8/2019  Narrative: CHEST INDICATION:   hypoxia  COMPARISON:  1/1/2019 EXAM PERFORMED/VIEWS:  XR CHEST PORTABLE FINDINGS:  NG tube tip below the diaphragm  Heart shadow is enlarged but unchanged from prior exam  Persistent bibasilar infiltrates  No pneumothorax or pleural effusion  Osseous structures appear within normal limits for patient age  Impression: Persistent bibasilar infiltrates  Workstation performed: [id number]     Xr Chest Pa & Lateral    Result Date: 1/2/2019  Narrative: CHEST INDICATION:   tachycardia, cough  COMPARISON:  November 20, 2018 EXAM PERFORMED/VIEWS:  XR CHEST PA & LATERAL Images: 2 FINDINGS: Study is shot with the AP technique  This makes it difficult to evaluate heart size Lower lobe infiltrates are present bilaterally  The right was not seen previously  The left is much worse than November  Osseous structures appear within normal limits for patient age  Impression: Lower lobe infiltrates bilaterally Workstation performed: [id number]     Ct Head Wo Contrast    Result Date: 1/3/2019  Narrative: CT BRAIN - WITHOUT CONTRAST INDICATION:   dyphagia, suspected neurologic cause  COMPARISON:  None  TECHNIQUE:  CT examination of the brain was performed  In addition to axial images, coronal 2D reformatted images were created and submitted for interpretation  Radiation dose length product (DLP) for this visit:  926 mGy-cm   This examination, like all CT scans performed in the [facility], was performed utilizing techniques to minimize radiation dose exposure, including the use of iterative reconstruction and automated exposure control  IMAGE QUALITY:  Diagnostic  FINDINGS: PARENCHYMA:  There is confluent hypoattenuation within both cerebral hemispheres suggestive of advance microangiopathic disease  No acute intracranial hemorrhage or transcortical infarction  Generalized parenchymal volume loss  Atherosclerotic calcification of the intracranial carotid and vertebral arteries  VENTRICLES AND EXTRA-AXIAL SPACES:  Ventriculomegaly and prominence of the extra-axial spaces consistent with generalized symmetric volume loss  VISUALIZED ORBITS AND PARANASAL SINUSES:  Unremarkable   CALVARIUM AND EXTRACRANIAL SOFT TISSUES:  Normal      Impression: Generalized volume loss with advanced microangiopathic changes of the cerebral white matter  No acute intracranial abnormality  Workstation performed: [id number]     Ct Chest Wo Contrast    Result Date: 1/5/2019  Narrative: CT CHEST WITHOUT IV CONTRAST INDICATION:   Pneumonia complicated / unresolved  COMPARISON:  None  TECHNIQUE: CT examination of the chest was performed without intravenous contrast   Axial, sagittal, and coronal 2D reformatted images were created from the source data and submitted for interpretation  Radiation dose length product (DLP) for this visit:  244 mGy-cm   This examination, like all CT scans performed in the [facility], was performed utilizing techniques to minimize radiation dose exposure, including the use of iterative reconstruction and automated exposure control  FINDINGS: LUNGS:  Consolidative and groundglass parenchymal density throughout the dependent lung zones, or severe on the left than on the right but other smaller areas of patchy groundglass density in the upper lobes as well is consistent with multifocal pneumonia  There is inspissated mucus within the trachea and in the mainstem bronchi bilaterally; this, in conjunction with the distribution of most dense consolidative airspace opacity, suggests that findings represent severe aspiration pneumonia  PLEURA:  Trace left greater than right pleural effusions are noted  HEART/GREAT VESSELS:  Trace pericardial effusion  Borderline caliber of ascending thoracic aorta measures up to 38 mm  MEDIASTINUM AND ZULEYKA:  Borderline and mildly enlarged mediastinal and hilar lymph nodes are likely reactive  CHEST WALL AND LOWER NECK:   Unremarkable  VISUALIZED STRUCTURES IN THE UPPER ABDOMEN:  Cystic mass in the region of the pancreatic tail and splenic hilum is better evaluated on MRI performed December 14, 2018  The finding is similar in size when compared to that examination  Surgical clips are  seen throughout the left upper abdomen   OSSEOUS STRUCTURES:  No acute fracture or destructive osseous lesion  Impression: Groundglass and consolidative airspace opacity throughout the lungs, most dense in the dependent lungs and more severe on the left than the right  Also noted is inspissated mucus within the trachea and the mainstem bronchi bilaterally  The findings are  most suspicious for severe multifocal aspiration pneumonia  Small left greater than right pleural effusions  Findings in the upper abdomen are better evaluated on recent abdominal MR of December 14, 2018  Workstation performed: [id number]     Mri Brain Wo Contrast    Result Date: 1/4/2019  Narrative: MRI BRAIN WITHOUT CONTRAST INDICATION: Change in mental status  Suspected neurologic cause of dysphagia  COMPARISON:   January 3, 2019  TECHNIQUE:  Sagittal T1, axial T2, axial FLAIR, axial T1, axial [place] and axial diffusion imaging  IMAGE QUALITY:  Diagnostic  FINDINGS: BRAIN PARENCHYMA:  There is no discrete mass, mass effect or midline shift  There is no intracranial hemorrhage  There is no evidence of acute infarction and diffusion imaging is unremarkable  Extensive patchy and confluent T2 and FLAIR signal abnormality seen throughout periventricular and subcortical white matter as well as in the central karthikeyan without associated mass effect is most consistent with advanced microangiopathic change  There is a chronic lacunar infarction in the right caudate head  VENTRICLES:  The ventricles are normal in size and contour  SELLA AND PITUITARY GLAND:  Normal  ORBITS:  Normal  PARANASAL SINUSES:  Sinuses are clear  There is a right mastoid effusion  VASCULATURE:  Evaluation of the major intracranial vasculature demonstrates appropriate flow voids  CALVARIUM AND SKULL BASE:  Normal  EXTRACRANIAL SOFT TISSUES:  Normal      Impression: No acute intracranial ischemia  Advanced microangiopathic changes throughout supratentorial white matter and central karthikeyan  Right mastoid effusion   Workstation performed: [id number]     Mri Abdomen W Wo Contrast    Result Date: 12/16/2018  Narrative: INDICATION:  Chronic pancreatitis  ORDERING PROVIDER:  Mauricio Aguero  TECHNIQUE:  Multiplanar unenhanced MR imaging followed by dynamic enhanced MR imaging through the abdomen following intravenous contrast   Gadavist 5 mL was administered intravenously  COMPARISON:  MR abdomen 10/9/18  FINDINGS: Liver morphology is normal   No cholelithiasis or cholecystitis  No biliary dilatation  The common bile duct is patent without filling defect, dilatation, or stricture identified  There are a few scattered bilateral subcentimeter renal cortical cysts seen in both kidneys, with no suspicious renal mass or hydronephrosis detected  Again seen is the patient's diffusely abnormal pancreas, which has diffuse scattered main pancreatic ductal ectasia and diffuse pancreatic atrophy  Finding in the tail of the pancreas on today's examination appears much more cystic, and fluid-filled than what was seen on prior examinations, now measuring roughly 4 1 x 3 4 cm on axial images 13 and 14 of series 9001  There is some developing rim enhancement about this collection, could reflect a developing pancreatic tail pseudocyst   Please note that the majority of the pulse sequences are rather significantly degraded by extensive patient motion during the study, I would recommend following this patient with CT instead of MRI in the future  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 complication of the patient's prior episodes of pancreatitis  No focal masses in the spleen and adrenal glands  No lymphadenopathy or ascites  There is some mild patchy left basilar atelectasis at the left lung base  Impression: 1  Finding in the patient's pancreatic tail is much more cystic and fluid-filled on today's examination than on the prior studies, favored to reflect a developing pseudocyst now measuring 4 1 x 3 4 cm    Cystic neoplasm seems much less likely based upon the appearance now  The patient's splenic vein remains occluded and there may be some very mild residual inflammatory changes about the pancreatic tail  Please note the majority of these pulse sequence images are degraded by significant motion, would recommend following this patient with CT in the future, not MRI  Recommend a follow-up CT in 6-8 weeks to reevaluate, and confirm ongoing evolution of this pancreatic tail cystic finding and rule out any underlying lesion  2  No cholelithiasis or cholecystitis  No biliary dilatation  The common bile duct is patent without filling defect, dilatation, or stricture identified  3    Moderate diffuse pancreatic parenchymal atrophy, with associated diffuse main pancreatic ductal ectasia, likely from old episodes of pancreatitis  Signed by Roney Dahl    Fl Barium Swallow Video W Speech    Result Date: 1/5/2019  Narrative: A video barium swallow study was performed by the Department of Speech Pathology  Please refer to the report for the official interpretation  The images are stored in  PACS for archival purposes only  Study images were not formally reviewed by the  Radiology Department  Fl Barium Swallow Video W Speech    Result Date: 1/3/2019  Narrative: A video barium swallow study was performed by the Department of Speech Pathology  Please refer to the report for the official interpretation  The images are stored in  PACS for archival purposes only  Study images were not formally reviewed by the  Radiology Department      Fl Barium Swallow Video W Speech    Result Date: 1/3/2019  Narrative: JONG Fernandez     1/3/2019 10:34 AM                                  Video Swallow Study Patient Name: Jose Call [id number] Date: 1/3/2019   Past Medical History Past Medical History: Diagnosis Date  Diabetes mellitus ([address] )   Type II  Weight loss   Past Surgical History Past Surgical History: Procedure Laterality Date  LAPAROTOMY N/A 11/19/2018  Procedure: LAPAROTOMY EXPLORATORY;  Surgeon: Adela Brown MD;  Location: [facility];  Service: Surgical Oncology  OR EDG US EXAM SURGICAL ALTER STOM DUODENUM/JEJUNUM N/A 9/21/2018  Procedure: LINEAR ENDOSCOPIC U/S;  Surgeon: Jerardo Luevano MD;  Location: [facility]; Service: Gastroenterology  OR LAP,DIAGNOSTIC ABDOMEN N/A 11/19/2018  Procedure: pancreatic biopsy;  Surgeon: Adela Brown MD;  Location: [facility];  Service: Surgical Oncology  TONSILLECTOMY    VASCULAR SURGERY    phlebitis many years ago  WRIST GANGLION EXCISION   Video Barium Swallow Study Summary: Pt presents with severe pharyngeal dysphagia characterized by poor tongue base retraction and drive, swallow delay, absent epiglottic movement/inversion, and a tight UES that allows only minimal amounts of material to pass for each swallow attempt  Incomplete swallow  Although no santa aspiration was observed on this study, suspect at least microaspiration with thin liquids due to prolonged weak cough after trial Significant risk for aspiration noted after each initial swallow attempt due to large amount of stasis throughout the pharynx  After 10+ cued swallows, pt continued to present with at least moderate pyriform retention  Additionally, pt observed to be at risk for poor nutrition/hydration due to significant work required to swallow small bolus (10+ swallow attempts)  Suspect fatigue as an additional risk factor  Recommendations: Diet: NPO; consider alternate means of nutrition Meds: via tube/IV Strategies: Provided moist swabs or small ice chips for oral comfort  Upright position F/u ST tx: yes; therapeutic po trials of nectar thick liquids and exercises Therapy Prognosis: guarded Prognosis considerations: comorbidities, strength, motivation Consider consult with: Neuro  ? Etiology new onset severe pharyngeal dysphagia with absent epiglottic inversion and poor cricopharyngeal relaxation  Nutrition Svcs   Results reviewed with: pt, nursing, CRNP If a dedicated assessment of the esophagus is desired, consider esophagram/barium swallow  **Images available for review on PACS** Patient's goal: None state Goals: 1  Pt will tolerate trials of nectar thick liquids by tsp with SLP only using multiple and effortful swallow techniques  2   Pt will perform pharyngeal exercises x 10 ea given min cues to improve pharyngeal swallow function  HPI: From H&P: The patient is an 83 y/o male who has past medical history significant for pancreatic mass suspected chronic pancreatitis versus autoimmune pancreatitis and type 2 diabetes  Darrius Angel presents from home with worsening weight loss of approximately 5 weeks and also with worsening cough that was described as wet and nonproductive associated with some worsening shortness of breath   Per family he did not receive a flu shot this year, and reportedly some sick contacts were present at home  [facility] any fever or chills at home  Darrius Angel does admit to diarrhea described as yellowish" without blood being seen it      In ED, patient was noted to desaturate to as low as 84% requiring up to 5 L nasal cannula   He received 1L NS, and received treatment with azithromycin and ceftriaxone for presumed pneumonia      Currently, patient is reporting he does not have shortness of breath or abdominal pain   He continues to report cough, however remains nonproductive  Nursing staff report patient with coughing episode at lunch meal  From Bedside Swallow Eval 1/2/19: Summary: Pt presents w/ mild oral pharyngeal dysphagia characterized by delayed a-p transfer time and delayed swallow initiation time with intermittent, weak cough with small sips of thin liquids    Recommendations: Diet: Regular diet Liquid: Thin liquids Meds: As tolerated best Supervision: Intermittent supervision Positioning:Upright Strategies: Pt to take PO/Meds only when fully alert and upright   Oral care: As needed Aspiration precautions *Consider video swallow study to assess for and r/o aspiration Previous VBS: None here CXR: 1/1/19 - Lower lobe infiltrates bilaterally Current Diet: Regular with thin liquids Premorbid diet: Regular/soft with thin liquids Dentition: WFLs O2 requirement: NC Vocal Quality/Speech: Decreased volume; minimal verbal output Cognitive status: Flat affect  Awake  Answered simple questions Consistencies administered: Barium laden applesauce, nectar thick by tsp, thin liquids by cup sip  Pt was seated laterally at 90 degrees  Oral stage: Lip closure: good Mastication: na Bolus formation: decreased Bolus control: fair Transfer: prolonged for puree Residue: mild Tongue drive:  SEVERELY IMPAIRED Pharyngeal stage: Swallow promptness: variably delayed Spill to valleculae: yes Spill to pyriforms: no Epiglottic inversion: ABSENT Laryngeal rise: fair/good rise, fair anterior excursion Pharyngeal constriction: fair Vallecular retention: max for puree; mild to mod for nectar and thin - decreased over time and with multiple cued swallows (and the assistance of gravity) Pyriform retention: max; decreased to mod with multiple swallows (10+) Cricopharyngeal relaxation: POOR; minimal material passed through the UES each swallow attempt Transient penetration: mostly transient for thin liquids Vocal cords:  Good closure during consecutive swallows Penetration: thin liquids Aspiration: no santa aspiration seen  Cannot r/o microaspiration with thin liquids due to significant, prolonged coughing following thin liquid trial not observed with other consistencies  Strategies: multiple swallows, effortful swallow (unable to perform effectively), liquid wash - decreased pharyngeal stasis  Esophageal stage: Screened briefly; ? Mild hold up at distal esophagus  Limited sample for screening  Xr Abdomen 1 Vw Portable    Result Date: 1/6/2019  Narrative: ABDOMEN INDICATION:   check NGT placement   COMPARISON:  None VIEWS:  AP supine FINDINGS: Enteric tube is present with its tip overlying the expected location of gastroesophageal junction and its proximal sidehole approximately 10 cm above the gastroesophageal junction  Enteric contrast administered for barium swallow study performed earlier  the same day is seen within the small bowel and in the large bowel which demonstrates an unremarkable bowel gas pattern  There is patchy airspace opacity in the left lower chest and blunting in the left costophrenic angle  Impression: Enteric tube tip in the region of gastroesophageal junction with sidehole approximately 10 cm proximal to GE junction  Workstation performed: [id number]     Xr Chest Portable Icu    Result Date: 1/11/2019  Narrative: CHEST INDICATION:   persistent hypoxia, r/o pna  COMPARISON:  Chest radiographs January 10, 2019, earlier in the day  EXAM PERFORMED/VIEWS:  XR CHEST PORTABLE ICU  AP semierect FINDINGS: The heart is mildly enlarged  Atherosclerotic changes in the aorta  Lung volumes diminished  Slight progression of bilateral pleural effusions, left side greater than right  Worsening bilateral perihilar infiltrates  Orogastric tube in the stomach  Osseous structures appear within normal limits for patient age  Impression: Slight progression of bilateral infiltrates and bilateral pleural effusions, left side greater than right   Workstation performed: [id number]       Assessment:)  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    Plan:  Differential diagnosis include MGUS, indolent multiple myeloma, multiple myeloma, amyloidosis    I believe his weakness weight loss coming from pancreatic insufficiency also recent diagnosis of bilateral pneumonitis/pneumonia    The patient clinically did not have any neuropathy to suggest amyloidosis he does not have macroglossia    Await for urine protein electrophoresis I will order free light chain in the serum    No evidence of hypercalcemia, renal insufficiency, I looked at the imaging studies I could not appreciate any lytic lesion to suggest myeloma    Patient to follow up as an outpatient in 4 weeks

## 2019-01-12 ENCOUNTER — APPOINTMENT (INPATIENT)
Dept: RADIOLOGY | Facility: HOSPITAL | Age: 84
DRG: 853 | End: 2019-01-12
Payer: MEDICARE

## 2019-01-12 PROBLEM — R65.20 SEVERE SEPSIS (HCC): Status: ACTIVE | Noted: 2019-01-01

## 2019-01-12 PROBLEM — E87.1 HYPONATREMIA: Status: ACTIVE | Noted: 2019-01-12

## 2019-01-12 PROBLEM — E83.42 HYPOMAGNESEMIA: Status: ACTIVE | Noted: 2019-01-12

## 2019-01-12 PROBLEM — R09.02 HYPOXIA: Status: ACTIVE | Noted: 2019-01-12

## 2019-01-12 LAB
ANION GAP SERPL CALCULATED.3IONS-SCNC: 6 MMOL/L (ref 4–13)
ATRIAL RATE: 80 BPM
BASOPHILS # BLD AUTO: 0.04 THOUSANDS/ΜL (ref 0–0.1)
BASOPHILS NFR BLD AUTO: 1 % (ref 0–1)
BUN SERPL-MCNC: 8 MG/DL (ref 5–25)
CALCIUM SERPL-MCNC: 7.1 MG/DL (ref 8.3–10.1)
CHLORIDE SERPL-SCNC: 105 MMOL/L (ref 100–108)
CO2 SERPL-SCNC: 30 MMOL/L (ref 21–32)
COPPER SERPL-MCNC: 79 UG/DL (ref 72–166)
CREAT SERPL-MCNC: 0.41 MG/DL (ref 0.6–1.3)
EOSINOPHIL # BLD AUTO: 0.1 THOUSAND/ΜL (ref 0–0.61)
EOSINOPHIL NFR BLD AUTO: 1 % (ref 0–6)
ERYTHROCYTE [DISTWIDTH] IN BLOOD BY AUTOMATED COUNT: 15.9 % (ref 11.6–15.1)
GFR SERPL CREATININE-BSD FRML MDRD: 110 ML/MIN/1.73SQ M
GLUCOSE SERPL-MCNC: 108 MG/DL (ref 65–140)
GLUCOSE SERPL-MCNC: 119 MG/DL (ref 65–140)
GLUCOSE SERPL-MCNC: 74 MG/DL (ref 65–140)
GLUCOSE SERPL-MCNC: 76 MG/DL (ref 65–140)
GLUCOSE SERPL-MCNC: 84 MG/DL (ref 65–140)
GLUCOSE SERPL-MCNC: 87 MG/DL (ref 65–140)
GLUCOSE SERPL-MCNC: 88 MG/DL (ref 65–140)
HCT VFR BLD AUTO: 26.4 % (ref 36.5–49.3)
HGB BLD-MCNC: 8.1 G/DL (ref 12–17)
IMM GRANULOCYTES # BLD AUTO: 0.05 THOUSAND/UL (ref 0–0.2)
IMM GRANULOCYTES NFR BLD AUTO: 1 % (ref 0–2)
KAPPA LC FREE SER-MCNC: 107.1 MG/L (ref 3.3–19.4)
KAPPA LC FREE/LAMBDA FREE SER: 0.79 {RATIO} (ref 0.26–1.65)
LAMBDA LC FREE SERPL-MCNC: 136.3 MG/L (ref 5.7–26.3)
LYMPHOCYTES # BLD AUTO: 1.02 THOUSANDS/ΜL (ref 0.6–4.47)
LYMPHOCYTES NFR BLD AUTO: 12 % (ref 14–44)
MCH RBC QN AUTO: 30.5 PG (ref 26.8–34.3)
MCHC RBC AUTO-ENTMCNC: 30.7 G/DL (ref 31.4–37.4)
MCV RBC AUTO: 99 FL (ref 82–98)
MONOCYTES # BLD AUTO: 0.5 THOUSAND/ΜL (ref 0.17–1.22)
MONOCYTES NFR BLD AUTO: 6 % (ref 4–12)
NEUTROPHILS # BLD AUTO: 7.11 THOUSANDS/ΜL (ref 1.85–7.62)
NEUTS SEG NFR BLD AUTO: 79 % (ref 43–75)
NRBC BLD AUTO-RTO: 0 /100 WBCS
P AXIS: -21 DEGREES
PLATELET # BLD AUTO: 287 THOUSANDS/UL (ref 149–390)
PMV BLD AUTO: 10.9 FL (ref 8.9–12.7)
POTASSIUM SERPL-SCNC: 3.6 MMOL/L (ref 3.5–5.3)
PR INTERVAL: 176 MS
PROCALCITONIN SERPL-MCNC: 0.15 NG/ML
QRS AXIS: -39 DEGREES
QRSD INTERVAL: 112 MS
QT INTERVAL: 420 MS
QTC INTERVAL: 443 MS
RBC # BLD AUTO: 2.66 MILLION/UL (ref 3.88–5.62)
SODIUM SERPL-SCNC: 141 MMOL/L (ref 136–145)
T WAVE AXIS: -20 DEGREES
VENTRICULAR RATE: 67 BPM
WBC # BLD AUTO: 8.82 THOUSAND/UL (ref 4.31–10.16)

## 2019-01-12 PROCEDURE — 93010 ELECTROCARDIOGRAM REPORT: CPT | Performed by: INTERNAL MEDICINE

## 2019-01-12 PROCEDURE — 82948 REAGENT STRIP/BLOOD GLUCOSE: CPT

## 2019-01-12 PROCEDURE — 92611 MOTION FLUOROSCOPY/SWALLOW: CPT

## 2019-01-12 PROCEDURE — 84145 PROCALCITONIN (PCT): CPT | Performed by: NURSE PRACTITIONER

## 2019-01-12 PROCEDURE — 94760 N-INVAS EAR/PLS OXIMETRY 1: CPT

## 2019-01-12 PROCEDURE — 85025 COMPLETE CBC W/AUTO DIFF WBC: CPT | Performed by: NURSE PRACTITIONER

## 2019-01-12 PROCEDURE — 80048 BASIC METABOLIC PNL TOTAL CA: CPT | Performed by: NURSE PRACTITIONER

## 2019-01-12 PROCEDURE — 74230 X-RAY XM SWLNG FUNCJ C+: CPT

## 2019-01-12 PROCEDURE — 99232 SBSQ HOSP IP/OBS MODERATE 35: CPT | Performed by: INTERNAL MEDICINE

## 2019-01-12 PROCEDURE — 99232 SBSQ HOSP IP/OBS MODERATE 35: CPT | Performed by: NURSE PRACTITIONER

## 2019-01-12 RX ORDER — PANTOPRAZOLE SODIUM 40 MG/1
40 INJECTION, POWDER, FOR SOLUTION INTRAVENOUS
Status: DISCONTINUED | OUTPATIENT
Start: 2019-01-13 | End: 2019-01-19 | Stop reason: HOSPADM

## 2019-01-12 RX ORDER — DEXTROSE AND SODIUM CHLORIDE 5; .45 G/100ML; G/100ML
50 INJECTION, SOLUTION INTRAVENOUS CONTINUOUS
Status: DISCONTINUED | OUTPATIENT
Start: 2019-01-12 | End: 2019-01-13

## 2019-01-12 RX ADMIN — HEPARIN SODIUM 5000 UNITS: 5000 INJECTION INTRAVENOUS; SUBCUTANEOUS at 05:54

## 2019-01-12 RX ADMIN — METRONIDAZOLE 500 MG: 500 INJECTION, SOLUTION INTRAVENOUS at 12:33

## 2019-01-12 RX ADMIN — METRONIDAZOLE 500 MG: 500 INJECTION, SOLUTION INTRAVENOUS at 03:15

## 2019-01-12 RX ADMIN — HEPARIN SODIUM 5000 UNITS: 5000 INJECTION INTRAVENOUS; SUBCUTANEOUS at 22:58

## 2019-01-12 RX ADMIN — CEFEPIME HYDROCHLORIDE 1000 MG: 1 INJECTION, POWDER, FOR SOLUTION INTRAMUSCULAR; INTRAVENOUS at 11:36

## 2019-01-12 RX ADMIN — HEPARIN SODIUM 5000 UNITS: 5000 INJECTION INTRAVENOUS; SUBCUTANEOUS at 14:44

## 2019-01-12 RX ADMIN — DEXTROSE AND SODIUM CHLORIDE 50 ML/HR: 5; .45 INJECTION, SOLUTION INTRAVENOUS at 11:36

## 2019-01-12 NOTE — ASSESSMENT & PLAN NOTE
- evolving sepsis as evidenced by fever, tachypnea, leukocytosis, and previous lactic acidosis  - presumed source of bilateral streptococcal pneumonia - positive urine streptococcal Ag noted - blood cultures from 1/1 are negative and repeat cultures from 1/10 negative  - MRSA screen negative  - encourage incentive spirometry - maintain oxygenation    - RSV/Influenza PCR negative   - appreciate pulmonology input who have discontinued antibiotics on 1/12 as procalcitonin normalized   - remains NPO due to dysphagia (see plan below)   - transferred out of the ICU on 1/11

## 2019-01-12 NOTE — SPEECH THERAPY NOTE
Speech Language/Pathology    Speech/Language Pathology Progress Note    Patient Name: Rachele Fernandez  MKTCO'V Date: 1/12/2019     Met c patients family in the room after VBS, discussed results of VBS being grossly unchanged from previous VBS and pt is not appropriate for PO diet at this time  Family reports they are not opposed to PEG, had questions regarding when that would be able to be done and plan for nutrition in the interim   Informed family I would pass those questions on to the nurse to discuss c MD

## 2019-01-12 NOTE — PROGRESS NOTES
[facility]ist Service - Internal Medicine Progress Note       PATIENT INFORMATION      Patient: Melissa Roman 82 y o  male   MRN: [id number]  PCP: Veronica Farley DO  Unit/Bed#: [facility] 070-72 Encounter: [id number]  Date Of Visit: 01/12/19       ASSESSMENTS & PLAN     Oropharyngeal dysphagia   Assessment & Plan    - with associated intermittent dysarthria noted - prior video barium swallow suspected possible neurologic etiology - previous CT of head/MR brain unremarkable for acute CVA - pending dedicated MRI of brainstem  - repeat (third) video barium swallow today unfortunate revealed no improvement - awaiting gastroenterology evaluation for PEG tube - recently pulled out nasogastric feeding tube due to discomfort and refuses to have it replaced  - appreciate neurology input - initially suspected possibility of myasthenia gravis however acetylcholine receptor binding antibodies and MuSK antibodies were negative and repeat (second) video barium swallow earlier in hospital course after neostigmine stimulation revealed minimal improvement - no improvement with prior Mestinon trial either  - various inflammatory/rheumatologic markers checked are unremarkable - heavy metal screen also negative     Acute respiratory failure with hypoxia   Assessment & Plan    - symptomatically improved currently on 2 L via nasal cannula  - likely secondary to bilateral pneumonia (see plan below)      Severe sepsis - Bilateral pneumonia   Assessment & Plan    - evolving sepsis as evidenced by fever, tachypnea, leukocytosis, and previous lactic acidosis  - presumed source of bilateral streptococcal pneumonia - positive urine streptococcal Ag noted - blood cultures from 1/1 are negative and repeat cultures from 1/10 remain preliminarily normal - MRSA screen negative  - encourage incentive spirometry - maintain oxygenation   - RSV/Influenza PCR negative   - appreciate pulmonology input who have discontinued antibiotics today as procalcitonin normalized   - remains NPO due to dysphagia (see plan above)      Diabetes mellitus type 2   Assessment & Plan    - HbA1c of 7 1   - hold home hypoglycemics - continue SSI coverage per Accu-Cheks QID (remains NPO on IV fluids)      Chronic pancreatitis - history of Pancreatic mass   Assessment & Plan    - biopsy report from resection on 2018 revealed benign pancreatic tissue in the setting of chronic pancreatitis - developed subsequent weight loss and diarrhea after procedure and follows with gastroenterology  - continue Creon supplementation     Moderate protein-calorie malnutrition    Assessment & Plan    - BMI of 17 82 in the setting of chronic illness, decreased appetite/oral intake, and evidence of muscle wasting/atrophy on exam  - plan for PEG evaluation in hopes of resuming tube feeding - previously pulled out nasogastric tube due to discomfort      Hyponatremia   Assessment & Plan    - serum sodium improved   - continue IV fluids     Hypomagnesemia - Hypokalemia   Assessment & Plan    - monitor/replete as necessary       VTE Prophylaxis:  Heparin SC      SUBJECTIVE     Seen/examined earlier the day with wife and daughter at bedside  Patient expresses frustration in lack of ability to consume food/drinks by mouth and states that he will refuse to allow a re-attempt at placing a nasogastric feeding tube as he pulled the previous tube out due to discomfort  Remains generally weak/fatigued  Denies any fever/chills or nausea/vomiting at this time  OBJECTIVE     Vitals:   Temp (24hrs), Av 3 °F (36 8 °C), Min:98 1 °F (36 7 °C), Max:98 4 °F (36 9 °C)    Temp:  [98 1 °F (36 7 °C)-98 4 °F (36 9 °C)] 98 4 °F (36 9 °C)  HR:  [72-73] 73  Resp:  [18-20] 18  BP: (133)/(75-76) 133/75  SpO2:  [95 %-98 %] 98 %  Body mass index is 17 82 kg/m²  Input and Output Summary (last 24 hours):        Intake/Output Summary (Last 24 hours) at 19 1508  Last data filed at 19 1438   Gross per 24 hour   Intake                0 ml   Output             1100 ml   Net            -1100 ml       Physical Exam:     GENERAL:  Frail/cachectic  HEAD:  Normocephalic - atraumatic - temporal wasting noted  EYES: PERRL - EOMI   MOUTH:  Mucosa moist  NECK:  Supple - full range of motion - clavicular wasting present  CARDIAC:  Regular rate/rhythm - S1/S2 positive  PULMONARY:  Clear breath sounds bilaterally - nonlabored respirations  ABDOMEN:  Soft - nontender/nondistended - active bowel sounds  MUSCULOSKELETAL:  Motor strength/range of motion deconditioned  NEUROLOGIC:  Alert/oriented x 3  SKIN:  Chronic wrinkles/blemishes   PSYCHIATRIC:  Mood/affect age-appropriate      ADDITIONAL DATA       Labs & Recent Cultures:       Results from last 7 days  Lab Units 01/12/19  0604   WBC Thousand/uL 8 82   HEMOGLOBIN g/dL 8 1*   HEMATOCRIT % 26 4*   PLATELETS Thousands/uL 287   NEUTROS PCT % 79*   LYMPHS PCT % 12*   MONOS PCT % 6   EOS PCT % 1       Results from last 7 days  Lab Units 01/12/19  0604  01/10/19  0522 01/10/19  0125   SODIUM mmol/L 141  < > 137  --    POTASSIUM mmol/L 3 6  < > 4 6  --    CHLORIDE mmol/L 105  < > 98*  --    CO2 mmol/L 30  < > 35*  --    CO2, I-STAT mmol/L  --   --   --  35*   BUN mg/dL 8  < > 12  --    CREATININE mg/dL 0 41*  < > 0 62  --    CALCIUM mg/dL 7 1*  < > 7 4*  --    ALK PHOS U/L  --   --  105  --    ALT U/L  --   --  20  --    AST U/L  --   --  22  --    GLUCOSE, ISTAT mg/dl  --   --   --  202*   < > = values in this interval not displayed  Results from last 7 days  Lab Units 01/10/19  1441 01/10/19  1439   BLOOD CULTURE  No Growth at 24 hrs  No Growth at 24 hrs           Last 24 Hours Medication List:     Current Facility-Administered Medications:  acetaminophen 650 mg Oral Q6H PRN Angeli Ge PA-C    albuterol 2 5 mg Nebulization Q4H PRN Zena Gutierrez MD    aspirin 81 mg Oral Daily Anna Lynn MD    atorvastatin 40 mg Oral Daily With Prashant Aguilera MD cholecalciferol 400 Units Oral Daily Antoine Wolfe MD    dextrose 5 % and sodium chloride 0 45 % 50 mL/hr Intravenous Continuous Jacinda Jesus MD Last Rate: 50 mL/hr (01/12/19 1136)   heparin (porcine) 5,000 Units Subcutaneous [facility] Letty Augustine MD    insulin lispro 1-5 Units Subcutaneous Q6H [facility] DEMI Langford    lidocaine 1 patch Topical Daily DEMI Langford    melatonin 3 mg Oral HS El Jorgensen DO    omeprazole (PRILOSEC) suspension 2 mg/mL 20 mg Oral Early Morning DEMI Langford    pancrelipase (Lip-Prot-Amyl) 24,000 Units Oral BID With Meals Antoine Wolfe MD    polyvinyl alcohol 1 drop Both Eyes PRN Rehana Tobias PA-C           Time Spent for Care: 33 minutes  More than 50% of total time spent on counseling and coordination of care as described above  Current Length of Stay: 11 day(s)      Code Status: Level 1 - Full Code         ** Please Note: This note is constructed using a voice recognition dictation system   **

## 2019-01-12 NOTE — PROCEDURES
Video Swallow Study      Patient Name: Sondra Castillo  SIWQK'M Date: 1/12/2019        Past Medical History  Past Medical History:   Diagnosis Date    Diabetes mellitus ([address] )     Type II    Weight loss         Past Surgical History  Past Surgical History:   Procedure Laterality Date    LAPAROTOMY N/A 11/19/2018    Procedure: LAPAROTOMY EXPLORATORY;  Surgeon: Zaira Grissom MD;  [facility]: [facility];  Service: Surgical Oncology    VT EDG US EXAM SURGICAL ALTER STOM DUODENUM/JEJUNUM N/A 9/21/2018    Procedure: LINEAR ENDOSCOPIC U/S;  Surgeon: Rhonda Vazquez MD;  Location: [facility]; Service: Gastroenterology    VT LAP,DIAGNOSTIC ABDOMEN N/A 11/19/2018    Procedure: pancreatic biopsy;  Surgeon: Zaira Grissom MD;  Location: [facility];  Service: Surgical Oncology    TONSILLECTOMY      VASCULAR SURGERY      phlebitis many years ago    WRIST GANGLION EXCISION       Video Barium Swallow Study    Summary:  Pt cont to present c moderate oral and severe pharyngeal dysphagia characterized by weak tongue propulsion, minimal hyolaryngeal excursion, minimal to no epiglottic inversion, weak pharyngeal constriction and significantly decreased opening of the UES  Pt given trials of thin liquid, NTL and puree applesauce by tsp c premature spillage and pt utilizing multiple swallows to attempt to transfer/clear material into the esophagus  Decreased retention of thinner material noted but pt cont to have retention of even thin liquid c minimal passage into the esophagus c multiple swallows  With minimal epiglottic inversion, pt is at risk for penetration/aspiration during the swallow  With pharyngeal retention, pt is at risk for overflow penetration/aspiration  Pt noted to have episodes of penetration during this study  Pt continues to be a high aspiration risk with this study being grossly unchanged from previous VBS on 1/5/19  Recommend alternative means of nutrition at this time  Recommendations:  Diet:NPO  Meds: IV/tube  F/u ST tx: yes  Therapy Prognosis: guarded  Prognosis considerations: dysphagia of unknown etiology  Aspiration Precautions  Reflux Precautions  Consider consult with: GI  Results reviewed with: pt, nursing, family, physician    If a dedicated assessment of the esophagus is desired, consider esophagram/barium swallow  Patient's goal:  "I want to eat"      Goals:  Pt will tolerate least restrictive diet w/out s/s aspiration or oral/pharyngeal difficulties  Consistencies administered: Barium laden applesauce, nectar thick, thin liquids  Liquids were administered by tsp  Pt was seated laterally at 90 degrees  Oral stage:   Mod Impaired  Lip closure: adequate  Mastication: n/a  Bolus formation: adeqaute  Bolus control: decreased  Transfer: prompt  Residue: trace    Pharyngeal stage:  Severe Impaired  Swallow promptness: grossly prompt  Spill to valleculae: no  Spill to pyriforms: no  Epiglottic inversion: incomplete/absent  Laryngeal rise: decreased  Pharyngeal constriction: decreased  Vallecular retention: mild  Pyriform retention: severe  PPW coating: yes  Osteophytes: C5-C6  CP prominence: no  Retropulsion from prominence: n/a  Transient penetration: NTL  Epiglottic undercoat: intermittently  Penetration: c thin  Aspiration: no  Response to aspiration: n/a

## 2019-01-12 NOTE — ASSESSMENT & PLAN NOTE
- HbA1c of 7 1   - hold home hypoglycemics - continue SSI coverage per Accu-Cheks QID (remains NPO on IV fluids)

## 2019-01-12 NOTE — ASSESSMENT & PLAN NOTE
- symptomatically worsened for short time while in PACU post-PEG attempt but stabilized prompting return back to medical floor  - likely secondary to bilateral pneumonia (see plan below)   - due to concurrent tachycardia, in EKG ordered revealed no significant changes from previous (reported by GI lab team as read by on site anesthesiologist)

## 2019-01-12 NOTE — ASSESSMENT & PLAN NOTE
- with associated intermittent dysarthria noted - initial and second video barium swallow suspected possible neurologic etiology - previous CT of head/MR brain unremarkable for acute CVA - a subsequent dedicated MRI of brainstem negative for acute etiology  - repeat (third) video barium swallow on 1/12 unfortunate revealed no improvement - unsuccessful in PEG tube placement 1/14 with recommendation of surgical oncology evaluation for open gastrostomy tube placement as suspicion for pseudocyst/gastric erosion leading to abnormal gastric positioning noted possibly related to chronic pancreatitis ? ? 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  This now appears to communicate with the lumen of the stomach as described above which would explain the findings during endoscopy     - recently pulled out nasogastric feeding tube due to discomfort and refuses to have it replaced  - appreciate neurology input - initially suspected possibility of myasthenia gravis however acetylcholine receptor binding antibodies and MuSK antibodies were negative and repeat (second) video barium swallow earlier in hospital course after neostigmine stimulation revealed minimal improvement - no improvement with prior Mestinon trial either  - various inflammatory/rheumatologic markers checked to assess for autoimmune/paraneoplastic syndrome are unremarkable - heavy metal screen also negative  - can also consider association with pancreatic insufficiency per neurology  - Pt having G tube placed today by surg onc  - continue intravenous fluid infusion until G tube able to be used

## 2019-01-12 NOTE — ASSESSMENT & PLAN NOTE
- biopsy report from resection on 11/2018 revealed benign pancreatic tissue in the setting of chronic pancreatitis - developed subsequent weight loss and diarrhea after procedure and follows with gastroenterology  - resume Creon supplementation when able  Follow biopsy from EGD 1/14

## 2019-01-12 NOTE — ASSESSMENT & PLAN NOTE
- BMI of 17 82 in the setting of chronic illness, decreased appetite/oral intake, and evidence of muscle wasting/atrophy on exam  - awaiting possible open gastrostomy in hopes of resuming tube feeding - previously pulled out nasogastric tube due to discomfort

## 2019-01-12 NOTE — PROGRESS NOTES
Spoke with Gabriela Mc, patient's next of kin  She asked about respiratory status and his level of consciousness as well whether or not  his oxygen is being monitored  Explained the maría system and let family member know his is on c-pap and comfortable at present

## 2019-01-12 NOTE — PROGRESS NOTES
Progress Note - Pulmonary   Denece Mariya 82 y o  male MRN: [id number]  Unit/Bed#: [facility] 913-01 Encounter: [id number]      Assessment/Plan:  1  Acute hypoxic respiratory failure-improving  1  Currently oxygenating well on 2 L nasal cannula without any sign of distress, continue titrate oxygen maintain SpO2 greater than 88%  2  Continue BiPAP therapy 10/5 q h s   3  Pulmonary toilet as tolerated:  Incentive spirometry, increasing time out of bed with cough deep breathe  2  Abnormal chest x-ray with Bilateral pneumonia with +strep urinary antigen, bilateral pleural effusions  1  s/p full antibiotic course   2  Procalcitonin 0 18, will discontinue antibiotics sitting continue monitor off ABX  3  Moderate daily temperatures and WBC  3  Dysphagia with history of aspiration and protein calorie malnutrition  1  VBS today  2  Will consider possible plate tube placement pending results  3  Currently refusing NG tube placement, discussed with patient's wife and daughter as he will likely not be able to receive PEG tube until Monday morning and concern for prolonged NPO status-will readdress with patient in the AM post VBS   4  Neurology following, will repeat MRI brain for evaluation for possible: brainstem stroke vs  CN pasly NPS vs  Motor neuron dissease  4  Generalized weakness increasing s/p ex-lap 11/2018 for distal pancreas dysfunction  1  PT OT        Subjective:     Tonio Hayward was seen lying in bed upon entering the room, denies acute overnight events  Reports wearing BiPAP throughout the night  Family at bedside reports that he has had periods of increased coughing, no significant shortness of breath  They are anxious for him to have his video barium swallow today    He pulled his NG tube out the night prior, and is currently refusing replacement although if video barium swallow does demonstrate persistent dysphagia is agreeable to PEG tube placement    Objective:         Vitals: Blood pressure 133/75, pulse 73, temperature 98 4 °F (36 9 °C), resp  rate 18, height 5' 7" (1 702 m), weight 51 6 kg (113 lb 12 1 oz), SpO2 98 % , 2LNC, Body mass index is 17 82 kg/m²  Intake/Output Summary (Last 24 hours) at 01/12/19 1339  Last data filed at 01/12/19 1300   Gross per 24 hour   Intake                0 ml   Output              900 ml   Net             -900 ml         Physical Exam  Gen: Awake, alert, oriented x 3, no acute distress  HEENT: Mucous membranes moist, no oral lesions, no thrush, oxygen via NC  NECK: No accessory muscle use, JVP not elevated  Cardiac: Regular, single S1, single S2, no murmurs, no rubs, no gallops  Lungs: clear lung sounds bilaterally  Abdomen: normoactive bowel sounds, soft nontender, nondistended, no rebound or rigidity, no guarding  Extremities: no cyanosis, no clubbing, no edema    Labs: I have personally reviewed pertinent lab results  , CBC:   Lab Results   Component Value Date    WBC 8 82 01/12/2019    HGB 8 1 (L) 01/12/2019    HCT 26 4 (L) 01/12/2019    MCV 99 (H) 01/12/2019     01/12/2019    MCH 30 5 01/12/2019    MCHC 30 7 (L) 01/12/2019    RDW 15 9 (H) 01/12/2019    MPV 10 9 01/12/2019    NRBC 0 01/12/2019   , CMP:   Lab Results   Component Value Date    SODIUM 141 01/12/2019    K 3 6 01/12/2019     01/12/2019    CO2 30 01/12/2019    BUN 8 01/12/2019    CREATININE 0 41 (L) 01/12/2019    CALCIUM 7 1 (L) 01/12/2019    EGFR 110 01/12/2019   , procalcitonin   18  Imaging and other studies: VBS pending      Srini Kerr

## 2019-01-12 NOTE — SOCIAL WORK
Met with family to discuss DCP-SNF  They have list and have not reviewed    Advised I would touch base with them on Monday

## 2019-01-13 ENCOUNTER — APPOINTMENT (INPATIENT)
Dept: RADIOLOGY | Facility: HOSPITAL | Age: 84
DRG: 853 | End: 2019-01-13
Payer: MEDICARE

## 2019-01-13 LAB
ANION GAP SERPL CALCULATED.3IONS-SCNC: 7 MMOL/L (ref 4–13)
BASOPHILS # BLD AUTO: 0.02 THOUSANDS/ΜL (ref 0–0.1)
BASOPHILS NFR BLD AUTO: 0 % (ref 0–1)
BUN SERPL-MCNC: 6 MG/DL (ref 5–25)
CALCIUM SERPL-MCNC: 7.4 MG/DL (ref 8.3–10.1)
CHLORIDE SERPL-SCNC: 102 MMOL/L (ref 100–108)
CO2 SERPL-SCNC: 29 MMOL/L (ref 21–32)
CREAT SERPL-MCNC: 0.37 MG/DL (ref 0.6–1.3)
EOSINOPHIL # BLD AUTO: 0.11 THOUSAND/ΜL (ref 0–0.61)
EOSINOPHIL NFR BLD AUTO: 1 % (ref 0–6)
ERYTHROCYTE [DISTWIDTH] IN BLOOD BY AUTOMATED COUNT: 15.7 % (ref 11.6–15.1)
GFR SERPL CREATININE-BSD FRML MDRD: 115 ML/MIN/1.73SQ M
GLUCOSE SERPL-MCNC: 104 MG/DL (ref 65–140)
GLUCOSE SERPL-MCNC: 107 MG/DL (ref 65–140)
GLUCOSE SERPL-MCNC: 108 MG/DL (ref 65–140)
GLUCOSE SERPL-MCNC: 127 MG/DL (ref 65–140)
GLUCOSE SERPL-MCNC: 97 MG/DL (ref 65–140)
HCT VFR BLD AUTO: 29.5 % (ref 36.5–49.3)
HGB BLD-MCNC: 9.3 G/DL (ref 12–17)
IMM GRANULOCYTES # BLD AUTO: 0.04 THOUSAND/UL (ref 0–0.2)
IMM GRANULOCYTES NFR BLD AUTO: 0 % (ref 0–2)
LYMPHOCYTES # BLD AUTO: 0.86 THOUSANDS/ΜL (ref 0.6–4.47)
LYMPHOCYTES NFR BLD AUTO: 7 % (ref 14–44)
MAGNESIUM SERPL-MCNC: 2.1 MG/DL (ref 1.6–2.6)
MCH RBC QN AUTO: 30.8 PG (ref 26.8–34.3)
MCHC RBC AUTO-ENTMCNC: 31.5 G/DL (ref 31.4–37.4)
MCV RBC AUTO: 98 FL (ref 82–98)
MONOCYTES # BLD AUTO: 0.58 THOUSAND/ΜL (ref 0.17–1.22)
MONOCYTES NFR BLD AUTO: 5 % (ref 4–12)
NEUTROPHILS # BLD AUTO: 10.24 THOUSANDS/ΜL (ref 1.85–7.62)
NEUTS SEG NFR BLD AUTO: 87 % (ref 43–75)
NRBC BLD AUTO-RTO: 0 /100 WBCS
PHOSPHATE SERPL-MCNC: 2 MG/DL (ref 2.3–4.1)
PLATELET # BLD AUTO: 337 THOUSANDS/UL (ref 149–390)
PMV BLD AUTO: 10.9 FL (ref 8.9–12.7)
POTASSIUM SERPL-SCNC: 3.5 MMOL/L (ref 3.5–5.3)
RBC # BLD AUTO: 3.02 MILLION/UL (ref 3.88–5.62)
SODIUM SERPL-SCNC: 138 MMOL/L (ref 136–145)
WBC # BLD AUTO: 11.85 THOUSAND/UL (ref 4.31–10.16)

## 2019-01-13 PROCEDURE — 80048 BASIC METABOLIC PNL TOTAL CA: CPT | Performed by: INTERNAL MEDICINE

## 2019-01-13 PROCEDURE — A9585 GADOBUTROL INJECTION: HCPCS | Performed by: INTERNAL MEDICINE

## 2019-01-13 PROCEDURE — 82948 REAGENT STRIP/BLOOD GLUCOSE: CPT

## 2019-01-13 PROCEDURE — C9113 INJ PANTOPRAZOLE SODIUM, VIA: HCPCS | Performed by: INTERNAL MEDICINE

## 2019-01-13 PROCEDURE — 99233 SBSQ HOSP IP/OBS HIGH 50: CPT | Performed by: INTERNAL MEDICINE

## 2019-01-13 PROCEDURE — 99232 SBSQ HOSP IP/OBS MODERATE 35: CPT | Performed by: INTERNAL MEDICINE

## 2019-01-13 PROCEDURE — 84100 ASSAY OF PHOSPHORUS: CPT | Performed by: INTERNAL MEDICINE

## 2019-01-13 PROCEDURE — 70553 MRI BRAIN STEM W/O & W/DYE: CPT

## 2019-01-13 PROCEDURE — 99233 SBSQ HOSP IP/OBS HIGH 50: CPT | Performed by: PSYCHIATRY & NEUROLOGY

## 2019-01-13 PROCEDURE — 85025 COMPLETE CBC W/AUTO DIFF WBC: CPT | Performed by: INTERNAL MEDICINE

## 2019-01-13 PROCEDURE — 83735 ASSAY OF MAGNESIUM: CPT | Performed by: INTERNAL MEDICINE

## 2019-01-13 RX ORDER — SODIUM CHLORIDE, SODIUM GLUCONATE, SODIUM ACETATE, POTASSIUM CHLORIDE, MAGNESIUM CHLORIDE, SODIUM PHOSPHATE, DIBASIC, AND POTASSIUM PHOSPHATE .53; .5; .37; .037; .03; .012; .00082 G/100ML; G/100ML; G/100ML; G/100ML; G/100ML; G/100ML; G/100ML
50 INJECTION, SOLUTION INTRAVENOUS CONTINUOUS
Status: DISCONTINUED | OUTPATIENT
Start: 2019-01-13 | End: 2019-01-14

## 2019-01-13 RX ADMIN — PANTOPRAZOLE SODIUM 40 MG: 40 INJECTION, POWDER, FOR SOLUTION INTRAVENOUS at 09:03

## 2019-01-13 RX ADMIN — HEPARIN SODIUM 5000 UNITS: 5000 INJECTION INTRAVENOUS; SUBCUTANEOUS at 06:12

## 2019-01-13 RX ADMIN — POTASSIUM PHOSPHATE, MONOBASIC AND POTASSIUM PHOSPHATE, DIBASIC 30 MMOL: 224; 236 INJECTION, SOLUTION INTRAVENOUS at 13:18

## 2019-01-13 RX ADMIN — HEPARIN SODIUM 5000 UNITS: 5000 INJECTION INTRAVENOUS; SUBCUTANEOUS at 22:19

## 2019-01-13 RX ADMIN — HEPARIN SODIUM 5000 UNITS: 5000 INJECTION INTRAVENOUS; SUBCUTANEOUS at 13:26

## 2019-01-13 RX ADMIN — GADOBUTROL 5 ML: 604.72 INJECTION INTRAVENOUS at 11:01

## 2019-01-13 RX ADMIN — SODIUM CHLORIDE, SODIUM GLUCONATE, SODIUM ACETATE, POTASSIUM CHLORIDE, MAGNESIUM CHLORIDE, SODIUM PHOSPHATE, DIBASIC, AND POTASSIUM PHOSPHATE 50 ML/HR: .53; .5; .37; .037; .03; .012; .00082 INJECTION, SOLUTION INTRAVENOUS at 13:27

## 2019-01-13 NOTE — PROGRESS NOTES
Progress Note - Neurology   Mima Pereyra 82 y o  male MRN: [id number]  Unit/Bed#: [facility] 913-01 Encounter: [id number]    Assessment:  Dysphagia, hypophonia, dysarthria (ddx midbrain stroke, versus motor neuron disease, versus pancreatic insufficiency, versus paraneoplastic)    -MRI performed this AM   There is extensive white matter disease, including several pontine hyperintensities  May be contributing to his dysphagia  There was no abnormal enhancement in nerve roots to suggest an inflammatory process  -MG: MG ab's negative and trial of mestinon did not improve symptoms  Physical exam without fatigability     -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  However, on exam, no fasciculations  Wasting appears systemic and related to nutritional status, rather than denervation  -pancreatic insufficiency: chronic pancreatitis 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/2018  Nutrition labs ADEK still active  ICal moderately low  Ceruloplasmin and serum copper normal     -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  Underwent thorough malignancy workup in 08/2018, revealing only the pancreatic pseudocyst       -SLP re-evaluated yesterday  Continues to have oral pharyngeal dysphagia  Not safe for PO intact    -Patient refusing NG tube due to discomfort  Will require PEG  GI onboard   -please continue patient on daily ASA 81mg for stroke prevention in setting of his extension chronic microvascular white matter disease    -please have patient seen in neuromuscular clinic with Dr Karey Gibson for outpatient EMG after discharge       -please page neurology for further recommendations  Subjective:   No acute events overnight  MRI brain w/wo performed this AM   Patient continues without NG tube  Refuses re-insertion of tube due to discomfort  ROS:  Patient endorses hunger    Denies chest pain, shortness of breath, blurred vision, confusion, extremity pain, abdominal pain, fevers, chills, new rashes or skin breakdown  Vitals: Blood pressure 146/73, pulse 79, temperature (!) 97 2 °F (36 2 °C), resp  rate 18, height 5' 7" (1 702 m), weight 51 6 kg (113 lb 12 1 oz), SpO2 94 %  ,Body mass index is 17 82 kg/m²  Physical Exam:  GEN: NAD  Cachectic  Temporal wasting  Diffuse loss of muscle bulk  CV: RRR, S1, S2  PULM: coarse breath sounds  No wheezes  GI: soft, non-tender  EXTREM: no CCE  NEURO:   Awake, alert, following commands, crossing midline  Does not give a comprehensive history  Naming, repetition intact  CN: PERRLA, EOMI, no fatigability on upgaze, no ptosis  Full visual fields  No diplopia  Raspy hypophonic voice  Moderate to severe labial dental dysarthria  Tongue without fasciculations  Palate, uvula, tongue all midline  Hearing intact  MOTOR: diffusely reduced bulk with normal tone  No fasciculations  Deltoids 4/5  R biceps, triceps, wrist ext/flex and  5/5  L biceps, triceps, wrist ext/flex and  4+/5  R hip flexor 4/5, knee ext, ankle dorsi-plantar flex 5/5  L hip flexor 4/5, knee ext, ankle dorsi-plantar flex 5/5  SENSORY: intact pinprick symmetrically  Normal proprioception  GAIT: deferred  REFLEXES: 2+ symmetric BB, BR, patellar, achilles, toes bilaterally down  Negative Price, pectoral jerk, jaw jerk, suprapatellar, cross adductors  COORDINATION: normal FNF         GEN:   Lab, Imaging and other studies:   CBC:   Results from last 7 days  Lab Units 01/13/19  0607 01/12/19  0604 01/11/19  0541   WBC Thousand/uL 11 85* 8 82 10 10   RBC Million/uL 3 02* 2 66* 2 69*   HEMOGLOBIN g/dL 9 3* 8 1* 8 0*   HEMATOCRIT % 29 5* 26 4* 26 7*   MCV fL 98 99* 99*   PLATELETS Thousands/uL 337 287 270     MRI brain with/without contrast, thin cuts through midbrain: no abnormal enhancement  Confluent white matter disease throughout the corona radiata and periventricular white matter  FLAIR hyperintensities in central bilateral karthikeyan and midbrain        VTE Prophylaxis: Heparin    Counseling / Coordination of Care  Total time spent today 40 minutes  Greater than 50% of total time was spent with the patient and / or family counseling and / or coordination of care  A description of the counseling / coordination of care: Examining patient, reviewing new imaging, discussing care plan with patient

## 2019-01-13 NOTE — PROGRESS NOTES
Progress Note - Pulmonary   Abe Jung 82 y o  male MRN: [id number]  Unit/Bed#: [facility] 913-01 Encounter: [id number]    Assessment/Plan:  1  Acute hypoxic respiratory failure-improving  1  Currently oxygenating well on 2 L nasal cannula without any sign of distress, continue titrate oxygen maintain SpO2 greater than 88%  2  Continue BiPAP therapy 10/5 q h s; pt declined as he did not tolerate the pressures  3  Pulmonary toilet as tolerated:  Incentive spirometry, increasing time out of bed with cough deep breathe  2  Abnormal chest x-ray with Bilateral pneumonia with +strep urinary antigen, bilateral pleural effusions  1  s/p full antibiotic course   2  Monitor off abx  3  Moderate daily temperatures and WBC  3  Dysphagia with history of aspiration and protein calorie malnutrition  1  VBS results pending  2  Plan for possible PEG tube placement  4  Generalized weakness increasing s/p ex-lap 11/2018 for distal pancreas dysfunction  1  PT OT    Chief Complaint:   Hoarseness of voice  Subjective:   No acute events overnight  Patient did not tolerate BiPAP and took it off  He does not want to wear it  Patient and his wife's primary concerns are related to the PEG tube placement  Objective:     Vitals: Blood pressure 146/73, pulse 79, temperature (!) 97 2 °F (36 2 °C), resp  rate 18, height 5' 7" (1 702 m), weight 51 6 kg (113 lb 12 1 oz), SpO2 94 %  ,Body mass index is 17 82 kg/m²        Intake/Output Summary (Last 24 hours) at 01/13/19 1257  Last data filed at 01/13/19 0927   Gross per 24 hour   Intake           861 66 ml   Output             1350 ml   Net          -488 34 ml       Invasive Devices     Peripheral Intravenous Line            Peripheral IV 01/12/19 Right Antecubital less than 1 day          Drain            External Urinary Catheter Small 3 days                Physical Exam: /87   Pulse 82   Temp 98 1 °F (36 7 °C)   Resp 18   Ht 5' 7" (1 702 m)   Wt 51 6 kg (113 lb 12 1 oz)   SpO2 94%   BMI 17 82 kg/m²   General appearance: alert and oriented, in no acute distress  Head: Normocephalic, without obvious abnormality, atraumatic  Eyes: conjunctivae/corneas clear  PERRL, EOM's intact  Fundi benign  Lungs: clear to auscultation bilaterally  Chest wall: no tenderness  Abdomen: soft, non-tender; bowel sounds normal; no masses,  no organomegaly  Extremities: extremities normal, warm and well-perfused; no cyanosis, clubbing, or edema  Pulses: 2+ and symmetric  Skin: Skin color, texture, turgor normal  No rashes or lesions     Labs: I have personally reviewed pertinent lab results  Lab Results   Component Value Date    WBC 11 85 (H) 01/13/2019    HGB 9 3 (L) 01/13/2019    HCT 29 5 (L) 01/13/2019    MCV 98 01/13/2019     01/13/2019     Lab Results   Component Value Date    GLUCOSE 202 (H) 01/10/2019    CALCIUM 7 4 (L) 01/13/2019    K 3 5 01/13/2019    CO2 29 01/13/2019     01/13/2019    BUN 6 01/13/2019    CREATININE 0 37 (L) 01/13/2019     Imaging and other studies: I have personally reviewed pertinent reports  MRI Brain - White matter changes suggestive of chronic microangiopathy  No acute intracranial pathology  Extensive right mastoid opacification

## 2019-01-13 NOTE — PLAN OF CARE
Nutrition/Hydration-ADULT     Nutrient/Hydration intake appropriate for improving, restoring or maintaining nutritional needs Not Progressing        Prexisting or High Potential for Compromised Skin Integrity     Skin integrity is maintained or improved Not Progressing

## 2019-01-13 NOTE — PROGRESS NOTES
[address] Hospitalist Service - Internal Medicine Progress Note       PATIENT INFORMATION      Patient: Isabell Mcgarry 82 y o  male   MRN: [id number]  PCP: Abby Stuart DO  Unit/Bed#: [facility] 232-44 Encounter: [id number]  Date Of Visit: 01/13/19       ASSESSMENTS & PLAN     Oropharyngeal dysphagia   Assessment & Plan    - with associated intermittent dysarthria noted - initial video barium swallow suspected possible neurologic etiology - previous CT of head/MR brain unremarkable for acute CVA - dedicated MRI of brainstem negative for acute etiology  - repeat (third) video barium swallow on 1/12 unfortunate revealed no improvement - awaiting gastroenterology evaluation for PEG tube - recently pulled out nasogastric feeding tube due to discomfort and refuses to have it replaced  - appreciate neurology input - initially suspected possibility of myasthenia gravis however acetylcholine receptor binding antibodies and MuSK antibodies were negative and repeat (second) video barium swallow earlier in hospital course after neostigmine stimulation revealed minimal improvement - no improvement with prior Mestinon trial either  - various inflammatory/rheumatologic markers checked to assess for autoimmune/paraneoplastic syndrome are unremarkable - heavy metal screen also negative  - can also consider association with pancreatic insufficiency per neurology  - continue Isolyte vitamin/mineral intravenous fluid infusion     Acute respiratory failure with hypoxia   Assessment & Plan    - symptomatically improved remaining on 2 L via nasal cannula  - likely secondary to bilateral pneumonia (see plan below)      Severe sepsis - Bilateral pneumonia   Assessment & Plan    - evolving sepsis as evidenced by fever, tachypnea, leukocytosis, and previous lactic acidosis  - presumed source of bilateral streptococcal pneumonia - positive urine streptococcal Ag noted - blood cultures from 1/1 are negative and repeat cultures from 1/10 remain preliminarily negative - MRSA screen negative  - encourage incentive spirometry - maintain oxygenation   - RSV/Influenza PCR negative   - appreciate pulmonology input who have discontinued antibiotics yesterday as procalcitonin normalized   - remains NPO due to dysphagia (see plan above)      Diabetes mellitus type 2   Assessment & Plan    - HbA1c of 7 1   - hold home hypoglycemics - continue SSI coverage per Accu-Cheks QID (remains NPO on IV fluids)      Chronic pancreatitis - history of Pancreatic mass   Assessment & Plan    - biopsy report from resection on 2018 revealed benign pancreatic tissue in the setting of chronic pancreatitis - developed subsequent weight loss and diarrhea after procedure and follows with gastroenterology  - on Creon supplementation      Moderate protein-calorie malnutrition    Assessment & Plan    - BMI of 17 82 in the setting of chronic illness, decreased appetite/oral intake, and evidence of muscle wasting/atrophy on exam  - plan for PEG evaluation in hopes of resuming tube feeding - previously pulled out nasogastric tube due to discomfort      Hyponatremia   Assessment & Plan    - serum sodium remains normalized   - continue IV fluids     Hypomagnesemia - Hypokalemia - Hypophosphatemia   Assessment & Plan    - monitor/replete as necessary       VTE Prophylaxis:  Heparin SC      SUBJECTIVE     Seen/examined earlier this afternoon during nursing rounds  The patient's wife and daughter present at bedside during my encounter  They are eagerly awaiting a gastroenterology evaluation for PEG tube placement  The patient himself states his breathing has improved today  He is resting in bed comfortably only complaining of weakness/fatigue        OBJECTIVE     Vitals:   Temp (24hrs), Av 2 °F (36 2 °C), Min:97 2 °F (36 2 °C), Max:97 2 °F (36 2 °C)    Temp:  [97 2 °F (36 2 °C)] 97 2 °F (36 2 °C)  HR:  [79] 79  Resp:  [18] 18  BP: (146)/(73) 146/73  SpO2:  [94 %] 94 %  Body mass index is 17 82 kg/m²  Input and Output Summary (last 24 hours): Intake/Output Summary (Last 24 hours) at 01/13/19 1523  Last data filed at 01/13/19 1341   Gross per 24 hour   Intake           861 66 ml   Output             1050 ml   Net          -188 34 ml       Physical Exam:     GENERAL:  Frail/cachectic  HEAD:  Normocephalic - atraumatic - temporal wasting present  EYES: PERRL - EOMI   MOUTH:  Mucosa moist  NECK:  Supple - no adenopathy - full range of motion - clavicular wasting seen  CARDIAC:  Regular rate/rhythm - S1/S2 positive  PULMONARY:  Clear breath sounds bilaterally - nonlabored respirations currently   ABDOMEN:  Soft - nontender/nondistended - active bowel sounds  MUSCULOSKELETAL:  Motor strength/range of motion remaining deconditioned  NEUROLOGIC:  Alert/oriented x 3  SKIN:  Chronic wrinkles/blemishes    PSYCHIATRIC:  Mood/affect age-appropriate      ADDITIONAL DATA       Labs & Recent Cultures:       Results from last 7 days  Lab Units 01/13/19  0607   WBC Thousand/uL 11 85*   HEMOGLOBIN g/dL 9 3*   HEMATOCRIT % 29 5*   PLATELETS Thousands/uL 337   NEUTROS PCT % 87*   LYMPHS PCT % 7*   MONOS PCT % 5   EOS PCT % 1       Results from last 7 days  Lab Units 01/13/19  0607  01/10/19  0522 01/10/19  0125   SODIUM mmol/L 138  < > 137  --    POTASSIUM mmol/L 3 5  < > 4 6  --    CHLORIDE mmol/L 102  < > 98*  --    CO2 mmol/L 29  < > 35*  --    CO2, I-STAT mmol/L  --   --   --  35*   BUN mg/dL 6  < > 12  --    CREATININE mg/dL 0 37*  < > 0 62  --    CALCIUM mg/dL 7 4*  < > 7 4*  --    ALK PHOS U/L  --   --  105  --    ALT U/L  --   --  20  --    AST U/L  --   --  22  --    GLUCOSE, ISTAT mg/dl  --   --   --  202*   < > = values in this interval not displayed  Results from last 7 days  Lab Units 01/10/19  1441 01/10/19  1439   BLOOD CULTURE  No Growth at 48 hrs  No Growth at 48 hrs           Last 24 Hours Medication List:     Current Facility-Administered Medications:  acetaminophen 650 mg Oral Q6H PRN Angeli Ge PA-C    albuterol 2 5 mg Nebulization Q4H PRN Dilcia Garnett MD    aspirin 81 mg Oral Daily Chrissie Cooney MD    atorvastatin 40 mg Oral Daily With Mila Vazquez MD    cholecalciferol 400 Units Oral Daily Rudi Kellogg MD    heparin (porcine) 5,000 Units Subcutaneous [facility] Helen Birmingham MD    insulin lispro 1-5 Units Subcutaneous Q6H [facility] DEMI Morales    lidocaine 1 patch Topical Daily DEMI Goodson    melatonin 3 mg Oral HS Shanna Glasgow DO    multi-electrolyte 50 mL/hr Intravenous Continuous Micheal Huerta MD Last Rate: 50 mL/hr (01/13/19 1327)   pancrelipase (Lip-Prot-Amyl) 24,000 Units Oral BID With Meals Rudi Kellogg MD    pantoprazole 40 mg Intravenous Q24H [facility] Micheal Huerta MD    polyvinyl alcohol 1 drop Both Eyes PRN Dewayne Lanier PA-C    potassium phosphate 30 mmol Intravenous Once Micheal Huerta MD Last Rate: 30 mmol (01/13/19 1318)          Time Spent for Care: 32 minutes  More than 50% of total time spent on counseling and coordination of care as described above  Current Length of Stay: 12 day(s)      Code Status: Level 1 - Full Code         ** Please Note: This note is constructed using a voice recognition dictation system   **

## 2019-01-13 NOTE — RESPIRATORY THERAPY NOTE
Respiratory Care      01/12/19 0240   Respiratory Assessment   Assessment Type Assess only   General Appearance Awake   Respiratory Pattern Normal   Bilateral Breath Sounds Diminished   Cough Productive   Resp Comments Pt ordered for HS BiPAP  Pt was non-compliant last night  Removing the mask several times  Currently pt in no distress  SpO2 on room air=93%  BS clear/diminished  Requests he not have to wear HS BiPAP  Will hold tonight  BiPAP to be kept at beside

## 2019-01-14 ENCOUNTER — APPOINTMENT (INPATIENT)
Dept: RADIOLOGY | Facility: HOSPITAL | Age: 84
DRG: 853 | End: 2019-01-14
Payer: MEDICARE

## 2019-01-14 ENCOUNTER — ANESTHESIA EVENT (INPATIENT)
Dept: GASTROENTEROLOGY | Facility: HOSPITAL | Age: 84
DRG: 853 | End: 2019-01-14
Payer: MEDICARE

## 2019-01-14 ENCOUNTER — ANESTHESIA (INPATIENT)
Dept: GASTROENTEROLOGY | Facility: HOSPITAL | Age: 84
DRG: 853 | End: 2019-01-14
Payer: MEDICARE

## 2019-01-14 PROBLEM — E43 SEVERE PROTEIN-CALORIE MALNUTRITION (HCC): Status: ACTIVE | Noted: 2019-01-01

## 2019-01-14 PROBLEM — R13.10 DYSPHAGIA: Status: ACTIVE | Noted: 2019-01-01

## 2019-01-14 LAB
A-TOCOPHEROL VIT E SERPL-MCNC: 5.6 MG/L (ref 9–29)
ANION GAP SERPL CALCULATED.3IONS-SCNC: 9 MMOL/L (ref 4–13)
ATRIAL RATE: 105 BPM
BASOPHILS # BLD AUTO: 0.06 THOUSANDS/ΜL (ref 0–0.1)
BASOPHILS NFR BLD AUTO: 1 % (ref 0–1)
BUN SERPL-MCNC: 4 MG/DL (ref 5–25)
CALCIUM SERPL-MCNC: 7.8 MG/DL (ref 8.3–10.1)
CHLORIDE SERPL-SCNC: 103 MMOL/L (ref 100–108)
CO2 SERPL-SCNC: 29 MMOL/L (ref 21–32)
CREAT SERPL-MCNC: 0.36 MG/DL (ref 0.6–1.3)
EOSINOPHIL # BLD AUTO: 0.13 THOUSAND/ΜL (ref 0–0.61)
EOSINOPHIL NFR BLD AUTO: 1 % (ref 0–6)
ERYTHROCYTE [DISTWIDTH] IN BLOOD BY AUTOMATED COUNT: 16.4 % (ref 11.6–15.1)
GAMMA TOCOPHEROL SERPL-MCNC: 0.4 MG/L (ref 0.5–4.9)
GFR SERPL CREATININE-BSD FRML MDRD: 116 ML/MIN/1.73SQ M
GLUCOSE SERPL-MCNC: 108 MG/DL (ref 65–140)
GLUCOSE SERPL-MCNC: 158 MG/DL (ref 65–140)
GLUCOSE SERPL-MCNC: 71 MG/DL (ref 65–140)
GLUCOSE SERPL-MCNC: 73 MG/DL (ref 65–140)
GLUCOSE SERPL-MCNC: 92 MG/DL (ref 65–140)
HCT VFR BLD AUTO: 31.5 % (ref 36.5–49.3)
HGB BLD-MCNC: 9.7 G/DL (ref 12–17)
IMM GRANULOCYTES # BLD AUTO: 0.06 THOUSAND/UL (ref 0–0.2)
IMM GRANULOCYTES NFR BLD AUTO: 1 % (ref 0–2)
LYMPHOCYTES # BLD AUTO: 1.13 THOUSANDS/ΜL (ref 0.6–4.47)
LYMPHOCYTES NFR BLD AUTO: 10 % (ref 14–44)
MAGNESIUM SERPL-MCNC: 2.1 MG/DL (ref 1.6–2.6)
MCH RBC QN AUTO: 30.5 PG (ref 26.8–34.3)
MCHC RBC AUTO-ENTMCNC: 30.8 G/DL (ref 31.4–37.4)
MCV RBC AUTO: 99 FL (ref 82–98)
MONOCYTES # BLD AUTO: 0.6 THOUSAND/ΜL (ref 0.17–1.22)
MONOCYTES NFR BLD AUTO: 5 % (ref 4–12)
NEUTROPHILS # BLD AUTO: 9.95 THOUSANDS/ΜL (ref 1.85–7.62)
NEUTS SEG NFR BLD AUTO: 82 % (ref 43–75)
NRBC BLD AUTO-RTO: 0 /100 WBCS
PHOSPHATE SERPL-MCNC: 2.9 MG/DL (ref 2.3–4.1)
PLATELET # BLD AUTO: 344 THOUSANDS/UL (ref 149–390)
PMV BLD AUTO: 11.1 FL (ref 8.9–12.7)
POTASSIUM SERPL-SCNC: 3.7 MMOL/L (ref 3.5–5.3)
QRS AXIS: -51 DEGREES
QRSD INTERVAL: 108 MS
QT INTERVAL: 368 MS
QTC INTERVAL: 504 MS
RBC # BLD AUTO: 3.18 MILLION/UL (ref 3.88–5.62)
SODIUM SERPL-SCNC: 141 MMOL/L (ref 136–145)
T WAVE AXIS: 57 DEGREES
VENTRICULAR RATE: 113 BPM
VIT A SERPL-MCNC: 19 UG/DL (ref 22–69.5)
WBC # BLD AUTO: 11.93 THOUSAND/UL (ref 4.31–10.16)

## 2019-01-14 PROCEDURE — 88342 IMHCHEM/IMCYTCHM 1ST ANTB: CPT | Performed by: PATHOLOGY

## 2019-01-14 PROCEDURE — C9113 INJ PANTOPRAZOLE SODIUM, VIA: HCPCS | Performed by: INTERNAL MEDICINE

## 2019-01-14 PROCEDURE — 83735 ASSAY OF MAGNESIUM: CPT | Performed by: INTERNAL MEDICINE

## 2019-01-14 PROCEDURE — 93010 ELECTROCARDIOGRAM REPORT: CPT | Performed by: INTERNAL MEDICINE

## 2019-01-14 PROCEDURE — 74177 CT ABD & PELVIS W/CONTRAST: CPT

## 2019-01-14 PROCEDURE — 82948 REAGENT STRIP/BLOOD GLUCOSE: CPT

## 2019-01-14 PROCEDURE — 0DB68ZX EXCISION OF STOMACH, VIA NATURAL OR ARTIFICIAL OPENING ENDOSCOPIC, DIAGNOSTIC: ICD-10-PCS | Performed by: SURGERY

## 2019-01-14 PROCEDURE — 99231 SBSQ HOSP IP/OBS SF/LOW 25: CPT | Performed by: INTERNAL MEDICINE

## 2019-01-14 PROCEDURE — 99233 SBSQ HOSP IP/OBS HIGH 50: CPT | Performed by: SURGERY

## 2019-01-14 PROCEDURE — 88313 SPECIAL STAINS GROUP 2: CPT | Performed by: PATHOLOGY

## 2019-01-14 PROCEDURE — 80048 BASIC METABOLIC PNL TOTAL CA: CPT | Performed by: INTERNAL MEDICINE

## 2019-01-14 PROCEDURE — 84100 ASSAY OF PHOSPHORUS: CPT | Performed by: INTERNAL MEDICINE

## 2019-01-14 PROCEDURE — 88305 TISSUE EXAM BY PATHOLOGIST: CPT | Performed by: PATHOLOGY

## 2019-01-14 PROCEDURE — 88341 IMHCHEM/IMCYTCHM EA ADD ANTB: CPT | Performed by: PATHOLOGY

## 2019-01-14 PROCEDURE — 43239 EGD BIOPSY SINGLE/MULTIPLE: CPT | Performed by: INTERNAL MEDICINE

## 2019-01-14 PROCEDURE — 99233 SBSQ HOSP IP/OBS HIGH 50: CPT | Performed by: INTERNAL MEDICINE

## 2019-01-14 PROCEDURE — 85025 COMPLETE CBC W/AUTO DIFF WBC: CPT | Performed by: INTERNAL MEDICINE

## 2019-01-14 PROCEDURE — 93005 ELECTROCARDIOGRAM TRACING: CPT

## 2019-01-14 PROCEDURE — 99232 SBSQ HOSP IP/OBS MODERATE 35: CPT | Performed by: INTERNAL MEDICINE

## 2019-01-14 RX ORDER — SODIUM CHLORIDE 9 MG/ML
INJECTION, SOLUTION INTRAVENOUS CONTINUOUS PRN
Status: DISCONTINUED | OUTPATIENT
Start: 2019-01-14 | End: 2019-01-14 | Stop reason: SURG

## 2019-01-14 RX ORDER — LIDOCAINE HYDROCHLORIDE 10 MG/ML
INJECTION, SOLUTION INFILTRATION; PERINEURAL AS NEEDED
Status: DISCONTINUED | OUTPATIENT
Start: 2019-01-14 | End: 2019-01-14 | Stop reason: SURG

## 2019-01-14 RX ORDER — PROPOFOL 10 MG/ML
INJECTION, EMULSION INTRAVENOUS CONTINUOUS PRN
Status: DISCONTINUED | OUTPATIENT
Start: 2019-01-14 | End: 2019-01-14 | Stop reason: SURG

## 2019-01-14 RX ORDER — PROPOFOL 10 MG/ML
INJECTION, EMULSION INTRAVENOUS AS NEEDED
Status: DISCONTINUED | OUTPATIENT
Start: 2019-01-14 | End: 2019-01-14 | Stop reason: SURG

## 2019-01-14 RX ORDER — DEXTROSE AND SODIUM CHLORIDE 5; .45 G/100ML; G/100ML
50 INJECTION, SOLUTION INTRAVENOUS CONTINUOUS
Status: DISCONTINUED | OUTPATIENT
Start: 2019-01-14 | End: 2019-01-15

## 2019-01-14 RX ADMIN — DEXTROSE AND SODIUM CHLORIDE 50 ML/HR: 5; .45 INJECTION, SOLUTION INTRAVENOUS at 05:43

## 2019-01-14 RX ADMIN — LIDOCAINE HYDROCHLORIDE 100 MG: 10 INJECTION, SOLUTION INFILTRATION; PERINEURAL at 12:31

## 2019-01-14 RX ADMIN — DEXTROSE AND SODIUM CHLORIDE 50 ML/HR: 5; .45 INJECTION, SOLUTION INTRAVENOUS at 21:22

## 2019-01-14 RX ADMIN — PROPOFOL 60 MG: 10 INJECTION, EMULSION INTRAVENOUS at 12:31

## 2019-01-14 RX ADMIN — IOHEXOL 85 ML: 350 INJECTION, SOLUTION INTRAVENOUS at 17:30

## 2019-01-14 RX ADMIN — PROPOFOL 80 MCG/KG/MIN: 10 INJECTION, EMULSION INTRAVENOUS at 12:31

## 2019-01-14 RX ADMIN — HEPARIN SODIUM 5000 UNITS: 5000 INJECTION INTRAVENOUS; SUBCUTANEOUS at 21:18

## 2019-01-14 RX ADMIN — SODIUM CHLORIDE: 9 INJECTION, SOLUTION INTRAVENOUS at 12:19

## 2019-01-14 RX ADMIN — PANTOPRAZOLE SODIUM 40 MG: 40 INJECTION, POWDER, FOR SOLUTION INTRAVENOUS at 08:22

## 2019-01-14 NOTE — PROGRESS NOTES
Progress Note - Pulmonary   Philomena Ansari 82 y o  male MRN: [id number]  Unit/Bed#: [facility] 913-01 Encounter: [id number]      Assessment/Plan:  1  Acute hypoxic respiratory failure        *  Resolved and on room air        *  Keep saturations greater than or equal to 88%        *  Incentive spirometry Q1hr, OOB as able, increase activity as able  2  Abnormal CXR with bilateral strep pneumo pneumonia        *  Completed full course of antibiotics        *  Monitor for fever or leukocytosis        *  Will need repeat CXR in 4-6 weeks to ensure resolution of opacities and effusions  3  Bilateral pleural effusions        *  As above  4  Dysphagia with history of aspiration and protein calorie malnutrition        *  Continue NPO --> for PEG placement today    -Overall stable from a pulmonary standpoint  Will sign off, please call with questions  -Outpatient pulmonary follow up as per D/C instructions with CXR prior    Subjective:   Mr Naveen Olson is seen laying in bed this morning  He is anxiously awaiting PEG tube placement  He has been weaned from supplemental oxygen  He has not been able to tolerate BiPAP  He has occasional cough with clear sputum, but denies chest pain, resting shortness of breath, fever or bronchospasm  He has no pain and hasn't been out of bed much  Objective:     Vitals: Blood pressure 145/84, pulse 67, temperature (!) 97 2 °F (36 2 °C), resp  rate 18, height 5' 7" (1 702 m), weight 51 6 kg (113 lb 12 1 oz), SpO2 94 % , RA, Body mass index is 17 82 kg/m²        Intake/Output Summary (Last 24 hours) at 01/14/19 0941  Last data filed at 01/14/19 0825   Gross per 24 hour   Intake          1076 66 ml   Output             1250 ml   Net          -173 34 ml         Physical Exam  Gen: Awake, alert, oriented x 3, no acute distress, appears chronically ill  HEENT: Mucous membranes moist, no oral lesions, no thrush  NECK: No accessory muscle use, JVP not elevated  Cardiac: Regular, single S1, single S2, no murmurs, no rubs, no gallops  Lungs: Decreased breath sounds at the bases bilaterally but clear without wheezes, rhonchi or rales   Abdomen: Flat, normoactive bowel sounds, soft nontender, nondistended, no rebound or rigidity, no guarding  Extremities: no cyanosis, no clubbing, no edema  Wearing SCDs bilaterally    Labs: I have personally reviewed pertinent lab results  , ABG: No results found for: PHART, XVB8DDD, PO2ART, VVM3RCT, J5OPYHGG, BEART, SOURCE, BNP: No results found for: BNP, CBC:   Lab Results   Component Value Date    WBC 11 93 (H) 01/14/2019    HGB 9 7 (L) 01/14/2019    HCT 31 5 (L) 01/14/2019    MCV 99 (H) 01/14/2019     01/14/2019    MCH 30 5 01/14/2019    MCHC 30 8 (L) 01/14/2019    RDW 16 4 (H) 01/14/2019    MPV 11 1 01/14/2019    NRBC 0 01/14/2019   , CMP:   Lab Results   Component Value Date    SODIUM 141 01/14/2019    K 3 7 01/14/2019     01/14/2019    CO2 29 01/14/2019    BUN 4 (L) 01/14/2019    CREATININE 0 36 (L) 01/14/2019    CALCIUM 7 8 (L) 01/14/2019    EGFR 116 01/14/2019   , PT/INR: No results found for: PT, INR, Troponin: No results found for: TROPONINI     Imaging and other studies: I have personally reviewed pertinent films in PACS    [facility] 1/10/19  Bilateral infiltrates and left greater than right pleural effusions    Shannan Flores PA-C

## 2019-01-14 NOTE — UTILIZATION REVIEW
Continued Stay Review    Date: 2019  Vital Signs: Temp (24hrs), Av 2 °F (36 2 °C), Min:97 2 °F (36 2 °C), Max:97 2 °F (36 2 °C)     Temp:  [97 2 °F (36 2 °C)] 97 2 °F (36 2 °C)  HR:  [79] 79  Resp:  [18] 18  BP: (146)/(73) 146/73  SpO2:  [94 %] 94 %  Body mass index is 17 82 kg/m²  Assessment/Plan: 81 yo m with  Oropharyngeal dysphagia -  - failed 3 VBS studies - GI eval for Peg -  Acute resp failure - w / hypoxia -  On 2 L NC o2 - likely due to b/l Pnu - Sepsis -  Pulmonary d/c'd abx's -1/   Procalcitonin normalized -  DM2 -  SSI coverage -  Hx of pancreatic mass - chronic pancreatitis -  benign tissue from biopsy - Malnutrition-  BMI 17 82 - in setting of chronic illness , decreased appetite , oral intake  And muscle wasting- atrophy on exam - pulled out NG Tube await Peg - Hyponatremia - Hypomag, Hypo K , Hypo Phos - monitoring and replete as needed       GI note  -  Peg tube to be attempted on  due to Oropharyngeal dysphagia     Medications:   Scheduled Meds:   Current Facility-Administered Medications:  acetaminophen 650 mg Oral Q6H PRN    albuterol 2 5 mg Nebulization Q4H PRN    aspirin 81 mg Oral Daily    atorvastatin 40 mg Oral Daily With Dinner    cholecalciferol 400 Units Oral Daily    heparin (porcine) 5,000 Units Subcutaneous Q8H [facility] & long-term    insulin lispro 1-5 Units Subcutaneous Q6H [facility] & long-term    lidocaine 1 patch Topical Daily    melatonin 3 mg Oral HS    pancrelipase (Lip-Prot-Amyl) 24,000 Units Oral BID With Meals    pantoprazole 40 mg Intravenous Q24H [facility]    polyvinyl alcohol 1 drop Both Eyes PRN      Continuous Infusions:   dextrose 5 % and sodium chloride 0 45 % 50 mL/hr       Pertinent Labs/Diagnostic Results:  - Wbc 11 93, H/H 9  5 - Bun/cr 4/0 36, Randy 7 8    Age/Sex: 82 y o  male     Discharge Plan:  TBD

## 2019-01-14 NOTE — PROGRESS NOTES
[facility]ist Service - Internal Medicine Progress Note       PATIENT INFORMATION      Patient: Jordyn Major 82 y o  male   MRN: [id number]  PCP: Sam Powell,   Unit/Bed#: [facility] 795-02 Encounter: [id number]  Date Of Visit: 01/14/19       ASSESSMENTS & PLAN     Oropharyngeal dysphagia   Assessment & Plan    - with associated intermittent dysarthria noted - initial and second video barium swallow suspected possible neurologic etiology - previous CT of head/MR brain unremarkable for acute CVA - a subsequent dedicated MRI of brainstem negative for acute etiology  - repeat (third) video barium swallow on 1/12 unfortunate revealed no improvement - unsuccessful in PEG tube placement today with recommendation of surgical oncology evaluation for open gastrostomy tube placement as suspicion for pseudocyst/gastric erosion leading to abnormal gastric positioning noted possibly related to chronic pancreatitis ? ? - GI recommended repeat CT of abdomen/pelvis with IV and oral contrast however patient continues to refuse NG tube to allow for oral contrast  - recently pulled out nasogastric feeding tube due to discomfort and refuses to have it replaced  - appreciate neurology input - initially suspected possibility of myasthenia gravis however acetylcholine receptor binding antibodies and MuSK antibodies were negative and repeat (second) video barium swallow earlier in hospital course after neostigmine stimulation revealed minimal improvement - no improvement with prior Mestinon trial either  - various inflammatory/rheumatologic markers checked to assess for autoimmune/paraneoplastic syndrome are unremarkable - heavy metal screen also negative  - can also consider association with pancreatic insufficiency per neurology  - continue intravenous fluid infusion     Acute respiratory failure with hypoxia   Assessment & Plan    - symptomatically worsened for short time while in PACU post-PEG attempt but stabilized prompting return back to medical floor  - likely secondary to bilateral pneumonia (see plan below)   - due to concurrent tachycardia, in EKG ordered revealed no significant changes from previous (reported by GI lab team as read by on site anesthesiologist)      Severe sepsis - Bilateral pneumonia   Assessment & Plan    - evolving sepsis as evidenced by fever, tachypnea, leukocytosis, and previous lactic acidosis  - presumed source of bilateral streptococcal pneumonia - positive urine streptococcal Ag noted - blood cultures from 1/1 are negative and repeat cultures from 1/10 remain preliminarily negative x 72 hrs now   - MRSA screen negative  - encourage incentive spirometry - maintain oxygenation    - RSV/Influenza PCR negative   - appreciate pulmonology input who have discontinued antibiotics on 1/12 as procalcitonin normalized   - remains NPO due to dysphagia (see plan above)   - transferred out of the ICU on 1/11     Diabetes mellitus type 2   Assessment & Plan    - HbA1c of 7 1   - hold home hypoglycemics - continue SSI coverage per Accu-Cheks QID (remains NPO on IV fluids)      Chronic pancreatitis - history of Pancreatic mass   Assessment & Plan    - biopsy report from resection on 11/2018 revealed benign pancreatic tissue in the setting of chronic pancreatitis - developed subsequent weight loss and diarrhea after procedure and follows with gastroenterology  - resume Creon supplementation when able     Moderate protein-calorie malnutrition    Assessment & Plan    - BMI of 17 82 in the setting of chronic illness, decreased appetite/oral intake, and evidence of muscle wasting/atrophy on exam  - awaiting possible open gastrostomy in hopes of resuming tube feeding - previously pulled out nasogastric tube due to discomfort       Hyponatremia   Assessment & Plan    - serum sodium remains normalized   - continue IV fluids     Hypomagnesemia - Hypokalemia - Hypophosphatemia   Assessment & Plan    - monitor/replete as necessary       VTE Prophylaxis:  Heparin SC      SUBJECTIVE     Seen/examined earlier this morning prior to attempted PEG procedure  No significant overnight events  Family present at bedside during my encounter at that time  Received a call from the PACU earlier this afternoon relating information that PEG placement was unsuccessful  Also noted that patient intermittently became short of breath requiring high-flow oxygenation at one point but subsequently weaned down back to 2 L via nasal cannula  He was also intermittently tachycardic with heart rates progressively improving afterwards as well  OBJECTIVE     Vitals:   Temp (24hrs), Av 9 °F (36 6 °C), Min:97 2 °F (36 2 °C), Max:98 1 °F (36 7 °C)    Temp:  [97 2 °F (36 2 °C)-98 1 °F (36 7 °C)] 98 1 °F (36 7 °C)  HR:  [] 100  Resp:  [14-26] 18  BP: ()/(58-86) 128/74  SpO2:  [89 %-99 %] 99 %  Body mass index is 17 82 kg/m²  Input and Output Summary (last 24 hours):        Intake/Output Summary (Last 24 hours) at 19 1643  Last data filed at 19 1615   Gross per 24 hour   Intake          1176 66 ml   Output             1300 ml   Net          -123 34 ml       Physical Exam:     GENERAL:  Frail/cachectic  HEAD:  Normocephalic - atraumatic - temporal wasting evident  EYES: PERRL - EOMI   MOUTH:  Mucosa moist  NECK:  Supple - no adenopathy - full range of motion - clavicular wasting present  CARDIAC:  Intermittently tachycardic but regular rhythm - S1/S2 positive  PULMONARY:  Diminished bibasilar breath sounds   ABDOMEN:  Soft - nontender/nondistended - active bowel sounds  MUSCULOSKELETAL:  Motor strength/range of motion remaining deconditioned  NEUROLOGIC:  Alert/oriented x 3  SKIN:  Chronic wrinkles/blemishes    PSYCHIATRIC:  Mood/affect stable      ADDITIONAL DATA       Labs & Recent Cultures:       Results from last 7 days  Lab Units 19  0527   WBC Thousand/uL 11 93*   HEMOGLOBIN g/dL 9 7*   HEMATOCRIT % 31 5*   PLATELETS Thousands/uL 344   NEUTROS PCT % 82*   LYMPHS PCT % 10*   MONOS PCT % 5   EOS PCT % 1       Results from last 7 days  Lab Units 01/14/19  0527  01/10/19  0522 01/10/19  0125   SODIUM mmol/L 141  < > 137  --    POTASSIUM mmol/L 3 7  < > 4 6  --    CHLORIDE mmol/L 103  < > 98*  --    CO2 mmol/L 29  < > 35*  --    CO2, I-STAT mmol/L  --   --   --  35*   BUN mg/dL 4*  < > 12  --    CREATININE mg/dL 0 36*  < > 0 62  --    CALCIUM mg/dL 7 8*  < > 7 4*  --    ALK PHOS U/L  --   --  105  --    ALT U/L  --   --  20  --    AST U/L  --   --  22  --    GLUCOSE, ISTAT mg/dl  --   --   --  202*   < > = values in this interval not displayed  Results from last 7 days  Lab Units 01/10/19  1441 01/10/19  1439   BLOOD CULTURE  No Growth at 72 hrs  No Growth at 72 hrs  Last 24 Hours Medication List:     Current Facility-Administered Medications:  acetaminophen 650 mg Oral Q6H PRN Angeli Ge PA-C    albuterol 2 5 mg Nebulization Q4H PRN Anna Solis MD    aspirin 81 mg Oral Daily Sarah Walker MD    atorvastatin 40 mg Oral Daily With Jaquelin Lebron MD    cholecalciferol 400 Units Oral Daily Dianelys Valencia MD    dextrose 5 % and sodium chloride 0 45 % 50 mL/hr Intravenous Continuous Sindy Rodriguez PA-C Last Rate: 50 mL/hr (01/14/19 0543)   heparin (porcine) 5,000 Units Subcutaneous [facility] Trisha Bruno MD    insulin lispro 1-5 Units Subcutaneous Q6H [facility] DEMI Langford    lidocaine 1 patch Topical Daily DEMI Langford    melatonin 3 mg Oral HS Janie Navarro DO    pancrelipase (Lip-Prot-Amyl) 24,000 Units Oral BID With Meals Dianelys Valencia MD    pantoprazole 40 mg Intravenous Q24H [facility] Miley Gold MD    polyvinyl alcohol 1 drop Both Eyes PRN Sindy Age, PA-C           Time Spent for Care: 36 minutes  More than 50% of total time spent on counseling and coordination of care as described above        Current Length of Stay: 13 day(s)      Code Status: Level 1 - Full Code         ** Please Note: This note is constructed using a voice recognition dictation system   **

## 2019-01-14 NOTE — RESPIRATORY THERAPY NOTE
RT Protocol Note  Jose Call 82 y o  male MRN: [id number]  Unit/Bed#: [facility] 913-01 Encounter: [id number]    Assessment    Principal Problem:    Oropharyngeal dysphagia  Active Problems:    Diabetes mellitus type 2    Chronic pancreatitis - history of Pancreatic mass    Severe sepsis - Bilateral pneumonia    Pneumonia    Leukocytosis    Moderate protein-calorie malnutrition     Abnormal SPEP    Acute respiratory failure with hypoxia    Hyponatremia    Hypomagnesemia - Hypokalemia - Hypophosphatemia      Home Pulmonary Medications:       Past Medical History:   Diagnosis Date    [facility]     Type II    Weight loss      Social History     Social History    Marital status: /Civil Union     Spouse name: N/A    Number of children: N/A    Years of education: N/A     Social History Main Topics    Smoking status: Former Smoker     Years: 25 00    Smokeless tobacco: Never Used      Comment: quit about 20 years ago as of 9/21/2018    Alcohol use Yes      Comment: 1 beer a day    Drug use: No    Sexual activity: Not Asked     Other Topics Concern    None     Social History Narrative    None       Subjective         Objective    Physical Exam:   O2 Device: (P) nc    Vitals:  Blood pressure 151/86, pulse 76, temperature 98 1 °F (36 7 °C), resp  rate 18, height 5' 7" (1 702 m), weight 51 6 kg (113 lb 12 1 oz), SpO2 94 %                O2 Device: (P) nc     Plan    Respiratory Plan: No distress/Pulmonary history        Resp Comments: (P) I dc'd cpap HS order due to pt being noncompliant and refusing to wear cpap the past 3 nights

## 2019-01-14 NOTE — ANESTHESIA PREPROCEDURE EVALUATION
Review of Systems/Medical History  Patient summary reviewed  Chart reviewed  No history of anesthetic complications     Cardiovascular  Exercise tolerance (METS): >4,  Valvular heart disease , tricuspid regurgitation, Aortic disease,   Pulmonary hypertension Pulmonary       GI/Hepatic    Pancreatic problem,             Endo/Other  Diabetes well controlled type 2 Oral agent,      GYN       Hematology   Musculoskeletal       Neurology   Psychology           Physical Exam    Airway    Mallampati score: III  TM Distance: >3 FB  Neck ROM: full     Dental   No notable dental hx     Cardiovascular      Pulmonary      Other Findings        Anesthesia Plan  ASA Score- 4     Anesthesia Type- IV sedation with anesthesia with ASA Monitors  Additional Monitors:   Airway Plan:         Plan Factors-    Induction- intravenous  Postoperative Plan-     Informed Consent- Anesthetic plan and risks discussed with patient  I personally reviewed this patient with the CRNA  Discussed and agreed on the Anesthesia Plan with the CRNA  Iraida Membreno

## 2019-01-14 NOTE — CONSULTS
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 autoimmune pancreatitis     Could consider cyst gastrostomy at time of open G tube vs further aspiration/biopsy as needed      History of Present Illness     HPI:  Burney Oppenheim is a 82 y o  male with history of pancreatic mass discovered in Sept 2018 on CTAP after the patient presented with 40 lb weight loss  He was found to have an indeterminate mass in the tail of the pancreas  He underwent surgical laparoscopic biopsy of this mass on 11/19/18 by Dr Louis Romo  Pathology returned as chronic pancreatitis, no malignant tissue  Patient was worked up for autoimmune pancraetitis, IgG 4 level was normal, however igG3 was lsightly above normal  All tumor markers have been normal CEA was 2 1, CA 19-9 was 16  He re-presented to [facility] on 1/1 with symptoms of sepsis secondary to pneumonia as well as continued weight loss  During this hospitalization he also had new onset dysphagia of indeterminate etiology  Neurology was consulted and barium swallow showed severe pharyngeal dysphagia  He is currently undergoing workup for the etiology of this dysphagia, however in the meantime enternal nutrition needed to be addressed  GI attempted to perform a PEG today however were unable to transilluminate to achieve safe PEG placement  In addition, they also saw some clear/yellow mucous extruding from the posterior stomach suspicious for an ulcer or a fistulization from a pseudocyst (as there also seemed to be external compression on this area of the stomach)  Inpatient Consult to Surgical Oncology     Date/Time 1/14/2019 2:53 PM     Performed by  Seda Cox     Authorized by Cesia Hall              Review of Systems   Constitutional: Positive for unexpected weight change  Negative for chills and fever  HENT: Negative for congestion  Respiratory: Positive for shortness of breath  Negative for cough  Cardiovascular: Negative for chest pain and leg swelling  Gastrointestinal: Negative for abdominal pain and diarrhea  Genitourinary: Negative for dysuria  Musculoskeletal: Negative for back pain  Neurological: Negative for headaches  Psychiatric/Behavioral: Negative for confusion  Historical Information   Past Medical History:   Diagnosis Date    Diabetes mellitus ([facility] )     Type II    Weight loss      Past Surgical History:   Procedure Laterality Date    LAPAROTOMY N/A 11/19/2018    Procedure: LAPAROTOMY EXPLORATORY;  Surgeon: Giovanny Villar MD;  Location: [facility];  Service: Surgical Oncology    [place] EDG US EXAM SURGICAL ALTER STOM DUODENUM/JEJUNUM N/A 9/21/2018    Procedure: LINEAR ENDOSCOPIC U/S;  Surgeon: Eusebio Rivers MD;  Location: [facility];   Service: Gastroenterology    [place] LAP,DIAGNOSTIC ABDOMEN N/A 11/19/2018    Procedure: pancreatic biopsy;  Surgeon: Giovanny Villar MD;  Location: [facility];  Service: Surgical Oncology    TONSILLECTOMY      VASCULAR SURGERY      phlebitis many years ago    WRIST GANGLION EXCISION       Social History   History   Alcohol Use    Yes     Comment: 1 beer a day     History   Drug Use No     History   Smoking Status    Former Smoker    Years: 25 00   Smokeless Tobacco    Never Used     Comment: quit about 20 years ago as of 9/21/2018     Family History:   Family History   Problem Relation Age of Onset    No Known Problems Mother     No Known Problems Father     Pancreatic cancer Brother 61    Breast cancer Daughter 36        38s       Meds/Allergies   all current active meds have been reviewed, current meds:   Current Facility-Administered Medications   Medication Dose Route Frequency    acetaminophen (TYLENOL) tablet 650 mg  650 mg Oral Q6H PRN    albuterol inhalation solution 2 5 mg  2 5 mg Nebulization Q4H PRN    aspirin chewable tablet 81 mg  81 mg Oral Daily    atorvastatin (LIPITOR) tablet 40 mg  40 mg Oral Daily With Dinner    cholecalciferol (VITAMIN D3) tablet 400 Units  400 Units Oral Daily    dextrose 5 % and sodium chloride 0 45 % infusion  50 mL/hr Intravenous Continuous    heparin (porcine) subcutaneous injection 5,000 Units  5,000 Units Subcutaneous Q8H [facility]    insulin lispro (HumaLOG) 100 units/mL subcutaneous injection 1-5 Units  1-5 Units Subcutaneous Q6H [facility]    lidocaine (LIDODERM) 5 % patch 1 patch  1 patch Topical Daily    melatonin tablet 3 mg  3 mg Oral HS    pancrelipase (Lip-Prot-Amyl) (CREON) delayed release capsule 24,000 Units  24,000 Units Oral BID With Meals    pantoprazole (PROTONIX) injection 40 mg  40 mg Intravenous Q24H [facility]    polyvinyl alcohol (LIQUIFILM TEARS) 1 4 % ophthalmic solution 1 drop  1 drop Both Eyes PRN    and PTA meds:   Prior to Admission Medications   Prescriptions Last Dose Informant Patient Reported?  Taking?   metFORMIN (GLUCOPHAGE) 500 mg tablet 1/1/2019 at Unknown time  Yes Yes   Sig: daily   omeprazole (PriLOSEC) 20 mg delayed release capsule 1/1/2019 at Unknown time  No Yes   Sig: Take 1 capsule (20 mg total) by mouth daily      Facility-Administered Medications: None     No Known Allergies    Objective   First Vitals:   Blood Pressure: 143/68 (01/01/19 2105)  Pulse: 96 (01/01/19 2105)  Temperature: (!) 97 4 °F (36 3 °C) (01/01/19 2108)  Temp Source: Oral (01/01/19 2108)  Respirations: 18 (01/01/19 2105)  Height: 5' 7" (170 2 cm) (01/02/19 1512)  Weight - Scale: 50 3 kg (111 lb) (01/01/19 2105)  SpO2: 94 % (01/01/19 2105)    Current Vitals:   Blood Pressure: 108/67 (01/14/19 1352)  Pulse: (!) 127 (01/14/19 1352)  Temperature: 98 1 °F (36 7 °C) (01/14/19 1220)  Temp Source: Oral (01/14/19 1220)  Respirations: 20 (01/14/19 1352)  Height: 5' 7" (170 2 cm) (01/10/19 0317)  Weight - Scale: 51 6 kg (113 lb 12 1 oz) (01/10/19 0317)  SpO2: (!) 89 % (01/14/19 1352)      Intake/Output Summary (Last 24 hours) at 01/14/19 1453  Last data filed at 01/14/19 1333   Gross per 24 hour   Intake          1176 66 ml   Output             1250 ml   Net           -73 34 ml       Invasive Devices     Peripheral Intravenous Line            Peripheral IV 01/12/19 Right Antecubital 1 day                Physical Exam   Constitutional: He is oriented to person, place, and time  He appears well-developed and well-nourished  HENT:   Head: Normocephalic  Eyes: Pupils are equal, round, and reactive to light  Neck: Normal range of motion  Neck supple  Cardiovascular: Normal rate and regular rhythm  Pulmonary/Chest: Effort normal    Abdominal: Soft  Bowel sounds are normal  He exhibits no distension  There is no rebound  Musculoskeletal: Normal range of motion  Neurological: He is alert and oriented to person, place, and time  Skin: Skin is warm  Lab Results:   CBC:   Lab Results   Component Value Date    WBC 11 93 (H) 01/14/2019    HGB 9 7 (L) 01/14/2019    HCT 31 5 (L) 01/14/2019    MCV 99 (H) 01/14/2019     01/14/2019    MCH 30 5 01/14/2019    MCHC 30 8 (L) 01/14/2019    RDW 16 4 (H) 01/14/2019    MPV 11 1 01/14/2019    NRBC 0 01/14/2019   , CMP:   Lab Results   Component Value Date    SODIUM 141 01/14/2019    K 3 7 01/14/2019     01/14/2019    CO2 29 01/14/2019    BUN 4 (L) 01/14/2019    CREATININE 0 36 (L) 01/14/2019    CALCIUM 7 8 (L) 01/14/2019    EGFR 116 01/14/2019   , Coagulation: No results found for: PT, INR, APTT  Imaging: I have personally reviewed pertinent reports  MRI brain w wo contrast   Final Result by Padmini Thompson DO (01/13 2654)      White matter changes suggestive of chronic microangiopathy    No acute intracranial pathology  Extensive right mastoid opacification  Workstation performed: [id number]         FL barium swallow video w speech   Final Result by SANTIAGO, DOCUMENTATION (01/12 1705)      FL barium swallow video w speech   Final Result by  (01/12 1503)      VAS lower limb venous duplex study, complete bilateral   Final Result by Ashley Lundberg MD (01/11 2226)      XR chest portable ICU   Final Result by Emily Anguiano DO (01/11 1561)   Slight progression of bilateral infiltrates and bilateral pleural effusions, left side greater than right  Workstation performed: [id number]         XR chest portable   Final Result by Radha Rojas MD (01/10 1412)      New left perihilar opacity suspicious for pneumonia with increasing left pleural effusion  Bibasilar densities are stable  The study was marked in [facility] for immediate notification  Workstation performed: [id number]         XR chest portable   Final Result by Mathieu Oneil MD (01/08 2054)      Persistent bibasilar infiltrates  Workstation performed: [id number]         XR abdomen 1 vw portable   Final Result by Stanley High MD (01/06 6811)      Enteric tube tip in the region of gastroesophageal junction with sidehole approximately 10 cm proximal to GE junction  Workstation performed: [id number]         FL barium swallow video w speech   Final Result by Alin Serrano, DOCUMENTATION (01/05 1441)      CT chest wo contrast   Final Result by Stanley High MD (01/05 4439)      Groundglass and consolidative airspace opacity throughout the lungs, most dense in the dependent lungs and more severe on the left than the right  Also noted is inspissated mucus within the trachea and the mainstem bronchi bilaterally  The findings are    most suspicious for severe multifocal aspiration pneumonia  Small left greater than right pleural effusions        Findings in the upper abdomen are better evaluated on recent abdominal MR of December 14, 2018  Workstation performed: [id number]         MRI brain wo contrast   Final Result by Gay Denver, MD (01/04 1701)      No acute intracranial ischemia  Advanced microangiopathic changes throughout supratentorial white matter and central karthikeyan  Right mastoid effusion  Workstation performed: [id number]         CT head wo contrast   Final Result by Aydee Lr MD (01/03 1114)      Generalized volume loss with advanced microangiopathic changes of the cerebral white matter  No acute intracranial abnormality  Workstation performed: [id number]         FL barium swallow video w speech   Final Result by  (01/03 1034)      FL barium swallow video w speech   Final Result by JOSH HENDERSON (01/03 0919)      XR chest pa & lateral   ED Interpretation by Leana Sorenson MD (01/01 2220)   Bilateral infiltrates concerning for pneumonia  Final Result by Ximena Spence MD (01/02 2375)      Lower lobe infiltrates bilaterally            Workstation performed: [id number]         XR chest pa & lateral    (Results Pending)   CT abdomen pelvis w contrast    (Results Pending)       EKG, Pathology, and Other Studies: I have personally reviewed pertinent reports  Counseling / Coordination of Care  Total floor / unit time spent today 30 minutes  Greater than 50% of total time was spent with the patient and / or family counseling and / or coordination of care  A description of the counseling / coordination of care: 30

## 2019-01-14 NOTE — OP NOTE
ESOPHAGOGASTRODUODENOSCOPY    PROCEDURE: EGD    SEDATION: Monitored anesthesia care, check anesthesia records    ASA Class: 3    INDICATIONS:  Oropharyngeal dysphagia with aspiration presents for PEG tube placement    CONSENT:  Informed consent was obtained for the procedure, including sedation after explaining the risks and benefits of the procedure  Risks including but not limited to bleeding, perforation, infection, and missed lesion  PREPARATION:   Telemetry, pulse oximetry, blood pressure were monitored throughout the procedure  Patient was identified by myself both verbally and by visual inspection of ID band  DESCRIPTION:   Patient was placed in the left lateral decubitus position and was sedated with the above medication  The gastroscope was introduced in to the oropharynx and the esophagus was intubated under direct visualization  Scope was passed down the esophagus up to 2nd part of the duodenum  A careful inspection was made as the gastroscope was withdrawn, including a retroflexed view of the stomach; findings and interventions are described below  FINDINGS:    #1  Esophagus- normal    #2  Stomach- there was a small sliding hiatal hernia and there was an unusual shape to the stomach  We insufflated the stomach with air for we were unable to transilluminate and we were unable to visualize 1 to 1 indentation in the gastric wall with digital pressure  Retroflexed view of the stomach was unremarkable  There was an area of extrinsic compression of the posterior wall of the stomach with some clear and yellow mucus extruding  It had the appearance of an ulcer or a fistula  I took a biopsy from the site  #3  Duodenum- normal          IMPRESSIONS:      Small sliding hiatal hernia  Unable to place PEG tube because of poor landmarks  Based on CT scan I suspect this is because the stomach is position very superiorly just under the diaphragm and above the margin of the ribs    Fistula versus ulcer and the posterior wall of the stomach concerning for erosion of the pseudocyst into the posterior wall of the stomach  RECOMMENDATIONS:     Await pathology results  Obtain a CT scan of the abdomen and pelvis with oral Gastrografin and IV contrast  Consult Dr Drew Dickinson for possible Surgical G tube placement    COMPLICATIONS:  None; patient tolerated the procedure well      SPECIMENS:    ID Type Source Tests Collected by Time Destination   1 : gastric ulcer bx-cold Tissue Stomach TISSUE EXAM Radha Sherwood MD 1/14/2019 1245        ESTIMATED BLOOD LOSS:  Minimal

## 2019-01-14 NOTE — PROGRESS NOTES
Brief note:    After EGD, while in PACU patient became tachypneic and tachycardic along with desaturation  SLIM attending Dr Arielle Oconnell notified who advised on getting patient assessed for high level of care  I personally spoke to resident on critical care service who spoke to Dr Nora Johnston in advise that patient can stay on SLIM as he continues to follow with Pulmonary Medicine on the med surgs floor  Advised Dr Arielle Oconnell regarding conversation with CCM team and will continue with his care however advised on EKG for which I ordered

## 2019-01-14 NOTE — ANESTHESIA POSTPROCEDURE EVALUATION
Post-Op Assessment Note      CV Status:  Stable    Mental Status:  Alert and awake    Hydration Status:  Euvolemic    PONV Controlled:  Controlled    Airway Patency:  Patent    Post Op Vitals Reviewed: Yes          Staff: CRNA           BP 98/58 (01/14/19 1259)    Temp      Pulse (!) 112 (01/14/19 1259)   Resp 14 (01/14/19 1259)    SpO2 93 % (01/14/19 1259)

## 2019-01-14 NOTE — PROGRESS NOTES
Progress Note- Yves Bertrand 82 y o  male MRN: [id number]    Unit/Bed#: [facility] 913-01 Encounter: [id number]      Assessment and Plan:    Chronic pancreatitis with pancreatic cystic lesion:  He appears to have chronic pancreatitis and a dilated duct with cystic lesion of the tail of his pancreas  His biopsies were benign in the appearance of the cystic lesion is consistent with a pseudocyst   He also may have intraductal papillary mucinous neoplasm or he may simply have a dilated duct from chronic pancreatitis  We will schedule him for a follow-up visit with Dr Luigi Dakin for further management and monitoring of his pancreas  Oropharyngeal dysphagia:  He has oropharyngeal dysphagia with possible aspiration  We will attempt PEG tube placement tomorrow  I discussed the risks and the benefits with he and his family today     ______________________________________________________________________    Subjective: We saw him earlier this admission because of his pancreatic lesion, weight loss, and diarrhea  We are now consulted because of his oropharyngeal dysphagia and aspiration  She reports coughing with meals and particular liquid food and he recently failed a swallow evaluation  He recently had a nasogastric tube for feedings but pulled it out because of discomfort        Medication Administration - last 24 hours from 01/12/2019 2343 to 01/13/2019 2343       Date/Time Order Dose Route Action Action by     01/13/2019 2219 heparin (porcine) subcutaneous injection 5,000 Units 5,000 Units Subcutaneous Given RAFAEL Mckee     01/13/2019 1326 heparin (porcine) subcutaneous injection 5,000 Units 5,000 Units Subcutaneous Given Braxton Scherer RN     01/13/2019 0612 heparin (porcine) subcutaneous injection 5,000 Units 5,000 Units Subcutaneous Given RAFAEL Mckee     01/13/2019 5989 cholecalciferol (VITAMIN D3) tablet 400 Units 400 Units Oral Not Given Braxton Scherer RN     01/13/2019 1652 pancrelipase (Lip-Prot-Amyl) (CREON) delayed release capsule 24,000 Units 24,000 Units Oral Not Given Mook Billy RN     01/13/2019 0900 pancrelipase (Lip-Prot-Amyl) (CREON) delayed release capsule 24,000 Units 24,000 Units Oral Not Given Mook Billy RN     01/13/2019 2157 melatonin tablet 3 mg 3 mg Oral Not Given Osmel Munguia RN     01/13/2019 9592 aspirin chewable tablet 81 mg 81 mg Oral Not Given Mook Billy RN     01/13/2019 1651 atorvastatin (LIPITOR) tablet 40 mg 40 mg Oral Not Given Mook Billy RN     01/13/2019 1813 insulin lispro (HumaLOG) 100 units/mL subcutaneous injection 1-5 Units 1 Units Subcutaneous Not Given Mook Billy RN     01/13/2019 1313 insulin lispro (HumaLOG) 100 units/mL subcutaneous injection 1-5 Units 1 Units Subcutaneous Not Given Mook Billy RN     01/13/2019 2561 insulin lispro (HumaLOG) 100 units/mL subcutaneous injection 1-5 Units 1 Units Subcutaneous Not Given Osmel Munguia RN     01/13/2019 0019 insulin lispro (HumaLOG) 100 units/mL subcutaneous injection 1-5 Units 1 Units Subcutaneous Not Given Osmel Munguia RN     01/13/2019 0900 lidocaine (LIDODERM) 5 % patch 1 patch 0 patch Topical Hold Mook Billy RN     01/13/2019 1326 dextrose 5 % and sodium chloride 0 45 % infusion 0 mL/hr Intravenous Stopped Mook Billy RN     01/13/2019 5327 pantoprazole (PROTONIX) injection 40 mg 40 mg Intravenous Given Mook Billy RN     01/13/2019 1101 gadobutrol injection (MULTI-DOSE) SOLN 5 mL 5 mL Intravenous Given Yesenia Hunter     01/13/2019 1318 potassium phosphate 30 mmol in sodium chloride 0 9 % 250 mL infusion 30 mmol Intravenous [address] Mook Billy RN     01/13/2019 1327 multi-electrolyte (ISOLYTE-S PH 7 4 equivalent) IV solution 50 mL/hr Intravenous New Bag Mook Billy RN          Objective:     Vitals: Blood pressure 149/87, pulse 82, temperature 98 1 °F (36 7 °C), resp  rate 18, height 5' 7" (1 702 m), weight 51 6 kg (113 lb 12 1 oz), SpO2 94 %  ,Body mass index is 17 82 kg/m²  Intake/Output Summary (Last 24 hours) at 01/13/19 2343  Last data filed at 01/13/19 1917   Gross per 24 hour   Intake           753 33 ml   Output              950 ml   Net          -196 67 ml       Physical Exam:   General Appearance: Awake and alert, in no acute distress, thin  Abdomen: Soft, well healed midline surgical scar in epigastrium, non-tender, non-distended; bowel sounds normal; no masses or no organomegaly    Invasive Devices     Peripheral Intravenous Line            Peripheral IV 01/12/19 Right Antecubital 1 day          Drain            External Urinary Catheter Small 3 days                Lab Results:  Admission on 01/01/2019   No results displayed because visit has over 200 results  Imaging Studies: I have personally reviewed pertinent imaging studies

## 2019-01-15 ENCOUNTER — ANESTHESIA (INPATIENT)
Dept: PERIOP | Facility: HOSPITAL | Age: 84
DRG: 853 | End: 2019-01-15
Payer: MEDICARE

## 2019-01-15 ENCOUNTER — ANESTHESIA EVENT (INPATIENT)
Dept: PERIOP | Facility: HOSPITAL | Age: 84
DRG: 853 | End: 2019-01-15
Payer: MEDICARE

## 2019-01-15 PROBLEM — E87.1 HYPONATREMIA: Status: RESOLVED | Noted: 2019-01-12 | Resolved: 2019-01-15

## 2019-01-15 PROBLEM — J18.9 BILATERAL PNEUMONIA: Status: ACTIVE | Noted: 2019-01-15

## 2019-01-15 PROBLEM — A41.9 SEVERE SEPSIS (HCC): Status: RESOLVED | Noted: 2019-01-01 | Resolved: 2019-01-15

## 2019-01-15 PROBLEM — K22.9 DISORDER OF UPPER ESOPHAGEAL SPHINCTER: Status: ACTIVE | Noted: 2019-01-15

## 2019-01-15 PROBLEM — R65.20 SEVERE SEPSIS (HCC): Status: RESOLVED | Noted: 2019-01-01 | Resolved: 2019-01-15

## 2019-01-15 PROBLEM — E86.0 DEHYDRATION: Status: ACTIVE | Noted: 2019-01-15

## 2019-01-15 LAB
25(OH)D2 SERPL-MCNC: <1 NG/ML
25(OH)D3 SERPL-MCNC: 10 NG/ML
25(OH)D3+25(OH)D2 SERPL-MCNC: 10 NG/ML
ANION GAP SERPL CALCULATED.3IONS-SCNC: 8 MMOL/L (ref 4–13)
BACTERIA BLD CULT: NORMAL
BASOPHILS # BLD AUTO: 0.04 THOUSANDS/ΜL (ref 0–0.1)
BASOPHILS NFR BLD AUTO: 0 % (ref 0–1)
BUN SERPL-MCNC: 3 MG/DL (ref 5–25)
CALCIUM SERPL-MCNC: 7.6 MG/DL (ref 8.3–10.1)
CHLORIDE SERPL-SCNC: 102 MMOL/L (ref 100–108)
CO2 SERPL-SCNC: 30 MMOL/L (ref 21–32)
CREAT SERPL-MCNC: 0.44 MG/DL (ref 0.6–1.3)
EOSINOPHIL # BLD AUTO: 0.11 THOUSAND/ΜL (ref 0–0.61)
EOSINOPHIL NFR BLD AUTO: 1 % (ref 0–6)
ERYTHROCYTE [DISTWIDTH] IN BLOOD BY AUTOMATED COUNT: 16.2 % (ref 11.6–15.1)
FAT 24H STL-MRATE: 0.4 G/24 HR (ref 0–7.1)
GFR SERPL CREATININE-BSD FRML MDRD: 107 ML/MIN/1.73SQ M
GLUCOSE SERPL-MCNC: 120 MG/DL (ref 65–140)
GLUCOSE SERPL-MCNC: 120 MG/DL (ref 65–140)
GLUCOSE SERPL-MCNC: 140 MG/DL (ref 65–140)
GLUCOSE SERPL-MCNC: 164 MG/DL (ref 65–140)
GLUCOSE SERPL-MCNC: 92 MG/DL (ref 65–140)
GLUCOSE SERPL-MCNC: 93 MG/DL (ref 65–140)
HCT VFR BLD AUTO: 33.7 % (ref 36.5–49.3)
HGB BLD-MCNC: 10.4 G/DL (ref 12–17)
IMM GRANULOCYTES # BLD AUTO: 0.06 THOUSAND/UL (ref 0–0.2)
IMM GRANULOCYTES NFR BLD AUTO: 1 % (ref 0–2)
LYMPHOCYTES # BLD AUTO: 1.23 THOUSANDS/ΜL (ref 0.6–4.47)
LYMPHOCYTES NFR BLD AUTO: 11 % (ref 14–44)
MAGNESIUM SERPL-MCNC: 1.9 MG/DL (ref 1.6–2.6)
MCH RBC QN AUTO: 30.4 PG (ref 26.8–34.3)
MCHC RBC AUTO-ENTMCNC: 30.9 G/DL (ref 31.4–37.4)
MCV RBC AUTO: 99 FL (ref 82–98)
MONOCYTES # BLD AUTO: 0.82 THOUSAND/ΜL (ref 0.17–1.22)
MONOCYTES NFR BLD AUTO: 7 % (ref 4–12)
NEUTROPHILS # BLD AUTO: 8.77 THOUSANDS/ΜL (ref 1.85–7.62)
NEUTS SEG NFR BLD AUTO: 80 % (ref 43–75)
NRBC BLD AUTO-RTO: 0 /100 WBCS
PHOSPHATE SERPL-MCNC: 2.4 MG/DL (ref 2.3–4.1)
PLATELET # BLD AUTO: 304 THOUSANDS/UL (ref 149–390)
PMV BLD AUTO: 10.9 FL (ref 8.9–12.7)
POTASSIUM SERPL-SCNC: 3.9 MMOL/L (ref 3.5–5.3)
RBC # BLD AUTO: 3.42 MILLION/UL (ref 3.88–5.62)
SODIUM SERPL-SCNC: 140 MMOL/L (ref 136–145)
SPECIMEN WT 72H STL-MRATE: 57 G
WBC # BLD AUTO: 11.03 THOUSAND/UL (ref 4.31–10.16)

## 2019-01-15 PROCEDURE — 0WJJ0ZZ INSPECTION OF PELVIC CAVITY, OPEN APPROACH: ICD-10-PCS | Performed by: INTERNAL MEDICINE

## 2019-01-15 PROCEDURE — 84100 ASSAY OF PHOSPHORUS: CPT | Performed by: INTERNAL MEDICINE

## 2019-01-15 PROCEDURE — 92526 ORAL FUNCTION THERAPY: CPT

## 2019-01-15 PROCEDURE — 80048 BASIC METABOLIC PNL TOTAL CA: CPT | Performed by: INTERNAL MEDICINE

## 2019-01-15 PROCEDURE — 99024 POSTOP FOLLOW-UP VISIT: CPT | Performed by: SURGERY

## 2019-01-15 PROCEDURE — 82948 REAGENT STRIP/BLOOD GLUCOSE: CPT

## 2019-01-15 PROCEDURE — C9113 INJ PANTOPRAZOLE SODIUM, VIA: HCPCS | Performed by: INTERNAL MEDICINE

## 2019-01-15 PROCEDURE — 99232 SBSQ HOSP IP/OBS MODERATE 35: CPT | Performed by: INTERNAL MEDICINE

## 2019-01-15 PROCEDURE — 83735 ASSAY OF MAGNESIUM: CPT | Performed by: INTERNAL MEDICINE

## 2019-01-15 PROCEDURE — 0D9A00Z DRAINAGE OF JEJUNUM WITH DRAINAGE DEVICE, OPEN APPROACH: ICD-10-PCS | Performed by: SURGERY

## 2019-01-15 PROCEDURE — 85025 COMPLETE CBC W/AUTO DIFF WBC: CPT | Performed by: INTERNAL MEDICINE

## 2019-01-15 PROCEDURE — 97530 THERAPEUTIC ACTIVITIES: CPT | Performed by: STUDENT IN AN ORGANIZED HEALTH CARE EDUCATION/TRAINING PROGRAM

## 2019-01-15 PROCEDURE — 99233 SBSQ HOSP IP/OBS HIGH 50: CPT | Performed by: GENERAL PRACTICE

## 2019-01-15 PROCEDURE — 44300 OPEN BOWEL TO SKIN: CPT | Performed by: SURGERY

## 2019-01-15 PROCEDURE — 97535 SELF CARE MNGMENT TRAINING: CPT | Performed by: STUDENT IN AN ORGANIZED HEALTH CARE EDUCATION/TRAINING PROGRAM

## 2019-01-15 PROCEDURE — 94760 N-INVAS EAR/PLS OXIMETRY 1: CPT

## 2019-01-15 RX ORDER — HYDRALAZINE HYDROCHLORIDE 20 MG/ML
5 INJECTION INTRAMUSCULAR; INTRAVENOUS AS NEEDED
Status: COMPLETED | OUTPATIENT
Start: 2019-01-15 | End: 2019-01-15

## 2019-01-15 RX ORDER — DEXTROSE AND SODIUM CHLORIDE 5; .45 G/100ML; G/100ML
75 INJECTION, SOLUTION INTRAVENOUS CONTINUOUS
Status: DISCONTINUED | OUTPATIENT
Start: 2019-01-15 | End: 2019-01-18

## 2019-01-15 RX ORDER — FENTANYL CITRATE/PF 50 MCG/ML
12.5 SYRINGE (ML) INJECTION
Status: DISCONTINUED | OUTPATIENT
Start: 2019-01-15 | End: 2019-01-15 | Stop reason: HOSPADM

## 2019-01-15 RX ORDER — MAGNESIUM HYDROXIDE 1200 MG/15ML
LIQUID ORAL AS NEEDED
Status: DISCONTINUED | OUTPATIENT
Start: 2019-01-15 | End: 2019-01-15 | Stop reason: HOSPADM

## 2019-01-15 RX ORDER — BUPIVACAINE HYDROCHLORIDE 2.5 MG/ML
INJECTION, SOLUTION EPIDURAL; INFILTRATION; INTRACAUDAL AS NEEDED
Status: DISCONTINUED | OUTPATIENT
Start: 2019-01-15 | End: 2019-01-15 | Stop reason: HOSPADM

## 2019-01-15 RX ORDER — LIDOCAINE HYDROCHLORIDE 10 MG/ML
INJECTION, SOLUTION INFILTRATION; PERINEURAL AS NEEDED
Status: DISCONTINUED | OUTPATIENT
Start: 2019-01-15 | End: 2019-01-15 | Stop reason: SURG

## 2019-01-15 RX ORDER — PROPOFOL 10 MG/ML
INJECTION, EMULSION INTRAVENOUS AS NEEDED
Status: DISCONTINUED | OUTPATIENT
Start: 2019-01-15 | End: 2019-01-15 | Stop reason: SURG

## 2019-01-15 RX ORDER — SODIUM CHLORIDE, SODIUM LACTATE, POTASSIUM CHLORIDE, CALCIUM CHLORIDE 600; 310; 30; 20 MG/100ML; MG/100ML; MG/100ML; MG/100ML
INJECTION, SOLUTION INTRAVENOUS CONTINUOUS PRN
Status: DISCONTINUED | OUTPATIENT
Start: 2019-01-15 | End: 2019-01-15 | Stop reason: SURG

## 2019-01-15 RX ORDER — CEFAZOLIN SODIUM 2 G/50ML
2000 SOLUTION INTRAVENOUS
Status: COMPLETED | OUTPATIENT
Start: 2019-01-15 | End: 2019-01-15

## 2019-01-15 RX ORDER — SUCCINYLCHOLINE CHLORIDE 20 MG/ML
INJECTION INTRAMUSCULAR; INTRAVENOUS AS NEEDED
Status: DISCONTINUED | OUTPATIENT
Start: 2019-01-15 | End: 2019-01-15 | Stop reason: SURG

## 2019-01-15 RX ORDER — ONDANSETRON 2 MG/ML
4 INJECTION INTRAMUSCULAR; INTRAVENOUS EVERY 4 HOURS PRN
Status: DISCONTINUED | OUTPATIENT
Start: 2019-01-15 | End: 2019-01-15 | Stop reason: HOSPADM

## 2019-01-15 RX ORDER — LABETALOL 20 MG/4 ML (5 MG/ML) INTRAVENOUS SYRINGE
5 ONCE
Status: DISCONTINUED | OUTPATIENT
Start: 2019-01-15 | End: 2019-01-19 | Stop reason: HOSPADM

## 2019-01-15 RX ORDER — KETOROLAC TROMETHAMINE 30 MG/ML
15 INJECTION, SOLUTION INTRAMUSCULAR; INTRAVENOUS ONCE AS NEEDED
Status: COMPLETED | OUTPATIENT
Start: 2019-01-15 | End: 2019-01-15

## 2019-01-15 RX ORDER — ONDANSETRON 2 MG/ML
INJECTION INTRAMUSCULAR; INTRAVENOUS AS NEEDED
Status: DISCONTINUED | OUTPATIENT
Start: 2019-01-15 | End: 2019-01-15 | Stop reason: SURG

## 2019-01-15 RX ORDER — SODIUM CHLORIDE, SODIUM LACTATE, POTASSIUM CHLORIDE, CALCIUM CHLORIDE 600; 310; 30; 20 MG/100ML; MG/100ML; MG/100ML; MG/100ML
50 INJECTION, SOLUTION INTRAVENOUS CONTINUOUS
Status: DISCONTINUED | OUTPATIENT
Start: 2019-01-15 | End: 2019-01-15

## 2019-01-15 RX ADMIN — HEPARIN SODIUM 5000 UNITS: 5000 INJECTION INTRAVENOUS; SUBCUTANEOUS at 05:53

## 2019-01-15 RX ADMIN — PANTOPRAZOLE SODIUM 40 MG: 40 INJECTION, POWDER, FOR SOLUTION INTRAVENOUS at 10:33

## 2019-01-15 RX ADMIN — METRONIDAZOLE 500 MG: 500 INJECTION, SOLUTION INTRAVENOUS at 17:40

## 2019-01-15 RX ADMIN — PROPOFOL 100 MG: 10 INJECTION, EMULSION INTRAVENOUS at 17:27

## 2019-01-15 RX ADMIN — ONDANSETRON 4 MG: 2 INJECTION INTRAMUSCULAR; INTRAVENOUS at 18:38

## 2019-01-15 RX ADMIN — PHENYLEPHRINE HYDROCHLORIDE 50 MCG/MIN: 10 INJECTION INTRAVENOUS at 17:46

## 2019-01-15 RX ADMIN — SODIUM CHLORIDE, SODIUM LACTATE, POTASSIUM CHLORIDE, AND CALCIUM CHLORIDE: .6; .31; .03; .02 INJECTION, SOLUTION INTRAVENOUS at 17:11

## 2019-01-15 RX ADMIN — HYDRALAZINE HYDROCHLORIDE 5 MG: 20 INJECTION INTRAMUSCULAR; INTRAVENOUS at 19:24

## 2019-01-15 RX ADMIN — FENTANYL CITRATE 12.5 MCG: 50 INJECTION, SOLUTION INTRAMUSCULAR; INTRAVENOUS at 19:27

## 2019-01-15 RX ADMIN — INSULIN LISPRO 1 UNITS: 100 INJECTION, SOLUTION INTRAVENOUS; SUBCUTANEOUS at 13:47

## 2019-01-15 RX ADMIN — SUCCINYLCHOLINE CHLORIDE 60 MG: 20 INJECTION, SOLUTION INTRAMUSCULAR; INTRAVENOUS at 17:27

## 2019-01-15 RX ADMIN — DEXTROSE AND SODIUM CHLORIDE 75 ML/HR: 5; .45 INJECTION, SOLUTION INTRAVENOUS at 20:04

## 2019-01-15 RX ADMIN — HYDROCORTISONE SODIUM SUCCINATE 100 MG: 100 INJECTION, POWDER, FOR SOLUTION INTRAMUSCULAR; INTRAVENOUS at 17:39

## 2019-01-15 RX ADMIN — LIDOCAINE HYDROCHLORIDE 50 MG: 10 INJECTION, SOLUTION INFILTRATION; PERINEURAL at 17:26

## 2019-01-15 RX ADMIN — DEXTROSE AND SODIUM CHLORIDE 75 ML/HR: 5; .45 INJECTION, SOLUTION INTRAVENOUS at 10:35

## 2019-01-15 RX ADMIN — HEPARIN SODIUM 5000 UNITS: 5000 INJECTION INTRAVENOUS; SUBCUTANEOUS at 23:21

## 2019-01-15 RX ADMIN — CEFAZOLIN SODIUM 2000 MG: 2 SOLUTION INTRAVENOUS at 17:36

## 2019-01-15 RX ADMIN — KETOROLAC TROMETHAMINE 15 MG: 30 INJECTION, SOLUTION INTRAMUSCULAR at 23:23

## 2019-01-15 NOTE — ANESTHESIA PREPROCEDURE EVALUATION
Review of Systems/Medical History  Patient summary reviewed  Chart reviewed  No history of anesthetic complications     Cardiovascular  Exercise tolerance (METS): >4,  Valvular heart disease , tricuspid regurgitation, Aortic disease,   Comment: ECHO (1/6/19): LEFT VENTRICLE:  Systolic function was normal  Ejection fraction was estimated to be 60 %  There were no regional wall motion abnormalities  Doppler parameters were consistent with abnormal left ventricular relaxation (grade 1 diastolic dysfunction)      LEFT ATRIUM:  The atrium was dilated      MITRAL VALVE:  There was mild annular calcification      TRICUSPID VALVE:  There was mild regurgitation  Estimated peak PA pressure was 42 mmHg  The findings suggest mild pulmonary hypertension      AORTA:  There was dilatation of the ascending aorta, with a maximum measured diameter of 3 9 cm , Pulmonary hypertension Pulmonary  Smoker ex-smoker  , Pneumonia (possible aspiration),        GI/Hepatic  Dysphagia,   GERD well controlled, Esophageal disease (esophagitis) , Pancreatic problem,             Endo/Other  Diabetes well controlled type 2 Oral agent,      GYN       Hematology   Musculoskeletal       Neurology   Psychology           Physical Exam    Airway    Mallampati score: III  TM Distance: >3 FB  Neck ROM: full     Dental   No notable dental hx     Cardiovascular  Rhythm: regular, Rate: normal,     Pulmonary  Decreased breath sounds,     Other Findings        Anesthesia Plan  ASA Score- 4     Anesthesia Type- general with ASA Monitors  Additional Monitors:   Airway Plan: ETT  Plan Factors-    Induction- intravenous and rapid sequence induction  Postoperative Plan- Plan for postoperative opioid use  Planned trial extubation    Informed Consent- Anesthetic plan and risks discussed with patient  I personally reviewed this patient with the CRNA  Discussed and agreed on the Anesthesia Plan with the CRNA  Karrie Nissen

## 2019-01-15 NOTE — RESTORATIVE TECHNICIAN NOTE
Restorative Specialist Mobility Note       Activity: Other (Comment) (Pt currently off the unit at the OR per nursing)    Napoleon GARCIA, Restorative Technician,

## 2019-01-15 NOTE — PHYSICAL THERAPY NOTE
PHYSICAL THERAPY NOTE      Patient Name: Ofelia Oro  RWMMD'C Date: 1/15/2019     Pt refusing PT treatment at this time saying he is too tired and is supposed to get an open G-tube placed tomorrow  PT to follow as on caseload as appropriate      Radha Godinez, PT

## 2019-01-15 NOTE — PLAN OF CARE
Problem: OCCUPATIONAL THERAPY ADULT  Goal: Performs self-care activities at highest level of function for planned discharge setting  See evaluation for individualized goals  Treatment Interventions: ADL retraining, Functional transfer training, UE strengthening/ROM, Endurance training, Cognitive reorientation, Patient/family training, Equipment evaluation/education, Compensatory technique education, Activityengagement, Energy conservation          See flowsheet documentation for full assessment, interventions and recommendations  Outcome: Progressing  Limitation: Decreased ADL status, Decreased UE strength, Decreased Safe judgement during ADL, Decreased endurance, Decreased cognition, Decreased high-level ADLs, Decreased self-care trans  Prognosis: Fair  Assessment: Pt participates in OT session with focus on LB dressing, activity tolerance, sitting balance, and bed mobility to increase I for d/c  Pt min A supine to sit EOB with HOB elevated and bed railing support  Pt max A LB dressing to don/doff socks while seated at EOB 2* decreased ROM  Pt engaged in sitting balance at EOB for 3 min duration  Pt O2 falls to 80 with activity and requires rest breaks to get back above 90  Pt min A sit to supine from EOB  Pt will continue to benefit from activity tolerance, adls, and transfers       OT Discharge Recommendation: Short Term Rehab  OT - OK to Discharge: Yes (when medically stable)

## 2019-01-15 NOTE — SOCIAL WORK
TCF daughter Barbara Bowels, request referral to [facility], referral entered in [facility]

## 2019-01-15 NOTE — RESPIRATORY THERAPY NOTE
RT Protocol Note  Jennifer Jeffery 82 y o  male MRN: [id number]  Unit/Bed#: [facility] 913-01 Encounter: [id number]    Assessment    Principal Problem:    Oropharyngeal dysphagia  Active Problems:    Diabetes mellitus type 2    Chronic pancreatitis - history of Pancreatic mass    Severe sepsis - Bilateral pneumonia    Pneumonia    Leukocytosis    Moderate protein-calorie malnutrition     Abnormal SPEP    Acute respiratory failure with hypoxia    Hyponatremia    Hypomagnesemia - Hypokalemia - Hypophosphatemia    Severe protein-calorie malnutrition (HCC)    Dysphagia      Home Pulmonary Medications:  na       Past Medical History:   Diagnosis Date    Diabetes mellitus ([facility] [address] )     Type II    Weight loss      Social History     Social History    Marital status: /Civil Union     Spouse name: N/A    Number of children: N/A    Years of education: N/A     Social History Main Topics    Smoking status: Former Smoker     Years: 25 00    Smokeless tobacco: Never Used      Comment: quit about 20 years ago as of 9/21/2018    Alcohol use Yes      Comment: 1 beer a day    Drug use: No    Sexual activity: Not Asked     Other Topics Concern    None     Social History Narrative    None       Subjective         Objective    Physical Exam:   Assessment Type: Assess only  General Appearance: Alert, Awake  Respiratory Pattern: Normal  Chest Assessment: Chest expansion symmetrical  Bilateral Breath Sounds: Clear, Diminished  Cough: None  O2 Device: nc    Vitals:  Blood pressure 134/81, pulse 86, temperature 98 42 °F (36 9 °C), resp  rate 18, height 5' 7" (1 702 m), weight 51 6 kg (113 lb 12 1 oz), SpO2 97 %  Imaging and other studies: I have personally reviewed pertinent reports        O2 Device: nc     Plan    Respiratory Plan: No distress/Pulmonary history        Resp Comments: (P) d/c'd prn udn/resp protocol, no resp hx, no resp meds at home, cxr shows bilat pleural effusions

## 2019-01-15 NOTE — ASSESSMENT & PLAN NOTE
Malnutrition Findings:   Malnutrition type: Chronic illness (past medical history significant for pancreatic mass )  Degree of Malnutrition: Malnutrition of moderate degree (evidenced by 11% wt loss < 3mo, slight depression at Confucianism region, poor po intake <75% energy intake compared to estimated needs for >1 mo, treated with diet and supplements)    BMI Findings:  BMI Classifications: Underweight < 18 5     Body mass index is 17 82 kg/m²

## 2019-01-15 NOTE — PROGRESS NOTES
GASTROENTEROLOGY  PROGRESS NOTE     Unit/Bed#: [facility] 913-01   Encounter: [id number]    PATIENT INFORMATION     Philomena Ansari   82 y o  male   MRN: [id number]  Hospital Stay Days: 14    ASSESSMENT/PLAN     Principal Problem:    Oropharyngeal dysphagia  Active Problems:    Diabetes mellitus type 2    Chronic pancreatitis - history of Pancreatic mass    Severe sepsis - Bilateral pneumonia    Pneumonia    Leukocytosis    Moderate protein-calorie malnutrition     Abnormal SPEP    Acute respiratory failure with hypoxia    Hyponatremia    Hypomagnesemia - Hypokalemia - Hypophosphatemia    Severe protein-calorie malnutrition (HCC)    Dysphagia    1  Oropharyngeal dysphagia with possible aspiration - was unable place PEG tube due to poor landmarks with unusual anatomy  The stomach was positioned very superiorly just under the diaphragm and above the margin of the ribs  · Surgical oncology consulted with plan for possible open G-tube later this week    2  Chronic pancreatitis with pancreatic cystic lesion - chronic pancreatitis and a dilated duct with cystic lesion of the tail of the pancreas  Previous biopsies were benign and cystic lesion is consistent with pseudocyst  There was what appeared to be a fistula on the posterior wall of the stomach concerning for erosion of the pseudocyst into the wall of the stomach  Fistulization is again seen on CT of the abdomen  · Await pathology results  · Surgical oncology consulted for surgical G-tube placement  · Outpatient follow-up with Dr Estela Macdonald for further management and monitoring of his pancreas    SUBJECTIVE     Patient seen and examined  No acute events overnight  Patient offers no acute complaints today      OBJECTIVE     Vitals: Temp (24hrs), Av 3 °F (36 8 °C), Min:98 1 °F (36 7 °C), Max:98 42 °F (36 9 °C)   Temperature: 98 4 °F (36 9 °C)  Vitals:    19 2300 01/15/19 0100 01/15/19 0655 01/15/19 0739   BP:   133/79    Pulse: 79 86 85    Resp:   18    Temp: 98 4 °F (36 9 °C)    TempSrc:       SpO2: 98% 95% 96% 97%   Weight:       Height:          Body mass index is 17 82 kg/m²  I/O last 24 hours: In: 881 7 [I V :881 7]  Out: 1150 [Urine:1150]    Physical Exam   Constitutional: He is oriented to person, place, and time  He appears well-developed  No distress  HENT:   Head: Normocephalic and atraumatic  Nose: Nose normal    Oral mucosa dry   Eyes: Conjunctivae and EOM are normal  No scleral icterus  Neck: Neck supple  No JVD present  No tracheal deviation present  Cardiovascular: Normal rate, regular rhythm, normal heart sounds and intact distal pulses  Pulmonary/Chest: Effort normal  No respiratory distress  He has no wheezes  He has no rales  Decreased basilar breath sounds   Abdominal: Soft  Bowel sounds are normal  He exhibits no distension  There is no tenderness  There is no rebound and no guarding  Musculoskeletal: He exhibits no edema or deformity  Neurological: He is alert and oriented to person, place, and time  No cranial nerve deficit  Skin: Skin is warm and dry  He is not diaphoretic  Psychiatric: He has a normal mood and affect  His behavior is normal    Nursing note and vitals reviewed  Invasive Devices     Peripheral Intravenous Line            Peripheral IV 01/12/19 Right Antecubital 2 days              LABORATORY DATA     Labs: I have personally reviewed pertinent reports        Results from last 7 days  Lab Units 01/15/19  0612 01/14/19  0527 01/13/19  0607   WBC Thousand/uL 11 03* 11 93* 11 85*   HEMOGLOBIN g/dL 10 4* 9 7* 9 3*   HEMATOCRIT % 33 7* 31 5* 29 5*   PLATELETS Thousands/uL 304 344 337   NEUTROS PCT % 80* 82* 87*   MONOS PCT % 7 5 5      Results from last 7 days  Lab Units 01/15/19  0612 01/14/19  0527 01/13/19  0607  01/10/19  0522 01/10/19  0125   POTASSIUM mmol/L 3 9 3 7 3 5  < > 4 6  --    CHLORIDE mmol/L 102 103 102  < > 98*  --    CO2 mmol/L 30 29 29  < > 35*  --    CO2, I-STAT mmol/L  --   --   --   --   -- 35*   BUN mg/dL 3* 4* 6  < > 12  --    CREATININE mg/dL 0 44* 0 36* 0 37*  < > 0 62  --    CALCIUM mg/dL 7 6* 7 8* 7 4*  < > 7 4*  --    ALK PHOS U/L  --   --   --   --  105  --    ALT U/L  --   --   --   --  20  --    AST U/L  --   --   --   --  22  --    GLUCOSE, ISTAT mg/dl  --   --   --   --   --  202*   < > = values in this interval not displayed  Results from last 7 days  Lab Units 01/15/19  0612 01/14/19  0527 01/13/19  0607   MAGNESIUM mg/dL 1 9 2 1 2 1       Results from last 7 days  Lab Units 01/15/19  0612 01/14/19  0527 01/13/19  0607   PHOSPHORUS mg/dL 2 4 2 9 2 0*                   IMAGING & DIAGNOSTIC TESTING     Radiology Results: I have personally reviewed pertinent films in PACS  XR Chest Pa & Lateral    Result Date: 1/2/2019  Impression: Lower lobe infiltrates bilaterally Workstation performed: [id number]     CT Head Wo Contrast    Result Date: 1/3/2019  Impression: Generalized volume loss with advanced microangiopathic changes of the cerebral white matter  No acute intracranial abnormality  Workstation performed: [id number]     CT Chest Wo Contrast    Result Date: 1/5/2019  Impression: Groundglass and consolidative airspace opacity throughout the lungs, most dense in the dependent lungs and more severe on the left than the right  Also noted is inspissated mucus within the trachea and the mainstem bronchi bilaterally  The findings are  most suspicious for severe multifocal aspiration pneumonia  Small left greater than right pleural effusions  Findings in the upper abdomen are better evaluated on recent abdominal MR of December 14, 2018  Workstation performed: [id number]     MRI Brain Wo Contrast    Result Date: 1/4/2019  Impression: No acute intracranial ischemia  Advanced microangiopathic changes throughout supratentorial white matter and central karthikeyan  Right mastoid effusion   Workstation performed: [id number]     XR Abdomen 1 Vw Portable    Result Date: 1/6/2019  Impression: Enteric tube tip in the region of gastroesophageal junction with sidehole approximately 10 cm proximal to GE junction  Workstation performed: [id number]     Other Diagnostic Testing: I have personally reviewed pertinent reports  ACTIVE MEDICATIONS     Current Facility-Administered Medications   Medication Dose Route Frequency    acetaminophen (TYLENOL) tablet 650 mg  650 mg Oral Q6H PRN    aspirin chewable tablet 81 mg  81 mg Oral Daily    atorvastatin (LIPITOR) tablet 40 mg  40 mg Oral Daily With Dinner    cholecalciferol (VITAMIN D3) tablet 400 Units  400 Units Oral Daily    dextrose 5 % and sodium chloride 0 45 % infusion  50 mL/hr Intravenous Continuous    heparin (porcine) subcutaneous injection 5,000 Units  5,000 Units Subcutaneous Q8H [facility]    insulin lispro (HumaLOG) 100 units/mL subcutaneous injection 1-5 Units  1-5 Units Subcutaneous Q6H [facility]    lidocaine (LIDODERM) 5 % patch 1 patch  1 patch Topical Daily    melatonin tablet 3 mg  3 mg Oral HS    pancrelipase (Lip-Prot-Amyl) (CREON) delayed release capsule 24,000 Units  24,000 Units Oral BID With Meals    pantoprazole (PROTONIX) injection 40 mg  40 mg Intravenous Q24H [facility]    polyvinyl alcohol (LIQUIFILM TEARS) 1 4 % ophthalmic solution 1 drop  1 drop Both Eyes PRN     VTE Pharmacologic Prophylaxis: Heparin  VTE Mechanical Prophylaxis: sequential compression device    ==  APARNA Dumont    [address] [facility]  Internal Medicine Resident PGY-2

## 2019-01-15 NOTE — RESPIRATORY THERAPY NOTE
RT Protocol Note  Abe Jung 82 y o  male MRN: [id number]  Unit/Bed#: [facility] 913-01 Encounter: [id number]    Assessment    Principal Problem:    Oropharyngeal dysphagia  Active Problems:    Diabetes mellitus type 2    Chronic pancreatitis - history of Pancreatic mass    Severe sepsis - Bilateral pneumonia    Pneumonia    Leukocytosis    Moderate protein-calorie malnutrition     Abnormal SPEP    Acute respiratory failure with hypoxia    Hyponatremia    Hypomagnesemia - Hypokalemia - Hypophosphatemia    Severe protein-calorie malnutrition (HCC)    Dysphagia      Home Pulmonary Medications:  na       Past Medical History:   Diagnosis Date    Diabetes mellitus ([facility] [address] )     Type II    Weight loss      Social History     Social History    Marital status: /Civil Union     Spouse name: N/A    Number of children: N/A    Years of education: N/A     Social History Main Topics    Smoking status: Former Smoker     Years: 25 00    Smokeless tobacco: Never Used      Comment: quit about 20 years ago as of 9/21/2018    Alcohol use Yes      Comment: 1 beer a day    Drug use: No    Sexual activity: Not Asked     Other Topics Concern    None     Social History Narrative    None       Subjective         Objective    Physical Exam:   Assessment Type: Assess only  General Appearance: Alert, Awake  Respiratory Pattern: Normal  Chest Assessment: Chest expansion symmetrical  Bilateral Breath Sounds: Clear, Diminished  Cough: None  O2 Device: nc    Vitals:  Blood pressure 134/81, pulse 86, temperature 98 42 °F (36 9 °C), resp  rate 18, height 5' 7" (1 702 m), weight 51 6 kg (113 lb 12 1 oz), SpO2 97 %  Imaging and other studies: I have personally reviewed pertinent reports        O2 Device: nc     Plan    Respiratory Plan: No distress/Pulmonary history        Resp Comments: (P) d/c'd prn udn/resp protocol, no resp hx, no resp meds at home, cxr shows bilat pleural effusions (d/c'd ACP, pt can do flutter valve on his own)

## 2019-01-15 NOTE — OCCUPATIONAL THERAPY NOTE
01/15/19 1026   Restrictions/Precautions   Weight Bearing Precautions Per Order No   Other Precautions Chair Alarm; Bed Alarm;Multiple lines;Telemetry;O2;Fall Risk   Pain Assessment   Pain Assessment No/denies pain   Pain Score No Pain   ADL   Where Assessed Edge of bed   LB Dressing Assistance 2  Maximal Assistance   LB Dressing Deficit Setup;Don/doff R sock; Don/doff L sock   LB Dressing Comments don/doff socks   Bed Mobility   Supine to Sit 4  Minimal assistance   Additional items Assist x 1   Sit to Supine 4  Minimal assistance   Additional items Assist x 1   Cognition   Overall Cognitive Status Impaired   Arousal/Participation Responsive   Attention Attends with cues to redirect   Orientation Level Oriented to person;Oriented to situation   Memory Decreased short term memory   Following Commands Follows one step commands with increased time or repetition   Activity Tolerance   Activity Tolerance Patient tolerated treatment well   Assessment   Assessment Pt participates in OT session with focus on LB dressing, activity tolerance, sitting balance, and bed mobility to increase I for d/c  Pt min A supine to sit EOB with HOB elevated and bed railing support  Pt max A LB dressing to don/doff socks while seated at EOB 2* decreased ROM  Pt engaged in sitting balance at EOB for 3 min duration  Pt O2 falls to 80 with activity and requires rest breaks to get back above 90  Pt min A sit to supine from EOB  Pt will continue to benefit from activity tolerance, adls, and transfers  Plan   Treatment Interventions ADL retraining;Functional transfer training; Endurance training; Activityengagement   Goal Expiration Date 01/25/19   Treatment Day 1   OT Frequency 3-5x/wk   Recommendation   OT Discharge Recommendation Short Term Rehab

## 2019-01-15 NOTE — PROGRESS NOTES
Progress Note - Surgical Oncology  Jacobo Nguyen 82 y o  male MRN: [id number]  Unit/Bed#: [facility] 913-01 Encounter: [id number]    Assessment:  81 y/o M p/w dysphagia, pancreatic pseudocyst  1/14--EGD, unsuccessful PEG w/ GI, failed d/t poor landmarks, clear and yellow mucus seen extruding from posterior wall of stomach  1/14--CTAP: multilocular cystic collection involving pancreatic tail and body, w/ larger component in splenic hilum, communicating w/ lumen of stomach    Plan:  --f/u GI bx of posterior wall of stomach  --Plan for open G-tube later this week  --Medical management per primary team    Subjective/Objective     Subjective:     No acute events overnight  Pt feels hungry and thirsty this AM      Objective:     Blood pressure 134/81, pulse 86, temperature 98 42 °F (36 9 °C), resp  rate 18, height 5' 7" (1 702 m), weight 51 6 kg (113 lb 12 1 oz), SpO2 95 %  ,Body mass index is 17 82 kg/m²  Intake/Output Summary (Last 24 hours) at 01/15/19 0417  Last data filed at 01/15/19 0321   Gross per 24 hour   Intake             1440 ml   Output             1350 ml   Net               90 ml       Invasive Devices     Peripheral Intravenous Line            Peripheral IV 01/12/19 Right Antecubital 2 days                Physical Exam:    GEN: NAD, cachectic  HEENT: MMM  CV: RRR  Lung: normal effort  Ab: Soft, NT/ND  Extrem: No CCE  Neuro:  A+Ox3, motor and sensation grossly intact      Lab, Imaging and other studies:  CBC:   Lab Results   Component Value Date    WBC 11 93 (H) 01/14/2019    HGB 9 7 (L) 01/14/2019    HCT 31 5 (L) 01/14/2019    MCV 99 (H) 01/14/2019     01/14/2019    MCH 30 5 01/14/2019    MCHC 30 8 (L) 01/14/2019    RDW 16 4 (H) 01/14/2019    MPV 11 1 01/14/2019    NRBC 0 01/14/2019   , CMP:   Lab Results   Component Value Date    SODIUM 141 01/14/2019    K 3 7 01/14/2019     01/14/2019    CO2 29 01/14/2019    BUN 4 (L) 01/14/2019    CREATININE 0 36 (L) 01/14/2019    CALCIUM 7 8 (L) 01/14/2019 EGFR 116 01/14/2019   , Coagulation: No results found for: PT, INR, APTT, Urinalysis: No results found for: COLORU, CLARITYU, SPECGRAV, PHUR, LEUKOCYTESUR, NITRITE, PROTEINUA, GLUCOSEU, KETONESU, BILIRUBINUR, BLOODU, Amylase: No results found for: AMYLASE, Lipase: No results found for: LIPASE  VTE Pharmacologic Prophylaxis: Heparin  VTE Mechanical Prophylaxis: sequential compression device

## 2019-01-15 NOTE — SPEECH THERAPY NOTE
Speech Language/Pathology    Speech/Language Pathology Progress Note    Patient Name: Melida Weaver  [id number] Date: 1/15/2019       Pt followed by ST for dysphagia  Current diet: NPO    Records reviewed and case d/w pt and MD   Per neuro note 1/13:  Dysphagia, hypophonia, dysarthria (ddx midbrain stroke, versus motor neuron disease, versus pancreatic insufficiency, versus paraneoplastic)     -MRI performed this AM   There is extensive white matter disease, including several pontine hyperintensities  May be contributing to his dysphagia  There was no abnormal enhancement in nerve roots to suggest an inflammatory process  EGD completed yesterday:  Small sliding hiatal hernia  Unable to place PEG tube because of poor landmarks  Based on CT scan I suspect this is because the stomach is position very superiorly just under the diaphragm and above the margin of the ribs  Fistula versus ulcer and the posterior wall of the stomach concerning for erosion of the pseudocyst into the posterior wall of the stomach  Pt will undergo surgical G tube placement  Per nsg, pt scheduled for OR today  Discussed results of repeat VBS with MD   ST to provide therapeutic trials of very small amounts of puree and thin liquids by tsp to strengthen/encourage swallow  Additionally, requested MD consider ENT consult due to UES dysfunction/tight UES  ST to f/u for therapeutic trials as able/appropriate

## 2019-01-15 NOTE — PROGRESS NOTES
Progress Note - Rachele Fernandez 1935, 82 y o  male MRN: [id number]    Unit/Bed#: OR POOL Encounter: [id number]    Primary Care Provider: Adam Delgado DO   Date and time admitted to hospital: 1/1/2019  8:59 PM        Severe sepsis - Bilateral pneumoniaresolved as of 1/15/2019   Assessment & Plan    - evolving sepsis as evidenced by fever, tachypnea, leukocytosis, and previous lactic acidosis  - presumed source of bilateral streptococcal pneumonia - positive urine streptococcal Ag noted - blood cultures from 1/1 are negative and repeat cultures from 1/10 negative  - MRSA screen negative  - encourage incentive spirometry - maintain oxygenation    - RSV/Influenza PCR negative   - appreciate pulmonology input who have discontinued antibiotics on 1/12 as procalcitonin normalized   - remains NPO due to dysphagia (see plan below)   - transferred out of the ICU on 1/11       * Oropharyngeal dysphagia   Assessment & Plan    - with associated intermittent dysarthria noted - initial and second video barium swallow suspected possible neurologic etiology - previous CT of head/MR brain unremarkable for acute CVA - a subsequent dedicated MRI of brainstem negative for acute etiology  - repeat (third) video barium swallow on 1/12 unfortunate revealed no improvement - unsuccessful in PEG tube placement 1/14 with recommendation of surgical oncology evaluation for open gastrostomy tube placement as suspicion for pseudocyst/gastric erosion leading to abnormal gastric positioning noted possibly related to chronic pancreatitis ? ? 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  This now appears to communicate with the lumen of the stomach as described above which would explain the findings during endoscopy  - recently pulled out nasogastric feeding tube due to discomfort and refuses to have it replaced  - appreciate neurology input - initially suspected possibility of myasthenia gravis however acetylcholine receptor binding antibodies and MuSK antibodies were negative and repeat (second) video barium swallow earlier in hospital course after neostigmine stimulation revealed minimal improvement - no improvement with prior Mestinon trial either  - various inflammatory/rheumatologic markers checked to assess for autoimmune/paraneoplastic syndrome are unremarkable - heavy metal screen also negative  - can also consider association with pancreatic insufficiency per neurology  - Pt having G tube placed today by surg onc  - continue intravenous fluid infusion until G tube able to be used     Disorder of upper esophageal sphincter   Assessment & Plan    Likely related to above  Pt should f/u with ENT outpt for possible Botox injection     Severe protein-calorie malnutrition ([facility] [address] )   Assessment & Plan    Malnutrition Findings:   Malnutrition type: Chronic illness (past medical history significant for pancreatic mass )  Degree of Malnutrition: Malnutrition of moderate degree (evidenced by 11% wt loss < 3mo, slight depression at [place] region, poor po intake <75% energy intake compared to estimated needs for >1 mo, treated with diet and supplements)    BMI Findings:  BMI Classifications: Underweight < 18 5     Body mass index is 17 82 kg/m²          Hypomagnesemia - Hypokalemia - Hypophosphatemia   Assessment & Plan    - monitor/replete as necessary     Acute respiratory failure with hypoxia   Assessment & Plan    - symptomatically worsened for short time while in PACU post-PEG attempt but stabilized prompting return back to medical floor  - likely secondary to bilateral pneumonia (see plan below)   - due to concurrent tachycardia, in EKG ordered revealed no significant changes from previous (reported by GI lab team as read by on site anesthesiologist)          Moderate protein-calorie malnutrition    Assessment & Plan    - BMI of 17 82 in the setting of chronic illness, decreased appetite/oral intake, and evidence of muscle wasting/atrophy on exam  - awaiting possible open gastrostomy in hopes of resuming tube feeding - previously pulled out nasogastric tube due to discomfort         Chronic pancreatitis - history of Pancreatic mass   Assessment & Plan    - biopsy report from resection on 2018 revealed benign pancreatic tissue in the setting of chronic pancreatitis - developed subsequent weight loss and diarrhea after procedure and follows with gastroenterology  - resume Creon supplementation when able  Follow biopsy from EGD        Diabetes mellitus type 2   Assessment & Plan    - HbA1c of 7 1   - hold home hypoglycemics - continue SSI coverage per Accu-Cheks QID (remains NPO on IV fluids)      Hyponatremiaresolved as of 1/15/2019   Assessment & Plan    - serum sodium remains normalized   - continue IV fluids       VTE Pharmacologic Prophylaxis:   Pharmacologic: Heparin  Mechanical VTE Prophylaxis in Place: Yes    Patient Centered Rounds: I have performed bedside rounds with nursing staff today  Discussions with Specialists or Other Care Team Provider: Dr Tee Bliss from ENT    Education and Discussions with Family / Patient: pt    Time Spent for Care: 30 minutes  More than 50% of total time spent on counseling and coordination of care as described above      Current Length of Stay: 14 day(s)    Current Patient Status: Inpatient   Certification Statement: The patient will continue to require additional inpatient hospital stay due to need to start tube feeds    Discharge Plan: when TF at goal    Code Status: Level 1 - Full Code      Subjective:   No acute complaints    Objective:     Vitals:   Temp (24hrs), Av 1 °F (36 7 °C), Min:97 2 °F (36 2 °C), Max:98 42 °F (36 9 °C)    Temp:  [97 2 °F (36 2 °C)-98 42 °F (36 9 °C)] 98 1 °F (36 7 °C)  HR:  [79-98] 82  Resp:  [18-20] 20  BP: (114-134)/(71-81) 114/71  SpO2:  [88 %-98 %] 98 %  Body mass index is 17 82 kg/m²  Input and Output Summary (last 24 hours): Intake/Output Summary (Last 24 hours) at 01/15/19 1727  Last data filed at 01/15/19 1646   Gross per 24 hour   Intake          1841 25 ml   Output             1075 ml   Net           766 25 ml       Physical Exam:     Physical Exam   Constitutional: He is oriented to person, place, and time  No distress  HENT:   Head: Normocephalic and atraumatic  Eyes: Conjunctivae and EOM are normal    Neck: Normal range of motion  Neck supple  Cardiovascular: Normal rate and regular rhythm  Pulmonary/Chest: Effort normal and breath sounds normal  He has no wheezes  He has no rales  Abdominal: Soft  Bowel sounds are normal  He exhibits no distension  There is no tenderness  Musculoskeletal: Normal range of motion  He exhibits no edema  Neurological: He is alert and oriented to person, place, and time  Skin: Skin is warm and dry  He is not diaphoretic  Additional Data:     Labs:      Results from last 7 days  Lab Units 01/15/19  0612   WBC Thousand/uL 11 03*   HEMOGLOBIN g/dL 10 4*   HEMATOCRIT % 33 7*   PLATELETS Thousands/uL 304   NEUTROS PCT % 80*   LYMPHS PCT % 11*   MONOS PCT % 7   EOS PCT % 1       Results from last 7 days  Lab Units 01/15/19  0612  01/10/19  0522 01/10/19  0125   POTASSIUM mmol/L 3 9  < > 4 6  --    CHLORIDE mmol/L 102  < > 98*  --    CO2 mmol/L 30  < > 35*  --    CO2, I-STAT mmol/L  --   --   --  35*   BUN mg/dL 3*  < > 12  --    CREATININE mg/dL 0 44*  < > 0 62  --    CALCIUM mg/dL 7 6*  < > 7 4*  --    ALK PHOS U/L  --   --  105  --    ALT U/L  --   --  20  --    AST U/L  --   --  22  --    GLUCOSE, ISTAT mg/dl  --   --   --  202*   < > = values in this interval not displayed  * I Have Reviewed All Lab Data Listed Above    * Additional Pertinent Lab Tests Reviewed: [address] Admission Reviewed        Recent Cultures (last 7 days):       Results from last 7 days  Lab Units 01/10/19  1441 01/10/19  1439   BLOOD CULTURE  No Growth After 4 Days  No Growth After 4 Days  Last 24 Hours Medication List:     Current Facility-Administered Medications:  [MAR Hold] acetaminophen 650 mg Oral Q6H PRN Angeli Ge PA-C    [MAR Hold] aspirin 81 mg Oral Daily Trav Antony MD    [facility] Hold] atorvastatin 40 mg Oral Daily With Caren Cole MD    [facility] Hold] cefazolin 2,000 mg Intravenous On Call To OR Gia Moeller PA-C Last Rate: Stopped (01/15/19 1037)   [MAR Hold] cholecalciferol 400 Units Oral Daily Antoine Wolfe MD    dextrose 5 % and sodium chloride 0 45 % 75 mL/hr Intravenous Continuous Gia Moeller PA-C Last Rate: Stopped (01/15/19 1646)   [MAR Hold] heparin (porcine) 5,000 Units Subcutaneous Q8H [address] Letty Augustine MD    [facility] Hold] hydrocortisone sodium succinate 100 mg Intravenous On Call To OR Gia Moeller PA-C    [MAR Hold] insulin lispro 1-5 Units Subcutaneous Q6H [address] DEMI Langford    [MAR Hold] lidocaine 1 patch Topical Daily DEMI Krause    [MAR Hold] melatonin 3 mg Oral HS El Jorgensen DO    [facility] Hold] metroNIDAZOLE 500 mg Intravenous On Call To OR Gia Moeller PA-C Last Rate: Stopped (01/15/19 1039)   [MAR Hold] pancrelipase (Lip-Prot-Amyl) 24,000 Units Oral BID With Meals Antoine Wolfe MD    [facility] Hold] pantoprazole 40 mg Intravenous Q24H [address] Jacinda Jesus MD    [MAR Hold] polyvinyl alcohol 1 drop Both Eyes PRN Rehana Tobias PA-C         Today, Patient Was Seen By: El Jorgensen DO    ** Please Note: Dictation voice to text software may have been used in the creation of this document   **

## 2019-01-16 LAB
ANION GAP SERPL CALCULATED.3IONS-SCNC: 4 MMOL/L (ref 4–13)
BACTERIA BLD CULT: NORMAL
BASOPHILS # BLD AUTO: 0.02 THOUSANDS/ΜL (ref 0–0.1)
BASOPHILS NFR BLD AUTO: 0 % (ref 0–1)
BUN SERPL-MCNC: 7 MG/DL (ref 5–25)
CALCIUM SERPL-MCNC: 7.6 MG/DL (ref 8.3–10.1)
CHLORIDE SERPL-SCNC: 105 MMOL/L (ref 100–108)
CO2 SERPL-SCNC: 30 MMOL/L (ref 21–32)
CREAT SERPL-MCNC: 0.6 MG/DL (ref 0.6–1.3)
EOSINOPHIL # BLD AUTO: 0.03 THOUSAND/ΜL (ref 0–0.61)
EOSINOPHIL NFR BLD AUTO: 0 % (ref 0–6)
ERYTHROCYTE [DISTWIDTH] IN BLOOD BY AUTOMATED COUNT: 16.7 % (ref 11.6–15.1)
GFR SERPL CREATININE-BSD FRML MDRD: 94 ML/MIN/1.73SQ M
GLUCOSE SERPL-MCNC: 122 MG/DL (ref 65–140)
GLUCOSE SERPL-MCNC: 153 MG/DL (ref 65–140)
GLUCOSE SERPL-MCNC: 155 MG/DL (ref 65–140)
GLUCOSE SERPL-MCNC: 158 MG/DL (ref 65–140)
GLUCOSE SERPL-MCNC: 209 MG/DL (ref 65–140)
GLUCOSE SERPL-MCNC: 212 MG/DL (ref 65–140)
HCT VFR BLD AUTO: 31.7 % (ref 36.5–49.3)
HGB BLD-MCNC: 9.7 G/DL (ref 12–17)
IMM GRANULOCYTES # BLD AUTO: 0.06 THOUSAND/UL (ref 0–0.2)
IMM GRANULOCYTES NFR BLD AUTO: 0 % (ref 0–2)
LYMPHOCYTES # BLD AUTO: 1.49 THOUSANDS/ΜL (ref 0.6–4.47)
LYMPHOCYTES NFR BLD AUTO: 10 % (ref 14–44)
MAGNESIUM SERPL-MCNC: 2 MG/DL (ref 1.6–2.6)
MCH RBC QN AUTO: 30.8 PG (ref 26.8–34.3)
MCHC RBC AUTO-ENTMCNC: 30.6 G/DL (ref 31.4–37.4)
MCV RBC AUTO: 101 FL (ref 82–98)
MISCELLANEOUS LAB TEST RESULT: NORMAL
MONOCYTES # BLD AUTO: 0.81 THOUSAND/ΜL (ref 0.17–1.22)
MONOCYTES NFR BLD AUTO: 5 % (ref 4–12)
NEUTROPHILS # BLD AUTO: 12.48 THOUSANDS/ΜL (ref 1.85–7.62)
NEUTS SEG NFR BLD AUTO: 85 % (ref 43–75)
NRBC BLD AUTO-RTO: 0 /100 WBCS
PHOSPHATE SERPL-MCNC: 3.2 MG/DL (ref 2.3–4.1)
PHYTONADIONE SERPL-MCNC: <.13 NG/ML (ref 0.13–1.88)
PLATELET # BLD AUTO: 342 THOUSANDS/UL (ref 149–390)
PMV BLD AUTO: 10.6 FL (ref 8.9–12.7)
POTASSIUM SERPL-SCNC: 3.9 MMOL/L (ref 3.5–5.3)
RBC # BLD AUTO: 3.15 MILLION/UL (ref 3.88–5.62)
SODIUM SERPL-SCNC: 139 MMOL/L (ref 136–145)
WBC # BLD AUTO: 14.89 THOUSAND/UL (ref 4.31–10.16)

## 2019-01-16 PROCEDURE — 84100 ASSAY OF PHOSPHORUS: CPT | Performed by: GENERAL PRACTICE

## 2019-01-16 PROCEDURE — 97110 THERAPEUTIC EXERCISES: CPT

## 2019-01-16 PROCEDURE — 97530 THERAPEUTIC ACTIVITIES: CPT

## 2019-01-16 PROCEDURE — 99024 POSTOP FOLLOW-UP VISIT: CPT | Performed by: SURGERY

## 2019-01-16 PROCEDURE — 80048 BASIC METABOLIC PNL TOTAL CA: CPT | Performed by: GENERAL PRACTICE

## 2019-01-16 PROCEDURE — 83735 ASSAY OF MAGNESIUM: CPT | Performed by: GENERAL PRACTICE

## 2019-01-16 PROCEDURE — 99233 SBSQ HOSP IP/OBS HIGH 50: CPT | Performed by: GENERAL PRACTICE

## 2019-01-16 PROCEDURE — 85025 COMPLETE CBC W/AUTO DIFF WBC: CPT | Performed by: GENERAL PRACTICE

## 2019-01-16 PROCEDURE — C9113 INJ PANTOPRAZOLE SODIUM, VIA: HCPCS | Performed by: INTERNAL MEDICINE

## 2019-01-16 PROCEDURE — 82948 REAGENT STRIP/BLOOD GLUCOSE: CPT

## 2019-01-16 RX ORDER — HYDROMORPHONE HCL/PF 1 MG/ML
0.2 SYRINGE (ML) INJECTION EVERY 4 HOURS PRN
Status: DISCONTINUED | OUTPATIENT
Start: 2019-01-16 | End: 2019-01-19 | Stop reason: HOSPADM

## 2019-01-16 RX ADMIN — INSULIN LISPRO 2 UNITS: 100 INJECTION, SOLUTION INTRAVENOUS; SUBCUTANEOUS at 00:02

## 2019-01-16 RX ADMIN — HEPARIN SODIUM 5000 UNITS: 5000 INJECTION INTRAVENOUS; SUBCUTANEOUS at 21:02

## 2019-01-16 RX ADMIN — SODIUM CHLORIDE 500 ML: 0.9 INJECTION, SOLUTION INTRAVENOUS at 05:49

## 2019-01-16 RX ADMIN — INSULIN LISPRO 1 UNITS: 100 INJECTION, SOLUTION INTRAVENOUS; SUBCUTANEOUS at 06:12

## 2019-01-16 RX ADMIN — HEPARIN SODIUM 5000 UNITS: 5000 INJECTION INTRAVENOUS; SUBCUTANEOUS at 05:49

## 2019-01-16 RX ADMIN — PANTOPRAZOLE SODIUM 40 MG: 40 INJECTION, POWDER, FOR SOLUTION INTRAVENOUS at 08:49

## 2019-01-16 RX ADMIN — HYDROMORPHONE HYDROCHLORIDE 0.2 MG: 1 INJECTION, SOLUTION INTRAMUSCULAR; INTRAVENOUS; SUBCUTANEOUS at 12:03

## 2019-01-16 RX ADMIN — HEPARIN SODIUM 5000 UNITS: 5000 INJECTION INTRAVENOUS; SUBCUTANEOUS at 13:55

## 2019-01-16 RX ADMIN — LIDOCAINE 1 PATCH: 50 PATCH CUTANEOUS at 08:48

## 2019-01-16 RX ADMIN — INSULIN LISPRO 1 UNITS: 100 INJECTION, SOLUTION INTRAVENOUS; SUBCUTANEOUS at 12:04

## 2019-01-16 RX ADMIN — INSULIN LISPRO 1 UNITS: 100 INJECTION, SOLUTION INTRAVENOUS; SUBCUTANEOUS at 17:00

## 2019-01-16 NOTE — ASSESSMENT & PLAN NOTE
- BMI of 17 82 in the setting of chronic illness, decreased appetite/oral intake, and evidence of muscle wasting/atrophy on exam  - s/p open J tube placement

## 2019-01-16 NOTE — PROGRESS NOTES
Progress Note - Kerwin Ocampo 1935, 82 y o  male MRN: [id number]    Unit/Bed#: [facility] 913-01 Encounter: [id number]    Primary Care Provider: Fermin Ray DO   Date and time admitted to hospital: 1/1/2019  8:59 PM        Severe sepsis - Bilateral pneumoniaresolved as of 1/15/2019   Assessment & Plan    - evolving sepsis as evidenced by fever, tachypnea, leukocytosis, and previous lactic acidosis  - presumed source of bilateral streptococcal pneumonia - positive urine streptococcal Ag noted - blood cultures from 1/1 are negative and repeat cultures from 1/10 negative  - MRSA screen negative  - encourage incentive spirometry - maintain oxygenation    - RSV/Influenza PCR negative   - appreciate pulmonology input who have discontinued antibiotics on 1/12 as procalcitonin normalized   - remains NPO due to dysphagia (see plan below)   - transferred out of the ICU on 1/11       * Oropharyngeal dysphagia   Assessment & Plan    - with associated intermittent dysarthria noted - initial and second video barium swallow suspected possible neurologic etiology - previous CT of head/MR brain unremarkable for acute CVA - a subsequent dedicated MRI of brainstem negative for acute etiology  - repeat (third) video barium swallow on 1/12 unfortunate revealed no improvement - unsuccessful in PEG tube placement 1/14 with recommendation of surgical oncology evaluation for open gastrostomy tube placement as suspicion for pseudocyst/gastric erosion leading to abnormal gastric positioning noted possibly related to chronic pancreatitis ? ? 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  This now appears to communicate with the lumen of the stomach as described above which would explain the findings during endoscopy  - recently pulled out nasogastric feeding tube due to discomfort and refuses to have it replaced  - appreciate neurology input - initially suspected possibility of myasthenia gravis however acetylcholine receptor binding antibodies and MuSK antibodies were negative and repeat (second) video barium swallow earlier in hospital course after neostigmine stimulation revealed minimal improvement - no improvement with prior Mestinon trial either  - various inflammatory/rheumatologic markers checked to assess for autoimmune/paraneoplastic syndrome are unremarkable - heavy metal screen also negative  - can also consider association with pancreatic insufficiency per neurology  - Pt had J tube placed 1/15 by surg onc - unable to place G tube so J tube placed  - 1/16: started trickle TF   continue intravenous fluid infusion until J tube feeds at goal     Disorder of upper esophageal sphincter   Assessment & Plan    Likely related to above  Pt should f/u with ENT outpt for possible Botox injection     Severe protein-calorie malnutrition ([facility] [address] )   Assessment & Plan    J tube placed    Malnutrition Findings:   Malnutrition type: Chronic illness  Degree of Malnutrition: Other severe protein calorie malnutrition (as evidenced by 11% wt loss since 10/2018; intake meeting less than 75% estimated needs for greater than 1 month and depresses temples; treated with tube feeding for nutrition support)    BMI Findings:  BMI Classifications: Underweight < 18 5     Body mass index is 17 82 kg/m²          Hypomagnesemia - Hypokalemia - Hypophosphatemia   Assessment & Plan    - monitor/replete as necessary     Acute respiratory failure with hypoxia   Assessment & Plan    - symptomatically worsened for short time while in PACU post-PEG attempt but stabilized prompting return back to medical floor  - likely secondary to bilateral pneumonia (see plan below)   - due to concurrent tachycardia, in EKG ordered revealed no significant changes from previous (reported by GI lab team as read by on site anesthesiologist)   - Tried to place on RA but desatted to 83  Stable on 2L  Incentive spirometery           Abnormal SPEP   Assessment & Plan    Onc appreciated  UPEP showed finding of undetermined significance  Outpt f/u w/ med-onc in 4 weeks     Chronic pancreatitis - history of Pancreatic mass   Assessment & Plan    - biopsy report from resection on 2018 revealed benign pancreatic tissue in the setting of chronic pancreatitis - developed subsequent weight loss and diarrhea after procedure and follows with gastroenterology  - resume Creon supplementation when able  biopsy from EGD  showed chronic active gastritis with inflamed granulation tissue and necroinflammatory debris consistent with ulcer       Diabetes mellitus type 2   Assessment & Plan    - HbA1c of 7 1   - hold home hypoglycemics - continue SSI coverage per Accu-Cheks QID (remains NPO on IV fluids)        VTE Pharmacologic Prophylaxis:   Pharmacologic: Heparin  Mechanical VTE Prophylaxis in Place: Yes    Patient Centered Rounds: I have performed bedside rounds with nursing staff today  Discussions with Specialists or Other Care Team Provider: no    Education and Discussions with Family / Patient: pt    Time Spent for Care: 30 minutes  More than 50% of total time spent on counseling and coordination of care as described above      Current Length of Stay: 15 day(s)    Current Patient Status: Inpatient   Certification Statement: The patient will continue to require additional inpatient hospital stay due to need to advance J tube feeds to goal    Discharge Plan: when J tube feeds at goal    Code Status: Level 1 - Full Code      Subjective:   Pain at J tube site    Objective:     Vitals:   Temp (24hrs), Av 3 °F (36 3 °C), Min:97 16 °F (36 2 °C), Max:97 8 °F (36 6 °C)    Temp:  [97 16 °F (36 2 °C)-97 8 °F (36 6 °C)] 97 2 °F (36 2 °C)  HR:  [67-91] 84  Resp:  [18-20] 20  BP: (121-170)/(70-88) 121/70  SpO2:  [95 %-99 %] 97 %  Body mass index is 17 82 kg/m²  Input and Output Summary (last 24 hours): Intake/Output Summary (Last 24 hours) at 01/16/19 1902  Last data filed at 01/16/19 1900   Gross per 24 hour   Intake           791 25 ml   Output              460 ml   Net           331 25 ml       Physical Exam:     Physical Exam   Constitutional: He is oriented to person, place, and time  No distress  HENT:   Head: Normocephalic and atraumatic  Eyes: Conjunctivae and EOM are normal    Neck: Normal range of motion  Neck supple  Cardiovascular: Normal rate and regular rhythm  Pulmonary/Chest: Effort normal and breath sounds normal  He has no wheezes  He has no rales  Abdominal: Soft  Bowel sounds are normal  There is tenderness (appropriate)  Musculoskeletal: Normal range of motion  He exhibits no edema  Neurological: He is alert and oriented to person, place, and time  Skin: Skin is warm and dry  He is not diaphoretic  J tube site intact       Additional Data:     Labs:      Results from last 7 days  Lab Units 01/16/19  1312   WBC Thousand/uL 14 89*   HEMOGLOBIN g/dL 9 7*   HEMATOCRIT % 31 7*   PLATELETS Thousands/uL 342   NEUTROS PCT % 85*   LYMPHS PCT % 10*   MONOS PCT % 5   EOS PCT % 0       Results from last 7 days  Lab Units 01/16/19  1312  01/10/19  0522 01/10/19  0125   POTASSIUM mmol/L 3 9  < > 4 6  --    CHLORIDE mmol/L 105  < > 98*  --    CO2 mmol/L 30  < > 35*  --    CO2, I-STAT mmol/L  --   --   --  35*   BUN mg/dL 7  < > 12  --    CREATININE mg/dL 0 60  < > 0 62  --    CALCIUM mg/dL 7 6*  < > 7 4*  --    ALK PHOS U/L  --   --  105  --    ALT U/L  --   --  20  --    AST U/L  --   --  22  --    GLUCOSE, ISTAT mg/dl  --   --   --  202*   < > = values in this interval not displayed  * I Have Reviewed All Lab Data Listed Above  * Additional Pertinent Lab Tests Reviewed:  All Labs For Current Hospital Admission Reviewed        Recent Cultures (last 7 days):       Results from last 7 days  Lab Units 01/10/19  1441 01/10/19  1439   BLOOD CULTURE  No Growth After 5 Days  No Growth After 5 Days  Last 24 Hours Medication List:     Current Facility-Administered Medications:  acetaminophen 650 mg Oral Q6H PRN Angeli Ge PA-C    aspirin 81 mg Oral Daily Nieves Walsh MD    atorvastatin 40 mg Oral Daily With Jennifer Heard MD    cholecalciferol 400 Units Oral Daily Yadiel Damon MD    dextrose 5 % and sodium chloride 0 45 % 75 mL/hr Intravenous Continuous Gia Moeller PA-C Last Rate: 75 mL/hr (01/15/19 2004)   heparin (porcine) 5,000 Units Subcutaneous [facility] Ashley Hernandez MD    HYDROmorphone 0 2 mg Intravenous Q4H PRN Livia Hernandez DO    insulin lispro 1-5 Units Subcutaneous Q6H [address] DEMI Langford    Labetalol HCl 5 mg Intravenous Once Stella Escalona MD    lidocaine 1 patch Topical Daily DEMI Melgar    melatonin 3 mg Oral HS Livia Hernandez DO    pancrelipase (Lip-Prot-Amyl) 24,000 Units Oral BID With Meals Yadiel Damon MD    pantoprazole 40 mg Intravenous Q24H [address] Corine Galarza MD    polyvinyl alcohol 1 drop Both Eyes PRN Ammy Olson PA-C         Today, Patient Was Seen By: Livia Hernandez DO    ** Please Note: Dictation voice to text software may have been used in the creation of this document   **

## 2019-01-16 NOTE — SOCIAL WORK
Notified pt, wife and granddaughter that [facility] does not have bed availability and requested additional facilities  Salasmayte Devin will discuss with pt daughter    [facility], they have a bed available  Spoke with [facility], [phone]  Advised patient is not medically cleared for discharge  They cannot hold bed but we should keep in touch once patient closer to disch

## 2019-01-16 NOTE — ANESTHESIA POSTPROCEDURE EVALUATION
Post-Op Assessment Note      CV Status:  Stable    Mental Status:  Alert and awake    Hydration Status:  Stable    PONV Controlled:  None    Airway Patency:  Patent    Post Op Vitals Reviewed: Yes          Staff: CRNA       Comments: vss          BP (!) 193/95 (01/15/19 1900)    Temp 97 8 °F (36 6 °C) (01/15/19 1900)    Pulse 74 (01/15/19 1900)   Resp 18 (01/15/19 1900)    SpO2 99 % (01/15/19 1900)

## 2019-01-16 NOTE — CONSULTS
Full ollow-up documented in flow sheet;  Recommend: increase EN as tolerated to goal of 65ml/hr; will provide 1872kcal, 86gms pro; 1265ml free water; meet hydration needs with 125ml water flush q 6 hours

## 2019-01-16 NOTE — MALNUTRITION/BMI
This medical record reflects one or more clinical indicators suggestive of malnutrition and underweight  Malnutrition Findings:   Malnutrition type: Chronic illness  Degree of Malnutrition: Other severe protein calorie malnutrition (as evidenced by 11% wt loss since 10/2018; intake meeting less than 75% estimated needs for greater than 1 month and depresses temples; treated with tube feeding for nutrition support)  Malnutrition Characteristics: Muscle loss, Inadequate energy, Weight loss    BMI Findings:  BMI Classifications: Underweight < 18 5     Body mass index is 17 82 kg/m²  See Nutrition note dated 1/16/19 for additional details  Completed nutrition assessment is viewable in the nutrition documentation

## 2019-01-16 NOTE — OP NOTE
OPERATIVE REPORT  PATIENT NAME: Aida Bedoya    :  1935  MRN: [id number]  Pt Location: [facility] OR ROOM 07    SURGERY DATE: 1/15/2019    Surgeon(s) and Role:     * Shemar Gardiner MD - Primary     Alex Diaz - Assisting    Preop Diagnosis:  Dehydration [E86 0]  Bilateral pneumonia [J18 9]  Severe protein-calorie malnutrition ([address] ) [E43]  Other chronic pancreatitis ([address] ) [K86 1]    Post-Op Diagnosis Codes:     * Dehydration [E86 0]     * Bilateral pneumonia [J18 9]     * Severe protein-calorie malnutrition ([address] ) [E43]     * Other chronic pancreatitis ([address] ) [K86 1]    Procedure(s) (LRB):  INSERTION JEJUNOSTOMY TUBE OPEN (N/A)   Lysis of adhesions    Specimen(s):  * No specimens in log *    Estimated Blood Loss:   Minimal    Drains:  Gastrostomy/Enterostomy Jejunostomy 14 Fr  LLQ (Active)   Number of days: 0       Anesthesia Type:   General    Operative Indications:  Dehydration [E86 0]  Bilateral pneumonia [J18 9]  Severe protein-calorie malnutrition ([address] ) [E43]  Other chronic pancreatitis ([address] ) [K801]  72-year-old male with malnutrition and oropharyngeal dysphagia  A PEG tube by GI was unsuccessful  Consequently was recommended that he have an open feeding tube placed  The risks and benefits were explained  Informed consent was obtained  Patient was brought to the operating room  Operative Findings: The stomach could not be safely brought inferior to the costal margin without putting the G-tube in a very distal portion of the stomach  Consequently, a J-tube was placed  Complications:   None    Procedure and Technique:  After identifying the patient, general anesthesia was achieved  Patient was prepped and draped in the usual sterile fashion  A time-out was performed  His previous incision was opened superiorly  Using sharp dissection this was taken through the skin, through the fascia and into the peritoneal cavity  We now lysed several adhesions of the omentum to the anterior abdominal wall  Once these were lysed we now lysed the colon off of the anterior abdominal wall  Once this was performed we did have a window and the stomach was identified  We freed up the stomach off the diaphragm and once we did this portion of the stomach could be brought inferiorly below the costal margin  At this time, the G-tube insertion site would be very distal in the stomach and I was concerned about potentially creating a gastric outlet obstruction based on the placement of the G-tube  Consequently, thought it would be safer to place a J-tube  We identified approximately 30 cm distal to the ligament of Treitz  A 3 0 silk pursestring suture was placed  The J-tube was brought through a separate stab incision  An enterotomy was made  The J-tube was placed distally into the small bowel  The pursestring suture was secured  A Witzel tunnel was created with 3 0 silk suture  The J-tube was tacked to the anterior abdominal wall at its exit site with interrupted 3 0 silk suture in 4 quadrants  The bowel was then tacked proximally and distally to prevent any twisting  The wound was now copiously irrigated  There was excellent hemostasis  Sponge and needle counts were correct  The fascia was approximated with 1 PDS suture starting at either end of the incision and secured centrally  Subcutaneous tissue was irrigated  Skin was approximated with interrupted 3 0 Vicryl suture subcutaneously and a running 4 Monocryl suture in a subcuticular fashion  Histocryl was used on the skin  Patient was then extubated and tolerated the procedure well and taken to the recovery room in stable condition  I was present and participated in all aspects of this procedure         I was present for the entire procedure    Patient Disposition:  PACU     SIGNATURE: Georgie Hashimoto, MD  DATE: January 15, 2019  TIME: 7:10 PM

## 2019-01-16 NOTE — ASSESSMENT & PLAN NOTE
- biopsy report from resection on 11/2018 revealed benign pancreatic tissue in the setting of chronic pancreatitis - developed subsequent weight loss and diarrhea after procedure and follows with gastroenterology  - resume Creon supplementation when able  biopsy from EGD 1/14 showed chronic active gastritis with inflamed granulation tissue and necroinflammatory debris consistent with ulcer

## 2019-01-16 NOTE — PHYSICAL THERAPY NOTE
Physical Therapy Tx Session:       01/16/19 1200   Pain Assessment   Pain Assessment No/denies pain   Pain Score No Pain   Restrictions/Precautions   Other Precautions Cognitive; Chair Alarm; Fall Risk;O2;Telemetry;Multiple lines  (2 L NC O2;93% with use of 2 L NC O2)   General   Chart Reviewed Yes   Family/Caregiver Present No   Cognition   Overall Cognitive Status Impaired   Arousal/Participation Responsive; Cooperative   Attention Attends with cues to redirect   Orientation Level Oriented to person;Disoriented to place; Disoriented to time;Disoriented to situation   Following Commands Follows one step commands with increased time or repetition  (2* dec cognition,slow mobility and lethargic)   Subjective   Subjective pt supine in bed resting comfortably;pt willing and agreeable to work with PT and to participate in therapy intervention;lethargic at times during tx session;NC O2 off during arrival, SpO2:83%->NSG aware (Anu), 2 L NC O2 placed on pt per NSG request and SpO2:93% with 2 L NC O2 following 20-30 seconds of use   Bed Mobility   Rolling L 3  Moderate assistance   Additional items Assist x 1;HOB elevated; Bedrails; Increased time required;Verbal cues;LE management   Supine to Sit 3  Moderate assistance   Additional items Assist x 1;HOB elevated; Bedrails; Increased time required;Verbal cues;LE management   Transfers   Sit to Stand 3  Moderate assistance   Additional items Assist x 2;Bedrails; Increased time required;Verbal cues   Stand to Sit 3  Moderate assistance   Additional items Assist x 2;Armrests; Increased time required;Verbal cues  (for safety,education and control descent)   Stand pivot 3  Moderate assistance   Additional items Assist x 2; Increased time required;Verbal cues   Ambulation/Elevation   Gait pattern Poor UE support; Improper Weight shift;Narrow NANCY; Forward Flexion; Shuffling; Inconsistent betty; Foward flexed; Short stride; Ataxia; Step to;Excessively slow   Gait Assistance 3  Moderate assist Additional items Assist x 2;Verbal cues; Tactile cues   Assistive Device Rolling walker   Distance 5 steps bed->chair with use of RW on tile surface;limited mobility and gait distance 2* inc fatigue,weakness and SOB,lethargic at times->NSG aware   Balance   Static Sitting (at EOB fair and sitting in chair with alarm fair(+))   Dynamic Sitting (zero)   Static Standing Zero   Dynamic Standing (zero)   Ambulatory Zero   Endurance Deficit   Endurance Deficit Yes   Endurance Deficit Description fatigue,weakness,SOB,lethargic at times,use of 2 L NC O2   Activity Tolerance   Activity Tolerance Patient limited by fatigue  (poor)   Nurse Made Aware yes (Anu)   Exercises   Hip Flexion Sitting;15 reps;AROM; Bilateral   Hip Abduction Sitting;15 reps;AROM; Bilateral   Hip Adduction Sitting;15 reps;AROM; Bilateral   Knee AROM Long Arc Quad Sitting;15 reps;AROM; Bilateral   Ankle Pumps Sitting;20 reps;AROM; Bilateral   Assessment   Prognosis Guarded   Problem List Decreased strength;Decreased endurance; Impaired balance;Decreased mobility; Decreased cognition;Decreased safety awareness;Decreased skin integrity   Assessment Pt able to perform sit to stand transfers modAx2,BM modAx1  Pt able to ambulate 5 steps bed->chair with use of RW on tile surface modAx2  Limited mobility and gait distance 2* pt lethargic at times,weakness,fatigues quickly and SOB  Use of 2 L NC O2 during PT tx session and mobility with SpO2:93% throughout  Pt able to perform and complete BLE ther ex HEP sitting in chair postmobility AROM with rest breaks inbetween each exercise  Pt would cont to benefit from skilled inpt PT services to maximize functional independence  Goals   Patient Goals to rest   STG Expiration Date 01/25/19   Treatment Day 2   Plan   Treatment/Interventions Functional transfer training; Endurance training;Patient/family training;Equipment eval/education; Bed mobility;Gait training;Spoke to nursing;Spoke to case management   Progress Slow progress, decreased activity tolerance   PT Frequency Other (Comment)  (3-5x/week)   Recommendation   Recommendation Short-term skilled PT   Equipment Recommended Walker   Skilled PT recommended while in hospital and upon DC to progress pt toward treatment goals

## 2019-01-16 NOTE — PLAN OF CARE

## 2019-01-16 NOTE — ASSESSMENT & PLAN NOTE
- with associated intermittent dysarthria noted - initial and second video barium swallow suspected possible neurologic etiology - previous CT of head/MR brain unremarkable for acute CVA - a subsequent dedicated MRI of brainstem negative for acute etiology  - repeat (third) video barium swallow on 1/12 unfortunate revealed no improvement - unsuccessful in PEG tube placement 1/14 with recommendation of surgical oncology evaluation for open gastrostomy tube placement as suspicion for pseudocyst/gastric erosion leading to abnormal gastric positioning noted possibly related to chronic pancreatitis ? ? 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  This now appears to communicate with the lumen of the stomach as described above which would explain the findings during endoscopy     - recently pulled out nasogastric feeding tube due to discomfort and refuses to have it replaced  - appreciate neurology input - initially suspected possibility of myasthenia gravis however acetylcholine receptor binding antibodies and MuSK antibodies were negative and repeat (second) video barium swallow earlier in hospital course after neostigmine stimulation revealed minimal improvement - no improvement with prior Mestinon trial either  - various inflammatory/rheumatologic markers checked to assess for autoimmune/paraneoplastic syndrome are unremarkable - heavy metal screen also negative  - can also consider association with pancreatic insufficiency per neurology  - Pt had J tube placed 1/15 by surg onc - unable to place G tube so J tube placed  - 1/16: started trickle TF   continue intravenous fluid infusion until J tube feeds at goal

## 2019-01-17 LAB
ANION GAP SERPL CALCULATED.3IONS-SCNC: 4 MMOL/L (ref 4–13)
BUN SERPL-MCNC: 6 MG/DL (ref 5–25)
CALCIUM SERPL-MCNC: 7.4 MG/DL (ref 8.3–10.1)
CHLORIDE SERPL-SCNC: 106 MMOL/L (ref 100–108)
CO2 SERPL-SCNC: 30 MMOL/L (ref 21–32)
CREAT SERPL-MCNC: 0.43 MG/DL (ref 0.6–1.3)
ERYTHROCYTE [DISTWIDTH] IN BLOOD BY AUTOMATED COUNT: 16.7 % (ref 11.6–15.1)
GFR SERPL CREATININE-BSD FRML MDRD: 108 ML/MIN/1.73SQ M
GLUCOSE SERPL-MCNC: 138 MG/DL (ref 65–140)
GLUCOSE SERPL-MCNC: 143 MG/DL (ref 65–140)
GLUCOSE SERPL-MCNC: 161 MG/DL (ref 65–140)
GLUCOSE SERPL-MCNC: 170 MG/DL (ref 65–140)
HCT VFR BLD AUTO: 30.3 % (ref 36.5–49.3)
HGB BLD-MCNC: 8.9 G/DL (ref 12–17)
MAGNESIUM SERPL-MCNC: 1.9 MG/DL (ref 1.6–2.6)
MCH RBC QN AUTO: 30.2 PG (ref 26.8–34.3)
MCHC RBC AUTO-ENTMCNC: 29.4 G/DL (ref 31.4–37.4)
MCV RBC AUTO: 103 FL (ref 82–98)
PHOSPHATE SERPL-MCNC: 2.5 MG/DL (ref 2.3–4.1)
PLATELET # BLD AUTO: 291 THOUSANDS/UL (ref 149–390)
PMV BLD AUTO: 11.6 FL (ref 8.9–12.7)
POTASSIUM SERPL-SCNC: 3.6 MMOL/L (ref 3.5–5.3)
RBC # BLD AUTO: 2.95 MILLION/UL (ref 3.88–5.62)
SODIUM SERPL-SCNC: 140 MMOL/L (ref 136–145)
WBC # BLD AUTO: 9.15 THOUSAND/UL (ref 4.31–10.16)

## 2019-01-17 PROCEDURE — C9113 INJ PANTOPRAZOLE SODIUM, VIA: HCPCS | Performed by: INTERNAL MEDICINE

## 2019-01-17 PROCEDURE — 80048 BASIC METABOLIC PNL TOTAL CA: CPT | Performed by: GENERAL PRACTICE

## 2019-01-17 PROCEDURE — 99233 SBSQ HOSP IP/OBS HIGH 50: CPT | Performed by: GENERAL PRACTICE

## 2019-01-17 PROCEDURE — 83735 ASSAY OF MAGNESIUM: CPT | Performed by: GENERAL PRACTICE

## 2019-01-17 PROCEDURE — 99232 SBSQ HOSP IP/OBS MODERATE 35: CPT | Performed by: INTERNAL MEDICINE

## 2019-01-17 PROCEDURE — 92526 ORAL FUNCTION THERAPY: CPT

## 2019-01-17 PROCEDURE — 82948 REAGENT STRIP/BLOOD GLUCOSE: CPT

## 2019-01-17 PROCEDURE — 84100 ASSAY OF PHOSPHORUS: CPT | Performed by: GENERAL PRACTICE

## 2019-01-17 PROCEDURE — 85027 COMPLETE CBC AUTOMATED: CPT | Performed by: GENERAL PRACTICE

## 2019-01-17 PROCEDURE — 99024 POSTOP FOLLOW-UP VISIT: CPT | Performed by: SURGERY

## 2019-01-17 RX ORDER — POTASSIUM CHLORIDE 20MEQ/15ML
40 LIQUID (ML) ORAL ONCE
Status: COMPLETED | OUTPATIENT
Start: 2019-01-17 | End: 2019-01-17

## 2019-01-17 RX ORDER — MAGNESIUM SULFATE HEPTAHYDRATE 40 MG/ML
2 INJECTION, SOLUTION INTRAVENOUS ONCE
Status: COMPLETED | OUTPATIENT
Start: 2019-01-17 | End: 2019-01-17

## 2019-01-17 RX ORDER — ASPIRIN 81 MG/1
81 TABLET, CHEWABLE ORAL DAILY
Status: DISCONTINUED | OUTPATIENT
Start: 2019-01-17 | End: 2019-01-19 | Stop reason: HOSPADM

## 2019-01-17 RX ORDER — ACETAMINOPHEN 160 MG/5ML
650 SUSPENSION, ORAL (FINAL DOSE FORM) ORAL EVERY 6 HOURS PRN
Status: DISCONTINUED | OUTPATIENT
Start: 2019-01-17 | End: 2019-01-19 | Stop reason: HOSPADM

## 2019-01-17 RX ADMIN — HEPARIN SODIUM 5000 UNITS: 5000 INJECTION INTRAVENOUS; SUBCUTANEOUS at 13:31

## 2019-01-17 RX ADMIN — INSULIN LISPRO 1 UNITS: 100 INJECTION, SOLUTION INTRAVENOUS; SUBCUTANEOUS at 06:14

## 2019-01-17 RX ADMIN — PANTOPRAZOLE SODIUM 40 MG: 40 INJECTION, POWDER, FOR SOLUTION INTRAVENOUS at 09:49

## 2019-01-17 RX ADMIN — HEPARIN SODIUM 5000 UNITS: 5000 INJECTION INTRAVENOUS; SUBCUTANEOUS at 21:11

## 2019-01-17 RX ADMIN — DEXTROSE AND SODIUM CHLORIDE 75 ML/HR: 5; .45 INJECTION, SOLUTION INTRAVENOUS at 13:31

## 2019-01-17 RX ADMIN — LIDOCAINE 1 PATCH: 50 PATCH CUTANEOUS at 09:51

## 2019-01-17 RX ADMIN — DEXTROSE AND SODIUM CHLORIDE 75 ML/HR: 5; .45 INJECTION, SOLUTION INTRAVENOUS at 00:13

## 2019-01-17 RX ADMIN — POTASSIUM CHLORIDE 40 MEQ: 20 SOLUTION ORAL at 09:49

## 2019-01-17 RX ADMIN — ASPIRIN 81 MG 81 MG: 81 TABLET ORAL at 17:14

## 2019-01-17 RX ADMIN — HEPARIN SODIUM 5000 UNITS: 5000 INJECTION INTRAVENOUS; SUBCUTANEOUS at 06:11

## 2019-01-17 RX ADMIN — INSULIN LISPRO 1 UNITS: 100 INJECTION, SOLUTION INTRAVENOUS; SUBCUTANEOUS at 17:23

## 2019-01-17 RX ADMIN — MAGNESIUM SULFATE HEPTAHYDRATE 2 G: 40 INJECTION, SOLUTION INTRAVENOUS at 17:15

## 2019-01-17 NOTE — PLAN OF CARE
Problem: SLP ADULT - SWALLOWING, IMPAIRED  Goal: Advance to least restrictive diet without signs or symptoms of aspiration for planned discharge setting  See evaluation for individualized goals  1   Pt will tolerate trials of puree and thin liquids by tsp with SLP only using multiple and effortful swallow techniques  2   Pt will perform pharyngeal exercises x 10 ea given min cues to improve pharyngeal swallow function     Outcome: Progressing

## 2019-01-17 NOTE — ASSESSMENT & PLAN NOTE
- symptomatically worsened for short time while in PACU post-PEG attempt but stabilized prompting return back to medical floor  - likely secondary to bilateral pneumonia (see plan below)   - due to concurrent tachycardia, in EKG ordered revealed no significant changes from previous (reported by GI lab team as read by on site anesthesiologist)   - 1/16: Tried to place on RA but desatted to 83  Stable on 2L    Incentive spirometery

## 2019-01-17 NOTE — ASSESSMENT & PLAN NOTE
J tube placed    Malnutrition Findings:   Malnutrition type: Chronic illness  Degree of Malnutrition: Other severe protein calorie malnutrition (as evidenced by 11% wt loss since 10/2018; intake meeting less than 75% estimated needs for greater than 1 month and depresses temples; treated with tube feeding for nutrition support)    BMI Findings:  BMI Classifications: Underweight < 18 5     Body mass index is 17 82 kg/m²

## 2019-01-17 NOTE — UTILIZATION REVIEW
Continued Stay Review    Date: 1/17/2019  Vital Signs: /67   Pulse 83   Temp 98 7 °F (37 1 °C)   Resp 20   Ht 5' 7" (1 702 m)   Wt 51 6 kg (113 lb 12 1 oz)   SpO2 98%   BMI 17 82 kg/m²      Assessment/Plan:  83 yo m  Admitted with Severe sepsis - b/l pnu now resolved 1/15  -Oropharyngeal dysphagia -  S/p J tube placment - now on tube feeds -  Disorder of upper esophageal sphincter -  ENT for possible Botox injection - Severe protein calorie malnutrition - hypomag  K and phosphatemia - Acute resp failure - with hypoxia -  Now stable on 2 L NC o2  - Chronic pancreatitis  - Hx of pancreatic mass  - DM2 -     Medications:   Scheduled Meds:   Current Facility-Administered Medications:  acetaminophen 650 mg Oral Q6H PRN    aspirin 81 mg Oral Daily    atorvastatin 40 mg Oral Daily With Dinner    cholecalciferol 400 Units Oral Daily    heparin (porcine) 5,000 Units Subcutaneous Q8H [address]    HYDROmorphone 0 2 mg Intravenous Q4H PRN 1/16 x1   insulin lispro 1-5 Units Subcutaneous Q6H [address]    Labetalol HCl 5 mg Intravenous Once    lidocaine 1 patch Topical Daily    melatonin 3 mg Oral HS    pancrelipase (Lip-Prot-Amyl) 24,000 Units Oral BID With Meals    pantoprazole 40 mg Intravenous Q24H BOBBY    polyvinyl alcohol 1 drop Both Eyes PRN    potassium chloride 40 mEq Per J Tube Once      Continuous Infusions:   dextrose 5 % and sodium chloride 0 45 % 75 mL/hr     Nursing orders -  VS q 4- Oral care - Elevate Head of bed - Aspiration precautions - Incentive spirometry - Up with assistance - Oral care - Diet  Enteral - parenteral - Jevity - 1 2 luis carlos - 60 - water q 6  - Pt/OT speech     Pertinent Labs/Diagnostic Results:      Ref Range & Units 1/17/19 0442 1/16/19 1312 1/15/19 0612 1/14/19 0527 1/13/19 0607   Sodium 136 - 145 mmol/L 140  139  140  141  138    Potassium 3 5 - 5 3 mmol/L 3 6  3 9  3 9  3 7  3 5    Chloride 100 - 108 mmol/L 106  105  102  103  102    CO2 21 - 32 mmol/L 30  30  30  29  29    ANION GAP 4 - 13 mmol/L 4  4  8  9  7    BUN 5 - 25 mg/dL 6  7  3   4   6    Creatinine 0 60 - 1 30 mg/dL 0 43   0 60CM  0 44CM   0 36CM   0 37CM     Glucose 65 - 140 mg/dL 143   155CM   120CM  73CM  107CM    Calcium 8 3 - 10 1 mg/dL 7 4   7 6   7 6   7 8   7 4     eGFR ml/min/1 73sq m 108  94  107  116  115           Mag 1/16 - 2 0 - 1/17 - 1 9  Phos 1/16 - 3 2 - 1/17 2 5     Ref Range & Units 1/17/19 0442 1/16/19 1312 1/15/19 0612 1/14/19 0527 1/13/19 0607   WBC 4 31 - 10 16 Thousand/uL 9 15  14 89   11 03   11 93   11 85     RBC 3 88 - 5 62 Million/uL 2 95   3 15   3 42   3 18   3 02     Hemoglobin 12 0 - 17 0 g/dL 8 9   9 7   10 4   9 7   9 3     Hematocrit 36 5 - 49 3 % 30 3   31 7   33 7   31 5   29 5     MCV 82 - 98 fL 103   101   99   99   98    MCH 26 8 - 34 3 pg 30 2  30 8  30 4  30 5  30 8    MCHC 31 4 - 37 4 g/dL 29 4   30 6   30 9   30 8   31 5    RDW 11 6 - 15 1 % 16 7   16 7   16 2   16 4   15 7     Platelets 420 - 361 Thousands/uL 291  342  304  344  337    MPV 8 9 - 12 7 fL 11 6  10 6  10 9  11 1  10 9            Ct A & P - 1/14 - Developing/worsening lobar pneumonia in the lower lobes bilaterally with associated parapneumonic effusions    Stable liver cyst  Stable renal cysts    Atrophic pancreas with multilocular cystic collection involving pancreatic body and tail with a larger component situated in the splenic hilum, most likely a large pseudocyst  This now appears to communicate with the lumen of the stomach as described   above which would explain the findings during endoscopy        Age/Sex: 82 y o  male     Discharge Plan:  TBD

## 2019-01-17 NOTE — PROGRESS NOTES
[facility]  Progress Note - Denece Mariya 82 y o  male MRN: [id number]    Unit/Bed#: [facility] 913-01 Encounter: [id number]    Assessment/Plan:  Oropharyngeal dysphagia  -unsuccessful attempt at endoscopic PEG tube placement therefore patient was referred to Surgical Oncology who also was unable to place a surgical G-tube due to anatomy and so underwent an open J-tube placement   -patient with minimal pain around J-tube site  -tube feeds as per nutrition recommendation    Chronic pancreatitis  Pseudocyst  Cystenteric Fistula  -patient following closely with Dr Cunningham Organ  -do not suspect abscess at this juncture as patient without leukocytosis, fever or abdominal pain in left upper quadrant  -discussion about possible stenting of cystenteric fistula to increase drainage however given the small size and lack of worrisome symptoms will defer for now  -continue pancreatic enzymes  -recommend repeat CT abdomen pelvis with contrast in 1 week, with GI follow-up after that    GI to sign off for now  Will follow up as an outpatient  Please call with any further questions or concerns    Subjective:   Patient currently without nausea, vomiting mild abdominal pain around incisional sites  No fevers  Tolerating tube feeds without issue  Objective:     Vitals: Blood pressure 116/65, pulse 88, temperature 98 1 °F (36 7 °C), resp  rate 18, height 5' 7" (1 702 m), weight 51 6 kg (113 lb 12 1 oz), SpO2 98 %  ,Body mass index is 17 82 kg/m²  Intake/Output Summary (Last 24 hours) at 01/17/19 1512  Last data filed at 01/17/19 1418   Gross per 24 hour   Intake             1445 ml   Output             1190 ml   Net              255 ml       Review of Systems: as per HPI  Review of Systems    Physical Exam:     Physical Exam   Constitutional: No distress  HENT:   Head: Atraumatic  Eyes: No scleral icterus  Neck: Neck supple  Cardiovascular: Normal rate and regular rhythm      Pulmonary/Chest: Effort normal and breath sounds normal    Abdominal: Soft  Bowel sounds are normal  He exhibits no distension  There is no tenderness  Midline incision appears clean dry and intact, J tube site appears clean dry and intact without erythema   Musculoskeletal: He exhibits no edema  Neurological: He is alert  Psychiatric: He has a normal mood and affect  Invasive Devices     Peripheral Intravenous Line            Peripheral IV 01/15/19 Left Arm 1 day          Drain            Gastrostomy/Enterostomy Jejunostomy 14 Fr   LLQ 1 day                        CBC: Lab Results   Component Value Date    WBC 9 15 01/17/2019    HGB 8 9 (L) 01/17/2019    HCT 30 3 (L) 01/17/2019     (H) 01/17/2019     01/17/2019    MCH 30 2 01/17/2019    MCHC 29 4 (L) 01/17/2019    RDW 16 7 (H) 01/17/2019    MPV 11 6 01/17/2019   ,   CMP: Lab Results   Component Value Date    K 3 6 01/17/2019     01/17/2019    CO2 30 01/17/2019    BUN 6 01/17/2019    CREATININE 0 43 (L) 01/17/2019    CALCIUM 7 4 (L) 01/17/2019    EGFR 108 01/17/2019

## 2019-01-17 NOTE — ASSESSMENT & PLAN NOTE
- symptomatically worsened for short time while in PACU post-PEG attempt but stabilized prompting return back to medical floor  - likely secondary to bilateral pneumonia (see plan below)   - due to concurrent tachycardia, in EKG ordered revealed no significant changes from previous (reported by GI lab team as read by on site anesthesiologist)   - Tried to place on RA but desatted to 83  Stable on 2L    Incentive spirometery

## 2019-01-17 NOTE — ASSESSMENT & PLAN NOTE
- biopsy report from resection on 11/2018 revealed benign pancreatic tissue in the setting of chronic pancreatitis - developed subsequent weight loss and diarrhea after procedure and follows with gastroenterology  - resume Creon supplementation when able  biopsy from EGD 1/14 showed chronic active gastritis with inflamed granulation tissue and necroinflammatory debris consistent with ulcer  Pt needs CTAP w/  IV contrast in 1 week (around 1/24) and then will f/u w/ GI

## 2019-01-17 NOTE — PROGRESS NOTES
Progress Note - David Alonso 1935, 82 y o  male MRN: [id number]    Unit/Bed#: [facility] 913-01 Encounter: [id number]    Primary Care Provider: Shirley Muñoz DO   Date and time admitted to hospital: 1/1/2019  8:59 PM        Severe sepsis - Bilateral pneumoniaresolved as of 1/15/2019   Assessment & Plan    - evolving sepsis as evidenced by fever, tachypnea, leukocytosis, and previous lactic acidosis  - presumed source of bilateral streptococcal pneumonia - positive urine streptococcal Ag noted - blood cultures from 1/1 are negative and repeat cultures from 1/10 negative  - MRSA screen negative  - encourage incentive spirometry - maintain oxygenation    - RSV/Influenza PCR negative   - appreciate pulmonology input who have discontinued antibiotics on 1/12 as procalcitonin normalized   - remains NPO due to dysphagia (see plan below)   - transferred out of the ICU on 1/11       * Oropharyngeal dysphagia   Assessment & Plan    - with associated intermittent dysarthria noted - initial and second video barium swallow suspected possible neurologic etiology - previous CT of head/MR brain unremarkable for acute CVA - a subsequent dedicated MRI of brainstem negative for acute etiology  - repeat (third) video barium swallow on 1/12 unfortunate revealed no improvement - unsuccessful in PEG tube placement 1/14 with recommendation of surgical oncology evaluation for open gastrostomy tube placement as suspicion for pseudocyst/gastric erosion leading to abnormal gastric positioning noted possibly related to chronic pancreatitis ? ? 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  This now appears to communicate with the lumen of the stomach as described above which would explain the findings during endoscopy  - recently pulled out nasogastric feeding tube due to discomfort and refuses to have it replaced  - appreciate neurology input - initially suspected possibility of myasthenia gravis however acetylcholine receptor binding antibodies and MuSK antibodies were negative and repeat (second) video barium swallow earlier in hospital course after neostigmine stimulation revealed minimal improvement - no improvement with prior Mestinon trial either  - various inflammatory/rheumatologic markers checked to assess for autoimmune/paraneoplastic syndrome are unremarkable - heavy metal screen also negative  - can also consider association with pancreatic insufficiency per neurology  - Pt had J tube placed 1/15 by surg onc - unable to place G tube so J tube placed  - 1/16: started trickle TF   continue intravenous fluid infusion until J tube feeds at goal  - 1/17: increase TF to 20/hr  Per Dr Niki Bonilla ok to give liquid meds and crushed ASA  Try to limit pills as much as possible  Crushed pills ok if needed  Disorder of upper esophageal sphincter   Assessment & Plan    Likely related to above  Pt should f/u with ENT outpt for possible Botox injection     Severe protein-calorie malnutrition ([facility] [address] )   Assessment & Plan    J tube placed    Malnutrition Findings:   Malnutrition type: Chronic illness  Degree of Malnutrition: Other severe protein calorie malnutrition (as evidenced by 11% wt loss since 10/2018; intake meeting less than 75% estimated needs for greater than 1 month and depresses temples; treated with tube feeding for nutrition support)    BMI Findings:  BMI Classifications: Underweight < 18 5     Body mass index is 17 82 kg/m²          Hypomagnesemia - Hypokalemia - Hypophosphatemia   Assessment & Plan    - monitor/replete as necessary     Acute respiratory failure with hypoxia   Assessment & Plan    - symptomatically worsened for short time while in PACU post-PEG attempt but stabilized prompting return back to medical floor  - likely secondary to bilateral pneumonia (see plan below)   - due to concurrent tachycardia, in EKG ordered revealed no significant changes from previous (reported by GI lab team as read by on site anesthesiologist)   - 1/16: Tried to place on RA but desatted to 83  Stable on 2L  Incentive spirometery           Abnormal SPEP   Assessment & Plan    Onc appreciated  UPEP showed finding of undetermined significance  Outpt f/u w/ med-onc in 4 weeks     Chronic pancreatitis - history of Pancreatic mass   Assessment & Plan    - biopsy report from resection on 11/2018 revealed benign pancreatic tissue in the setting of chronic pancreatitis - developed subsequent weight loss and diarrhea after procedure and follows with gastroenterology  - resume Creon supplementation when able  biopsy from EGD 1/14 showed chronic active gastritis with inflamed granulation tissue and necroinflammatory debris consistent with ulcer  Pt needs CTAP w/  IV contrast in 1 week (around 1/24) and then will f/u w/ GI     Diabetes mellitus type 2   Assessment & Plan    - HbA1c of 7 1   - hold home hypoglycemics - continue SSI coverage per [employer] QID (remains NPO on IV fluids)        VTE Pharmacologic Prophylaxis:   Pharmacologic: Heparin  Mechanical VTE Prophylaxis in Place: Yes    Patient Centered Rounds: I have performed bedside rounds with nursing staff today  Discussions with Specialists or Other Care Team Provider: Dr Herbert Bhardwaj    Education and Discussions with Family / Patient: pt and wife  Left vm w/ Tamie    Time Spent for Care: 30 minutes  More than 50% of total time spent on counseling and coordination of care as described above      Current Length of Stay: 16 day(s)    Current Patient Status: Inpatient   Certification Statement: The patient will continue to require additional inpatient hospital stay due to need to [facility] [email or web address] s/p J tube    Discharge Plan: Sat to STR if TF at goal    Code Status: Level 1 - Full Code      Subjective:   No acute complaints    Objective:     Vitals:   Temp (24hrs), Av 2 °F (36 8 °C), Min:97 9 °F (36 6 °C), Max:98 7 °F (37 1 °C)    Temp:  [97 9 °F (36 6 °C)-98 7 °F (37 1 °C)] 97 9 °F (36 6 °C)  HR:  [73-90] 84  Resp:  [18-20] 20  BP: (111-118)/(64-67) 111/64  SpO2:  [95 %-98 %] 96 %  Body mass index is 17 82 kg/m²  Input and Output Summary (last 24 hours): Intake/Output Summary (Last 24 hours) at 19 1733  Last data filed at 19 1700   Gross per 24 hour   Intake             1445 ml   Output             1075 ml   Net              370 ml       Physical Exam:     Physical Exam   Constitutional: He is oriented to person, place, and time  No distress  HENT:   Head: Normocephalic and atraumatic  Eyes: Conjunctivae and EOM are normal    Neck: Normal range of motion  Neck supple  Cardiovascular: Normal rate and regular rhythm  Pulmonary/Chest: Effort normal and breath sounds normal  He has no wheezes  He has no rales  Abdominal: Soft  Bowel sounds are normal  He exhibits no distension  There is no tenderness  Musculoskeletal: Normal range of motion  He exhibits no edema  Neurological: He is alert and oriented to person, place, and time  Skin: Skin is warm and dry  He is not diaphoretic  Additional Data:     Labs:      Results from last 7 days  Lab Units 19  0442 19  1312   WBC Thousand/uL 9 15 14 89*   HEMOGLOBIN g/dL 8 9* 9 7*   HEMATOCRIT % 30 3* 31 7*   PLATELETS Thousands/uL 291 342   NEUTROS PCT %  --  85*   LYMPHS PCT %  --  10*   MONOS PCT %  --  5   EOS PCT %  --  0       Results from last 7 days  Lab Units 19  0442   POTASSIUM mmol/L 3 6   CHLORIDE mmol/L 106   CO2 mmol/L 30   BUN mg/dL 6   CREATININE mg/dL 0 43*   CALCIUM mg/dL 7 4*           * I Have Reviewed All Lab Data Listed Above  * Additional Pertinent Lab Tests Reviewed:  [address] Admission Reviewed        Recent Cultures (last 7 days):           Last 24 Hours Medication List:     Current Facility-Administered Medications:  acetaminophen 650 mg Per J Tube Q6H PRN Nora Mode, DO    aspirin 81 mg Per J Tube Daily Nora Mode, DO    atorvastatin 40 mg Oral Daily With Tapan Gann MD    cholecalciferol 400 Units Oral Daily Emily Burgos MD    dextrose 5 % and sodium chloride 0 45 % 75 mL/hr Intravenous Continuous Gia Moeller PA-C Last Rate: 75 mL/hr (01/17/19 1331)   heparin (porcine) 5,000 Units Subcutaneous [facility] Klarissa Smart MD    HYDROmorphone 0 2 mg Intravenous Q4H PRN Nora Mode, DO    insulin lispro 1-5 Units Subcutaneous Q6H [address] DEMI Langford    Labetalol HCl 5 mg Intravenous Once Liz Lake MD    lidocaine 1 patch Topical Daily Bartholome DEMI Carter    magnesium sulfate 2 g Intravenous Once Nora Mode, DO    melatonin 3 mg Oral HS Nora Mode, DO    pancrelipase (Lip-Prot-Amyl) 24,000 Units Oral BID With Meals Emily Burgos MD    pantoprazole 40 mg Intravenous Q24H [address] Bernadette Brody MD    polyvinyl alcohol 1 drop Both Eyes PRN Danielle Stewart PA-C         Today, Patient Was Seen By: Nora Mode, DO    ** Please Note: Dictation voice to text software may have been used in the creation of this document   **

## 2019-01-17 NOTE — SOCIAL WORK
TCF daughter, she would prefer patient be at [facility] or [address] [place] [address], she would like to wait and see if they have a bed available the weekend

## 2019-01-17 NOTE — PROGRESS NOTES
Progress Note - Surgical Oncology  Kera Giang 82 y o  male MRN: [id number]  Unit/Bed#: [facility] 913-01 Encounter: [id number]    Assessment:  82 y o  M with dysphagia and malnutrition, s/p open J tube placement 1/15    Pt has no complaints this morning, denies n/v  Tolerating tube feeds well  Incisions C/D/I    Plan:  [facility] 1 2 from 10 to 20        Objective:     Blood pressure 118/67, pulse 83, temperature 98 7 °F (37 1 °C), resp  rate 20, height 5' 7" (1 702 m), weight 51 6 kg (113 lb 12 1 oz), SpO2 98 %  ,Body mass index is 17 82 kg/m²  Intake/Output Summary (Last 24 hours) at 01/17/19 0543  Last data filed at 01/17/19 0300   Gross per 24 hour   Intake             1250 ml   Output              910 ml   Net              340 ml       Invasive Devices     Peripheral Intravenous Line            Peripheral IV 01/15/19 Left Arm 1 day          Drain            Gastrostomy/Enterostomy Jejunostomy 14 Fr  LLQ 1 day                Physical Exam: /67   Pulse 83   Temp 98 7 °F (37 1 °C)   Resp 20   Ht 5' 7" (1 702 m)   Wt 51 6 kg (113 lb 12 1 oz)   SpO2 98%   BMI 17 82 kg/m²   General appearance: alert and oriented, in no acute distress  Head: Normocephalic, without obvious abnormality, atraumatic  Lungs: clear to auscultation bilaterally  Heart: regular rate and rhythm, S1, S2 normal, no murmur, click, rub or gallop  Abdomen: soft, non-tender; bowel sounds normal; no masses,  no organomegaly  Extremities: extremities normal, warm and well-perfused; no cyanosis, clubbing, or edema  Skin: Skin color, texture, turgor normal  Incisions on abdomen healing well, no warm, tenderness, or erythema        VTE Pharmacologic Prophylaxis: Heparin  VTE Mechanical Prophylaxis: sequential compression device

## 2019-01-17 NOTE — ASSESSMENT & PLAN NOTE
- with associated intermittent dysarthria noted - initial and second video barium swallow suspected possible neurologic etiology - previous CT of head/MR brain unremarkable for acute CVA - a subsequent dedicated MRI of brainstem negative for acute etiology  - repeat (third) video barium swallow on 1/12 unfortunate revealed no improvement - unsuccessful in PEG tube placement 1/14 with recommendation of surgical oncology evaluation for open gastrostomy tube placement as suspicion for pseudocyst/gastric erosion leading to abnormal gastric positioning noted possibly related to chronic pancreatitis ? ? 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  This now appears to communicate with the lumen of the stomach as described above which would explain the findings during endoscopy  - recently pulled out nasogastric feeding tube due to discomfort and refuses to have it replaced  - appreciate neurology input - initially suspected possibility of myasthenia gravis however acetylcholine receptor binding antibodies and MuSK antibodies were negative and repeat (second) video barium swallow earlier in hospital course after neostigmine stimulation revealed minimal improvement - no improvement with prior Mestinon trial either  - various inflammatory/rheumatologic markers checked to assess for autoimmune/paraneoplastic syndrome are unremarkable - heavy metal screen also negative  - can also consider association with pancreatic insufficiency per neurology  - Pt had J tube placed 1/15 by surg onc - unable to place G tube so J tube placed  - 1/16: started trickle TF   continue intravenous fluid infusion until J tube feeds at goal  - 1/17: increase TF to 20/hr  Per Dr Napoles Notice ok to give liquid meds and crushed ASA  Try to limit pills as much as possible  Crushed pills ok if needed

## 2019-01-17 NOTE — SPEECH THERAPY NOTE
Speech Language/Pathology    Speech/Language Pathology Progress Note    Patient Name: Emma Simmons  [id number]'L Date: 1/17/2019    Subjective:  Pt awake and alert in bed  Wife present bedside  Objective:  Pt seen for dysphagia tx  Current diet: NPO; J-tube     Reviewed VBS with pt and wife utilizing diagram and discussing swallowing exercises to strengthen pharyngeal swallow  Pt given therapeutic trials of 1/2 tsp presentations of applesauce alternated by 1/2 tsp presentations of thin water  Encouraged multiple and effortful swallows  Pt initiated swallows x 3-6 per presentation  Mod to max cues for hard swallow without excess effort visualized  Cued throat clear/cough was weak  No overt s/s aspiration observed  Introduced the [employer]  Pt unable to initiate swallow for 5 trials with tongue placed between upper and lower teeth  Assessment:  Therapeutic swallowing exercises initiated  Plan/Recommendations:  Continue NPO; Jtube for all nutrition/hydration/meds  Continue exercises and therapeutic trials with SLP only

## 2019-01-18 ENCOUNTER — TELEPHONE (OUTPATIENT)
Dept: GASTROENTEROLOGY | Facility: CLINIC | Age: 84
End: 2019-01-18

## 2019-01-18 PROBLEM — R09.02 HYPOXIA: Status: RESOLVED | Noted: 2019-01-12 | Resolved: 2019-01-18

## 2019-01-18 PROBLEM — K25.9 GASTRIC ULCER: Status: ACTIVE | Noted: 2019-01-18

## 2019-01-18 LAB
ANION GAP SERPL CALCULATED.3IONS-SCNC: 1 MMOL/L (ref 4–13)
BASOPHILS # BLD AUTO: 0.01 THOUSANDS/ΜL (ref 0–0.1)
BASOPHILS NFR BLD AUTO: 0 % (ref 0–1)
BUN SERPL-MCNC: 5 MG/DL (ref 5–25)
CALCIUM SERPL-MCNC: 7.4 MG/DL (ref 8.3–10.1)
CHLORIDE SERPL-SCNC: 107 MMOL/L (ref 100–108)
CO2 SERPL-SCNC: 31 MMOL/L (ref 21–32)
CREAT SERPL-MCNC: 0.38 MG/DL (ref 0.6–1.3)
EOSINOPHIL # BLD AUTO: 0.1 THOUSAND/ΜL (ref 0–0.61)
EOSINOPHIL NFR BLD AUTO: 2 % (ref 0–6)
ERYTHROCYTE [DISTWIDTH] IN BLOOD BY AUTOMATED COUNT: 16.7 % (ref 11.6–15.1)
GFR SERPL CREATININE-BSD FRML MDRD: 114 ML/MIN/1.73SQ M
GLUCOSE SERPL-MCNC: 139 MG/DL (ref 65–140)
GLUCOSE SERPL-MCNC: 149 MG/DL (ref 65–140)
GLUCOSE SERPL-MCNC: 156 MG/DL (ref 65–140)
GLUCOSE SERPL-MCNC: 167 MG/DL (ref 65–140)
GLUCOSE SERPL-MCNC: 193 MG/DL (ref 65–140)
HCT VFR BLD AUTO: 26.6 % (ref 36.5–49.3)
HCT VFR BLD AUTO: 28.3 % (ref 36.5–49.3)
HGB BLD-MCNC: 8 G/DL (ref 12–17)
HGB BLD-MCNC: 8.4 G/DL (ref 12–17)
IMM GRANULOCYTES # BLD AUTO: 0.03 THOUSAND/UL (ref 0–0.2)
IMM GRANULOCYTES NFR BLD AUTO: 1 % (ref 0–2)
LYMPHOCYTES # BLD AUTO: 1.05 THOUSANDS/ΜL (ref 0.6–4.47)
LYMPHOCYTES NFR BLD AUTO: 18 % (ref 14–44)
MAGNESIUM SERPL-MCNC: 2.4 MG/DL (ref 1.6–2.6)
MCH RBC QN AUTO: 31 PG (ref 26.8–34.3)
MCHC RBC AUTO-ENTMCNC: 30.1 G/DL (ref 31.4–37.4)
MCV RBC AUTO: 103 FL (ref 82–98)
MONOCYTES # BLD AUTO: 0.44 THOUSAND/ΜL (ref 0.17–1.22)
MONOCYTES NFR BLD AUTO: 7 % (ref 4–12)
NEUTROPHILS # BLD AUTO: 4.35 THOUSANDS/ΜL (ref 1.85–7.62)
NEUTS SEG NFR BLD AUTO: 72 % (ref 43–75)
NRBC BLD AUTO-RTO: 0 /100 WBCS
PHOSPHATE SERPL-MCNC: 2.1 MG/DL (ref 2.3–4.1)
PLATELET # BLD AUTO: 260 THOUSANDS/UL (ref 149–390)
PMV BLD AUTO: 11.2 FL (ref 8.9–12.7)
POTASSIUM SERPL-SCNC: 4.2 MMOL/L (ref 3.5–5.3)
RBC # BLD AUTO: 2.58 MILLION/UL (ref 3.88–5.62)
SODIUM SERPL-SCNC: 139 MMOL/L (ref 136–145)
WBC # BLD AUTO: 5.98 THOUSAND/UL (ref 4.31–10.16)

## 2019-01-18 PROCEDURE — 85025 COMPLETE CBC W/AUTO DIFF WBC: CPT | Performed by: PHYSICIAN ASSISTANT

## 2019-01-18 PROCEDURE — 84100 ASSAY OF PHOSPHORUS: CPT | Performed by: GENERAL PRACTICE

## 2019-01-18 PROCEDURE — 99232 SBSQ HOSP IP/OBS MODERATE 35: CPT | Performed by: GENERAL PRACTICE

## 2019-01-18 PROCEDURE — 83735 ASSAY OF MAGNESIUM: CPT | Performed by: GENERAL PRACTICE

## 2019-01-18 PROCEDURE — 85014 HEMATOCRIT: CPT | Performed by: GENERAL PRACTICE

## 2019-01-18 PROCEDURE — 85018 HEMOGLOBIN: CPT | Performed by: GENERAL PRACTICE

## 2019-01-18 PROCEDURE — 97116 GAIT TRAINING THERAPY: CPT

## 2019-01-18 PROCEDURE — 97530 THERAPEUTIC ACTIVITIES: CPT

## 2019-01-18 PROCEDURE — 80048 BASIC METABOLIC PNL TOTAL CA: CPT | Performed by: PHYSICIAN ASSISTANT

## 2019-01-18 PROCEDURE — 99024 POSTOP FOLLOW-UP VISIT: CPT | Performed by: SURGERY

## 2019-01-18 PROCEDURE — 82948 REAGENT STRIP/BLOOD GLUCOSE: CPT

## 2019-01-18 PROCEDURE — C9113 INJ PANTOPRAZOLE SODIUM, VIA: HCPCS | Performed by: INTERNAL MEDICINE

## 2019-01-18 PROCEDURE — 97110 THERAPEUTIC EXERCISES: CPT

## 2019-01-18 PROCEDURE — 97535 SELF CARE MNGMENT TRAINING: CPT

## 2019-01-18 RX ORDER — LACTOSE-REDUCED FOOD/FIBER
LIQUID (ML) ORAL
Qty: 1000 ML | Refills: 0 | Status: ON HOLD | OUTPATIENT
Start: 2019-01-18 | End: 2019-02-18 | Stop reason: SDUPTHER

## 2019-01-18 RX ADMIN — PANTOPRAZOLE SODIUM 40 MG: 40 INJECTION, POWDER, FOR SOLUTION INTRAVENOUS at 08:25

## 2019-01-18 RX ADMIN — HEPARIN SODIUM 5000 UNITS: 5000 INJECTION INTRAVENOUS; SUBCUTANEOUS at 21:38

## 2019-01-18 RX ADMIN — HEPARIN SODIUM 5000 UNITS: 5000 INJECTION INTRAVENOUS; SUBCUTANEOUS at 13:42

## 2019-01-18 RX ADMIN — DEXTROSE AND SODIUM CHLORIDE 75 ML/HR: 5; .45 INJECTION, SOLUTION INTRAVENOUS at 01:39

## 2019-01-18 RX ADMIN — HEPARIN SODIUM 5000 UNITS: 5000 INJECTION INTRAVENOUS; SUBCUTANEOUS at 06:12

## 2019-01-18 RX ADMIN — SODIUM PHOSPHATE, MONOBASIC, MONOHYDRATE 21 MMOL: 276; 142 INJECTION, SOLUTION INTRAVENOUS at 15:11

## 2019-01-18 RX ADMIN — INSULIN LISPRO 1 UNITS: 100 INJECTION, SOLUTION INTRAVENOUS; SUBCUTANEOUS at 17:17

## 2019-01-18 RX ADMIN — INSULIN LISPRO 1 UNITS: 100 INJECTION, SOLUTION INTRAVENOUS; SUBCUTANEOUS at 06:13

## 2019-01-18 NOTE — ASSESSMENT & PLAN NOTE
J tube placed    Malnutrition Findings:   Malnutrition type: Chronic illness  Degree of Malnutrition: Other severe protein calorie malnutrition (as evidenced by 11% wt loss since 10/2018; intake meeting less than 75% estimated needs for greater than 1 month and depresses temples; treated with tube feeding for nutrition support)    BMI Findings:  BMI Classifications: Underweight < 18 5     Body mass index is 22 62 kg/m²

## 2019-01-18 NOTE — SOCIAL WORK
PT for discharge tomorrow  [facility], they can transport at 11:15 tomorrow 1/19/19 to [address] [place] [address] notified  Request TF orders  Requested from Dr Vita Hess  For ambulance faxed to [facility]    Patient and wife notified of time of transfer

## 2019-01-18 NOTE — PLAN OF CARE
Problem: OCCUPATIONAL THERAPY ADULT  Goal: Performs self-care activities at highest level of function for planned discharge setting  See evaluation for individualized goals  Treatment Interventions: ADL retraining, Functional transfer training, UE strengthening/ROM, Endurance training, Cognitive reorientation, Patient/family training, Equipment evaluation/education, Compensatory technique education, Activityengagement, Energy conservation          See flowsheet documentation for full assessment, interventions and recommendations  Outcome: Progressing  Limitation: Decreased ADL status, Decreased UE strength, Decreased Safe judgement during ADL, Decreased endurance, Decreased cognition, Decreased high-level ADLs, Decreased self-care trans  Prognosis: Fair  Assessment: Patient participated in Skilled OT session this date with interventions consisting of ADL re training with the use of correct body mechnaics, safety awareness and fall prevention techniques, therapeutic exercise to: increase functional use of BUEs, increase BUE muscle strength ,  therapeutic activities to: increase activity tolerance, increase standing tolerance time with unilateral UE support to complete sink level ADLs, increase postural control, increase trunk control and increase OOB/ sitting tolerance   Patient agreeable to OT treatment session, upon arrival patient was found supine in bed  In comparison to previous session, patient with improvements in cognition and endurance*   Patient requiring verbal cues for safety, verbal cues for correct technique and verbal cues for pacing thru activity steps  Patient continues to be functioning below baseline level, occupational performance remains limited secondary to factors listed above and increased risk for falls and injury  From OT standpoint, recommendation at time of d/c would be Short Term Rehab     Patient to benefit from continued Occupational Therapy treatment while in the hospital to address deficits as defined above and maximize level of functional independence with ADLs and functional mobility  OT Discharge Recommendation: Short Term Rehab  OT - OK to Discharge:  Yes

## 2019-01-18 NOTE — TELEPHONE ENCOUNTER
Patient is scheduled for a follow up on 1/24 with Lizette Nelson and also has a CT scan already scheduled that was ordered by Dr Drew Dickinson  I do not see an order for CT scan from our practice  The patient is scheduled for 2/1 for the CT scan

## 2019-01-18 NOTE — PROGRESS NOTES
Progress Note - Melida Weaver 1935, 82 y o  male MRN: [id number]    Unit/Bed#: [facility] 913-01 Encounter: [id number]    Primary Care Provider: Zoya Mcclain DO   Date and time admitted to hospital: 1/1/2019  8:59 PM        Severe sepsis - Bilateral pneumoniaresolved as of 1/15/2019   Assessment & Plan    - evolving sepsis as evidenced by fever, tachypnea, leukocytosis, and previous lactic acidosis  - presumed source of bilateral streptococcal pneumonia - positive urine streptococcal Ag noted - blood cultures from 1/1 are negative and repeat cultures from 1/10 negative  - MRSA screen negative  - encourage incentive spirometry - maintain oxygenation    - RSV/Influenza PCR negative   - appreciate pulmonology input who have discontinued antibiotics on 1/12 as procalcitonin normalized   - remains NPO due to dysphagia (see plan below)   - transferred out of the ICU on 1/11       * Oropharyngeal dysphagia   Assessment & Plan    - with associated intermittent dysarthria noted - initial and second video barium swallow suspected possible neurologic etiology - previous CT of head/MR brain unremarkable for acute CVA - a subsequent dedicated MRI of brainstem negative for acute etiology  - repeat (third) video barium swallow on 1/12 unfortunate revealed no improvement - unsuccessful in PEG tube placement 1/14 with recommendation of surgical oncology evaluation for open gastrostomy tube placement as suspicion for pseudocyst/gastric erosion leading to abnormal gastric positioning noted possibly related to chronic pancreatitis ? ? 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  This now appears to communicate with the lumen of the stomach as described above which would explain the findings during endoscopy  - recently pulled out nasogastric feeding tube due to discomfort and refuses to have it replaced  - appreciate neurology input - initially suspected possibility of myasthenia gravis however acetylcholine receptor binding antibodies and MuSK antibodies were negative and repeat (second) video barium swallow earlier in hospital course after neostigmine stimulation revealed minimal improvement - no improvement with prior Mestinon trial either  - various inflammatory/rheumatologic markers checked to assess for autoimmune/paraneoplastic syndrome are unremarkable - heavy metal screen also negative  - can also consider association with pancreatic insufficiency per neurology  - Pt had J tube placed 1/15 by surg onc - unable to place G tube so J tube placed  - 1/16: started trickle TF   continue intravenous fluid infusion until J tube feeds at goal  - 1/17: increase TF to 20/hr  Per Dr Charlene Hsu ok to give liquid meds and crushed ASA  Try to limit pills as much as possible  Crushed pills ok if needed  - 1/18: Advancing TF to goal of 65/hr     Gastric ulcer   Assessment & Plan    D/c on daily liquid PPI through J tube     Disorder of upper esophageal sphincter   Assessment & Plan    Likely related to above  Pt should f/u with ENT outpt for possible Botox injection     Severe protein-calorie malnutrition ([facility] [address] )   Assessment & Plan    J tube placed    Malnutrition Findings:   Malnutrition type: Chronic illness  Degree of Malnutrition: Other severe protein calorie malnutrition (as evidenced by 11% wt loss since 10/2018; intake meeting less than 75% estimated needs for greater than 1 month and depresses temples; treated with tube feeding for nutrition support)    BMI Findings:  BMI Classifications: Underweight < 18 5     Body mass index is 22 62 kg/m²          Hypomagnesemia - Hypokalemia - Hypophosphatemia   Assessment & Plan    - monitor/replete as necessary Abnormal SPEP   Assessment & Plan    Onc appreciated  UPEP showed finding of undetermined significance  Outpt f/u w/ med-onc in 4 weeks     Chronic pancreatitis - history of Pancreatic mass   Assessment & Plan    - biopsy report from resection on 11/2018 revealed benign pancreatic tissue in the setting of chronic pancreatitis - developed subsequent weight loss and diarrhea after procedure and follows with gastroenterology  - resume Creon supplementation when able  biopsy from EGD 1/14 showed chronic active gastritis with inflamed granulation tissue and necroinflammatory debris consistent with ulcer  Pt needs CTAP w/  IV contrast in 1 week (around 1/24) and then will f/u w/ GI     Diabetes mellitus type 2   Assessment & Plan    - HbA1c of 7 1   - hold home hypoglycemics - continue SSI coverage per Accu-Cheks QID (remains NPO on IV fluids)      Acute respiratory failure with hypoxiaresolved as of 1/18/2019   Assessment & Plan    - symptomatically worsened for short time while in PACU post-PEG attempt but stabilized prompting return back to medical floor  - likely secondary to bilateral pneumonia (see plan below)   - due to concurrent tachycardia, in EKG ordered revealed no significant changes from previous (reported by GI lab team as read by on site anesthesiologist)   - 1/16: Tried to place on RA but desatted to 83  Stable on 2L  Incentive spirometery    - 1/18: on RA           VTE Pharmacologic Prophylaxis:   Pharmacologic: Heparin  Mechanical VTE Prophylaxis in Place: Yes    Patient Centered Rounds: I have performed bedside rounds with nursing staff today  Discussions with Specialists or Other Care Team Provider: no    Education and Discussions with Family / Patient: pt and family    Time Spent for Care: 30 minutes  More than 50% of total time spent on counseling and coordination of care as described above      Current Length of Stay: 17 day(s)    Current Patient Status: Inpatient   Certification Statement: The patient will continue to require additional inpatient hospital stay due to need to make sure pt can tolerate goal TF    Discharge Plan: tomorrow    Code Status: Level 1 - Full Code      Subjective:   No acute complaints    Objective:     Vitals:   Temp (24hrs), Av 8 °F (36 6 °C), Min:97 6 °F (36 4 °C), Max:97 9 °F (36 6 °C)    Temp:  [97 6 °F (36 4 °C)-97 9 °F (36 6 °C)] 97 6 °F (36 4 °C)  HR:  [66-68] 66  Resp:  [18-20] 18  BP: (110-117)/(59-62) 110/59  SpO2:  [98 %-99 %] 98 %  Body mass index is 22 62 kg/m²  Input and Output Summary (last 24 hours): Intake/Output Summary (Last 24 hours) at 19 1628  Last data filed at 19 1500   Gross per 24 hour   Intake          1271 25 ml   Output              725 ml   Net           546 25 ml       Physical Exam:     Physical Exam   Constitutional: He is oriented to person, place, and time  No distress  HENT:   Head: Normocephalic and atraumatic  Eyes: Conjunctivae and EOM are normal    Neck: Normal range of motion  Neck supple  Cardiovascular: Normal rate and regular rhythm  Pulmonary/Chest: Effort normal and breath sounds normal  He has no wheezes  He has no rales  Abdominal: Soft  Bowel sounds are normal  He exhibits no distension  There is no tenderness  Musculoskeletal: Normal range of motion  He exhibits no edema  Neurological: He is alert and oriented to person, place, and time  Skin: Skin is warm and dry  He is not diaphoretic         Additional Data:     Labs:      Results from last 7 days  Lab Units 19  1443 19  0509   WBC Thousand/uL  --  5 98   HEMOGLOBIN g/dL 8 4* 8 0*   HEMATOCRIT % 28 3* 26 6*   PLATELETS Thousands/uL  --  260   NEUTROS PCT %  --  72   LYMPHS PCT %  --  18   MONOS PCT %  --  7   EOS PCT %  --  2       Results from last 7 days  Lab Units 19  0509   POTASSIUM mmol/L 4 2   CHLORIDE mmol/L 107   CO2 mmol/L 31   BUN mg/dL 5   CREATININE mg/dL 0 38*   CALCIUM mg/dL 7 4*           * I Have Reviewed All Lab Data Listed Above  * Additional Pertinent Lab Tests Reviewed: [address] Admission Reviewed        Recent Cultures (last 7 days):           Last 24 Hours Medication List:     Current Facility-Administered Medications:  acetaminophen 650 mg Per J Tube Q6H PRN Zoila Geiger, DO    aspirin 81 mg Per J Tube Daily Zoila Geiger, DO    atorvastatin 40 mg Oral Daily With Olga Martinez MD    cholecalciferol 400 Units Oral Daily Nettie Lainez MD    heparin (porcine) 5,000 Units Subcutaneous [facility] Melinda Solano MD    HYDROmorphone 0 2 mg Intravenous Q4H PRN Zoila Geiger, DO    insulin lispro 1-5 Units Subcutaneous Q6H [address] DEMI Langford    Labetalol HCl 5 mg Intravenous Once David Harkins MD    lidocaine 1 patch Topical Daily DEMI Mcarhtur    melatonin 3 mg Oral HS Zoila Geiger, DO    pancrelipase (Lip-Prot-Amyl) 24,000 Units Oral BID With Meals Nettie Lainez MD    pantoprazole 40 mg Intravenous Q24H [address] Derek Harper MD    polyvinyl alcohol 1 drop Both Eyes PRN Qiana Britt PA-C    sodium phosphate 21 mmol Intravenous Once Zoila Geiger DO Last Rate: 21 mmol (01/18/19 1511)        Today, Patient Was Seen By: Zoila Geiger DO    ** Please Note: Dictation voice to text software may have been used in the creation of this document   **

## 2019-01-18 NOTE — TELEPHONE ENCOUNTER
----- Message from [place], [place] sent at 1/18/2019  9:21 AM EST -----  Regarding: CT-SCAN  Anjelica Medina,    Can you please help pt schedule ct-scan & f-up  Thanks,  Azeem Chaney  ----- Message -----  From: Becki Campos MD  Sent: 1/17/2019   3:22 PM  To: [place], [place]    Shira Stafford! Can you help me with this zechariah, it's a patient of Dr Rene Jolly  He needs a repeat CT scan with of abdomen/pelvis in about 1 week and a follow up in about 2 weeks  Thanks!

## 2019-01-18 NOTE — ASSESSMENT & PLAN NOTE
- symptomatically worsened for short time while in PACU post-PEG attempt but stabilized prompting return back to medical floor  - likely secondary to bilateral pneumonia (see plan below)   - due to concurrent tachycardia, in EKG ordered revealed no significant changes from previous (reported by GI lab team as read by on site anesthesiologist)   - 1/16: Tried to place on RA but desatted to 83  Stable on 2L    Incentive spirometery    - 1/18: on RA

## 2019-01-18 NOTE — PROGRESS NOTES
Progress Note - Surgical Oncology  Kay Davis 82 y o  male MRN: [id number]  Unit/Bed#: [facility] 913-01 Encounter: [id number]    Assessment:  82 y o  M with dysphagia and malnutrition, s/p open J tube placement 1/15    Pt has no complaints this morning, tolerating increased tube feeds well  Denies n/v or abdominal pain  No flatus or BMs, bowels sounds present but soft  Incisions CDI    Plan:  Advance Jevity 1 2 feeds from 20 to 30  Then increase by 10 q4 hours until goal of 65      Objective:     Blood pressure 117/62, pulse 68, temperature 97 9 °F (36 6 °C), resp  rate 20, height 5' 7" (1 702 m), weight 51 6 kg (113 lb 12 1 oz), SpO2 99 %  ,Body mass index is 17 82 kg/m²  Intake/Output Summary (Last 24 hours) at 01/18/19 0608  Last data filed at 01/18/19 0301   Gross per 24 hour   Intake          1946 25 ml   Output              825 ml   Net          1121 25 ml       Invasive Devices     Peripheral Intravenous Line            Peripheral IV 01/15/19 Left Arm 2 days          Drain            Gastrostomy/Enterostomy Jejunostomy 14 Fr  LLQ 2 days                Physical Exam: /62   Pulse 68   Temp 97 9 °F (36 6 °C)   Resp 20   Ht 5' 7" (1 702 m)   Wt 51 6 kg (113 lb 12 1 oz)   SpO2 99%   BMI 17 82 kg/m²   General appearance: alert and oriented, in no acute distress  Head: Normocephalic, without obvious abnormality, atraumatic  Lungs: clear to auscultation bilaterally  Heart: regular rate and rhythm, S1, S2 normal, no murmur, click, rub or gallop  Abdomen: Soft, nontender, nondistended  Bowel sounds present but hypoactive  Incisions CDI  Extremities: extremities normal, warm and well-perfused; no cyanosis, clubbing, or edema  Skin: Warm and dry, incisions on lower abdomen healing well  CDI   No warmth, tenderness, or erythema      VTE Pharmacologic Prophylaxis: Heparin  VTE Mechanical Prophylaxis: sequential compression device

## 2019-01-18 NOTE — PLAN OF CARE
Problem: PHYSICAL THERAPY ADULT  Goal: Performs mobility at highest level of function for planned discharge setting  See evaluation for individualized goals  Treatment/Interventions: Gait training, Bed mobility, Equipment eval/education, Patient/family training, LE strengthening/ROM, Functional transfer training, Therapeutic exercise, Endurance training  Equipment Recommended: Nixon Kirby       See flowsheet documentation for full assessment, interventions and recommendations  Outcome: Progressing  Prognosis: Fair  Problem List: Decreased strength, Decreased endurance, Decreased mobility, Impaired balance, Decreased cognition, Decreased safety awareness  Assessment: Pt resting in bed at time of PT session  Pt reports feeling "okay" this session and is willing to participate in PT treatment session  Pt able to perform all bed mobility and transfers with mod A x 2 which is slightly improved compared to previous session, however pt continues to require increased time as well as VC and TC for hand placement and safety with all transfers  Pt reported needing to have a BM seated EOB during session and was assisted onto commode  Pt able to tolerate standing x 2 min post BM with PT while OT assisted with pericare  Pt noted to have increased fatigue and weakness and required seated rest break on commode during pericare  Pt demonstrated poor +/ poor standing balance with posterior lean noted while OT performed pericare  TC needed to correct posterior lean  Pt able to tolerate ambulation this session, however pt was only able to ambulate from commode to bed side chair with mod A x 2 and the use of a RW due to fatigue and LE weakness  Pt demonstrated step to gait pattern with decreased foot clearance, decreased step length, improper weight shifts, and posterior lean  VC and TC needed for RW management during ambulation   Pt assisted back into chair and was able to complete all therex in seated position without any increase in complaints  VC And TC needed for proper form and pacing  Pt continues to require increased time to complete all mobility and therex due to fatigue and weakness  Pt assisted back into chair at conclusion of PT session with all needs within reach  Pt denies any further questions  PT will continue to follow  D/C recommendation when medically cleared is rehab due to decreased functional mobility compared to baseline and increased A needed from caregiver at current time  Recommendation: Short-term skilled PT     PT - OK to Discharge: Yes (to rehab when medically cleared )    See flowsheet documentation for full assessment

## 2019-01-18 NOTE — PHYSICAL THERAPY NOTE
PHYSICAL THERAPY NOTE          Patient Name: David Alonso  [id number] Date: 1/18/2019 01/18/19 1016   Pain Assessment   Pain Assessment No/denies pain   Restrictions/Precautions   Weight Bearing Precautions Per Order No   Other Precautions Cognitive; Chair Alarm; Bed Alarm; Fall Risk   General   Chart Reviewed Yes   Response to Previous Treatment Patient with no complaints from previous session  Family/Caregiver Present No   Cognition   Overall Cognitive Status Impaired   Arousal/Participation Responsive   Attention Attends with cues to redirect   Orientation Level Oriented X4   Memory Decreased recall of recent events   Following Commands Follows one step commands with increased time or repetition   Subjective   Subjective Pt willing to participate in PT session    Bed Mobility   Supine to Sit 3  Moderate assistance   Additional items Assist x 2; Increased time required;Verbal cues   Transfers   Sit to Stand 3  Moderate assistance   Additional items Assist x 2; Increased time required;Verbal cues   Stand to Sit 3  Moderate assistance   Additional items Assist x 2; Increased time required;Verbal cues   Toilet transfer 3  Moderate assistance   Additional items Assist x 2; Increased time required;Verbal cues   Additional Comments VC and TC needed for hand placement and safety    Ambulation/Elevation   Gait pattern Step to;Excessively slow; Short stride; Foward flexed; Inconsistent betty;Decreased foot clearance   Gait Assistance 3  Moderate assist   Additional items Assist x 2   Assistive Device Rolling walker   Distance 6ft   (limited by fatigue )   Balance   Static Sitting Fair -   Static Standing Poor +   Ambulatory Poor   Endurance Deficit   Endurance Deficit Yes   Endurance Deficit Description fatigue and weakness    Activity Tolerance   Activity Tolerance Patient limited by fatigue   Medical Staff Ze Dawson 20, OT    Nurse Made Aware Pt appropriate to be seen and mobilize per nsg    Exercises   Hip Abduction Sitting;10 reps;AROM; Bilateral  (x 2 sets )   Hip Adduction Sitting;15 reps;AROM; Bilateral  (x 2 sets )   Knee AROM Long Arc Quad Sitting;15 reps;AROM; Bilateral  (x 2 sets )   Ankle Pumps Sitting;15 reps;AROM; Bilateral  (x 2 sets )   Marching Sitting;10 reps;AROM; Bilateral  (x 2 sets )   Assessment   Prognosis Fair   Problem List Decreased strength;Decreased endurance;Decreased mobility; Impaired balance;Decreased cognition;Decreased safety awareness   Assessment Pt resting in bed at time of PT session  Pt reports feeling "okay" this session and is willing to participate in PT treatment session  Pt able to perform all bed mobility and transfers with mod A x 2 which is slightly improved compared to previous session, however pt continues to require increased time as well as VC and TC for hand placement and safety with all transfers  Pt reported needing to have a BM seated EOB during session and was assisted onto commode  Pt able to tolerate standing x 2 min post BM with PT while OT assisted with pericare  Pt noted to have increased fatigue and weakness and required seated rest break on commode during pericare  Pt demonstrated poor +/ poor standing balance with posterior lean noted while OT performed pericare  TC needed to correct posterior lean  Pt able to tolerate ambulation this session, however pt was only able to ambulate from commode to bed side chair with mod A x 2 and the use of a RW due to fatigue and LE weakness  Pt demonstrated step to gait pattern with decreased foot clearance, decreased step length, improper weight shifts, and posterior lean  VC and TC needed for RW management during ambulation  Pt assisted back into chair and was able to complete all therex in seated position without any increase in complaints  VC And TC needed for proper form and pacing   Pt continues to require increased time to complete all mobility and therex due to fatigue and weakness  Pt assisted back into chair at conclusion of PT session with all needs within reach  Pt denies any further questions  PT will continue to follow  D/C recommendation when medically cleared is rehab due to decreased functional mobility compared to baseline and increased A needed from caregiver at current time  Goals   Patient Goals to rest    STG Expiration Date 01/25/19   Treatment Day 3   Plan   Treatment/Interventions Functional transfer training;LE strengthening/ROM; Therapeutic exercise; Endurance training;Patient/family training;Equipment eval/education; Bed mobility;Gait training;Spoke to nursing;OT   Progress Slow progress, decreased activity tolerance   PT Frequency Other (Comment)  (3-5x a week )   Recommendation   Recommendation Short-term skilled PT   Equipment Recommended Walker  (RW)   PT - OK to Discharge Yes  (to rehab when medically cleared )   Morgan Mathur, PT

## 2019-01-18 NOTE — ASSESSMENT & PLAN NOTE
- with associated intermittent dysarthria noted - initial and second video barium swallow suspected possible neurologic etiology - previous CT of head/MR brain unremarkable for acute CVA - a subsequent dedicated MRI of brainstem negative for acute etiology  - repeat (third) video barium swallow on 1/12 unfortunate revealed no improvement - unsuccessful in PEG tube placement 1/14 with recommendation of surgical oncology evaluation for open gastrostomy tube placement as suspicion for pseudocyst/gastric erosion leading to abnormal gastric positioning noted possibly related to chronic pancreatitis ? ? 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  This now appears to communicate with the lumen of the stomach as described above which would explain the findings during endoscopy  - recently pulled out nasogastric feeding tube due to discomfort and refuses to have it replaced  - appreciate neurology input - initially suspected possibility of myasthenia gravis however acetylcholine receptor binding antibodies and MuSK antibodies were negative and repeat (second) video barium swallow earlier in hospital course after neostigmine stimulation revealed minimal improvement - no improvement with prior Mestinon trial either  - various inflammatory/rheumatologic markers checked to assess for autoimmune/paraneoplastic syndrome are unremarkable - heavy metal screen also negative  - can also consider association with pancreatic insufficiency per neurology  - Pt had J tube placed 1/15 by surg onc - unable to place G tube so J tube placed  - 1/16: started trickle TF   continue intravenous fluid infusion until J tube feeds at goal  - 1/17: increase TF to 20/hr  Per Dr Dheeraj Robles ok to give liquid meds and crushed ASA  Try to limit pills as much as possible  Crushed pills ok if needed  - 1/18:  Advancing TF to goal of 65/hr

## 2019-01-19 VITALS
WEIGHT: 144.4 LBS | OXYGEN SATURATION: 95 % | HEIGHT: 67 IN | TEMPERATURE: 98.6 F | HEART RATE: 72 BPM | SYSTOLIC BLOOD PRESSURE: 124 MMHG | DIASTOLIC BLOOD PRESSURE: 65 MMHG | BODY MASS INDEX: 22.66 KG/M2 | RESPIRATION RATE: 20 BRPM

## 2019-01-19 PROBLEM — J18.9 PNEUMONIA: Status: RESOLVED | Noted: 2019-01-01 | Resolved: 2019-01-19

## 2019-01-19 PROBLEM — E83.42 HYPOMAGNESEMIA: Status: RESOLVED | Noted: 2019-01-12 | Resolved: 2019-01-19

## 2019-01-19 PROBLEM — E86.0 DEHYDRATION: Status: RESOLVED | Noted: 2019-01-15 | Resolved: 2019-01-19

## 2019-01-19 PROBLEM — D72.829 LEUKOCYTOSIS: Status: RESOLVED | Noted: 2019-01-03 | Resolved: 2019-01-19

## 2019-01-19 PROBLEM — J18.9 BILATERAL PNEUMONIA: Status: RESOLVED | Noted: 2019-01-15 | Resolved: 2019-01-19

## 2019-01-19 LAB
ANION GAP SERPL CALCULATED.3IONS-SCNC: 4 MMOL/L (ref 4–13)
BUN SERPL-MCNC: 6 MG/DL (ref 5–25)
CALCIUM SERPL-MCNC: 7.7 MG/DL (ref 8.3–10.1)
CHLORIDE SERPL-SCNC: 104 MMOL/L (ref 100–108)
CO2 SERPL-SCNC: 32 MMOL/L (ref 21–32)
CREAT SERPL-MCNC: 0.39 MG/DL (ref 0.6–1.3)
ERYTHROCYTE [DISTWIDTH] IN BLOOD BY AUTOMATED COUNT: 16.7 % (ref 11.6–15.1)
GFR SERPL CREATININE-BSD FRML MDRD: 113 ML/MIN/1.73SQ M
GLUCOSE SERPL-MCNC: 122 MG/DL (ref 65–140)
GLUCOSE SERPL-MCNC: 153 MG/DL (ref 65–140)
GLUCOSE SERPL-MCNC: 173 MG/DL (ref 65–140)
HCT VFR BLD AUTO: 30.1 % (ref 36.5–49.3)
HGB BLD-MCNC: 9.1 G/DL (ref 12–17)
MCH RBC QN AUTO: 30.3 PG (ref 26.8–34.3)
MCHC RBC AUTO-ENTMCNC: 30.2 G/DL (ref 31.4–37.4)
MCV RBC AUTO: 100 FL (ref 82–98)
PHOSPHATE SERPL-MCNC: 2.7 MG/DL (ref 2.3–4.1)
PLATELET # BLD AUTO: 264 THOUSANDS/UL (ref 149–390)
PMV BLD AUTO: 11.3 FL (ref 8.9–12.7)
POTASSIUM SERPL-SCNC: 4.3 MMOL/L (ref 3.5–5.3)
RBC # BLD AUTO: 3 MILLION/UL (ref 3.88–5.62)
SODIUM SERPL-SCNC: 140 MMOL/L (ref 136–145)
WBC # BLD AUTO: 5.57 THOUSAND/UL (ref 4.31–10.16)

## 2019-01-19 PROCEDURE — 82948 REAGENT STRIP/BLOOD GLUCOSE: CPT

## 2019-01-19 PROCEDURE — C9113 INJ PANTOPRAZOLE SODIUM, VIA: HCPCS | Performed by: INTERNAL MEDICINE

## 2019-01-19 PROCEDURE — 84100 ASSAY OF PHOSPHORUS: CPT | Performed by: GENERAL PRACTICE

## 2019-01-19 PROCEDURE — 80048 BASIC METABOLIC PNL TOTAL CA: CPT | Performed by: GENERAL PRACTICE

## 2019-01-19 PROCEDURE — 85027 COMPLETE CBC AUTOMATED: CPT | Performed by: GENERAL PRACTICE

## 2019-01-19 PROCEDURE — 99239 HOSP IP/OBS DSCHRG MGMT >30: CPT | Performed by: GENERAL PRACTICE

## 2019-01-19 RX ORDER — POLYVINYL ALCOHOL 14 MG/ML
1 SOLUTION/ DROPS OPHTHALMIC AS NEEDED
Qty: 15 ML | Refills: 0
Start: 2019-01-19 | End: 2019-04-09

## 2019-01-19 RX ORDER — LIDOCAINE 50 MG/G
1 PATCH TOPICAL DAILY
Qty: 30 PATCH | Refills: 0
Start: 2019-01-20 | End: 2019-04-09

## 2019-01-19 RX ORDER — OMEPRAZOLE 20 MG/1
40 CAPSULE, DELAYED RELEASE ORAL DAILY
Qty: 30 CAPSULE | Refills: 0 | Status: ON HOLD
Start: 2019-01-19 | End: 2020-02-09 | Stop reason: SDUPTHER

## 2019-01-19 RX ORDER — ACETAMINOPHEN 160 MG/5ML
650 SUSPENSION, ORAL (FINAL DOSE FORM) ORAL EVERY 6 HOURS PRN
Qty: 118 ML | Refills: 0
Start: 2019-01-19 | End: 2019-04-09

## 2019-01-19 RX ORDER — ASPIRIN 81 MG/1
81 TABLET, CHEWABLE ORAL DAILY
Refills: 0
Start: 2019-01-20

## 2019-01-19 RX ADMIN — PANTOPRAZOLE SODIUM 40 MG: 40 INJECTION, POWDER, FOR SOLUTION INTRAVENOUS at 08:02

## 2019-01-19 RX ADMIN — INSULIN LISPRO 1 UNITS: 100 INJECTION, SOLUTION INTRAVENOUS; SUBCUTANEOUS at 06:26

## 2019-01-19 RX ADMIN — INSULIN LISPRO 1 UNITS: 100 INJECTION, SOLUTION INTRAVENOUS; SUBCUTANEOUS at 00:29

## 2019-01-19 RX ADMIN — HEPARIN SODIUM 5000 UNITS: 5000 INJECTION INTRAVENOUS; SUBCUTANEOUS at 06:26

## 2019-01-19 NOTE — ASSESSMENT & PLAN NOTE
- with associated intermittent dysarthria noted - initial and second video barium swallow suspected possible neurologic etiology - previous CT of head/MR brain unremarkable for acute CVA - a subsequent dedicated MRI of brainstem negative for acute etiology  - repeat (third) video barium swallow on 1/12 unfortunate revealed no improvement - unsuccessful in PEG tube placement 1/14 with recommendation of surgical oncology evaluation for open gastrostomy tube placement as suspicion for pseudocyst/gastric erosion leading to abnormal gastric positioning noted possibly related to chronic pancreatitis ? ? 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  This now appears to communicate with the lumen of the stomach as described above which would explain the findings during endoscopy  - recently pulled out nasogastric feeding tube due to discomfort and refuses to have it replaced  - appreciate neurology input - initially suspected possibility of myasthenia gravis however acetylcholine receptor binding antibodies and MuSK antibodies were negative and repeat (second) video barium swallow earlier in hospital course after neostigmine stimulation revealed minimal improvement - no improvement with prior Mestinon trial either  - various inflammatory/rheumatologic markers checked to assess for autoimmune/paraneoplastic syndrome are unremarkable - heavy metal screen also negative  - can also consider association with pancreatic insufficiency per neurology  - Pt had J tube placed 1/15 by surg onc - unable to place G tube so J tube placed  - 1/16: started trickle TF   continue intravenous fluid infusion until J tube feeds at goal  - 1/17: increase TF to 20/hr  Per Dr Lachelle Funk ok to give liquid meds and crushed ASA  Try to limit pills as much as possible  Crushed pills ok if needed  - 1/18:  Advancing TF to goal of 65/hr  -  2/29: TF at goal

## 2019-01-19 NOTE — DISCHARGE SUMMARY
Discharge- Elier Vergara 1935, 82 y o  male MRN: [id number]    Unit/Bed#: [facility] 913-01 Encounter: [id number]    Primary Care Provider: Jakob Song DO   Date and time admitted to hospital: 1/1/2019  8:59 PM        Severe sepsis - Bilateral pneumoniaresolved as of 1/15/2019   Assessment & Plan    - evolving sepsis as evidenced by fever, tachypnea, leukocytosis, and previous lactic acidosis  - presumed source of bilateral streptococcal pneumonia - positive urine streptococcal Ag noted - blood cultures from 1/1 are negative and repeat cultures from 1/10 negative  - MRSA screen negative  - encourage incentive spirometry - maintain oxygenation    - RSV/Influenza PCR negative   - appreciate pulmonology input who have discontinued antibiotics on 1/12 as procalcitonin normalized   - remains NPO due to dysphagia (see plan below)   - transferred out of the ICU on 1/11       * Oropharyngeal dysphagia   Assessment & Plan    - with associated intermittent dysarthria noted - initial and second video barium swallow suspected possible neurologic etiology - previous CT of head/MR brain unremarkable for acute CVA - a subsequent dedicated MRI of brainstem negative for acute etiology  - repeat (third) video barium swallow on 1/12 unfortunate revealed no improvement - unsuccessful in PEG tube placement 1/14 with recommendation of surgical oncology evaluation for open gastrostomy tube placement as suspicion for pseudocyst/gastric erosion leading to abnormal gastric positioning noted possibly related to chronic pancreatitis ? ? 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  This now appears to communicate with the lumen of the stomach as described above which would explain the findings during endoscopy  - recently pulled out nasogastric feeding tube due to discomfort and refuses to have it replaced  - appreciate neurology input - initially suspected possibility of myasthenia gravis however acetylcholine receptor binding antibodies and MuSK antibodies were negative and repeat (second) video barium swallow earlier in hospital course after neostigmine stimulation revealed minimal improvement - no improvement with prior Mestinon trial either  - various inflammatory/rheumatologic markers checked to assess for autoimmune/paraneoplastic syndrome are unremarkable - heavy metal screen also negative  - can also consider association with pancreatic insufficiency per neurology  - Pt had J tube placed 1/15 by surg onc - unable to place G tube so J tube placed  - 1/16: started trickle TF   continue intravenous fluid infusion until J tube feeds at goal  - 1/17: increase TF to 20/hr  Per Dr Juni June ok to give liquid meds and crushed ASA  Try to limit pills as much as possible  Crushed pills ok if needed  - 1/18: Advancing TF to goal of 65/hr  -  2/29: TF at goal     Gastric ulcer   Assessment & Plan    D/c on daily liquid PPI through J tube     Disorder of upper esophageal sphincter   Assessment & Plan    Likely related to above  Pt should f/u with ENT outpt for possible Botox injection     Severe protein-calorie malnutrition ([address] )   Assessment & Plan    J tube placed    Malnutrition Findings:   Malnutrition type: Chronic illness  Degree of Malnutrition: Other severe protein calorie malnutrition (as evidenced by 11% wt loss since 10/2018; intake meeting less than 75% estimated needs for greater than 1 month and depresses temples; treated with tube feeding for nutrition support)    BMI Findings:  BMI Classifications: Underweight < 18 5     Body mass index is 22 62 kg/m²          Abnormal SPEP   Assessment & Plan    Onc appreciated  UPEP showed finding of undetermined significance  Outpt f/u w/ med-onc in 4 weeks     Chronic pancreatitis - history of Pancreatic mass   Assessment & Plan    - biopsy report from resection on 11/2018 revealed benign pancreatic tissue in the setting of chronic pancreatitis - developed subsequent weight loss and diarrhea after procedure and follows with gastroenterology  - resume Creon supplementation when able  biopsy from EGD 1/14 showed chronic active gastritis with inflamed granulation tissue and necroinflammatory debris consistent with ulcer  Pt needs CTAP w/  IV contrast in 1 week (around 1/24) and then will f/u w/ GI     Diabetes mellitus type 2   Assessment & Plan    - HbA1c of 7 1   - hold home hypoglycemics - continue SSI coverage per [employer]u-Cheks QID (remains NPO)      Hypomagnesemia - Hypokalemia - Hypophosphatemiaresolved as of 1/19/2019   Assessment & Plan    - monitor/replete as necessary     Hyponatremiaresolved as of 1/15/2019   Assessment & Plan    - serum sodium remains normalized        Atrial flutter (HCC)resolved as of 1/10/2019   Assessment & Plan    briefly  Now NSR  Appreciate cardio input - flutter in setting of sepsis hence cont to monitor, if needed can use BB  Will hold off on BB for now while on Mestinon and w/ nl HR  Check echo - EF of 60% with grade 1 diastolic dysfunction  hold off AC due to brief episode, CHADS-Vasc 3  Per neuro, dysphagia symptoms are not TIA/CVA related       Acute respiratory failure with hypoxia (HCC)resolved as of 1/11/2019   Assessment & Plan    · Acute hypoxic respiratory failure secondary to pneumonia and probable ongoing aspiration  · Now on RA  · NPO currently  · Rest as above         Discharging Physician / Practitioner: Radha Mendieta DO  PCP: Lien Bernstein DO  Admission Date:   Admission Orders     Ordered        01/01/19 2256  Inpatient Admission (expected length of stay for this patient is greater than two midnights)  Once             Discharge Date: 01/19/19    Disposition: Short Term Rehab or SNF at [place] [id number] (see below)    For Discharges to [facility] SNF:   · [address] Physician    Reason for Admission: PNA    Discharge Diagnoses:     Please see assessment and plan section above for further details regarding discharge diagnoses  Resolved Problems  Date Reviewed: 1/19/2019          Resolved    Severe sepsis - Bilateral pneumonia 1/15/2019     Resolved by  Frances Leiva, DO    Pneumonia 1/19/2019     Resolved by  Frances Leiva,     Acute kidney injury ([facility] [address] ) 1/8/2019     Resolved by  DEMI Zuleta    Acute respiratory failure with hypoxia ([facility] [address] ) 1/11/2019     Resolved by  Camila Lewis MD    Hypokalemia 1/10/2019     Resolved by  DEMI Zuleta    Leukocytosis 1/19/2019     Resolved by  Frances Leiva,     Atrial flutter ([facility] [address] ) 1/10/2019     Resolved by  DEMI Zuleta    Acute respiratory failure with hypoxia 1/18/2019     Resolved by  Frances Leiva,     Hyponatremia 1/15/2019     Resolved by  Frances Leiva,     Hypomagnesemia - Hypokalemia - Hypophosphatemia 1/19/2019     Resolved by  Frances Leiva, DO    Dehydration 1/19/2019     Resolved by  Frances Leiva,     Overview Signed 1/15/2019 10:32 AM by Clari Hair PA-C     Added automatically from request for surgery [id number]         Bilateral pneumonia 1/19/2019     Resolved by  Frances Leiva,     Overview Signed 1/15/2019 10:32 AM by Clari Hair PA-C     Added automatically from request for surgery [id number]               Consultations During Hospital Stay:  · Dr Lila Krause - GI  · Dr Blossom Medina - neuro  · Dr Virgil Dubonoter - cardio  · Dr Daisy Tomlinson  · Dr Tim Castellanos - med onc  · Pharmacy  · Dr Ranjan Solis - surg onc  · nutrition    Procedures Performed:   · EGD w/ unsuccessful PEG placement  · J-tube placement     Medication Adjustments and Discharge Medications:  · Summary of Medication Adjustments made as a result of this hospitalization: Metformin stopped  On ISS  ASA started  · Medication Dosing Tapers - Please refer to Discharge Medication List for details on any medication dosing tapers (if applicable to patient)  · Medications being temporarily held (include recommended restart time): n/a  · Discharge Medication List: See after visit summary for reconciled discharge medications  Wound Care Recommendations:  When applicable, please see wound care section of After Visit Summary  Diet Recommendations at Discharge:  Diet -        Diet Orders            Start     Ordered    01/18/19 0636  Diet Enteral/Parenteral; Tube Feeding No Oral Diet; Jevity 1 2 Randy; Continuous; 65; 125; Water; Every 6 hours  Diet effective now     Comments:  Increase tube feeds by 10 mL q4 hours until goal of 65 mL/hour  NO CRUSHED MEDICATIONS THRU JEJUNOSTOMY TUBE   Question Answer Comment   Diet Type Enteral/Parenteral    Enteral/Parenteral Tube Feeding No Oral Diet    Tube Feeding Formula: Jevity 1 2 Randy    Bolus/Cyclic/Continuous Continuous    Tube Feeding Goal Rate (mL/hr): 65    Tube Feeding water flush (mL): 125    Water Flush type: Water    Water flush frequency: Every 6 hours    RD to adjust diet per protocol? Yes        01/18/19 7310    01/03/19 0728  Room Service  Once     Question:  Type of Service  Answer:  Room Service - Appropriate with Assistance    01/03/19 0727          Instructions for any Catheters / Lines Present at Discharge (including removal date, if applicable): n/a    Significant Findings / Test Results:   · See above    Incidental Findings:   · none     Test Results Pending at Discharge (will require follow up):   · none     Outpatient Tests Requested:  · CBC, BMP, Mag, P in 3-5 days  · CtAP w/ IV contrast around 1/24 - Dr Gus De La Vega will follow result    Complications:  none    Hospital Course:     Keiry Aviles is a 82 y o  male patient who originally presented to the hospital on 1/1/2019 due to PNA  Tx w/ abx    Required O2, which was eventually weaned off  Also had dysphagia, and had full w/u as above  J tube placed  Condition at Discharge: stable     Discharge Day Visit / Exam:     Subjective:  No acute complaints  Vitals: Blood Pressure: 124/65 (01/18/19 2325)  Pulse: 72 (01/18/19 2325)  Temperature: 98 6 °F (37 °C) (01/18/19 2325)  Temp Source: Oral (01/18/19 5037)  Respirations: 20 (01/18/19 2325)  Height: 5' 7" (170 2 cm) (01/10/19 0317)  Weight - Scale: 65 5 kg (144 lb 6 4 oz) (2 pillows, 3 blankets) (01/18/19 1240)  SpO2: 95 % (01/18/19 2325)  Exam:   Physical Exam   Constitutional: He is oriented to person, place, and time  No distress  HENT:   Head: Normocephalic and atraumatic  Eyes: Conjunctivae and EOM are normal    Neck: Normal range of motion  Neck supple  Cardiovascular: Normal rate and regular rhythm  Pulmonary/Chest: Effort normal and breath sounds normal  He has no wheezes  He has no rales  Abdominal: Soft  Bowel sounds are normal  He exhibits no distension  There is no tenderness  Musculoskeletal: Normal range of motion  He exhibits no edema  Neurological: He is alert and oriented to person, place, and time  Skin: Skin is warm and dry  He is not diaphoretic  Discussion with Family: yesterday    Goals of Care Discussions:  · Code Status at Discharge: Level 1 - Full Code  · Were there any Goals of Care Discussions during Hospitalization?: Yes  · Results of any General Goals of Care Discussions: Full code   · POLST Completed: No   · If POLST Completed, Summary of POLST Agreement Provided Here: n/a   · OK to Rehospitalize if Needed? Yes    Discharge instructions/Information to patient and family:   See after visit summary section titled Discharge Instructions for information provided to patient and family  Planned Readmission: no      Discharge Statement:  I spent 35 minutes discharging the patient  This time was spent on the day of discharge   I had direct contact with the patient on the day of discharge  Greater than 50% of the total time was spent examining patient, answering all patient questions, arranging and discussing plan of care with patient as well as directly providing post-discharge instructions  Additional time then spent on discharge activities      ** Please Note: This note has been constructed using a voice recognition system **

## 2019-01-19 NOTE — ASSESSMENT & PLAN NOTE
· Acute hypoxic respiratory failure secondary to pneumonia and probable ongoing aspiration  · Now on RA  · NPO currently  · Rest as above

## 2019-01-19 NOTE — DISCHARGE INSTRUCTIONS
Please check CBC, BMP, Mg, and Phos in 3-5 days  C/w tube feeds as ordered  Patient needs at [facility] w/ IV contrast around 1/24 to follow up on pancreatic pseudocyst per Dr Clint Muro will follow this  Dysphagia   WHAT YOU NEED TO KNOW:   Dysphagia is trouble swallowing  It occurs when you have trouble moving food or liquid from your mouth to your esophagus or down to your stomach  It may occur when you eat, drink, or any time you try to swallow  DISCHARGE INSTRUCTIONS:   Return to the emergency department if:   · You choke on your own saliva  · You have chest pain  You have shortness of breath  Contact your healthcare provider if:   · You lose weight without trying  · Your signs and symptoms get worse, or you have new signs or symptoms  · You have signs or symptoms of dehydration, such as increased thirst, dark yellow urine, or little or no urine  · You get colds often  · You have questions or concerns about your condition or care  Nutrition:  You may need to change the texture of the foods you eat to help reduce choking problems  Your healthcare provider may show you how to thicken liquids or soften foods to make them easier to swallow  Follow up with your healthcare provider as directed:  Write down your questions so you remember to ask them during your visits  © 2017 [employer] Information is for End User's use only and may not be sold, redistributed or otherwise used for commercial purposes  All illustrations and images included in CareNotes® are the copyrighted property of A D A M [employer] Inc  or [employer] Chang  The above information is an  only  It is not intended as medical advice for individual conditions or treatments  Talk to your doctor, nurse or pharmacist before following any medical regimen to see if it is safe and effective for you

## 2019-01-19 NOTE — PROGRESS NOTES
Progress Note - Surgical Oncology   Kay Davis 82 y o  male MRN: [id number]  Unit/Bed#: [facility] 913-01 Encounter: [id number]  01/19/19  8:12 AM      Subjective/Objective   Chief Complaint   Patient presents with    Weakness - Generalized     pt started about a week ago deteriorating and becoming increasingly weak  pt family reports he has not been eating or drinking   Dehydration       Subjective:  Pain controlled status post J-tube placement  Objective:     Blood pressure 124/65, pulse 72, temperature 98 6 °F (37 °C), resp  rate 20, height 5' 7" (1 702 m), weight 65 5 kg (144 lb 6 4 oz), SpO2 95 %  ,Body mass index is 22 62 kg/m²  Intake/Output Summary (Last 24 hours) at 01/19/19 0812  Last data filed at 01/19/19 0755   Gross per 24 hour   Intake             1959 ml   Output             1425 ml   Net              534 ml       Invasive Devices     Peripheral Intravenous Line            Peripheral IV 01/15/19 Left Arm 3 days          Drain            Gastrostomy/Enterostomy Jejunostomy 14 Fr  LLQ 3 days                Physical Examm:    Midline incision clean dry intact  Abdomen soft  J-tube site looks good  Lab Results:  Lab Results   Component Value Date    WBC 5 57 01/19/2019    HGB 9 1 (L) 01/19/2019    HCT 30 1 (L) 01/19/2019     (H) 01/19/2019     01/19/2019     Lab Results   Component Value Date    GLUCOSE 202 (H) 01/10/2019    CALCIUM 7 7 (L) 01/19/2019    K 4 3 01/19/2019    CO2 32 01/19/2019     01/19/2019    BUN 6 01/19/2019    CREATININE 0 39 (L) 01/19/2019     No results found for: AFP  Lab Results   Component Value Date    CALCIUM 7 7 (L) 01/19/2019    PHOS 2 7 01/19/2019     Lab Results   Component Value Date    CEA 2 1 09/19/2018           VTE Pharmacologic Prophylaxis: Sequential compression device (Venodyne)   VTE Mechanical Prophylaxis: sequential compression device    Assessment:  Malnutrition      Plan:  J-tube feeds at goal   Continue as tolerated      Inna King MD  8:12 AM

## 2019-01-21 ENCOUNTER — TELEPHONE (OUTPATIENT)
Dept: HEMATOLOGY ONCOLOGY | Facility: CLINIC | Age: 84
End: 2019-01-21

## 2019-01-21 ENCOUNTER — TELEPHONE (OUTPATIENT)
Dept: GASTROENTEROLOGY | Facility: CLINIC | Age: 84
End: 2019-01-21

## 2019-01-21 NOTE — TELEPHONE ENCOUNTER
Patients daughter called to schedule a hospital F/U appointment  Patient is unsure who Dr Speedy Spnecer would like him to see and how soon  Can you please advise which doctor to schedule with?       Daughter can be reached at [phone]

## 2019-01-21 NOTE — TELEPHONE ENCOUNTER
dixon patient      His daughter Chan Weeks would like a call back to discuss her office visit on 1-24-19  She  would like to get her in with dr Tanner Yo due to her recent issues   She can be reached at [phone]

## 2019-01-24 ENCOUNTER — TELEPHONE (OUTPATIENT)
Dept: GASTROENTEROLOGY | Facility: CLINIC | Age: 84
End: 2019-01-24

## 2019-01-24 ENCOUNTER — OFFICE VISIT (OUTPATIENT)
Dept: GASTROENTEROLOGY | Facility: AMBULARY SURGERY CENTER | Age: 84
End: 2019-01-24
Payer: MEDICARE

## 2019-01-24 ENCOUNTER — TELEPHONE (OUTPATIENT)
Dept: GASTROENTEROLOGY | Facility: AMBULARY SURGERY CENTER | Age: 84
End: 2019-01-24

## 2019-01-24 VITALS
SYSTOLIC BLOOD PRESSURE: 102 MMHG | WEIGHT: 114 LBS | TEMPERATURE: 97.8 F | HEIGHT: 67 IN | BODY MASS INDEX: 17.89 KG/M2 | RESPIRATION RATE: 16 BRPM | DIASTOLIC BLOOD PRESSURE: 60 MMHG | HEART RATE: 98 BPM

## 2019-01-24 DIAGNOSIS — R13.12 OROPHARYNGEAL DYSPHAGIA: Chronic | ICD-10-CM

## 2019-01-24 DIAGNOSIS — K86.1 CHRONIC PANCREATITIS, UNSPECIFIED PANCREATITIS TYPE (HCC): ICD-10-CM

## 2019-01-24 DIAGNOSIS — K86.3 PANCREATIC PSEUDOCYST: Primary | ICD-10-CM

## 2019-01-24 PROCEDURE — 99214 OFFICE O/P EST MOD 30 MIN: CPT | Performed by: PHYSICIAN ASSISTANT

## 2019-01-24 NOTE — TELEPHONE ENCOUNTER
dixon pt    [facility] has questions regarding the pts tube   [phone]

## 2019-01-24 NOTE — LETTER
January 24, 2019     VIVIA[address] [place] [address]    Patient: Eliseo Sanches   YOB: 1935   Date of Visit: 1/24/2019       Dear Dr yDe Human: Thank you for referring Nick Kim to me for evaluation  Below are my notes for this consultation  If you have questions, please do not hesitate to call me  I look forward to following your patient along with you  Sincerely,        Haydee Benavides PA-C        CC: No Recipients  Haydee Benavides PA-C  1/24/2019  2:26 PM  Sign at close encounter  [facility] - Outpatient Follow-up Note  Eliseo Sanches 82 y o  male MRN: [id number]  Encounter: [id number]   ASSESSMENT AND PLAN:        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  -CT A/P 1/14 showed atrophic pancreas with multilocular cystic collection involving pancreatic body and tail/splenic hilum which appears to communicate to the stomach  -EGD 1/1 with fistula versus ulcer in the posterior wall of the stomach  -currently asymptomatic  -repeat CT scan is planned for 2/1 to evaluate for evolution of pseudocyst  -He then has follow up with Dr Amanda Chilel and follow-up with Dr Lizbeth Gaxiola to be scheduled   -He will call if he develops pain, nausea, vomiting  -currently receiving feeds through J tube so creon was discontinued    Oropharyngeal dysphagia  -Unsuccessful G tube placement given superiorly located stomach ergo J tube was placed surgically  -management of J tube per surgery    ______________________________________________________________________    SUBJECTIVE:  Nick Kim is an 83 y/o male who presents for hospital follow-up  He has a history of chronic pancreatitis, pancreatic lesion who was recently evaluated by Dr Lizbeth Gaxiola   He then had a complicated recent hospitalization for oropharyngeal dysphagia (felt possibly secondary to myasthenia gravis) and aspiration, PEG was attempted but not able to be placed given his stomach for far above his costal margin, he had a surgical J tube placed by Dr Hue Damon  He was also found to have pseudocyst with fistula into his stomach  Today he feels okay, he denies abdominal pain, nausea, vomiting, diarrhea, fever/chills  He has mild SOB that has been stable since discharge  He is tolerating tube feeds without difficulty and denies any significant leakage or pain at PEG site    REVIEW OF SYSTEMS IS OTHERWISE NEGATIVE  Historical Information   Past Medical History:   Diagnosis Date    Diabetes mellitus ([facility] [address] )     Type II    Weight loss      Past Surgical History:   Procedure Laterality Date    GASTROSTOMY TUBE PLACEMENT N/A 1/14/2019    Procedure: INSERTION PEG TUBE;  Surgeon: Cally Charlton MD;  [facility]; Service: Gastroenterology    GASTROSTOMY TUBE PLACEMENT N/A 1/15/2019    Procedure: INSERTION JEJUNOSTOMY TUBE OPEN;  Surgeon: Sueellen Habermann, MD;  [facility];  Service: Surgical Oncology    LAPAROTOMY N/A 11/19/2018    Procedure: LAPAROTOMY EXPLORATORY;  Surgeon: Sueellen Habermann, MD;  [facility];  Service: Surgical Oncology    SC EDG US EXAM SURGICAL ALTER STOM DUODENUM/JEJUNUM N/A 9/21/2018    Procedure: LINEAR ENDOSCOPIC U/S;  Surgeon: Mg Staton MD;  [facility];   Service: Gastroenterology    SC LAP,DIAGNOSTIC ABDOMEN N/A 11/19/2018    Procedure: pancreatic biopsy;  Surgeon: Sueellen Habermann, MD;  [facility];  Service: Surgical Oncology    TONSILLECTOMY      VASCULAR SURGERY      phlebitis many years ago    WRIST GANGLION EXCISION       Social History   History   Alcohol Use    Yes     Comment: 1 beer a day     History   Drug Use No     History   Smoking Status    Former Smoker    Years: 25 00   Smokeless Tobacco    Never Used     Comment: quit about 20 years ago as of 9/21/2018     Family History   Problem Relation Age of Onset    No Known Problems Mother     No Known Problems Father     Pancreatic cancer Brother 61    Breast cancer Daughter 36        38s     Meds/Allergies     Current Outpatient Prescriptions:     acetaminophen (TYLENOL) 160 mg/5 mL suspension    aspirin 81 mg chewable tablet    insulin lispro (HumaLOG) 100 units/mL injection    lidocaine (LIDODERM) 5 %    Nutritional Supplements (JEVITY 1 2 KARL) LIQD    omeprazole (PriLOSEC) 20 mg delayed release capsule    polyvinyl alcohol (LIQUIFILM TEARS) 1 4 % ophthalmic solution    No Known Allergies    Objective     Blood pressure 102/60, pulse 98, temperature 97 8 °F (36 6 °C), temperature source Tympanic, resp  rate 16, height 5' 7" (1 702 m), weight 51 7 kg (114 lb)  Body mass index is 17 85 kg/m²  PHYSICAL EXAM:    General Appearance:   Alert, cooperative, chronically ill appearing   HEENT:   Normocephalic, atraumatic, anicteric      Neck:  Supple, symmetrical, trachea midline   Lungs:   Decreased bilaterally, o2 sat 96% on room air, no distress, mild SOB   Heart[de-identified]   Regular rate and rhythm   Abdomen:   Soft, non-tender, non-distended; normal bowel sounds; J-tube C/D/I with sutures                              Lab Results:   No visits with results within 1 Day(s) from this visit  Latest known visit with results is:   Admission on 01/01/2019, Discharged on 01/19/2019   No results displayed because visit has over 200 results  Radiology Results:   Xr Chest Portable    Result Date: 1/10/2019  Narrative: CHEST INDICATION:   Hypoxia  COMPARISON:  1/8/2019 EXAM PERFORMED/VIEWS:  XR CHEST PORTABLE FINDINGS:  Nasogastric tube projects over the stomach  Heart shadow is enlarged but unchanged from prior exam  There is new airspace disease in the left perihilar region  Patchy bibasilar density is stable  There is a left pleural effusion which appears increased from the prior study  Osseous structures appear within normal limits for patient age  Impression: New left perihilar opacity suspicious for pneumonia with increasing left pleural effusion   Bibasilar densities are stable  The study was marked in [facility] for immediate notification  Workstation performed: [id number]     Xr Chest Portable    Result Date: 1/8/2019  Narrative: CHEST INDICATION:   hypoxia  COMPARISON:  1/1/2019 EXAM PERFORMED/VIEWS:  XR CHEST PORTABLE FINDINGS:  NG tube tip below the diaphragm  Heart shadow is enlarged but unchanged from prior exam  Persistent bibasilar infiltrates  No pneumothorax or pleural effusion  Osseous structures appear within normal limits for patient age  Impression: Persistent bibasilar infiltrates  Workstation performed: [id number]     Xr Chest Pa & Lateral    Result Date: 1/2/2019  Narrative: CHEST INDICATION:   tachycardia, cough  COMPARISON:  November 20, 2018 EXAM PERFORMED/VIEWS:  XR CHEST PA & LATERAL Images: 2 FINDINGS: Study is shot with the AP technique  This makes it difficult to evaluate heart size Lower lobe infiltrates are present bilaterally  The right was not seen previously  The left is much worse than November  Osseous structures appear within normal limits for patient age  Impression: Lower lobe infiltrates bilaterally Workstation performed: [id number]     Ct Head Wo Contrast    Result Date: 1/3/2019  Narrative: CT BRAIN - WITHOUT CONTRAST INDICATION:   dyphagia, suspected neurologic cause  COMPARISON:  None  TECHNIQUE:  CT examination of the brain was performed  In addition to axial images, coronal 2D reformatted images were created and submitted for interpretation  Radiation dose length product (DLP) for this visit:  926 mGy-cm   This examination, like all CT scans performed in the [facility], was performed utilizing techniques to minimize radiation dose exposure, including the use of iterative reconstruction and automated exposure control  IMAGE QUALITY:  Diagnostic  FINDINGS: PARENCHYMA:  There is confluent hypoattenuation within both cerebral hemispheres suggestive of advance microangiopathic disease    No acute intracranial hemorrhage or transcortical infarction  Generalized parenchymal volume loss  Atherosclerotic calcification of the intracranial carotid and vertebral arteries  VENTRICLES AND EXTRA-AXIAL SPACES:  Ventriculomegaly and prominence of the extra-axial spaces consistent with generalized symmetric volume loss  VISUALIZED ORBITS AND PARANASAL SINUSES:  Unremarkable  CALVARIUM AND EXTRACRANIAL SOFT TISSUES:  Normal      Impression: Generalized volume loss with advanced microangiopathic changes of the cerebral white matter  No acute intracranial abnormality  Workstation performed: [id number]     Ct Chest Wo Contrast    Result Date: 1/5/2019  Narrative: CT CHEST WITHOUT IV CONTRAST INDICATION:   Pneumonia complicated / unresolved  COMPARISON:  None  TECHNIQUE: CT examination of the chest was performed without intravenous contrast   Axial, sagittal, and coronal 2D reformatted images were created from the source data and submitted for interpretation  Radiation dose length product (DLP) for this visit:  244 mGy-cm   This examination, like all CT scans performed in the [facility], was performed utilizing techniques to minimize radiation dose exposure, including the use of iterative reconstruction and automated exposure control  FINDINGS: LUNGS:  Consolidative and groundglass parenchymal density throughout the dependent lung zones, or severe on the left than on the right but other smaller areas of patchy groundglass density in the upper lobes as well is consistent with multifocal pneumonia  There is inspissated mucus within the trachea and in the mainstem bronchi bilaterally; this, in conjunction with the distribution of most dense consolidative airspace opacity, suggests that findings represent severe aspiration pneumonia  PLEURA:  Trace left greater than right pleural effusions are noted  HEART/GREAT VESSELS:  Trace pericardial effusion    Borderline caliber of ascending thoracic aorta measures up to 38 mm  MEDIASTINUM AND ZULEYKA:  Borderline and mildly enlarged mediastinal and hilar lymph nodes are likely reactive  CHEST WALL AND LOWER NECK:   Unremarkable  VISUALIZED STRUCTURES IN THE UPPER ABDOMEN:  Cystic mass in the region of the pancreatic tail and splenic hilum is better evaluated on MRI performed December 14, 2018  The finding is similar in size when compared to that examination  Surgical clips are  seen throughout the left upper abdomen  OSSEOUS STRUCTURES:  No acute fracture or destructive osseous lesion  Impression: Groundglass and consolidative airspace opacity throughout the lungs, most dense in the dependent lungs and more severe on the left than the right  Also noted is inspissated mucus within the trachea and the mainstem bronchi bilaterally  The findings are  most suspicious for severe multifocal aspiration pneumonia  Small left greater than right pleural effusions  Findings in the upper abdomen are better evaluated on recent abdominal MR of December 14, 2018  Workstation performed: [id number]     Mri Brain Wo Contrast    Result Date: 1/4/2019  Narrative: MRI BRAIN WITHOUT CONTRAST INDICATION: Change in mental status  Suspected neurologic cause of dysphagia  COMPARISON:   January 3, 2019  TECHNIQUE:  Sagittal T1, axial T2, axial FLAIR, axial T1, axial [place] and axial diffusion imaging  IMAGE QUALITY:  Diagnostic  FINDINGS: BRAIN PARENCHYMA:  There is no discrete mass, mass effect or midline shift  There is no intracranial hemorrhage  There is no evidence of acute infarction and diffusion imaging is unremarkable  Extensive patchy and confluent T2 and FLAIR signal abnormality seen throughout periventricular and subcortical white matter as well as in the central karthikeyan without associated mass effect is most consistent with advanced microangiopathic change  There is a chronic lacunar infarction in the right caudate head  VENTRICLES:  The ventricles are normal in size and contour   SELLA AND PITUITARY GLAND:  Normal  ORBITS:  Normal  PARANASAL SINUSES:  Sinuses are clear  There is a right mastoid effusion  VASCULATURE:  Evaluation of the major intracranial vasculature demonstrates appropriate flow voids  CALVARIUM AND SKULL BASE:  Normal  EXTRACRANIAL SOFT TISSUES:  Normal      Impression: No acute intracranial ischemia  Advanced microangiopathic changes throughout supratentorial white matter and central karthikeyan  Right mastoid effusion  Workstation performed: [id number]     Mri Brain W Wo Contrast    Result Date: 1/13/2019  Narrative: MRI BRAIN WITH AND WITHOUT CONTRAST INDICATION: cva-   Dysphagia, dysarthria and weight loss  COMPARISON:  None  TECHNIQUE: Sagittal T1, axial T2, axial FLAIR, axial T1, axial [place], axial diffusion  Sagittal, axial T1 postcontrast   Axial bravo postcontrast with coronal reconstructions  IV Contrast:  5 mL of gadobutrol injection (MULTI-DOSE)  IMAGE QUALITY:   Diagnostic  FINDINGS: BRAIN PARENCHYMA:  There is no discrete mass, mass effect or midline shift  There is no intracranial hemorrhage  Normal posterior fossa  Diffusion imaging is unremarkable  Small scattered hyperintensities on T2/FLAIR imaging are noted in the periventricular and subcortical white matter demonstrating an appearance that is statistically most likely to represent advanced microangiopathic change  Postcontrast imaging of the brain demonstrates no abnormal enhancement  VENTRICLES:  Normal  SELLA AND PITUITARY GLAND:  Normal  ORBITS:  Normal  PARANASAL SINUSES:  Extensive right mastoid air cell opacification  VASCULATURE:  Evaluation of the major intracranial vasculature demonstrates appropriate flow voids  CALVARIUM AND SKULL BASE:  Normal  EXTRACRANIAL SOFT TISSUES:  Normal      Impression: White matter changes suggestive of chronic microangiopathy  No acute intracranial pathology  Extensive right mastoid opacification   Workstation performed: [id number]     Fl Barium Swallow Video W Speech    Result Date: 1/12/2019  Narrative: A video barium swallow study was performed by the Department of Speech Pathology  Please refer to the report for the official interpretation  The images are stored in  PACS for archival purposes only  Study images were not formally reviewed by the  Radiology Department  Fl Barium Swallow Video W Speech    Result Date: 1/12/2019  Narrative: JONG Fragoso     1/12/2019  3:03 PM                                  Video Swallow Study Patient Name: Rachele Fernandez [id number] Date: 1/12/2019   Past Medical History Past Medical History: Diagnosis Date  Diabetes mellitus ([address] )   Type II  Weight loss   Past Surgical History Past Surgical History: Procedure Laterality Date  LAPAROTOMY N/A 11/19/2018  Procedure: LAPAROTOMY EXPLORATORY;  Surgeon: Zechariah Bingham MD;  [facility];  Service: Surgical Oncology  [place] EDG US EXAM SURGICAL ALTER STOM DUODENUM/JEJUNUM N/A 9/21/2018  Procedure: LINEAR ENDOSCOPIC U/S;  Surgeon: Jennifer Carter MD;  [facility]; Service: Gastroenterology  CA LAP,DIAGNOSTIC ABDOMEN N/A 11/19/2018  Procedure: pancreatic biopsy;  Surgeon: Zechariah Bingham MD;  [facility];  Service: Surgical Oncology  TONSILLECTOMY    VASCULAR SURGERY    phlebitis many years ago  WRIST GANGLION EXCISION   Video Barium Swallow Study Summary: Pt cont to present c moderate oral and severe pharyngeal dysphagia characterized by weak tongue propulsion, minimal hyolaryngeal excursion, minimal to no epiglottic inversion, weak pharyngeal constriction and significantly decreased opening of the UES  Pt given trials of thin liquid, NTL and puree applesauce by tsp c premature spillage and pt utilizing multiple swallows to attempt to transfer/clear material into the esophagus  Decreased retention of thinner material noted but pt cont to have retention of even thin liquid c minimal passage into the esophagus c multiple swallows   With minimal epiglottic inversion, pt is at risk for penetration/aspiration during the swallow  With pharyngeal retention, pt is at risk for overflow penetration/aspiration  Pt noted to have episodes of penetration during this study  Pt continues to be a high aspiration risk with this study being grossly unchanged from previous VBS on 1/5/19  Recommend alternative means of nutrition at this time  Recommendations: Diet:NPO Meds: IV/tube F/u ST tx: yes Therapy Prognosis: guarded Prognosis considerations: dysphagia of unknown etiology Aspiration Precautions Reflux Precautions Consider consult with: GI Results reviewed with: pt, nursing, family, physician If a dedicated assessment of the esophagus is desired, consider esophagram/barium swallow  Patient's goal: "I want to eat" Goals: Pt will tolerate least restrictive diet w/out s/s aspiration or oral/pharyngeal difficulties  Consistencies administered: Barium laden applesauce, nectar thick, thin liquids  Liquids were administered by tsp  Pt was seated laterally at 90 degrees  Oral stage: Mod Impaired Lip closure: adequate Mastication: n/a Bolus formation: adeqaute Bolus control: decreased Transfer: prompt Residue: trace Pharyngeal stage: Severe Impaired Swallow promptness: grossly prompt Spill to valleculae: no Spill to pyriforms: no Epiglottic inversion: incomplete/absent Laryngeal rise: decreased Pharyngeal constriction: decreased Vallecular retention: mild Pyriform retention: severe PPW coating: yes Osteophytes: C5-C6 CP prominence: no Retropulsion from prominence: n/a Transient penetration: NTL Epiglottic undercoat: intermittently Penetration: c thin Aspiration: no Response to aspiration: n/a     Fl Barium Swallow Video W Speech    Result Date: 1/5/2019  Narrative: A video barium swallow study was performed by the Department of Speech Pathology  Please refer to the report for the official interpretation  The images are stored in  PACS for archival purposes only   Study images were not formally reviewed by the  Radiology Department  Fl Barium Swallow Video W Speech    Result Date: 1/3/2019  Narrative: A video barium swallow study was performed by the Department of Speech Pathology  Please refer to the report for the official interpretation  The images are stored in  PACS for archival purposes only  Study images were not formally reviewed by the  Radiology Department  Fl Barium Swallow Video W Speech    Result Date: 1/3/2019  Narrative: JONG García     1/3/2019 10:34 AM                                  Video Swallow Study Patient Name: David SWEENEY Date: 1/3/2019   Past Medical History Past Medical History: Diagnosis Date  Diabetes mellitus ([facility] [address] )   Type II  Weight loss   Past Surgical History Past Surgical History: Procedure Laterality Date  LAPAROTOMY N/A 11/19/2018  Procedure: LAPAROTOMY EXPLORATORY;  Surgeon: Noy Montero MD;  [facility];  Service: Surgical Oncology  [place] EDG US EXAM SURGICAL ALTER STOM DUODENUM/JEJUNUM N/A 9/21/2018  Procedure: LINEAR ENDOSCOPIC U/S;  Surgeon: Gil Rodrigues MD;  [facility]; Service: Gastroenterology  [place] LAP,DIAGNOSTIC ABDOMEN N/A 11/19/2018  Procedure: pancreatic biopsy;  Surgeon: Noy Montero MD;  [facility];  Service: Surgical Oncology  TONSILLECTOMY    VASCULAR SURGERY    phlebitis many years ago  WRIST GANGLION EXCISION   Video Barium Swallow Study Summary: Pt presents with severe pharyngeal dysphagia characterized by poor tongue base retraction and drive, swallow delay, absent epiglottic movement/inversion, and a tight UES that allows only minimal amounts of material to pass for each swallow attempt  Incomplete swallow  Although no santa aspiration was observed on this study, suspect at least microaspiration with thin liquids due to prolonged weak cough after trial Significant risk for aspiration noted after each initial swallow attempt due to large amount of stasis throughout the pharynx    After 10+ cued swallows, pt continued to present with at least moderate pyriform retention  Additionally, pt observed to be at risk for poor nutrition/hydration due to significant work required to swallow small bolus (10+ swallow attempts)  Suspect fatigue as an additional risk factor  Recommendations: Diet: NPO; consider alternate means of nutrition Meds: via tube/IV Strategies: Provided moist swabs or small ice chips for oral comfort  Upright position F/u ST tx: yes; therapeutic po trials of nectar thick liquids and exercises Therapy Prognosis: guarded Prognosis considerations: comorbidities, strength, motivation Consider consult with: Neuro  ? Etiology new onset severe pharyngeal dysphagia with absent epiglottic inversion and poor cricopharyngeal relaxation  Nutrition Svcs  Results reviewed with: pt, nursing, CRNP If a dedicated assessment of the esophagus is desired, consider esophagram/barium swallow  **Images available for review on PACS** Patient's goal: None state Goals: 1  Pt will tolerate trials of nectar thick liquids by tsp with SLP only using multiple and effortful swallow techniques  2   Pt will perform pharyngeal exercises x 10 ea given min cues to improve pharyngeal swallow function   HPI: From H&P: The patient is an 83 y/o male who has past medical history significant for pancreatic mass suspected chronic pancreatitis versus autoimmune pancreatitis and type 2 diabetes  [facility] presents from home with worsening weight loss of approximately 5 weeks and also with worsening cough that was described as wet and nonproductive associated with some worsening shortness of breath   Per family he did not receive a flu shot this year, and reportedly some sick contacts were present at home  [facility] any fever or chills at home  [facility] does admit to diarrhea described as yellowish" without blood being seen it      In ED, patient was noted to desaturate to as low as 84% requiring up to 5 L nasal cannula   He received 1L NS, and received treatment with azithromycin and ceftriaxone for presumed pneumonia      Currently, patient is reporting he does not have shortness of breath or abdominal pain   He continues to report cough, however remains nonproductive  Nursing staff report patient with coughing episode at lunch meal  From Bedside Swallow Eval 1/2/19: Summary: Pt presents w/ mild oral pharyngeal dysphagia characterized by delayed a-p transfer time and delayed swallow initiation time with intermittent, weak cough with small sips of thin liquids    Recommendations: Diet: Regular diet Liquid: Thin liquids Meds: As tolerated best Supervision: Intermittent supervision Positioning:Upright Strategies: Pt to take PO/Meds only when fully alert and upright  Oral care: As needed Aspiration precautions *Consider video swallow study to assess for and r/o aspiration Previous VBS: None here CXR: 1/1/19 - Lower lobe infiltrates bilaterally Current Diet: Regular with thin liquids Premorbid diet: Regular/soft with thin liquids Dentition: WFLs O2 requirement: NC Vocal Quality/Speech: Decreased volume; minimal verbal output Cognitive status: Flat affect  Awake  Answered simple questions Consistencies administered: Barium laden applesauce, nectar thick by tsp, thin liquids by cup sip  Pt was seated laterally at 90 degrees   Oral stage: Lip closure: good Mastication: na Bolus formation: decreased Bolus control: fair Transfer: prolonged for puree Residue: mild Tongue drive:  SEVERELY IMPAIRED Pharyngeal stage: Swallow promptness: variably delayed Spill to valleculae: yes Spill to pyriforms: no Epiglottic inversion: ABSENT Laryngeal rise: fair/good rise, fair anterior excursion Pharyngeal constriction: fair Vallecular retention: max for puree; mild to mod for nectar and thin - decreased over time and with multiple cued swallows (and the assistance of gravity) Pyriform retention: max; decreased to mod with multiple swallows (10+) Cricopharyngeal relaxation: POOR; minimal material passed through the UES each swallow attempt Transient penetration: mostly transient for thin liquids Vocal cords:  Good closure during consecutive swallows Penetration: thin liquids Aspiration: no santa aspiration seen  Cannot r/o microaspiration with thin liquids due to significant, prolonged coughing following thin liquid trial not observed with other consistencies  Strategies: multiple swallows, effortful swallow (unable to perform effectively), liquid wash - decreased pharyngeal stasis  Esophageal stage: Screened briefly; ? Mild hold up at distal esophagus  Limited sample for screening  Xr Abdomen 1 Vw Portable    Result Date: 1/6/2019  Narrative: ABDOMEN INDICATION:   check NGT placement  COMPARISON:  None VIEWS:  AP supine FINDINGS: Enteric tube is present with its tip overlying the expected location of gastroesophageal junction and its proximal sidehole approximately 10 cm above the gastroesophageal junction  Enteric contrast administered for barium swallow study performed earlier  the same day is seen within the small bowel and in the large bowel which demonstrates an unremarkable bowel gas pattern  There is patchy airspace opacity in the left lower chest and blunting in the left costophrenic angle  Impression: Enteric tube tip in the region of gastroesophageal junction with sidehole approximately 10 cm proximal to GE junction  Workstation performed: [id number]     Xr Chest Portable Icu    Result Date: 1/11/2019  Narrative: CHEST INDICATION:   persistent hypoxia, r/o pna  COMPARISON:  Chest radiographs January 10, 2019, earlier in the day  EXAM PERFORMED/VIEWS:  XR CHEST PORTABLE ICU  AP semierect FINDINGS: The heart is mildly enlarged  Atherosclerotic changes in the aorta  Lung volumes diminished  Slight progression of bilateral pleural effusions, left side greater than right  Worsening bilateral perihilar infiltrates  Orogastric tube in the stomach   Osseous structures appear within normal limits for patient age  Impression: Slight progression of bilateral infiltrates and bilateral pleural effusions, left side greater than right  Workstation performed: [id number]     Vas Lower Limb Venous Duplex Study, Complete Bilateral    Result Date: 1/11/2019  Narrative:  [facility] REPORT [facility]INICAL: Indications: Patient admitted for respiratory distress and SOB  Risk Factors The patient has no history of Obesity  FINDINGS:  Right           Impression                           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)  GSV Dist Thigh  E1  Non Occlusive Thrombus (Chronic)  GSV Mid Calf    E1  Non Occlusive Thrombus (Chronic)   Left            Impression                           CFV             E1 Non Occlusive Thrombus (Chronic)  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)  GSV Dist Thigh  E1  Non Occlusive Thrombus (Chronic)  GSV Mid Calf    E1  Non Occlusive Thrombus (Chronic)     CONCLUSION:  Impression: RIGHT LOWER LIMB: No evidence of acute or chronic deep vein thrombosis  There are areas of chronic non-occlusive superficial thrombosis in the greater saphenous vein  Doppler evaluation shows a normal response to augmentation maneuvers  Popliteal, posterior tibial and anterior tibial arterial Doppler waveforms are triphasic/monophasic  LEFT LOWER LIMB: Evaluation shows evidence of chronic deep vein thrombosis in the common femoral vein  There are areas of chronic non-occlusive superficial thrombosis in the greater saphenous vein  Doppler evaluation shows a normal response to augmentation maneuvers  Popliteal, posterior tibial and anterior tibial arterial Doppler waveforms are triphasic/biphasic/monophasic  Technical findings were given to critical care attending    SIGNATURE: Electronically Signed by: Lorene Peabody, MD on 2019-01-11 10:26:50 PM    Ct Abdomen Pelvis W Contrast    Result Date: 1/14/2019  Narrative: CT ABDOMEN AND PELVIS WITH IV CONTRAST INDICATION:   abdominal pain with 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  COMPARISON:  Prior abdominal MRI dated December 14, 2018  Prior CT dated September 13, 2018  TECHNIQUE:  CT examination of the abdomen and pelvis was performed  Axial, sagittal, and coronal 2D reformatted images were created from the source data and submitted for interpretation  Radiation dose length product (DLP) for this visit:  566 mGy-cm   This examination, like all CT scans performed in the [facility], was performed utilizing techniques to minimize radiation dose exposure, including the use of iterative reconstruction and automated exposure control  IV Contrast:  85 mL of iohexol (OMNIPAQUE) Enteric Contrast:  Enteric contrast was not administered  FINDINGS: ABDOMEN LOWER CHEST:  Consolidative changes with air bronchograms in the right lower lobe with small right side pleural effusion, worse since previous exam and likely representing developing pneumonia  Consolidative changes with air bronchograms involving left lower lobe with small left side pleural effusion, also significantly worse when compared to the previous exam, likely representing pneumonia  Cardiac size is within normal limits for patient's age  Trace pericardial fluid  LIVER/BILIARY TREE:  Liver is average size  No enhancing mass identified  Cystic lesion in the right hepatic lobe measuring 11 mm, previously 11 mm  No intrahepatic or extrahepatic biliary ductal dilatation  GALLBLADDER:  Layering higher attenuation bile seen dependently  No evidence of cholecystitis  SPLEEN:  Contour irregularity of the spleen again suggestive of either previous injury or small segmental infarct, this is unchanged from previous examinations  Spleen appears to drain via collateral veins  PANCREAS:  Pancreas is again atrophic   Irregular dilatation of the pancreatic duct, increased compared to previous examinations, appearing to communicate with the cystic collection in the pancreatic tail/splenic hilum, the overall size of the collection the pancreatic tail is very similar to December at 3 6 cm  Irregularly-shaped multilobular collection in the pancreatic body and tail measures larger on today's exam when compared to the MRI in December, measuring approximately 4 cm x 2 4 cm today compared to 2 7 x 2 3 cm in December  Today, the collection tracks cephalad towards the undersurface of stomach rather appears to be fistulization into the stomach lumen, this is best seen on the sagittal series image 602/69 and axial image 2/13  ADRENAL GLANDS:  Mild age-related adrenal hypertrophy  KIDNEYS/URETERS:  Stable renal cysts  No hydronephrosis  STOMACH AND BOWEL:  Limited evaluation of GI tract without oral contrast  Fistulization appears to exist between the multilocular pancreatic collection in the undersurface of the body/antrum of stomach  The small bowel appears average caliber  No evidence of bowel obstruction  Normal appendix  APPENDIX:  A normal appendix was visualized  ABDOMINOPELVIC CAVITY:  Ascites fluid observed in the pelvis, increased from prior exam  Trace amounts of perisplenic fluid, slightly increased compared to prior exam  VESSELS:  Unremarkable for patient's age  PELVIS REPRODUCTIVE ORGANS:  Unremarkable for patient's age  URINARY BLADDER:  Unremarkable  ABDOMINAL WALL/INGUINAL REGIONS:  Small fat-containing paraumbilical hernia  OSSEOUS STRUCTURES:  Stable     Impression: Developing/worsening lobar pneumonia in the lower lobes bilaterally with associated parapneumonic effusions  Stable liver cyst  Stable renal cysts  Atrophic pancreas with multilocular cystic collection involving pancreatic body and tail with a larger component situated in the splenic hilum, most likely a large pseudocyst  This now appears to communicate with the lumen of the stomach as described above which would explain the findings during endoscopy   Otherwise stable exam  The study was marked in EPIC for immediate notification   Pancreatic findings were discussed with Dr Lachelle Funk at approximately 6:00 PM  Workstation performed: [id number]

## 2019-01-24 NOTE — PATIENT INSTRUCTIONS
Patient is going to see Dr Constantine Kent for a follow up in 1 month  Patient caretaker will call to schedule it

## 2019-01-24 NOTE — TELEPHONE ENCOUNTER
Sony Templeton, surgical oncology actually placed this j-tube for him  I am not sure what the protocol for this is  They will either need to call the radiology department for recommendations or surgical oncology   Thank you

## 2019-01-24 NOTE — TELEPHONE ENCOUNTER
Shannon Lord 130 and spoke with [place]  Advised her to call Oncology surgeon - Dr Rose Mary Bazzi [phone] or radiology department at [facility]  She will call Dr Hitesh Ge first  She thanked me for my help

## 2019-01-24 NOTE — PROGRESS NOTES
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  -CT A/P 1/14 showed atrophic pancreas with multilocular cystic collection involving pancreatic body and tail/splenic hilum which appears to communicate to the stomach  -EGD 1/1 with fistula versus ulcer in the posterior wall of the stomach  -currently asymptomatic  -repeat CT scan is planned for 2/1 to evaluate for evolution of pseudocyst  -He then has follow up with Dr Ranjan Solis and follow-up with Dr Clint Muro to be scheduled   -He will call if he develops pain, nausea, vomiting  -currently receiving feeds through J tube so creon was discontinued    Severe oropharyngeal dysphagia  -felt initially possible neurologic in nature however workup for myasthenia gravis was negative, possible spasm of UES, he will be followingup with ENT  -Unsuccessful G tube placement given superiorly located stomach ergo J tube was placed surgically  -management of J tube per surgery    ______________________________________________________________________    SUBJECTIVE:  Kezia Levy is an 83 y/o male who presents for hospital follow-up  He has a history of chronic pancreatitis, pancreatic lesion who was recently evaluated by Dr Cilnt Muro  He then had a complicated recent hospitalization for oropharyngeal dysphagia (felt possibly secondary to myasthenia gravis) and aspiration, PEG was attempted but not able to be placed given his stomach for far above his costal margin, he had a surgical J tube placed by Dr Ranjan Solis  He was also found to have pseudocyst with fistula into his stomach  Today he feels okay, he denies abdominal pain, nausea, vomiting, diarrhea, fever/chills  He has mild SOB that has been stable since discharge   He is tolerating tube feeds without difficulty and denies any significant leakage or pain at PEG site    REVIEW OF SYSTEMS IS OTHERWISE NEGATIVE  Historical Information   Past Medical History:   Diagnosis Date    Diabetes mellitus ([facility] [address] )     Type II    Weight loss      Past Surgical History:   Procedure Laterality Date    GASTROSTOMY TUBE PLACEMENT N/A 1/14/2019    Procedure: INSERTION PEG TUBE;  Surgeon: Se Florentino MD;  [facility]; Service: Gastroenterology    GASTROSTOMY TUBE PLACEMENT N/A 1/15/2019    Procedure: INSERTION JEJUNOSTOMY TUBE OPEN;  Surgeon: Georgie Hashimoto, MD;  [facility];  Service: Surgical Oncology    LAPAROTOMY N/A 11/19/2018    Procedure: LAPAROTOMY EXPLORATORY;  Surgeon: Georgie Hashimoto, MD;  [facility];  Service: Surgical Oncology    CT EDG US EXAM SURGICAL ALTER STOM DUODENUM/JEJUNUM N/A 9/21/2018    Procedure: LINEAR ENDOSCOPIC U/S;  Surgeon: Cristiano Carey MD;  [facility];   Service: Gastroenterology    CT LAP,DIAGNOSTIC ABDOMEN N/A 11/19/2018    Procedure: pancreatic biopsy;  Surgeon: Georgie Hashimoto, MD;  [facility];  Service: Surgical Oncology    TONSILLECTOMY      VASCULAR SURGERY      phlebitis many years ago    WRIST GANGLION EXCISION       Social History   History   Alcohol Use    Yes     Comment: 1 beer a day     History   Drug Use No     History   Smoking Status    Former Smoker    Years: 25 00   Smokeless Tobacco    Never Used     Comment: quit about 20 years ago as of 9/21/2018     Family History   Problem Relation Age of Onset    No Known Problems Mother     No Known Problems Father     Pancreatic cancer Brother 61    Breast cancer Daughter 36        38s     Meds/Allergies     Current Outpatient Prescriptions:     acetaminophen (TYLENOL) 160 mg/5 mL suspension    aspirin 81 mg chewable tablet    insulin lispro (HumaLOG) 100 units/mL injection    lidocaine (LIDODERM) 5 %    Nutritional Supplements (JEVITY 1 2 KARL) LIQD    omeprazole (PriLOSEC) 20 mg delayed release capsule    polyvinyl alcohol (LIQUIFILM TEARS) 1 4 % ophthalmic solution    No Known Allergies    Objective     Blood pressure 102/60, pulse 98, temperature 97 8 °F (36 6 °C), temperature source Tympanic, resp  rate 16, height 5' 7" (1 702 m), weight 51 7 kg (114 lb)  Body mass index is 17 85 kg/m²  PHYSICAL EXAM:    General Appearance:   Alert, cooperative, chronically ill appearing   HEENT:   Normocephalic, atraumatic, anicteric      Neck:  Supple, symmetrical, trachea midline   Lungs:   Decreased bilaterally, o2 sat 96% on room air, no distress, mild SOB   Heart[de-identified]   Regular rate and rhythm   Abdomen:   Soft, non-tender, non-distended; normal bowel sounds; J-tube C/D/I with sutures                              Lab Results:   No visits with results within 1 Day(s) from this visit  Latest known visit with results is:   Admission on 01/01/2019, Discharged on 01/19/2019   No results displayed because visit has over 200 results  Radiology Results:   Xr Chest Portable    Result Date: 1/10/2019  Narrative: CHEST INDICATION:   Hypoxia  COMPARISON:  1/8/2019 EXAM PERFORMED/VIEWS:  XR CHEST PORTABLE FINDINGS:  Nasogastric tube projects over the stomach  Heart shadow is enlarged but unchanged from prior exam  There is new airspace disease in the left perihilar region  Patchy bibasilar density is stable  There is a left pleural effusion which appears increased from the prior study  Osseous structures appear within normal limits for patient age  Impression: New left perihilar opacity suspicious for pneumonia with increasing left pleural effusion  Bibasilar densities are stable  The study was marked in [facility] for immediate notification  Workstation performed: [id number]     Xr Chest Portable    Result Date: 1/8/2019  Narrative: CHEST INDICATION:   hypoxia  COMPARISON:  1/1/2019 EXAM PERFORMED/VIEWS:  XR CHEST PORTABLE FINDINGS:  NG tube tip below the diaphragm  Heart shadow is enlarged but unchanged from prior exam  Persistent bibasilar infiltrates    No pneumothorax or pleural effusion  Osseous structures appear within normal limits for patient age  Impression: Persistent bibasilar infiltrates  Workstation performed: [id number]     Xr Chest Pa & Lateral    Result Date: 1/2/2019  Narrative: CHEST INDICATION:   tachycardia, cough  COMPARISON:  November 20, 2018 EXAM PERFORMED/VIEWS:  XR CHEST PA & LATERAL Images: 2 FINDINGS: Study is shot with the AP technique  This makes it difficult to evaluate heart size Lower lobe infiltrates are present bilaterally  The right was not seen previously  The left is much worse than November  Osseous structures appear within normal limits for patient age  Impression: Lower lobe infiltrates bilaterally Workstation performed: [id number]     Ct Head Wo Contrast    Result Date: 1/3/2019  Narrative: CT BRAIN - WITHOUT CONTRAST INDICATION:   dyphagia, suspected neurologic cause  COMPARISON:  None  TECHNIQUE:  CT examination of the brain was performed  In addition to axial images, coronal 2D reformatted images were created and submitted for interpretation  Radiation dose length product (DLP) for this visit:  926 mGy-cm   This examination, like all CT scans performed in the [facility], was performed utilizing techniques to minimize radiation dose exposure, including the use of iterative reconstruction and automated exposure control  IMAGE QUALITY:  Diagnostic  FINDINGS: PARENCHYMA:  There is confluent hypoattenuation within both cerebral hemispheres suggestive of advance microangiopathic disease  No acute intracranial hemorrhage or transcortical infarction  Generalized parenchymal volume loss  Atherosclerotic calcification of the intracranial carotid and vertebral arteries  VENTRICLES AND EXTRA-AXIAL SPACES:  Ventriculomegaly and prominence of the extra-axial spaces consistent with generalized symmetric volume loss  VISUALIZED ORBITS AND PARANASAL SINUSES:  Unremarkable   CALVARIUM AND EXTRACRANIAL SOFT TISSUES:  Normal      Impression: Generalized volume loss with advanced microangiopathic changes of the cerebral white matter  No acute intracranial abnormality  Workstation performed: [id number]     Ct Chest Wo Contrast    Result Date: 1/5/2019  Narrative: CT CHEST WITHOUT IV CONTRAST INDICATION:   Pneumonia complicated / unresolved  COMPARISON:  None  TECHNIQUE: CT examination of the chest was performed without intravenous contrast   Axial, sagittal, and coronal 2D reformatted images were created from the source data and submitted for interpretation  Radiation dose length product (DLP) for this visit:  244 mGy-cm   This examination, like all CT scans performed in the [facility], was performed utilizing techniques to minimize radiation dose exposure, including the use of iterative reconstruction and automated exposure control  FINDINGS: LUNGS:  Consolidative and groundglass parenchymal density throughout the dependent lung zones, or severe on the left than on the right but other smaller areas of patchy groundglass density in the upper lobes as well is consistent with multifocal pneumonia  There is inspissated mucus within the trachea and in the mainstem bronchi bilaterally; this, in conjunction with the distribution of most dense consolidative airspace opacity, suggests that findings represent severe aspiration pneumonia  PLEURA:  Trace left greater than right pleural effusions are noted  HEART/GREAT VESSELS:  Trace pericardial effusion  Borderline caliber of ascending thoracic aorta measures up to 38 mm  MEDIASTINUM AND ZULEYKA:  Borderline and mildly enlarged mediastinal and hilar lymph nodes are likely reactive  CHEST WALL AND LOWER NECK:   Unremarkable  VISUALIZED STRUCTURES IN THE UPPER ABDOMEN:  Cystic mass in the region of the pancreatic tail and splenic hilum is better evaluated on MRI performed December 14, 2018  The finding is similar in size when compared to that examination    Surgical clips are  seen throughout the left upper abdomen  OSSEOUS STRUCTURES:  No acute fracture or destructive osseous lesion  Impression: Groundglass and consolidative airspace opacity throughout the lungs, most dense in the dependent lungs and more severe on the left than the right  Also noted is inspissated mucus within the trachea and the mainstem bronchi bilaterally  The findings are  most suspicious for severe multifocal aspiration pneumonia  Small left greater than right pleural effusions  Findings in the upper abdomen are better evaluated on recent abdominal MR of December 14, 2018  Workstation performed: [id number]     Mri Brain Wo Contrast    Result Date: 1/4/2019  Narrative: MRI BRAIN WITHOUT CONTRAST INDICATION: Change in mental status  Suspected neurologic cause of dysphagia  COMPARISON:   January 3, 2019  TECHNIQUE:  Sagittal T1, axial T2, axial FLAIR, axial T1, axial [place] and axial diffusion imaging  IMAGE QUALITY:  Diagnostic  FINDINGS: BRAIN PARENCHYMA:  There is no discrete mass, mass effect or midline shift  There is no intracranial hemorrhage  There is no evidence of acute infarction and diffusion imaging is unremarkable  Extensive patchy and confluent T2 and FLAIR signal abnormality seen throughout periventricular and subcortical white matter as well as in the central karthikeyan without associated mass effect is most consistent with advanced microangiopathic change  There is a chronic lacunar infarction in the right caudate head  VENTRICLES:  The ventricles are normal in size and contour  SELLA AND PITUITARY GLAND:  Normal  ORBITS:  Normal  PARANASAL SINUSES:  Sinuses are clear  There is a right mastoid effusion  VASCULATURE:  Evaluation of the major intracranial vasculature demonstrates appropriate flow voids  CALVARIUM AND SKULL BASE:  Normal  EXTRACRANIAL SOFT TISSUES:  Normal      Impression: No acute intracranial ischemia   Advanced microangiopathic changes throughout supratentorial white matter and central karthikeyan  Right mastoid effusion  Workstation performed: [id number]     Mri Brain W Wo Contrast    Result Date: 1/13/2019  Narrative: MRI BRAIN WITH AND WITHOUT CONTRAST INDICATION: cva-   Dysphagia, dysarthria and weight loss  COMPARISON:  None  TECHNIQUE: Sagittal T1, axial T2, axial FLAIR, axial T1, axial [place], axial diffusion  Sagittal, axial T1 postcontrast   Axial bravo postcontrast with coronal reconstructions  IV Contrast:  5 mL of gadobutrol injection (MULTI-DOSE)  IMAGE QUALITY:   Diagnostic  FINDINGS: BRAIN PARENCHYMA:  There is no discrete mass, mass effect or midline shift  There is no intracranial hemorrhage  Normal posterior fossa  Diffusion imaging is unremarkable  Small scattered hyperintensities on T2/FLAIR imaging are noted in the periventricular and subcortical white matter demonstrating an appearance that is statistically most likely to represent advanced microangiopathic change  Postcontrast imaging of the brain demonstrates no abnormal enhancement  VENTRICLES:  Normal  SELLA AND PITUITARY GLAND:  Normal  ORBITS:  Normal  PARANASAL SINUSES:  Extensive right mastoid air cell opacification  VASCULATURE:  Evaluation of the major intracranial vasculature demonstrates appropriate flow voids  CALVARIUM AND SKULL BASE:  Normal  EXTRACRANIAL SOFT TISSUES:  Normal      Impression: White matter changes suggestive of chronic microangiopathy  No acute intracranial pathology  Extensive right mastoid opacification  Workstation performed: [id number]     Fl Barium Swallow Video W Speech    Result Date: 1/12/2019  Narrative: A video barium swallow study was performed by the Department of Speech Pathology  Please refer to the report for the official interpretation  The images are stored in  PACS for archival purposes only  Study images were not formally reviewed by the  Radiology Department      Fl Barium Swallow Video W Speech    Result Date: 1/12/2019  Narrative: Minnie Guzman SLP     1/12/2019  3:03 PM                                  Video Swallow Study Patient Name: Emma [id number] Date: 1/12/2019   Past Medical History Past Medical History: Diagnosis Date  Diabetes mellitus ([address] )   Type II  Weight loss   Past Surgical History Past Surgical History: Procedure Laterality Date  LAPAROTOMY N/A 11/19/2018  Procedure: LAPAROTOMY EXPLORATORY;  Surgeon: Nader Jansen MD;  [facility];  Service: Surgical Oncology  TN EDG US EXAM SURGICAL ALTER STOM DUODENUM/JEJUNUM N/A 9/21/2018  Procedure: LINEAR ENDOSCOPIC U/S;  Surgeon: Shantal Carroll MD;  [facility]; Service: Gastroenterology  TN LAP,DIAGNOSTIC ABDOMEN N/A 11/19/2018  Procedure: pancreatic biopsy;  Surgeon: Nader Jansen MD;  [facility];  Service: Surgical Oncology  TONSILLECTOMY    VASCULAR SURGERY    phlebitis many years ago  WRIST GANGLION EXCISION   Video Barium Swallow Study Summary: Pt cont to present c moderate oral and severe pharyngeal dysphagia characterized by weak tongue propulsion, minimal hyolaryngeal excursion, minimal to no epiglottic inversion, weak pharyngeal constriction and significantly decreased opening of the UES  Pt given trials of thin liquid, NTL and puree applesauce by tsp c premature spillage and pt utilizing multiple swallows to attempt to transfer/clear material into the esophagus  Decreased retention of thinner material noted but pt cont to have retention of even thin liquid c minimal passage into the esophagus c multiple swallows  With minimal epiglottic inversion, pt is at risk for penetration/aspiration during the swallow  With pharyngeal retention, pt is at risk for overflow penetration/aspiration  Pt noted to have episodes of penetration during this study  Pt continues to be a high aspiration risk with this study being grossly unchanged from previous VBS on 1/5/19  Recommend alternative means of nutrition at this time   Recommendations: Diet:NPO Meds: IV/tube F/u ST tx: yes Therapy Prognosis: guarded Prognosis considerations: dysphagia of unknown etiology Aspiration Precautions Reflux Precautions Consider consult with: GI Results reviewed with: pt, nursing, family, physician If a dedicated assessment of the esophagus is desired, consider esophagram/barium swallow  Patient's goal: "I want to eat" Goals: Pt will tolerate least restrictive diet w/out s/s aspiration or oral/pharyngeal difficulties  Consistencies administered: Barium laden applesauce, nectar thick, thin liquids  Liquids were administered by tsp  Pt was seated laterally at 90 degrees  Oral stage: Mod Impaired Lip closure: adequate Mastication: n/a Bolus formation: adeqaute Bolus control: decreased Transfer: prompt Residue: trace Pharyngeal stage: Severe Impaired Swallow promptness: grossly prompt Spill to valleculae: no Spill to pyriforms: no Epiglottic inversion: incomplete/absent Laryngeal rise: decreased Pharyngeal constriction: decreased Vallecular retention: mild Pyriform retention: severe PPW coating: yes Osteophytes: C5-C6 CP prominence: no Retropulsion from prominence: n/a Transient penetration: NTL Epiglottic undercoat: intermittently Penetration: c thin Aspiration: no Response to aspiration: n/a     Fl Barium Swallow Video W Speech    Result Date: 1/5/2019  Narrative: A video barium swallow study was performed by the Department of Speech Pathology  Please refer to the report for the official interpretation  The images are stored in  PACS for archival purposes only  Study images were not formally reviewed by the  Radiology Department  Fl Barium Swallow Video W Speech    Result Date: 1/3/2019  Narrative: A video barium swallow study was performed by the Department of Speech Pathology  Please refer to the report for the official interpretation  The images are stored in  PACS for archival purposes only  Study images were not formally reviewed by the  Radiology Department      Fl Barium Swallow Video W Speech    Result Date: 1/3/2019  Narrative: JONG Fuchs     1/3/2019 10:34 AM                                  Video Swallow Study Patient Name: Edie Tang Today's Date: 1/3/2019   Past Medical History Past Medical History: Diagnosis Date  Diabetes mellitus ([address] )   Type II  Weight loss   Past Surgical History Past Surgical History: Procedure Laterality Date  LAPAROTOMY N/A 11/19/2018  Procedure: LAPAROTOMY EXPLORATORY;  Surgeon: Soren Chen MD;  [facility];  Service: Surgical Oncology  AL EDG US EXAM SURGICAL ALTER STOM DUODENUM/JEJUNUM N/A 9/21/2018  Procedure: LINEAR ENDOSCOPIC U/S;  Surgeon: Cristhian Bingham MD;  [facility]; Service: Gastroenterology  AL LAP,DIAGNOSTIC ABDOMEN N/A 11/19/2018  Procedure: pancreatic biopsy;  Surgeon: Soren Chen MD;  [facility];  Service: Surgical Oncology  TONSILLECTOMY    VASCULAR SURGERY    phlebitis many years ago  WRIST GANGLION EXCISION   Video Barium Swallow Study Summary: Pt presents with severe pharyngeal dysphagia characterized by poor tongue base retraction and drive, swallow delay, absent epiglottic movement/inversion, and a tight UES that allows only minimal amounts of material to pass for each swallow attempt  Incomplete swallow  Although no santa aspiration was observed on this study, suspect at least microaspiration with thin liquids due to prolonged weak cough after trial Significant risk for aspiration noted after each initial swallow attempt due to large amount of stasis throughout the pharynx  After 10+ cued swallows, pt continued to present with at least moderate pyriform retention  Additionally, pt observed to be at risk for poor nutrition/hydration due to significant work required to swallow small bolus (10+ swallow attempts)  Suspect fatigue as an additional risk factor   Recommendations: Diet: NPO; consider alternate means of nutrition Meds: via tube/IV Strategies: Provided moist swabs or small ice chips for oral comfort  Upright position F/u ST tx: yes; therapeutic po trials of nectar thick liquids and exercises Therapy Prognosis: guarded Prognosis considerations: comorbidities, strength, motivation Consider consult with: Neuro  ? Etiology new onset severe pharyngeal dysphagia with absent epiglottic inversion and poor cricopharyngeal relaxation  Nutrition Svcs  Results reviewed with: pt, nursing, CRNP If a dedicated assessment of the esophagus is desired, consider esophagram/barium swallow  **Images available for review on PACS** Patient's goal: None state Goals: 1  Pt will tolerate trials of nectar thick liquids by tsp with SLP only using multiple and effortful swallow techniques  2   Pt will perform pharyngeal exercises x 10 ea given min cues to improve pharyngeal swallow function  HPI: From H&P: The patient is an 81 y/o male who has past medical history significant for pancreatic mass suspected chronic pancreatitis versus autoimmune pancreatitis and type 2 diabetes  [place] from home with worsening weight loss of approximately 5 weeks and also with worsening cough that was described as wet and nonproductive associated with some worsening shortness of breath   Per family he did not receive a flu shot this year, and reportedly some sick contacts were present at home  [facility] any fever or chills at home  [place] does admit to diarrhea described as yellowish" without blood being seen it      In ED, patient was noted to desaturate to as low as 84% requiring up to 5 L nasal cannula   He received 1L NS, and received treatment with azithromycin and ceftriaxone for presumed pneumonia      Currently, patient is reporting he does not have shortness of breath or abdominal pain   He continues to report cough, however remains nonproductive   Nursing staff report patient with coughing episode at lunch meal  From Bedside Swallow Eval 1/2/19: Summary: Pt presents w/ mild oral pharyngeal dysphagia characterized by delayed a-p transfer time and delayed swallow initiation time with intermittent, weak cough with small sips of thin liquids    Recommendations: Diet: Regular diet Liquid: Thin liquids Meds: As tolerated best Supervision: Intermittent supervision Positioning:Upright Strategies: Pt to take PO/Meds only when fully alert and upright  Oral care: As needed Aspiration precautions *Consider video swallow study to assess for and r/o aspiration Previous VBS: None here CXR: 1/1/19 - Lower lobe infiltrates bilaterally Current Diet: Regular with thin liquids Premorbid diet: Regular/soft with thin liquids Dentition: WFLs O2 requirement: NC Vocal Quality/Speech: Decreased volume; minimal verbal output Cognitive status: Flat affect  Awake  Answered simple questions Consistencies administered: Barium laden applesauce, nectar thick by tsp, thin liquids by cup sip  Pt was seated laterally at 90 degrees  Oral stage: Lip closure: good Mastication: na Bolus formation: decreased Bolus control: fair Transfer: prolonged for puree Residue: mild Tongue drive:  SEVERELY IMPAIRED Pharyngeal stage: Swallow promptness: variably delayed Spill to valleculae: yes Spill to pyriforms: no Epiglottic inversion: ABSENT Laryngeal rise: fair/good rise, fair anterior excursion Pharyngeal constriction: fair Vallecular retention: max for puree; mild to mod for nectar and thin - decreased over time and with multiple cued swallows (and the assistance of gravity) Pyriform retention: max; decreased to mod with multiple swallows (10+) Cricopharyngeal relaxation: POOR; minimal material passed through the UES each swallow attempt Transient penetration: mostly transient for thin liquids Vocal cords:  Good closure during consecutive swallows Penetration: thin liquids Aspiration: no santa aspiration seen  Cannot r/o microaspiration with thin liquids due to significant, prolonged coughing following thin liquid trial not observed with other consistencies   Strategies: multiple swallows, effortful swallow (unable to perform effectively), liquid wash - decreased pharyngeal stasis  Esophageal stage: Screened briefly; ? Mild hold up at distal esophagus  Limited sample for screening  Xr Abdomen 1 Vw Portable    Result Date: 1/6/2019  Narrative: ABDOMEN INDICATION:   check NGT placement  COMPARISON:  None VIEWS:  AP supine FINDINGS: Enteric tube is present with its tip overlying the expected location of gastroesophageal junction and its proximal sidehole approximately 10 cm above the gastroesophageal junction  Enteric contrast administered for barium swallow study performed earlier  the same day is seen within the small bowel and in the large bowel which demonstrates an unremarkable bowel gas pattern  There is patchy airspace opacity in the left lower chest and blunting in the left costophrenic angle  Impression: Enteric tube tip in the region of gastroesophageal junction with sidehole approximately 10 cm proximal to GE junction  Workstation performed: [id number]     Xr Chest Portable Icu    Result Date: 1/11/2019  Narrative: CHEST INDICATION:   persistent hypoxia, r/o pna  COMPARISON:  Chest radiographs January 10, 2019, earlier in the day  EXAM PERFORMED/VIEWS:  XR CHEST PORTABLE ICU  AP semierect FINDINGS: The heart is mildly enlarged  Atherosclerotic changes in the aorta  Lung volumes diminished  Slight progression of bilateral pleural effusions, left side greater than right  Worsening bilateral perihilar infiltrates  Orogastric tube in the stomach  Osseous structures appear within normal limits for patient age  Impression: Slight progression of bilateral infiltrates and bilateral pleural effusions, left side greater than right  Workstation performed: [id number]     Vas Lower Limb Venous Duplex Study, Complete Bilateral    Result Date: 1/11/2019  Narrative:  [facility] REPORT CLINICAL: Indications: Patient admitted for respiratory distress and SOB   Risk Factors The patient has no history of Obesity  FINDINGS:  Right           Impression                           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)  GSV Dist Thigh  E1  Non Occlusive Thrombus (Chronic)  GSV Mid Calf    E1  Non Occlusive Thrombus (Chronic)   Left            Impression                           CFV             E1 Non Occlusive Thrombus (Chronic)  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)  GSV Dist Thigh  E1  Non Occlusive Thrombus (Chronic)  GSV Mid Calf    E1  Non Occlusive Thrombus (Chronic)     CONCLUSION:  Impression: RIGHT LOWER LIMB: No evidence of acute or chronic deep vein thrombosis  There are areas of chronic non-occlusive superficial thrombosis in the greater saphenous vein  Doppler evaluation shows a normal response to augmentation maneuvers  Popliteal, posterior tibial and anterior tibial arterial Doppler waveforms are triphasic/monophasic  LEFT LOWER LIMB: Evaluation shows evidence of chronic deep vein thrombosis in the common femoral vein  There are areas of chronic non-occlusive superficial thrombosis in the greater saphenous vein  Doppler evaluation shows a normal response to augmentation maneuvers  Popliteal, posterior tibial and anterior tibial arterial Doppler waveforms are triphasic/biphasic/monophasic  Technical findings were given to critical care attending  SIGNATURE: Electronically Signed by: Avery Good MD on 2019-01-11 10:26:50 PM    Ct Abdomen Pelvis W Contrast    Result Date: 1/14/2019  Narrative: CT ABDOMEN AND PELVIS WITH IV CONTRAST INDICATION:   abdominal pain with 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  COMPARISON:  Prior abdominal MRI dated December 14, 2018  Prior CT dated September 13, 2018  TECHNIQUE:  CT examination of the abdomen and pelvis was performed  Axial, sagittal, and coronal 2D reformatted images were created from the source data and submitted for interpretation   Radiation dose length product (DLP) for this visit:  566 mGy-cm    This examination, like all CT scans performed in the [facility], was performed utilizing techniques to minimize radiation dose exposure, including the use of iterative reconstruction and automated exposure control  IV Contrast:  85 mL of iohexol (OMNIPAQUE) Enteric Contrast:  Enteric contrast was not administered  FINDINGS: ABDOMEN LOWER CHEST:  Consolidative changes with air bronchograms in the right lower lobe with small right side pleural effusion, worse since previous exam and likely representing developing pneumonia  Consolidative changes with air bronchograms involving left lower lobe with small left side pleural effusion, also significantly worse when compared to the previous exam, likely representing pneumonia  Cardiac size is within normal limits for patient's age  Trace pericardial fluid  LIVER/BILIARY TREE:  Liver is average size  No enhancing mass identified  Cystic lesion in the right hepatic lobe measuring 11 mm, previously 11 mm  No intrahepatic or extrahepatic biliary ductal dilatation  GALLBLADDER:  Layering higher attenuation bile seen dependently  No evidence of cholecystitis  SPLEEN:  Contour irregularity of the spleen again suggestive of either previous injury or small segmental infarct, this is unchanged from previous examinations  Spleen appears to drain via collateral veins  PANCREAS:  Pancreas is again atrophic  Irregular dilatation of the pancreatic duct, increased compared to previous examinations, appearing to communicate with the cystic collection in the pancreatic tail/splenic hilum, the overall size of the collection the pancreatic tail is very similar to December at 3 6 cm  Irregularly-shaped multilobular collection in the pancreatic body and tail measures larger on today's exam when compared to the MRI in December, measuring approximately 4 cm x 2 4 cm today compared to 2 7 x 2 3 cm in December   Today, the collection tracks cephalad towards the undersurface of stomach rather appears to be fistulization into the stomach lumen, this is best seen on the sagittal series image 602/69 and axial image 2/13  ADRENAL GLANDS:  Mild age-related adrenal hypertrophy  KIDNEYS/URETERS:  Stable renal cysts  No hydronephrosis  STOMACH AND BOWEL:  Limited evaluation of GI tract without oral contrast  Fistulization appears to exist between the multilocular pancreatic collection in the undersurface of the body/antrum of stomach  The small bowel appears average caliber  No evidence of bowel obstruction  Normal appendix  APPENDIX:  A normal appendix was visualized  ABDOMINOPELVIC CAVITY:  Ascites fluid observed in the pelvis, increased from prior exam  Trace amounts of perisplenic fluid, slightly increased compared to prior exam  VESSELS:  Unremarkable for patient's age  PELVIS REPRODUCTIVE ORGANS:  Unremarkable for patient's age  URINARY BLADDER:  Unremarkable  ABDOMINAL WALL/INGUINAL REGIONS:  Small fat-containing paraumbilical hernia  OSSEOUS STRUCTURES:  Stable     Impression: Developing/worsening lobar pneumonia in the lower lobes bilaterally with associated parapneumonic effusions  Stable liver cyst  Stable renal cysts  Atrophic pancreas with multilocular cystic collection involving pancreatic body and tail with a larger component situated in the splenic hilum, most likely a large pseudocyst  This now appears to communicate with the lumen of the stomach as described above which would explain the findings during endoscopy  Otherwise stable exam  The study was marked in EPIC for immediate notification   Pancreatic findings were discussed with Dr Brenda Thrasher at approximately 6:00 PM  Workstation performed: [id number]

## 2019-01-24 NOTE — TELEPHONE ENCOUNTER
DR MCKEON'S PT    Pt daughter called requesting to schedule pt 1 month follow with dr Claire Glaser

## 2019-01-24 NOTE — TELEPHONE ENCOUNTER
Patient recently D/C'ed from \[facility]"" (1/1/19 - 1/19/19) and is currently at [facility]  Spoke to Radha Bañuelos  Patient is scheduled for a CT abdomen and pelvis with contrast (barium ordered)  Pt has J- tube and is NPO  Nurse questioning how to give the barium through the tube - Tube feed rate is 65 ml/hr and Tube feed water flush at 125 ml every 6 hrs   bolus? Rate per hour?     Please advise  Phone # [phone] or Daniel Gao

## 2019-02-01 ENCOUNTER — TELEPHONE (OUTPATIENT)
Dept: GASTROENTEROLOGY | Facility: CLINIC | Age: 84
End: 2019-02-01

## 2019-02-01 ENCOUNTER — TELEPHONE (OUTPATIENT)
Dept: SURGICAL ONCOLOGY | Facility: CLINIC | Age: 84
End: 2019-02-01

## 2019-02-01 NOTE — TELEPHONE ENCOUNTER
Kareme Martin @ [facility] called to inform us that pt has localized redness at J-tube site  Denies that the pt has fevers or chills, denies foul odor at the site, and states the pt is not complaining of pain or soreness  She described the redness as being just right around the stitches  Advised her that this may be normal skin irritation  Advised her to call back if redness worsens or if he develops fever, chills or pain

## 2019-02-01 NOTE — TELEPHONE ENCOUNTER
J-tube placement surgery was done by Dr Cali Otoole  I referred [facility] to his office  [phone]

## 2019-02-01 NOTE — TELEPHONE ENCOUNTER
dixon pt    Please return Renetta's call from saul zapata, she states the pt has rednedd around his j tube   [phone]

## 2019-02-05 ENCOUNTER — TELEPHONE (OUTPATIENT)
Dept: GASTROENTEROLOGY | Facility: CLINIC | Age: 84
End: 2019-02-05

## 2019-02-05 NOTE — TELEPHONE ENCOUNTER
Surgical oncology had placed J tube and would defer this to them-they may want nutrition to give input, please let country zapata know this

## 2019-02-05 NOTE — TELEPHONE ENCOUNTER
dixon patient    We received a call from cathie cuadra [facility] regarding the feeding tube  They would like to have permission to turn the 24 hr tube into a nocturnal feeding tube        Call back # [phone]

## 2019-02-06 NOTE — TELEPHONE ENCOUNTER
LMOM with Nurses station @ [facility] relaying message that surgical oncology placed J tube and not GI  I left our phone number for them to call if they had any questions

## 2019-02-07 ENCOUNTER — TELEPHONE (OUTPATIENT)
Dept: GASTROENTEROLOGY | Facility: CLINIC | Age: 84
End: 2019-02-07

## 2019-02-07 NOTE — TELEPHONE ENCOUNTER
Please return cathie's call in regards to the tube feeding   [phone] Also, she is asking if clinical information could be faxed that states why the tube feeding is needed [phone] attn cathie

## 2019-02-07 NOTE — TELEPHONE ENCOUNTER
Pts daughter called back in she a little upset this ov has not been scheduled yet  Please assist in scheduling for a 1 month f/u with dr Balwinder Yo in the [facility] office  Thank you   [phone]

## 2019-02-08 NOTE — TELEPHONE ENCOUNTER
LMOM for Heather Heredia her again that the feeding tube was placed by surgical oncology and to refer to them for any questions or concerns with the patient's feeding tube

## 2019-02-11 ENCOUNTER — OFFICE VISIT (OUTPATIENT)
Dept: HEMATOLOGY ONCOLOGY | Facility: CLINIC | Age: 84
End: 2019-02-11
Payer: MEDICARE

## 2019-02-11 VITALS
HEIGHT: 67 IN | OXYGEN SATURATION: 94 % | BODY MASS INDEX: 16.64 KG/M2 | HEART RATE: 94 BPM | WEIGHT: 106 LBS | DIASTOLIC BLOOD PRESSURE: 62 MMHG | SYSTOLIC BLOOD PRESSURE: 130 MMHG

## 2019-02-11 DIAGNOSIS — R13.12 OROPHARYNGEAL DYSPHAGIA: Primary | Chronic | ICD-10-CM

## 2019-02-11 DIAGNOSIS — D47.2 MONOCLONAL GAMMOPATHY: Primary | ICD-10-CM

## 2019-02-11 PROCEDURE — 99214 OFFICE O/P EST MOD 30 MIN: CPT | Performed by: INTERNAL MEDICINE

## 2019-02-11 NOTE — PROGRESS NOTES
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, IgG lambda 0 5 grams/deciliters, in a patient who was admitted to the hospital with bilateral pneumococcal pneumonia, chronic pancreatic insufficiency, unable to thrive, multiple medical problems, giving his medical problems, this time we decided not to proceed with bone marrow biopsy and aspirate and repeat SPEP, free light chain in 2 month as well as CBC, CMP    The patient and his wife agreed on the plan, he has PEG tube in place, needs to be evaluated by surgery for repositioning           HPI:  22-year-old  male with history for diabetes mellitus type 2 diagnosed 6 months ago, chronic pancreatitis, the family reported weight loss over the past 6 months about 50 lb, imaging studies showed a mass in the distant pancreatic area,, with dilatation of the pancreatic duct, normal CA 19-9, CEA, status post EUS with biopsy and diagnostic laparotomy and biopsy, biopsy was negative for malignancy or autoimmune pancreatitis, laparotomy showed no evidence of metastatic disease, after that the patient had fatigue, diarrhea, non digested food, steatorrhea     He was admitted to the hospital with hypoxemia, dyspnea, CT scan showed bilateral infiltrate in the lung consistent with possible multifocal pneumonia/pneumonitis from aspiration  He is NPO, status post PEG tube placement, workup by Neurology showed abnormal serum protein electrophoresis with IgG kappa and IgG lambda M protein of 1 g, urine protein electrophoresis was negative, MRI of the brain showed advanced microangiopathic changes    CT scan showed bilateral pulmonary infiltrate, pneumococcus pneumonia    The patient is here with his wife for follow-up regarding abnormal SPEP        Interval History:        Previous Treatment:         Test Results:    Imaging: Mri Brain W Wo Contrast    Result Date: 1/13/2019  Narrative: MRI BRAIN WITH AND WITHOUT CONTRAST INDICATION: cva-   Dysphagia, dysarthria and weight loss  COMPARISON:  None  TECHNIQUE: Sagittal T1, axial T2, axial FLAIR, axial T1, axial Geneva, axial diffusion  Sagittal, axial T1 postcontrast   Axial bravo postcontrast with coronal reconstructions  IV Contrast:  5 mL of gadobutrol injection (MULTI-DOSE)  IMAGE QUALITY:   Diagnostic  FINDINGS: BRAIN PARENCHYMA:  There is no discrete mass, mass effect or midline shift  There is no intracranial hemorrhage  Normal posterior fossa  Diffusion imaging is unremarkable  Small scattered hyperintensities on T2/FLAIR imaging are noted in the periventricular and subcortical white matter demonstrating an appearance that is statistically most likely to represent advanced microangiopathic change  Postcontrast imaging of the brain demonstrates no abnormal enhancement  VENTRICLES:  Normal  SELLA AND PITUITARY GLAND:  Normal  ORBITS:  Normal  PARANASAL SINUSES:  Extensive right mastoid air cell opacification  VASCULATURE:  Evaluation of the major intracranial vasculature demonstrates appropriate flow voids  CALVARIUM AND SKULL BASE:  Normal  EXTRACRANIAL SOFT TISSUES:  Normal      Impression: White matter changes suggestive of chronic microangiopathy  No acute intracranial pathology  Extensive right mastoid opacification  Workstation performed: [id number]     Ct Abdomen Pelvis W Contrast    Result Date: 1/14/2019  Narrative: CT ABDOMEN AND PELVIS WITH IV CONTRAST INDICATION:   abdominal pain with 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  COMPARISON:  Prior abdominal MRI dated December 14, 2018  Prior CT dated September 13, 2018  TECHNIQUE:  CT examination of the abdomen and pelvis was performed  Axial, sagittal, and coronal 2D reformatted images were created from the source data and submitted for interpretation   Radiation dose length product (DLP) for this visit:  566 mGy-cm   This examination, like all CT scans performed in the [facility], was performed utilizing techniques to minimize radiation dose exposure, including the use of iterative reconstruction and automated exposure control  IV Contrast:  85 mL of iohexol (OMNIPAQUE) Enteric Contrast:  Enteric contrast was not administered  FINDINGS: ABDOMEN LOWER CHEST:  Consolidative changes with air bronchograms in the right lower lobe with small right side pleural effusion, worse since previous exam and likely representing developing pneumonia  Consolidative changes with air bronchograms involving left lower lobe with small left side pleural effusion, also significantly worse when compared to the previous exam, likely representing pneumonia  Cardiac size is within normal limits for patient's age  Trace pericardial fluid  LIVER/BILIARY TREE:  Liver is average size  No enhancing mass identified  Cystic lesion in the right hepatic lobe measuring 11 mm, previously 11 mm  No intrahepatic or extrahepatic biliary ductal dilatation  GALLBLADDER:  Layering higher attenuation bile seen dependently  No evidence of cholecystitis  SPLEEN:  Contour irregularity of the spleen again suggestive of either previous injury or small segmental infarct, this is unchanged from previous examinations  Spleen appears to drain via collateral veins  PANCREAS:  Pancreas is again atrophic  Irregular dilatation of the pancreatic duct, increased compared to previous examinations, appearing to communicate with the cystic collection in the pancreatic tail/splenic hilum, the overall size of the collection the pancreatic tail is very similar to December at 3 6 cm  Irregularly-shaped multilobular collection in the pancreatic body and tail measures larger on today's exam when compared to the MRI in December, measuring approximately 4 cm x 2 4 cm today compared to 2 7 x 2 3 cm in December   Today, the collection tracks cephalad towards the undersurface of stomach rather appears to be fistulization into the stomach lumen, this is best seen on the sagittal series image 602/69 and axial image 2/13  ADRENAL GLANDS:  Mild age-related adrenal hypertrophy  KIDNEYS/URETERS:  Stable renal cysts  No hydronephrosis  STOMACH AND BOWEL:  Limited evaluation of GI tract without oral contrast  Fistulization appears to exist between the multilocular pancreatic collection in the undersurface of the body/antrum of stomach  The small bowel appears average caliber  No evidence of bowel obstruction  Normal appendix  APPENDIX:  A normal appendix was visualized  ABDOMINOPELVIC CAVITY:  Ascites fluid observed in the pelvis, increased from prior exam  Trace amounts of perisplenic fluid, slightly increased compared to prior exam  VESSELS:  Unremarkable for patient's age  PELVIS REPRODUCTIVE ORGANS:  Unremarkable for patient's age  URINARY BLADDER:  Unremarkable  ABDOMINAL WALL/INGUINAL REGIONS:  Small fat-containing paraumbilical hernia  OSSEOUS STRUCTURES:  Stable     Impression: Developing/worsening lobar pneumonia in the lower lobes bilaterally with associated parapneumonic effusions  Stable liver cyst  Stable renal cysts  Atrophic pancreas with multilocular cystic collection involving pancreatic body and tail with a larger component situated in the splenic hilum, most likely a large pseudocyst  This now appears to communicate with the lumen of the stomach as described above which would explain the findings during endoscopy  Otherwise stable exam  The study was marked in EPIC for immediate notification   Pancreatic findings were discussed with Dr Chang Mann at approximately 6:00 PM  Workstation performed: [id number]       Labs:   Lab Results   Component Value Date    WBC 5 57 01/19/2019    HGB 9 1 (L) 01/19/2019    HCT 30 1 (L) 01/19/2019     (H) 01/19/2019     01/19/2019     Lab Results   Component Value Date    K 4 3 01/19/2019     01/19/2019    CO2 32 01/19/2019    BUN 6 01/19/2019    CREATININE 0 39 (L) 01/19/2019    GLUCOSE 202 (H) 01/10/2019    GLUF 109 (H) 08/28/2018    CALCIUM 7 7 (L) 01/19/2019    AST 22 01/10/2019    ALT 20 01/10/2019    ALKPHOS 105 01/10/2019    EGFR 113 01/19/2019       No results found for: IRON, TIBC, FERRITIN    Lab Results   Component Value Date    GNHDGCBF47 424 01/08/2019         ROS:   Review of Systems   Constitutional: Positive for activity change, appetite change and fatigue  Negative for chills, diaphoresis, fever and unexpected weight change  HENT: Negative for congestion, dental problem, facial swelling, hearing loss, mouth sores, nosebleeds, postnasal drip, rhinorrhea, sore throat, trouble swallowing and voice change  Eyes: Negative for photophobia, pain, discharge, redness, itching and visual disturbance  Respiratory: Positive for shortness of breath  Negative for cough, choking, chest tightness and wheezing  Cardiovascular: Negative for chest pain, palpitations and leg swelling  Gastrointestinal: Negative for abdominal distention, abdominal pain, anal bleeding, blood in stool, constipation, diarrhea, nausea, rectal pain and vomiting  Endocrine: Negative for cold intolerance and heat intolerance  Genitourinary: Negative for decreased urine volume, difficulty urinating, dysuria, flank pain, frequency, hematuria and urgency  Musculoskeletal: Negative for arthralgias, back pain, gait problem, joint swelling, myalgias, neck pain and neck stiffness  Skin: Negative for color change, pallor, rash and wound  Allergic/Immunologic: Negative for immunocompromised state  Neurological: Negative for dizziness, tremors, seizures, syncope, facial asymmetry, speech difficulty, weakness, light-headedness, numbness and headaches  Hematological: Negative for adenopathy  Does not bruise/bleed easily     Psychiatric/Behavioral: Negative for agitation, confusion, decreased concentration, dysphoric mood and sleep disturbance  The patient is not nervous/anxious  All other systems reviewed and are negative  Current Medications: Reviewed  Allergies: Reviewed  PMH/FH/SH:  Reviewed      Physical Exam:    Body surface area is 1 54 meters squared  Wt Readings from Last 3 Encounters:   02/11/19 48 1 kg (106 lb)   01/24/19 51 7 kg (114 lb)   01/18/19 65 5 kg (144 lb 6 4 oz)        Temp Readings from Last 3 Encounters:   01/24/19 97 8 °F (36 6 °C) (Tympanic)   01/18/19 98 6 °F (37 °C)   12/04/18 98 6 °F (37 °C)        BP Readings from Last 3 Encounters:   02/11/19 130/62   01/24/19 102/60   01/18/19 124/65         Pulse Readings from Last 3 Encounters:   02/11/19 94   01/24/19 98   01/18/19 72        Physical Exam   Constitutional: He is oriented to person, place, and time  No distress  Cachectic   HENT:   Head: Normocephalic and atraumatic  Mouth/Throat: Oropharynx is clear and moist  No oropharyngeal exudate  Eyes: Pupils are equal, round, and reactive to light  Conjunctivae and EOM are normal    Neck: Normal range of motion  Neck supple  No tracheal deviation present  No thyromegaly present  Cardiovascular: Normal rate and regular rhythm  Exam reveals no gallop and no friction rub  No murmur heard  Pulmonary/Chest: Effort normal and breath sounds normal  No respiratory distress  He has no wheezes  He has no rales  He exhibits no tenderness  Abdominal: Soft  Bowel sounds are normal  He exhibits no distension and no mass  There is no tenderness  There is no rebound and no guarding  Peg tube in place   Musculoskeletal: Normal range of motion  Lymphadenopathy:     He has no cervical adenopathy  Neurological: He is alert and oriented to person, place, and time  Skin: Skin is warm and dry  No rash noted  He is not diaphoretic  No erythema  No pallor  Psychiatric: He has a normal mood and affect   His behavior is normal  Judgment and thought content normal    Vitals reviewed  Goals and Barriers:  Current Goal: Minimize effects of disease  Barriers: None  Patient's Capacity to Self Care:  Patient is able to self care      Code Status: @[facility]@

## 2019-02-12 LAB — MISCELLANEOUS LAB TEST RESULT: NORMAL

## 2019-02-13 ENCOUNTER — HOSPITAL ENCOUNTER (OUTPATIENT)
Dept: CT IMAGING | Facility: HOSPITAL | Age: 84
Discharge: HOME | End: 2019-02-13
Attending: SURGERY
Payer: MEDICARE

## 2019-02-13 DIAGNOSIS — D37.8 NEOPLASM OF UNCERTAIN BEHAVIOR OF TAIL OF PANCREAS: ICD-10-CM

## 2019-02-13 PROCEDURE — 74177 CT ABD & PELVIS W/CONTRAST: CPT

## 2019-02-13 RX ADMIN — IOHEXOL 100 ML: 350 INJECTION, SOLUTION INTRAVENOUS at 15:25

## 2019-02-14 ENCOUNTER — HOSPITAL ENCOUNTER (INPATIENT)
Facility: HOSPITAL | Age: 84
LOS: 4 days | Discharge: NURSING, REHAB OR LONG-TERM CARE | DRG: 199 | End: 2019-02-18
Attending: EMERGENCY MEDICINE | Admitting: FAMILY MEDICINE
Payer: MEDICARE

## 2019-02-14 ENCOUNTER — TELEPHONE (OUTPATIENT)
Dept: SURGICAL ONCOLOGY | Facility: CLINIC | Age: 84
End: 2019-02-14

## 2019-02-14 ENCOUNTER — APPOINTMENT (INPATIENT)
Dept: RADIOLOGY | Facility: HOSPITAL | Age: 84
DRG: 199 | End: 2019-02-14
Payer: MEDICARE

## 2019-02-14 DIAGNOSIS — J93.9 PNEUMOTHORAX ON LEFT: Primary | ICD-10-CM

## 2019-02-14 DIAGNOSIS — E43 SEVERE PROTEIN-CALORIE MALNUTRITION (HCC): ICD-10-CM

## 2019-02-14 DIAGNOSIS — R62.7 FAILURE TO THRIVE IN ADULT: ICD-10-CM

## 2019-02-14 DIAGNOSIS — R06.00 DYSPNEA ON EXERTION: ICD-10-CM

## 2019-02-14 DIAGNOSIS — K86.1 OTHER CHRONIC PANCREATITIS (HCC): ICD-10-CM

## 2019-02-14 DIAGNOSIS — R64 CACHEXIA (HCC): ICD-10-CM

## 2019-02-14 DIAGNOSIS — K86.89 PANCREATIC FISTULA: ICD-10-CM

## 2019-02-14 LAB — GLUCOSE SERPL-MCNC: 91 MG/DL (ref 65–140)

## 2019-02-14 PROCEDURE — 82948 REAGENT STRIP/BLOOD GLUCOSE: CPT

## 2019-02-14 PROCEDURE — 71046 X-RAY EXAM CHEST 2 VIEWS: CPT

## 2019-02-14 PROCEDURE — 99222 1ST HOSP IP/OBS MODERATE 55: CPT | Performed by: SURGERY

## 2019-02-14 PROCEDURE — 99284 EMERGENCY DEPT VISIT MOD MDM: CPT

## 2019-02-14 PROCEDURE — 99223 1ST HOSP IP/OBS HIGH 75: CPT | Performed by: INTERNAL MEDICINE

## 2019-02-14 RX ORDER — ONDANSETRON 2 MG/ML
4 INJECTION INTRAMUSCULAR; INTRAVENOUS EVERY 6 HOURS PRN
Status: DISCONTINUED | OUTPATIENT
Start: 2019-02-14 | End: 2019-02-18 | Stop reason: HOSPADM

## 2019-02-14 NOTE — H&P
History and Physical - [facility]    Patient Information: Denece Mariya 82 y o  male MRN: [id number]  Unit/Bed#: AVI Encounter: [id number]  Admitting Physician: Alonso Hidalgo DO  PCP: Amelie Hamilton DO  Date of Admission:  02/14/19    Assessment/Plan:    Hospital Problem List:     Principal Problem:    Pneumothorax  Active Problems:    Diabetes mellitus type 2    Weight loss    Chronic pancreatitis ([facility] [address] )    Oropharyngeal dysphagia    Abnormal SPEP    Severe protein-calorie malnutrition ([facility] [address] )    Pancreatic pseudocyst    Pancreatic fistula    Cachexia ([facility] [address] )    Failure to thrive in adult      Plan     1  Moderate size Left pneumothorax, family like to discuss risk/ benefit of chest tube with thoracic surgery, continue supportive care and non rebreather mask, consult thoracic surgery for further management, check stat CBC and BMP, follow on chest x-ray, consult palliative care regarding goals of care  2  Persistent fistula tract between posterior wall of the stomach and pancreatic duct, consult Surgical Oncology for further management  3  Abnormal SPEP, evaluated recently by Hematology,  with plan to to hold on bone marrow biopsy for now and repeat study in 2 months  4  Oropharyngeal dysphagia, nutrition maintained on J-tube, consult Nutrition  5  Chronic pancreatitis  6  Severe protein calorie malnutrition  7  Failure to thrive, secondary to above  8  Diabetes mellitus type 2, monitor blood sugar        Unable to reach daughter Miguel Anglin by phone at [phone]    VTE Prophylaxis: Enoxaparin (Lovenox)  / sequential compression device   Code Status: full code  POLST: There is no POLST form on file for this patient (pre-hospital)    Anticipated Length of Stay:  Patient will be admitted on an Inpatient basis with an anticipated length of stay of  > 2 midnights  Justification for Hospital Stay:  Management of pneumothorax    Total Time for Visit, including Counseling / Coordination of Care: 1 hour  Greater than 50% of this total time spent on direct patient counseling and coordination of care  Chief Complaint:     Abnormal CT scan with moderate left-sided pneumothorax    History of Present Illness:    Burney Oppenheim is a 82 y o  male who was sent to the hospital from nursing home due to abnormal CT scan consistent with moderate left-sided pneumothorax  Patient was hospitalized from 1/1/19 until 1/19/19 due to bilateral pneumonia and sepsis  Patient with chronic pancreatitis, recent imaging studies showed a mass in the distal pancreatic area with normal tumor markers, he underwent biopsy and diagnostic laparotomy with negative result for malignancy or metastatic disease  Patient with dysphagia with unremarkable workup from neurological standpoint  he underwent  J-tube placement by surgical Oncology on 01/15  patient currently maintained on tube feeding     Patient is comfortable in bed, he reported mild short of breath and dyspnea on exertion, otherwise he said he feels fine without chest pain,  wife and brother at bedside    Evaluated by ER,  after family discussion with ER staff,  family would like to attempt non-rebreather oxygen therapy then discuss with palliative and thoracic surgeon for plans and option      Review of Systems:    Review of Systems   Constitutional: Positive for appetite change and fatigue  Negative for chills and fever  HENT: Positive for trouble swallowing  Respiratory: Positive for shortness of breath  Cardiovascular: Negative for chest pain and leg swelling  Gastrointestinal: Negative for abdominal pain, diarrhea, nausea and vomiting  Genitourinary: Negative for dysuria  Musculoskeletal: Negative for gait problem  Neurological: Positive for weakness  All other systems reviewed and are negative        Past Medical and Surgical History:     Past Medical History:   Diagnosis Date    Diabetes mellitus ([facility] [address] )     Type II    Weight loss        Past Surgical History: Procedure Laterality Date    GASTROSTOMY TUBE PLACEMENT N/A 1/14/2019    Procedure: INSERTION PEG TUBE;  Surgeon: Jarrod Montenegro MD;  [facility]; Service: Gastroenterology    GASTROSTOMY TUBE PLACEMENT N/A 1/15/2019    Procedure: INSERTION JEJUNOSTOMY TUBE OPEN;  Surgeon: Leidy Stark MD;  [facility];  Service: Surgical Oncology    LAPAROTOMY N/A 11/19/2018    Procedure: LAPAROTOMY EXPLORATORY;  Surgeon: Leidy Stark MD;  [facility];  Service: Surgical Oncology    [place] EDG US EXAM SURGICAL ALTER STOM DUODENUM/JEJUNUM N/A 9/21/2018    Procedure: LINEAR ENDOSCOPIC U/S;  Surgeon: Kylie Fofana MD;  [facility]; Service: Gastroenterology    [place] LAP,DIAGNOSTIC ABDOMEN N/A 11/19/2018    Procedure: pancreatic biopsy;  Surgeon: Leidy Stark MD;  [facility];  Service: Surgical Oncology    TONSILLECTOMY      VASCULAR SURGERY      phlebitis many years ago    WRIST GANGLION EXCISION         Meds/Allergies:    Prior to Admission medications    Medication Sig Start Date End Date Taking?  Authorizing Provider   acetaminophen (TYLENOL) 160 mg/5 mL suspension 20 3 mL (650 mg total) by Per J Tube route every 6 (six) hours as needed for mild pain, headaches or fever 1/19/19  Yes Titus Koroma, DO   aspirin 81 mg chewable tablet 1 tablet (81 mg total) by Per J Tube route daily 1/20/19  Yes Titus Koroma DO   insulin degludec (TRESIBA FLEXTOUCH) 100 units/mL injection pen Inject 8 Units under the skin daily at bedtime   Yes Historical Provider, MD   Nutritional Supplements (JEVITY 1 2 KARL) LIQD Give through J tube at 65 mL/hr with free water flushes 125 mL q6h 1/18/19  Yes Titus Koroma DO   omeprazole (PriLOSEC) 20 mg delayed release capsule Take 2 capsules (40 mg total) by mouth daily Please give through J tube 1/19/19  Yes Titus Koroma DO   polyvinyl alcohol (LIQUIFILM TEARS) 1 4 % ophthalmic solution Administer 1 drop to both eyes as needed for dry eyes 1/19/19  Yes Stanislav Jack Valentine,    insulin lispro (HumaLOG) 100 units/mL injection Inject 1-5 Units under the skin every 6 (six) hours 1/19/19   Frances Leiva DO   lidocaine (LIDODERM) 5 % Apply 1 patch topically daily Remove & Discard patch within 12 hours or as directed by MD 1/20/19   Frances Leiva DO     I have reveiwed home medications using records provided by [facility]  Allergies: No Known Allergies    Social History:     Marital Status: /Civil Union     Substance Use History:   Social History     Substance and Sexual Activity   Alcohol Use Yes    Comment: 1 beer a day     Social History     Tobacco Use   Smoking Status Former Smoker    Years: 25 00   Smokeless Tobacco Never Used   Tobacco Comment    quit about 20 years ago as of 9/21/2018     Social History     Substance and Sexual Activity   Drug Use No       Family History:    non-contributory    Physical Exam:     Vitals:   Blood Pressure: 161/73 (02/14/19 1547)  Pulse: 66 (02/14/19 1547)  Temperature: 97 7 °F (36 5 °C) (02/14/19 1547)  Temp Source: Oral (02/14/19 1547)  Respirations: 18 (02/14/19 1547)  Height: 5' 7" (170 2 cm) (02/14/19 1547)  Weight - Scale: 47 6 kg (105 lb) (02/14/19 1547)  SpO2: 97 % (02/14/19 1547)    Physical Exam   Constitutional:   Patient is cachectic  Chronically ill-appearing   HENT:   Head: Normocephalic and atraumatic  Mouth/Throat: No oropharyngeal exudate  On non-rebreather   Eyes: Conjunctivae are normal  No scleral icterus  Neck: Normal range of motion  Neck supple  No JVD present  Cardiovascular: Normal rate, regular rhythm, normal heart sounds and intact distal pulses  Distant heart sounds   Pulmonary/Chest: Effort normal  No respiratory distress  He has no wheezes  Decreased breath sounds bilateral L>R   Abdominal: Soft  Bowel sounds are normal  He exhibits no distension  There is no tenderness  There is no rebound  J tube in place   Musculoskeletal: He exhibits no edema or tenderness  Neurological: He is alert   No cranial nerve deficit  Skin: Skin is warm and dry  Additional Data:     Lab Results: I have personally reviewed pertinent reports  Invalid input(s): LABALBU        Imaging: I have personally reviewed pertinent reports  Ct Abdomen Pelvis W Contrast    Result Date: 2/14/2019  Narrative: CT ABDOMEN AND PELVIS WITH IV CONTRAST INDICATION:   D37 8: 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  Follow-up of pancreatic cysts  COMPARISON:  CT abdomen pelvis 1/4/2019 TECHNIQUE:  CT examination of the abdomen and pelvis was performed  Axial, sagittal, and coronal 2D reformatted images were created from the source data and submitted for interpretation  Radiation dose length product (DLP) for this visit:  656 mGy-cm   This examination, like all CT scans performed in the [facility], was performed utilizing techniques to minimize radiation dose exposure, including the use of iterative reconstruction and automated exposure control  IV Contrast:  100 mL of iohexol (OMNIPAQUE) Enteric Contrast:  Enteric contrast was not administered  FINDINGS: ABDOMEN LOWER CHEST:  Partially imaged, at least moderate-sized, left anterior pneumothorax  Small left basilar effusion  Bilateral lower lobe airspace disease in keeping with pneumonia or atelectasis  Left anterior pneumothorax  LIVER/BILIARY TREE:  Stable 11 mm medial hepatic cysts  GALLBLADDER:  No calcified gallstones  No pericholecystic inflammatory change  SPLEEN:  Chronic heterogeneous enhancement of the spleen  Numerous surgical clips at the splenic hilum  PANCREAS:  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 #601/53 and # 331 through 43 pancreatic tail pseudocyst is resolved  Cystic areas in the pancreatic neck/head are resolved  Persistent prominent pancreatic ductal dilation measuring up to 13 mm  ADRENAL GLANDS:  Unremarkable  KIDNEYS/URETERS:  Stable renal cysts    No hydronephrosis  STOMACH AND BOWEL:  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 as described above  No bowel obstruction  Jejunostomy tube is in place  APPENDIX:  No findings to suggest appendicitis  ABDOMINOPELVIC CAVITY:  No prominent quantity of ascites  Small amount of free fluid at the splenic hilum  VESSELS:  Chronic splenic vein thrombus  PELVIS REPRODUCTIVE ORGANS:  Unremarkable for patient's age  URINARY BLADDER:  Unremarkable  ABDOMINAL WALL/INGUINAL REGIONS:  Unremarkable  OSSEOUS STRUCTURES:  No acute fracture or destructive osseous lesion  Impression: Partially imaged, at least moderate-sized, left anterior pneumothorax  Small left basilar effusion  Bilateral lower lobe airspace disease in keeping with pneumonia or atelectasis  Left anterior pneumothorax  Persistent widely patent fistula tract between the posterior wall of the stomach and the pancreatic duct  Dilated pancreatic duct measuring up to 13 mm  Pancreatic pseudocysts have resolved  Small amount of fluid at the splenic hilum has a nonloculated appearance Jejunostomy remains in place  I personally discussed this study with Mahesh Li on 2/14/2019 at 2:29 PM  Workstation performed: [id number]       EKG, Pathology, and Other Studies Reviewed on Admission:   · yes    [facility]     ** Please Note: This note has been constructed using a voice recognition system   **

## 2019-02-14 NOTE — ED PROVIDER NOTES
History  Chief Complaint   Patient presents with    Evaluation of Abnormal Diagnostic Test     [place] reports pt had an MRI and was told he had a pneumothorax  Patient is a 82year old male with a past medical history significant for recent admission and discharge, complicated hospital course with severe sepsis secondary to pneumonia requiring BiPAP, has a history of a pancreatic mass and fistula, status post a biopsy with questionable source, status post J-tube at the end of January who presents the secondary to a left-sided pneumothorax that was found on a CT scan on 2019  Patient reports that he was called to return secondary to this finding  Reports that the since that time of discharge he has been dyspneic with exertion, but otherwise he feels the same  Denies any new cough, congestion, shortness of breath, chest pain, palpitations, lightheadedness, fevers  Prior to Admission Medications   Prescriptions Last Dose Informant Patient Reported? Taking?    Nutritional Supplements (JEVITY 1 2 KARL) LIQD Past Week at Unknown time  No Yes   Sig: Give through J tube at 65 mL/hr with free water flushes 125 mL q6h   acetaminophen (TYLENOL) 160 mg/5 mL suspension Unknown at Unknown time  No No   Si 3 mL (650 mg total) by Per J Tube route every 6 (six) hours as needed for mild pain, headaches or fever   aspirin 81 mg chewable tablet 2019 at Unknown time  No Yes   Si tablet (81 mg total) by Per J Tube route daily   insulin degludec (TRESIBA FLEXTOUCH) 100 units/mL injection pen   Yes Yes   Sig: Inject 8 Units under the skin daily at bedtime   insulin lispro (HumaLOG) 100 units/mL injection Unknown at Unknown time  No No   Sig: Inject 1-5 Units under the skin every 6 (six) hours   lidocaine (LIDODERM) 5 % Unknown at Unknown time  No No   Sig: Apply 1 patch topically daily Remove & Discard patch within 12 hours or as directed by MD   omeprazole (PriLOSEC) 20 mg delayed release capsule 2/8/2019  No Yes   Sig: Take 2 capsules (40 mg total) by mouth daily Please give through J tube   polyvinyl alcohol (LIQUIFILM TEARS) 1 4 % ophthalmic solution 1/19/2019  No Yes   Sig: Administer 1 drop to both eyes as needed for dry eyes      Facility-Administered Medications: None       Past Medical History:   Diagnosis Date    Diabetes mellitus ([facility] [address] )     Type II    Weight loss        Past Surgical History:   Procedure Laterality Date    GASTROSTOMY TUBE PLACEMENT N/A 1/14/2019    Procedure: INSERTION PEG TUBE;  Surgeon: Fransisco Cerrato MD;  [facility]; Service: Gastroenterology    GASTROSTOMY TUBE PLACEMENT N/A 1/15/2019    Procedure: INSERTION JEJUNOSTOMY TUBE OPEN;  Surgeon: Zechariah Bingham MD;  [facility];  Service: Surgical Oncology    LAPAROTOMY N/A 11/19/2018    Procedure: LAPAROTOMY EXPLORATORY;  Surgeon: Zechariah Bingham MD;  [facility];  Service: Surgical Oncology    [place] EDG US EXAM SURGICAL ALTER STOM DUODENUM/JEJUNUM N/A 9/21/2018    Procedure: LINEAR ENDOSCOPIC U/S;  Surgeon: Jennifer Carter MD;  [facility]: [facility]; Service: Gastroenterology    [place] LAP,DIAGNOSTIC ABDOMEN N/A 11/19/2018    Procedure: pancreatic biopsy;  Surgeon: Zechariah Bingham MD;  [facility];  Service: Surgical Oncology    TONSILLECTOMY      VASCULAR SURGERY      phlebitis many years ago    WRIST GANGLION EXCISION         Family History   Problem Relation Age of Onset    No Known Problems Mother     No Known Problems Father     Pancreatic cancer Brother 61    Breast cancer Daughter 36        38s     I have reviewed and agree with the history as documented      Social History     Tobacco Use    Smoking status: Former Smoker     Years: 25 00    Smokeless tobacco: Never Used    Tobacco comment: quit about 20 years ago as of 9/21/2018   Substance Use Topics    Alcohol use: Yes     Comment: 1 beer a day    Drug use: No        Review of Systems   Constitutional: Positive for appetite change  Negative for chills and fever  HENT: Negative for congestion and rhinorrhea  Eyes: Negative for photophobia and visual disturbance  Respiratory: Positive for shortness of breath  Negative for cough, chest tightness and wheezing  Cardiovascular: Negative for chest pain, palpitations and leg swelling  Gastrointestinal: Negative for abdominal pain, constipation, diarrhea, nausea and vomiting  Genitourinary: Negative for dysuria and hematuria  Musculoskeletal: Negative for back pain and neck pain  Skin: Negative for pallor and rash  Neurological: Negative for dizziness, light-headedness and headaches  Physical Exam  ED Triage Vitals [02/14/19 1547]   Temperature Pulse Respirations Blood Pressure SpO2   97 7 °F (36 5 °C) 66 18 161/73 97 %      Temp Source Heart Rate Source Patient Position - Orthostatic VS BP Location FiO2 (%)   Oral Monitor Sitting Left arm --      Pain Score       No Pain           Orthostatic Vital Signs  Vitals:    02/14/19 1547 02/14/19 1826   BP: 161/73 143/73   Pulse: 66 62   Patient Position - Orthostatic VS: Sitting Lying       Physical Exam   Constitutional: He is oriented to person, place, and time  He appears well-developed  No distress  Cachectic, frail   HENT:   Head: Normocephalic and atraumatic  Right Ear: External ear normal    Left Ear: External ear normal    Nose: Nose normal    Mouth/Throat: Oropharynx is clear and moist  No oropharyngeal exudate  Eyes: Pupils are equal, round, and reactive to light  Conjunctivae and EOM are normal    Neck: Normal range of motion  Neck supple  Cardiovascular: Normal rate, regular rhythm, normal heart sounds and intact distal pulses  Exam reveals no gallop and no friction rub  No murmur heard  Pulmonary/Chest: Effort normal  No stridor  No respiratory distress  He has wheezes  He has no rales  He exhibits no tenderness  Diminished breath sounds bilaterally with scant wheezes bilaterally   Abdominal: Soft  Bowel sounds are normal  He exhibits no distension  There is no tenderness  J-tube present in the LLQ   Musculoskeletal: Normal range of motion  Neurological: He is alert and oriented to person, place, and time  Skin: Skin is warm and dry  He is not diaphoretic  Psychiatric: He has a normal mood and affect  His behavior is normal    Nursing note and vitals reviewed  ED Medications  Medications   ondansetron (ZOFRAN) injection 4 mg (has no administration in time range)   enoxaparin (LOVENOX) subcutaneous injection 40 mg (has no administration in time range)   insulin lispro (HumaLOG) 100 units/mL subcutaneous injection 1-5 Units (1 Units Subcutaneous Not Given 2/14/19 1953)       Diagnostic Studies  Results Reviewed     None                 XR chest pa & lateral    (Results Pending)         Procedures  Procedures      Phone Consults  ED Phone Contact    ED Course           Identification of Seniors at Risk      Most Recent Value   (ISAR) Identification of Seniors at Risk   Before the illness or injury that brought you to the Emergency, did you need someone to help you on a regular basis? 0 Filed at: 02/14/2019 1600   In the last 24 hours, have you needed more help than usual?  0 Filed at: 02/14/2019 1600   Have you been hospitalized for one or more nights during the past 6 months? 1 Filed at: 02/14/2019 1600   In general, do you see well?  0 Filed at: 02/14/2019 1600   In general, do you have serious problems with your memory? 0 Filed at: 02/14/2019 1600   Do you take more than three different medications every day? 1 Filed at: 02/14/2019 1600   ISAR Score  2 Filed at: 02/14/2019 1600                          MDM  Number of Diagnoses or Management Options  Dyspnea on exertion:   Pneumothorax on left:   Diagnosis management comments: Assessment and Plan:   Left moderate sized pneumothorax- hemodynamically stable   Had a lengthy family discussion with patient, wife, daughter describing options, risks/benefits of chest tube versus non-rebreather oxygen therapy  Family would like to attempt non-rebreather O2 therapy, then have a meeting with palliative, CT surg to discuss plans/ options  Discussed this with SLIM  Will admit patient to level 2 SD on non-rebreather, continuous pulse-ox  Repeat CXR  Disposition  Final diagnoses:   Pneumothorax on left   Dyspnea on exertion     Time reflects when diagnosis was documented in both MDM as applicable and the Disposition within this note     Time User Action Codes Description Comment    2/14/2019  5:06 PM Lacie Davidson [J93 9] Pneumothorax on left     2/14/2019  5:06 PM Ali Rule Add [R06 09] Dyspnea on exertion     2/14/2019  6:00 PM Batsheva Martínez [W58 69] Pancreatic fistula     2/14/2019  6:01 PM Batsheva Martínez [R62 7] Failure to thrive in adult     2/14/2019  6:01 PM Sebastián, Liang [address] Cachexia ([facility] [address] )     2/14/2019  6:18 PM Batsheva Martínez [E43] Severe protein-calorie malnutrition [facility])       ED Disposition     ED Disposition Condition Date/Time Comment    Admit Stable u Feb 14, 2019  5:06 PM Case was discussed with Dr Owen Kaiser and the patient's admission status was agreed to be Admission Status: inpatient status to the service of Dr Fausto Oliveros   Follow-up Information    None         Current Discharge Medication List      CONTINUE these medications which have NOT CHANGED    Details   aspirin 81 mg chewable tablet 1 tablet (81 mg total) by Per J Tube route daily  Refills: 0    Associated Diagnoses: Dysphagia, unspecified type      insulin degludec (TRESIBA FLEXTOUCH) 100 units/mL injection pen Inject 8 Units under the skin daily at bedtime      Nutritional Supplements (JEVITY 1 2 KARL) LIQD Give through J tube at 65 mL/hr with free water flushes 125 mL q6h  Qty: 1000 mL, Refills: 0    Associated Diagnoses: Severe protein-calorie malnutrition ([facility] [address] );  Other chronic pancreatitis (HCC)      omeprazole (PriLOSEC) 20 mg delayed release capsule Take 2 capsules (40 mg total) by mouth daily Please give through J tube  Qty: 30 capsule, Refills: 0    Associated Diagnoses: Esophagitis      polyvinyl alcohol (LIQUIFILM TEARS) 1 4 % ophthalmic solution Administer 1 drop to both eyes as needed for dry eyes  Qty: 15 mL, Refills: 0    Associated Diagnoses: Severe sepsis (HCC)      acetaminophen (TYLENOL) 160 mg/5 mL suspension 20 3 mL (650 mg total) by Per J Tube route every 6 (six) hours as needed for mild pain, headaches or fever  Qty: 118 mL, Refills: 0    Associated Diagnoses: Dysphagia, unspecified type      insulin lispro (HumaLOG) 100 units/mL injection Inject 1-5 Units under the skin every 6 (six) hours  Refills: 0    Associated Diagnoses: Type 2 diabetes mellitus without complication, without long-term current use of insulin ([facility])      lidocaine (LIDODERM) 5 % Apply 1 patch topically daily Remove & Discard patch within 12 hours or as directed by MD  Qty: 30 patch, Refills: 0    Associated Diagnoses: Pneumonia of both lower lobes due to infectious organism ([facility] [address] )           No discharge procedures on file  ED Provider  Attending physically available and evaluated Vernadine Butts  I managed the patient along with the ED Attending      Electronically Signed by         Mello Perdue DO  02/14/19 1954

## 2019-02-14 NOTE — TELEPHONE ENCOUNTER
Spoke with Diann Rivera, nurse for pt at [facility]  Explained CT findings, advised her that pt must be taken to ER for evaluation asap

## 2019-02-15 ENCOUNTER — APPOINTMENT (INPATIENT)
Dept: RADIOLOGY | Facility: HOSPITAL | Age: 84
DRG: 199 | End: 2019-02-15
Payer: MEDICARE

## 2019-02-15 PROBLEM — I73.9 MICROANGIOPATHY (HCC): Status: ACTIVE | Noted: 2019-02-15

## 2019-02-15 LAB
ALBUMIN SERPL BCP-MCNC: 2.4 G/DL (ref 3.5–5)
ALP SERPL-CCNC: 94 U/L (ref 46–116)
ALT SERPL W P-5'-P-CCNC: 151 U/L (ref 12–78)
AMYLASE FLD QL: 11 U/L
ANION GAP SERPL CALCULATED.3IONS-SCNC: 4 MMOL/L (ref 4–13)
ANION GAP SERPL CALCULATED.3IONS-SCNC: 5 MMOL/L (ref 4–13)
APTT PPP: 27 SECONDS (ref 26–38)
AST SERPL W P-5'-P-CCNC: 67 U/L (ref 5–45)
BASOPHILS # BLD AUTO: 0.04 THOUSANDS/ΜL (ref 0–0.1)
BASOPHILS # BLD AUTO: 0.04 THOUSANDS/ΜL (ref 0–0.1)
BASOPHILS NFR BLD AUTO: 1 % (ref 0–1)
BASOPHILS NFR BLD AUTO: 1 % (ref 0–1)
BILIRUB SERPL-MCNC: 0.21 MG/DL (ref 0.2–1)
BUN SERPL-MCNC: 17 MG/DL (ref 5–25)
BUN SERPL-MCNC: 19 MG/DL (ref 5–25)
CALCIUM SERPL-MCNC: 7.9 MG/DL (ref 8.3–10.1)
CALCIUM SERPL-MCNC: 8.1 MG/DL (ref 8.3–10.1)
CHLORIDE SERPL-SCNC: 104 MMOL/L (ref 100–108)
CHLORIDE SERPL-SCNC: 104 MMOL/L (ref 100–108)
CO2 SERPL-SCNC: 29 MMOL/L (ref 21–32)
CO2 SERPL-SCNC: 31 MMOL/L (ref 21–32)
CREAT SERPL-MCNC: 0.46 MG/DL (ref 0.6–1.3)
CREAT SERPL-MCNC: 0.47 MG/DL (ref 0.6–1.3)
EOSINOPHIL # BLD AUTO: 0.64 THOUSAND/ΜL (ref 0–0.61)
EOSINOPHIL # BLD AUTO: 0.67 THOUSAND/ΜL (ref 0–0.61)
EOSINOPHIL NFR BLD AUTO: 9 % (ref 0–6)
EOSINOPHIL NFR BLD AUTO: 9 % (ref 0–6)
ERYTHROCYTE [DISTWIDTH] IN BLOOD BY AUTOMATED COUNT: 15.8 % (ref 11.6–15.1)
ERYTHROCYTE [DISTWIDTH] IN BLOOD BY AUTOMATED COUNT: 15.8 % (ref 11.6–15.1)
GFR SERPL CREATININE-BSD FRML MDRD: 104 ML/MIN/1.73SQ M
GFR SERPL CREATININE-BSD FRML MDRD: 104 ML/MIN/1.73SQ M
GLUCOSE SERPL-MCNC: 108 MG/DL (ref 65–140)
GLUCOSE SERPL-MCNC: 114 MG/DL (ref 65–140)
GLUCOSE SERPL-MCNC: 134 MG/DL (ref 65–140)
GLUCOSE SERPL-MCNC: 143 MG/DL (ref 65–140)
GLUCOSE SERPL-MCNC: 175 MG/DL (ref 65–140)
GLUCOSE SERPL-MCNC: 184 MG/DL (ref 65–140)
GLUCOSE SERPL-MCNC: 191 MG/DL (ref 65–140)
HCT VFR BLD AUTO: 31.2 % (ref 36.5–49.3)
HCT VFR BLD AUTO: 32 % (ref 36.5–49.3)
HGB BLD-MCNC: 10 G/DL (ref 12–17)
HGB BLD-MCNC: 9.9 G/DL (ref 12–17)
IMM GRANULOCYTES # BLD AUTO: 0.02 THOUSAND/UL (ref 0–0.2)
IMM GRANULOCYTES # BLD AUTO: 0.02 THOUSAND/UL (ref 0–0.2)
IMM GRANULOCYTES NFR BLD AUTO: 0 % (ref 0–2)
IMM GRANULOCYTES NFR BLD AUTO: 0 % (ref 0–2)
INR PPP: 1.01 (ref 0.86–1.17)
LYMPHOCYTES # BLD AUTO: 1.13 THOUSANDS/ΜL (ref 0.6–4.47)
LYMPHOCYTES # BLD AUTO: 1.17 THOUSANDS/ΜL (ref 0.6–4.47)
LYMPHOCYTES NFR BLD AUTO: 14 % (ref 14–44)
LYMPHOCYTES NFR BLD AUTO: 16 % (ref 14–44)
MAGNESIUM SERPL-MCNC: 2.3 MG/DL (ref 1.6–2.6)
MCH RBC QN AUTO: 31.3 PG (ref 26.8–34.3)
MCH RBC QN AUTO: 31.4 PG (ref 26.8–34.3)
MCHC RBC AUTO-ENTMCNC: 31.3 G/DL (ref 31.4–37.4)
MCHC RBC AUTO-ENTMCNC: 31.7 G/DL (ref 31.4–37.4)
MCV RBC AUTO: 100 FL (ref 82–98)
MCV RBC AUTO: 99 FL (ref 82–98)
MONOCYTES # BLD AUTO: 0.66 THOUSAND/ΜL (ref 0.17–1.22)
MONOCYTES # BLD AUTO: 0.7 THOUSAND/ΜL (ref 0.17–1.22)
MONOCYTES NFR BLD AUTO: 9 % (ref 4–12)
MONOCYTES NFR BLD AUTO: 9 % (ref 4–12)
NEUTROPHILS # BLD AUTO: 5.03 THOUSANDS/ΜL (ref 1.85–7.62)
NEUTROPHILS # BLD AUTO: 5.33 THOUSANDS/ΜL (ref 1.85–7.62)
NEUTS SEG NFR BLD AUTO: 65 % (ref 43–75)
NEUTS SEG NFR BLD AUTO: 67 % (ref 43–75)
NRBC BLD AUTO-RTO: 0 /100 WBCS
NRBC BLD AUTO-RTO: 0 /100 WBCS
PHOSPHATE SERPL-MCNC: 3.8 MG/DL (ref 2.3–4.1)
PLATELET # BLD AUTO: 229 THOUSANDS/UL (ref 149–390)
PLATELET # BLD AUTO: 230 THOUSANDS/UL (ref 149–390)
PMV BLD AUTO: 11.2 FL (ref 8.9–12.7)
PMV BLD AUTO: 11.8 FL (ref 8.9–12.7)
POTASSIUM SERPL-SCNC: 4.2 MMOL/L (ref 3.5–5.3)
POTASSIUM SERPL-SCNC: 4.3 MMOL/L (ref 3.5–5.3)
PROT SERPL-MCNC: 6.4 G/DL (ref 6.4–8.2)
PROTHROMBIN TIME: 13.4 SECONDS (ref 11.8–14.2)
RBC # BLD AUTO: 3.15 MILLION/UL (ref 3.88–5.62)
RBC # BLD AUTO: 3.19 MILLION/UL (ref 3.88–5.62)
SODIUM SERPL-SCNC: 138 MMOL/L (ref 136–145)
SODIUM SERPL-SCNC: 139 MMOL/L (ref 136–145)
WBC # BLD AUTO: 7.56 THOUSAND/UL (ref 4.31–10.16)
WBC # BLD AUTO: 7.89 THOUSAND/UL (ref 4.31–10.16)

## 2019-02-15 PROCEDURE — C1729 CATH, DRAINAGE: HCPCS

## 2019-02-15 PROCEDURE — 85610 PROTHROMBIN TIME: CPT | Performed by: INTERNAL MEDICINE

## 2019-02-15 PROCEDURE — 8E0WXBG COMPUTER ASSISTED PROCEDURE OF TRUNK REGION, WITH COMPUTERIZED TOMOGRAPHY: ICD-10-PCS | Performed by: RADIOLOGY

## 2019-02-15 PROCEDURE — 82948 REAGENT STRIP/BLOOD GLUCOSE: CPT

## 2019-02-15 PROCEDURE — 0W9B30Z DRAINAGE OF LEFT PLEURAL CAVITY WITH DRAINAGE DEVICE, PERCUTANEOUS APPROACH: ICD-10-PCS | Performed by: RADIOLOGY

## 2019-02-15 PROCEDURE — 84100 ASSAY OF PHOSPHORUS: CPT | Performed by: INTERNAL MEDICINE

## 2019-02-15 PROCEDURE — 99153 MOD SED SAME PHYS/QHP EA: CPT

## 2019-02-15 PROCEDURE — 85025 COMPLETE CBC W/AUTO DIFF WBC: CPT | Performed by: INTERNAL MEDICINE

## 2019-02-15 PROCEDURE — 83735 ASSAY OF MAGNESIUM: CPT | Performed by: INTERNAL MEDICINE

## 2019-02-15 PROCEDURE — C1769 GUIDE WIRE: HCPCS

## 2019-02-15 PROCEDURE — 99222 1ST HOSP IP/OBS MODERATE 55: CPT | Performed by: THORACIC SURGERY (CARDIOTHORACIC VASCULAR SURGERY)

## 2019-02-15 PROCEDURE — 80053 COMPREHEN METABOLIC PANEL: CPT | Performed by: INTERNAL MEDICINE

## 2019-02-15 PROCEDURE — 32557 INSERT CATH PLEURA W/ IMAGE: CPT

## 2019-02-15 PROCEDURE — 71046 X-RAY EXAM CHEST 2 VIEWS: CPT

## 2019-02-15 PROCEDURE — 99232 SBSQ HOSP IP/OBS MODERATE 35: CPT | Performed by: GENERAL PRACTICE

## 2019-02-15 PROCEDURE — 80048 BASIC METABOLIC PNL TOTAL CA: CPT | Performed by: INTERNAL MEDICINE

## 2019-02-15 PROCEDURE — 99152 MOD SED SAME PHYS/QHP 5/>YRS: CPT

## 2019-02-15 PROCEDURE — 85730 THROMBOPLASTIN TIME PARTIAL: CPT | Performed by: INTERNAL MEDICINE

## 2019-02-15 PROCEDURE — 82150 ASSAY OF AMYLASE: CPT | Performed by: SURGERY

## 2019-02-15 RX ORDER — FENTANYL CITRATE 50 UG/ML
INJECTION, SOLUTION INTRAMUSCULAR; INTRAVENOUS CODE/TRAUMA/SEDATION MEDICATION
Status: DISCONTINUED | OUTPATIENT
Start: 2019-02-15 | End: 2019-02-18 | Stop reason: HOSPADM

## 2019-02-15 RX ORDER — POLYVINYL ALCOHOL 14 MG/ML
1 SOLUTION/ DROPS OPHTHALMIC AS NEEDED
Status: DISCONTINUED | OUTPATIENT
Start: 2019-02-15 | End: 2019-02-18 | Stop reason: HOSPADM

## 2019-02-15 RX ORDER — MIDAZOLAM HYDROCHLORIDE 1 MG/ML
INJECTION INTRAMUSCULAR; INTRAVENOUS CODE/TRAUMA/SEDATION MEDICATION
Status: DISCONTINUED | OUTPATIENT
Start: 2019-02-15 | End: 2019-02-18 | Stop reason: HOSPADM

## 2019-02-15 RX ORDER — ACETAMINOPHEN 160 MG/5ML
650 SUSPENSION, ORAL (FINAL DOSE FORM) ORAL EVERY 6 HOURS PRN
Status: DISCONTINUED | OUTPATIENT
Start: 2019-02-15 | End: 2019-02-18 | Stop reason: HOSPADM

## 2019-02-15 RX ORDER — PANTOPRAZOLE SODIUM 40 MG/1
40 TABLET, DELAYED RELEASE ORAL
Status: DISCONTINUED | OUTPATIENT
Start: 2019-02-16 | End: 2019-02-15

## 2019-02-15 RX ORDER — ASPIRIN 81 MG/1
81 TABLET, CHEWABLE ORAL DAILY
Status: DISCONTINUED | OUTPATIENT
Start: 2019-02-16 | End: 2019-02-18 | Stop reason: HOSPADM

## 2019-02-15 RX ADMIN — MIDAZOLAM 0.5 MG: 1 INJECTION INTRAMUSCULAR; INTRAVENOUS at 15:35

## 2019-02-15 RX ADMIN — INSULIN LISPRO 1 UNITS: 100 INJECTION, SOLUTION INTRAVENOUS; SUBCUTANEOUS at 06:10

## 2019-02-15 RX ADMIN — FENTANYL CITRATE 25 MCG: 50 INJECTION INTRAMUSCULAR; INTRAVENOUS at 15:32

## 2019-02-15 RX ADMIN — ENOXAPARIN SODIUM 40 MG: 40 INJECTION SUBCUTANEOUS at 09:17

## 2019-02-15 RX ADMIN — FENTANYL CITRATE 25 MCG: 50 INJECTION INTRAMUSCULAR; INTRAVENOUS at 15:28

## 2019-02-15 RX ADMIN — FENTANYL CITRATE 25 MCG: 50 INJECTION INTRAMUSCULAR; INTRAVENOUS at 15:35

## 2019-02-15 RX ADMIN — MIDAZOLAM 0.5 MG: 1 INJECTION INTRAMUSCULAR; INTRAVENOUS at 15:32

## 2019-02-15 RX ADMIN — MIDAZOLAM 0.5 MG: 1 INJECTION INTRAMUSCULAR; INTRAVENOUS at 15:28

## 2019-02-15 NOTE — MEDICAL STUDENT
[facility] Internal Medicine Progress Note  Patient: Rachele Fernandez 82 y o  male   MRN: [id number]  PCP: Adam Delgado DO  Unit/Bed#: [facility] 420-01 Encounter: [id number]  Date Of Visit: 02/15/19    Assessment:    Principal Problem:    Pneumothorax  Active Problems:    Diabetes mellitus type 2    Weight loss    Chronic pancreatitis ([facility] [address] )    Oropharyngeal dysphagia    Abnormal SPEP    Severe protein-calorie malnutrition (HCC)    Pancreatic pseudocyst    Pancreatic fistula    Cachexia ([facility] [address] )    Failure to thrive in adult      Plan:    Pneumothorax  · Respiratory status improving  Patient is satting 94-96% on RA  Monitor O2 saturation, maintain > 92%  · Even if chest tube indicated, patient has made it known that he does not want a chest tube  · F/u repeat AM CXR for interval change of PTX  · Appreciate thoracic surgery recommendations  · Awaiting palliative care recommendations for [address] discussion  DM  Lab Results   Component Value Date    HGBA1C 7 1 (H) 01/09/2019     · Possibly Type 1 2/2 to chronic pancreatitis and decreased insulin production  · BGs was 190 this morning and 1u   · Patient receives insulin degludec Domitila Lands) 8u at bedtime at home  Not continued here  Consider restarting long-acting insulin if sugars are uncontrolled on SSI  · Maintain blood sugars between  in hospital  Continue monitoring BGs with POCT before meals and before bed  · Consider adding long-acting insulin at bedtime for better BG control  Dysphagia  · Patient has a J-tube placed during previous admission  Was receiving tube feeds in rehab until 2 days prior to this admission  · Continue omeprazole  · Given patient's cachetic state, consider continuing tube feeds 12h on, 12h off  · Nutrition is consulted for diet recommendations  · Consider S&S eval for oral diet  Chronic pancreatitis  · Patient has a known pancreatico-gastric fistula, dilated pancreatic duct, pseudocysts, splenic vein thrombus   Patient has been following up with GI outpatient  · Autoimmune workup negative  · No interventions necessary at this time  Pancreatic mass  · Bx'd in previous admission and found to be benign  · No interventions necessary at this time, but await final surg-onc recommendations    Abnormal SPEP  · Patient following with heme/onc outpatient for an abnormal SPEP on previous admission  · Patient has decided not to proceed with bone marrow biopsy to evaluate for MM, MGUS  VTE Pharmacologic Prophylaxis:   Pharmacologic: Enoxaparin (Lovenox)  Mechanical VTE Prophylaxis in Place: Yes    Patient Centered Rounds: I have performed bedside rounds with nursing staff today  Time Spent for Care: 45 minutes  More than 50% of total time spent on counseling and coordination of care as described above  Current Length of Stay: 1 day(s)    Current Patient Status: Inpatient   Certification Statement: The patient will continue to require additional inpatient hospital stay due to active management of PTX    Discharge Plan / Estimated Discharge Date: med surg, inpatient    Code Status: Level 1 - Full Code      Subjective:   Mr Jackie Vargas reports feeling well today  He denies chest pain or shortness of breath  He had a BM this morning  He has no changes in urination  He says he uses a walker and wheelchair when walking around  Objective:     Vitals:   Temp (24hrs), Av 7 °F (36 5 °C), Min:97 5 °F (36 4 °C), Max:97 8 °F (36 6 °C)    Temp:  [97 5 °F (36 4 °C)-97 8 °F (36 6 °C)] 97 7 °F (36 5 °C)  HR:  [62-66] 66  Resp:  [16-19] 16  BP: (138-161)/(68-73) 154/72  SpO2:  [97 %-100 %] 98 %  Body mass index is 16 71 kg/m²  Input and Output Summary (last 24 hours): Intake/Output Summary (Last 24 hours) at 2/15/2019 0939  Last data filed at 2/15/2019 0720  Gross per 24 hour   Intake 895 ml   Output 400 ml   Net 495 ml       Physical Exam:     Physical Exam   Constitutional: He is oriented to person, place, and time   He appears cachectic  HENT:   Head: Normocephalic and atraumatic  Mouth/Throat: No oropharyngeal exudate  Neck: Normal range of motion  Cardiovascular: Normal rate, regular rhythm, normal heart sounds and intact distal pulses  Exam reveals no gallop and no friction rub  No murmur heard  Pulmonary/Chest: Effort normal and breath sounds normal  No respiratory distress  He has no wheezes  He has no rales  Abdominal: Soft  Bowel sounds are normal  He exhibits no distension  There is no tenderness  Musculoskeletal: Normal range of motion  He exhibits no edema or deformity  Lymphadenopathy:     He has no cervical adenopathy  Neurological: He is alert and oriented to person, place, and time  Skin: Skin is warm and dry  Capillary refill takes less than 2 seconds  Additional Data:     Labs:    Results from last 7 days   Lab Units 02/15/19  0529   WBC Thousand/uL 7 89   HEMOGLOBIN g/dL 10 0*   HEMATOCRIT % 32 0*   PLATELETS Thousands/uL 229   NEUTROS PCT % 67   LYMPHS PCT % 14   MONOS PCT % 9   EOS PCT % 9*     Results from last 7 days   Lab Units 02/15/19  0529   POTASSIUM mmol/L 4 3   CHLORIDE mmol/L 104   CO2 mmol/L 29   BUN mg/dL 17   CREATININE mg/dL 0 47*   CALCIUM mg/dL 8 1*   ALK PHOS U/L 94   ALT U/L 151*   AST U/L 67*     Results from last 7 days   Lab Units 02/15/19  0105   INR  1 01       * I Have Reviewed All Lab Data Listed Above  * Additional Pertinent Lab Tests Reviewed:  All Labs Within Last 24 Hours Reviewed    Imaging:    Imaging Reports Reviewed Today Include: CT CAP, CXR  Imaging Personally Reviewed by Myself Includes:  CT CAP, CXR    Last 24 Hours Medication List:     Current Facility-Administered Medications:  enoxaparin 40 mg Subcutaneous Daily Maira Braun, DO   insulin lispro 1-5 Units Subcutaneous TID AC Maira Braun, DO   ondansetron 4 mg Intravenous Q6H PRN Maira Braun, DO        Today, Patient Was Seen By: Heraclio Lopez Dr  Sindhu Gramajo  02/15/19, 10:54 AM

## 2019-02-15 NOTE — PROGRESS NOTES
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[address] )  Assessment & Plan  Noted in MRI brain  ASA    Abnormal SPEP  Assessment & Plan  Outpt f/u with med onc    Moderate protein-calorie malnutrition   Assessment & Plan  Malnutrition Findings:   Malnutrition type: Acute illness(r/t disease/condition, as evidenced by 8# (7%) weight loss in <1 month, moderate fat and muscle wasting noted at clavicle and orbitals  Treatment: rec increasing TF to provide adequate nutrition  See recs in consult note )  Degree of Malnutrition: Malnutrition of moderate degree    BMI Findings:  BMI Classifications: Underweight < 18 5     Body mass index is 16 71 kg/m²  Chronic pancreatitis [facility])  Assessment & Plan  surg onc appreciated  Outpt f/u    Diabetes mellitus type 2  Assessment & Plan  Lab Results   Component Value Date    HGBA1C 7 1 (H) 01/09/2019       Recent Labs     02/15/19  0030 02/15/19  0607 02/15/19  1222 02/15/19  1801   POCGLU 184* 191* 134 114       Blood Sugar Average: Last 72 hrs:  (P) 142 8     ISS for now  May need extra insulin while receiving nightly TF    VTE Pharmacologic Prophylaxis:   Pharmacologic: Enoxaparin (Lovenox)  Mechanical VTE Prophylaxis in Place: Yes    Patient Centered Rounds: I have performed bedside rounds with nursing staff today  Discussions with Specialists or Other Care Team Provider: no    Education and Discussions with Family / Patient: pt    Time Spent for Care: 30 minutes  More than 50% of total time spent on counseling and coordination of care as described above      Current Length of Stay: 1 day(s)    Current Patient Status: Inpatient   Certification Statement: The patient will continue to require additional inpatient hospital stay due to having chest tube    Discharge Plan: likely when chest tube removed    Code Status: Level 1 - Full Code      Subjective:   No CP or SOB    Objective:     Vitals:   Temp (24hrs), Av 8 °F (36 6 °C), Min:97 6 °F (36 4 °C), Max:98 °F (36 7 °C)    Temp:  [97 6 °F (36 4 °C)-98 °F (36 7 °C)] 98 °F (36 7 °C)  HR:  [34-66] 59  Resp:  [16] 16  BP: (138-180)/(68-86) 169/79  SpO2:  [93 %-100 %] 95 %  Body mass index is 16 71 kg/m²  Input and Output Summary (last 24 hours): Intake/Output Summary (Last 24 hours) at 2/15/2019 1919  Last data filed at 2/15/2019 1803  Gross per 24 hour   Intake 895 ml   Output 400 ml   Net 495 ml       Physical Exam:     Physical Exam   Constitutional: He is oriented to person, place, and time  No distress  HENT:   Head: Normocephalic and atraumatic  Eyes: Conjunctivae and EOM are normal    Neck: Normal range of motion  Neck supple  Cardiovascular: Normal rate and regular rhythm  Pulmonary/Chest: Effort normal and breath sounds normal  He has no wheezes  He has no rales  Abdominal: Soft  Bowel sounds are normal  He exhibits no distension  There is no tenderness  Musculoskeletal: Normal range of motion  He exhibits no edema  Neurological: He is alert and oriented to person, place, and time  Skin: Skin is warm and dry  He is not diaphoretic         Additional Data:     Labs:    Results from last 7 days   Lab Units 02/15/19  0529   WBC Thousand/uL 7 89   HEMOGLOBIN g/dL 10 0*   HEMATOCRIT % 32 0*   PLATELETS Thousands/uL 229   NEUTROS PCT % 67   LYMPHS PCT % 14   MONOS PCT % 9   EOS PCT % 9*     Results from last 7 days   Lab Units 02/15/19  0529   POTASSIUM mmol/L 4 3   CHLORIDE mmol/L 104   CO2 mmol/L 29   BUN mg/dL 17   CREATININE mg/dL 0 47*   CALCIUM mg/dL 8 1*   ALK PHOS U/L 94   ALT U/L 151*   AST U/L 79*     Results from last 7 days   Lab Units 02/15/19  0105   INR  1 01       * I Have Reviewed All Lab Data Listed Above  * Additional Pertinent Lab Tests Reviewed: [address] Admission Reviewed        Recent Cultures (last 7 days):           Last 24 Hours Medication List:     Current Facility-Administered Medications:  acetaminophen 650 mg Per J Tube Q6H PRN Keshia Camarillo DO   [START ON 2/16/2019] aspirin 81 mg Per J Tube Daily Keshia Camarillo DO   enoxaparin 40 mg Subcutaneous Daily Keshia Camarillo DO   fentanyl citrate (PF)  Intravenous Code/Trauma/Sedation Alexis Cline MD   insulin lispro 1-5 Units Subcutaneous Q6H Aury Barber DO   midazolam   Code/Trauma/Sedation Med Meera Clien MD   [START ON 2/16/2019] omeprazole (PRILOSEC) suspension 2 mg/mL 40 mg Per J Tube Daily Aury Barber DO   ondansetron 4 mg Intravenous Q6H PRN Keshia Camarillo DO   polyvinyl alcohol 1 drop Both Eyes PRN Keshia Camarillo DO        Today, Patient Was Seen By: Aury Barber DO    ** Please Note: Dictation voice to text software may have been used in the creation of this document   **

## 2019-02-15 NOTE — PROGRESS NOTES
Progress Note - Surg Onc   Jose Call 82 y o  male MRN: [id number]  Unit/Bed#: [facility] 420-01 Encounter: [id number]    Assessment:  84yM w/ chronic pancreatitis  Jtube previously placed for nutrition support  - Came in for pneumothorax  CT showing connection between pancreas and stomach    - Tolerating tube feeds  No abdominal pain  Plan:  Care per primary  Continue tube feeds  Outpt follow up     Subjective/Objective   Subjective:   No acute events  No SOB  No chest pain  Objective:     Blood pressure 154/72, pulse 66, temperature 97 7 °F (36 5 °C), temperature source Oral, resp  rate 16, height 5' 7" (1 702 m), weight 48 4 kg (106 lb 11 2 oz), SpO2 98 %  ,Body mass index is 16 71 kg/m²  Intake/Output Summary (Last 24 hours) at 2/15/2019 0829  Last data filed at 2/15/2019 0720  Gross per 24 hour   Intake 895 ml   Output 400 ml   Net 495 ml       Invasive Devices     Peripheral Intravenous Line            Peripheral IV 02/14/19 Right Antecubital less than 1 day          Drain            Gastrostomy/Enterostomy Jejunostomy 14 Fr  LLQ 30 days                Physical Exam:   Gen: NAD, AAOx3  CV: RRR  Pulm: no resp distress  Abd: Soft, non-distended, non-tender, JT in place      Lab, Imaging and other studies:  I have personally reviewed pertinent lab results    , CBC:   Lab Results   Component Value Date    WBC 7 89 02/15/2019    HGB 10 0 (L) 02/15/2019    HCT 32 0 (L) 02/15/2019     (H) 02/15/2019     02/15/2019    MCH 31 3 02/15/2019    MCHC 31 3 (L) 02/15/2019    RDW 15 8 (H) 02/15/2019    MPV 11 8 02/15/2019    NRBC 0 02/15/2019   , CMP:   Lab Results   Component Value Date    SODIUM 138 02/15/2019    K 4 3 02/15/2019     02/15/2019    CO2 29 02/15/2019    BUN 17 02/15/2019    CREATININE 0 47 (L) 02/15/2019    CALCIUM 8 1 (L) 02/15/2019    AST 67 (H) 02/15/2019     (H) 02/15/2019    ALKPHOS 94 02/15/2019    EGFR 104 02/15/2019     VTE Pharmacologic Prophylaxis: Enoxaparin (Lovenox)  VTE Mechanical Prophylaxis: sequential compression device

## 2019-02-15 NOTE — CONSULTS
Consultation - Surgical Oncology  Edilma Sy 82 y o  male MRN: [id number]  Unit/Bed#: [facility] 420-01 Encounter: [id number]        Assessment/Plan     Assessment:  82year old male with history of chronic pancreatitis status post jejunostomy tube placement, admitted with left pneumothorax    Plan:  Continue tube feeds as tolerated  Monitor weight gain  Supportive care per primary  Ongoing discussion for management of pneumothorax per thoracic surgery    History of Present Illness     HPI:  Edilma Sy is a 82 y o  male who presents to the hospital with a pneumothorax  He has a history of chronic pancreatitis status and was recently admiitted in January with cough and shortness of breath as well as weight loss  Was found to have bilateral pneumonia at the time  Due to dysphagia and severe malnutrition a surgical feeding tube was placed  He could not undergo gastrostomy tube due to anatomic considerations so he underwent a jejunostomy tube placement on 1/15  He was subsequently discharged on 1/19 to rehab tolerating tube feeds  He states he has been doing well at rehab  He has been tolerating tube feeds and having normal bowel function  He states that he was started on an oral diet two days ago which has been going well  He denies any fevers or chills  Yesterday he was sent for a routine CT scan, which revealed a moderate sized left anterior pneumothorax  He denies any increased shortness of breath or chest pain  He states that he only gets short of breath if he walks a long distance, though he is able to go 350 ft with his walker  He does not wear oxygen at rehab  He has otherwise been asymptomatic  He is admitted for further management  Review of Systems   Constitutional: Negative  HENT: Negative  Eyes: Negative  Respiratory: Negative  Negative for chest tightness  Cardiovascular: Negative  Gastrointestinal: Negative  Negative for abdominal distention  Endocrine: Negative  Genitourinary: Negative  Musculoskeletal: Negative  Skin: Negative  Allergic/Immunologic: Negative  Neurological: Negative  Hematological: Negative  Psychiatric/Behavioral: Negative  Historical Information   Past Medical History:   Diagnosis Date    Diabetes mellitus ([facility] [address] )     Type II    Weight loss      Past Surgical History:   Procedure Laterality Date    GASTROSTOMY TUBE PLACEMENT N/A 1/14/2019    Procedure: INSERTION PEG TUBE;  Surgeon: Elisa Parks MD;  [facility]: [facility]; Service: Gastroenterology    GASTROSTOMY TUBE PLACEMENT N/A 1/15/2019    Procedure: INSERTION JEJUNOSTOMY TUBE OPEN;  Surgeon: Adela Brown MD;  [facility];  Service: Surgical Oncology    LAPAROTOMY N/A 11/19/2018    Procedure: LAPAROTOMY EXPLORATORY;  Surgeon: Adela Brown MD;  [facility];  Service: Surgical Oncology    FL EDG US EXAM SURGICAL ALTER STOM DUODENUM/JEJUNUM N/A 9/21/2018    Procedure: LINEAR ENDOSCOPIC U/S;  Surgeon: Jerardo Luevano MD;  [facility]: [facility];   Service: Gastroenterology    FL LAP,DIAGNOSTIC ABDOMEN N/A 11/19/2018    Procedure: pancreatic biopsy;  Surgeon: Adela Brown MD;  [facility];  Service: Surgical Oncology    TONSILLECTOMY      VASCULAR SURGERY      phlebitis many years ago    WRIST GANGLION EXCISION       Social History   Social History     Substance and Sexual Activity   Alcohol Use Yes    Comment: 1 beer a day     Social History     Substance and Sexual Activity   Drug Use No     Social History     Tobacco Use   Smoking Status Former Smoker    Years: 25 00   Smokeless Tobacco Never Used   Tobacco Comment    quit about 20 years ago as of 9/21/2018     Family History:   Family History   Problem Relation Age of Onset    No Known Problems Mother     No Known Problems Father     Pancreatic cancer Brother 61    Breast cancer Daughter 36        38s       Meds/Allergies   PTA meds:   Prior to Admission Medications   Prescriptions Last Dose Informant Patient Reported? Taking?    Nutritional Supplements (JEVITY 1 2 KARL) LIQD Past Week at Unknown time  No Yes   Sig: Give through J tube at 65 mL/hr with free water flushes 125 mL q6h   acetaminophen (TYLENOL) 160 mg/5 mL suspension Unknown at Unknown time  No No   Si 3 mL (650 mg total) by Per J Tube route every 6 (six) hours as needed for mild pain, headaches or fever   aspirin 81 mg chewable tablet 2019 at Unknown time  No Yes   Si tablet (81 mg total) by Per J Tube route daily   insulin degludec (TRESIBA FLEXTOUCH) 100 units/mL injection pen   Yes Yes   Sig: Inject 8 Units under the skin daily at bedtime   insulin lispro (HumaLOG) 100 units/mL injection Unknown at Unknown time  No No   Sig: Inject 1-5 Units under the skin every 6 (six) hours   lidocaine (LIDODERM) 5 % Unknown at Unknown time  No No   Sig: Apply 1 patch topically daily Remove & Discard patch within 12 hours or as directed by MD   omeprazole (PriLOSEC) 20 mg delayed release capsule 2019  No Yes   Sig: Take 2 capsules (40 mg total) by mouth daily Please give through J tube   polyvinyl alcohol (LIQUIFILM TEARS) 1 4 % ophthalmic solution 2019  No Yes   Sig: Administer 1 drop to both eyes as needed for dry eyes      Facility-Administered Medications: None     No Known Allergies    Objective   First Vitals:   Blood Pressure: 161/73 (19)  Pulse: 66 (19)  Temperature: 97 7 °F (36 5 °C) (19)  Temp Source: Oral (19)  Respirations: 18 (19)  Height: 5' 7" (170 2 cm) (19)  Weight - Scale: 47 6 kg (105 lb) (19)  SpO2: 97 % (19)    Current Vitals:   Blood Pressure: 143/73 (19)  Pulse: 62 (19)  Temperature: 97 5 °F (36 4 °C) (19)  Temp Source: Oral (19)  Respirations: 19 (19)  Height: 5' 7" (170 2 cm) (19)  Weight - Scale: 48 4 kg (106 lb 11 2 oz) (19)  SpO2: 100 % (02/14/19 1826)      Intake/Output Summary (Last 24 hours) at 2/14/2019 2042  Last data filed at 2/14/2019 2016  Gross per 24 hour   Intake --   Output 100 ml   Net -100 ml       Invasive Devices     Peripheral Intravenous Line            Peripheral IV 02/14/19 Right Antecubital less than 1 day          Drain            Gastrostomy/Enterostomy Jejunostomy 14 Fr  LLQ 30 days                Physical Exam   Constitutional: He is oriented to person, place, and time  He appears well-developed  cachectic   HENT:   Head: Normocephalic  Eyes: Pupils are equal, round, and reactive to light  Neck: Normal range of motion  Cardiovascular: Normal rate  Pulmonary/Chest: Effort normal  No respiratory distress  He exhibits no tenderness  Abdominal: Soft  He exhibits no distension  There is no tenderness  Jejunostomy tube in place   Musculoskeletal: He exhibits no tenderness  Neurological: He is alert and oriented to person, place, and time  Skin: Skin is warm and dry  Psychiatric: He has a normal mood and affect  His behavior is normal        Lab Results: CBC: No results found for: WBC, HGB, HCT, MCV, PLT, ADJUSTEDWBC, MCH, MCHC, RDW, MPV, NRBC, CMP: No results found for: SODIUM, K, CL, CO2, ANIONGAP, BUN, CREATININE, GLUCOSE, CALCIUM, AST, ALT, ALKPHOS, PROT, BILITOT, EGFR  Imaging: I have personally reviewed pertinent reports  EKG, Pathology, and Other Studies: I have personally reviewed pertinent reports        Code Status: Level 1 - Full Code  Advance Directive and Living Will:      Power of :    POLST:

## 2019-02-15 NOTE — BRIEF OP NOTE (RAD/CATH)
IR CHEST TUBE PLACEMENT    PATIENT NAME: Otoniel Carreno  : 1935  MRN: [id number]     Pre-op Diagnosis:   1  Pneumothorax on left    2  Dyspnea on exertion    3  Pancreatic fistula    4  Failure to thrive in adult    5  Cachexia ([address] )    6  Severe protein-calorie malnutrition (HCC)      Post-op Diagnosis:   1  Pneumothorax on left    2  Dyspnea on exertion    3  Pancreatic fistula    4  Failure to thrive in adult    5  Cachexia ([address] )    6  Severe protein-calorie malnutrition ([facility] [address] )        Surgeon:   Ezekiel Lopez MD  Assistants:     Estimated Blood Loss: 0  Findings:  Small left hydropneumothorax  The anterior pneumothorax component was targeted and an 8 [place] tube placed  The air was aspirated      Specimens: none    Complications:  None immediate    Anesthesia: Conscious sedation    Ezekiel Lopez MD     Date: 2/15/2019  Time: 3:59 PM

## 2019-02-15 NOTE — PLAN OF CARE
Problem: Potential for Falls  Goal: Patient will remain free of falls  Description  INTERVENTIONS:  - Assess patient frequently for physical needs  -  Identify cognitive and physical deficits and behaviors that affect risk of falls  -  [place] fall precautions as indicated by assessment   - Educate patient/family on patient safety including physical limitations  - Instruct patient to call for assistance with activity based on assessment  - Modify environment to reduce risk of injury  - Consider OT/PT consult to assist with strengthening/mobility  Outcome: Progressing     Problem: Nutrition/Hydration-ADULT  Goal: Nutrient/Hydration intake appropriate for improving, restoring or maintaining nutritional needs  Description  Monitor and assess patient's nutrition/hydration status for malnutrition (ex- brittle hair, bruises, dry skin, pale skin and conjunctiva, muscle wasting, smooth red tongue, and disorientation)  Collaborate with interdisciplinary team and initiate plan and interventions as ordered  Monitor patient's weight and dietary intake as ordered or per policy  Utilize nutrition screening tool and intervene per policy  Determine patient's food preferences and provide high-protein, high-caloric foods as appropriate       INTERVENTIONS:  - Monitor oral intake, urinary output, labs, and treatment plans  - Assess nutrition and hydration status and recommend course of action  - Evaluate amount of meals eaten  - Assist patient with eating if necessary   - Allow adequate time for meals  - Recommend/ encourage appropriate diets, oral nutritional supplements, and vitamin/mineral supplements  - Order, calculate, and assess calorie counts as needed  - Recommend, monitor, and adjust tube feedings and TPN/PPN based on assessed needs  - Assess need for intravenous fluids  - Provide specific nutrition/hydration education as appropriate  - Include patient/family/caregiver in decisions related to nutrition  Outcome: Progressing     Problem: Prexisting or High Potential for Compromised Skin Integrity  Goal: Skin integrity is maintained or improved  Description  INTERVENTIONS:  - Identify patients at risk for skin breakdown  - Assess and monitor skin integrity  - Assess and monitor nutrition and hydration status  - Monitor labs (i e  albumin)  - Assess for incontinence   - Turn and reposition patient  - Assist with mobility/ambulation  - Relieve pressure over bony prominences  - Avoid friction and shearing  - Provide appropriate hygiene as needed including keeping skin clean and dry  - Evaluate need for skin moisturizer/barrier cream  - Collaborate with interdisciplinary team (i e  Nutrition, Rehabilitation, etc )   - Patient/family teaching  Outcome: Progressing     Problem: PAIN - ADULT  Goal: Verbalizes/displays adequate comfort level or baseline comfort level  Description  Interventions:  - Encourage patient to monitor pain and request assistance  - Assess pain using appropriate pain scale  - Administer analgesics based on type and severity of pain and evaluate response  - Implement non-pharmacological measures as appropriate and evaluate response  - Consider cultural and social influences on pain and pain management  - Notify physician/advanced practitioner if interventions unsuccessful or patient reports new pain  Outcome: Progressing     Problem: SAFETY ADULT  Goal: Maintain or return to baseline ADL function  Description  INTERVENTIONS:  -  Assess patient's ability to carry out ADLs; assess patient's baseline for ADL function and identify physical deficits which impact ability to perform ADLs (bathing, care of mouth/teeth, toileting, grooming, dressing, etc )  - Assess/evaluate cause of self-care deficits   - Assess range of motion  - Assess patient's mobility; develop plan if impaired  - Assess patient's need for assistive devices and provide as appropriate  - Encourage maximum independence but intervene and supervise when necessary  ¯ Involve family in performance of ADLs  ¯ Assess for home care needs following discharge   ¯ Request OT consult to assist with ADL evaluation and planning for discharge  ¯ Provide patient education as appropriate  Outcome: Progressing  Goal: Maintain or return mobility status to optimal level  Description  INTERVENTIONS:  - Assess patient's baseline mobility status (ambulation, transfers, stairs, etc )    - Identify cognitive and physical deficits and behaviors that affect mobility  - Identify mobility aids required to assist with transfers and/or ambulation (gait belt, sit-to-stand, lift, walker, cane, etc )  - [place] fall precautions as indicated by assessment  - Record patient progress and toleration of activity level on Mobility SBAR; progress patient to next Phase/Stage  - Instruct patient to call for assistance with activity based on assessment  - Request Rehabilitation consult to assist with strengthening/weightbearing, etc   Outcome: Progressing     Problem: DISCHARGE PLANNING  Goal: Discharge to home or other facility with appropriate resources  Description  INTERVENTIONS:  - Identify barriers to discharge w/patient and caregiver  - Arrange for needed discharge resources and transportation as appropriate  - Identify discharge learning needs (meds, wound care, etc )  - Arrange for interpretive services to assist at discharge as needed  - Refer to Case Management Department for coordinating discharge planning if the patient needs post-hospital services based on physician/advanced practitioner order or complex needs related to functional status, cognitive ability, or social support system  Outcome: Progressing     Problem: Knowledge Deficit  Goal: Patient/family/caregiver demonstrates understanding of disease process, treatment plan, medications, and discharge instructions  Description  Complete learning assessment and assess knowledge base    Interventions:  - Provide teaching at level of understanding  - Provide teaching via preferred learning methods  Outcome: Progressing

## 2019-02-15 NOTE — SOCIAL WORK
Pt is <30-day readmit  Pt's last admit was 1/1/19 - 1/19/19  Pt is admitted to [facility] from [facility]  Pt normally resides w/ wife only in a 1-story house w/ no steps inside and a ramp enter  Pt's wife Guy Ware (c: [phone]) is primary contact  Pt uses a rolling walker at baseline to ambulate and requires 1x assist at baseline for performing his ADLS  Pt has no additional reported DME in home  Pt has hx of [address] services w/ [facility]  Pt is currently placed at [facility] since 1/19/19 for i/p rehab  Pt has no mental health issues nor D&A issues  Pt's PCP is Dr Mayford Lanes, an independent practitioner located in [facility]  Pt uses [facility] located in [facility] for his Rx needs  Pt is retired and receives [employer] as a source of income  Pt is enrolled in Medicare for healthcare coverage and also has supplemental insurance through [employer] for secondary coverage and Rx benefits  Pt does not have a legally pre-designated medical POA appointed for himself  Pt will require ambulance transport to be arranged to return to SNF at time of d/c     CM reviewed d/c planning process including the following: identifying help at home, patient preference for d/c planning needs, Discharge Lounge, [facility] Meds to Bed program, availability of treatment team to discuss questions or concerns patient and/or family may have regarding understanding medications and recognizing signs and symptoms once discharged  CM also encouraged patient to follow up with all recommended appointments after discharge  Patient advised of importance for patient and family to participate in managing patients medical well being  Patient/caregiver received discharge checklist  Content reviewed  Patient/caregiver encouraged to participate in discharge plan of care prior to discharge home  CM made Ecin referral to Jono THURMAN to follow pt's case while hospitalized   Pt is aware and agreeable to this referral  CM will reassess pt for additional d/c needs once recommendations for his aftercare are made by the tx team  CM to follow

## 2019-02-15 NOTE — ED ATTENDING ATTESTATION
Rae Lane DO, saw and evaluated the patient  I have discussed the patient with the resident/non-physician practitioner and agree with the resident's/non-physician practitioner's findings, Plan of Care, and MDM as documented in the resident's/non-physician practitioner's note, except where noted  All available labs and Radiology studies were reviewed  At this point I agree with the current assessment done in the Emergency Department  I have conducted an independent evaluation of this patient a history and physical is as follows:    82YEAR-OLD MALE WITH HISTORY OF RECENT ADMISSION FOR PANCREATIC mass and fistula, status post biopsy at the end of January  The patient also had history of pneumonia requiring BiPAP during that hospital stay  Patient had an outpatient CT which was positive for pneumothorax, see results below  CT ABDOMEN AND PELVIS WITH IV CONTRAST     INDICATION:   D37 8: 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  Follow-up of pancreatic cysts      COMPARISON:  CT abdomen pelvis 1/4/2019     TECHNIQUE:  CT examination of the abdomen and pelvis was performed  Axial, sagittal, and coronal 2D reformatted images were created from the source data and submitted for interpretation      Radiation dose length product (DLP) for this visit:  656 mGy-cm   This examination, like all CT scans performed in the [facility], was performed utilizing techniques to minimize radiation dose exposure, including the use of iterative   reconstruction and automated exposure control      IV Contrast:  100 mL of iohexol (OMNIPAQUE)  Enteric Contrast:  Enteric contrast was not administered      FINDINGS:     ABDOMEN     LOWER CHEST:  Partially imaged, at least moderate-sized, left anterior pneumothorax      Small left basilar effusion  Bilateral lower lobe airspace disease in keeping with pneumonia or atelectasis    Left anterior pneumothorax      LIVER/BILIARY TREE: Stable 11 mm medial hepatic cysts      GALLBLADDER:  No calcified gallstones  No pericholecystic inflammatory change      SPLEEN:  Chronic heterogeneous enhancement of the spleen  Numerous surgical clips at the splenic hilum      PANCREAS:  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 #601/53 and # 331 through 43 pancreatic tail pseudocyst is resolved  Cystic areas in the pancreatic neck/head are resolved  Persistent prominent pancreatic ductal dilation   measuring up to 13 mm      ADRENAL GLANDS:  Unremarkable      KIDNEYS/URETERS:  Stable renal cysts  No hydronephrosis      STOMACH AND BOWEL:  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 as described above  No bowel obstruction  Jejunostomy tube is in place      APPENDIX:  No findings to suggest appendicitis      ABDOMINOPELVIC CAVITY:  No prominent quantity of ascites  Small amount of free fluid at the splenic hilum      VESSELS:  Chronic splenic vein thrombus      PELVIS     REPRODUCTIVE ORGANS:  Unremarkable for patient's age      URINARY BLADDER:  Unremarkable      ABDOMINAL WALL/INGUINAL REGIONS:  Unremarkable      OSSEOUS STRUCTURES:  No acute fracture or destructive osseous lesion      IMPRESSION:     Partially imaged, at least moderate-sized, left anterior pneumothorax      Small left basilar effusion  Bilateral lower lobe airspace disease in keeping with pneumonia or atelectasis  Left anterior pneumothorax      Persistent widely patent fistula tract between the posterior wall of the stomach and the pancreatic duct  Dilated pancreatic duct measuring up to 13 mm      Pancreatic pseudocysts have resolved  Small amount of fluid at the splenic hilum has a nonloculated appearance     Jejunostomy remains in place        A lengthy discussion with patient and patient's family    They are opting for no aggressive intervention regarding his pneumothorax given it most likely is been present for a few days and he has been asymptomatic  He denies any chest pain, shortness of breath, cough, fever, chills  Patient to be admitted on a high-flow nasal oxygen, non-rebreather the, the thoracic surgery consultation as well as palliative care evaluation      Critical Care Time  Procedures

## 2019-02-15 NOTE — MALNUTRITION/BMI
This medical record reflects one or more clinical indicators suggestive of malnutrition and underweight  Malnutrition Findings:   Malnutrition type: Acute illness(r/t disease/condition, as evidenced by 8# (7%) weight loss in <1 month, moderate fat and muscle wasting noted at clavicle and orbitals  Treatment: rec increasing TF to provide adequate nutrition  See recs in consult note )  Degree of Malnutrition: Malnutrition of moderate degree  Malnutrition Characteristics: Weight loss, Muscle loss, Fat loss    BMI Findings:  BMI Classifications: Underweight < 18 5     Body mass index is 16 71 kg/m²  See Nutrition note dated 2/15/2019 for additional details  Completed nutrition assessment is viewable in the nutrition documentation

## 2019-02-15 NOTE — PLAN OF CARE
Problem: DISCHARGE PLANNING - CARE MANAGEMENT  Goal: Discharge to post-acute care or home with appropriate resources  Description  INTERVENTIONS:  - Conduct assessment to determine patient/family and health care team treatment goals, and need for post-acute services based on payer coverage, community resources, and patient preferences, and barriers to discharge  - Address psychosocial, clinical, and financial barriers to discharge as identified in assessment in conjunction with the patient/family and health care team  - Arrange appropriate level of post-acute services according to patient's   needs and preference and payer coverage in collaboration with the physician and health care team  - Communicate with and update the patient/family, physician, and health care team regarding progress on the discharge plan  - Arrange appropriate transportation to post-acute venues   Outcome: Progressing

## 2019-02-15 NOTE — CONSULTS
Consult for tube feeding  Pt reports rate of 100ml/hr x 12hrs at facility prior to admission  Due to significant weight loss within past month and fat/muscle wasting, recommend increasing TF of Jevity 1 2 as tolerated to new goal of 118ml/hr x12hrs  Continue free water flushes 125ml q 6hrs, monitor electrolytes and adjust as appropriate  Will provide 1699kcal, 79g protein, 1647ml water  Continue to monitor weight

## 2019-02-15 NOTE — CONSULTS
Consultation - Darlene Vale 82 y o  male MRN: [id number]  Unit/Bed#: [facility] 420-01 Encounter: [id number]        Assessment/Plan     Assessment/Plan  82year old male with history of chronic pancreatitis who is status post jejunostomy tube now with incidentally found left anterior pneumothorax  Discussed with patient who does not wish to have a chest tube placed unless absolutely necessary  Will follow with a chest x-ray in the morning to monitor for development of worsening pneumothorax, and discuss goals of care and possible drainage tomorrow    History of Present Illness     HPI:  Kera Giang is a 82 y o  male with a history of chronic pancreatitis status post jejunostomy tube placement  He was recently admitted in January with cough and shortness of breath as well as weight loss  Was found to have bilateral pneumonia at the time  Due to dysphagia and severe malnutrition a surgical feeding tube was placed  He could not undergo gastrostomy tube due to anatomic considerations so he underwent a jejunostomy tube placement on 1/15  He was subsequently discharged on 1/19 to rehab tolerating tube feeds  He states he has been doing well at rehab  He has been tolerating tube feeds and having normal bowel function  He states that he was started on an oral diet two days ago which has been going well  He denies any fevers or chills  Yesterday he was sent for a routine CT scan, which revealed a moderate sized left anterior pneumothorax  He denies any increased shortness of breath or chest pain  He states that he only gets short of breath if he walks a long distance, though he is able to go 350 ft with his walker  He does not wear oxygen at rehab  He has otherwise been asymptomatic  He is admitted for further management  Review of Systems   Constitutional: Negative for activity change, appetite change, chills and fever  HENT: Negative  Eyes: Negative  Respiratory: Negative    Negative for chest tightness and shortness of breath  Cardiovascular: Negative for chest pain  Gastrointestinal: Negative for abdominal distention, abdominal pain, constipation, diarrhea, nausea and vomiting  Endocrine: Negative  Genitourinary: Negative  Musculoskeletal: Negative  Skin: Negative  Allergic/Immunologic: Negative  Neurological: Negative  Hematological: Negative  Psychiatric/Behavioral: Negative  Historical Information   Past Medical History:   Diagnosis Date    Diabetes mellitus ([facility] [address] )     Type II    Weight loss      Past Surgical History:   Procedure Laterality Date    GASTROSTOMY TUBE PLACEMENT N/A 1/14/2019    Procedure: INSERTION PEG TUBE;  Surgeon: Shaye Luu MD;  [facility]: [facility]; Service: Gastroenterology    GASTROSTOMY TUBE PLACEMENT N/A 1/15/2019    Procedure: INSERTION JEJUNOSTOMY TUBE OPEN;  Surgeon: Jani Woodruff MD;  [facility];  Service: Surgical Oncology    LAPAROTOMY N/A 11/19/2018    Procedure: LAPAROTOMY EXPLORATORY;  Surgeon: Jani Woodruff MD;  [facility];  Service: Surgical Oncology    MA EDG US EXAM SURGICAL ALTER STOM DUODENUM/JEJUNUM N/A 9/21/2018    Procedure: LINEAR ENDOSCOPIC U/S;  Surgeon: Presley Cox MD;  [facility];   Service: Gastroenterology    MA LAP,DIAGNOSTIC ABDOMEN N/A 11/19/2018    Procedure: pancreatic biopsy;  Surgeon: Jani Woodruff MD;  [facility];  Service: Surgical Oncology    TONSILLECTOMY      VASCULAR SURGERY      phlebitis many years ago    WRIST GANGLION EXCISION       Social History   Social History     Substance and Sexual Activity   Alcohol Use Yes    Comment: 1 beer a day     Social History     Substance and Sexual Activity   Drug Use No     Social History     Tobacco Use   Smoking Status Former Smoker    Years: 25 00   Smokeless Tobacco Never Used   Tobacco Comment    quit about 20 years ago as of 9/21/2018     Family History:   Family History   Problem Relation Age of Onset    No Known Problems Mother     No Known Problems Father     Pancreatic cancer Brother 61    Breast cancer Daughter 36        38s       Meds/Allergies   PTA meds:   Prior to Admission Medications   Prescriptions Last Dose Informant Patient Reported? Taking?    Nutritional Supplements (JEVITY 1 2 KARL) LIQD Past Week at Unknown time  No Yes   Sig: Give through J tube at 65 mL/hr with free water flushes 125 mL q6h   acetaminophen (TYLENOL) 160 mg/5 mL suspension Unknown at Unknown time  No No   Si 3 mL (650 mg total) by Per J Tube route every 6 (six) hours as needed for mild pain, headaches or fever   aspirin 81 mg chewable tablet 2019 at Unknown time  No Yes   Si tablet (81 mg total) by Per J Tube route daily   insulin degludec (TRESIBA FLEXTOUCH) 100 units/mL injection pen   Yes Yes   Sig: Inject 8 Units under the skin daily at bedtime   insulin lispro (HumaLOG) 100 units/mL injection Unknown at Unknown time  No No   Sig: Inject 1-5 Units under the skin every 6 (six) hours   lidocaine (LIDODERM) 5 % Unknown at Unknown time  No No   Sig: Apply 1 patch topically daily Remove & Discard patch within 12 hours or as directed by MD   omeprazole (PriLOSEC) 20 mg delayed release capsule 2019  No Yes   Sig: Take 2 capsules (40 mg total) by mouth daily Please give through J tube   polyvinyl alcohol (LIQUIFILM TEARS) 1 4 % ophthalmic solution 2019  No Yes   Sig: Administer 1 drop to both eyes as needed for dry eyes      Facility-Administered Medications: None     No Known Allergies    Objective   First Vitals:   Blood Pressure: 161/73 (19)  Pulse: 66 (19)  Temperature: 97 7 °F (36 5 °C) (19)  Temp Source: Oral (19)  Respirations: 18 (19)  Height: 5' 7" (170 2 cm) (19)  Weight - Scale: 47 6 kg (105 lb) (19)  SpO2: 97 % (19)    Current Vitals:   Blood Pressure: 143/73 (19)  Pulse: 62 (19)  Temperature: 97 5 °F (36 4 °C) (02/14/19 1826)  Temp Source: Oral (02/14/19 1826)  Respirations: 19 (02/14/19 1826)  Height: 5' 7" (170 2 cm) (02/14/19 1826)  Weight - Scale: 48 4 kg (106 lb 11 2 oz) (02/14/19 1826)  SpO2: 100 % (02/14/19 1826)    No intake or output data in the 24 hours ending 02/14/19 2007    Invasive Devices     Peripheral Intravenous Line            Peripheral IV 02/14/19 Right Antecubital less than 1 day          Drain            Gastrostomy/Enterostomy Jejunostomy 14 Fr  LLQ 30 days                Physical Exam   Constitutional: He is oriented to person, place, and time  He appears well-developed  cachectic   HENT:   Head: Normocephalic  Eyes: Pupils are equal, round, and reactive to light  EOM are normal    Neck: Normal range of motion  Cardiovascular: Normal rate  Pulmonary/Chest: Effort normal  No respiratory distress  He exhibits no tenderness  Abdominal: Soft  He exhibits no distension  There is no tenderness  Jejunostomy tube in place, intact   Musculoskeletal: He exhibits no tenderness  Neurological: He is alert and oriented to person, place, and time  Skin: Skin is warm and dry  Psychiatric: He has a normal mood and affect  His behavior is normal        Lab Results: CBC: No results found for: WBC, HGB, HCT, MCV, PLT, ADJUSTEDWBC, MCH, MCHC, RDW, MPV, NRBC, CMP: No results found for: SODIUM, K, CL, CO2, ANIONGAP, BUN, CREATININE, GLUCOSE, CALCIUM, AST, ALT, ALKPHOS, PROT, BILITOT, EGFR, Coagulation: No results found for: PT, INR, APTT  Imaging: I have personally reviewed pertinent reports  EKG, Pathology, and Other Studies: I have personally reviewed pertinent reports        Code Status: Level 1 - Full Code  Advance Directive and Living Will:      Power of :    POLST:

## 2019-02-15 NOTE — PHYSICIAN ADVISOR
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]  Dyspnea on exertion [R06 09]  Failure to thrive in adult [R62 7]  Pneumothorax on left [J93 9]  Pneumothorax [J93 9]       There is documentation in the medical record of an expected length of stay of at least 2 midnights  The patient is therefore expected to satisfy the 2 midnight benchmark and given the 2 midnight presumption is appropriate for INPATIENT ADMISSION  Given this expectation of a satisfying stay, CMS instructs us that the patient is most often appropriate for inpatient admission under part A provided medical necessity is documented in the chart  After review of the relevant documentation, labs, vital signs and test results, the patient is appropriate for INPATIENT ADMISSION  Admission to the hospital as an inpatient is a complex decision making process which requires the practitioner to consider the patients presenting complaint, history and physical examination and all relevant testing  With this in mind, in this case, the patient was deemed appropriate for INPATIENT ADMISSION  After review of the documentation and testing available at the time of the admission I concur with this clinical determination of medical necessity  The patient does have an inpatient admission within the previous 30 days  The patient was admitted on 1/1/19 and discharged on 1/19/19 as an inpatient  The patient therefore required readmission review  In this case the patient should be considered a SEPARATE and UNRELATED INPATIENT ADMISSION  The patient had been discharged in stable condition with a completed care plan   There were no unresolved acute medical issues at the time of discharged which would have reasonably been expected to prompt this readmission  Rationale is as follows: The patient is a 82 yrs   Male who presented to the ED at 2/14/2019  3:46 PM with a chief complaint of Evaluation of Abnormal Diagnostic Test (country zapata reports pt had an MRI and was told he had a pneumothorax )     Patient admitted with a report of SOB and HUTTON and found to have a pneumothorax on OP imaging  He was recently hospitalized for dysphagia secondary to pneumonia and also had a J-tube placed during that admission  On this admission,he was seen by Thoracic Surgery and they recommended placement of a chest tube but the patient remains hesitant to have that procedure done  There does not appear to be any new issue with the J-tube and the pneumonia has resolved  It would be appropriate to consider this admission as [address] to the previous admission  The patients vitals on arrival were ED Triage Vitals [02/14/19 1547]   Temperature Pulse Respirations Blood Pressure SpO2   97 7 °F (36 5 °C) 66 18 161/73 97 %      Temp Source Heart Rate Source Patient Position - Orthostatic VS BP Location FiO2 (%)   Oral Monitor Sitting Left arm --      Pain Score       No Pain           Past Medical History:   Diagnosis Date    Diabetes mellitus ([facility] [address] )     Type II    Weight loss      Past Surgical History:   Procedure Laterality Date    GASTROSTOMY TUBE PLACEMENT N/A 1/14/2019    Procedure: INSERTION PEG TUBE;  Surgeon: Fantasma Boyd MD;  [facility]: [facility];   Service: Gastroenterology    GASTROSTOMY TUBE PLACEMENT N/A 1/15/2019    Procedure: INSERTION JEJUNOSTOMY TUBE OPEN;  Surgeon: Braden Fuentes MD;  [facility];  Service: Surgical Oncology    LAPAROTOMY N/A 11/19/2018    Procedure: LAPAROTOMY EXPLORATORY;  Surgeon: Braden Fuentes MD;  [facility];  Service: Surgical Oncology    AZ EDG US EXAM SURGICAL ALTER STOM DUODENUM/JEJUNUM N/A 9/21/2018    Procedure: LINEAR ENDOSCOPIC U/S;  Surgeon: Cathryn Bradley MD;  [facility];  Service: Gastroenterology    ND LAP,DIAGNOSTIC ABDOMEN N/A 11/19/2018    Procedure: pancreatic biopsy;  Surgeon: Adela Brown MD;  [facility];  Service: Surgical Oncology    TONSILLECTOMY      VASCULAR SURGERY      phlebitis many years ago    WRIST GANGLION EXCISION             Consults have been placed to:   IP CONSULT TO THORACIC SURGERY  IP CONSULT TO SURGICAL ONCOLOGY  IP CONSULT TO NUTRITION SERVICES    Vitals:    02/14/19 2000 02/14/19 2315 02/15/19 0404 02/15/19 0743   BP:  138/68 154/72 154/72   BP Location:  Left arm Left arm Left arm   Pulse:  64 62 66   Resp:  16 16 16   Temp:  97 8 °F (36 6 °C) 97 6 °F (36 4 °C) 97 7 °F (36 5 °C)   TempSrc:  Oral Oral Oral   SpO2: 99% 99% 99% 98%   Weight:       Height:           Most recent labs:    Recent Labs     02/15/19  0105 02/15/19  0529   WBC 7 56 7 89   HGB 9 9* 10 0*   HCT 31 2* 32 0*    229   K 4 2 4 3   CALCIUM 7 9* 8 1*   BUN 19 17   CREATININE 0 46* 0 47*   INR 1 01  --    AST  --  67*   ALT  --  151*   ALKPHOS  --  94       Scheduled Meds:  Current Facility-Administered Medications:  enoxaparin 40 mg Subcutaneous Daily Curry Madrid, DO   insulin lispro 1-5 Units Subcutaneous Q6H Stanislav Clemens, DO   ondansetron 4 mg Intravenous Q6H PRN Curry Madrid, DO     Continuous Infusions:   PRN Meds: ondansetron    Surgical procedures (if appropriate):

## 2019-02-15 NOTE — UTILIZATION REVIEW
Initial Clinical Review    Admission: Date/Time/Statement: 2/14/19 @ 1708 Inpatient Written   Orders Placed This Encounter   Procedures    Inpatient Admission     Standing Status:   Standing     Number of Occurrences:   1     Order Specific Question:   Admitting Physician     Answer:   Ronal Greco     Order Specific Question:   Level of Care     Answer:   Level 2 Stepdown / HOT [14]     Order Specific Question:   Estimated length of stay     Answer:   More than 2 Midnights     Order Specific Question:   Certification     Answer:   I certify that inpatient services are medically necessary for this patient for a duration of greater than two midnights  See H&P and MD Progress Notes for additional information about the patient's course of treatment  ED: Date/Time/Mode of Arrival:   ED Arrival Information     Expected Arrival Acuity Means of Arrival Escorted By Service Admission Type    - 2/14/2019 15:45 Urgent Ambulance [facility] Hospitalist Urgent    Arrival Complaint    abnormal labs        Chief Complaint:   Chief Complaint   Patient presents with    Evaluation of Abnormal Diagnostic Test     country zapata reports pt had an MRI and was told he had a pneumothorax  History of Illness: Patient is a 82year old male with a past medical history significant for recent admission and discharge, complicated hospital course with severe sepsis secondary to pneumonia requiring BiPAP, has a history of a pancreatic mass and fistula, status post a biopsy with questionable source, status post J-tube at the end of January who presents the secondary to a left-sided pneumothorax that was found on a CT scan on 02/11/2019  Patient reports that he was called to return secondary to this finding  Reports that the since that time of discharge he has been dyspneic with exertion, but otherwise he feels the same      ED Vital Signs:   ED Triage Vitals [02/14/19 1547]   Temperature Pulse Respirations Blood Pressure SpO2   97 7 °F (36 5 °C) 66 18 161/73 97 %      Temp Source Heart Rate Source Patient Position - Orthostatic VS BP Location FiO2 (%)   Oral Monitor Sitting Left arm --      Pain Score       No Pain        Wt Readings from Last 1 Encounters:   02/14/19 48 4 kg (106 lb 11 2 oz)     Vital Signs (abnormal): WNL  Pertinent Labs/Diagnostic Test Results: GLUC 184, RBC 3 15, HGB 9 9, HCT 31 2, CREAT 0 46  CXR:  Small left hydropneumothorax  Bibasilar atelectasis or infiltrates with improved left perihilar/basilar aeration  REPEAT CXR 2/15:  Small to moderate left hydropneumothorax is unchanged compared to 2/14/2019  ED Treatment:   Medication Administration from 02/14/2019 1545 to 02/14/2019 1812     None        Past Medical/Surgical History: Active Ambulatory Problems     Diagnosis Date Noted    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    Esophagitis 10/10/2018    Diabetes mellitus type 2 10/15/2018    Weight loss 10/15/2018    Chronic pancreatitis ([address] ) 12/03/2018    Oropharyngeal dysphagia 01/02/2019    Moderate protein-calorie malnutrition  01/05/2019    Abnormal SPEP 01/11/2019    Severe protein-calorie malnutrition ([address] ) 01/01/2019    Dysphagia 01/01/2019    Disorder of upper esophageal sphincter 01/15/2019    Gastric ulcer 01/18/2019    Pancreatic pseudocyst 01/24/2019     Past Medical History:   Diagnosis Date    Diabetes mellitus ([address] )     Weight loss      Admitting Diagnosis: Cachexia ([address] ) [R64]  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]  Dyspnea on exertion [R06 09]  Failure to thrive in adult [R62 7]  Pneumothorax on left [J93 9]  Pneumothorax [J93 9]  Age/Sex: 82 y o  male  Assessment/Plan:   1   Moderate size Left pneumothorax, family like to discuss risk/ benefit of chest tube with thoracic surgery, continue supportive care and non rebreather mask, consult thoracic surgery for further management, check stat CBC and BMP, follow on chest x-ray, consult palliative care regarding goals of care  2  Persistent fistula tract between posterior wall of the stomach and pancreatic duct, consult Surgical Oncology for further management  3  Abnormal SPEP, evaluated recently by Hematology,  with plan to to hold on bone marrow biopsy for now and repeat study in 2 months  4  Oropharyngeal dysphagia, nutrition maintained on J-tube, consult Nutrition  5  Chronic pancreatitis  6  Severe protein calorie malnutrition  7  Failure to thrive, secondary to above  8  Diabetes mellitus type 2, monitor blood sugar  VTE Prophylaxis: Enoxaparin (Lovenox)  / sequential compression device   Anticipated Length of Stay:  Patient will be admitted on an Inpatient basis with an anticipated length of stay of  > 2 midnights  Justification for Hospital Stay:  Management of pneumothorax    Admission Orders:  Telemetry  Tube feed  OOB as issa with assist  Accuchecks QAC and QHS with coverage  Consult nutr serv, palliative care, surgical oncology, thoracic sx  Nonrebreater mask  Sequential compression device   Scheduled Meds:   Current Facility-Administered Medications:  enoxaparin 40 mg Subcutaneous Daily   insulin lispro 1-5 Units Subcutaneous Q6H   ondansetron 4 mg Intravenous Q6H PRN     Continuous Infusions:    PRN Meds: ondansetron    Network Utilization Review Department  Phone: [phone]; Fax [phone]  [email or web address]  ATTENTION: Please call with any questions or concerns to [phone]  and carefully listen to the prompts so that you are directed to the right person  Send all requests for admission clinical reviews, approved or denied determinations and any other requests to fax [phone]   All voicemails are confidential

## 2019-02-15 NOTE — PROGRESS NOTES
Progress Note - [facility] 82 y o  male MRN: [id number]  Unit/Bed#: [facility] 420-01 Encounter: [id number]    Assessment:  84yM w/ chronic pancreatitis  Jtube previously placed for nutrition support  - Came in for pneumothorax  No SOB  Sats fine on 1-2L      Plan:  F/u AM CXR  Will discuss pigtail catheter placement    Subjective/Objective   Subjective:   No acute events  No SOB  No chest pain  Objective:     Blood pressure 154/72, pulse 66, temperature 97 7 °F (36 5 °C), temperature source Oral, resp  rate 16, height 5' 7" (1 702 m), weight 48 4 kg (106 lb 11 2 oz), SpO2 98 %  ,Body mass index is 16 71 kg/m²  Intake/Output Summary (Last 24 hours) at 2/15/2019 1759  Last data filed at 2/15/2019 0720  Gross per 24 hour   Intake 895 ml   Output 400 ml   Net 495 ml       Invasive Devices     Peripheral Intravenous Line            Peripheral IV 02/14/19 Right Antecubital less than 1 day          Drain            Gastrostomy/Enterostomy Jejunostomy 14 Fr  LLQ 30 days                Physical Exam:   Gen: NAD, AAOx3  CV: RRR  Pulm: no resp distress  Abd: Soft, non-distended, non-tender, JT in place      Lab, Imaging and other studies:  I have personally reviewed pertinent lab results    , CBC:   Lab Results   Component Value Date    WBC 7 89 02/15/2019    HGB 10 0 (L) 02/15/2019    HCT 32 0 (L) 02/15/2019     (H) 02/15/2019     02/15/2019    MCH 31 3 02/15/2019    MCHC 31 3 (L) 02/15/2019    RDW 15 8 (H) 02/15/2019    MPV 11 8 02/15/2019    NRBC 0 02/15/2019   , CMP:   Lab Results   Component Value Date    SODIUM 138 02/15/2019    K 4 3 02/15/2019     02/15/2019    CO2 29 02/15/2019    BUN 17 02/15/2019    CREATININE 0 47 (L) 02/15/2019    CALCIUM 8 1 (L) 02/15/2019    AST 67 (H) 02/15/2019     (H) 02/15/2019    ALKPHOS 94 02/15/2019    EGFR 104 02/15/2019     VTE Pharmacologic Prophylaxis: Enoxaparin (Lovenox)  VTE Mechanical Prophylaxis: sequential compression device

## 2019-02-15 NOTE — INTERVAL H&P NOTE
Update:     Patient with mildly symptomatic left hydropneumothorax  This has not improved with time on oxygen  Chest tube placement is indicated to resolve this as it will likely progress  He may also have trapped lung  I discussed the risks of bleeding and the benefit of expanding his lung with the patient  He wishes to proceed  Tube feeds held since 8:00 a m  Antony Manifold prophylactic anticoagulation noted  MP 2 ASA 3    Patient re-evaluated   Accept as history and physical     Ania Wen MD/February 15, 2019/3:24 PM

## 2019-02-16 ENCOUNTER — APPOINTMENT (INPATIENT)
Dept: RADIOLOGY | Facility: HOSPITAL | Age: 84
DRG: 199 | End: 2019-02-16
Payer: MEDICARE

## 2019-02-16 PROBLEM — R74.01 TRANSAMINITIS: Status: ACTIVE | Noted: 2019-02-16

## 2019-02-16 LAB
GLUCOSE SERPL-MCNC: 113 MG/DL (ref 65–140)
GLUCOSE SERPL-MCNC: 152 MG/DL (ref 65–140)
GLUCOSE SERPL-MCNC: 217 MG/DL (ref 65–140)
GLUCOSE SERPL-MCNC: 231 MG/DL (ref 65–140)

## 2019-02-16 PROCEDURE — 99232 SBSQ HOSP IP/OBS MODERATE 35: CPT | Performed by: SURGERY

## 2019-02-16 PROCEDURE — 71046 X-RAY EXAM CHEST 2 VIEWS: CPT

## 2019-02-16 PROCEDURE — 99233 SBSQ HOSP IP/OBS HIGH 50: CPT | Performed by: GENERAL PRACTICE

## 2019-02-16 PROCEDURE — 82948 REAGENT STRIP/BLOOD GLUCOSE: CPT

## 2019-02-16 PROCEDURE — 99232 SBSQ HOSP IP/OBS MODERATE 35: CPT | Performed by: THORACIC SURGERY (CARDIOTHORACIC VASCULAR SURGERY)

## 2019-02-16 RX ADMIN — ENOXAPARIN SODIUM 40 MG: 40 INJECTION SUBCUTANEOUS at 09:16

## 2019-02-16 RX ADMIN — Medication 40 MG: at 12:28

## 2019-02-16 RX ADMIN — ASPIRIN 81 MG 81 MG: 81 TABLET ORAL at 12:27

## 2019-02-16 NOTE — ASSESSMENT & PLAN NOTE
Lab Results   Component Value Date    HGBA1C 7 1 (H) 01/09/2019       Recent Labs     02/15/19  0030 02/15/19  0607 02/15/19  1222 02/15/19  1801   POCGLU 184* 191* 134 114       Blood Sugar Average: Last 72 hrs:  (P) 142 8     ISS for now    May need extra insulin while receiving nightly TF

## 2019-02-16 NOTE — PROGRESS NOTES
Progress Note - Surgical Oncology   Eliseo Sanches 82 y o  male MRN: [id number]  Unit/Bed#: [facility] 420-01 Encounter: [id number]  02/16/19  11:23 AM      Subjective/Objective   Chief Complaint   Patient presents with    Evaluation of Abnormal Diagnostic Test     [place] z[place] reports pt had an MRI and was told he had a pneumothorax  Subjective:  No complaints    Objective:  Chest tube in place    Blood pressure 138/71, pulse 67, temperature 97 7 °F (36 5 °C), temperature source Oral, resp  rate 18, height 5' 7" (1 702 m), weight 48 4 kg (106 lb 11 2 oz), SpO2 98 %  ,Body mass index is 16 71 kg/m²  Intake/Output Summary (Last 24 hours) at 2/16/2019 1123  Last data filed at 2/16/2019 0900  Gross per 24 hour   Intake 1537 ml   Output 655 ml   Net 882 ml       Invasive Devices     Peripheral Intravenous Line            Peripheral IV 02/14/19 Right Antecubital 1 day          Drain            Gastrostomy/Enterostomy Jejunostomy 14 Fr  LLQ 31 days    Chest Tube Left  8 Fr  less than 1 day                Physical Exam:   Head and neck: is normocephalic  Neck is supple without adenopathy  Sclerae are anicteric  Mucous membranes are moist   Abdomen: Soft, nontender, nondistended, and without masses   J-tube in place  Extremities: Without cyanosis, clubbing, or edema, symmetric  Neuro: Grossly nonfocal  Lymphatics: No cervical, axillary, or inguinal adenopathy bilaterally  Skin is warm and anicteric  Psych: Patient is pleasant and talkative              Lab Results:  Lab Results   Component Value Date    WBC 7 89 02/15/2019    HGB 10 0 (L) 02/15/2019    HCT 32 0 (L) 02/15/2019     (H) 02/15/2019     02/15/2019     Lab Results   Component Value Date    GLUCOSE 202 (H) 01/10/2019    CALCIUM 8 1 (L) 02/15/2019    K 4 3 02/15/2019    CO2 29 02/15/2019     02/15/2019    BUN 17 02/15/2019    CREATININE 0 47 (L) 02/15/2019     No results found for: AFP  Lab Results   Component Value Date    CALCIUM 8 1 (L) 02/15/2019    PHOS 3 8 02/15/2019     Lab Results   Component Value Date    CEA 2 1 09/19/2018     Amylase in the pleural fluid was 11      Assessment:  82-year-old male who admitted for pneumothorax and distal pancreatic mass    Plan:  Continue oxygen and chest tube management as per thoracic surgery  Pancreatic pseudocyst has spontaneously decompressed into his stomach  Abdominal exam is benign  Would administer oral diet as tolerated  Cycle tube feeds  Calorie counts  No need for surgical oncology intervention at this time  I will sign off  Please call if there are any questions or concerns  Follow-up in the office as an outpatient      Chilo Raines MD  11:23 AM

## 2019-02-16 NOTE — SPEECH THERAPY NOTE
Speech Language/Pathology  Consult received and chart reviewed  Patient known to department with h/o dysphagia and recommendations for NPO with Jtube for all nutrition and therapeutic trials with SLP only at last admission (1/17/19)  Patient admitted to the hospital 2/14 with pneumothorax and is s/p chest tube  Patient referred for swallowing evaluation for possible pleasure feeds  Attempted to see patient for swallowing evaluation however, patient reported coming off of tube feedings recently this AM and preference for holding off on assessment  Patient and wife reported patient was receiving Jtube feedings for nutrition and receiving pleasure feeds with SLP only at nursing home  Recommend patient continue with SLP services upon return to SNF  Recommend patient be provided with frequent oral care  Will follow-up as able/as appropriate

## 2019-02-16 NOTE — PROGRESS NOTES
Progress Note - Reva Johnston 1935, 82 y o  male MRN: [id number]    Unit/Bed#: [facility] 420-01 Encounter: [id number]    Primary Care Provider: Arjun Weeks DO   Date and time admitted to hospital: 2/14/2019  3:46 PM        * Pneumothorax  Assessment & Plan  S/p Chest tube placement by IR 2/15  Mgmt per thoracics    Oropharyngeal dysphagia  Assessment & Plan  S/p J tube  TFs  Speech eval to see if pt can have pleasure feeds    Transaminitis  Assessment & Plan  CTAP shows stable hepatic cysts  Check hepatitis panel  Trend  Unsure of etiology - if not improved tomorrow, will consult GI    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HCC)  Assessment & Plan  Noted in MRI brain  ASA    Abnormal SPEP  Assessment & Plan  Outpt f/u with med onc    Moderate protein-calorie malnutrition   Assessment & Plan  Malnutrition Findings:   Malnutrition type: Acute illness(r/t disease/condition, as evidenced by 8# (7%) weight loss in <1 month, moderate fat and muscle wasting noted at clavicle and orbitals  Treatment: rec increasing TF to provide adequate nutrition  See recs in consult note )  Degree of Malnutrition: Malnutrition of moderate degree    BMI Findings:  BMI Classifications: Underweight < 18 5     Body mass index is 16 71 kg/m²  Chronic pancreatitis [facility])  Assessment & Plan  surg onc appreciated  Outpt f/u    Diabetes mellitus type 2  Assessment & Plan  Lab Results   Component Value Date    HGBA1C 7 1 (H) 01/09/2019       Recent Labs     02/16/19  0008 02/16/19  0551 02/16/19  1127 02/16/19  1806   POCGLU 231* 217* 152* 113       Blood Sugar Average: Last 72 hrs:  (P) 153 5     ISS for now  May need extra insulin while receiving nightly TF    VTE Pharmacologic Prophylaxis:   Pharmacologic: Enoxaparin (Lovenox)  Mechanical VTE Prophylaxis in Place: Yes    Patient Centered Rounds: I have performed bedside rounds with nursing staff today      Discussions with Specialists or Other Care Team Provider: no    Education and Discussions with Family / Patient: pt and wife    Time Spent for Care: 30 minutes  More than 50% of total time spent on counseling and coordination of care as described above  Current Length of Stay: 2 day(s)    Current Patient Status: Inpatient   Certification Statement: The patient will continue to require additional inpatient hospital stay due to need for chest tube    Discharge Plan: when CT removed and LFTs stable    Code Status: Level 1 - Full Code      Subjective:   No acute complaints    Objective:     Vitals:   Temp (24hrs), Av 7 °F (36 5 °C), Min:97 6 °F (36 4 °C), Max:98 °F (36 7 °C)    Temp:  [97 6 °F (36 4 °C)-98 °F (36 7 °C)] 97 6 °F (36 4 °C)  HR:  [63-68] 68  Resp:  [18] 18  BP: (127-160)/(69-74) 127/70  SpO2:  [94 %-98 %] 94 %  Body mass index is 16 71 kg/m²  Input and Output Summary (last 24 hours): Intake/Output Summary (Last 24 hours) at 2019 1904  Last data filed at 2019 1400  Gross per 24 hour   Intake 1891 ml   Output 795 ml   Net 1096 ml       Physical Exam:     Physical Exam   Constitutional: He is oriented to person, place, and time  No distress  HENT:   Head: Normocephalic and atraumatic  Eyes: Conjunctivae and EOM are normal    Neck: Normal range of motion  Neck supple  Cardiovascular: Normal rate and regular rhythm  Pulmonary/Chest: Effort normal and breath sounds normal  He has no wheezes  He has no rales  Abdominal: Soft  Bowel sounds are normal  He exhibits no distension  There is no tenderness  Musculoskeletal: Normal range of motion  He exhibits no edema  Neurological: He is alert and oriented to person, place, and time  Skin: Skin is warm and dry  He is not diaphoretic         Additional Data:     Labs:    Results from last 7 days   Lab Units 02/15/19  0529   WBC Thousand/uL 7 89   HEMOGLOBIN g/dL 10 0*   HEMATOCRIT % 32 0*   PLATELETS Thousands/uL 229   NEUTROS PCT % 67   LYMPHS PCT % 14   MONOS PCT % 9   EOS PCT % 9*     Results from last 7 days   Lab Units 02/15/19  0529   POTASSIUM mmol/L 4 3   CHLORIDE mmol/L 104   CO2 mmol/L 29   BUN mg/dL 17   CREATININE mg/dL 0 47*   CALCIUM mg/dL 8 1*   ALK PHOS U/L 94   ALT U/L 151*   AST U/L 67*     Results from last 7 days   Lab Units 02/15/19  0105   INR  1 01       * I Have Reviewed All Lab Data Listed Above  * Additional Pertinent Lab Tests Reviewed: [address] Admission Reviewed      Recent Cultures (last 7 days):           Last 24 Hours Medication List:     Current Facility-Administered Medications:  acetaminophen 650 mg Per J Tube Q6H PRN Cassandria Shady, DO   aspirin 81 mg Per J Tube Daily Cassandria Shady, DO   enoxaparin 40 mg Subcutaneous Daily Cassandria Shady, DO   fentanyl citrate (PF)  Intravenous Code/Trauma/Sedation Alexis Colon MD   insulin lispro 1-5 Units Subcutaneous Q6H Shanna Glasgow,    midazolam   Code/Trauma/Sedation Alexis Colon MD   omeprazole (PRILOSEC) suspension 2 mg/mL 40 mg Per J Tube Daily Shanna Glasgow,    ondansetron 4 mg Intravenous Q6H PRN Cassandria Shady, DO   polyvinyl alcohol 1 drop Both Eyes PRN Cassandria Shady, DO        Today, Patient Was Seen By: Shanna Glasgow DO    ** Please Note: Dictation voice to text software may have been used in the creation of this document   **

## 2019-02-16 NOTE — PROGRESS NOTES
Progress Note - Thoracic Surgery   Ofelia Oor 82 y o  male MRN: [id number]  Unit/Bed#: [facility] 420-01 Encounter: [id number]    Assessment:  74-year-old male with history of chronic pancreatitis, protein calorie malnutrition, history of J-tube placement now with left pneumothorax    Plan:  Cycle tube feeds overnight per primary service via J-tube  Keep chest tube to suction  Follow-up a m  Chest x-ray  Aggressive pulmonary toilet/incentive spirometry  Encourage ambulation  Rest of care per primary service    Subjective/Objective     Chief Complaint:     Subjective:  No acute events overnight  Patient denies any pain  Denies shortness of breath      Objective:     Vitals: Blood pressure 149/69, pulse 66, temperature 97 6 °F (36 4 °C), temperature source Oral, resp  rate 18, height 5' 7" (1 702 m), weight 48 4 kg (106 lb 11 2 oz), SpO2 96 %  ,Body mass index is 16 71 kg/m²  I/O       02/14 0701 - 02/15 0700 02/15 0701 - 02/16 0700    P  O   100    NG/ 375    Feedings 520 1062    Total Intake(mL/kg) 895 (18 5) 1537 (31 8)    Urine (mL/kg/hr) 300 400 (0 3)    Emesis/NG output 0     Stool  0    Chest Tube  180    Total Output 300 580    Net +595 +957          Unmeasured Urine Occurrence  1 x    Unmeasured Stool Occurrence  4 x          Physical Exam:  Frail-appearing cachectic male in no apparent distress  Regular rate and rhythm  Nonlabored respirations on room air  Left pigtail chest tube to suction without air leak, serous drainage    Lab, Imaging and other studies: CBC with diff: No results found for: WBC, HGB, HCT, MCV, PLT, ADJUSTEDWBC, MCH, MCHC, RDW, MPV, NRBC, BMP/CMP: No results found for: SODIUM, K, CL, CO2, ANIONGAP, BUN, CREATININE, GLUCOSE, CALCIUM, AST, ALT, ALKPHOS, PROT, BILITOT, EGFR, Magnesium: No components found for: MAG, Coags: No results found for: PT, PTT, INR, Blood Culture: No results found for: BLOODCX, Urine Culture: No results found for: URINECX, Wound Culure: No results found for: WOUNDCULT  VTE Pharmacologic Prophylaxis: Enoxaparin (Lovenox)  VTE Mechanical Prophylaxis: sequential compression device

## 2019-02-16 NOTE — ASSESSMENT & PLAN NOTE
Malnutrition Findings:   Malnutrition type: Acute illness(r/t disease/condition, as evidenced by 8# (7%) weight loss in <1 month, moderate fat and muscle wasting noted at clavicle and orbitals  Treatment: rec increasing TF to provide adequate nutrition  See recs in consult note )  Degree of Malnutrition: Malnutrition of moderate degree    BMI Findings:  BMI Classifications: Underweight < 18 5     Body mass index is 16 71 kg/m²

## 2019-02-17 ENCOUNTER — APPOINTMENT (INPATIENT)
Dept: RADIOLOGY | Facility: HOSPITAL | Age: 84
DRG: 199 | End: 2019-02-17
Payer: MEDICARE

## 2019-02-17 LAB
ALBUMIN SERPL BCP-MCNC: 2.3 G/DL (ref 3.5–5)
ALP SERPL-CCNC: 94 U/L (ref 46–116)
ALT SERPL W P-5'-P-CCNC: 101 U/L (ref 12–78)
ANION GAP SERPL CALCULATED.3IONS-SCNC: 7 MMOL/L (ref 4–13)
AST SERPL W P-5'-P-CCNC: 45 U/L (ref 5–45)
BILIRUB SERPL-MCNC: 0.25 MG/DL (ref 0.2–1)
BUN SERPL-MCNC: 17 MG/DL (ref 5–25)
CALCIUM SERPL-MCNC: 8 MG/DL (ref 8.3–10.1)
CHLORIDE SERPL-SCNC: 103 MMOL/L (ref 100–108)
CO2 SERPL-SCNC: 26 MMOL/L (ref 21–32)
CREAT SERPL-MCNC: 0.44 MG/DL (ref 0.6–1.3)
GFR SERPL CREATININE-BSD FRML MDRD: 106 ML/MIN/1.73SQ M
GLUCOSE SERPL-MCNC: 108 MG/DL (ref 65–140)
GLUCOSE SERPL-MCNC: 115 MG/DL (ref 65–140)
GLUCOSE SERPL-MCNC: 121 MG/DL (ref 65–140)
GLUCOSE SERPL-MCNC: 175 MG/DL (ref 65–140)
GLUCOSE SERPL-MCNC: 207 MG/DL (ref 65–140)
GLUCOSE SERPL-MCNC: 240 MG/DL (ref 65–140)
HAV IGM SER QL: NORMAL
HBV CORE IGM SER QL: NORMAL
HBV SURFACE AG SER QL: NORMAL
HCV AB SER QL: NORMAL
POTASSIUM SERPL-SCNC: 4.5 MMOL/L (ref 3.5–5.3)
PROT SERPL-MCNC: 6.3 G/DL (ref 6.4–8.2)
SODIUM SERPL-SCNC: 136 MMOL/L (ref 136–145)

## 2019-02-17 PROCEDURE — 71046 X-RAY EXAM CHEST 2 VIEWS: CPT

## 2019-02-17 PROCEDURE — 80053 COMPREHEN METABOLIC PANEL: CPT | Performed by: GENERAL PRACTICE

## 2019-02-17 PROCEDURE — 99231 SBSQ HOSP IP/OBS SF/LOW 25: CPT | Performed by: THORACIC SURGERY (CARDIOTHORACIC VASCULAR SURGERY)

## 2019-02-17 PROCEDURE — 82948 REAGENT STRIP/BLOOD GLUCOSE: CPT

## 2019-02-17 PROCEDURE — 80074 ACUTE HEPATITIS PANEL: CPT | Performed by: GENERAL PRACTICE

## 2019-02-17 PROCEDURE — 99233 SBSQ HOSP IP/OBS HIGH 50: CPT | Performed by: GENERAL PRACTICE

## 2019-02-17 RX ORDER — INSULIN GLARGINE 100 [IU]/ML
8 INJECTION, SOLUTION SUBCUTANEOUS
Status: DISCONTINUED | OUTPATIENT
Start: 2019-02-17 | End: 2019-02-18 | Stop reason: HOSPADM

## 2019-02-17 RX ADMIN — INSULIN GLARGINE 8 UNITS: 100 INJECTION, SOLUTION SUBCUTANEOUS at 21:21

## 2019-02-17 RX ADMIN — ASPIRIN 81 MG 81 MG: 81 TABLET ORAL at 08:54

## 2019-02-17 RX ADMIN — ENOXAPARIN SODIUM 40 MG: 40 INJECTION SUBCUTANEOUS at 08:54

## 2019-02-17 RX ADMIN — Medication 40 MG: at 08:54

## 2019-02-17 NOTE — PROGRESS NOTES
Progress Note - General Surgery   Tracy Romeo 82 y o  male MRN: [id number]  Unit/Bed#: [facility] 420-01 Encounter: [id number]    Assessment:  44-year-old male who admitted for pneumothorax and distal pancreatic mass  His chest tube was switched from [facility] drainage to [place] yesterday    Plan:  -Potentially move CT to [facility] today  -f/u CXR if moved to [address] [place] [address]  -OOB AAT  -O2 as needed, encourage IS    Subjective/Objective   Subjective: Feels ok  Tired this morning  No SOB  Denies fevers, chills, nausea, vomiting    Objective:   Blood pressure 128/72, pulse 66, temperature 97 7 °F (36 5 °C), temperature source Oral, resp  rate 18, height 5' 7" (1 702 m), weight 48 4 kg (106 lb 11 2 oz), SpO2 95 %  ,Body mass index is 16 71 kg/m²  Intake/Output Summary (Last 24 hours) at 2/17/2019 0046  Last data filed at 2/16/2019 2301  Gross per 24 hour   Intake 1766 ml   Output 705 ml   Net 1061 ml       Invasive Devices     Peripheral Intravenous Line            Peripheral IV 02/14/19 Right Antecubital 2 days          Drain            Gastrostomy/Enterostomy Jejunostomy 14 Fr  LLQ 32 days    Chest Tube Left  8 Fr  1 day                Physical Exam:   General: No acute distress  HEENT: Normocephalic, atraumatic   Neck: Normal ROM  No tracheal deviation  Cardio: Normal rate, regular rhythm  Pulm: Normal respiratory effort  CT with 90 output  +AL on sxn @ 10 mL/min  Abdomen: Soft, non-tender, non-distended   Feeding tube with some loose sutures superiorly, but tube is in good position; no need for additional sutures  Extremities: TRAN, No edema  Neuro: Cranial nerves II-XII intact  Psych: Normal affect      Lab, Imaging and other studies:  CBC: No results found for: WBC, HGB, HCT, MCV, PLT, ADJUSTEDWBC, MCH, MCHC, RDW, MPV, NRBC, CMP:   Lab Results   Component Value Date    SODIUM 136 02/17/2019    K 4 5 02/17/2019     02/17/2019    CO2 26 02/17/2019    BUN 17 02/17/2019    CREATININE 0 44 (L) 02/17/2019    CALCIUM 8 0 (L) 02/17/2019 AST 45 02/17/2019     (H) 02/17/2019    ALKPHOS 94 02/17/2019    EGFR 106 02/17/2019     VTE Pharmacologic Prophylaxis: Heparin  VTE Mechanical Prophylaxis: sequential compression device

## 2019-02-17 NOTE — ASSESSMENT & PLAN NOTE
Lab Results   Component Value Date    HGBA1C 7 1 (H) 01/09/2019       Recent Labs     02/16/19  2358 02/17/19  0600 02/17/19  1158 02/17/19  1200   POCGLU 240* 207* 115 108       Blood Sugar Average: Last 72 hrs:  (P) 157 5     ISS for now    May need extra insulin while receiving nightly TF

## 2019-02-17 NOTE — ASSESSMENT & PLAN NOTE
Lab Results   Component Value Date    HGBA1C 7 1 (H) 01/09/2019       Recent Labs     02/16/19  0008 02/16/19  0551 02/16/19  1127 02/16/19  1806   POCGLU 231* 217* 152* 113       Blood Sugar Average: Last 72 hrs:  (P) 153 5     ISS for now    May need extra insulin while receiving nightly TF

## 2019-02-17 NOTE — PROGRESS NOTES
L pigtail chest tube removed in routine fashion with tip intact  Occlusive dressing applied  Patient tolerated the procedure well  We will obtain a PA/lateral CXR and if no pneumothorax, patient is cleared for discharge from a Thoracic surgery standpoint      Trinh Umaña MD

## 2019-02-17 NOTE — PLAN OF CARE
Problem: Potential for Falls  Goal: Patient will remain free of falls  Description  INTERVENTIONS:  - Assess patient frequently for physical needs  -  Identify cognitive and physical deficits and behaviors that affect risk of falls  -  [place] fall precautions as indicated by assessment   - Educate patient/family on patient safety including physical limitations  - Instruct patient to call for assistance with activity based on assessment  - Modify environment to reduce risk of injury  - Consider OT/PT consult to assist with strengthening/mobility  Outcome: Progressing     Problem: Nutrition/Hydration-ADULT  Goal: Nutrient/Hydration intake appropriate for improving, restoring or maintaining nutritional needs  Description  Monitor and assess patient's nutrition/hydration status for malnutrition (ex- brittle hair, bruises, dry skin, pale skin and conjunctiva, muscle wasting, smooth red tongue, and disorientation)  Collaborate with interdisciplinary team and initiate plan and interventions as ordered  Monitor patient's weight and dietary intake as ordered or per policy  Utilize nutrition screening tool and intervene per policy  Determine patient's food preferences and provide high-protein, high-caloric foods as appropriate       INTERVENTIONS:  - Monitor oral intake, urinary output, labs, and treatment plans  - Assess nutrition and hydration status and recommend course of action  - Evaluate amount of meals eaten  - Assist patient with eating if necessary   - Allow adequate time for meals  - Recommend/ encourage appropriate diets, oral nutritional supplements, and vitamin/mineral supplements  - Order, calculate, and assess calorie counts as needed  - Recommend, monitor, and adjust tube feedings and TPN/PPN based on assessed needs  - Assess need for intravenous fluids  - Provide specific nutrition/hydration education as appropriate  - Include patient/family/caregiver in decisions related to nutrition  Outcome: Progressing     Problem: Prexisting or High Potential for Compromised Skin Integrity  Goal: Skin integrity is maintained or improved  Description  INTERVENTIONS:  - Identify patients at risk for skin breakdown  - Assess and monitor skin integrity  - Assess and monitor nutrition and hydration status  - Monitor labs (i e  albumin)  - Assess for incontinence   - Turn and reposition patient  - Assist with mobility/ambulation  - Relieve pressure over bony prominences  - Avoid friction and shearing  - Provide appropriate hygiene as needed including keeping skin clean and dry  - Evaluate need for skin moisturizer/barrier cream  - Collaborate with interdisciplinary team (i e  Nutrition, Rehabilitation, etc )   - Patient/family teaching  Outcome: Progressing     Problem: PAIN - ADULT  Goal: Verbalizes/displays adequate comfort level or baseline comfort level  Description  Interventions:  - Encourage patient to monitor pain and request assistance  - Assess pain using appropriate pain scale  - Administer analgesics based on type and severity of pain and evaluate response  - Implement non-pharmacological measures as appropriate and evaluate response  - Consider cultural and social influences on pain and pain management  - Notify physician/advanced practitioner if interventions unsuccessful or patient reports new pain  Outcome: Progressing     Problem: SAFETY ADULT  Goal: Maintain or return to baseline ADL function  Description  INTERVENTIONS:  -  Assess patient's ability to carry out ADLs; assess patient's baseline for ADL function and identify physical deficits which impact ability to perform ADLs (bathing, care of mouth/teeth, toileting, grooming, dressing, etc )  - Assess/evaluate cause of self-care deficits   - Assess range of motion  - Assess patient's mobility; develop plan if impaired  - Assess patient's need for assistive devices and provide as appropriate  - Encourage maximum independence but intervene and supervise when necessary  ¯ Involve family in performance of ADLs  ¯ Assess for home care needs following discharge   ¯ Request OT consult to assist with ADL evaluation and planning for discharge  ¯ Provide patient education as appropriate  Outcome: Progressing  Goal: Maintain or return mobility status to optimal level  Description  INTERVENTIONS:  - Assess patient's baseline mobility status (ambulation, transfers, stairs, etc )    - Identify cognitive and physical deficits and behaviors that affect mobility  - Identify mobility aids required to assist with transfers and/or ambulation (gait belt, sit-to-stand, lift, walker, cane, etc )  - [place] fall precautions as indicated by assessment  - Record patient progress and toleration of activity level on Mobility SBAR; progress patient to next Phase/Stage  - Instruct patient to call for assistance with activity based on assessment  - Request Rehabilitation consult to assist with strengthening/weightbearing, etc   Outcome: Progressing     Problem: DISCHARGE PLANNING  Goal: Discharge to home or other facility with appropriate resources  Description  INTERVENTIONS:  - Identify barriers to discharge w/patient and caregiver  - Arrange for needed discharge resources and transportation as appropriate  - Identify discharge learning needs (meds, wound care, etc )  - Arrange for interpretive services to assist at discharge as needed  - Refer to Case Management Department for coordinating discharge planning if the patient needs post-hospital services based on physician/advanced practitioner order or complex needs related to functional status, cognitive ability, or social support system  Outcome: Progressing     Problem: Knowledge Deficit  Goal: Patient/family/caregiver demonstrates understanding of disease process, treatment plan, medications, and discharge instructions  Description  Complete learning assessment and assess knowledge base    Interventions:  - Provide teaching at level of understanding  - Provide teaching via preferred learning methods  Outcome: Progressing     Problem: DISCHARGE PLANNING - CARE MANAGEMENT  Goal: Discharge to post-acute care or home with appropriate resources  Description  INTERVENTIONS:  - Conduct assessment to determine patient/family and health care team treatment goals, and need for post-acute services based on payer coverage, community resources, and patient preferences, and barriers to discharge  - Address psychosocial, clinical, and financial barriers to discharge as identified in assessment in conjunction with the patient/family and health care team  - Arrange appropriate level of post-acute services according to patient's   needs and preference and payer coverage in collaboration with the physician and health care team  - Communicate with and update the patient/family, physician, and health care team regarding progress on the discharge plan  - Arrange appropriate transportation to post-acute venues   Outcome: Progressing

## 2019-02-17 NOTE — ASSESSMENT & PLAN NOTE
CTAP shows stable hepatic cysts  Check hepatitis panel  Trend  Unsure of etiology - if not improved tomorrow, will consult GI

## 2019-02-17 NOTE — PROGRESS NOTES
Progress Note - Beckie Living 1935, 82 y o  male MRN: [id number]    Unit/Bed#: [facility] 420-01 Encounter: [id number]    Primary Care Provider: Hector Burton DO   Date and time admitted to hospital: 2/14/2019  3:46 PM        * Pneumothorax  Assessment & Plan  S/p Chest tube placement by IR 2/15  Mgmt per thoracics    Oropharyngeal dysphagia  Assessment & Plan  S/p J tube  TFs  Speech eval to see if pt can have pleasure feeds    Transaminitis  Assessment & Plan  CTAP shows stable hepatic cysts  hepatitis panel WNL  Unsure of etiology - trending down    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HCC)  Assessment & Plan  Noted in MRI brain  ASA    Abnormal SPEP  Assessment & Plan  Outpt f/u with med onc    Moderate protein-calorie malnutrition   Assessment & Plan  Malnutrition Findings:   Malnutrition type: Acute illness(r/t disease/condition, as evidenced by 8# (7%) weight loss in <1 month, moderate fat and muscle wasting noted at clavicle and orbitals  Treatment: rec increasing TF to provide adequate nutrition  See recs in consult note )  Degree of Malnutrition: Malnutrition of moderate degree    BMI Findings:  BMI Classifications: Underweight < 18 5     Body mass index is 16 71 kg/m²  Chronic pancreatitis [facility])  Assessment & Plan  surg onc appreciated  Outpt f/u    Diabetes mellitus type 2  Assessment & Plan  Lab Results   Component Value Date    HGBA1C 7 1 (H) 01/09/2019       Recent Labs     02/16/19  2358 02/17/19  0600 02/17/19  1158 02/17/19  1200   POCGLU 240* 207* 115 108       Blood Sugar Average: Last 72 hrs:  (P) 157 5     ISS for now  May need extra insulin while receiving nightly TF    VTE Pharmacologic Prophylaxis:   Pharmacologic: Enoxaparin (Lovenox)  Mechanical VTE Prophylaxis in Place: Yes    Patient Centered Rounds: I have performed bedside rounds with nursing staff today      Discussions with Specialists or Other Care Team Provider: thoracics    Education and Discussions with Family / Patient: pt and family    Time Spent for Care: 30 minutes  More than 50% of total time spent on counseling and coordination of care as described above  Current Length of Stay: 3 day(s)    Current Patient Status: Inpatient   Certification Statement: The patient will continue to require additional inpatient hospital stay due to need to make sure pt ok s/p CT removal    Discharge Plan: tomorrow    Code Status: Level 1 - Full Code      Subjective:   No acute complaints    Objective:     Vitals:   Temp (24hrs), Av 9 °F (36 6 °C), Min:97 7 °F (36 5 °C), Max:98 1 °F (36 7 °C)    Temp:  [97 7 °F (36 5 °C)-98 1 °F (36 7 °C)] 98 1 °F (36 7 °C)  HR:  [66-73] 73  Resp:  [18] 18  BP: (128-141)/(72-78) 141/78  SpO2:  [95 %-96 %] 96 %  Body mass index is 16 71 kg/m²  Input and Output Summary (last 24 hours): Intake/Output Summary (Last 24 hours) at 2019 1623  Last data filed at 2019 0749  Gross per 24 hour   Intake 1430 ml   Output 600 ml   Net 830 ml       Physical Exam:     Physical Exam   Constitutional: He is oriented to person, place, and time  No distress  HENT:   Head: Normocephalic and atraumatic  Eyes: Conjunctivae and EOM are normal    Neck: Normal range of motion  Neck supple  Cardiovascular: Normal rate and regular rhythm  Pulmonary/Chest: Effort normal and breath sounds normal  He has no wheezes  He has no rales  Abdominal: Soft  Bowel sounds are normal  He exhibits no distension  There is no tenderness  Musculoskeletal: Normal range of motion  He exhibits no edema  Neurological: He is alert and oriented to person, place, and time  Skin: Skin is warm and dry  He is not diaphoretic         Additional Data:     Labs:     Results from last 7 days   Lab Units 02/15/19  0529   WBC Thousand/uL 7 89   HEMOGLOBIN g/dL 10 0*   HEMATOCRIT % 32 0*   PLATELETS Thousands/uL 229   NEUTROS PCT % 67   LYMPHS PCT % 14   MONOS PCT % 9   EOS PCT % 9*     Results from last 7 days   Lab Units 19  0452 POTASSIUM mmol/L 4 5   CHLORIDE mmol/L 103   CO2 mmol/L 26   BUN mg/dL 17   CREATININE mg/dL 0 44*   CALCIUM mg/dL 8 0*   ALK PHOS U/L 94   ALT U/L 101*   AST U/L 45     Results from last 7 days   Lab Units 02/15/19  0105   INR  1 01       * I Have Reviewed All Lab Data Listed Above  * Additional Pertinent Lab Tests Reviewed: Sigrid 66 Admission Reviewed        Recent Cultures (last 7 days):           Last 24 Hours Medication List:     Current Facility-Administered Medications:  acetaminophen 650 mg Per J Tube Q6H PRN Doristine Area, DO   aspirin 81 mg Per J Tube Daily Doristine Area, DO   enoxaparin 40 mg Subcutaneous Daily Doristine Area, DO   fentanyl citrate (PF)  Intravenous Code/Trauma/Sedation Med Diego Jimenez MD   insulin lispro 1-5 Units Subcutaneous Q6H Radha Mendieta DO   midazolam   Code/Trauma/Sedation Med Diego Jimenez MD   omeprazole (PRILOSEC) suspension 2 mg/mL 40 mg Per J Tube Daily Radha Mendieta DO   ondansetron 4 mg Intravenous Q6H PRN Doristine Area, DO   polyvinyl alcohol 1 drop Both Eyes PRN Doristine Area, DO        Today, Patient Was Seen By: Radha Mendieta DO    ** Please Note: Dictation voice to text software may have been used in the creation of this document   **

## 2019-02-17 NOTE — ASSESSMENT & PLAN NOTE
S/p Chest tube placement by IR 2/15, removed today  F/u CXR after CT removal on my read shows PTX resolved    Await rad read  Mgmt per thoracics

## 2019-02-18 VITALS
TEMPERATURE: 97.3 F | BODY MASS INDEX: 16.75 KG/M2 | SYSTOLIC BLOOD PRESSURE: 128 MMHG | RESPIRATION RATE: 18 BRPM | OXYGEN SATURATION: 96 % | DIASTOLIC BLOOD PRESSURE: 59 MMHG | HEART RATE: 68 BPM | WEIGHT: 106.7 LBS | HEIGHT: 67 IN

## 2019-02-18 LAB
ALBUMIN SERPL BCP-MCNC: 2.2 G/DL (ref 3.5–5)
ALP SERPL-CCNC: 91 U/L (ref 46–116)
ALT SERPL W P-5'-P-CCNC: 80 U/L (ref 12–78)
ANION GAP SERPL CALCULATED.3IONS-SCNC: 5 MMOL/L (ref 4–13)
AST SERPL W P-5'-P-CCNC: 31 U/L (ref 5–45)
BILIRUB SERPL-MCNC: 0.26 MG/DL (ref 0.2–1)
BUN SERPL-MCNC: 20 MG/DL (ref 5–25)
CALCIUM SERPL-MCNC: 7.9 MG/DL (ref 8.3–10.1)
CHLORIDE SERPL-SCNC: 105 MMOL/L (ref 100–108)
CO2 SERPL-SCNC: 27 MMOL/L (ref 21–32)
CREAT SERPL-MCNC: 0.49 MG/DL (ref 0.6–1.3)
GFR SERPL CREATININE-BSD FRML MDRD: 102 ML/MIN/1.73SQ M
GLUCOSE SERPL-MCNC: 108 MG/DL (ref 65–140)
GLUCOSE SERPL-MCNC: 162 MG/DL (ref 65–140)
GLUCOSE SERPL-MCNC: 193 MG/DL (ref 65–140)
GLUCOSE SERPL-MCNC: 222 MG/DL (ref 65–140)
POTASSIUM SERPL-SCNC: 4.2 MMOL/L (ref 3.5–5.3)
PROT SERPL-MCNC: 6.2 G/DL (ref 6.4–8.2)
SODIUM SERPL-SCNC: 137 MMOL/L (ref 136–145)

## 2019-02-18 PROCEDURE — G8987 SELF CARE CURRENT STATUS: HCPCS

## 2019-02-18 PROCEDURE — 82948 REAGENT STRIP/BLOOD GLUCOSE: CPT

## 2019-02-18 PROCEDURE — G8988 SELF CARE GOAL STATUS: HCPCS

## 2019-02-18 PROCEDURE — 99239 HOSP IP/OBS DSCHRG MGMT >30: CPT | Performed by: GENERAL PRACTICE

## 2019-02-18 PROCEDURE — 92610 EVALUATE SWALLOWING FUNCTION: CPT

## 2019-02-18 PROCEDURE — 97167 OT EVAL HIGH COMPLEX 60 MIN: CPT

## 2019-02-18 PROCEDURE — G8996 SWALLOW CURRENT STATUS: HCPCS

## 2019-02-18 PROCEDURE — G8998 SWALLOW D/C STATUS: HCPCS

## 2019-02-18 PROCEDURE — 80053 COMPREHEN METABOLIC PANEL: CPT | Performed by: GENERAL PRACTICE

## 2019-02-18 PROCEDURE — G8979 MOBILITY GOAL STATUS: HCPCS

## 2019-02-18 PROCEDURE — G8997 SWALLOW GOAL STATUS: HCPCS

## 2019-02-18 PROCEDURE — 97163 PT EVAL HIGH COMPLEX 45 MIN: CPT

## 2019-02-18 PROCEDURE — G8978 MOBILITY CURRENT STATUS: HCPCS

## 2019-02-18 RX ORDER — LACTOSE-REDUCED FOOD/FIBER
LIQUID (ML) ORAL
Qty: 1000 ML | Refills: 0 | Status: SHIPPED | OUTPATIENT
Start: 2019-02-18 | End: 2019-03-31

## 2019-02-18 RX ADMIN — Medication 40 MG: at 08:10

## 2019-02-18 RX ADMIN — ENOXAPARIN SODIUM 40 MG: 40 INJECTION SUBCUTANEOUS at 08:09

## 2019-02-18 RX ADMIN — ASPIRIN 81 MG 81 MG: 81 TABLET ORAL at 08:09

## 2019-02-18 NOTE — ASSESSMENT & PLAN NOTE
Lab Results   Component Value Date    HGBA1C 7 1 (H) 01/09/2019       Recent Labs     02/17/19  1756 02/18/19  0005 02/18/19  0614 02/18/19  1209   POCGLU 121 222* 162* 108       Blood Sugar Average: Last 72 hrs:  (P) 564 [id number]     ISS for now      Restarted on long acting insulin last night

## 2019-02-18 NOTE — DISCHARGE SUMMARY
Discharge- Isabell Mcgarry 1935, 82 y o  male MRN: [id number]    Unit/Bed#: [facility] 420-01 Encounter: [id number]    Primary Care Provider: Abby Stuart DO   Date and time admitted to hospital: 2/14/2019  3:46 PM        * Pneumothorax  Assessment & Plan  S/p Chest tube placement by IR 2/15, removed today  F/u CXR after CT removal showed small PTX, which per thoracics is ok  PT stable for d/c  Mgmt per thoracics    Oropharyngeal dysphagia  Assessment & Plan  S/p J tube  TFs      Transaminitis  Assessment & Plan  CTAP shows stable hepatic cysts  hepatitis panel WNL  Unsure of etiology - but trending down    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HCC)  Assessment & Plan  Noted in MRI brain on previous admission  ASA    Abnormal SPEP  Assessment & Plan  Outpt f/u with med onc    Moderate protein-calorie malnutrition   Assessment & Plan  Malnutrition Findings:   Malnutrition type: Acute illness(r/t disease/condition, as evidenced by 8# (7%) weight loss in <1 month, moderate fat and muscle wasting noted at clavicle and orbitals  Treatment: rec increasing TF to provide adequate nutrition  See recs in consult note )  Degree of Malnutrition: Malnutrition of moderate degree    BMI Findings:  BMI Classifications: Underweight < 18 5     Body mass index is 16 71 kg/m²  Diabetes mellitus type 2  Assessment & Plan  Lab Results   Component Value Date    HGBA1C 7 1 (H) 01/09/2019       Recent Labs     02/17/19  1756 02/18/19  0005 02/18/19  0614 02/18/19  1209   POCGLU 121 222* 162* 108       Blood Sugar Average: Last 72 hrs:  (P) 988 [id number]     ISS for now      Restarted on long acting insulin last night      Discharging Physician / Practitioner: Antonette Zamora DO  PCP: Abby Stuart DO  Admission Date:   Admission Orders (From admission, onward)    Ordered        02/14/19 1710  Inpatient Admission  Once     Order ID Start Status   [id number] 02/14/19 1708 Completed              Discharge Date: 02/18/19    Disposition: Short Term Rehab or SNF at [place] [address] (see below)    For Discharges to [facility] SNF:   · [facility] - no physician available on [address]    Reason for Admission: PTX    Discharge Diagnoses:     Please see assessment and plan section above for further details regarding discharge diagnoses  Resolved Problems  Date Reviewed: 2/18/2019    None          Consultations During Hospital Stay:  · thoracics  · surg onc  · nutrition    Procedures Performed:   · CT placed     Medication Adjustments and Discharge Medications:  · Summary of Medication Adjustments made as a result of this hospitalization: none  · Medication Dosing Tapers - Please refer to Discharge Medication List for details on any medication dosing tapers (if applicable to patient)  · Medications being temporarily held (include recommended restart time): n/a  · Discharge Medication List: See after visit summary for reconciled discharge medications  Wound Care Recommendations:  When applicable, please see wound care section of After Visit Summary  Diet Recommendations at Discharge:  Diet -        Diet Orders   (From admission, onward)            Start     Ordered    02/15/19 1647  Diet Enteral/Parenteral; Tube Feeding No Oral Diet; Jevity 1 2 Randy; Continuous; 118; 125; Water; Every 6 hours  Diet effective now     Comments:  From 8:00 p m  To 8:00 a m  Question Answer Comment   Diet Type Enteral/Parenteral    Enteral/Parenteral Tube Feeding No Oral Diet    Tube Feeding Formula: Jevity 1 2 Randy    Bolus/Cyclic/Continuous Continuous    Tube Feeding Goal Rate (mL/hr): 118    Tube Feeding water flush (mL): 125    Water Flush type: Water    Water flush frequency: Every 6 hours    RD to adjust diet per protocol?  Yes        02/15/19 1647          Instructions for any Catheters / Lines Present at Discharge (including removal date, if applicable): keep Jtube in    Significant Findings / Test Results:   · See above    Incidental Findings:   · none     Test Results Pending at Discharge (will require follow up):   · none     Outpatient Tests Requested:  · CMP and CXR in about 3 days    Complications:  none    Hospital Course:     Vickie Thomas is a 82 y o  male patient who originally presented to the hospital on 2/14/2019 due to incidental finding of PTX  CT placed and at d/c, only small PTX  Condition at Discharge: stable     Discharge Day Visit / Exam:     Subjective:  No acute complaints  Vitals: Blood Pressure: 128/59 (02/18/19 0745)  Pulse: 68 (02/18/19 0745)  Temperature: (!) 97 3 °F (36 3 °C) (02/18/19 0745)  Temp Source: Oral (02/18/19 0745)  Respirations: 18 (02/18/19 0745)  Height: 5' 7" (170 2 cm) (02/14/19 1826)  Weight - Scale: 48 4 kg (106 lb 11 2 oz) (02/14/19 1826)  SpO2: 96 % (02/18/19 0745)  Exam:   Physical Exam   Constitutional: He is oriented to person, place, and time  No distress  HENT:   Head: Normocephalic and atraumatic  Eyes: Conjunctivae and EOM are normal    Neck: Normal range of motion  Neck supple  Cardiovascular: Normal rate and regular rhythm  Pulmonary/Chest: Effort normal and breath sounds normal  He has no wheezes  He has no rales  Abdominal: Soft  Bowel sounds are normal  He exhibits no distension  There is no tenderness  Musculoskeletal: Normal range of motion  He exhibits no edema  Neurological: He is alert and oriented to person, place, and time  Skin: Skin is warm and dry  He is not diaphoretic  Discussion with Family: yes    Goals of Care Discussions:  · Code Status at Discharge: Level 1 - Full Code  · Were there any Goals of Care Discussions during Hospitalization?: Yes  · Results of any General Goals of Care Discussions: full code   · POLST Completed: No   · If POLST Completed, Summary of POLST Agreement Provided Here: n/a   · OK to Rehospitalize if Needed?  Yes    Discharge instructions/Information to patient and family:   See after visit summary section titled Discharge Instructions for information provided to patient and family  Planned Readmission: no      Discharge Statement:  I spent 35 minutes discharging the patient  This time was spent on the day of discharge  I had direct contact with the patient on the day of discharge  Greater than 50% of the total time was spent examining patient, answering all patient questions, arranging and discussing plan of care with patient as well as directly providing post-discharge instructions  Additional time then spent on discharge activities      ** Please Note: This note has been constructed using a voice recognition system **

## 2019-02-18 NOTE — PHYSICAL THERAPY NOTE
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    Esophagitis    Diabetes mellitus type 2    Weight loss    Chronic pancreatitis (HCC)    Oropharyngeal dysphagia    Moderate protein-calorie malnutrition     Abnormal SPEP    Severe protein-calorie malnutrition (HCC)    Dysphagia    Disorder of upper esophageal sphincter    Gastric ulcer    Pancreatic pseudocyst    Pneumothorax    Pancreatic fistula    [facility] [address] [facility]    Failure to thrive in adult    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[facility] [address] [facility]    Transaminitis        Past Medical History  Past Medical History:   Diagnosis Date    Diabetes mellitus ([facility] [address] )     Type II    Weight loss         Past Surgical History  Past Surgical History:   Procedure Laterality Date    CT GUIDED CHEST TUBE  2/15/2019    GASTROSTOMY TUBE PLACEMENT N/A 1/14/2019    Procedure: INSERTION PEG TUBE;  Surgeon: Temi Bautista MD;  [facility]; Service: Gastroenterology    GASTROSTOMY TUBE PLACEMENT N/A 1/15/2019    Procedure: INSERTION JEJUNOSTOMY TUBE OPEN;  Surgeon: Sedrick Aden MD;  [facility];  Service: Surgical Oncology    LAPAROTOMY N/A 11/19/2018    Procedure: LAPAROTOMY EXPLORATORY;  Surgeon: Sedrick Aden MD;  [facility];  Service: Surgical Oncology    ME EDG US EXAM SURGICAL ALTER STOM DUODENUM/JEJUNUM N/A 9/21/2018    Procedure: LINEAR ENDOSCOPIC U/S;  Surgeon: Aleksey Green MD;  [facility];   Service: Gastroenterology    ME LAP,DIAGNOSTIC ABDOMEN N/A 11/19/2018    Procedure: pancreatic biopsy;  Surgeon: Sedrick Aden MD;  [facility];  Service: Surgical Oncology    TONSILLECTOMY      VASCULAR SURGERY      phlebitis many years ago    WRIST GANGLION EXCISION           02/18/19 1121   Note Type   Note type Eval only   Pain Assessment Pain Assessment No/denies pain   Pain Score No Pain   Home Living   Type of [address] One level  (ramp)   Home Equipment Walker   Additional Comments prior to last admission was at home w/ spouse- pt was admitted from [facility] rehab where he was receiving PT/OT w/ reported goal of returning home (per pt and sposue)   Prior Function   Level of [place] Needs assistance with ADLs and functional mobility  (A w/ ADLs at rehab was I prior to [employer])   Vocational Retired   Comments pt admitted from [facility] where he was receiving PT and OT- ambualting w/ RW and A x1- Pt was admitted to [facility] 1/1-1/19 and was d/c to rehab following  Restrictions/Precautions   Weight Bearing Precautions Per Order No   Other Precautions Multiple lines; Fall Risk;Hard of hearing  (malnutrition; PEG)   General   Family/Caregiver Present Yes  (yes- daughter and spouse)   Cognition   Arousal/Participation Alert   Orientation Level Oriented X4   Memory Within functional limits   Following Commands Follows one step commands without difficulty   RLE Assessment   RLE Assessment WFL  (3+/5 - functional observation )   LLE Assessment   LLE Assessment WFL  (functional observation )   Coordination   Sensation WFL   Light Touch   RLE Light Touch Grossly intact   LLE Light Touch Grossly intact   Bed Mobility   Additional Comments OOB to chair on presentation    Transfers   Sit to Stand 4  Minimal assistance   Additional items Assist x 1;Verbal cues  (v/c for correct hand placement and breathing and pacing )   Stand to Sit 4  Minimal assistance   Stand pivot 4  Minimal assistance  (using RW)   Additional items Assist x 1; Increased time required;Verbal cues   Additional Comments pt and family provided education on proper breathing adn pacing of activity- proper sequencing when using RW and taking frequent rest breaks b/t all functional activities    Ambulation/Elevation   Gait pattern Excessively slow; Short stride;Decreased foot clearance   Gait Assistance 4  Minimal assist   Additional items Assist x 1;Verbal cues; Tactile cues   Assistive Device Rolling walker   Distance 40'x4 w/ RW- pt requring standing rest b/t trials for LE fatigue  Mark and verbal and tactile cues required for increasing NANCY; RW sequencing and upright posture  Spo2 w/ activity was 97% on RA (spot check )   Stair Management Assistance   (deferred 2* fatigue (LE weakness and deconditioning))   Balance   Static Sitting Fair   Dynamic Sitting Fair   Static Standing Poor +   Dynamic Standing Poor +   Ambulatory Poor +  (RW)   Endurance Deficit   Endurance Deficit Yes   Endurance Deficit Description limited standing and activity tolerance; limited gait distances w/ need for frequent therapeutic rest breaks    Activity Tolerance   Activity Tolerance Patient limited by fatigue   Medical Staff Made Aware DANA ying    Nurse Made Aware RN and CM updated    Assessment   Prognosis Fair   Problem List Decreased strength;Decreased endurance; Impaired balance;Decreased mobility; Decreased safety awareness  (cachexia; deconditioning )   Assessment Pt is 82 y o  male seen for PT evaluation s/p admit to [facility] on 2/14/2019  Pt presenting w/ from [facility] rehab w/ SOB/ HUTTON; found to have abmornal CT of chest  And w/ subsequent dx w/ pneumothorax;   Current dx/ problem list also including recent admission to hospitals 1/1/-1/19/19 w/ sepsis and B/L pneumonia; failure to thrive; weight loss; cachexia dysphagia; PEG; pancreatic fistula and as above   PT now consulted for assessment of mobility and d/c needs  Comorbidities affecting pt's physical performance at time of assessment listed above   Personal factors affecting pt at time of IE include: ramp entry to home environment, limited home support- spouse unablet o provide physical assist, advanced age, past experience, inability to perform IADLs, inability to perform ADLs, inability to ambulate household distances, significant deconditioning; limited insight into impairments; declining medical condition;   Prior to admission, [facility]  Upon evaluation, pt currently is requiring  Mark for functional transfers and Mark for ambulation w/ RW 40'x4 w/ standing rest breaks required for significant deconditioning  Pt presents functioning below indep prior baseline and currently w/ overall mobility deficits 2* to: decreased LE strength/AROM; limited flexibility;  generalized weakness/ deconditioning; decreased endurance; decreased activity tolerance; decreased coordination; impaired balance; gait deviations; decreased safety awareness; SOB/HUTTON; fatigue; impaired safety and judgement; limited insight into current deficits; PEG; poor nutritional status; multiple lines; risk fr skin breakdown 2* cachexia- Pt currently at risk for falls  (Please find additional objective findings from PT assessment regarding body systems outlined above ) Pt will continue to benefit from skilled PT interventions to address stated impairments; to maximize functional potential; for ongoing pt/ family training; and DME needs  PT is currently recommending return to rehab on d/c to maximize functional recovery prior to return to home  Pt/ family agreeable to plan and goals as stated on evaluation- spouse who was present stating she will be unable to provide functional assistance at [address] [place] [address] [id number]        Barriers to Discharge Decreased caregiver support   Barriers to Discharge Comments elderly spouse unable to provide physical assist on d/c    Goals   Patient Goals "I want to get strong enought to go home"    STG Expiration Date 02/28/19   Short Term Goal #1 In 10 days pt will complete: 1) Bed mobility skills with MI to facilitate safe return to previous living environment 2) Functional transfers with MI  to facilitate safe return to previous living environment  3) Ambulation with RW 50'x4' without LOB and stable vitals for safe ambulation home/ community distances  4) Stair training up/ down 1 step curb  for safe access to community/ MD appointments 5) Improve balance grades to Good 6) Improve LE strength grades by 1 to increase independence w/ transfers and gait  7) LE HEP independently  8) PT for ongoing pt and family education; DME needs and D/C planning to promote highest level of function in least restrictive environment  Treatment Day 0   Plan   Treatment/Interventions Functional transfer training;LE strengthening/ROM; Elevations; Therapeutic exercise; Endurance training;Patient/family training;Equipment eval/education; Bed mobility;Gait training;Spoke to nursing;Spoke to case management;OT;Family   PT Frequency   (3-5x/wk )   Recommendation   Recommendation Short-term skilled PT   Equipment Recommended Walker   PT - OK to Discharge   (yes- to rehab when medically cleared )   Additional Comments up in chair w/ all needs in reach and alarm intact post eval- family at bedside    Modified Daryl Scale   Modified [place] Scale 4   Barthel Index   Feeding 0   Bathing 0   Grooming Score 0   Dressing Score 5   Bladder Score 10   Bowels Score 5   Toilet Use Score 5   Transfers (Bed/Chair) Score 10   Mobility (Level Surface) Score 0   Stairs Score 0   Barthel Index Score 35     Papa Kern, PT

## 2019-02-18 NOTE — SOCIAL WORK
CM called [facility] and spoke with Anai Jacome, per Anai Jacome they will need PT/OT notes before they can take him back  CM notified PT, if they can see pt today

## 2019-02-18 NOTE — NURSING NOTE
Report given to nurse at [facility]  Pt to be transported by daughter to the faculty

## 2019-02-18 NOTE — DISCHARGE INSTRUCTIONS
Obtain chest X-ray and comprehensive metabolic panel (CMP) 3 days after discharge  IF LFTs elevated from d/c, please call Dr Emilie Wheat    Spontaneous Pneumothorax   WHAT YOU NEED TO KNOW:   A spontaneous pneumothorax is when part of your lung collapses  It happens if air collects in the pleural space (the space between your lungs and chest wall)  The trapped air in the pleural space prevents your lung from filling with air, and the lung collapses  A spontaneous pneumothorax can happen in one or both lungs  A primary spontaneous pneumothorax occurs in a person with no known lung problems  A secondary spontaneous pneumothorax occurs in a person who has a known lung disease or medical condition  DISCHARGE INSTRUCTIONS:   Medicines:   · Pain medicine: You may need medicine to take away or decrease pain  ¨ Learn how to take your medicine  Ask what medicine and how much you should take  Be sure you know how, when, and how often to take it  ¨ Do not wait until the pain is severe before you take your medicine  Tell caregivers if your pain does not decrease  ¨ Pain medicine can make you dizzy or sleepy  Prevent falls by calling someone when you get out of bed or if you need help  · Take your medicine as directed  Contact your healthcare provider if you think your medicine is not helping or if you have side effects  Tell him or her if you are allergic to any medicine  Keep a list of the medicines, vitamins, and herbs you take  Include the amounts, and when and why you take them  Bring the list or the pill bottles to follow-up visits  Carry your medicine list with you in case of an emergency  Follow up with your healthcare provider in 3 days: You may need to return for more chest x-rays  Write down your questions so you remember to ask them during your visits  For your safety:  Do not dive underwater or climb to high altitudes after a pneumothorax   Do not fly if you have an untreated or recurring pneumothorax  The change of pressure could cause another pneumothorax  Ask your healthcare provider when it is safe to fly, dive, or climb to high altitudes  Contact your healthcare provider if:   · You have a fever  · You hear a crackling noise or feel popping when you touch your skin  · You have questions about your condition or care  Seek care immediately or call 911 if:   · You have new or increased shortness of breath or chest pain  · Your throat or the front of your neck is pushed to one side  · You are sweating and feel like you are going to pass out  · Your fingernails, toenails, or lips begin to turn blue  · You have trouble thinking clearly  © 2017 [employer] Information is for End User's use only and may not be sold, redistributed or otherwise used for commercial purposes  All illustrations and images included in CareNotes® are the copyrighted property of A D A M [employer] Inc  or Schuyler Chang  The above information is an  only  It is not intended as medical advice for individual conditions or treatments  Talk to your doctor, nurse or pharmacist before following any medical regimen to see if it is safe and effective for you

## 2019-02-18 NOTE — OCCUPATIONAL THERAPY NOTE
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    Esophagitis    Diabetes mellitus type 2    Weight loss    Chronic pancreatitis (HCC)    Oropharyngeal dysphagia    Moderate protein-calorie malnutrition     Abnormal SPEP    Severe protein-calorie malnutrition (HCC)    Dysphagia    Disorder of upper esophageal sphincter    Gastric ulcer    Pancreatic pseudocyst    Pneumothorax    Pancreatic fistula    Cachexia ([address]    Failure to thrive in adult    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[address] )    Transaminitis       Past Medical History:   Diagnosis Date    Diabetes mellitus ([address] )     Type II    Weight loss        Past Surgical History:   Procedure Laterality Date    CT GUIDED CHEST TUBE  2/15/2019    GASTROSTOMY TUBE PLACEMENT N/A 1/14/2019    Procedure: INSERTION PEG TUBE;  Surgeon: Laron Chavez MD;  [facility]; Service: Gastroenterology    GASTROSTOMY TUBE PLACEMENT N/A 1/15/2019    Procedure: INSERTION JEJUNOSTOMY TUBE OPEN;  Surgeon: Chilo Raines MD;  [facility];  Service: Surgical Oncology    LAPAROTOMY N/A 11/19/2018    Procedure: LAPAROTOMY EXPLORATORY;  Surgeon: Chilo Raines MD;  [facility];  Service: Surgical Oncology    OR EDG US EXAM SURGICAL ALTER STOM DUODENUM/JEJUNUM N/A 9/21/2018    Procedure: LINEAR ENDOSCOPIC U/S;  Surgeon: Nae Gan MD;  [facility]; Service: Gastroenterology    OR LAP,DIAGNOSTIC ABDOMEN N/A 11/19/2018    Procedure: pancreatic biopsy;  Surgeon: Chilo Raines MD;  [facility];  Service: Surgical Oncology    TONSILLECTOMY      VASCULAR SURGERY      phlebitis many years ago    WRIST GANGLION EXCISION          02/18/19 1125   Note Type   Note type Eval/Treat   Restrictions/Precautions   Weight Bearing Precautions Per Order No   Braces or Orthoses   (None)   Other Precautions Cognitive;Multiple lines; Fall Risk;Pain; Impulsive; Chair Alarm  (PEG)   Pain Assessment   Pain Assessment No/denies pain   Pain Score No Pain   Home Living   Type of [facility] One level;Ramped entrance; Able to live on main level with bedroom/bathroom   Bathroom Shower/Tub Tub/shower unit  (& Walk-in)   [address]   (None)   [address]   Additional Comments Per family and Pt report, [facility] [place] [facility] where Pt has been residing since 1/19/19 for rehab services  During rehab stay, Pt reports he ambulates Ax1 w/ RW and requires A x1 for all self-care tasks  Family reports they would like Pt to return to [facility] facility for continued rehab stay and further family education prior to Pt returning home  Prior Function   Level of Ray Independent with ADLs and functional mobility; Needs assistance with IADLs   Lives With Spouse   Receives Help From Family   ADL Assistance Independent   IADLs Needs assistance   Falls in the last 6 months 0  (0, per Pt)   Vocational Retired   Comments Per Pt and family report, prior to [employer], Pt was functioning at (I) level for all mobility and self-care tasks  Lifestyle   Autonomy Prior to Pt's most recent rehab stay, Pt was functioning at (I) level for all ADLS and mobility  During rehab stay, Pt required Ax1 for all self-care and mobility tasks  Pt ambulates w/ RW  (-) drives  Reciprocal Relationships Pt lives w/ supportive spouse who is able to provide A as needed     Service to Others Pt is retired   Intrinsic Gratification Pt reports enjoying spending time w/ family   Psychosocial   Psychosocial (WDL) WDL   ADL   Where Assessed Chair   Eating Assistance Unable to assess  (Per EMR, Pt is NPO at night )   Grooming Assistance 5  Supervision/Setup   UB C/ [address] 4  Minimal Assistance   UB Dressing Assistance 4  Minimal Assistance   LB Dressing Assistance 3  Moderate Assistance   Toileting Assistance  3  Moderate Assistance   Bed Mobility   Supine to Sit Unable to assess   Sit to Supine Unable to assess   Additional Comments Upon OT arrival Pt sitting OOB in chair  Pt left sitting in chair w/ chair alarm activated s/p OT session  Transfers   Sit to Stand 4  Minimal assistance   Additional items Assist x 1; Increased time required;Verbal cues; Impulsive;Armrests   Stand to Sit 4  Minimal assistance   Additional items Assist x 1;Verbal cues; Increased time required; Impulsive;Armrests   Additional Comments Transfers completed w/ RW  VC required for safe/proper hand placement during transfers  Functional Mobility   Functional Mobility 4  Minimal assistance   Additional Comments Pt demonstrated ability to perform long distance household mobility into hallway w/ Min A w/ RW  B/L UE tremors noted during mobility, however Pt stated, "It's because I'm cold "   Additional items Rolling walker   Balance   Static Sitting Fair   Dynamic Sitting Fair -   Static Standing Fair -   Dynamic Standing Poor +   Ambulatory Poor +   Activity Tolerance   Activity Tolerance Patient limited by fatigue;Patient tolerated treatment well   Medical Staff Made Aware PT Edwardtoisa   Nurse Made Aware RN cleared Pt for OT eval   RUE Assessment   RUE Assessment X  (AROM WFL; Gross strength 4-/5)   LUE Assessment   LUE Assessment X  (AROM WFL: Gross strength 4-/5)   Hand Function   Gross Motor Coordination Functional   Fine Motor Coordination Impaired  (2' noted B/L UE tremors )   Sensation   Light Touch No apparent deficits   Vision-Basic Assessment   Current Vision Wears glasses all the time   Cognition   Overall Cognitive Status Impaired   Arousal/Participation Alert; Cooperative;Lethargic   Attention Attends with cues to redirect   Orientation Level Oriented to person;Oriented to place;Oriented to situation   Memory Decreased recall of recent events;Decreased short term memory   Following Commands Follows one step commands with increased time or repetition   Comments Pt presents pleasant and cooperative to participate in therapy this day  Presents w/ decreased insight into deficits, decreased safety awareness, and required VC for sequencing during functional tasks  Assessment   Limitation Decreased ADL status; Decreased UE strength;Decreased Safe judgement during ADL;Decreased cognition;Decreased endurance;Decreased high-level ADLs   Prognosis Fair   Assessment Pt is a 81 yo male seen for OT eval s/p adm to [facility] w/ sent to the hospital from nursing home due to abnormal CT scan consistent with moderate left-sided pneumothorax dx'd w/ pnuemothorax  Comorbidities include a h/o DM 2, chronic pancreatitis, dysphagia, cachexia, failure to thrive, and recent rehab stay  Pt with active OT orders and activity as tolerated orders  Pt has recent [facility] hospital stay from 1/1/19-1/19/19  Per family and Pt report, Pt admitted to [facility] from [facility] where Pt has been residing since 1/19/19 for rehab services  During rehab stay, Pt reports he ambulates Ax1 w/ RW and requires A x1 for all self-care tasks  Pt ambulates Ax1 w/ RW  Family reports they would like Pt to return to [facility] facility for continued rehab stay and further family education prior to Pt returning home  Pt is currently demonstrating the following occupational deficits: Min A UB ADLS, Min-Mod A LB ADLS, Min A functional mobility w/ RW  These deficits that are impacting pt's baseline areas of occupation are a result of the following impairments: endurance, activity tolerance, functional mobility, balance, decreased I w/ ADLS/IADLS, strength, cognitive impairments, decreased safety awareness and decreased insight into deficits   The following Occupational Performance Areas to address include: grooming, bathing/shower, toilet hygiene, dressing, health maintenance, functional mobility and clothing management  Pt scored overall 35/100 on the Barthel Index  Based on the aforementioned OT evaluation, functional performance deficits, and assessments, pt has been identified as a high complexity evaluation  Recommend STR upon D/C  Pt to continue to benefit from acute immediate OT services to address the following goals 3-5x/week to  w/in 7-10 days:    Goals   Patient Goals To return to rehab   LTG Time Frame 7-10   Long Term Goal #1 Refer to goals below   Plan   Treatment Interventions ADL retraining;Functional transfer training;UE strengthening/ROM; Endurance training;Cognitive reorientation;Patient/family training;Equipment evaluation/education; Activityengagement; Energy conservation; Compensatory technique education   Goal Expiration Date 19   OT Frequency 3-5x/wk   Recommendation   OT Discharge Recommendation Short Term Rehab   OT - OK to Discharge Yes  (When medically cleared)   Barthel Index   Feeding 0   Bathing 0   Grooming Score 0   Dressing Score 5   Bladder Score 10   Bowels Score 5   Toilet Use Score 5   Transfers (Bed/Chair) Score 10   Mobility (Level Surface) Score 0   Stairs Score 0   Barthel Index Score 35   Modified [place] Scale   Modified [place] Scale 4     GOALS    1) Pt will improve activity tolerance to G for min 30 min txment sessions for increase engagement in functional tasks    2) Pt will complete UB/LB dressing/self care w/ mod I using adaptive device and DME as needed    3) Pt will complete bathing w/ Mod I w/ use of AE and DME as needed    4) Pt will complete toileting w/ mod I w/ G hygiene/thoroughness using DME as needed    5) Pt will improve functional transfers to Mod I on/off all surfaces using DME as needed w/ G balance/safety     6) Pt will improve functional mobility during ADL/IADL/leisure tasks to Mod I using DME as needed w/ G balance/safety     7) Pt will participate in simulated IADL management task to increase independence to Mod I w/ G safety and endurance    8) Pt will be attentive 100% of the time during ongoing cognitive assessment w/ G participation to assist w/ safe d/c planning/recommendations    9) Pt will demonstrate G carryover of pt/caregiver education and training as appropriate w/o cues w/ good tolerance to increase safety during functional tasks    10) Pt will demonstrate 100% carryover of energy conservation techniques t/o functional I/ADL/leisure tasks w/o cues s/p skilled education to increase endurance during functional tasks       Joseph Guerrero, MS, OTR/L

## 2019-02-18 NOTE — PLAN OF CARE
Problem: Potential for Falls  Goal: Patient will remain free of falls  Description  INTERVENTIONS:  - Assess patient frequently for physical needs  -  Identify cognitive and physical deficits and behaviors that affect risk of falls  -  [place] fall precautions as indicated by assessment   - Educate patient/family on patient safety including physical limitations  - Instruct patient to call for assistance with activity based on assessment  - Modify environment to reduce risk of injury  - Consider OT/PT consult to assist with strengthening/mobility  Outcome: Progressing     Problem: Nutrition/Hydration-ADULT  Goal: Nutrient/Hydration intake appropriate for improving, restoring or maintaining nutritional needs  Description  Monitor and assess patient's nutrition/hydration status for malnutrition (ex- brittle hair, bruises, dry skin, pale skin and conjunctiva, muscle wasting, smooth red tongue, and disorientation)  Collaborate with interdisciplinary team and initiate plan and interventions as ordered  Monitor patient's weight and dietary intake as ordered or per policy  Utilize nutrition screening tool and intervene per policy  Determine patient's food preferences and provide high-protein, high-caloric foods as appropriate       INTERVENTIONS:  - Monitor oral intake, urinary output, labs, and treatment plans  - Assess nutrition and hydration status and recommend course of action  - Evaluate amount of meals eaten  - Assist patient with eating if necessary   - Allow adequate time for meals  - Recommend/ encourage appropriate diets, oral nutritional supplements, and vitamin/mineral supplements  - Order, calculate, and assess calorie counts as needed  - Recommend, monitor, and adjust tube feedings and TPN/PPN based on assessed needs  - Assess need for intravenous fluids  - Provide specific nutrition/hydration education as appropriate  - Include patient/family/caregiver in decisions related to nutrition  Outcome: Progressing     Problem: Prexisting or High Potential for Compromised Skin Integrity  Goal: Skin integrity is maintained or improved  Description  INTERVENTIONS:  - Identify patients at risk for skin breakdown  - Assess and monitor skin integrity  - Assess and monitor nutrition and hydration status  - Monitor labs (i e  albumin)  - Assess for incontinence   - Turn and reposition patient  - Assist with mobility/ambulation  - Relieve pressure over bony prominences  - Avoid friction and shearing  - Provide appropriate hygiene as needed including keeping skin clean and dry  - Evaluate need for skin moisturizer/barrier cream  - Collaborate with interdisciplinary team (i e  Nutrition, Rehabilitation, etc )   - Patient/family teaching  Outcome: Progressing     Problem: PAIN - ADULT  Goal: Verbalizes/displays adequate comfort level or baseline comfort level  Description  Interventions:  - Encourage patient to monitor pain and request assistance  - Assess pain using appropriate pain scale  - Administer analgesics based on type and severity of pain and evaluate response  - Implement non-pharmacological measures as appropriate and evaluate response  - Consider cultural and social influences on pain and pain management  - Notify physician/advanced practitioner if interventions unsuccessful or patient reports new pain  Outcome: Progressing     Problem: SAFETY ADULT  Goal: Maintain or return to baseline ADL function  Description  INTERVENTIONS:  -  Assess patient's ability to carry out ADLs; assess patient's baseline for ADL function and identify physical deficits which impact ability to perform ADLs (bathing, care of mouth/teeth, toileting, grooming, dressing, etc )  - Assess/evaluate cause of self-care deficits   - Assess range of motion  - Assess patient's mobility; develop plan if impaired  - Assess patient's need for assistive devices and provide as appropriate  - Encourage maximum independence but intervene and supervise when necessary  ¯ Involve family in performance of ADLs  ¯ Assess for home care needs following discharge   ¯ Request OT consult to assist with ADL evaluation and planning for discharge  ¯ Provide patient education as appropriate  Outcome: Progressing  Goal: Maintain or return mobility status to optimal level  Description  INTERVENTIONS:  - Assess patient's baseline mobility status (ambulation, transfers, stairs, etc )    - Identify cognitive and physical deficits and behaviors that affect mobility  - Identify mobility aids required to assist with transfers and/or ambulation (gait belt, sit-to-stand, lift, walker, cane, etc )  - [place] fall precautions as indicated by assessment  - Record patient progress and toleration of activity level on Mobility SBAR; progress patient to next Phase/Stage  - Instruct patient to call for assistance with activity based on assessment  - Request Rehabilitation consult to assist with strengthening/weightbearing, etc   Outcome: Progressing     Problem: DISCHARGE PLANNING  Goal: Discharge to home or other facility with appropriate resources  Description  INTERVENTIONS:  - Identify barriers to discharge w/patient and caregiver  - Arrange for needed discharge resources and transportation as appropriate  - Identify discharge learning needs (meds, wound care, etc )  - Arrange for interpretive services to assist at discharge as needed  - Refer to Case Management Department for coordinating discharge planning if the patient needs post-hospital services based on physician/advanced practitioner order or complex needs related to functional status, cognitive ability, or social support system  Outcome: Progressing     Problem: Knowledge Deficit  Goal: Patient/family/caregiver demonstrates understanding of disease process, treatment plan, medications, and discharge instructions  Description  Complete learning assessment and assess knowledge base    Interventions:  - Provide teaching at level of understanding  - Provide teaching via preferred learning methods  Outcome: Progressing     Problem: DISCHARGE PLANNING - CARE MANAGEMENT  Goal: Discharge to post-acute care or home with appropriate resources  Description  INTERVENTIONS:  - Conduct assessment to determine patient/family and health care team treatment goals, and need for post-acute services based on payer coverage, community resources, and patient preferences, and barriers to discharge  - Address psychosocial, clinical, and financial barriers to discharge as identified in assessment in conjunction with the patient/family and health care team  - Arrange appropriate level of post-acute services according to patient's   needs and preference and payer coverage in collaboration with the physician and health care team  - Communicate with and update the patient/family, physician, and health care team regarding progress on the discharge plan  - Arrange appropriate transportation to post-acute venues   Outcome: Progressing

## 2019-02-18 NOTE — RESTORATIVE TECHNICIAN NOTE
Restorative Specialist Mobility Note       Activity: Ambulate in diehl, Chair, Stand at bedside     Assistive Device: Front wheel walker

## 2019-02-18 NOTE — ASSESSMENT & PLAN NOTE
S/p Chest tube placement by IR 2/15, removed today  F/u CXR after CT removal showed small PTX, which per thoracics is ok  PT stable for d/c    Mgmt per thoracics

## 2019-02-18 NOTE — MEDICAL STUDENT
MEDICAL STUDENT  Inpatient Progress Note for TRAINING ONLY  Not Part of Legal Medical Record       [facility] Internal Medicine Progress Note  Patient: Keiry Aviles 82 y o  male   MRN: [id number]  PCP: Alicia Morgan DO  Unit/Bed#: PPHP 420-01 Encounter: [id number]  Date Of Visit: 02/18/19    Assessment & Plan:    Principal Problem:    Pneumothorax  Active Problems:    Diabetes mellitus type 2    Weight loss    Chronic pancreatitis ([address] )    Oropharyngeal dysphagia    Moderate protein-calorie malnutrition     Abnormal SPEP    Pancreatic pseudocyst    Pancreatic fistula    Cachexia (HCC)    Failure to thrive in adult    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HCC)    Transaminitis      Pneumothorax  · Respiratory status improving  Patient is satting 94-96% on RA  Monitor O2 saturation, maintain > 92%  · Patient received CT 2/15, removed on 2/17  · Repeat CXR demonstrated small residual PTX  · Per thoracic surgery, ok to dc  · Awaiting PT/OT evaluation prior to dc back to [facility]       DM        Lab Results   Component Value Date     HGBA1C 7 1 (H) 01/09/2019      · Possibly Type 1 2/2 to chronic pancreatitis and decreased insulin production  · BGs have been close to goal range  · Continue 8u Lantus at bed time, SSI humalog  · Maintain blood sugars between  in hospital  Continue monitoring BGs with POCT before meals and before bed      Dysphagia  · Patient has a J-tube placed during previous admission  Was receiving tube feeds in rehab until 2 days prior to this admission  · Continue omeprazole  · Given patient's cachetic state, continue tube feeds 12h on, 12h off  · Awaiting S&S eval for oral diet  Transaminitis  · Mild elevation of ALT, alk phos within normal limits  Albumin low  INR within normal limits  Patient does not appear jaundiced  Has not received any hepatotoxic medications  · Trending down  On AM CMP, AST/ALT 31/80  · Mild elevation is not concerning  No need to continue trending     Chronic pancreatitis  · Patient has a known pancreatico-gastric fistula, dilated pancreatic duct, pseudocysts, splenic vein thrombus  Patient has been following up with GI outpatient  · Autoimmune workup negative  · No interventions necessary at this time       Pancreatic mass  · Bx'd in previous admission and found to be benign  · No interventions necessary at this time, per surgical oncology      Abnormal SPEP  · Patient following with heme/onc outpatient for an abnormal SPEP on previous admission  · Patient has decided not to proceed with bone marrow biopsy to evaluate for MM, MGUS  VTE Pharmacologic Prophylaxis:   Pharmacologic: Enoxaparin (Lovenox)  Mechanical VTE Prophylaxis in Place: Yes    Patient Centered Rounds: I have performed bedside rounds with nursing staff today  Time Spent for Care: 45 minutes  More than 50% of total time spent on counseling and coordination of care as described above  Current Length of Stay: 4 day(s)    Current Patient Status: Inpatient     Discharge Plan / Estimated Discharge Date: D/c today to [facility], pending PT/OT evaluation     Code Status: Level 1 - Full Code      Subjective:   Mr Cary Mitchell reports feeling well today  Denies chest pain or shortness of breath  Last BM this AM  No changes in urination  Ambulating with assistance in hallways  Objective:     Vitals:   Temp (24hrs), Av 8 °F (36 6 °C), Min:97 3 °F (36 3 °C), Max:98 1 °F (36 7 °C)    Temp:  [97 3 °F (36 3 °C)-98 1 °F (36 7 °C)] 97 3 °F (36 3 °C)  HR:  [64-80] 68  Resp:  [17-18] 18  BP: (115-142)/(59-71) 128/59  SpO2:  [95 %-96 %] 96 %  Body mass index is 16 71 kg/m²  Input and Output Summary (last 24 hours): Intake/Output Summary (Last 24 hours) at 2019 0817  Last data filed at 2019 0600  Gross per 24 hour   Intake 1125 ml   Output 200 ml   Net 925 ml       Physical Exam:     Physical Exam   Constitutional: He is oriented to person, place, and time   He appears well-developed  He appears cachectic  HENT:   Head: Normocephalic  Neck: Normal range of motion  Cardiovascular: Normal rate, regular rhythm, normal heart sounds and intact distal pulses  Exam reveals no gallop and no friction rub  No murmur heard  Pulmonary/Chest: Effort normal and breath sounds normal  No respiratory distress  He has no wheezes  Abdominal: Soft  Bowel sounds are normal  He exhibits no distension  There is no tenderness  Musculoskeletal: Normal range of motion  He exhibits no edema  Lymphadenopathy:     He has no cervical adenopathy  Neurological: He is alert and oriented to person, place, and time  Skin: Skin is warm and dry  Capillary refill takes less than 2 seconds  Vitals reviewed  Additional Data:     Labs:    Results from last 7 days   Lab Units 02/15/19  0529   WBC Thousand/uL 7 89   HEMOGLOBIN g/dL 10 0*   HEMATOCRIT % 32 0*   PLATELETS Thousands/uL 229   NEUTROS PCT % 67   LYMPHS PCT % 14   MONOS PCT % 9   EOS PCT % 9*     Results from last 7 days   Lab Units 02/18/19  0526   POTASSIUM mmol/L 4 2   CHLORIDE mmol/L 105   CO2 mmol/L 27   BUN mg/dL 20   CREATININE mg/dL 0 49*   CALCIUM mg/dL 7 9*   ALK PHOS U/L 91   ALT U/L 80*   AST U/L 31     Results from last 7 days   Lab Units 02/15/19  0105   INR  1 01       * I Have Reviewed All Lab Data Listed Above  * Additional Pertinent Lab Tests Reviewed:  All Labs Within Last 24 Hours Reviewed    Imaging:    Imaging Reports Reviewed Today Include: CXR 2/17  Imaging Personally Reviewed by Myself Includes:  CXR 2/17    Last 24 Hours Medication List:     Current Facility-Administered Medications:  acetaminophen 650 mg Per J Tube Q6H PRN Carine Willinghamon, DO   aspirin 81 mg Per J Tube Daily Carine Sami, DO   enoxaparin 40 mg Subcutaneous Daily Carine Gallardo, DO   fentanyl citrate (PF)  Intravenous Code/Trauma/Sedation Alexis Whiteside MD   insulin glargine 8 Units Subcutaneous HS Carine Gallardo DO   insulin lispro 1-5 Units Subcutaneous Q6H Taz Diss, DO   midazolam   Code/Trauma/Sedation Med Wayne Bright MD   omeprazole (PRILOSEC) suspension 2 mg/mL 40 mg Per J Tube Daily Taz Diss, DO   ondansetron 4 mg Intravenous Q6H PRN Lauraine Muzzy, DO   polyvinyl alcohol 1 drop Both Eyes PRN Deonteaine Nicky, DO        Today, Patient Was Seen By: Dr Poornima Bose  02/18/19, 10:46 AM

## 2019-02-18 NOTE — PLAN OF CARE
Problem: OCCUPATIONAL THERAPY ADULT  Goal: Performs self-care activities at highest level of function for planned discharge setting  See evaluation for individualized goals  Description  Treatment Interventions: ADL retraining, Functional transfer training, UE strengthening/ROM, Endurance training, Cognitive reorientation, Patient/family training, Equipment evaluation/education, Activityengagement, Energy conservation, Compensatory technique education          See flowsheet documentation for full assessment, interventions and recommendations  Note:   Limitation: Decreased ADL status, Decreased UE strength, Decreased Safe judgement during ADL, Decreased cognition, Decreased endurance, Decreased high-level ADLs  Prognosis: Fair  Assessment: Pt is a 83 yo male seen for OT eval s/p adm to [facility] w/ sent to the hospital from nursing home due to abnormal CT scan consistent with moderate left-sided pneumothorax dx'd w/ pnuemothorax  Comorbidities include a h/o DM 2, chronic pancreatitis, dysphagia, cachexia, failure to thrive, and recent rehab stay  Pt with active OT orders and activity as tolerated orders  Pt has recent [facility] hospital stay from 1/1/19-1/19/19  Per family and Pt report, Pt admitted to [facility] from [facility] where Pt has been residing since 1/19/19 for rehab services  During rehab stay, Pt reports he ambulates Ax1 w/ RW and requires A x1 for all self-care tasks  Pt ambulates Ax1 w/ RW  Family reports they would like Pt to return to [facility] facility for continued rehab stay and further family education prior to Pt returning home  Pt is currently demonstrating the following occupational deficits: Min A UB ADLS, Min-Mod A LB ADLS, Min A functional mobility w/ RW   These deficits that are impacting pt's baseline areas of occupation are a result of the following impairments: endurance, activity tolerance, functional mobility, balance, decreased I w/ ADLS/IADLS, strength, cognitive impairments, decreased safety awareness and decreased insight into deficits  The following Occupational Performance Areas to address include: grooming, bathing/shower, toilet hygiene, dressing, health maintenance, functional mobility and clothing management  Pt scored overall 35/100 on the Barthel Index  Based on the aforementioned OT evaluation, functional performance deficits, and assessments, pt has been identified as a high complexity evaluation  Recommend STR upon D/C   Pt to continue to benefit from acute immediate OT services to address the following goals 3-5x/week to  w/in 7-10 days:      OT Discharge Recommendation: Short Term Rehab  OT - OK to Discharge: Yes(When medically cleared)     Leslie Hill MS, OTR/L

## 2019-02-18 NOTE — SPEECH THERAPY NOTE
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    Esophagitis    Diabetes mellitus type 2    Weight loss    Chronic pancreatitis (HCC)    Oropharyngeal dysphagia    Moderate protein-calorie malnutrition     Abnormal SPEP    Severe protein-calorie malnutrition (HCC)    Dysphagia    Disorder of upper esophageal sphincter    Gastric ulcer    Pancreatic pseudocyst    Pneumothorax    Pancreatic fistula    [facility] [address] [facility]    Failure to thrive in adult    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[facility] [address] [facility]    Transaminitis     Past Medical History  Past Medical History:   Diagnosis Date    Diabetes mellitus ([facility] [address] [facility]     Type II    Weight loss      Past Surgical History  Past Surgical History:   Procedure Laterality Date    CT GUIDED CHEST TUBE  2/15/2019    GASTROSTOMY TUBE PLACEMENT N/A 1/14/2019    Procedure: INSERTION PEG TUBE;  Surgeon: Dennis Albarran MD;  [facility]; Service: Gastroenterology    GASTROSTOMY TUBE PLACEMENT N/A 1/15/2019    Procedure: INSERTION JEJUNOSTOMY TUBE OPEN;  Surgeon: Chelita Magallon MD;  [facility];  Service: Surgical Oncology    LAPAROTOMY N/A 11/19/2018    Procedure: LAPAROTOMY EXPLORATORY;  Surgeon: Chelita Magallon MD;  [facility];  Service: Surgical Oncology    RI EDG US EXAM SURGICAL ALTER STOM DUODENUM/JEJUNUM N/A 9/21/2018    Procedure: LINEAR ENDOSCOPIC U/S;  Surgeon: Louis Quintanilla MD;  [facility];   Service: Gastroenterology    RI LAP,DIAGNOSTIC ABDOMEN N/A 11/19/2018    Procedure: pancreatic biopsy;  Surgeon: Chelita Magallon MD;  [facility];  Service: Surgical Oncology    TONSILLECTOMY      VASCULAR SURGERY      phlebitis many years ago    WRIST GANGLION EXCISION          Bedside Swallow Evaluation:    Summary:  Pt presents w/ continued suspected pharyngeal dysphagia c decreased clearance from the pharynx requiring multiple swallows and liquid wash  Pt observed c pleasure feeds of pudding and thin coffee/juice  Pt took multiple bites of pudding before taking a sip of liquid  Cough x1 noted  Pt cont to have hoarse vocal quality  Family reported pt has ENT appointment on March 8th  Pt to d/c back to [facility] today, recommend cont pleasure feeds as directed by SLP  Recommendations:  Diet: pleasure feeds of puree/thin liquids  Meds: tube  Supervision: FULL  Positioning:Upright  Strategies: Pt to take PO only when fully alert and upright  Oral care: aggressive oral care  Aspiration precautions  Reflux precautions    Eval only, f/u at SNF     Reason for consult:  h/o dysphagia     Current diet:  Pleasure feeds, PEG for primary nutrition  Premorbid diet[de-identified]  Reg/thin  Previous VBS:  Yes 1/12/19: Pt cont to present c moderate oral and severe pharyngeal dysphagia characterized by weak tongue propulsion, minimal hyolaryngeal excursion, minimal to no epiglottic inversion, weak pharyngeal constriction and significantly decreased opening of the UES  Pt given trials of thin liquid, NTL and puree applesauce by tsp c premature spillage and pt utilizing multiple swallows to attempt to transfer/clear material into the esophagus  Decreased retention of thinner material noted but pt cont to have retention of even thin liquid c minimal passage into the esophagus c multiple swallows  With minimal epiglottic inversion, pt is at risk for penetration/aspiration during the swallow  With pharyngeal retention, pt is at risk for overflow penetration/aspiration  Pt noted to have episodes of penetration during this study  Pt continues to be a high aspiration risk with this study being grossly unchanged from previous VBS on 1/5/19  Recommend alternative means of nutrition at this time  O2 requirement:  RA  Voice/Speech:  hoarse  Social:  SNF  Follows commands:          Yes Cognitive Status:  Grossly intact    Items administered:  Puree, thin liquids  Liquids were taken by cup       Oral stage:  Lip closure: adequate  Mastication: n/a  Bolus formation: adequate  Bolus control: adequate  Transfer: prompt  Oral residue: no  Pocketing: no    Pharyngeal stage:  Swallow promptness: delayed  Laryngeal rise: decreased  Wet voice: no  Throat clear: no  Cough: x1  Secondary swallows: yes  Audible swallows: yes    Esophageal stage:  No s/s reported    Results d/w:  Pt, nursing

## 2019-02-18 NOTE — PLAN OF CARE
Problem: PHYSICAL THERAPY ADULT  Goal: Performs mobility at highest level of function for planned discharge setting  See evaluation for individualized goals  Description  Treatment/Interventions: Functional transfer training, LE strengthening/ROM, Elevations, Therapeutic exercise, Endurance training, Patient/family training, Equipment eval/education, Bed mobility, Gait training, Spoke to nursing, Spoke to case management, OT, Family  Equipment Recommended: Ariel Collins       See flowsheet documentation for full assessment, interventions and recommendations  Note:   Prognosis: Fair  Problem List: Decreased strength, Decreased endurance, Impaired balance, Decreased mobility, Decreased safety awareness(cachexia; deconditioning )  Assessment: Pt is 82 y o  male seen for PT evaluation s/p admit to [facility] on 2/14/2019  Pt presenting w/ from  rehab w/ SOB/ HUTTON; found to have abmornal CT of chest  And w/ subsequent dx w/ pneumothorax;   Current dx/ problem list also including recent admission to [facility] 1/1/-1/19/19 w/ sepsis and B/L pneumonia; failure to thrive; weight loss; cachexia dysphagia; PEG; pancreatic fistula and as above   PT now consulted for assessment of mobility and d/c needs  Comorbidities affecting pt's physical performance at time of assessment listed above  Personal factors affecting pt at time of IE include: ramp entry to home environment, limited home support- spouse unablet o provide physical assist, advanced age, past experience, inability to perform IADLs, inability to perform ADLs, inability to ambulate household distances, significant deconditioning; limited insight into impairments; declining medical condition;   Prior to admission, pt was receiving PT/ OT at [facility] rehab  Upon evaluation, pt currently is requiring  Mark for functional transfers and Mark for ambulation w/ RW 40'x4 w/ standing rest breaks required for significant deconditioning     Pt presents functioning below indep prior baseline and currently w/ overall mobility deficits 2* to: decreased LE strength/AROM; limited flexibility;  generalized weakness/ deconditioning; decreased endurance; decreased activity tolerance; decreased coordination; impaired balance; gait deviations; decreased safety awareness; SOB/HUTTON; fatigue; impaired safety and judgement; limited insight into current deficits; PEG; poor nutritional status; multiple lines; risk fr skin breakdown 2* cachexia- Pt currently at risk for falls  (Please find additional objective findings from PT assessment regarding body systems outlined above ) Pt will continue to benefit from skilled PT interventions to address stated impairments; to maximize functional potential; for ongoing pt/ family training; and DME needs  PT is currently recommending return to rehab on d/c to maximize functional recovery prior to return to home  Pt/ family agreeable to plan and goals as stated on evaluation- spouse who was present stating she will be unable to provide functional assistance at [id number] [facility]  Barriers to Discharge: Decreased caregiver support  Barriers to Discharge Comments: elderly spouse unable to provide physical assist on d/c   Recommendation: Short-term skilled PT     PT - OK to Discharge: (yes- to rehab when medically cleared )    See flowsheet documentation for full assessment

## 2019-02-18 NOTE — SOCIAL WORK
CM called [facility] and spoke with Dugananil Rankin, per [facility] he rec'd PT/OT notes and is okay to return to [facility] me with patient, pts daughter Luke Valentine, and pts spouse regarding d/c to , all are in agreement  Pts daughter will transport at 3pm  Physician, and RN notified of d/c   Report# [phone] ext# [phone], Fax# [phone]

## 2019-02-28 ENCOUNTER — OFFICE VISIT (OUTPATIENT)
Dept: PULMONOLOGY | Facility: CLINIC | Age: 84
End: 2019-02-28
Payer: MEDICARE

## 2019-02-28 VITALS
TEMPERATURE: 96.9 F | WEIGHT: 111 LBS | HEIGHT: 67 IN | OXYGEN SATURATION: 99 % | BODY MASS INDEX: 17.42 KG/M2 | DIASTOLIC BLOOD PRESSURE: 64 MMHG | HEART RATE: 76 BPM | SYSTOLIC BLOOD PRESSURE: 118 MMHG

## 2019-02-28 DIAGNOSIS — J69.0 ASPIRATION PNEUMONIA OF BOTH LUNGS, UNSPECIFIED ASPIRATION PNEUMONIA TYPE, UNSPECIFIED PART OF LUNG (HCC): Primary | ICD-10-CM

## 2019-02-28 PROCEDURE — 99215 OFFICE O/P EST HI 40 MIN: CPT | Performed by: INTERNAL MEDICINE

## 2019-02-28 NOTE — PROGRESS NOTES
Pulmonary outpatient note   Abe Jung 82 y o  male MRN: [id number]  2/28/2019      Assessment and plan:  Abe Jung has the following medical problems:  1  Aspiration pneumonia  Severe in January 2019, bilateral, resolving  He was hospitalized for this and treated with broad-spectrum antibiotics  The next chest CT on February 2013 showed resolving infiltrates  He has dysphagia and working 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  He has a J-tube which she uses and also eats 3 meals per day without evidence of clinical choking  2   Left pneumothorax  Was found incidentally during abdominal CT scan  He had a chest tube for few days and was discharged with small trace left apical pneumothorax  He is asymptomatic  I ordered another chest CT in 4 weeks to make sure the infiltrates are resolving and does not hide mass behind them and to examine the status of the pneumothorax which might need more treatments such as pleurodesis or chest tube if gets bigger  Follow-up in 6 weeks after chest CT  Rachel hRodes MD/PhD,  [facility]      Return in about 6 weeks (around 4/11/2019)  History of Present Illness  This is 82y o  year old male that was hospitalized in January 2018 due to severe suspected aspiration pneumonia  I saw him as a consult then and he was treated with broad-spectrum antibiotics and discharged home  He was found to have severe dysphagia and is using it J-tube and also eats 3 meals a day  He is working with speech therapist in his [place]  He was hospitalized again in February 2019 due to spontaneous and incidentally seen pneumothorax in the left lung  A chest tube was placed and he was hospitalized for few days  After discharge he had another chest x-ray of February 21st that showed trace small left apical pneumothorax  Today his asymptomatic from the breathing standpoint as he was during the pneumothorax    He does not have history of lung disease  He smoked many years ago for 10 years approximately a pack a day  Never diagnosed with COPD or asthma  Social history:   Smoked for 10 years many years ago a pack a day  Does not drink alcohol  Occupational history:   Retired  Review of Systems   Constitutional: Negative  HENT: Negative  Eyes: Negative  Respiratory: Negative  Cardiovascular: Negative  Gastrointestinal:        Dysphagia   Endocrine: Negative  Genitourinary: Negative  Musculoskeletal: Positive for back pain  Skin: Negative  Allergic/Immunologic: Negative  Neurological: Negative  Hematological: Negative  Psychiatric/Behavioral: Negative  Historical Information   Past Medical History:   Diagnosis Date    Diabetes mellitus ([facility] [address] )     Type II    Weight loss      Past Surgical History:   Procedure Laterality Date    CT GUIDED CHEST TUBE  2/15/2019    GASTROSTOMY TUBE PLACEMENT N/A 1/14/2019    Procedure: INSERTION PEG TUBE;  Surgeon: Nahomi Macdonald MD;  [facility]; Service: Gastroenterology    GASTROSTOMY TUBE PLACEMENT N/A 1/15/2019    Procedure: INSERTION JEJUNOSTOMY TUBE OPEN;  Surgeon: Giovanny Villar MD;  [facility];  Service: Surgical Oncology    LAPAROTOMY N/A 11/19/2018    Procedure: LAPAROTOMY EXPLORATORY;  Surgeon: Giovanny Villar MD;  [facility];  Service: Surgical Oncology    NC EDG US EXAM SURGICAL ALTER STOM DUODENUM/JEJUNUM N/A 9/21/2018    Procedure: LINEAR ENDOSCOPIC U/S;  Surgeon: Eusebio Rivers MD;  [facility];   Service: Gastroenterology    NC LAP,DIAGNOSTIC ABDOMEN N/A 11/19/2018    Procedure: pancreatic biopsy;  Surgeon: Giovanny Villar MD;  [facility];  Service: Surgical Oncology    TONSILLECTOMY      VASCULAR SURGERY      phlebitis many years ago    WRIST GANGLION EXCISION       Family History   Problem Relation Age of Onset    No Known Problems Mother     No Known Problems Father    Ashley Franklin Pancreatic cancer Brother 61    Breast cancer Daughter 36        38s       Meds/Allergies     Current Outpatient Medications:     acetaminophen (TYLENOL) 160 mg/5 mL suspension, 20 3 mL (650 mg total) by Per J Tube route every 6 (six) hours as needed for mild pain, headaches or fever, Disp: 118 mL, Rfl: 0    aspirin 81 mg chewable tablet, 1 tablet (81 mg total) by Per J Tube route daily, Disp: , Rfl: 0    insulin degludec (TRESIBA FLEXTOUCH) 100 units/mL injection pen, Inject 8 Units under the skin daily at bedtime, Disp: , Rfl:     insulin lispro (HumaLOG) 100 units/mL injection, Inject 1-5 Units under the skin every 6 (six) hours, Disp: , Rfl: 0    lidocaine (LIDODERM) 5 %, Apply 1 patch topically daily Remove & Discard patch within 12 hours or as directed by MD, Disp: 30 patch, Rfl: 0    Nutritional Supplements (JEVITY 1 2 KARL) LIQD, Give through J tube at 118 mL/hr with free water flushes 125 mL q6h from 8 pm to 8 AM, Disp: 1000 mL, Rfl: 0    omeprazole (PriLOSEC) 20 mg delayed release capsule, Take 2 capsules (40 mg total) by mouth daily Please give through J tube, Disp: 30 capsule, Rfl: 0    polyvinyl alcohol (LIQUIFILM TEARS) 1 4 % ophthalmic solution, Administer 1 drop to both eyes as needed for dry eyes, Disp: 15 mL, Rfl: 0  No Known Allergies    Vitals: Blood pressure 118/64, pulse 76, temperature (!) 96 9 °F (36 1 °C), temperature source Tympanic, height 5' 7" (1 702 m), weight 50 3 kg (111 lb), SpO2 99 %  Body mass index is 17 39 kg/m²  Oxygen Therapy  SpO2: 99 %  Oxygen Therapy: None (Room air)    Physical Exam  Physical Exam   Constitutional: He is oriented to person, place, and time  No distress  Cachectic   HENT:   Head: Normocephalic and atraumatic  Eyes: Pupils are equal, round, and reactive to light  EOM are normal    Neck: Normal range of motion  Neck supple  No JVD present  Cardiovascular: Normal rate, regular rhythm and normal heart sounds  No murmur heard    Pulmonary/Chest: Effort normal  No stridor  He has no wheezes  He has rales (Bilateral lower lobes)  Abdominal: Soft  He exhibits no distension  Musculoskeletal: Normal range of motion  He exhibits no edema or deformity  Neurological: He is alert and oriented to person, place, and time  Skin: Skin is warm  He is not diaphoretic  Psychiatric: He has a normal mood and affect  Labs: I have personally reviewed pertinent lab results  Lab Results   Component Value Date    WBC 7 89 02/15/2019    HGB 10 0 (L) 02/15/2019    HCT 32 0 (L) 02/15/2019     (H) 02/15/2019     02/15/2019     Lab Results   Component Value Date    GLUCOSE 202 (H) 01/10/2019    CALCIUM 7 9 (L) 02/18/2019    K 4 2 02/18/2019    CO2 27 02/18/2019     02/18/2019    BUN 20 02/18/2019    CREATININE 0 49 (L) 02/18/2019     No results found for: IGE  Lab Results   Component Value Date    ALT 80 (H) 02/18/2019    AST 31 02/18/2019    ALKPHOS 91 02/18/2019       Imaging and other studies:   I have personally reviewed pertinent imaging studies in PACS  The patient had chest CT on January 2019 that showed severe bilateral infiltrates suspected to be aspiration pneumonia  The patient had another chest CT on February 2018 that showed resolving infiltrates in the lower lobes and new left apical pneumothorax  He had another chest x-ray on February 21, 2018 that showed trace apical left pneumothorax      Misty Pizarro MD/PhD,  [facility]

## 2019-02-28 NOTE — LETTER
February 28, 2019     Yoni [address] [place] [address]    Patient: Otoniel Carreno   YOB: 1935   Date of Visit: 2/28/2019       Dear Dr Rafael Mcclure: Thank you for referring Greysonjoséchris Ruano to me for evaluation  Below are my notes for this consultation  If you have questions, please do not hesitate to call me  I look forward to following your patient along with you  Sincerely,        Neptali Jha MD        CC: No Recipients  Neptali Jha MD  2/28/2019 11:12 AM  Sign at close encounter  Pulmonary outpatient note   Otoniel Carreno 82 y o  male MRN: [id number]  2/28/2019      Assessment and plan:  Otoniel Carreno has the following medical problems:  1  Aspiration pneumonia  Severe in January 2019, bilateral, resolving  He was hospitalized for this and treated with broad-spectrum antibiotics  The next chest CT on February 2013 showed resolving infiltrates  He has dysphagia and working 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  He has a J-tube which she uses and also eats 3 meals per day without evidence of clinical choking  2   Left pneumothorax  Was found incidentally during abdominal CT scan  He had a chest tube for few days and was discharged with small trace left apical pneumothorax  He is asymptomatic  I ordered another chest CT in 4 weeks to make sure the infiltrates are resolving and does not hide mass behind them and to examine the status of the pneumothorax which might need more treatments such as pleurodesis or chest tube if gets bigger  Follow-up in 6 weeks after chest CT  Neptali Jha MD/PhD,  [facility]      Return in about 6 weeks (around 4/11/2019)  History of Present Illness  This is 82y o  year old male that was hospitalized in January 2018 due to severe suspected aspiration pneumonia    I saw him as a consult then and he was treated with broad-spectrum antibiotics and discharged home  He was found to have severe dysphagia and is using it J-tube and also eats 3 meals a day  He is working with speech therapist in his [place]  He was hospitalized again in February 2019 due to spontaneous and incidentally seen pneumothorax in the left lung  A chest tube was placed and he was hospitalized for few days  After discharge he had another chest x-ray of February 21st that showed trace small left apical pneumothorax  Today his asymptomatic from the breathing standpoint as he was during the pneumothorax  He does not have history of lung disease  He smoked many years ago for 10 years approximately a pack a day  Never diagnosed with COPD or asthma  Social history:   Smoked for 10 years many years ago a pack a day  Does not drink alcohol  Occupational history:   Retired  Review of Systems   Constitutional: Negative  HENT: Negative  Eyes: Negative  Respiratory: Negative  Cardiovascular: Negative  Gastrointestinal:        Dysphagia   Endocrine: Negative  Genitourinary: Negative  Musculoskeletal: Positive for back pain  Skin: Negative  Allergic/Immunologic: Negative  Neurological: Negative  Hematological: Negative  Psychiatric/Behavioral: Negative  Historical Information   Past Medical History:   Diagnosis Date    [facility]     Type II    Weight loss      Past Surgical History:   Procedure Laterality Date    CT GUIDED CHEST TUBE  2/15/2019    GASTROSTOMY TUBE PLACEMENT N/A 1/14/2019    Procedure: INSERTION PEG TUBE;  Surgeon: Neal Boudreaux MD;  [facility];   Service: Gastroenterology    GASTROSTOMY TUBE PLACEMENT N/A 1/15/2019    Procedure: INSERTION JEJUNOSTOMY TUBE OPEN;  Surgeon: Marichuy Balderrama MD;  [facility];  Service: Surgical Oncology    LAPAROTOMY N/A 11/19/2018    Procedure: LAPAROTOMY EXPLORATORY;  Surgeon: Marichuy Balderrama MD;  [facility];  Service: Surgical Oncology    [address] EXAM SURGICAL ALTER STOM DUODENUM/JEJUNUM N/A 9/21/2018    Procedure: LINEAR ENDOSCOPIC U/S;  Surgeon: Kate Dias MD;  [facility];   Service: Gastroenterology    VA LAP,DIAGNOSTIC ABDOMEN N/A 11/19/2018    Procedure: pancreatic biopsy;  Surgeon: Lila Patel MD;  [facility];  Service: Surgical Oncology    TONSILLECTOMY      VASCULAR SURGERY      phlebitis many years ago    WRIST GANGLION EXCISION       Family History   Problem Relation Age of Onset    No Known Problems Mother     No Known Problems Father     Pancreatic cancer Brother 61    Breast cancer Daughter 36        38s       Meds/Allergies     Current Outpatient Medications:     acetaminophen (TYLENOL) 160 mg/5 mL suspension, 20 3 mL (650 mg total) by Per J Tube route every 6 (six) hours as needed for mild pain, headaches or fever, Disp: 118 mL, Rfl: 0    aspirin 81 mg chewable tablet, 1 tablet (81 mg total) by Per J Tube route daily, Disp: , Rfl: 0    insulin degludec (TRESIBA FLEXTOUCH) 100 units/mL injection pen, Inject 8 Units under the skin daily at bedtime, Disp: , Rfl:     insulin lispro (HumaLOG) 100 units/mL injection, Inject 1-5 Units under the skin every 6 (six) hours, Disp: , Rfl: 0    lidocaine (LIDODERM) 5 %, Apply 1 patch topically daily Remove & Discard patch within 12 hours or as directed by MD, Disp: 30 patch, Rfl: 0    Nutritional Supplements (JEVITY 1 2 KARL) LIQD, Give through J tube at 118 mL/hr with free water flushes 125 mL q6h from 8 pm to 8 AM, Disp: 1000 mL, Rfl: 0    omeprazole (PriLOSEC) 20 mg delayed release capsule, Take 2 capsules (40 mg total) by mouth daily Please give through J tube, Disp: 30 capsule, Rfl: 0    polyvinyl alcohol (LIQUIFILM TEARS) 1 4 % ophthalmic solution, Administer 1 drop to both eyes as needed for dry eyes, Disp: 15 mL, Rfl: 0  No Known Allergies    Vitals: Blood pressure 118/64, pulse 76, temperature (!) 96 9 °F (36 1 °C), temperature source Tympanic, height 5' 7" (1 702 m), weight 50 3 kg (111 lb), SpO2 99 %  Body mass index is 17 39 kg/m²  Oxygen Therapy  SpO2: 99 %  Oxygen Therapy: None (Room air)    Physical Exam  Physical Exam   Constitutional: He is oriented to person, place, and time  No distress  Cachectic   HENT:   Head: Normocephalic and atraumatic  Eyes: Pupils are equal, round, and reactive to light  EOM are normal    Neck: Normal range of motion  Neck supple  No JVD present  Cardiovascular: Normal rate, regular rhythm and normal heart sounds  No murmur heard  Pulmonary/Chest: Effort normal  No stridor  He has no wheezes  He has rales (Bilateral lower lobes)  Abdominal: Soft  He exhibits no distension  Musculoskeletal: Normal range of motion  He exhibits no edema or deformity  Neurological: He is alert and oriented to person, place, and time  Skin: Skin is warm  He is not diaphoretic  Psychiatric: He has a normal mood and affect  Labs: I have personally reviewed pertinent lab results  Lab Results   Component Value Date    WBC 7 89 02/15/2019    HGB 10 0 (L) 02/15/2019    HCT 32 0 (L) 02/15/2019     (H) 02/15/2019     02/15/2019     Lab Results   Component Value Date    GLUCOSE 202 (H) 01/10/2019    CALCIUM 7 9 (L) 02/18/2019    K 4 2 02/18/2019    CO2 27 02/18/2019     02/18/2019    BUN 20 02/18/2019    CREATININE 0 49 (L) 02/18/2019     No results found for: IGE  Lab Results   Component Value Date    ALT 80 (H) 02/18/2019    AST 31 02/18/2019    ALKPHOS 91 02/18/2019       Imaging and other studies:   I have personally reviewed pertinent imaging studies in PACS  The patient had chest CT on January 2019 that showed severe bilateral infiltrates suspected to be aspiration pneumonia  The patient had another chest CT on February 2018 that showed resolving infiltrates in the lower lobes and new left apical pneumothorax  He had another chest x-ray on February 21, 2018 that showed trace apical left pneumothorax      Darroll Ink Kalee Fowler MD/PhD,  [facility]

## 2019-03-05 ENCOUNTER — OFFICE VISIT (OUTPATIENT)
Dept: SURGICAL ONCOLOGY | Facility: CLINIC | Age: 84
End: 2019-03-05

## 2019-03-05 VITALS
HEIGHT: 67 IN | WEIGHT: 115 LBS | TEMPERATURE: 97.5 F | DIASTOLIC BLOOD PRESSURE: 78 MMHG | RESPIRATION RATE: 15 BRPM | BODY MASS INDEX: 18.05 KG/M2 | HEART RATE: 67 BPM | SYSTOLIC BLOOD PRESSURE: 132 MMHG

## 2019-03-05 DIAGNOSIS — K86.1 CHRONIC PANCREATITIS, UNSPECIFIED PANCREATITIS TYPE (HCC): Primary | ICD-10-CM

## 2019-03-05 PROCEDURE — 99024 POSTOP FOLLOW-UP VISIT: CPT | Performed by: SURGERY

## 2019-03-05 NOTE — LETTER
March 5, 2019     Fermin Ray, [address] [facility]    Patient: Kerwin Ocampo   YOB: 1935   Date of Visit: 3/5/2019       Dear Dr Robinson Swanson: Thank you for referring Sabas Gardiner to me for evaluation  Below are my notes for this consultation  If you have questions, please do not hesitate to call me  I look forward to following your patient along with you  Sincerely,        Braden Fuentes MD        CC: MD Braden Montoya MD  3/5/2019  9:57 AM  Incomplete               Surgical Oncology Follow Up       [address] [facility],[address] [place] [address]  [facility] [address] [facility]  1935  [id number]  [address] [place] [address],[address] [place] [address]  [facility] [address] [address]    Diagnoses and all orders for this visit:    Chronic pancreatitis, unspecified pancreatitis type [facility])        Chief Complaint   Patient presents with    Follow-up     2 wk  pancreatitis  F/U       No follow-ups on file  No history exists  History of Present Illness:  Patient returns in follow-up  He was recently hospitalized with a pneumothorax  This has then resolved  He is using his feeding tube at night for feeds and is now on soft food  He is feeling much better and he denies any abdominal pain  Review of Systems  Complete ROS Surg Onc:   Complete ROS Surg Onc:   Constitutional: The patient denies new or recent history of general fatigue, no recent weight loss, no change in appetite  Eyes: No complaints of visual problems, no scleral icterus  ENT: no complaints of ear pain, no hoarseness, no difficulty swallowing,  no tinnitus and no new masses in head, oral cavity, or neck  Cardiovascular: No complaints of chest pain, no palpitations, no ankle edema  Respiratory: No complaints of shortness of breath, no cough     Gastrointestinal: No complaints of jaundice, no bloody stools, no pale stools  Genitourinary: No complaints of dysuria, no hematuria, no nocturia, no frequent urination, no urethral discharge  Musculoskeletal: No complaints of weakness, paralysis, joint stiffness or arthralgias  Integumentary: No complaints of rash, no new lesions  Neurological: No complaints of convulsions, no seizures, no dizziness  Hematologic/Lymphatic: No complaints of easy bruising  Endocrine:  No hot or cold intolerance  No polydipsia, polyphagia, or polyuria  Allergy/immunology:  No environmental allergies  No food allergies  Not immunocompromised  Skin:  No pallor or rash  No wound  Patient Active Problem List   Diagnosis    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    Esophagitis    Diabetes mellitus type 2    Weight loss    Chronic pancreatitis (HCC)    Oropharyngeal dysphagia    Moderate protein-calorie malnutrition     Abnormal SPEP    Severe protein-calorie malnutrition (HCC)    Dysphagia    Disorder of upper esophageal sphincter    Gastric ulcer    Pancreatic pseudocyst    Pneumothorax    Pancreatic fistula    Cachexia ([address]    Failure to thrive in adult    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[address] )    Transaminitis     Past Medical History:   Diagnosis Date    Weight loss      Past Surgical History:   Procedure Laterality Date    CT GUIDED CHEST TUBE  2/15/2019    GASTROSTOMY TUBE PLACEMENT N/A 1/14/2019    Procedure: INSERTION PEG TUBE;  Surgeon: Kim Christensen MD;  [facility];   Service: Gastroenterology    GASTROSTOMY TUBE PLACEMENT N/A 1/15/2019    Procedure: INSERTION JEJUNOSTOMY TUBE OPEN;  Surgeon: Sb Flores MD;  [facility];  Service: Surgical Oncology    LAPAROTOMY N/A 11/19/2018    Procedure: LAPAROTOMY EXPLORATORY;  Surgeon: Sb Flores MD;  [facility];  Service: Surgical Oncology    PA EDG US EXAM SURGICAL ALTER STOM DUODENUM/JEJUNUM N/A 9/21/2018    Procedure: LINEAR ENDOSCOPIC U/S;  Surgeon: Malou Mace Estela Macdonald MD;  [facility];   Service: Gastroenterology    [place] LAP,DIAGNOSTIC ABDOMEN N/A 11/19/2018    Procedure: pancreatic biopsy;  Surgeon: Giovanny Villar MD;  [facility];  Service: Surgical Oncology    TONSILLECTOMY      VASCULAR SURGERY      phlebitis many years ago    WRIST GANGLION EXCISION       Family History   Problem Relation Age of Onset    No Known Problems Mother     No Known Problems Father     Pancreatic cancer Brother 61    Breast cancer Daughter 36        38s     Social History     Socioeconomic History    Marital status: /Civil Union     Spouse name: Not on file    Number of children: Not on file    Years of education: Not on file    Highest education level: Not on file   Occupational History    Not on file   Social Needs    Financial resource strain: Not on file    Food insecurity:     Worry: Not on file     Inability: Not on file    Transportation needs:     Medical: Not on file     Non-medical: Not on file   Tobacco Use    Smoking status: Former Smoker     Packs/day: 1 00     Years: 25 00     Pack years: 25 00    Smokeless tobacco: Never Used    Tobacco comment: quit about 20 years ago as of 9/21/2018   Substance and Sexual Activity    Alcohol use: Yes     Comment: 1 beer a day    Drug use: No    Sexual activity: Not on file   Lifestyle    Physical activity:     Days per week: Not on file     Minutes per session: Not on file    Stress: Not on file   Relationships    Social connections:     Talks on phone: Not on file     Gets together: Not on file     Attends [employer] witness service: Not on file     Active member of club or organization: Not on file     Attends meetings of clubs or organizations: Not on file     Relationship status: Not on file    Intimate partner violence:     Fear of current or ex partner: Not on file     Emotionally abused: Not on file     Physically abused: Not on file     Forced sexual activity: Not on file   Other Topics Concern    Not on file   Social History Narrative    Not on file       Current Outpatient Medications:     acetaminophen (TYLENOL) 160 mg/5 mL suspension, 20 3 mL (650 mg total) by Per J Tube route every 6 (six) hours as needed for mild pain, headaches or fever, Disp: 118 mL, Rfl: 0    aspirin 81 mg chewable tablet, 1 tablet (81 mg total) by Per J Tube route daily, Disp: , Rfl: 0    insulin degludec (TRESIBA FLEXTOUCH) 100 units/mL injection pen, Inject 8 Units under the skin daily at bedtime, Disp: , Rfl:     insulin lispro (HumaLOG) 100 units/mL injection, Inject 1-5 Units under the skin every 6 (six) hours, Disp: , Rfl: 0    lidocaine (LIDODERM) 5 %, Apply 1 patch topically daily Remove & Discard patch within 12 hours or as directed by MD, Disp: 30 patch, Rfl: 0    Nutritional Supplements (JEVITY 1 2 KARL) LIQD, Give through J tube at 118 mL/hr with free water flushes 125 mL q6h from 8 pm to 8 AM, Disp: 1000 mL, Rfl: 0    omeprazole (PriLOSEC) 20 mg delayed release capsule, Take 2 capsules (40 mg total) by mouth daily Please give through J tube, Disp: 30 capsule, Rfl: 0    polyvinyl alcohol (LIQUIFILM TEARS) 1 4 % ophthalmic solution, Administer 1 drop to both eyes as needed for dry eyes, Disp: 15 mL, Rfl: 0  No Known Allergies  Vitals:    03/05/19 0934   BP: 132/78   Pulse: 67   Resp: 15   Temp: 97 5 °F (36 4 °C)       Physical Exam  Constitutional: General appearance: The Patient is well-developed and well-nourished who appears the stated age in no acute distress  Patient is pleasant and talkative  HEENT:  Normocephalic  Sclerae are anicteric  Mucous membranes are moist  Neck is supple without adenopathy  No JVD  Chest: The lungs are clear to auscultation  Cardiac: Heart is regular rate  Abdomen: Abdomen is soft, non-tender, non-distended and without masses  J-tube is in place  Extremities: There is no clubbing or cyanosis  There is no edema  Symmetric    Neuro: Grossly nonfocal   He is in a wheelchair     Skin: Warm, anicteric  Psych:  Patient is pleasant and talkative  Breasts:        Pathology:  [unfilled]    Labs:      Imaging  Xr Chest Pa & Lateral    Result Date: 2/18/2019  Narrative: CHEST INDICATION:   L pneumothorax  s/p Chest tube removal  COMPARISON:  February 17, 2019 at 1118 hours EXAM PERFORMED/VIEWS:  XR CHEST PA & LATERAL  The frontal view was performed utilizing dual energy radiographic technique  Images: 4 FINDINGS:  The small caliber left-sided chest tube was withdrawn  Minimal trace apical pneumothorax visible  Small pleural effusion or pleural thickening persists at the left lung base  Stable linear airspace opacity left midlung  Stable right basilar airspace opacity medially  Cardiac size within normal limits  No vascular congestion or peribronchial thickening  Bony structures remain grossly intact  Impression: There is a tiny trace pneumothorax at left lung apex status post chest tube removal  Follow-up advised  Exam otherwise stable  The study was marked in [facility] for immediate notification  Workstation performed: [id number]     Xr Chest Pa & Lateral    Result Date: 2/17/2019  Narrative: CHEST INDICATION:   Follow-up pneumothorax  COMPARISON:  2/16/2019 EXAM PERFORMED/VIEWS:  XR CHEST PA & LATERAL  The frontal view was performed utilizing dual energy radiographic technique  FINDINGS: Cardiomediastinal silhouette appears unremarkable  There is a stable left basilar chest tube without residual pneumothorax  A small pleural effusion is seen  There is stable left mid and right basilar airspace opacity  Osseous structures appear within normal limits for patient age  Impression: 1  No left-sided pneumothorax visualized  Chest tube is in place  2   Stable small left pleural effusion with left mid lung and right lower lobe airspace opacity   Workstation performed: [id number]     Xr Chest Pa & Lateral    Result Date: 2/17/2019  Narrative: CHEST INDICATION: Pneumothorax  COMPARISON:  Chest radiographs February 15, 2019 EXAM PERFORMED/VIEWS:  XR CHEST PA & LATERAL  The frontal view was performed utilizing dual energy radiographic technique  Images: 4 FINDINGS: Cardiomediastinal silhouette appears unremarkable  There has been placement of a small bore left-sided chest tube in the left lower lobe  There has been resolution of the left-sided hydropneumothorax  Stable infiltrates are present in the lower lobes bilaterally, left side greater than right  Osseous structures appear within normal limits for patient age  Impression: 1  Resolution of left-sided hydropneumothorax, status post small bore chest tube placement  2   Persistent bilateral lower lobe infiltrates  Workstation performed: [id number]     Xr Chest Pa & Lateral    Result Date: 2/15/2019  Narrative: CHEST INDICATION:   pneumothorax  COMPARISON:  Chest x-ray from 12/14/2019  EXAM PERFORMED/VIEWS:  XR CHEST PA & LATERAL FINDINGS: Cardiomediastinal silhouette appears unremarkable  Small to moderate left hydropneumothorax is unchanged compared to 2/14/2019  Linear atelectasis or scars again seen in the left midlung zone  Osseous structures appear within normal limits for patient age  Impression: Small to moderate left hydropneumothorax is unchanged compared to 2/14/2019  Workstation performed: [id number]     Xr Chest Pa & Lateral    Result Date: 2/15/2019  Narrative: CHEST INDICATION:   Left-sided pneumothorax  COMPARISON:  1/10/2019, CT chest 1/5/2019 and report of CT abdomen and pelvis 2/13/2019 EXAM PERFORMED/VIEWS:  XR CHEST FRONTAL & LATERAL FINDINGS: Cardiomediastinal silhouette appears unremarkable  There is a small left hydrothorax  Partial volume loss left lung base with improved aeration compared to the prior study  Right basilar atelectasis or infiltrate  Left perihilar atelectasis or infiltrate, aeration improving  Jejunostomy tube noted in the left mid abdomen    Surgical clips left upper quadrant  Osseous structures appear within normal limits for patient age  Impression: Small left hydropneumothorax  Bibasilar atelectasis or infiltrates with improved left perihilar/basilar aeration  Workstation performed: [id number]     Ct Guided Chest Tube    Result Date: 2/15/2019  Narrative: CT GUIDED CHEST TUBE History: Symptomatic left hydropneumothorax of unclear etiology  This has not resolved with conservative management  Drainage is indicated to resolve hydropneumothorax, evaluate for etiology, and prevent progression to tension pneumothorax  Sedation time: 27 minutes Procedure: After explaining the risks and benefits of the procedure to the patient, informed consent was obtained  CT was used to localize the left hydropneumothorax   Radiation dose length product (DLP) for this visit:  1180 84 mGy-cm   This examination, like all CT scans performed in the [facility], was performed utilizing techniques to minimize radiation dose exposure, including the use of iterative reconstruction and automated exposure control  Procedure: The patient was identified verbally and by wristband  Timeout was performed  Following obtaining informed consent, the patient was prepped and draped in the usual sterile fashion  Using serial CT guidance access was gained to the patient's left pleural space using an 18 gauge needle from a lateral intercostal approach  A 0 038 wire was advanced into the pleural cavity using fluoroscopic guidance  After tract dilation, an 8 [employer] [place] all-purpose catheter was advanced into the cavity  The catheter was secured in place and placed to wall suction with a Pleur-evac  The patient tolerated the procedure well without apparent immediate complications  Findings: Layering left hydropneumothorax  Surprisingly the pneumothorax component was not apical but was inferolateral   Subsequently a lateral intercostal approach was used for needle placement    The inner component was targeted  Needle, then wire, than catheter in the pleural space  On final imaging evacuation of the air with some residual pleural effusion  Specimens: None  Fluid was slowly beginning to return out of the tube as the patient was supine  As he sits up in bed more fluid can be drained and sent as a sample  Impression: Impression: CT-guided left chest tube placement via a lateral intercostal approach  Plan: Continuous low wall suction  Follow-up radiographs  Workstation performed: [id number]     Ct Abdomen Pelvis W Contrast    Result Date: 2/14/2019  Narrative: CT ABDOMEN AND PELVIS WITH IV CONTRAST INDICATION:   D37 8: 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  Follow-up of pancreatic cysts  COMPARISON:  CT abdomen pelvis 1/4/2019 TECHNIQUE:  CT examination of the abdomen and pelvis was performed  Axial, sagittal, and coronal 2D reformatted images were created from the source data and submitted for interpretation  Radiation dose length product (DLP) for this visit:  656 mGy-cm   This examination, like all CT scans performed in the [facility], was performed utilizing techniques to minimize radiation dose exposure, including the use of iterative reconstruction and automated exposure control  IV Contrast:  100 mL of iohexol (OMNIPAQUE) Enteric Contrast:  Enteric contrast was not administered  FINDINGS: ABDOMEN LOWER CHEST:  Partially imaged, at least moderate-sized, left anterior pneumothorax  Small left basilar effusion  Bilateral lower lobe airspace disease in keeping with pneumonia or atelectasis  Left anterior pneumothorax  LIVER/BILIARY TREE:  Stable 11 mm medial hepatic cysts  GALLBLADDER:  No calcified gallstones  No pericholecystic inflammatory change  SPLEEN:  Chronic heterogeneous enhancement of the spleen  Numerous surgical clips at the splenic hilum   PANCREAS:  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 #601/53 and # 331 through 43 pancreatic tail pseudocyst is resolved  Cystic areas in the pancreatic neck/head are resolved  Persistent prominent pancreatic ductal dilation measuring up to 13 mm  ADRENAL GLANDS:  Unremarkable  KIDNEYS/URETERS:  Stable renal cysts  No hydronephrosis  STOMACH AND BOWEL:  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 as described above  No bowel obstruction  Jejunostomy tube is in place  APPENDIX:  No findings to suggest appendicitis  ABDOMINOPELVIC CAVITY:  No prominent quantity of ascites  Small amount of free fluid at the splenic hilum  VESSELS:  Chronic splenic vein thrombus  PELVIS REPRODUCTIVE ORGANS:  Unremarkable for patient's age  URINARY BLADDER:  Unremarkable  ABDOMINAL WALL/INGUINAL REGIONS:  Unremarkable  OSSEOUS STRUCTURES:  No acute fracture or destructive osseous lesion  Impression: Partially imaged, at least moderate-sized, left anterior pneumothorax  Small left basilar effusion  Bilateral lower lobe airspace disease in keeping with pneumonia or atelectasis  Left anterior pneumothorax  Persistent widely patent fistula tract between the posterior wall of the stomach and the pancreatic duct  Dilated pancreatic duct measuring up to 13 mm  Pancreatic pseudocysts have resolved  Small amount of fluid at the splenic hilum has a nonloculated appearance Jejunostomy remains in place  I personally discussed this study with Eitan Carreno on 2/14/2019 at 2:29 PM  Workstation performed: [id number]     I reviewed the above laboratory and imaging data  Discussion/Summary:  82-year-old male status post J-tube placement  He underwent exploration in November of 2018 and was ultimately found to have chronic pancreatitis  He had a pseudocyst develop that has drained spontaneously into his stomach  He will continue his tube feeds at night  He is being discharged from rehab at the end of next week  I will see him again in 1 month    If he is able to maintain his weight without any tube feeds supplements we can't pull his feeding tube  He is agreeable to this  All his questions were answered

## 2019-03-05 NOTE — PROGRESS NOTES
Surgical Oncology Follow Up       [address] [facility],[address]  [facility] [address] [facility]  1935  [id number]  [address] [facility],[address] [place] [address]  [facility] [address] [address]    Diagnoses and all orders for this visit:    Chronic pancreatitis, unspecified pancreatitis type [facility])        Chief Complaint   Patient presents with    Follow-up     2 wk  pancreatitis  F/U       No follow-ups on file  No history exists  History of Present Illness:  Patient returns in follow-up  He was recently hospitalized with a pneumothorax  This has then resolved  He is using his feeding tube at night for feeds and is now on soft food  He is feeling much better and he denies any abdominal pain  Review of Systems  Complete ROS Surg Onc:   Complete ROS Surg Onc:   Constitutional: The patient denies new or recent history of general fatigue, no recent weight loss, no change in appetite  Eyes: No complaints of visual problems, no scleral icterus  ENT: no complaints of ear pain, no hoarseness, no difficulty swallowing,  no tinnitus and no new masses in head, oral cavity, or neck  Cardiovascular: No complaints of chest pain, no palpitations, no ankle edema  Respiratory: No complaints of shortness of breath, no cough  Gastrointestinal: No complaints of jaundice, no bloody stools, no pale stools  Genitourinary: No complaints of dysuria, no hematuria, no nocturia, no frequent urination, no urethral discharge  Musculoskeletal: No complaints of weakness, paralysis, joint stiffness or arthralgias  Integumentary: No complaints of rash, no new lesions  Neurological: No complaints of convulsions, no seizures, no dizziness  Hematologic/Lymphatic: No complaints of easy bruising  Endocrine:  No hot or cold intolerance    No polydipsia, polyphagia, or polyuria  Allergy/immunology:  No environmental allergies  No food allergies  Not immunocompromised  Skin:  No pallor or rash  No wound  Patient Active Problem List   Diagnosis    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    Esophagitis    Diabetes mellitus type 2    Weight loss    Chronic pancreatitis (HCC)    Oropharyngeal dysphagia    Moderate protein-calorie malnutrition     Abnormal SPEP    Severe protein-calorie malnutrition (HCC)    Dysphagia    Disorder of upper esophageal sphincter    Gastric ulcer    Pancreatic pseudocyst    Pneumothorax    Pancreatic fistula    Cachexia ([address]    Failure to thrive in adult    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[address] )    Transaminitis     Past Medical History:   Diagnosis Date    Weight loss      Past Surgical History:   Procedure Laterality Date    CT GUIDED CHEST TUBE  2/15/2019    GASTROSTOMY TUBE PLACEMENT N/A 1/14/2019    Procedure: INSERTION PEG TUBE;  Surgeon: Cyndy Benitez MD;  [facility]; Service: Gastroenterology    GASTROSTOMY TUBE PLACEMENT N/A 1/15/2019    Procedure: INSERTION JEJUNOSTOMY TUBE OPEN;  Surgeon: Howie Locke MD;  [facility];  Service: Surgical Oncology    LAPAROTOMY N/A 11/19/2018    Procedure: LAPAROTOMY EXPLORATORY;  Surgeon: Howie Locke MD;  [facility];  Service: Surgical Oncology    WA EDG US EXAM SURGICAL ALTER STOM DUODENUM/JEJUNUM N/A 9/21/2018    Procedure: LINEAR ENDOSCOPIC U/S;  Surgeon: Toni Mcgrath MD;  [facility];   Service: Gastroenterology    WA LAP,DIAGNOSTIC ABDOMEN N/A 11/19/2018    Procedure: pancreatic biopsy;  Surgeon: Howie Locke MD;  [facility];  Service: Surgical Oncology    TONSILLECTOMY      VASCULAR SURGERY      phlebitis many years ago    WRIST GANGLION EXCISION       Family History   Problem Relation Age of Onset    No Known Problems Mother     No Known Problems Father     Pancreatic cancer Brother 61    Breast cancer Daughter 39 45s     Social History     Socioeconomic History    Marital status: /Civil Union     Spouse name: Not on file    Number of children: Not on file    Years of education: Not on file    Highest education level: Not on file   Occupational History    Not on file   Social Needs    Financial resource strain: Not on file    Food insecurity:     Worry: Not on file     Inability: Not on file    Transportation needs:     Medical: Not on file     Non-medical: Not on file   Tobacco Use    Smoking status: Former Smoker     Packs/day: 1 00     Years: 25 00     Pack years: 25 00    Smokeless tobacco: Never Used    Tobacco comment: quit about 20 years ago as of 9/21/2018   Substance and Sexual Activity    Alcohol use: Yes     Comment: 1 beer a day    Drug use: No    Sexual activity: Not on file   Lifestyle    Physical activity:     Days per week: Not on file     Minutes per session: Not on file    Stress: Not on file   Relationships    Social connections:     Talks on phone: Not on file     Gets together: Not on file     Attends [employer] service: Not on file     Active member of club or organization: Not on file     Attends meetings of clubs or organizations: Not on file     Relationship status: Not on file    Intimate partner violence:     Fear of current or ex partner: Not on file     Emotionally abused: Not on file     Physically abused: Not on file     Forced sexual activity: Not on file   Other Topics Concern    Not on file   Social History Narrative    Not on file       Current Outpatient Medications:     acetaminophen (TYLENOL) 160 mg/5 mL suspension, 20 3 mL (650 mg total) by Per J Tube route every 6 (six) hours as needed for mild pain, headaches or fever, Disp: 118 mL, Rfl: 0    aspirin 81 mg chewable tablet, 1 tablet (81 mg total) by Per J Tube route daily, Disp: , Rfl: 0    insulin degludec (TRESIBA FLEXTOUCH) 100 units/mL injection pen, Inject 8 Units under the skin daily at bedtime, Disp: , Rfl:     insulin lispro (HumaLOG) 100 units/mL injection, Inject 1-5 Units under the skin every 6 (six) hours, Disp: , Rfl: 0    lidocaine (LIDODERM) 5 %, Apply 1 patch topically daily Remove & Discard patch within 12 hours or as directed by MD, Disp: 30 patch, Rfl: 0    Nutritional Supplements (JEVITY 1 2 KARL) LIQD, Give through J tube at 118 mL/hr with free water flushes 125 mL q6h from 8 pm to 8 AM, Disp: 1000 mL, Rfl: 0    omeprazole (PriLOSEC) 20 mg delayed release capsule, Take 2 capsules (40 mg total) by mouth daily Please give through J tube, Disp: 30 capsule, Rfl: 0    polyvinyl alcohol (LIQUIFILM TEARS) 1 4 % ophthalmic solution, Administer 1 drop to both eyes as needed for dry eyes, Disp: 15 mL, Rfl: 0  No Known Allergies  Vitals:    03/05/19 0934   BP: 132/78   Pulse: 67   Resp: 15   Temp: 97 5 °F (36 4 °C)       Physical Exam  Constitutional: General appearance: The Patient is well-developed and well-nourished who appears the stated age in no acute distress  Patient is pleasant and talkative  HEENT:  Normocephalic  Sclerae are anicteric  Mucous membranes are moist  Neck is supple without adenopathy  No JVD  Chest: The lungs are clear to auscultation  Cardiac: Heart is regular rate  Abdomen: Abdomen is soft, non-tender, non-distended and without masses  J-tube is in place  Extremities: There is no clubbing or cyanosis  There is no edema  Symmetric  Neuro: Grossly nonfocal   He is in a wheelchair     Skin: Warm, anicteric  Psych:  Patient is pleasant and talkative  Breasts:        Pathology:  [unfilled]    Labs:      Imaging  Xr Chest Pa & Lateral    Result Date: 2/18/2019  Narrative: CHEST INDICATION:   L pneumothorax  s/p Chest tube removal  COMPARISON:  February 17, 2019 at 1118 hours EXAM PERFORMED/VIEWS:  XR CHEST PA & LATERAL  The frontal view was performed utilizing dual energy radiographic technique   Images: 4 FINDINGS:  The small caliber left-sided chest tube was withdrawn  Minimal trace apical pneumothorax visible  Small pleural effusion or pleural thickening persists at the left lung base  Stable linear airspace opacity left midlung  Stable right basilar airspace opacity medially  Cardiac size within normal limits  No vascular congestion or peribronchial thickening  Bony structures remain grossly intact  Impression: There is a tiny trace pneumothorax at left lung apex status post chest tube removal  Follow-up advised  Exam otherwise stable  The study was marked in [facility] for immediate notification  Workstation performed: [id number]     Xr Chest Pa & Lateral    Result Date: 2/17/2019  Narrative: CHEST INDICATION:   Follow-up pneumothorax  COMPARISON:  2/16/2019 EXAM PERFORMED/VIEWS:  XR CHEST PA & LATERAL  The frontal view was performed utilizing dual energy radiographic technique  FINDINGS: Cardiomediastinal silhouette appears unremarkable  There is a stable left basilar chest tube without residual pneumothorax  A small pleural effusion is seen  There is stable left mid and right basilar airspace opacity  Osseous structures appear within normal limits for patient age  Impression: 1  No left-sided pneumothorax visualized  Chest tube is in place  2   Stable small left pleural effusion with left mid lung and right lower lobe airspace opacity  Workstation performed: [id number]     Xr Chest Pa & Lateral    Result Date: 2/17/2019  Narrative: CHEST INDICATION:   Pneumothorax  COMPARISON:  Chest radiographs February 15, 2019 EXAM PERFORMED/VIEWS:  XR CHEST PA & LATERAL  The frontal view was performed utilizing dual energy radiographic technique  Images: 4 FINDINGS: Cardiomediastinal silhouette appears unremarkable  There has been placement of a small bore left-sided chest tube in the left lower lobe  There has been resolution of the left-sided hydropneumothorax    Stable infiltrates are present in the lower lobes bilaterally, left side greater than right  Osseous structures appear within normal limits for patient age  Impression: 1  Resolution of left-sided hydropneumothorax, status post small bore chest tube placement  2   Persistent bilateral lower lobe infiltrates  Workstation performed: [id number]     Xr Chest Pa & Lateral    Result Date: 2/15/2019  Narrative: CHEST INDICATION:   pneumothorax  COMPARISON:  Chest x-ray from 12/14/2019  EXAM PERFORMED/VIEWS:  XR CHEST PA & LATERAL FINDINGS: Cardiomediastinal silhouette appears unremarkable  Small to moderate left hydropneumothorax is unchanged compared to 2/14/2019  Linear atelectasis or scars again seen in the left midlung zone  Osseous structures appear within normal limits for patient age  Impression: Small to moderate left hydropneumothorax is unchanged compared to 2/14/2019  Workstation performed: [id number]     Xr Chest Pa & Lateral    Result Date: 2/15/2019  Narrative: CHEST INDICATION:   Left-sided pneumothorax  COMPARISON:  1/10/2019, CT chest 1/5/2019 and report of CT abdomen and pelvis 2/13/2019 EXAM PERFORMED/VIEWS:  XR CHEST FRONTAL & LATERAL FINDINGS: Cardiomediastinal silhouette appears unremarkable  There is a small left hydrothorax  Partial volume loss left lung base with improved aeration compared to the prior study  Right basilar atelectasis or infiltrate  Left perihilar atelectasis or infiltrate, aeration improving  Jejunostomy tube noted in the left mid abdomen  Surgical clips left upper quadrant  Osseous structures appear within normal limits for patient age  Impression: Small left hydropneumothorax  Bibasilar atelectasis or infiltrates with improved left perihilar/basilar aeration  Workstation performed: [id number]     Ct Guided Chest Tube    Result Date: 2/15/2019  Narrative: CT GUIDED CHEST TUBE History: Symptomatic left hydropneumothorax of unclear etiology  This has not resolved with conservative management    Drainage is indicated to resolve hydropneumothorax, evaluate for etiology, and prevent progression to tension pneumothorax  Sedation time: 27 minutes Procedure: After explaining the risks and benefits of the procedure to the patient, informed consent was obtained  CT was used to localize the left hydropneumothorax   Radiation dose length product (DLP) for this visit:  1180 84 mGy-cm   This examination, like all CT scans performed in the [facility], was performed utilizing techniques to minimize radiation dose exposure, including the use of iterative reconstruction and automated exposure control  Procedure: The patient was identified verbally and by wristband  Timeout was performed  Following obtaining informed consent, the patient was prepped and draped in the usual sterile fashion  Using serial CT guidance access was gained to the patient's left pleural space using an 18 gauge needle from a lateral intercostal approach  A 0 038 wire was advanced into the pleural cavity using fluoroscopic guidance  After tract dilation, an 8 [employer] [place] all-purpose catheter was advanced into the cavity  The catheter was secured in place and placed to wall suction with a Pleur-evac  The patient tolerated the procedure well without apparent immediate complications  Findings: Layering left hydropneumothorax  Surprisingly the pneumothorax component was not apical but was inferolateral   Subsequently a lateral intercostal approach was used for needle placement  The inner component was targeted  Needle, then wire, than catheter in the pleural space  On final imaging evacuation of the air with some residual pleural effusion  Specimens: None  Fluid was slowly beginning to return out of the tube as the patient was supine  As he sits up in bed more fluid can be drained and sent as a sample  Impression: Impression: CT-guided left chest tube placement via a lateral intercostal approach  Plan: Continuous low wall suction  Follow-up radiographs   Workstation performed: [id number]     Ct Abdomen Pelvis W Contrast    Result Date: 2/14/2019  Narrative: CT ABDOMEN AND PELVIS WITH IV CONTRAST INDICATION:   D37 8: 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  Follow-up of pancreatic cysts  COMPARISON:  CT abdomen pelvis 1/4/2019 TECHNIQUE:  CT examination of the abdomen and pelvis was performed  Axial, sagittal, and coronal 2D reformatted images were created from the source data and submitted for interpretation  Radiation dose length product (DLP) for this visit:  656 mGy-cm   This examination, like all CT scans performed in the [facility], was performed utilizing techniques to minimize radiation dose exposure, including the use of iterative reconstruction and automated exposure control  IV Contrast:  100 mL of iohexol (OMNIPAQUE) Enteric Contrast:  Enteric contrast was not administered  FINDINGS: ABDOMEN LOWER CHEST:  Partially imaged, at least moderate-sized, left anterior pneumothorax  Small left basilar effusion  Bilateral lower lobe airspace disease in keeping with pneumonia or atelectasis  Left anterior pneumothorax  LIVER/BILIARY TREE:  Stable 11 mm medial hepatic cysts  GALLBLADDER:  No calcified gallstones  No pericholecystic inflammatory change  SPLEEN:  Chronic heterogeneous enhancement of the spleen  Numerous surgical clips at the splenic hilum  PANCREAS:  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 #601/53 and # 331 through 43 pancreatic tail pseudocyst is resolved  Cystic areas in the pancreatic neck/head are resolved  Persistent prominent pancreatic ductal dilation measuring up to 13 mm  ADRENAL GLANDS:  Unremarkable  KIDNEYS/URETERS:  Stable renal cysts  No hydronephrosis  STOMACH AND BOWEL:  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 as described above  No bowel obstruction  Jejunostomy tube is in place  APPENDIX:  No findings to suggest appendicitis   ABDOMINOPELVIC CAVITY:  No prominent quantity of ascites  Small amount of free fluid at the splenic hilum  VESSELS:  Chronic splenic vein thrombus  PELVIS REPRODUCTIVE ORGANS:  Unremarkable for patient's age  URINARY BLADDER:  Unremarkable  ABDOMINAL WALL/INGUINAL REGIONS:  Unremarkable  OSSEOUS STRUCTURES:  No acute fracture or destructive osseous lesion  Impression: Partially imaged, at least moderate-sized, left anterior pneumothorax  Small left basilar effusion  Bilateral lower lobe airspace disease in keeping with pneumonia or atelectasis  Left anterior pneumothorax  Persistent widely patent fistula tract between the posterior wall of the stomach and the pancreatic duct  Dilated pancreatic duct measuring up to 13 mm  Pancreatic pseudocysts have resolved  Small amount of fluid at the splenic hilum has a nonloculated appearance Jejunostomy remains in place  I personally discussed this study with Gary Mg on 2/14/2019 at 2:29 PM  Workstation performed: [id number]     I reviewed the above laboratory and imaging data  Discussion/Summary:  82-year-old male status post J-tube placement  He underwent exploration in November of 2018 and was ultimately found to have chronic pancreatitis  He had a pseudocyst develop that has drained spontaneously into his stomach  He will continue his tube feeds at night  He is being discharged from rehab at the end of next week  I will see him again in 1 month  If he is able to maintain his weight without any tube feeds supplements we can't pull his feeding tube  He is agreeable to this  All his questions were answered

## 2019-03-11 ENCOUNTER — OFFICE VISIT (OUTPATIENT)
Dept: GASTROENTEROLOGY | Facility: MEDICAL CENTER | Age: 84
End: 2019-03-11
Payer: MEDICARE

## 2019-03-11 ENCOUNTER — TELEPHONE (OUTPATIENT)
Dept: GASTROENTEROLOGY | Facility: AMBULARY SURGERY CENTER | Age: 84
End: 2019-03-11

## 2019-03-11 VITALS
DIASTOLIC BLOOD PRESSURE: 64 MMHG | HEIGHT: 67 IN | SYSTOLIC BLOOD PRESSURE: 116 MMHG | WEIGHT: 112 LBS | HEART RATE: 65 BPM | BODY MASS INDEX: 17.58 KG/M2 | TEMPERATURE: 97.5 F

## 2019-03-11 DIAGNOSIS — R13.12 OROPHARYNGEAL DYSPHAGIA: Primary | Chronic | ICD-10-CM

## 2019-03-11 DIAGNOSIS — K21.9 GASTROESOPHAGEAL REFLUX DISEASE WITHOUT ESOPHAGITIS: ICD-10-CM

## 2019-03-11 DIAGNOSIS — K86.3 PANCREATIC PSEUDOCYST: ICD-10-CM

## 2019-03-11 DIAGNOSIS — E44.0 MODERATE PROTEIN-CALORIE MALNUTRITION (HCC): ICD-10-CM

## 2019-03-11 DIAGNOSIS — K86.1 IDIOPATHIC CHRONIC PANCREATITIS (HCC): ICD-10-CM

## 2019-03-11 PROCEDURE — 99213 OFFICE O/P EST LOW 20 MIN: CPT | Performed by: INTERNAL MEDICINE

## 2019-03-11 NOTE — ASSESSMENT & PLAN NOTE
Symptoms controlled on omeprazole  If he is unable to tolerate the omeprazole by mouth then he can try omeprazole suspension or Prevacid solu tabs

## 2019-03-11 NOTE — TELEPHONE ENCOUNTER
[facility] called requesting more info regarding pt ct scan  Caregiver would like to know if pt need another ct scan other than what is order by other providers      [address] Felice Omalley

## 2019-03-11 NOTE — TELEPHONE ENCOUNTER
I spoke with can treatment dose, they were unsure who called us  I will discuss with Dr Jessica Galarza if he would like CT scan abdomen and pelvis does 1 of the chest and neck is ordered only    Ella Correia

## 2019-03-11 NOTE — ASSESSMENT & PLAN NOTE
Improving  Continue swallow therapy  I would recommend to continue swallow therapy when after he is discharged from skilled nursing facility/rehab  If he is able to tolerate enough by mouth and is not using his J-tube for at least 1 month then he can be evaluated for removal   He will be following up with Dr Becca Bell in regards to that

## 2019-03-11 NOTE — PROGRESS NOTES
Outpatient Follow up  [address] 71  [address] [facility]  Aleksey Green MD  Ph : [phone]  Fax : [phone]  Mobile : [phone]  Email : [email or web address]  Also available on [address] 82 y o  male MRN: [id number]    PCP: Michaeline Galeazzi, DO  Referring: No referring provider defined for this encounter  Chuy Navarro presented for a follow up visit  My recommendations are included  Please do not hesitate to contact me with any questions you may have  ASSESSMENT AND PLAN:      Oropharyngeal dysphagia  Improving  Continue swallow therapy  I would recommend to continue swallow therapy when after he is discharged from skilled nursing facility/rehab  If he is able to tolerate enough by mouth and is not using his J-tube for at least 1 month then he can be evaluated for removal   He will be following up with Dr Juni June in regards to that  Chronic pancreatitis ([facility] [address] )  Unclear as to what the etiology of his pancreatitis was  However he did perform a pseudocyst which has spontaneously fistulizing into the stomach and most recent CT scan shows resolution of this pseudocyst   He will be having a repeat CT scan in the next few weeks  Pancreatic pseudocyst  Resolved at this time  Repeat CT scan in the next few weeks  Moderate protein-calorie malnutrition   Continue both oral feeds in tube feeds  Evaluation by nutrition ongoing  Gastroesophageal reflux disease without esophagitis  Symptoms controlled on omeprazole  If he is unable to tolerate the omeprazole by mouth then he can try omeprazole suspension or Prevacid solu tabs        Diagnoses and all orders for this visit:    Oropharyngeal dysphagia    Idiopathic chronic pancreatitis ([facility] [address] )    Pancreatic pseudocyst    Moderate protein-calorie malnutrition     Gastroesophageal reflux disease without esophagitis      ______________________________________________________________________    SUBJECTIVE:    46-year-old gentleman with a history of pancreatic pseudocyst/pancreatic duct dilation at the tail of the pancreas this has been sampled in the past   No conclusive evidence of malignancy  He even went through surgical biopsy which again showed chronic pancreatitis  He did developed worsening of his pseudocyst which then spontaneously for fistulized to the stomach  He is not having any abdominal pain  The past 2 months he had 2 hospital stays  One regarding aspiration pneumonia  He had sudden onset of oropharyngeal dysphagia the cause for which is not ascertain at this point  It is thought that he may have had myasthenia gravis  He is seeing speech and is doing better  He is able to tolerate more by mouth  A J-tube was inserted since a PEG tube could not be done due to superior location of the stomach behind the ribcage  He is tolerating tube feeds well  He is getting them 6 hours at night  He is seeing nutrition and being considered for discontinuation of tube feeds by the end of the week  He was then again admitted for pneumothorax  This was treated with chest tube  He is doing well from that standpoint  Barium swallow : 1/19  Pt cont to present c moderate oral and severe pharyngeal dysphagia characterized by weak tongue propulsion, minimal hyolaryngeal excursion, minimal to no epiglottic inversion, weak pharyngeal constriction and significantly decreased opening of the UES  Pt given trials of thin liquid, NTL and puree applesauce by tsp c premature spillage and pt utilizing multiple swallows to attempt to transfer/clear material into the esophagus  Decreased retention of thinner material noted but pt cont to have retention of even thin liquid c minimal passage into the esophagus c multiple swallows   With minimal epiglottic inversion, pt is at risk for penetration/aspiration during the swallow  With pharyngeal retention, pt is at risk for overflow penetration/aspiration  Pt noted to have episodes of penetration during this study  Pt continues to be a high aspiration risk with this study being grossly unchanged from previous VBS on 1/5/19  Recommend alternative means of nutrition at this time  REVIEW OF SYSTEMS IS OTHERWISE NEGATIVE  Historical Information   Past Medical History:   Diagnosis Date    Weight loss      Past Surgical History:   Procedure Laterality Date    CT GUIDED CHEST TUBE  2/15/2019    GASTROSTOMY TUBE PLACEMENT N/A 1/14/2019    Procedure: INSERTION PEG TUBE;  Surgeon: Amarilys Schaeffer MD;  [facility]: [facility]; Service: Gastroenterology    GASTROSTOMY TUBE PLACEMENT N/A 1/15/2019    Procedure: INSERTION JEJUNOSTOMY TUBE OPEN;  Surgeon: Yaritza Kim MD;  [facility];  Service: Surgical Oncology    LAPAROTOMY N/A 11/19/2018    Procedure: LAPAROTOMY EXPLORATORY;  Surgeon: Yaritza Kim MD;  [facility];  Service: Surgical Oncology    ME EDG US EXAM SURGICAL ALTER STOM DUODENUM/JEJUNUM N/A 9/21/2018    Procedure: LINEAR ENDOSCOPIC U/S;  Surgeon: Derrick Bassett MD;  [facility];   Service: Gastroenterology    ME LAP,DIAGNOSTIC ABDOMEN N/A 11/19/2018    Procedure: pancreatic biopsy;  Surgeon: Yaritza Kim MD;  [facility];  Service: Surgical Oncology    TONSILLECTOMY      VASCULAR SURGERY      phlebitis many years ago    WRIST GANGLION EXCISION       Social History   Social History     Substance and Sexual Activity   Alcohol Use Yes    Comment: 1 beer a day     Social History     Substance and Sexual Activity   Drug Use No     Social History     Tobacco Use   Smoking Status Former Smoker    Packs/day: 1 00    Years: 25 00    Pack years: 25 00   Smokeless Tobacco Never Used   Tobacco Comment    quit about 20 years ago as of 9/21/2018     Family History   Problem Relation Age of Onset    No Known Problems Mother     No Known Problems Father     Pancreatic cancer Brother 61    Breast cancer Daughter 36        38s       Meds/Allergies       Current Outpatient Medications:     acetaminophen (TYLENOL) 160 mg/5 mL suspension    aspirin 81 mg chewable tablet    insulin degludec (TRESIBA FLEXTOUCH) 100 units/mL injection pen    insulin lispro (HumaLOG) 100 units/mL injection    lidocaine (LIDODERM) 5 %    Nutritional Supplements (JEVITY 1 2 KARL) LIQD    omeprazole (PriLOSEC) 20 mg delayed release capsule    polyvinyl alcohol (LIQUIFILM TEARS) 1 4 % ophthalmic solution    No Known Allergies        Objective     Blood pressure 116/64, pulse 65, temperature 97 5 °F (36 4 °C), height 5' 7" (1 702 m), weight 50 8 kg (112 lb)  Body mass index is 17 54 kg/m²  PHYSICAL EXAM:      Physical Exam   Constitutional: He is oriented to person, place, and time  Vital signs are normal  He appears well-developed  Malnourished  HENT:   Head: Normocephalic and atraumatic  Eyes: Pupils are equal, round, and reactive to light  Conjunctivae are normal  No scleral icterus  Neck: Normal range of motion  Cardiovascular: Normal rate, regular rhythm and normal heart sounds  Pulmonary/Chest: Effort normal and breath sounds normal  No respiratory distress  Abdominal: Soft  Normal appearance and bowel sounds are normal  He exhibits no distension, no ascites and no mass  There is no hepatosplenomegaly  There is no tenderness  No hernia  J-tube in place  Musculoskeletal:   In a wheelchair at this time  Lymphadenopathy:     He has no cervical adenopathy  Neurological: He is alert and oriented to person, place, and time  Skin: Skin is warm  Psychiatric: He has a normal mood and affect  His behavior is normal  Thought content normal        Lab Results:   No visits with results within 1 Day(s) from this visit     Latest known visit with results is:   Admission on 02/14/2019, Discharged on 02/18/2019   Component Date Value    WBC 02/15/2019 7 56     RBC 02/15/2019 3 15*    Hemoglobin 02/15/2019 9 9*    Hematocrit 02/15/2019 31 2*    MCV 02/15/2019 99*    MCH 02/15/2019 31 4     MCHC 02/15/2019 31 7     RDW 02/15/2019 15 8*    MPV 02/15/2019 11 2     Platelets 37/08/3608 230     nRBC 02/15/2019 0     Neutrophils Relative 02/15/2019 65     Immat GRANS % 02/15/2019 0     Lymphocytes Relative 02/15/2019 16     Monocytes Relative 02/15/2019 9     Eosinophils Relative 02/15/2019 9*    Basophils Relative 02/15/2019 1     Neutrophils Absolute 02/15/2019 5 03     Immature Grans Absolute 02/15/2019 0 02     Lymphocytes Absolute 02/15/2019 1 17     Monocytes Absolute 02/15/2019 0 66     Eosinophils Absolute 02/15/2019 0 64*    Basophils Absolute 02/15/2019 0 04     Sodium 02/15/2019 139     Potassium 02/15/2019 4 2     Chloride 02/15/2019 104     CO2 02/15/2019 31     ANION GAP 02/15/2019 4     BUN 02/15/2019 19     Creatinine 02/15/2019 0 46*    Glucose 02/15/2019 143*    Calcium 02/15/2019 7 9*    eGFR 02/15/2019 104     POC Glucose 02/14/2019 91     Protime 02/15/2019 13 4     INR 02/15/2019 1 01     PTT 02/15/2019 27     POC Glucose 02/15/2019 184*    Sodium 02/15/2019 138     Potassium 02/15/2019 4 3     Chloride 02/15/2019 104     CO2 02/15/2019 29     ANION GAP 02/15/2019 5     BUN 02/15/2019 17     Creatinine 02/15/2019 0 47*    Glucose 02/15/2019 175*    Calcium 02/15/2019 8 1*    AST 02/15/2019 67*    ALT 02/15/2019 151*    Alkaline Phosphatase 02/15/2019 94     Total Protein 02/15/2019 6 4     Albumin 02/15/2019 2 4*    Total Bilirubin 02/15/2019 0 21     eGFR 02/15/2019 104     Phosphorus 02/15/2019 3 8     Magnesium 02/15/2019 2 3     WBC 02/15/2019 7 89     RBC 02/15/2019 3 19*    Hemoglobin 02/15/2019 10 0*    Hematocrit 02/15/2019 32 0*    MCV 02/15/2019 100*    MCH 02/15/2019 31 3     MCHC 02/15/2019 31 3*    RDW 02/15/2019 15 8*    MPV 02/15/2019 11 8     Platelets 24/28/1054 229  nRBC 02/15/2019 0     Neutrophils Relative 02/15/2019 67     Immat GRANS % 02/15/2019 0     Lymphocytes Relative 02/15/2019 14     Monocytes Relative 02/15/2019 9     Eosinophils Relative 02/15/2019 9*    Basophils Relative 02/15/2019 1     Neutrophils Absolute 02/15/2019 5 33     Immature Grans Absolute 02/15/2019 0 02     Lymphocytes Absolute 02/15/2019 1 13     Monocytes Absolute 02/15/2019 0 70     Eosinophils Absolute 02/15/2019 0 67*    Basophils Absolute 02/15/2019 0 04     POC Glucose 02/15/2019 191*    POC Glucose 02/15/2019 134     Amylase, Fluid 02/15/2019 11     POC Glucose 02/15/2019 114     POC Glucose 02/15/2019 108     POC Glucose 02/16/2019 231*    POC Glucose 02/16/2019 217*    POC Glucose 02/16/2019 152*    POC Glucose 02/16/2019 113     POC Glucose 02/16/2019 240*    Sodium 02/17/2019 136     Potassium 02/17/2019 4 5     Chloride 02/17/2019 103     CO2 02/17/2019 26     ANION GAP 02/17/2019 7     BUN 02/17/2019 17     Creatinine 02/17/2019 0 44*    Glucose 02/17/2019 175*    Calcium 02/17/2019 8 0*    AST 02/17/2019 45     ALT 02/17/2019 101*    Alkaline Phosphatase 02/17/2019 94     Total Protein 02/17/2019 6 3*    Albumin 02/17/2019 2 3*    Total Bilirubin 02/17/2019 0 25     eGFR 02/17/2019 106     Hepatitis B Surface Ag 02/17/2019 Non-reactive     Hep A IgM 02/17/2019 Non-reactive     Hepatitis C Ab 02/17/2019 Non-reactive     Hep B C IgM 02/17/2019 Non-reactive     POC Glucose 02/17/2019 207*    POC Glucose 02/17/2019 115     POC Glucose 02/17/2019 108     POC Glucose 02/17/2019 121     POC Glucose 02/18/2019 222*    Sodium 02/18/2019 137     Potassium 02/18/2019 4 2     Chloride 02/18/2019 105     CO2 02/18/2019 27     ANION GAP 02/18/2019 5     BUN 02/18/2019 20     Creatinine 02/18/2019 0 49*    Glucose 02/18/2019 193*    Calcium 02/18/2019 7 9*    AST 02/18/2019 31     ALT 02/18/2019 80*    Alkaline Phosphatase 02/18/2019 91  Total Protein 02/18/2019 6 2*    Albumin 02/18/2019 2 2*    Total Bilirubin 02/18/2019 0 26     eGFR 02/18/2019 102     POC Glucose 02/18/2019 162*    POC Glucose 02/18/2019 108          Radiology Results:   Xr Chest Pa & Lateral    Result Date: 2/18/2019  Narrative: CHEST INDICATION:   L pneumothorax  s/p Chest tube removal  COMPARISON:  February 17, 2019 at 1118 hours EXAM PERFORMED/VIEWS:  XR CHEST PA & LATERAL  The frontal view was performed utilizing dual energy radiographic technique  Images: 4 FINDINGS:  The small caliber left-sided chest tube was withdrawn  Minimal trace apical pneumothorax visible  Small pleural effusion or pleural thickening persists at the left lung base  Stable linear airspace opacity left midlung  Stable right basilar airspace opacity medially  Cardiac size within normal limits  No vascular congestion or peribronchial thickening  Bony structures remain grossly intact  Impression: There is a tiny trace pneumothorax at left lung apex status post chest tube removal  Follow-up advised  Exam otherwise stable  The study was marked in [facility] for immediate notification  Workstation performed: [id number]     Xr Chest Pa & Lateral    Result Date: 2/17/2019  Narrative: CHEST INDICATION:   Follow-up pneumothorax  COMPARISON:  2/16/2019 EXAM PERFORMED/VIEWS:  XR CHEST PA & LATERAL  The frontal view was performed utilizing dual energy radiographic technique  FINDINGS: Cardiomediastinal silhouette appears unremarkable  There is a stable left basilar chest tube without residual pneumothorax  A small pleural effusion is seen  There is stable left mid and right basilar airspace opacity  Osseous structures appear within normal limits for patient age  Impression: 1  No left-sided pneumothorax visualized  Chest tube is in place  2   Stable small left pleural effusion with left mid lung and right lower lobe airspace opacity   Workstation performed: [id number]     Xr Chest Pa & Lateral    Result Date: 2/17/2019  Narrative: CHEST INDICATION:   Pneumothorax  COMPARISON:  Chest radiographs February 15, 2019 EXAM PERFORMED/VIEWS:  XR CHEST PA & LATERAL  The frontal view was performed utilizing dual energy radiographic technique  Images: 4 FINDINGS: Cardiomediastinal silhouette appears unremarkable  There has been placement of a small bore left-sided chest tube in the left lower lobe  There has been resolution of the left-sided hydropneumothorax  Stable infiltrates are present in the lower lobes bilaterally, left side greater than right  Osseous structures appear within normal limits for patient age  Impression: 1  Resolution of left-sided hydropneumothorax, status post small bore chest tube placement  2   Persistent bilateral lower lobe infiltrates  Workstation performed: [id number]     Xr Chest Pa & Lateral    Result Date: 2/15/2019  Narrative: CHEST INDICATION:   pneumothorax  COMPARISON:  Chest x-ray from 12/14/2019  EXAM PERFORMED/VIEWS:  XR CHEST PA & LATERAL FINDINGS: Cardiomediastinal silhouette appears unremarkable  Small to moderate left hydropneumothorax is unchanged compared to 2/14/2019  Linear atelectasis or scars again seen in the left midlung zone  Osseous structures appear within normal limits for patient age  Impression: Small to moderate left hydropneumothorax is unchanged compared to 2/14/2019  Workstation performed: [id number]     Xr Chest Pa & Lateral    Result Date: 2/15/2019  Narrative: CHEST INDICATION:   Left-sided pneumothorax  COMPARISON:  1/10/2019, CT chest 1/5/2019 and report of CT abdomen and pelvis 2/13/2019 EXAM PERFORMED/VIEWS:  XR CHEST FRONTAL & LATERAL FINDINGS: Cardiomediastinal silhouette appears unremarkable  There is a small left hydrothorax  Partial volume loss left lung base with improved aeration compared to the prior study  Right basilar atelectasis or infiltrate  Left perihilar atelectasis or infiltrate, aeration improving  Jejunostomy tube noted in the left mid abdomen  Surgical clips left upper quadrant  Osseous structures appear within normal limits for patient age  Impression: Small left hydropneumothorax  Bibasilar atelectasis or infiltrates with improved left perihilar/basilar aeration  Workstation performed: [id number]     Ct Guided Chest Tube    Result Date: 2/15/2019  Narrative: CT GUIDED CHEST TUBE History: Symptomatic left hydropneumothorax of unclear etiology  This has not resolved with conservative management  Drainage is indicated to resolve hydropneumothorax, evaluate for etiology, and prevent progression to tension pneumothorax  Sedation time: 27 minutes Procedure: After explaining the risks and benefits of the procedure to the patient, informed consent was obtained  CT was used to localize the left hydropneumothorax   Radiation dose length product (DLP) for this visit:  1180 84 mGy-cm   This examination, like all CT scans performed in the [facility], was performed utilizing techniques to minimize radiation dose exposure, including the use of iterative reconstruction and automated exposure control  Procedure: The patient was identified verbally and by wristband  Timeout was performed  Following obtaining informed consent, the patient was prepped and draped in the usual sterile fashion  Using serial CT guidance access was gained to the patient's left pleural space using an 18 gauge needle from a lateral intercostal approach  A 0 038 wire was advanced into the pleural cavity using fluoroscopic guidance  After tract dilation, an 8 [employer] [place] all-purpose catheter was advanced into the cavity  The catheter was secured in place and placed to wall suction with a Pleur-evac  The patient tolerated the procedure well without apparent immediate complications  Findings: Layering left hydropneumothorax    Surprisingly the pneumothorax component was not apical but was inferolateral   Subsequently a lateral intercostal approach was used for needle placement  The inner component was targeted  Needle, then wire, than catheter in the pleural space  On final imaging evacuation of the air with some residual pleural effusion  Specimens: None  Fluid was slowly beginning to return out of the tube as the patient was supine  As he sits up in bed more fluid can be drained and sent as a sample  Impression: Impression: CT-guided left chest tube placement via a lateral intercostal approach  Plan: Continuous low wall suction  Follow-up radiographs  Workstation performed: [id number]     Ct Abdomen Pelvis W Contrast    Result Date: 2/14/2019  Narrative: CT ABDOMEN AND PELVIS WITH IV CONTRAST INDICATION:   D37 8: 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  Follow-up of pancreatic cysts  COMPARISON:  CT abdomen pelvis 1/4/2019 TECHNIQUE:  CT examination of the abdomen and pelvis was performed  Axial, sagittal, and coronal 2D reformatted images were created from the source data and submitted for interpretation  Radiation dose length product (DLP) for this visit:  656 mGy-cm   This examination, like all CT scans performed in the [facility], was performed utilizing techniques to minimize radiation dose exposure, including the use of iterative reconstruction and automated exposure control  IV Contrast:  100 mL of iohexol (OMNIPAQUE) Enteric Contrast:  Enteric contrast was not administered  FINDINGS: ABDOMEN LOWER CHEST:  Partially imaged, at least moderate-sized, left anterior pneumothorax  Small left basilar effusion  Bilateral lower lobe airspace disease in keeping with pneumonia or atelectasis  Left anterior pneumothorax  LIVER/BILIARY TREE:  Stable 11 mm medial hepatic cysts  GALLBLADDER:  No calcified gallstones  No pericholecystic inflammatory change  SPLEEN:  Chronic heterogeneous enhancement of the spleen  Numerous surgical clips at the splenic hilum   PANCREAS:  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 #601/53 and # 331 through 43 pancreatic tail pseudocyst is resolved  Cystic areas in the pancreatic neck/head are resolved  Persistent prominent pancreatic ductal dilation measuring up to 13 mm  ADRENAL GLANDS:  Unremarkable  KIDNEYS/URETERS:  Stable renal cysts  No hydronephrosis  STOMACH AND BOWEL:  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 as described above  No bowel obstruction  Jejunostomy tube is in place  APPENDIX:  No findings to suggest appendicitis  ABDOMINOPELVIC CAVITY:  No prominent quantity of ascites  Small amount of free fluid at the splenic hilum  VESSELS:  Chronic splenic vein thrombus  PELVIS REPRODUCTIVE ORGANS:  Unremarkable for patient's age  URINARY BLADDER:  Unremarkable  ABDOMINAL WALL/INGUINAL REGIONS:  Unremarkable  OSSEOUS STRUCTURES:  No acute fracture or destructive osseous lesion  Impression: Partially imaged, at least moderate-sized, left anterior pneumothorax  Small left basilar effusion  Bilateral lower lobe airspace disease in keeping with pneumonia or atelectasis  Left anterior pneumothorax  Persistent widely patent fistula tract between the posterior wall of the stomach and the pancreatic duct  Dilated pancreatic duct measuring up to 13 mm  Pancreatic pseudocysts have resolved  Small amount of fluid at the splenic hilum has a nonloculated appearance Jejunostomy remains in place    I personally discussed this study with Gerry Gaona on 2/14/2019 at 2:29 PM  Workstation performed: [id number]

## 2019-03-11 NOTE — ASSESSMENT & PLAN NOTE
Unclear as to what the etiology of his pancreatitis was  However he did perform a pseudocyst which has spontaneously fistulizing into the stomach and most recent CT scan shows resolution of this pseudocyst   He will be having a repeat CT scan in the next few weeks

## 2019-03-12 NOTE — TELEPHONE ENCOUNTER
Please let the nursing home not know we are adding abdomen and pelvis to his chest CT however unsure when this is currently scheduled but would recommend waiting until next month to do the abdomen and pelvis    Saturnino Potter

## 2019-03-13 NOTE — TELEPHONE ENCOUNTER
Spoke with Dima Longo @ central scheduling, she linked the CT scans for 3/28/2019  Shannon Lord [address], spoke with Marie Obando and relayed all information  She verbalized understanding

## 2019-03-13 NOTE — TELEPHONE ENCOUNTER
You can add those on at same time  We dont need to see him - I will call him once I have results  Thanks

## 2019-03-13 NOTE — TELEPHONE ENCOUNTER
Returned call, nursing home states patient is scheduled for 3/28 for a CT scan of chest as well as soft tissue neck  These were ordered by Dr Razia Valdivia and Dr Sergio Barraza  Would you like me to call central scheduling to add on the CT of abdomen and pelvis to the CT scans scheduled for 3/28? Also nursing home would like to know if patient needs a follow up scheduled or if you want to wait until the CT results are received?

## 2019-03-14 NOTE — TELEPHONE ENCOUNTER
Called Bobby @ [phone] to obtain prior authorization  Per Riki Cuellar, intake representative no prior authorization is required  No call ref number was given, [facility] said to use her name and date which is 3/14/2019

## 2019-03-21 ENCOUNTER — HOSPITAL ENCOUNTER (EMERGENCY)
Facility: HOSPITAL | Age: 84
Discharge: HOME | End: 2019-03-21
Attending: EMERGENCY MEDICINE
Payer: MEDICARE

## 2019-03-21 VITALS
BODY MASS INDEX: 18.05 KG/M2 | TEMPERATURE: 97.5 F | SYSTOLIC BLOOD PRESSURE: 141 MMHG | RESPIRATION RATE: 18 BRPM | HEART RATE: 62 BPM | OXYGEN SATURATION: 99 % | HEIGHT: 67 IN | WEIGHT: 115 LBS | DIASTOLIC BLOOD PRESSURE: 74 MMHG

## 2019-03-21 DIAGNOSIS — T85.528A JEJUNOSTOMY TUBE FELL OUT: Primary | ICD-10-CM

## 2019-03-21 PROCEDURE — 99282 EMERGENCY DEPT VISIT SF MDM: CPT

## 2019-03-22 NOTE — ED ATTENDING ATTESTATION
Bedside and Verbal shift change report given to Ella Giron RN (oncoming nurse) by Flores Hickey RN (offgoing nurse). Report included the following information SBAR, Kardex, ED Summary, Intake/Output, Recent Results, Med Rec Status and Cardiac Rhythm A-Paced. 5241: Notified Dr. Rodriguez Bears results of Labs;drawn at 00:52; Troponin increased from 0.16 to 0.32. No new orders at this time. Bedside and Verbal shift change report given to Kimber Cabrera RN (oncoming nurse) by Ella Giron RN (offgoing nurse). Report included the following information SBAR, Kardex, ED Summary, Intake/Output, Recent Results, Med Rec Status and Cardiac Rhythm A-Paced. IVishal DO, saw and evaluated the patient  I have discussed the patient with the resident/non-physician practitioner and agree with the resident's/non-physician practitioner's findings, Plan of Care, and MDM as documented in the resident's/non-physician practitioner's note, except where noted  All available labs and Radiology studies were reviewed  I was present for key portions of any procedure(s) performed by the resident/non-physician practitioner and I was immediately available to provide assistance  At this point I agree with the current assessment done in the Emergency Department  I have conducted an independent evaluation of this patient a history and physical is as follows:      Critical Care Time  Procedures     Jtube since January came out tonight when got caught  Pt stated that the tube is going to come out soon  He has been eating and gained 3# since last week  No pain since being pulleds out  Exm: Jtube held on by a stitch  No bleeding  Stitch removed  Length of tube compares with prior CT  Pln: contact surgeon to see if it can be left out

## 2019-03-22 NOTE — QUICK NOTE
Paged by EM Resident regarding Mr Britt Aviles  States it fell out earlier today, uncertain of exactly when  Has otherwise been feeling well  Patient had jejunostomy placed by Dr Gregorio Kim in January of this year  He was recently seen in the office by Dr Gregorio Kim with a plan to re-assess and potential remove the tube at his follow up appointment in early April  Patient states he has stopped tube feeds approximately 2 weeks ago and has only been flushing the tube  He is tolerating a nectar thick diet and has been maintaining/gaining weight  Patient instructed to keep tube out, cover site with gauze and follow up as scheduled with Dr Gregorio Kim  He should continue his nectar thick diet and call/return for evaluation if not tolerating PO intake or developing signs of infection  GEN: NAD  HEENT: MMM  CV: RRR  Lung: normal effort  Ab: Soft, NT/ND  J-tube site in left dylan-abdomen, tubing absent  No erythema or discharge appreciated  Extrem: No CCE  Neuro:  A+Ox3, motor and sensation grossly intact

## 2019-03-22 NOTE — DISCHARGE INSTRUCTIONS
Continue nectar thick diet  Follow up with Dr Francisca Valentin as planned  Return to the ER for fever, severe pain, worsening redness from tube site, purulent drainage from site, any other concerns

## 2019-03-22 NOTE — ED PROVIDER NOTES
History  Chief Complaint   Patient presents with    Feeding Tube Problem     Pt presents to ED due to feeding tube coming out  Pt states stitches have been torn and the physician was aware but today it came out  HPI  82 y o  Male with PMH DM presents to the ED with concern for displaced jejunostomy tube  Pt reports he was attempting to flush water into it today when it pulled out  There is only one remaining suture holding it to his abdomen  Pt reports he has visiting nurses that manage the tube and his feeds  Feeding tube was originally placed in 2019 when pt was admitted for sepsis and chronic pancreatitis with pancreatic pseudocyst  Since then pt has been doing well with advancing his diet  He is now also taking nectar thick PO diet and has gained weight  Pt is scheduled to see Dr Becky Rees in two weeks to possibly have feeding tube removed  Denies any fever, chills, vomiting, abdominal wall redness, abdominal pain  Prior to Admission Medications   Prescriptions Last Dose Informant Patient Reported? Taking?    Nutritional Supplements (JEVITY 1 2 KARL) LIQD Not Taking at Unknown time Care Giver No No   Sig: Give through J tube at 118 mL/hr with free water flushes 125 mL q6h from 8 pm to 8 AM   Patient not taking: Reported on 3/21/2019   acetaminophen (TYLENOL) 160 mg/5 mL suspension Not Taking at Unknown time Care Giver No No   Si 3 mL (650 mg total) by Per J Tube route every 6 (six) hours as needed for mild pain, headaches or fever   Patient not taking: Reported on 3/21/2019   aspirin 81 mg chewable tablet  Care Giver No Yes   Si tablet (81 mg total) by Per J Tube route daily   insulin degludec (TRESIBA FLEXTOUCH) 100 units/mL injection pen Not Taking at Unknown time Care Giver Yes No   Sig: Inject 8 Units under the skin daily at bedtime   insulin lispro (HumaLOG) 100 units/mL injection Not Taking at Unknown time Care Giver No No   Sig: Inject 1-5 Units under the skin every 6 (six) hours Patient not taking: Reported on 3/21/2019   lidocaine (LIDODERM) 5 % Not Taking at Unknown time Care Giver No No   Sig: Apply 1 patch topically daily Remove & Discard patch within 12 hours or as directed by MD   Patient not taking: Reported on 3/21/2019   omeprazole (PriLOSEC) 20 mg delayed release capsule  Care Giver No Yes   Sig: Take 2 capsules (40 mg total) by mouth daily Please give through J tube   polyvinyl alcohol (LIQUIFILM TEARS) 1 4 % ophthalmic solution Not Taking at Unknown time Care Giver No No   Sig: Administer 1 drop to both eyes as needed for dry eyes   Patient not taking: Reported on 3/21/2019      Facility-Administered Medications: None       Past Medical History:   Diagnosis Date    Weight loss        Past Surgical History:   Procedure Laterality Date    CT GUIDED CHEST TUBE  2/15/2019    GASTROSTOMY TUBE PLACEMENT N/A 1/14/2019    Procedure: INSERTION PEG TUBE;  Surgeon: Nils Moritz, MD;  [facility]: [facility]; Service: Gastroenterology    GASTROSTOMY TUBE PLACEMENT N/A 1/15/2019    Procedure: INSERTION JEJUNOSTOMY TUBE OPEN;  Surgeon: Shireen Rawls MD;  [facility];  Service: Surgical Oncology    LAPAROTOMY N/A 11/19/2018    Procedure: LAPAROTOMY EXPLORATORY;  Surgeon: Shireen Rawls MD;  [facility];  Service: Surgical Oncology    OK EDG US EXAM SURGICAL ALTER STOM DUODENUM/JEJUNUM N/A 9/21/2018    Procedure: LINEAR ENDOSCOPIC U/S;  Surgeon: Gil Rodrigues MD;  [facility];   Service: Gastroenterology    OK LAP,DIAGNOSTIC ABDOMEN N/A 11/19/2018    Procedure: pancreatic biopsy;  Surgeon: Shireen Rawls MD;  [facility];  Service: Surgical Oncology    TONSILLECTOMY      VASCULAR SURGERY      phlebitis many years ago    WRIST GANGLION EXCISION         Family History   Problem Relation Age of Onset    No Known Problems Mother     No Known Problems Father     Pancreatic cancer Brother 61    Breast cancer Daughter 36        38s     I have reviewed and agree with the history as documented  Social History     Tobacco Use    Smoking status: Former Smoker     Packs/day: 1 00     Years: 25 00     Pack years: 25 00    Smokeless tobacco: Never Used    Tobacco comment: quit about 20 years ago as of 9/21/2018   Substance Use Topics    Alcohol use: Not Currently     Comment: 1 beer a day    Drug use: No        Review of Systems   Constitutional: Negative for chills and fever  HENT: Negative for congestion, rhinorrhea and sore throat  Respiratory: Negative for cough and shortness of breath  Cardiovascular: Negative for chest pain and palpitations  Gastrointestinal: Negative for abdominal pain, nausea and vomiting  Genitourinary: Negative for dysuria and hematuria  Musculoskeletal: Negative for arthralgias and myalgias  Skin: Positive for wound (jejunostomy tube)  Negative for rash  Neurological: Negative for dizziness, weakness, light-headedness, numbness and headaches  Hematological: Does not bruise/bleed easily  All other systems reviewed and are negative  Physical Exam  ED Triage Vitals [03/21/19 2046]   Temperature Pulse Respirations Blood Pressure SpO2   97 5 °F (36 4 °C) 67 18 159/75 99 %      Temp Source Heart Rate Source Patient Position - Orthostatic VS BP Location FiO2 (%)   Oral Monitor Lying Left arm --      Pain Score       No Pain             Orthostatic Vital Signs  Vitals:    03/21/19 2046 03/21/19 2230   BP: 159/75 141/74   Pulse: 67 62   Patient Position - Orthostatic VS: Lying Lying       Physical Exam   Constitutional: He is oriented to person, place, and time  He appears well-developed and well-nourished  No distress  HENT:   Head: Normocephalic and atraumatic  Right Ear: External ear normal    Left Ear: External ear normal    Mouth/Throat: Oropharynx is clear and moist    Eyes: Pupils are equal, round, and reactive to light  Conjunctivae are normal    Neck: Normal range of motion  Neck supple     Cardiovascular: Normal rate, regular rhythm, normal heart sounds and intact distal pulses  Exam reveals no gallop and no friction rub  No murmur heard  Pulmonary/Chest: Effort normal and breath sounds normal  No respiratory distress  He has no wheezes  He has no rhonchi  He has no rales  Abdominal: Soft  Bowel sounds are normal  He exhibits no distension  There is no tenderness  J tube site leaking clear fluid with small amount of surrounding erythema   Musculoskeletal: Normal range of motion  Lymphadenopathy:     He has no cervical adenopathy  Neurological: He is alert and oriented to person, place, and time  He has normal strength  No cranial nerve deficit or sensory deficit  Skin: Skin is warm and dry  He is not diaphoretic  ED Medications  Medications - No data to display    Diagnostic Studies  Results Reviewed     None                 No orders to display         Procedures  Procedures      Phone Consults  ED Phone Contact    ED Course  ED Course as of Mar 21 2317   Thu Mar 21, 2019   2238 Surgery evaluated, can leave tube out and follow up with Dr Becca Bell as planned in two weeks  Identification of Seniors at Risk      Most Recent Value   (ISAR) Identification of Seniors at Risk   Before the illness or injury that brought you to the Emergency, did you need someone to help you on a regular basis? 0 Filed at: 03/21/2019 2048   In the last 24 hours, have you needed more help than usual?  0 Filed at: 03/21/2019 2048   Have you been hospitalized for one or more nights during the past 6 months? 0 Filed at: 03/21/2019 2048   In general, do you see well?  0 Filed at: 03/21/2019 2048   In general, do you have serious problems with your memory? 0 Filed at: 03/21/2019 2048   Do you take more than three different medications every day?   1 Filed at: 03/21/2019 2048   ISAR Score  1 Filed at: 03/21/2019 2048                          MDM  Number of Diagnoses or Management Options  Jejunostomy tube fell out:   Diagnosis management comments: 82 y o  Male with PMH DM presents to the ED with concern for displaced jejunostomy tube  Will discuss with surgery  Can likely leave tube out and have pt follow up in the office  Disposition  Final diagnoses:   Jejunostomy tube fell out     Time reflects when diagnosis was documented in both MDM as applicable and the Disposition within this note     Time User Action Codes Description Comment    3/21/2019 10:29 PM Oksana Bain Add [R02 143O] Jejunostomy tube fell out       ED Disposition     ED Disposition Condition Date/Time Comment    Discharge Stable Thu Mar 21, 2019 10:36 PM Philomena Ansari discharge to home/self care              Follow-up Information     Follow up With Specialties Details Why Gregoria Bocanegra MD Oncology, Surgical Oncology  Follow up as planned, call if any problems sooner [address]  [phone]            Discharge Medication List as of 3/21/2019 10:38 PM      CONTINUE these medications which have NOT CHANGED    Details   aspirin 81 mg chewable tablet 1 tablet (81 mg total) by Per J Tube route daily, Starting Sun 1/20/2019, No Print      omeprazole (PriLOSEC) 20 mg delayed release capsule Take 2 capsules (40 mg total) by mouth daily Please give through J tube, Starting Sat 1/19/2019, No Print      acetaminophen (TYLENOL) 160 mg/5 mL suspension 20 3 mL (650 mg total) by Per J Tube route every 6 (six) hours as needed for mild pain, headaches or fever, Starting Sat 1/19/2019, No Print      insulin degludec (TRESIBA FLEXTOUCH) 100 units/mL injection pen Inject 8 Units under the skin daily at bedtime, Historical Med      insulin lispro (HumaLOG) 100 units/mL injection Inject 1-5 Units under the skin every 6 (six) hours, Starting Sat 1/19/2019, No Print      lidocaine (LIDODERM) 5 % Apply 1 patch topically daily Remove & Discard patch within 12 hours or as directed by MD, Starting Sun 1/20/2019, No Print Nutritional Supplements (JEVITY 1 2 KARL) LIQD Give through J tube at 118 mL/hr with free water flushes 125 mL q6h from 8 pm to 8 AM, Print      polyvinyl alcohol (LIQUIFILM TEARS) 1 4 % ophthalmic solution Administer 1 drop to both eyes as needed for dry eyes, Starting Sat 1/19/2019, No Print           No discharge procedures on file  ED Provider  Attending physically available and evaluated Chuy Navarro I managed the patient along with the ED Attending      Electronically Signed by         Micha Sanchez MD  03/21/19 2421

## 2019-03-26 DIAGNOSIS — R13.12 OROPHARYNGEAL DYSPHAGIA: Primary | Chronic | ICD-10-CM

## 2019-03-26 DIAGNOSIS — E11.9 TYPE 2 DIABETES MELLITUS WITHOUT COMPLICATION, WITHOUT LONG-TERM CURRENT USE OF INSULIN (HCC): ICD-10-CM

## 2019-03-27 ENCOUNTER — CLINICAL SUPPORT (OUTPATIENT)
Dept: NUTRITION | Facility: HOSPITAL | Age: 84
End: 2019-03-27
Attending: SURGERY
Payer: MEDICARE

## 2019-03-27 VITALS — WEIGHT: 115.4 LBS | BODY MASS INDEX: 18.07 KG/M2

## 2019-03-27 DIAGNOSIS — R13.12 OROPHARYNGEAL DYSPHAGIA: Chronic | ICD-10-CM

## 2019-03-27 DIAGNOSIS — E11.9 DIABETES MELLITUS WITHOUT COMPLICATION (HCC): Primary | ICD-10-CM

## 2019-03-27 PROCEDURE — 97802 MEDICAL NUTRITION INDIV IN: CPT

## 2019-03-27 NOTE — PROGRESS NOTES
Initial Nutrition Assessment Form    Patient Name: Keiry Aviles    YOB: 1935    Sex: Male     Assessment Date: 3/27/2019  Start Time: 1255 Stop Time: 155 Total Minutes:60     Data:  Present at session: self   Parent Concerns: n/a   Medical Dx/Reason for Referral: DM   Past Medical History:   Diagnosis Date    Weight loss     Pancreatitis  DM   Current Outpatient Medications   Medication Sig Dispense Refill    acetaminophen (TYLENOL) 160 mg/5 mL suspension 20 3 mL (650 mg total) by Per J Tube route every 6 (six) hours as needed for mild pain, headaches or fever (Patient not taking: Reported on 3/21/2019) 118 mL 0    aspirin 81 mg chewable tablet 1 tablet (81 mg total) by Per J Tube route daily  0    insulin degludec (TRESIBA FLEXTOUCH) 100 units/mL injection pen Inject 8 Units under the skin daily at bedtime      insulin lispro (HumaLOG) 100 units/mL injection Inject 1-5 Units under the skin every 6 (six) hours (Patient not taking: Reported on 3/21/2019)  0    lidocaine (LIDODERM) 5 % Apply 1 patch topically daily Remove & Discard patch within 12 hours or as directed by MD (Patient not taking: Reported on 3/21/2019) 30 patch 0    Nutritional Supplements (JEVITY 1 2 KARL) LIQD Give through J tube at 118 mL/hr with free water flushes 125 mL q6h from 8 pm to 8 AM (Patient not taking: Reported on 3/21/2019) 1000 mL 0    omeprazole (PriLOSEC) 20 mg delayed release capsule Take 2 capsules (40 mg total) by mouth daily Please give through J tube 30 capsule 0    polyvinyl alcohol (LIQUIFILM TEARS) 1 4 % ophthalmic solution Administer 1 drop to both eyes as needed for dry eyes (Patient not taking: Reported on 3/21/2019) 15 mL 0     No current facility-administered medications for this visit           Additional Meds/Supplements: n/a   Special Learning Needs:    Height:   HC Readings from Last 3 Encounters:   No data found for [facility]       Weight:'s; 165#  Nov '17 Wt Readings from Last 12 Encounters: 03/27/19 52 3 kg (115 lb 6 4 oz)   03/21/19 52 2 kg (115 lb)   03/11/19 50 8 kg (112 lb)   03/08/19 50 8 kg (112 lb)   03/05/19 52 2 kg (115 lb)   02/28/19 50 3 kg (111 lb)   02/14/19 48 4 kg (106 lb 11 2 oz)   02/11/19 48 1 kg (106 lb)   01/24/19 51 7 kg (114 lb)   01/18/19 65 5 kg (144 lb 6 4 oz)   01/09/19 50 4 kg (111 lb 1 6 oz)   01/09/19 50 4 kg (111 lb 1 6 oz)     Estimated body mass index is 18 07 kg/m² as calculated from the following:    Height as of 3/21/19: 5' 7" (1 702 m)  Weight as of this encounter: 52 3 kg (115 lb 6 4 oz)  Usual Weight: #  Ideal Body Weight: #   Recent Weight Change: []Yes     []No  Amount:       Energy Needs: No calculation needed   No Known Allergies nKFA   Social History     Substance and Sexual Activity   Alcohol Use Not Currently    Comment: 1 beer a day    n/a   Social History     Tobacco Use   Smoking Status Former Smoker    Packs/day: 1 00    Years: 25 00    Pack years: 25 00   Smokeless Tobacco Never Used   Tobacco Comment    quit about 20 years ago as of 9/21/2018    n/a   Who shops? spouse   Who cooks? spouse   Exercise: PT 2x/wk x 1 hr   Prior Counseling? []Yes     []No  When:    Why:         Diet Hx:  Breakfast:6 oz oj 1/2 c apple juice 2 eggs 1 pce aleman 1/2c home fries     815-830  a m  Lunch:van pudding 1 c chipped steak 1 c mashed pot  Apple sauce apple juice      p m  Dinner:4 oz OJ 1 rabbit leg, 1/4c rice 1c mashed potatoes 1 c apple sauce lemon meringue   530    p m           Snacks: AM -  PM - 2-3 4 oz oj 1 c vanillia pudding  HS -         Nutrition Diagnosis:   Altered Nutrition-Related Laboratory values  related to Kidney, liver, cardiac, endocrine, neurologic, and/or pulmonary dysfunction as evidenced by  Abnormal plasma glucose and/or HgbA1c levels       Medical Nutrition Therapy Intervention:  [x]Individualized Meal Plan []Understanding Lab Values   []Basic Pathophysiology of Disease []Food/Medication Interactions   [x]Food Diary [x]Exercise   [x]Lifestyle/Behavior Modification Techniques []Medication, Mechanism of Action   []Label Reading []Self Blood Glucose Monitoring   [x]Weight/BMI Goals []Other - awake 2-3 x and falls back to sleep sleep  awake at 8; nap 2 hrs   Other Notes:        Comprehension: []Excellent  [x]Very Good  []Good  []Fair   []Poor    Receptivity: []Excellent  [x]Very Good  []Good  []Fair   []Poor    Expected Compliance: []Excellent  [x]Very Good  []Good  []Fair   []Poor        Goals:  1 more fluid as needed minimum 1 quart   2 3 meals a day snacks as able   3  Boost pudding daily 1-3 cans/day  4  Activity- 15 mins daily       No follow-ups on file    Labs:  CMP  Lab Results   Component Value Date    K 4 2 02/18/2019     02/18/2019    CO2 27 02/18/2019    BUN 20 02/18/2019    CREATININE 0 49 (L) 02/18/2019    GLUCOSE 202 (H) 01/10/2019    GLUF 109 (H) 08/28/2018    CALCIUM 7 9 (L) 02/18/2019    AST 31 02/18/2019    ALT 80 (H) 02/18/2019    ALKPHOS 91 02/18/2019    EGFR 102 02/18/2019       BMP  Lab Results   Component Value Date    GLUCOSE 202 (H) 01/10/2019    CALCIUM 7 9 (L) 02/18/2019    K 4 2 02/18/2019    CO2 27 02/18/2019     02/18/2019    BUN 20 02/18/2019    CREATININE 0 49 (L) 02/18/2019       Lipids  No results found for: CHOL  Lab Results   Component Value Date    HDL 33 (L) 01/09/2019    HDL 73 (H) 06/28/2018     Lab Results   Component Value Date    LDLCALC 19 01/09/2019    LDLCALC 73 06/28/2018     Lab Results   Component Value Date    TRIG 36 01/09/2019    TRIG 82 06/28/2018     No results found for: CHOLHDL    Hemoglobin A1C  Lab Results   Component Value Date    HGBA1C 7 1 (H) 01/09/2019       Fasting Glucose  Lab Results   Component Value Date    GLUF 109 (H) 08/28/2018       Insulin     Thyroid  Lab Results   Component Value Date    R2LSPQC 1 00 09/14/2018    O5JLKXE 7 4 09/14/2018       Hepatic Function Panel  Lab Results   Component Value Date    ALT 80 (H) 02/18/2019    AST 31 02/18/2019    ALKPHOS 91 02/18/2019       Celiac Disease Antibody Panel  No results found for: ENDOMYSIAL IGA, GLIADIN IGA, GLIADIN IGG, IGA, TISSUE TRANSGLUT AB, TTG IGA   Iron  No results found for: IRON, TIBC, FERRITIN    Vitamins  No results found for: VITAMIN B2   No results found for: NICOTINAMIDE, NICOTINIC ACID   No results found for: VITAMINB6  Lab Results   Component Value Date    TBIHYKSH09 775 01/08/2019     No results found for: VITB5  No results found for: O0MTRESU  No results found for: THYROGLB  Vitamin K   Date Value Ref Range Status   01/10/2019 < 13 (L) 0 13 - 1 88 ng/mL Final      25-HYDROXY VIT D   Date Value Ref Range Status   01/10/2019 10 (L) ng/mL Final     Comment:     Reference Range:   All Ages: Target levels 30 - 100     25-HYDROXY VIT D3   Date Value Ref Range Status   01/10/2019 10 ng/mL Final      No components found for: Gretchen Peak MS RD N  [facility] [address] [address]

## 2019-03-28 ENCOUNTER — HOSPITAL ENCOUNTER (OUTPATIENT)
Dept: CT IMAGING | Facility: HOSPITAL | Age: 84
Discharge: HOME | End: 2019-03-28
Attending: INTERNAL MEDICINE
Payer: MEDICARE

## 2019-03-28 ENCOUNTER — TRANSCRIBE ORDERS (OUTPATIENT)
Dept: ADMINISTRATIVE | Facility: HOSPITAL | Age: 84
End: 2019-03-28

## 2019-03-28 DIAGNOSIS — C25.9 MALIGNANT NEOPLASM OF PANCREAS, UNSPECIFIED LOCATION OF MALIGNANCY (HCC): ICD-10-CM

## 2019-03-28 DIAGNOSIS — K86.1 IDIOPATHIC CHRONIC PANCREATITIS (HCC): ICD-10-CM

## 2019-03-28 DIAGNOSIS — R13.10 DYSPHAGIA, UNSPECIFIED TYPE: Primary | ICD-10-CM

## 2019-03-28 DIAGNOSIS — J69.0 ASPIRATION PNEUMONIA OF BOTH LUNGS, UNSPECIFIED ASPIRATION PNEUMONIA TYPE, UNSPECIFIED PART OF LUNG (HCC): ICD-10-CM

## 2019-03-28 DIAGNOSIS — R13.12 OROPHARYNGEAL DYSPHAGIA: Chronic | ICD-10-CM

## 2019-03-28 PROCEDURE — 71250 CT THORAX DX C-: CPT

## 2019-03-28 PROCEDURE — 70491 CT SOFT TISSUE NECK W/DYE: CPT

## 2019-03-28 PROCEDURE — 74177 CT ABD & PELVIS W/CONTRAST: CPT

## 2019-03-28 RX ADMIN — IOHEXOL 100 ML: 350 INJECTION, SOLUTION INTRAVENOUS at 13:00

## 2019-03-30 ENCOUNTER — HOSPITAL ENCOUNTER (EMERGENCY)
Facility: HOSPITAL | Age: 84
Discharge: HOME | End: 2019-03-30
Attending: EMERGENCY MEDICINE
Payer: MEDICARE

## 2019-03-30 ENCOUNTER — APPOINTMENT (EMERGENCY)
Dept: RADIOLOGY | Facility: HOSPITAL | Age: 84
End: 2019-03-30
Payer: MEDICARE

## 2019-03-30 VITALS
TEMPERATURE: 97.5 F | WEIGHT: 115 LBS | DIASTOLIC BLOOD PRESSURE: 75 MMHG | OXYGEN SATURATION: 97 % | HEART RATE: 68 BPM | RESPIRATION RATE: 19 BRPM | SYSTOLIC BLOOD PRESSURE: 146 MMHG | BODY MASS INDEX: 18.05 KG/M2 | HEIGHT: 67 IN

## 2019-03-30 DIAGNOSIS — E16.2 HYPOGLYCEMIA: Primary | ICD-10-CM

## 2019-03-30 LAB
ALBUMIN SERPL BCP-MCNC: 2.9 G/DL (ref 3.5–5.7)
ALP SERPL-CCNC: 68 U/L (ref 55–165)
ALT SERPL W P-5'-P-CCNC: 26 U/L (ref 7–52)
ANION GAP SERPL CALCULATED.3IONS-SCNC: 6 MMOL/L (ref 4–13)
AST SERPL W P-5'-P-CCNC: 19 U/L (ref 13–39)
BASOPHILS # BLD AUTO: 0 THOUSANDS/ΜL (ref 0–0.1)
BASOPHILS NFR BLD AUTO: 0 % (ref 0–2)
BILIRUB SERPL-MCNC: 0.2 MG/DL (ref 0.2–1)
BUN SERPL-MCNC: 10 MG/DL (ref 7–25)
CALCIUM SERPL-MCNC: 8 MG/DL (ref 8.6–10.5)
CHLORIDE SERPL-SCNC: 99 MMOL/L (ref 98–107)
CO2 SERPL-SCNC: 30 MMOL/L (ref 21–31)
CREAT SERPL-MCNC: 0.58 MG/DL (ref 0.7–1.3)
EOSINOPHIL # BLD AUTO: 0 THOUSAND/ΜL (ref 0–0.61)
EOSINOPHIL NFR BLD AUTO: 1 % (ref 0–5)
ERYTHROCYTE [DISTWIDTH] IN BLOOD BY AUTOMATED COUNT: 13.5 % (ref 11.5–14.5)
GFR SERPL CREATININE-BSD FRML MDRD: 95 ML/MIN/1.73SQ M
GLUCOSE SERPL-MCNC: 257 MG/DL (ref 65–99)
HCT VFR BLD AUTO: 36.5 % (ref 42–47)
HGB BLD-MCNC: 12.2 G/DL (ref 14–18)
LACTATE SERPL-SCNC: 3 MMOL/L (ref 0.5–2)
LYMPHOCYTES # BLD AUTO: 0.6 THOUSANDS/ΜL (ref 0.6–4.47)
LYMPHOCYTES NFR BLD AUTO: 8 % (ref 21–51)
MCH RBC QN AUTO: 31.9 PG (ref 26–34)
MCHC RBC AUTO-ENTMCNC: 33.3 G/DL (ref 31–37)
MCV RBC AUTO: 96 FL (ref 81–99)
MONOCYTES # BLD AUTO: 0.4 THOUSAND/ΜL (ref 0.17–1.22)
MONOCYTES NFR BLD AUTO: 6 % (ref 2–12)
NEUTROPHILS # BLD AUTO: 6 THOUSANDS/ΜL (ref 1.4–6.5)
NEUTS SEG NFR BLD AUTO: 85 % (ref 42–75)
PLATELET # BLD AUTO: 156 THOUSANDS/UL (ref 149–390)
PMV BLD AUTO: 9.4 FL (ref 8.6–11.7)
POTASSIUM SERPL-SCNC: 3.7 MMOL/L (ref 3.5–5.5)
PROT SERPL-MCNC: 6 G/DL (ref 6.4–8.9)
RBC # BLD AUTO: 3.82 MILLION/UL (ref 4.3–5.9)
SODIUM SERPL-SCNC: 135 MMOL/L (ref 134–143)
WBC # BLD AUTO: 7 THOUSAND/UL (ref 4.8–10.8)

## 2019-03-30 PROCEDURE — 99285 EMERGENCY DEPT VISIT HI MDM: CPT

## 2019-03-30 PROCEDURE — 80053 COMPREHEN METABOLIC PANEL: CPT | Performed by: PHYSICIAN ASSISTANT

## 2019-03-30 PROCEDURE — 71046 X-RAY EXAM CHEST 2 VIEWS: CPT

## 2019-03-30 PROCEDURE — 83605 ASSAY OF LACTIC ACID: CPT | Performed by: PHYSICIAN ASSISTANT

## 2019-03-30 PROCEDURE — 36415 COLL VENOUS BLD VENIPUNCTURE: CPT | Performed by: PHYSICIAN ASSISTANT

## 2019-03-30 PROCEDURE — 85025 COMPLETE CBC W/AUTO DIFF WBC: CPT | Performed by: PHYSICIAN ASSISTANT

## 2019-03-30 RX ORDER — BLOOD-GLUCOSE METER
1 KIT MISCELLANEOUS 2 TIMES DAILY
Qty: 1 EACH | Refills: 0 | Status: SHIPPED | OUTPATIENT
Start: 2019-03-30 | End: 2019-03-30 | Stop reason: SDUPTHER

## 2019-03-30 RX ORDER — BLOOD-GLUCOSE METER
1 KIT MISCELLANEOUS 2 TIMES DAILY
Qty: 1 EACH | Refills: 0 | Status: SHIPPED | OUTPATIENT
Start: 2019-03-30 | End: 2020-02-03

## 2019-03-31 ENCOUNTER — HOSPITAL ENCOUNTER (EMERGENCY)
Facility: HOSPITAL | Age: 84
Discharge: HOME | End: 2019-03-31
Attending: EMERGENCY MEDICINE | Admitting: EMERGENCY MEDICINE
Payer: MEDICARE

## 2019-03-31 VITALS
TEMPERATURE: 98 F | RESPIRATION RATE: 16 BRPM | BODY MASS INDEX: 17.96 KG/M2 | WEIGHT: 114.64 LBS | SYSTOLIC BLOOD PRESSURE: 150 MMHG | HEART RATE: 65 BPM | DIASTOLIC BLOOD PRESSURE: 70 MMHG | OXYGEN SATURATION: 96 %

## 2019-03-31 DIAGNOSIS — E16.2 HYPOGLYCEMIA: Primary | ICD-10-CM

## 2019-03-31 LAB
ALBUMIN SERPL BCP-MCNC: 3.1 G/DL (ref 3.5–5.7)
ALP SERPL-CCNC: 73 U/L (ref 55–165)
ALT SERPL W P-5'-P-CCNC: 24 U/L (ref 7–52)
ANION GAP SERPL CALCULATED.3IONS-SCNC: 4 MMOL/L (ref 4–13)
AST SERPL W P-5'-P-CCNC: 18 U/L (ref 13–39)
BASOPHILS # BLD AUTO: 0 THOUSANDS/ΜL (ref 0–0.1)
BASOPHILS NFR BLD AUTO: 1 % (ref 0–2)
BILIRUB SERPL-MCNC: 0.3 MG/DL (ref 0.2–1)
BUN SERPL-MCNC: 11 MG/DL (ref 7–25)
CALCIUM SERPL-MCNC: 8.4 MG/DL (ref 8.6–10.5)
CHLORIDE SERPL-SCNC: 103 MMOL/L (ref 98–107)
CO2 SERPL-SCNC: 31 MMOL/L (ref 21–31)
CREAT SERPL-MCNC: 0.66 MG/DL (ref 0.7–1.3)
EOSINOPHIL # BLD AUTO: 0.2 THOUSAND/ΜL (ref 0–0.61)
EOSINOPHIL NFR BLD AUTO: 4 % (ref 0–5)
ERYTHROCYTE [DISTWIDTH] IN BLOOD BY AUTOMATED COUNT: 13.6 % (ref 11.5–14.5)
GFR SERPL CREATININE-BSD FRML MDRD: 90 ML/MIN/1.73SQ M
GLUCOSE SERPL-MCNC: 104 MG/DL (ref 65–140)
GLUCOSE SERPL-MCNC: 50 MG/DL (ref 65–140)
GLUCOSE SERPL-MCNC: 52 MG/DL (ref 65–99)
GLUCOSE SERPL-MCNC: 92 MG/DL (ref 65–140)
HCT VFR BLD AUTO: 37.8 % (ref 42–47)
HGB BLD-MCNC: 12.7 G/DL (ref 14–18)
LYMPHOCYTES # BLD AUTO: 0.9 THOUSANDS/ΜL (ref 0.6–4.47)
LYMPHOCYTES NFR BLD AUTO: 17 % (ref 21–51)
MCH RBC QN AUTO: 31.7 PG (ref 26–34)
MCHC RBC AUTO-ENTMCNC: 33.4 G/DL (ref 31–37)
MCV RBC AUTO: 95 FL (ref 81–99)
MONOCYTES # BLD AUTO: 0.4 THOUSAND/ΜL (ref 0.17–1.22)
MONOCYTES NFR BLD AUTO: 9 % (ref 2–12)
NEUTROPHILS # BLD AUTO: 3.6 THOUSANDS/ΜL (ref 1.4–6.5)
NEUTS SEG NFR BLD AUTO: 70 % (ref 42–75)
PLATELET # BLD AUTO: 161 THOUSANDS/UL (ref 149–390)
PMV BLD AUTO: 9.1 FL (ref 8.6–11.7)
POTASSIUM SERPL-SCNC: 3.4 MMOL/L (ref 3.5–5.5)
PROT SERPL-MCNC: 6 G/DL (ref 6.4–8.9)
RBC # BLD AUTO: 3.99 MILLION/UL (ref 4.3–5.9)
SODIUM SERPL-SCNC: 138 MMOL/L (ref 134–143)
WBC # BLD AUTO: 5.2 THOUSAND/UL (ref 4.8–10.8)

## 2019-03-31 PROCEDURE — 82948 REAGENT STRIP/BLOOD GLUCOSE: CPT

## 2019-03-31 PROCEDURE — 99285 EMERGENCY DEPT VISIT HI MDM: CPT

## 2019-03-31 PROCEDURE — 36415 COLL VENOUS BLD VENIPUNCTURE: CPT | Performed by: EMERGENCY MEDICINE

## 2019-03-31 PROCEDURE — 85025 COMPLETE CBC W/AUTO DIFF WBC: CPT | Performed by: EMERGENCY MEDICINE

## 2019-03-31 PROCEDURE — 80053 COMPREHEN METABOLIC PANEL: CPT | Performed by: EMERGENCY MEDICINE

## 2019-03-31 RX ORDER — NICOTINE POLACRILEX 4 MG
15 LOZENGE BUCCAL ONCE
Qty: 15 G | Refills: 2 | Status: SHIPPED | OUTPATIENT
Start: 2019-03-31 | End: 2019-12-30

## 2019-04-02 ENCOUNTER — TELEPHONE (OUTPATIENT)
Dept: PULMONOLOGY | Facility: CLINIC | Age: 84
End: 2019-04-02

## 2019-04-05 ENCOUNTER — HOSPITAL ENCOUNTER (OUTPATIENT)
Dept: RADIOLOGY | Facility: HOSPITAL | Age: 84
Discharge: HOME | End: 2019-04-05
Payer: MEDICARE

## 2019-04-05 DIAGNOSIS — C25.9 MALIGNANT NEOPLASM OF PANCREAS, UNSPECIFIED LOCATION OF MALIGNANCY (HCC): ICD-10-CM

## 2019-04-05 DIAGNOSIS — R13.10 DYSPHAGIA, UNSPECIFIED TYPE: ICD-10-CM

## 2019-04-05 PROCEDURE — 92611 MOTION FLUOROSCOPY/SWALLOW: CPT

## 2019-04-05 PROCEDURE — G8998 SWALLOW D/C STATUS: HCPCS

## 2019-04-05 PROCEDURE — 74230 X-RAY XM SWLNG FUNCJ C+: CPT

## 2019-04-05 PROCEDURE — G8996 SWALLOW CURRENT STATUS: HCPCS

## 2019-04-05 PROCEDURE — G8997 SWALLOW GOAL STATUS: HCPCS

## 2019-04-09 ENCOUNTER — OFFICE VISIT (OUTPATIENT)
Dept: SURGICAL ONCOLOGY | Facility: CLINIC | Age: 84
End: 2019-04-09

## 2019-04-09 VITALS
SYSTOLIC BLOOD PRESSURE: 100 MMHG | HEIGHT: 67 IN | HEART RATE: 88 BPM | TEMPERATURE: 96.9 F | DIASTOLIC BLOOD PRESSURE: 80 MMHG | BODY MASS INDEX: 18.36 KG/M2 | RESPIRATION RATE: 16 BRPM | WEIGHT: 117 LBS

## 2019-04-09 DIAGNOSIS — K86.1 CHRONIC PANCREATITIS, UNSPECIFIED PANCREATITIS TYPE (HCC): Primary | ICD-10-CM

## 2019-04-09 PROCEDURE — 1124F ACP DISCUSS-NO DSCNMKR DOCD: CPT | Performed by: SURGERY

## 2019-04-09 PROCEDURE — 99024 POSTOP FOLLOW-UP VISIT: CPT | Performed by: SURGERY

## 2019-04-26 PROBLEM — J38.02 BILATERAL COMPLETE VOCAL FOLD PARALYSIS: Status: ACTIVE | Noted: 2019-04-26

## 2019-04-30 ENCOUNTER — OFFICE VISIT (OUTPATIENT)
Dept: HEMATOLOGY ONCOLOGY | Facility: CLINIC | Age: 84
End: 2019-04-30
Payer: MEDICARE

## 2019-04-30 VITALS
HEART RATE: 71 BPM | WEIGHT: 113.8 LBS | SYSTOLIC BLOOD PRESSURE: 116 MMHG | RESPIRATION RATE: 18 BRPM | DIASTOLIC BLOOD PRESSURE: 70 MMHG | BODY MASS INDEX: 17.82 KG/M2 | OXYGEN SATURATION: 99 %

## 2019-04-30 DIAGNOSIS — R77.8 ABNORMAL SPEP: Primary | ICD-10-CM

## 2019-04-30 PROBLEM — R64 CACHEXIA (HCC): Status: RESOLVED | Noted: 2019-02-14 | Resolved: 2019-04-30

## 2019-04-30 PROCEDURE — 99213 OFFICE O/P EST LOW 20 MIN: CPT | Performed by: INTERNAL MEDICINE

## 2019-05-23 ENCOUNTER — OFFICE VISIT (OUTPATIENT)
Dept: PULMONOLOGY | Facility: CLINIC | Age: 84
End: 2019-05-23
Payer: MEDICARE

## 2019-05-23 VITALS
BODY MASS INDEX: 18.45 KG/M2 | TEMPERATURE: 97.1 F | DIASTOLIC BLOOD PRESSURE: 80 MMHG | HEART RATE: 71 BPM | SYSTOLIC BLOOD PRESSURE: 136 MMHG | WEIGHT: 117.8 LBS | OXYGEN SATURATION: 97 %

## 2019-05-23 DIAGNOSIS — J90 PLEURAL EFFUSION: Primary | ICD-10-CM

## 2019-05-23 PROCEDURE — 99214 OFFICE O/P EST MOD 30 MIN: CPT | Performed by: INTERNAL MEDICINE

## 2019-05-23 RX ORDER — BLOOD-GLUCOSE METER
KIT MISCELLANEOUS
Refills: 5 | COMMUNITY
Start: 2019-05-20 | End: 2020-02-03

## 2019-05-23 RX ORDER — LANCETS 28 GAUGE
EACH MISCELLANEOUS
Refills: 5 | COMMUNITY
Start: 2019-05-20 | End: 2020-02-03

## 2019-05-28 ENCOUNTER — CLINICAL SUPPORT (OUTPATIENT)
Dept: NUTRITION | Facility: HOSPITAL | Age: 84
End: 2019-05-28
Payer: MEDICARE

## 2019-05-28 VITALS — WEIGHT: 116.2 LBS | BODY MASS INDEX: 18.2 KG/M2

## 2019-05-28 DIAGNOSIS — E11.9 DIABETES MELLITUS WITHOUT COMPLICATION (HCC): Primary | ICD-10-CM

## 2019-05-28 PROCEDURE — 97803 MED NUTRITION INDIV SUBSEQ: CPT

## 2019-07-19 ENCOUNTER — TRANSCRIBE ORDERS (OUTPATIENT)
Dept: LAB | Facility: HOSPITAL | Age: 84
End: 2019-07-19

## 2019-07-19 ENCOUNTER — HOSPITAL ENCOUNTER (OUTPATIENT)
Dept: RADIOLOGY | Facility: HOSPITAL | Age: 84
Discharge: HOME | End: 2019-07-19
Attending: INTERNAL MEDICINE
Payer: MEDICARE

## 2019-07-19 ENCOUNTER — APPOINTMENT (OUTPATIENT)
Dept: LAB | Facility: HOSPITAL | Age: 84
End: 2019-07-19
Payer: MEDICARE

## 2019-07-19 DIAGNOSIS — E11.8 TYPE 2 DIABETES MELLITUS WITH COMPLICATION, WITH LONG-TERM CURRENT USE OF INSULIN (HCC): ICD-10-CM

## 2019-07-19 DIAGNOSIS — Z79.4 TYPE 2 DIABETES MELLITUS WITH COMPLICATION, WITH LONG-TERM CURRENT USE OF INSULIN (HCC): ICD-10-CM

## 2019-07-19 DIAGNOSIS — E11.8 TYPE 2 DIABETES MELLITUS WITH COMPLICATION, WITH LONG-TERM CURRENT USE OF INSULIN (HCC): Primary | ICD-10-CM

## 2019-07-19 DIAGNOSIS — Z79.4 TYPE 2 DIABETES MELLITUS WITH COMPLICATION, WITH LONG-TERM CURRENT USE OF INSULIN (HCC): Primary | ICD-10-CM

## 2019-07-19 DIAGNOSIS — J90 PLEURAL EFFUSION: ICD-10-CM

## 2019-07-19 LAB
EST. AVERAGE GLUCOSE BLD GHB EST-MCNC: 143 MG/DL
GLUCOSE P FAST SERPL-MCNC: 95 MG/DL (ref 65–99)
HBA1C MFR BLD: 6.6 % (ref 4.2–6.3)

## 2019-07-19 PROCEDURE — 83036 HEMOGLOBIN GLYCOSYLATED A1C: CPT

## 2019-07-19 PROCEDURE — 36415 COLL VENOUS BLD VENIPUNCTURE: CPT

## 2019-07-19 PROCEDURE — 82947 ASSAY GLUCOSE BLOOD QUANT: CPT

## 2019-07-19 PROCEDURE — 71046 X-RAY EXAM CHEST 2 VIEWS: CPT

## 2019-07-22 NOTE — PLAN OF CARE
Problem: PHYSICAL THERAPY ADULT  Goal: Performs mobility at highest level of function for planned discharge setting  See evaluation for individualized goals  Treatment/Interventions: Gait training, Bed mobility, Equipment eval/education, Patient/family training, LE strengthening/ROM, Functional transfer training, Therapeutic exercise, Endurance training  Equipment Recommended: Keila Mcnamara       See flowsheet documentation for full assessment, interventions and recommendations  Outcome: Progressing  Prognosis: Guarded  Problem List: Decreased strength, Decreased endurance, Impaired balance, Decreased mobility, Decreased cognition, Decreased safety awareness, Decreased skin integrity  Assessment: Pt able to perform sit to stand transfers modAx2,BM modAx1  Pt able to ambulate 5 steps bed->chair with use of RW on tile surface modAx2  Limited mobility and gait distance 2* pt lethargic at times,weakness,fatigues quickly and SOB  Use of 2 L NC O2 during PT tx session and mobility with SpO2:93% throughout  Pt able to perform and complete BLE ther ex HEP sitting in chair postmobility AROM with rest breaks inbetween each exercise  Pt would cont to benefit from skilled inpt PT services to maximize functional independence  Recommendation: Short-term skilled PT     PT - OK to Discharge: Yes (rehab)    See flowsheet documentation for full assessment  [FreeTextEntry1] : Left ankle still hurts physical therapy helps some. Pain is still significant with walking and standing

## 2019-08-07 ENCOUNTER — TELEPHONE (OUTPATIENT)
Dept: PULMONOLOGY | Facility: CLINIC | Age: 84
End: 2019-08-07

## 2019-08-07 NOTE — TELEPHONE ENCOUNTER
I called Anthony Barr and discussed with his wife his chest x-ray result  His chest x-ray looks stable and has not increased in size since previous imaging in May of 2019  Explained to patient that a thoracentesis is not indicated at this time  I reminded patient of upcoming appointment with Dr Michel Jewell on 9/23/19  At this appointment, it will be determine the frequency of further imaging if needed at all  Patient was told if any questions were to arise to please call the office

## 2019-09-23 ENCOUNTER — OFFICE VISIT (OUTPATIENT)
Dept: PULMONOLOGY | Facility: CLINIC | Age: 84
End: 2019-09-23
Payer: MEDICARE

## 2019-09-23 VITALS
HEIGHT: 66 IN | HEART RATE: 66 BPM | DIASTOLIC BLOOD PRESSURE: 80 MMHG | BODY MASS INDEX: 19.13 KG/M2 | RESPIRATION RATE: 16 BRPM | WEIGHT: 119 LBS | TEMPERATURE: 97 F | SYSTOLIC BLOOD PRESSURE: 118 MMHG | OXYGEN SATURATION: 99 %

## 2019-09-23 DIAGNOSIS — E43 EDEMA DUE TO MALNUTRITION, DUE TO UNSPECIFIED MALNUTRITION TYPE (HCC): ICD-10-CM

## 2019-09-23 DIAGNOSIS — R64 CACHEXIA (HCC): Primary | ICD-10-CM

## 2019-09-23 PROCEDURE — 99214 OFFICE O/P EST MOD 30 MIN: CPT | Performed by: INTERNAL MEDICINE

## 2019-09-23 RX ORDER — FUROSEMIDE 20 MG/1
20 TABLET ORAL EVERY OTHER DAY
Qty: 30 TABLET | Refills: 5 | Status: SHIPPED | OUTPATIENT
Start: 2019-09-23

## 2019-09-23 NOTE — PROGRESS NOTES
Pulmonary outpatient note   Brody Beckwith 82 y o  male MRN: [id number]  9/23/2019      Assessment and plan:  Brody Beckwith has the following medical problems:  1  Resolving pneumonia  The patient had aspiration pneumonia in January 2018 that resolved  He had multiple imaging since then  The last imaging as was to lie that showed no signs of left pleural effusion but some signs of chronic left pleural process  This is unchanged compared to previous studies  No need to continue follow-up regarding this  2   Dysphagia  Improved significantly  However, the patient weight was previously 160 lb  Currently is 117 lb  I am referring the patient to that he shin and GI physician  3   Hoarseness  Most probably related to procedures the patient had with intubation  Was seen by ear nose and throat regarding this  Was diagnosed with bilateral vocal cord paresis  Improving  Follow-up in 6 months with GI consult and a tissue in  Weight status  No need for further imaging at this point  Emma Augustin MD/PhD,  [facility]      No follow-ups on file  History of Present Illness  This is 82y o  year old male that was hospitalized in January 2018 due to severe suspected aspiration pneumonia  He was found to have severe dysphagia and was using J-tube that was pulled in the meantime  He was hospitalized again in February 2019 due to spontaneous and incidentally seen pneumothorax in the left lung  A chest tube was placed and he was hospitalized for few days  He does not have any known history of lung disease  He had another chest CT that showed significant improvement of the consolidation in the left lung with some trace areas of reticulation and persistent left small to medium pleural effusion  He is not symptomatic from the breathing standpoint  His weight is not increasing  His boarding eating well but still weight 117 lb    Is regular weight used to be 160-170 lb                              Social history:   Smoked for 10 years many years ago a pack a day  Does not drink alcohol      Occupational history:   Retired  Review of Systems   Constitutional: Negative  HENT: Positive for voice change  Eyes: Negative  Respiratory: Negative  Cardiovascular: Negative  Gastrointestinal: Negative  Endocrine: Negative  Genitourinary: Negative  Musculoskeletal: Positive for arthralgias, back pain and gait problem  Skin: Negative  Allergic/Immunologic: Negative  Hematological: Negative  Psychiatric/Behavioral: Negative  Historical Information   Past Medical History:   Diagnosis Date    Cardiac disease     Diabetes mellitus ([address] )     GERD (gastroesophageal reflux disease)     Weight loss      Past Surgical History:   Procedure Laterality Date    CT GUIDED CHEST TUBE  2/15/2019    GASTROSTOMY TUBE PLACEMENT N/A 1/14/2019    Procedure: INSERTION PEG TUBE;  Surgeon: Rony Sawyer MD;  [facility]; Service: Gastroenterology    GASTROSTOMY TUBE PLACEMENT N/A 1/15/2019    Procedure: INSERTION JEJUNOSTOMY TUBE OPEN;  Surgeon: Dinorah Gottlieb MD;  [facility];  Service: Surgical Oncology    LAPAROTOMY N/A 11/19/2018    Procedure: LAPAROTOMY EXPLORATORY;  Surgeon: Dinorah Gottlieb MD;  [facility];  Service: Surgical Oncology    MN EDG US EXAM SURGICAL ALTER STOM DUODENUM/JEJUNUM N/A 9/21/2018    Procedure: LINEAR ENDOSCOPIC U/S;  Surgeon: Khadra Corado MD;  [facility];   Service: Gastroenterology    MN LAP,DIAGNOSTIC ABDOMEN N/A 11/19/2018    Procedure: pancreatic biopsy;  Surgeon: Dinorah Gottlieb MD;  [facility];  Service: Surgical Oncology    TONSILLECTOMY      VASCULAR SURGERY      phlebitis many years ago    WRIST GANGLION EXCISION       Family History   Problem Relation Age of Onset    No Known Problems Mother     No Known Problems Father     Pancreatic cancer Brother 61    Breast cancer Daughter 39        45s       Meds/Allergies     Current Outpatient Medications:     aspirin 81 mg chewable tablet, 1 tablet (81 mg total) by Per J Tube route daily, Disp: , Rfl: 0    FREESTYLE LITE test strip, USE 1 STRIP TO CHECK GLUCOSE ONCE DAILY, Disp: , Rfl: 5    glucose monitoring kit (FREESTYLE) monitoring kit, 1 each by Does not apply route 2 (two) times a day, Disp: 1 each, Rfl: 0    insulin degludec (TRESIBA FLEXTOUCH) 100 units/mL injection pen, Inject 2 Units under the skin daily at bedtime , Disp: , Rfl:     Lancets (FREESTYLE) lancets, USE 1  TO CHECK GLUCOSE ONCE DAILY, Disp: , Rfl: 5    metFORMIN (GLUCOPHAGE) 500 mg tablet, Take 500 mg by mouth daily, Disp: , Rfl: 3    omeprazole (PriLOSEC) 20 mg delayed release capsule, Take 2 capsules (40 mg total) by mouth daily Please give through J tube, Disp: 30 capsule, Rfl: 0    glucose 40 %, Take 15 g by mouth once for 1 dose, Disp: 15 g, Rfl: 2  No Known Allergies    Vitals: Blood pressure 118/80, pulse 66, temperature (!) 97 °F (36 1 °C), resp  rate 16, height 5' 6" (1 676 m), weight 54 kg (119 lb), SpO2 99 %  Body mass index is 19 21 kg/m²  Oxygen Therapy  SpO2: 99 %    Physical Exam  Physical Exam   Constitutional: He is oriented to person, place, and time  No distress  Cachexia   HENT:   Head: Normocephalic and atraumatic  Hoarse   Eyes: Pupils are equal, round, and reactive to light  EOM are normal    Neck: Normal range of motion  Neck supple  No JVD present  Cardiovascular: Normal rate, regular rhythm and normal heart sounds  No murmur heard  Pulmonary/Chest: Effort normal  No stridor  No respiratory distress  He has no wheezes  He has no rales  Abdominal: Soft  He exhibits no distension  Musculoskeletal: Normal range of motion  He exhibits no edema or deformity  Neurological: He is alert and oriented to person, place, and time  Skin: Skin is warm  He is not diaphoretic  Psychiatric: He has a normal mood and affect  Labs:   I have personally reviewed pertinent lab results  Lab Results   Component Value Date    WBC 5 20 03/31/2019    HGB 12 7 (L) 03/31/2019    HCT 37 8 (L) 03/31/2019    MCV 95 03/31/2019     03/31/2019     Lab Results   Component Value Date    GLUCOSE 202 (H) 01/10/2019    CALCIUM 8 4 (L) 03/31/2019    K 3 4 (L) 03/31/2019    CO2 31 03/31/2019     03/31/2019    BUN 11 03/31/2019    CREATININE 0 66 (L) 03/31/2019     No results found for: IGE  Lab Results   Component Value Date    ALT 24 03/31/2019    AST 18 03/31/2019    ALKPHOS 73 03/31/2019       Imaging and other studies:   I have personally reviewed pertinent imaging studies in PACS  Chest CT March 2019  Persistent moderate amount of debris seen within the left mainstem bronchus and lower lobe bronchi, similar to prior exam   Significant interval improvement in prior areas of airspace consolidation, now with residual groundglass and interstitial thickening with associated mild bronchiectasis seen within both lower lobes and the basilar dependent regions of the right middle lobe and lingula  Persistent mild left pleural fluid without evidence of loculation, stable compared to prior      Chest x-ray July 2019  Cardiomediastinal silhouette appears unremarkable  Again, there is abnormal soft tissue pleural reflection on the left  This is unchanged  I'm uncertain of this pleural effusion or pleural thickening  It does not appear any different    Osseous structures appear within normal limits for patient age

## 2019-10-02 ENCOUNTER — APPOINTMENT (OUTPATIENT)
Dept: LAB | Facility: HOSPITAL | Age: 84
End: 2019-10-02
Attending: INTERNAL MEDICINE
Payer: MEDICARE

## 2019-10-02 DIAGNOSIS — R64 CACHEXIA (HCC): ICD-10-CM

## 2019-10-02 LAB
ALBUMIN SERPL BCP-MCNC: 2.1 G/DL (ref 3.5–5)
ALP SERPL-CCNC: 92 U/L (ref 46–116)
ALT SERPL W P-5'-P-CCNC: 31 U/L (ref 12–78)
ANION GAP SERPL CALCULATED.3IONS-SCNC: 7 MMOL/L (ref 4–13)
AST SERPL W P-5'-P-CCNC: 24 U/L (ref 5–45)
BASOPHILS # BLD AUTO: 0.03 THOUSANDS/ΜL (ref 0–0.1)
BASOPHILS NFR BLD AUTO: 0 % (ref 0–1)
BILIRUB SERPL-MCNC: 0.33 MG/DL (ref 0.2–1)
BUN SERPL-MCNC: 8 MG/DL (ref 5–25)
CALCIUM SERPL-MCNC: 8.1 MG/DL (ref 8.3–10.1)
CHLORIDE SERPL-SCNC: 105 MMOL/L (ref 100–108)
CO2 SERPL-SCNC: 28 MMOL/L (ref 21–32)
CREAT SERPL-MCNC: 0.68 MG/DL (ref 0.6–1.3)
EOSINOPHIL # BLD AUTO: 0.23 THOUSAND/ΜL (ref 0–0.61)
EOSINOPHIL NFR BLD AUTO: 2 % (ref 0–6)
ERYTHROCYTE [DISTWIDTH] IN BLOOD BY AUTOMATED COUNT: 13.1 % (ref 11.6–15.1)
GFR SERPL CREATININE-BSD FRML MDRD: 89 ML/MIN/1.73SQ M
GLUCOSE P FAST SERPL-MCNC: 65 MG/DL (ref 65–99)
HCT VFR BLD AUTO: 38.5 % (ref 36.5–49.3)
HGB BLD-MCNC: 12.3 G/DL (ref 12–17)
IMM GRANULOCYTES # BLD AUTO: 0.04 THOUSAND/UL (ref 0–0.2)
IMM GRANULOCYTES NFR BLD AUTO: 0 % (ref 0–2)
LYMPHOCYTES # BLD AUTO: 1.63 THOUSANDS/ΜL (ref 0.6–4.47)
LYMPHOCYTES NFR BLD AUTO: 17 % (ref 14–44)
MCH RBC QN AUTO: 31.9 PG (ref 26.8–34.3)
MCHC RBC AUTO-ENTMCNC: 31.9 G/DL (ref 31.4–37.4)
MCV RBC AUTO: 100 FL (ref 82–98)
MONOCYTES # BLD AUTO: 0.86 THOUSAND/ΜL (ref 0.17–1.22)
MONOCYTES NFR BLD AUTO: 9 % (ref 4–12)
NEUTROPHILS # BLD AUTO: 6.99 THOUSANDS/ΜL (ref 1.85–7.62)
NEUTS SEG NFR BLD AUTO: 72 % (ref 43–75)
NRBC BLD AUTO-RTO: 0 /100 WBCS
PLATELET # BLD AUTO: 238 THOUSANDS/UL (ref 149–390)
PMV BLD AUTO: 11.7 FL (ref 8.9–12.7)
POTASSIUM SERPL-SCNC: 3.5 MMOL/L (ref 3.5–5.3)
PROT SERPL-MCNC: 5.5 G/DL (ref 6.4–8.2)
RBC # BLD AUTO: 3.86 MILLION/UL (ref 3.88–5.62)
SODIUM SERPL-SCNC: 140 MMOL/L (ref 136–145)
WBC # BLD AUTO: 9.78 THOUSAND/UL (ref 4.31–10.16)

## 2019-10-02 PROCEDURE — 80053 COMPREHEN METABOLIC PANEL: CPT

## 2019-10-02 PROCEDURE — 85025 COMPLETE CBC W/AUTO DIFF WBC: CPT

## 2019-10-02 PROCEDURE — 36415 COLL VENOUS BLD VENIPUNCTURE: CPT

## 2019-11-18 ENCOUNTER — CLINICAL SUPPORT (OUTPATIENT)
Dept: NUTRITION | Facility: HOSPITAL | Age: 84
End: 2019-11-18
Payer: MEDICARE

## 2019-11-18 VITALS — WEIGHT: 124.4 LBS | BODY MASS INDEX: 20.08 KG/M2

## 2019-11-18 DIAGNOSIS — E11.9 DIABETES MELLITUS WITHOUT COMPLICATION (HCC): Primary | ICD-10-CM

## 2019-11-18 PROCEDURE — 97803 MED NUTRITION INDIV SUBSEQ: CPT

## 2019-11-18 NOTE — PROGRESS NOTES
Follow-Up Nutrition Assessment Form    Patient Name: Juan Clay    YOB: 1935    Sex: Male      Follow Up Date: 11/18/2019  Start Time: 320 Stop Time: 350 Total Minutes: 30     Data:  Present at session: self   Parent/Patient Concerns:    Medical Dx/Reason for Referral:    Past Medical History:   Diagnosis Date    Cardiac disease     Diabetes mellitus ([address] )     GERD (gastroesophageal reflux disease)     Weight loss        Current Outpatient Medications   Medication Sig Dispense Refill    aspirin 81 mg chewable tablet 1 tablet (81 mg total) by Per J Tube route daily  0    FREESTYLE LITE test strip USE 1 STRIP TO CHECK GLUCOSE ONCE DAILY  5    furosemide (LASIX) 20 mg tablet Take 1 tablet (20 mg total) by mouth every other day 30 tablet 5    glucose 40 % Take 15 g by mouth once for 1 dose 15 g 2    glucose monitoring kit (FREESTYLE) monitoring kit 1 each by Does not apply route 2 (two) times a day 1 each 0    insulin degludec (TRESIBA FLEXTOUCH) 100 units/mL injection pen Inject 2 Units under the skin daily at bedtime       Lancets (FREESTYLE) lancets USE 1  TO CHECK GLUCOSE ONCE DAILY  5    metFORMIN (GLUCOPHAGE) 500 mg tablet Take 500 mg by mouth daily  3    omeprazole (PriLOSEC) 20 mg delayed release capsule Take 2 capsules (40 mg total) by mouth daily Please give through J tube 30 capsule 0     No current facility-administered medications for this visit           Additional Meds/Supplements:    Barriers to Learning: None   Labs:    Height: Ht Readings from Last 3 Encounters:   09/23/19 5' 6" (1 676 m)   04/26/19 5' 7" (1 702 m)   04/09/19 5' 7" (1 702 m)      Weight: Wt Readings from Last 10 Encounters:   11/18/19 56 4 kg (124 lb 6 4 oz)   09/23/19 54 kg (119 lb)   05/28/19 52 7 kg (116 lb 3 2 oz)   05/23/19 53 4 kg (117 lb 12 8 oz)   04/30/19 51 6 kg (113 lb 12 8 oz)   04/26/19 56 2 kg (124 lb)   04/09/19 53 1 kg (117 lb)   03/31/19 52 kg (114 lb 10 2 oz)   03/30/19 52 2 kg (115 lb) 03/27/19 52 3 kg (115 lb 6 4 oz)     Estimated body mass index is 20 08 kg/m² as calculated from the following:    Height as of 9/23/19: 5' 6" (1 676 m)  Weight as of this encounter: 56 4 kg (124 lb 6 4 oz)  Wt  Change Since Last Visit: [x]Yes     []No  Amount: Favorable gain of 8#      Energy Needs: No calculation needed   Pain Screen: Are you having pain now? No      Goals Achieved:     New Goals: 1  PT at home for deconditioning? ? Family to look into it with MD   2   3 meals a day no skipping   3  Initial PES:       New PES: No Change      New Problem List:  1 had a fall 3 weeks ago-some deconditioning noted   2    3        Assessment:  Checks BG every other day, 84 Am fasting then at night would be 200, energy throughout the day is fairly steady, does take naps durng the day per wife and daughter  Medical Nutrition Therapy Intervention:  [x]Individualized Meal Plan- 11am B pancakes 3 butter syrup; 2 eggs, OJ x10, boost pudding  1-2day, can of chicken noodle soup crackers x3-4 apple juice;   Red beet egg-jelly beans; 5clams/fish potatoes gravy stuffing no veggies-more juice; boost pudding before dinner- ice cream fruit and cool whip []Understanding Lab Values   []Basic Pathophysiology of Disease []Food/Medication Interactions   [x]Food Diary [x]Exercise   [x]Lifestyle/Behavior Modification Techniques []Medication, Mechanism of Action   []Label Reading []Self Blood Glucose Monitoring- does not check BG at home   [x]Weight/BMI Goals [x]Other - awake at 9-10, bed at 12MN   Other Notes:        Comprehension: []Excellent  [x]Very Good  []Good  []Fair   []Poor    Receptivity: []Excellent  [x]Very Good  []Good  []Fair   []Poor    Expected Compliance: []Excellent  [x]Very Good  []Good  []Fair   []Poor      Labs:  CMP  Lab Results   Component Value Date    K 3 5 10/02/2019     10/02/2019    CO2 28 10/02/2019    BUN 8 10/02/2019    CREATININE 0 68 10/02/2019    GLUCOSE 202 (H) 01/10/2019    GLUF 65 10/02/2019    CALCIUM 8 1 (L) 10/02/2019    AST 24 10/02/2019    ALT 31 10/02/2019    ALKPHOS 92 10/02/2019    EGFR 89 10/02/2019       BMP  Lab Results   Component Value Date    GLUCOSE 202 (H) 01/10/2019    CALCIUM 8 1 (L) 10/02/2019    K 3 5 10/02/2019    CO2 28 10/02/2019     10/02/2019    BUN 8 10/02/2019    CREATININE 0 68 10/02/2019       Lipids  No results found for: CHOL  Lab Results   Component Value Date    HDL 33 (L) 01/09/2019    HDL 73 (H) 06/28/2018     Lab Results   Component Value Date    LDLCALC 19 01/09/2019    LDLCALC 73 06/28/2018     Lab Results   Component Value Date    TRIG 36 01/09/2019    TRIG 82 06/28/2018     No results found for: CHOLHDL    Hemoglobin A1C  Lab Results   Component Value Date    HGBA1C 6 6 (H) 07/19/2019       Fasting Glucose  Lab Results   Component Value Date    GLUF 65 10/02/2019       Insulin     Thyroid  Lab Results   Component Value Date    E0BYXYC 1 00 09/14/2018    S8WJZLB 7 4 09/14/2018       Hepatic Function Panel  Lab Results   Component Value Date    ALT 31 10/02/2019    AST 24 10/02/2019    ALKPHOS 92 10/02/2019       Celiac Disease Antibody Panel  No results found for: ENDOMYSIAL IGA, GLIADIN IGA, GLIADIN IGG, IGA, TISSUE TRANSGLUT AB, TTG IGA   Iron  No results found for: IRON, TIBC, FERRITIN    Vitamins  No results found for: VITAMIN B2   No results found for: NICOTINAMIDE, NICOTINIC ACID   No results found for: VITAMINB6  Lab Results   Component Value Date    DJEYDTTR36 424 01/08/2019     No results found for: VITB5  No results found for: A8BQIFUI  No results found for: THYROGLB  Vitamin K   Date Value Ref Range Status   01/10/2019 < 13 (L) 0 13 - 1 88 ng/mL Final      25-HYDROXY VIT D   Date Value Ref Range Status   01/10/2019 10 (L) ng/mL Final     Comment:     Reference Range:   All Ages: Target levels 30 - 100     25-HYDROXY VIT D3   Date Value Ref Range Status   01/10/2019 10 ng/mL Final      No components found for: VITAMINE     No follow-ups on file      [address] [address]

## 2019-12-30 ENCOUNTER — OFFICE VISIT (OUTPATIENT)
Dept: GASTROENTEROLOGY | Facility: MEDICAL CENTER | Age: 84
End: 2019-12-30
Payer: MEDICARE

## 2019-12-30 VITALS
DIASTOLIC BLOOD PRESSURE: 74 MMHG | WEIGHT: 117 LBS | SYSTOLIC BLOOD PRESSURE: 130 MMHG | BODY MASS INDEX: 18.8 KG/M2 | HEIGHT: 66 IN | HEART RATE: 74 BPM | TEMPERATURE: 98.2 F

## 2019-12-30 DIAGNOSIS — K86.1 OTHER CHRONIC PANCREATITIS (HCC): Primary | ICD-10-CM

## 2019-12-30 DIAGNOSIS — E11.9 TYPE 2 DIABETES MELLITUS WITHOUT COMPLICATION, WITHOUT LONG-TERM CURRENT USE OF INSULIN (HCC): ICD-10-CM

## 2019-12-30 DIAGNOSIS — R26.2 AMBULATORY DYSFUNCTION: ICD-10-CM

## 2019-12-30 DIAGNOSIS — R64 CACHEXIA (HCC): ICD-10-CM

## 2019-12-30 DIAGNOSIS — E43 SEVERE PROTEIN-CALORIE MALNUTRITION (HCC): ICD-10-CM

## 2019-12-30 DIAGNOSIS — R63.4 WEIGHT LOSS: ICD-10-CM

## 2019-12-30 DIAGNOSIS — R60.0 LOWER EXTREMITY EDEMA: ICD-10-CM

## 2019-12-30 DIAGNOSIS — K86.3 PANCREATIC PSEUDOCYST: ICD-10-CM

## 2019-12-30 DIAGNOSIS — K21.9 GASTROESOPHAGEAL REFLUX DISEASE WITHOUT ESOPHAGITIS: ICD-10-CM

## 2019-12-30 DIAGNOSIS — K86.89 PANCREATIC FISTULA: ICD-10-CM

## 2019-12-30 PROBLEM — R13.12 OROPHARYNGEAL DYSPHAGIA: Chronic | Status: RESOLVED | Noted: 2019-01-02 | Resolved: 2019-12-30

## 2019-12-30 PROBLEM — R13.10 DYSPHAGIA: Status: RESOLVED | Noted: 2019-01-01 | Resolved: 2019-12-30

## 2019-12-30 PROCEDURE — 99214 OFFICE O/P EST MOD 30 MIN: CPT | Performed by: INTERNAL MEDICINE

## 2019-12-30 NOTE — PROGRESS NOTES
Outpatient Follow up  [facility]  [address] [facility]  Amaury Abarca MD  Ph : [phone]  Fax : [phone]  Mobile : [phone]  Email : [email or web address]  Also available on [address] 82 y o  male MRN: [id number]    PCP: Bhavani Waldrop MD  Referring: Trinh Villar MD  [address], [address] [facility]    Juan Hernandez presented for a follow up visit  My recommendations are included  Please do not hesitate to contact me with any questions you may have  ASSESSMENT AND PLAN:      82-year-old gentleman who presents to us for follow-up  He has a history of chronic pancreatitis and pancreatic pseudocyst   This had spontaneously fistulizing to the stomach and had resolved  However since he is having symptoms of weight loss, steatorrhea I would like to further evaluate this again  I would like to repeat a  CT scan and check pancreatic fecal elastase  I would also check a comprehensive metabolic panel  His last albumin was quite low at 2 1  There is concern for protein calorie malnutrition  I will check blood work in regards to his folate, B12, vitamin-D, INR  Will check a TSH  Since his MCV was also high checking a B12 and folate and a TSH is reasonable  I recommended for him to see nutrition services  Regarding his ambulatory dysfunction I would recommend him to see physical therapy  I have encouraged him to follow up with his primary physician in regards to further evaluation in regards to his ambulatory dysfunction, malnutrition and lower extremity edema which may be related to his hypoalbuminemia  Follow up with us in the office  For his GERD recommend to continue the omeprazole  Diagnoses and all orders for this visit:    Other chronic pancreatitis ([facility] )  -     Comprehensive metabolic panel;  Future  -     Pancreatic elastase, fecal; Future    Cachexia ([facility] )  -     Ambulatory referral to Gastroenterology    Pancreatic pseudocyst  -     Pancreatic elastase, fecal; Future  -     CT abdomen pelvis w contrast; Future    Pancreatic fistula    Gastroesophageal reflux disease without esophagitis    Weight loss  -     Comprehensive metabolic panel; Future  -     Protime-INR; Future  -     CBC and differential; Future  -     Folate; Future  -     Vitamin B12; Future  -     Vitamin D 25 hydroxy; Future  -     CT abdomen pelvis w contrast; Future  -     Ambulatory referral to Nutrition Services; Future  -     TSH, 3rd generation; Future    Ambulatory dysfunction  -     Comprehensive metabolic panel; Future  -     Folate; Future  -     Vitamin B12; Future  -     Vitamin D 25 hydroxy; Future  -     Ambulatory referral to Physical Therapy; Future    Severe protein-calorie malnutrition (HCC)  -     Comprehensive metabolic panel; Future  -     Protime-INR; Future  -     CBC and differential; Future  -     Folate; Future  -     Vitamin B12; Future  -     Vitamin D 25 hydroxy; Future  -     Ambulatory referral to Nutrition Services; Future  -     TSH, 3rd generation; Future    Diabetes mellitus type 2  -     Hemoglobin A1C; Future    Lower extremity edema      ______________________________________________________________________    SUBJECTIVECreasie Leonard presents for follow-up  He has been seen by us in regards to his history of chronic pancreatitis and pancreatic pseudocyst   These have both resolved on his previous CT scan earlier in the year  His last CT was in 3/19 and showed resolution of the pancreatic cyst  He had been doing well  He did have history of dysphagia which have also resolved  He then had a fall in November  He has been weak and has been losing some weight as well  He did see his PCP after the fall but did not discuss the fall with them  He is walking with a walker at this time  His daughters are concerned about malabsorption since they have noticed that his stools float in the water now   His last elastase was 50  He is not on pancreatic supplements  His albumin was 2 1 in 10/19  His CBC was unremarkable except for a high MCV at 100  He has lower extermity edema which is being addressed by his primary  He is on diuretics  Last EGD 1/19 : Small sliding hiatal hernia  Unable to place PEG tube because of poor landmarks  Fistula versus ulcer and the posterior wall of the stomach concerning for erosion of the pseudocyst into the posterior wall of the stomach  REVIEW OF SYSTEMS IS OTHERWISE NEGATIVE  Historical Information   Past Medical History:   Diagnosis Date    Cardiac disease     Diabetes mellitus [facility] )     GERD (gastroesophageal reflux disease)     Weight loss      Past Surgical History:   Procedure Laterality Date    CT GUIDED CHEST TUBE  2/15/2019    GASTROSTOMY TUBE PLACEMENT N/A 1/14/2019    Procedure: INSERTION PEG TUBE;  Surgeon: Char Diego MD;  [facility]; Service: Gastroenterology    GASTROSTOMY TUBE PLACEMENT N/A 1/15/2019    Procedure: INSERTION JEJUNOSTOMY TUBE OPEN;  Surgeon: Mckinley Michaels MD;  [facility];  Service: Surgical Oncology    LAPAROTOMY N/A 11/19/2018    Procedure: LAPAROTOMY EXPLORATORY;  Surgeon: Mckinley Michaels MD;  [facility];  Service: Surgical Oncology    IN EDG US EXAM SURGICAL ALTER STOM DUODENUM/JEJUNUM N/A 9/21/2018    Procedure: LINEAR ENDOSCOPIC U/S;  Surgeon: Jovan Brooks MD;  [facility];   Service: Gastroenterology    IN LAP,DIAGNOSTIC ABDOMEN N/A 11/19/2018    Procedure: pancreatic biopsy;  Surgeon: Mckinley Michaels MD;  [facility];  Service: Surgical Oncology    TONSILLECTOMY      UPPER GASTROINTESTINAL ENDOSCOPY      VASCULAR SURGERY      phlebitis many years ago    WRIST GANGLION EXCISION       Social History   Social History     Substance and Sexual Activity   Alcohol Use Not Currently    Comment: 1 beer a day     Social History     Substance and Sexual Activity   Drug Use No     Social History     Tobacco Use   Smoking Status Former Smoker    Packs/day: 0 50    Years: 4 00    Pack years: 2 00    Types: Cigarettes    Start date: 82    Last attempt to quit: 1962    Years since quittin 0   Smokeless Tobacco Never Used   Tobacco Comment    quit about 20 years ago as of 2018     Family History   Problem Relation Age of Onset    No Known Problems Mother     No Known Problems Father     Pancreatic cancer Brother 61    Breast cancer Daughter 36        38s       Meds/Allergies       Current Outpatient Medications:     aspirin 81 mg chewable tablet    FREESTYLE LITE test strip    furosemide (LASIX) 20 mg tablet    glucose monitoring kit (FREESTYLE) monitoring kit    insulin degludec (TRESIBA FLEXTOUCH) 100 units/mL injection pen    Lancets (FREESTYLE) lancets    metFORMIN (GLUCOPHAGE) 500 mg tablet    omeprazole (PriLOSEC) 20 mg delayed release capsule    No Known Allergies        Objective     Blood pressure 130/74, pulse 74, temperature 98 2 °F (36 8 °C), temperature source Tympanic, height 5' 6" (1 676 m), weight 53 1 kg (117 lb)  Body mass index is 18 88 kg/m²  PHYSICAL EXAM:      Physical Exam   Constitutional: He is oriented to person, place, and time  Vital signs are normal  He appears well-developed and well-nourished  HENT:   Head: Normocephalic and atraumatic  Eyes: Pupils are equal, round, and reactive to light  Conjunctivae are normal  No scleral icterus  Neck: Normal range of motion  Cardiovascular: Normal rate, regular rhythm and normal heart sounds  Pulmonary/Chest: Effort normal and breath sounds normal  No respiratory distress  Abdominal: Soft  Normal appearance and bowel sounds are normal  He exhibits no distension, no ascites and no mass  There is no hepatosplenomegaly  There is no tenderness  No hernia  Surgical scars   Musculoskeletal: Normal range of motion  He exhibits edema (b/l lower extremity edema)     Lymphadenopathy:     He has no cervical adenopathy  Neurological: He is alert and oriented to person, place, and time  Skin: Skin is warm  Psychiatric: He has a normal mood and affect  His behavior is normal  Thought content normal        Lab Results:   No visits with results within 1 Day(s) from this visit  Latest known visit with results is:   Appointment on 10/02/2019   Component Date Value    WBC 10/02/2019 9 78     RBC 10/02/2019 3 86*    Hemoglobin 10/02/2019 12 3     Hematocrit 10/02/2019 38 5     MCV 10/02/2019 100*    MCH 10/02/2019 31 9     MCHC 10/02/2019 31 9     RDW 10/02/2019 13 1     MPV 10/02/2019 11 7     Platelets 39/46/4648 238     nRBC 10/02/2019 0     Neutrophils Relative 10/02/2019 72     Immat GRANS % 10/02/2019 0     Lymphocytes Relative 10/02/2019 17     Monocytes Relative 10/02/2019 9     Eosinophils Relative 10/02/2019 2     Basophils Relative 10/02/2019 0     Neutrophils Absolute 10/02/2019 6 99     Immature Grans Absolute 10/02/2019 0 04     Lymphocytes Absolute 10/02/2019 1 63     Monocytes Absolute 10/02/2019 0 86     Eosinophils Absolute 10/02/2019 0 23     Basophils Absolute 10/02/2019 0 03     Sodium 10/02/2019 140     Potassium 10/02/2019 3 5     Chloride 10/02/2019 105     CO2 10/02/2019 28     ANION GAP 10/02/2019 7     BUN 10/02/2019 8     Creatinine 10/02/2019 0 68     Glucose, Fasting 10/02/2019 65     Calcium 10/02/2019 8 1*    AST 10/02/2019 24     ALT 10/02/2019 31     Alkaline Phosphatase 10/02/2019 92     Total Protein 10/02/2019 5 5*    Albumin 10/02/2019 2 1*    Total Bilirubin 10/02/2019 0 33     eGFR 10/02/2019 89          Radiology Results:   No results found

## 2019-12-31 ENCOUNTER — APPOINTMENT (OUTPATIENT)
Dept: LAB | Facility: HOSPITAL | Age: 84
End: 2019-12-31
Payer: MEDICARE

## 2019-12-31 DIAGNOSIS — E11.9 TYPE 2 DIABETES MELLITUS WITHOUT COMPLICATION, WITHOUT LONG-TERM CURRENT USE OF INSULIN (HCC): ICD-10-CM

## 2019-12-31 DIAGNOSIS — K86.3 PANCREATIC PSEUDOCYST: ICD-10-CM

## 2019-12-31 DIAGNOSIS — E43 SEVERE PROTEIN-CALORIE MALNUTRITION (HCC): ICD-10-CM

## 2019-12-31 DIAGNOSIS — R63.4 WEIGHT LOSS: ICD-10-CM

## 2019-12-31 DIAGNOSIS — R26.2 AMBULATORY DYSFUNCTION: ICD-10-CM

## 2019-12-31 DIAGNOSIS — K86.1 OTHER CHRONIC PANCREATITIS (HCC): ICD-10-CM

## 2019-12-31 LAB
25(OH)D3 SERPL-MCNC: 16.6 NG/ML (ref 30–100)
ALBUMIN SERPL BCP-MCNC: 1.7 G/DL (ref 3.5–5)
ALP SERPL-CCNC: 133 U/L (ref 46–116)
ALT SERPL W P-5'-P-CCNC: 43 U/L (ref 12–78)
ANION GAP SERPL CALCULATED.3IONS-SCNC: 5 MMOL/L (ref 4–13)
AST SERPL W P-5'-P-CCNC: 30 U/L (ref 5–45)
BASOPHILS # BLD AUTO: 0.04 THOUSANDS/ΜL (ref 0–0.1)
BASOPHILS NFR BLD AUTO: 1 % (ref 0–1)
BILIRUB SERPL-MCNC: 0.32 MG/DL (ref 0.2–1)
BUN SERPL-MCNC: 15 MG/DL (ref 5–25)
CALCIUM SERPL-MCNC: 7.7 MG/DL (ref 8.3–10.1)
CHLORIDE SERPL-SCNC: 107 MMOL/L (ref 100–108)
CO2 SERPL-SCNC: 28 MMOL/L (ref 21–32)
CREAT SERPL-MCNC: 0.68 MG/DL (ref 0.6–1.3)
EOSINOPHIL # BLD AUTO: 0.1 THOUSAND/ΜL (ref 0–0.61)
EOSINOPHIL NFR BLD AUTO: 1 % (ref 0–6)
ERYTHROCYTE [DISTWIDTH] IN BLOOD BY AUTOMATED COUNT: 14.5 % (ref 11.6–15.1)
EST. AVERAGE GLUCOSE BLD GHB EST-MCNC: 120 MG/DL
FOLATE SERPL-MCNC: >20 NG/ML (ref 3.1–17.5)
GFR SERPL CREATININE-BSD FRML MDRD: 89 ML/MIN/1.73SQ M
GLUCOSE SERPL-MCNC: 89 MG/DL (ref 65–140)
HBA1C MFR BLD: 5.8 % (ref 4.2–6.3)
HCT VFR BLD AUTO: 35.2 % (ref 36.5–49.3)
HGB BLD-MCNC: 11 G/DL (ref 12–17)
IMM GRANULOCYTES # BLD AUTO: 0.04 THOUSAND/UL (ref 0–0.2)
IMM GRANULOCYTES NFR BLD AUTO: 1 % (ref 0–2)
INR PPP: 1.09 (ref 0.84–1.19)
LYMPHOCYTES # BLD AUTO: 1.04 THOUSANDS/ΜL (ref 0.6–4.47)
LYMPHOCYTES NFR BLD AUTO: 14 % (ref 14–44)
MCH RBC QN AUTO: 31.5 PG (ref 26.8–34.3)
MCHC RBC AUTO-ENTMCNC: 31.3 G/DL (ref 31.4–37.4)
MCV RBC AUTO: 101 FL (ref 82–98)
MONOCYTES # BLD AUTO: 0.54 THOUSAND/ΜL (ref 0.17–1.22)
MONOCYTES NFR BLD AUTO: 7 % (ref 4–12)
NEUTROPHILS # BLD AUTO: 5.61 THOUSANDS/ΜL (ref 1.85–7.62)
NEUTS SEG NFR BLD AUTO: 76 % (ref 43–75)
NRBC BLD AUTO-RTO: 0 /100 WBCS
PLATELET # BLD AUTO: 292 THOUSANDS/UL (ref 149–390)
PMV BLD AUTO: 11.4 FL (ref 8.9–12.7)
POTASSIUM SERPL-SCNC: 4.4 MMOL/L (ref 3.5–5.3)
PROT SERPL-MCNC: 5.3 G/DL (ref 6.4–8.2)
PROTHROMBIN TIME: 13.7 SECONDS (ref 11.6–14.5)
RBC # BLD AUTO: 3.49 MILLION/UL (ref 3.88–5.62)
SODIUM SERPL-SCNC: 140 MMOL/L (ref 136–145)
TSH SERPL DL<=0.05 MIU/L-ACNC: 4.49 UIU/ML (ref 0.36–3.74)
VIT B12 SERPL-MCNC: 737 PG/ML (ref 100–900)
WBC # BLD AUTO: 7.37 THOUSAND/UL (ref 4.31–10.16)

## 2019-12-31 PROCEDURE — 36415 COLL VENOUS BLD VENIPUNCTURE: CPT

## 2019-12-31 PROCEDURE — 85610 PROTHROMBIN TIME: CPT

## 2019-12-31 PROCEDURE — 82306 VITAMIN D 25 HYDROXY: CPT

## 2019-12-31 PROCEDURE — 82746 ASSAY OF FOLIC ACID SERUM: CPT

## 2019-12-31 PROCEDURE — 85025 COMPLETE CBC W/AUTO DIFF WBC: CPT

## 2019-12-31 PROCEDURE — 84443 ASSAY THYROID STIM HORMONE: CPT

## 2019-12-31 PROCEDURE — 80053 COMPREHEN METABOLIC PANEL: CPT

## 2019-12-31 PROCEDURE — 82607 VITAMIN B-12: CPT

## 2019-12-31 PROCEDURE — 83036 HEMOGLOBIN GLYCOSYLATED A1C: CPT

## 2020-01-04 ENCOUNTER — APPOINTMENT (EMERGENCY)
Dept: RADIOLOGY | Facility: HOSPITAL | Age: 85
DRG: 871 | End: 2020-01-04
Payer: MEDICARE

## 2020-01-04 ENCOUNTER — HOSPITAL ENCOUNTER (INPATIENT)
Facility: HOSPITAL | Age: 85
LOS: 6 days | Discharge: NURSING, REHAB OR LONG-TERM CARE | DRG: 871 | End: 2020-01-10
Attending: FAMILY MEDICINE | Admitting: HOSPITALIST
Payer: MEDICARE

## 2020-01-04 DIAGNOSIS — E16.2 HYPOGLYCEMIA: Primary | ICD-10-CM

## 2020-01-04 DIAGNOSIS — J12.1 RSV (RESPIRATORY SYNCYTIAL VIRUS PNEUMONIA): ICD-10-CM

## 2020-01-04 DIAGNOSIS — J18.9 PNEUMONIA: ICD-10-CM

## 2020-01-04 DIAGNOSIS — J90 PLEURAL EFFUSION: ICD-10-CM

## 2020-01-04 PROBLEM — J12.9 VIRAL PNEUMONIA: Status: ACTIVE | Noted: 2020-01-04

## 2020-01-04 PROBLEM — G93.41 ACUTE METABOLIC ENCEPHALOPATHY DUE TO HYPOGLYCEMIA: Status: ACTIVE | Noted: 2020-01-04

## 2020-01-04 PROBLEM — B33.8 RSV INFECTION: Status: ACTIVE | Noted: 2020-01-04

## 2020-01-04 PROBLEM — E55.9 VITAMIN D DEFICIENCY: Status: ACTIVE | Noted: 2020-01-04

## 2020-01-04 PROBLEM — E11.9 TYPE 2 DIABETES MELLITUS WITHOUT COMPLICATION, WITHOUT LONG-TERM CURRENT USE OF INSULIN (HCC): Chronic | Status: ACTIVE | Noted: 2018-10-15

## 2020-01-04 LAB
ANION GAP SERPL CALCULATED.3IONS-SCNC: 5 MMOL/L (ref 4–13)
BASOPHILS # BLD AUTO: 0 THOUSANDS/ΜL (ref 0–0.1)
BASOPHILS NFR BLD AUTO: 0 % (ref 0–2)
BNP SERPL-MCNC: 237 PG/ML (ref 1–100)
BUN SERPL-MCNC: 22 MG/DL (ref 7–25)
CALCIUM SERPL-MCNC: 7.5 MG/DL (ref 8.6–10.5)
CHLORIDE SERPL-SCNC: 103 MMOL/L (ref 98–107)
CO2 SERPL-SCNC: 29 MMOL/L (ref 21–31)
CREAT SERPL-MCNC: 0.7 MG/DL (ref 0.7–1.3)
EOSINOPHIL # BLD AUTO: 0 THOUSAND/ΜL (ref 0–0.61)
EOSINOPHIL NFR BLD AUTO: 0 % (ref 0–5)
ERYTHROCYTE [DISTWIDTH] IN BLOOD BY AUTOMATED COUNT: 15 % (ref 11.5–14.5)
FLUAV RNA NPH QL NAA+PROBE: ABNORMAL
FLUBV RNA NPH QL NAA+PROBE: ABNORMAL
GFR SERPL CREATININE-BSD FRML MDRD: 88 ML/MIN/1.73SQ M
GLUCOSE SERPL-MCNC: 150 MG/DL (ref 65–99)
GLUCOSE SERPL-MCNC: 155 MG/DL (ref 65–140)
GLUCOSE SERPL-MCNC: 216 MG/DL (ref 65–140)
GLUCOSE SERPL-MCNC: 239 MG/DL (ref 65–140)
GLUCOSE SERPL-MCNC: <20 MG/DL (ref 65–140)
GLUCOSE SERPL-MCNC: <20 MG/DL (ref 65–140)
HCT VFR BLD AUTO: 36.5 % (ref 42–47)
HGB BLD-MCNC: 11.7 G/DL (ref 14–18)
INR PPP: 1.24 (ref 0.9–1.5)
LYMPHOCYTES # BLD AUTO: 0.7 THOUSANDS/ΜL (ref 0.6–4.47)
LYMPHOCYTES NFR BLD AUTO: 5 % (ref 21–51)
MCH RBC QN AUTO: 31.4 PG (ref 26–34)
MCHC RBC AUTO-ENTMCNC: 32.1 G/DL (ref 31–37)
MCV RBC AUTO: 98 FL (ref 81–99)
MONOCYTES # BLD AUTO: 0.8 THOUSAND/ΜL (ref 0.17–1.22)
MONOCYTES NFR BLD AUTO: 6 % (ref 2–12)
NEUTROPHILS # BLD AUTO: 12.2 THOUSANDS/ΜL (ref 1.4–6.5)
NEUTS SEG NFR BLD AUTO: 89 % (ref 42–75)
PLATELET # BLD AUTO: 202 THOUSANDS/UL (ref 149–390)
PMV BLD AUTO: 8.8 FL (ref 8.6–11.7)
POTASSIUM SERPL-SCNC: 4.1 MMOL/L (ref 3.5–5.5)
PROTHROMBIN TIME: 14.4 SECONDS (ref 10.2–13)
RBC # BLD AUTO: 3.73 MILLION/UL (ref 4.3–5.9)
RSV RNA NPH QL NAA+PROBE: DETECTED
SODIUM SERPL-SCNC: 137 MMOL/L (ref 134–143)
WBC # BLD AUTO: 13.7 THOUSAND/UL (ref 4.8–10.8)

## 2020-01-04 PROCEDURE — 99285 EMERGENCY DEPT VISIT HI MDM: CPT

## 2020-01-04 PROCEDURE — 99285 EMERGENCY DEPT VISIT HI MDM: CPT | Performed by: FAMILY MEDICINE

## 2020-01-04 PROCEDURE — 87449 NOS EACH ORGANISM AG IA: CPT | Performed by: PHYSICIAN ASSISTANT

## 2020-01-04 PROCEDURE — 84145 PROCALCITONIN (PCT): CPT | Performed by: PHYSICIAN ASSISTANT

## 2020-01-04 PROCEDURE — 80048 BASIC METABOLIC PNL TOTAL CA: CPT | Performed by: FAMILY MEDICINE

## 2020-01-04 PROCEDURE — 87040 BLOOD CULTURE FOR BACTERIA: CPT | Performed by: PHYSICIAN ASSISTANT

## 2020-01-04 PROCEDURE — 96374 THER/PROPH/DIAG INJ IV PUSH: CPT

## 2020-01-04 PROCEDURE — 85610 PROTHROMBIN TIME: CPT | Performed by: FAMILY MEDICINE

## 2020-01-04 PROCEDURE — 71045 X-RAY EXAM CHEST 1 VIEW: CPT

## 2020-01-04 PROCEDURE — 36415 COLL VENOUS BLD VENIPUNCTURE: CPT | Performed by: FAMILY MEDICINE

## 2020-01-04 PROCEDURE — 82948 REAGENT STRIP/BLOOD GLUCOSE: CPT

## 2020-01-04 PROCEDURE — 94640 AIRWAY INHALATION TREATMENT: CPT

## 2020-01-04 PROCEDURE — 87631 RESP VIRUS 3-5 TARGETS: CPT | Performed by: FAMILY MEDICINE

## 2020-01-04 PROCEDURE — 94664 DEMO&/EVAL PT USE INHALER: CPT

## 2020-01-04 PROCEDURE — 94760 N-INVAS EAR/PLS OXIMETRY 1: CPT

## 2020-01-04 PROCEDURE — 99223 1ST HOSP IP/OBS HIGH 75: CPT | Performed by: PHYSICIAN ASSISTANT

## 2020-01-04 PROCEDURE — 85025 COMPLETE CBC W/AUTO DIFF WBC: CPT | Performed by: FAMILY MEDICINE

## 2020-01-04 PROCEDURE — 83880 ASSAY OF NATRIURETIC PEPTIDE: CPT | Performed by: FAMILY MEDICINE

## 2020-01-04 PROCEDURE — 93005 ELECTROCARDIOGRAM TRACING: CPT

## 2020-01-04 PROCEDURE — 94668 MNPJ CHEST WALL SBSQ: CPT

## 2020-01-04 RX ORDER — DEXTROSE MONOHYDRATE 25 G/50ML
25 INJECTION, SOLUTION INTRAVENOUS ONCE
Status: COMPLETED | OUTPATIENT
Start: 2020-01-04 | End: 2020-01-04

## 2020-01-04 RX ORDER — PANTOPRAZOLE SODIUM 40 MG/1
40 TABLET, DELAYED RELEASE ORAL
Status: DISCONTINUED | OUTPATIENT
Start: 2020-01-05 | End: 2020-01-10 | Stop reason: HOSPADM

## 2020-01-04 RX ORDER — ONDANSETRON 2 MG/ML
4 INJECTION INTRAMUSCULAR; INTRAVENOUS EVERY 6 HOURS PRN
Status: DISCONTINUED | OUTPATIENT
Start: 2020-01-04 | End: 2020-01-10 | Stop reason: HOSPADM

## 2020-01-04 RX ORDER — FUROSEMIDE 10 MG/ML
40 INJECTION INTRAMUSCULAR; INTRAVENOUS ONCE
Status: DISCONTINUED | OUTPATIENT
Start: 2020-01-04 | End: 2020-01-04

## 2020-01-04 RX ORDER — ERGOCALCIFEROL 1.25 MG/1
50000 CAPSULE ORAL WEEKLY
Status: DISCONTINUED | OUTPATIENT
Start: 2020-01-05 | End: 2020-01-10 | Stop reason: HOSPADM

## 2020-01-04 RX ORDER — SODIUM CHLORIDE 9 MG/ML
75 INJECTION, SOLUTION INTRAVENOUS ONCE
Status: COMPLETED | OUTPATIENT
Start: 2020-01-04 | End: 2020-01-04

## 2020-01-04 RX ORDER — ACETAMINOPHEN 325 MG/1
650 TABLET ORAL EVERY 4 HOURS PRN
Status: DISCONTINUED | OUTPATIENT
Start: 2020-01-04 | End: 2020-01-10 | Stop reason: HOSPADM

## 2020-01-04 RX ORDER — SODIUM CHLORIDE 9 MG/ML
75 INJECTION, SOLUTION INTRAVENOUS CONTINUOUS
Status: DISCONTINUED | OUTPATIENT
Start: 2020-01-04 | End: 2020-01-08

## 2020-01-04 RX ORDER — ASPIRIN 81 MG/1
81 TABLET, CHEWABLE ORAL DAILY
Status: DISCONTINUED | OUTPATIENT
Start: 2020-01-05 | End: 2020-01-10 | Stop reason: HOSPADM

## 2020-01-04 RX ORDER — ALBUTEROL SULFATE 2.5 MG/3ML
2.5 SOLUTION RESPIRATORY (INHALATION) EVERY 4 HOURS PRN
Status: DISCONTINUED | OUTPATIENT
Start: 2020-01-04 | End: 2020-01-10 | Stop reason: HOSPADM

## 2020-01-04 RX ORDER — CEFTRIAXONE 1 G/50ML
1000 INJECTION, SOLUTION INTRAVENOUS ONCE
Status: COMPLETED | OUTPATIENT
Start: 2020-01-04 | End: 2020-01-04

## 2020-01-04 RX ORDER — CEFTRIAXONE 1 G/50ML
1000 INJECTION, SOLUTION INTRAVENOUS EVERY 24 HOURS
Status: DISCONTINUED | OUTPATIENT
Start: 2020-01-05 | End: 2020-01-10 | Stop reason: HOSPADM

## 2020-01-04 RX ADMIN — FUROSEMIDE 40 MG: 10 INJECTION, SOLUTION INTRAMUSCULAR; INTRAVENOUS at 16:14

## 2020-01-04 RX ADMIN — SODIUM CHLORIDE 75 ML/HR: 9 INJECTION, SOLUTION INTRAVENOUS at 18:09

## 2020-01-04 RX ADMIN — ENOXAPARIN SODIUM 40 MG: 40 INJECTION SUBCUTANEOUS at 16:40

## 2020-01-04 RX ADMIN — ALBUTEROL SULFATE 2.5 MG: 2.5 SOLUTION RESPIRATORY (INHALATION) at 17:17

## 2020-01-04 RX ADMIN — DEXTROSE MONOHYDRATE 25 ML: 25 INJECTION, SOLUTION INTRAVENOUS at 11:35

## 2020-01-04 RX ADMIN — AZITHROMYCIN MONOHYDRATE 500 MG: 500 INJECTION, POWDER, LYOPHILIZED, FOR SOLUTION INTRAVENOUS at 15:20

## 2020-01-04 RX ADMIN — INSULIN LISPRO 1 UNITS: 100 INJECTION, SOLUTION INTRAVENOUS; SUBCUTANEOUS at 23:06

## 2020-01-04 RX ADMIN — INSULIN LISPRO 1 UNITS: 100 INJECTION, SOLUTION INTRAVENOUS; SUBCUTANEOUS at 17:57

## 2020-01-04 RX ADMIN — CEFTRIAXONE 1000 MG: 1 INJECTION, SOLUTION INTRAVENOUS at 14:16

## 2020-01-04 NOTE — ASSESSMENT & PLAN NOTE
· Family reported patient was disoriented, his blood sugar was less than 20  · EMS provided him 1 amp of D 50 and when he arrived in the ER, his blood sugars to less than 50 he received 2nd amp of D50  · Blood sugar improved

## 2020-01-04 NOTE — ED NOTES
Blood pressure 87/60    Per dr Franca Rawls, hold lasix for now     Moon Archuleta, RAFAEL  01/04/20 [id number]

## 2020-01-04 NOTE — ED PROVIDER NOTES
History  Chief Complaint   Patient presents with    Hypoglycemia - Symptomatic     pt arrives after family called ems for low blood sugar, 52mg/dL per family, pt disoriented at the tiime  now pt is alert, oriented, eating a sandwich, received d-10 25 gm by medic       History provided by:  Patient and EMS personnel   used: No    Hypoglycemia - Symptomatic   This 82-year-old male presented to ED by EMS for low blood sugar  EMS was called for low blood sugar at home  Um as per EMS patient blood sugar was 20 and was given 1 amp of D50  Patient also given a sandwich to eat  Patient was lethargic at that time however the became more alert and oriented after he was given dextrose  Upon arrival to the ED patient blood sugar was rechecked and it showed 20 patient was given another amp of D50  Patient is awake alert oriented he is somewhat slow to response however at baseline  Patient is denying any chest pain or shortness of breath  He does have a chronic lower extremity edema which is not worse from baseline  Patient is complaining of nasal congestion and cough which has been going on for past 1 week  Patient takes metformin for his diabetes as well as insulin  Prior to Admission Medications   Prescriptions Last Dose Informant Patient Reported? Taking?    FREESTYLE LITE test strip   Yes Yes   Sig: USE 1 STRIP TO CHECK GLUCOSE ONCE DAILY   Lancets (FREESTYLE) lancets   Yes Yes   Sig: USE 1  TO CHECK GLUCOSE ONCE DAILY   aspirin 81 mg chewable tablet  Care Giver No Yes   Si tablet (81 mg total) by Per J Tube route daily   furosemide (LASIX) 20 mg tablet   No Yes   Sig: Take 1 tablet (20 mg total) by mouth every other day   glucose monitoring kit (FREESTYLE) monitoring kit  Care Giver No Yes   Si each by Does not apply route 2 (two) times a day   insulin degludec (TRESIBA FLEXTOUCH) 100 units/mL injection pen  Care Giver Yes Yes   Sig: Inject 2 Units under the skin daily at bedtime metFORMIN (GLUCOPHAGE) 500 mg tablet   Yes Yes   Sig: Take 500 mg by mouth daily   omeprazole (PriLOSEC) 20 mg delayed release capsule  Care Giver No Yes   Sig: Take 2 capsules (40 mg total) by mouth daily Please give through J tube      Facility-Administered Medications: None       Past Medical History:   Diagnosis Date    Cardiac disease     Diabetes mellitus ([facility] [address] )     GERD (gastroesophageal reflux disease)     Weight loss        Past Surgical History:   Procedure Laterality Date    CT GUIDED CHEST TUBE  2/15/2019    GASTROSTOMY TUBE PLACEMENT N/A 1/14/2019    Procedure: INSERTION PEG TUBE;  Surgeon: Bonny Humphries MD;  [facility]; Service: Gastroenterology    GASTROSTOMY TUBE PLACEMENT N/A 1/15/2019    Procedure: INSERTION JEJUNOSTOMY TUBE OPEN;  Surgeon: Alfredo Rodríguez MD;  [facility];  Service: Surgical Oncology    LAPAROTOMY N/A 11/19/2018    Procedure: LAPAROTOMY EXPLORATORY;  Surgeon: Alfredo Rodríguez MD;  [facility];  Service: Surgical Oncology    IA EDG US EXAM SURGICAL ALTER STOM DUODENUM/JEJUNUM N/A 9/21/2018    Procedure: LINEAR ENDOSCOPIC U/S;  Surgeon: William Domingo MD;  [facility]; Service: Gastroenterology    IA LAP,DIAGNOSTIC ABDOMEN N/A 11/19/2018    Procedure: pancreatic biopsy;  Surgeon: Alfredo Rodríguez MD;  [facility];  Service: Surgical Oncology    TONSILLECTOMY      UPPER GASTROINTESTINAL ENDOSCOPY      VASCULAR SURGERY      phlebitis many years ago    WRIST GANGLION EXCISION         Family History   Problem Relation Age of Onset    No Known Problems Mother     No Known Problems Father     Pancreatic cancer Brother 61    Breast cancer Daughter 36        38s     I have reviewed and agree with the history as documented      Social History     Tobacco Use    Smoking status: Former Smoker     Packs/day: 0 50     Years: 4 00     Pack years: 2 00     Types: Cigarettes     Start date: 1958     Last attempt to quit: 1962     Years since quittin 0    Smokeless tobacco: Never Used    Tobacco comment: quit about 20 years ago as of 2018   Substance Use Topics    Alcohol use: Not Currently     Comment: 1 beer a day    Drug use: No        Review of Systems   Constitutional: Negative  HENT: Positive for congestion and rhinorrhea  Eyes: Negative  Respiratory: Negative  Cardiovascular: Positive for leg swelling (Chronic bilateral lower extremity swelling)  Gastrointestinal: Negative  Endocrine:        Low blood sugar   Genitourinary: Negative  Musculoskeletal: Negative  Neurological: Negative  Psychiatric/Behavioral: Negative  Physical Exam  Physical Exam   Constitutional: He is oriented to person, place, and time  He appears well-developed and well-nourished  No distress  HENT:   Head: Normocephalic and atraumatic  Eyes: Pupils are equal, round, and reactive to light  EOM are normal    Neck: Normal range of motion  Neck supple  Cardiovascular: Regular rhythm and normal heart sounds  Tachycardia present  Pulmonary/Chest: Effort normal and breath sounds normal  No stridor  No respiratory distress  He has no wheezes  Abdominal: Soft  Bowel sounds are normal    Musculoskeletal: He exhibits edema ( chronic 2+ the pitting lower extremity edema  )  Neurological: He is alert and oriented to person, place, and time  Skin: Skin is warm  He is diaphoretic (Secondary to hyperglycemia)  Nursing note and vitals reviewed        Vital Signs  ED Triage Vitals [20 1106]   Temperature Pulse Respirations Blood Pressure SpO2   (!) 96 9 °F (36 1 °C) 72 16 137/86 91 %      Temp Source Heart Rate Source Patient Position - Orthostatic VS BP Location FiO2 (%)   Temporal Monitor Sitting - Orthostatic VS Right arm --      Pain Score       No Pain           Vitals:    20 1106 20 1130 20 1330 20 1400   BP: 137/86  110/59 (!) 87/60   Pulse: 72 (!) 114 101 93   Patient Position - Orthostatic VS: Sitting - Orthostatic VS            Visual Acuity      ED Medications  Medications   cefTRIAXone (ROCEPHIN) IVPB (premix) 1,000 mg (has no administration in time range)   azithromycin (ZITHROMAX) 500 mg in sodium chloride 0 9 % 250 mL IVPB (has no administration in time range)   furosemide (LASIX) injection 40 mg (has no administration in time range)   dextrose 50 % IV solution 25 mL (25 mL Intravenous Given 1/4/20 1135)       Diagnostic Studies  Results Reviewed     Procedure Component Value Units Date/Time    Influenza A/B and RSV PCR [[id number]]  (Abnormal) Collected:  01/04/20 1134    Lab Status:  Final result Specimen:  Nasopharyngeal Swab Updated:  01/04/20 1221     INFLUENZA A PCR None Detected     INFLUENZA B PCR None Detected     RSV PCR Detected    Fingerstick Glucose (POCT) [[id number]]  (Abnormal) Collected:  01/04/20 1216    Lab Status:  Final result Updated:  01/04/20 1217     POC Glucose 239 mg/dl     B-Type Natriuretic Peptide ([address] [place] [address]) [[id number]]  (Abnormal) Collected:  01/04/20 1120    Lab Status:  Final result Specimen:  Blood from Arm, Right Updated:  01/04/20 1155      pg/mL     Protime-INR [[id number]]  (Abnormal) Collected:  01/04/20 1120    Lab Status:  Final result Specimen:  Blood from Arm, Right Updated:  01/04/20 1147     Protime 14 4 seconds      INR 0 72    Basic metabolic panel [[id number]]  (Abnormal) Collected:  01/04/20 1120    Lab Status:  Final result Specimen:  Blood from Arm, Right Updated:  01/04/20 1147     Sodium 137 mmol/L      Potassium 4 1 mmol/L      Chloride 103 mmol/L      CO2 29 mmol/L      ANION GAP 5 mmol/L      BUN 22 mg/dL      Creatinine 0 70 mg/dL      Glucose 150 mg/dL      Calcium 7 5 mg/dL      eGFR 88 ml/min/1 73sq m     Narrative:       [place] guidelines for Chronic Kidney Disease (CKD):     Stage 1 with normal or high GFR (GFR > 90 mL/min/1 73 square meters)    Stage 2 Mild CKD (GFR = 60-89 mL/min/1 73 square meters)    Stage 3A Moderate CKD (GFR = 45-59 mL/min/1 73 square meters)    Stage 3B Moderate CKD (GFR = 30-44 mL/min/1 73 square meters)    Stage 4 Severe CKD (GFR = 15-29 mL/min/1 73 square meters)    Stage 5 End Stage CKD (GFR <15 mL/min/1 73 square meters)  Note: GFR calculation is accurate only with a steady state creatinine    Fingerstick Glucose (POCT) [[id number]]  (Abnormal) Collected:  01/04/20 1108    Lab Status:  Final result Updated:  01/04/20 1147     POC Glucose <20 mg/dl     Fingerstick Glucose (POCT) [[id number]]  (Abnormal) Collected:  01/04/20 1107    Lab Status:  Final result Updated:  01/04/20 1146     POC Glucose <20 mg/dl     CBC and differential [[id number]]  (Abnormal) Collected:  01/04/20 1120    Lab Status:  Final result Specimen:  Blood from Arm, Right Updated:  01/04/20 1135     WBC 13 70 Thousand/uL      RBC 3 73 Million/uL      Hemoglobin 11 7 g/dL      Hematocrit 36 5 %      MCV 98 fL      MCH 31 4 pg      MCHC 32 1 g/dL      RDW 15 0 %      MPV 8 8 fL      Platelets 101 Thousands/uL      Neutrophils Relative 89 %      Lymphocytes Relative 5 %      Monocytes Relative 6 %      Eosinophils Relative 0 %      Basophils Relative 0 %      Neutrophils Absolute 12 20 Thousands/µL      Lymphocytes Absolute 0 70 Thousands/µL      Monocytes Absolute 0 80 Thousand/µL      Eosinophils Absolute 0 00 Thousand/µL      Basophils Absolute 0 00 Thousands/µL                  XR chest 1 view portable   Final Result by Coralee Ganser, MD (01/04 9348)      Left lower lobe consolidation and small effusion  Findings consistent with pneumonia  Workstation performed: [id number]                    Procedures  Procedures         ED Course  ED Course as of Jan 04 1411   Sat Jan 04, 2020   1411 Patient blood sugar has improved  Discussed with patient and family will admit                                    MDM      Disposition  Final diagnoses:   Hypoglycemia   Pneumonia   RSV (respiratory syncytial virus pneumonia)   Pleural effusion     Time reflects when diagnosis was documented in both MDM as applicable and the Disposition within this note     Time User Action Codes Description Comment    1/4/2020  2:10 PM Maria Isabel Garduno Add [E16 2] Hypoglycemia     1/4/2020  2:10 PM Philipp Xie Add [J18 9] Pneumonia     1/4/2020  2:10 PM Philipp Xie Add [J12 1] RSV (respiratory syncytial virus pneumonia)     1/4/2020  2:10 PM Maria Isabel Garduno Add [J90] Pleural effusion       ED Disposition     ED Disposition Condition Date/Time Comment    Admit Stable Sat Jan 4, 2020  2:10 PM Case was discussed with [facility] and the patient's admission status was agreed to be Admission Status: inpatient status to the service of Dr Elsa Hernandez   Follow-up Information    None         Patient's Medications   Discharge Prescriptions    No medications on file     No discharge procedures on file      ED Provider  Electronically Signed by           Fely Maria MD  01/04/20 5670

## 2020-01-04 NOTE — ASSESSMENT & PLAN NOTE
· Left lower lobe secondary to RSV  · Will start IV antibiotics  · Follow-up procalcitonin and blood cultures

## 2020-01-04 NOTE — H&P
H&P- [facility] 1935, 82 y o  male MRN: [id number]    Unit/Bed#: -01 Encounter: [id number]    Primary Care Provider: Alyson Lutz MD   Date and time admitted to hospital: 1/4/2020 11:12 AM        * Acute metabolic encephalopathy due to hypoglycemia  Assessment & Plan  · Family reported patient was disoriented, his blood sugar was less than 20  · EMS provided him 1 amp of D 48 and when he arrived in the ER, his blood sugars to less than 50 he received 2nd amp of D50  · Blood sugar improved    Viral pneumonia  Assessment & Plan  · Left lower lobe secondary to RSV  · Will start IV antibiotics  · Follow-up procalcitonin and blood cultures    RSV infection  Assessment & Plan  · Supportive care    Severe protein-calorie malnutrition ([facility] [address] )  Assessment & Plan  Malnutrition Findings:     chronically ill-appearing  Muscle and fat loss of the upper and lower extremities prominent sternocleidomastoid temporal wasting  Boggy heels       BMI Findings: Body mass index is 18 48 kg/m²     · Supplement  · Nutrition consult    Vitamin D deficiency  Assessment & Plan  · Start weekly ergocalciferol    Lower extremity edema  Assessment & Plan  · Status post IV Lasix in the emergency room  · Likely chronic secondary to malnutrition    Ambulatory dysfunction  Assessment & Plan  · PT/OT eval    Gastroesophageal reflux disease without esophagitis  Assessment & Plan  · Continue PPI    Chronic pancreatitis ([facility] [address] )  Assessment & Plan  · Here follow GI as an outpatient    Diabetes mellitus type 2  Assessment & Plan  Lab Results   Component Value Date    HGBA1C 5 8 12/31/2019       Recent Labs     01/04/20  1107 01/04/20  1108 01/04/20  1216   POCGLU <20* <20* 239*       Blood Sugar Average: Last 72 hrs:  (P) 239   · On insulin 2 units at bedtime and metformin  · Will monitor blood sugars he had hypoglycemia on admission  · Patient will likely not need metformin at discharge    VTE Prophylaxis: Enoxaparin (Lovenox)  Code Status: full code  POLST: There is no POLST form on file for this patient (pre-hospital)  Discussion with patient, wife and daughter at bedside    Anticipated Length of Stay:  Patient will be admitted on an Inpatient basis with an anticipated length of stay of  > 2 midnights  Justification for Hospital Stay: IV antibiotics, supportive care, therapy needs, blood sugar monitoring    Chief Complaint:   Disoriented with low blood sugar    History of Present Illness:    Farnaz Maya is a 82 y o  male who presents with disoriented with low blood sugar  Patient with past medical history of GERD, weight loss with chronic pancreatitis and J-tube in place, diabetes mellitus on insulin presented to the emergency room with disorientation and low blood sugar  EMS was called and they found blood sugar to be 50, they provided the patient amp of D50, blood sugar continued to be low and the ER provided a 2nd amp of D50  Blood sugar improved  Patient was awake in ER, slow to respond, he had complaint of nasal congestion, cough for past week  No cp/sob  When I evaluated the patient he reports reports decreased PO intake, cough but no sputum  He is lethargic but arousable and was able to provide review of systems  Patient complains his heels hurt  Review of Systems:  Review of Systems   Constitutional: Positive for appetite change (decreased po intake), chills and fatigue  Negative for fever  HENT: Positive for rhinorrhea  Negative for sore throat and trouble swallowing  Eyes: Negative for discharge and redness  Respiratory: Positive for cough  Negative for shortness of breath and wheezing  Cardiovascular: Negative for chest pain and leg swelling  Gastrointestinal: Negative for abdominal pain, diarrhea, nausea and vomiting  Genitourinary: Negative for dysuria and hematuria  Musculoskeletal: Negative for back pain, myalgias and neck pain  Skin: Positive for rash  Negative for wound     Neurological: Positive for weakness  Negative for dizziness and headaches  Psychiatric/Behavioral: Negative for agitation and confusion  Past Medical and Surgical History:   Past Medical History:   Diagnosis Date    Cardiac disease     [facility]itis ([facility] [address]     Diabetes mellitus ([facility] [address] [facility]     GERD (gastroesophageal reflux disease)     Vitamin D deficient osteomalacia     Weight loss        Past Surgical History:   Procedure Laterality Date    CT GUIDED CHEST TUBE  2/15/2019    GASTROSTOMY TUBE PLACEMENT N/A 1/14/2019    Procedure: INSERTION PEG TUBE;  Surgeon: Paz Spring MD;  [facility]; Service: Gastroenterology    GASTROSTOMY TUBE PLACEMENT N/A 1/15/2019    Procedure: INSERTION JEJUNOSTOMY TUBE OPEN;  Surgeon: Denver Romance, MD;  [facility];  Service: Surgical Oncology    LAPAROTOMY N/A 11/19/2018    Procedure: LAPAROTOMY EXPLORATORY;  Surgeon: Denver Romance, MD;  [facility];  Service: Surgical Oncology    MN EDG US EXAM SURGICAL ALTER STOM DUODENUM/JEJUNUM N/A 9/21/2018    Procedure: LINEAR ENDOSCOPIC U/S;  Surgeon: Nicolasa Feng MD;  [facility]; Service: Gastroenterology    MN LAP,DIAGNOSTIC ABDOMEN N/A 11/19/2018    Procedure: pancreatic biopsy;  Surgeon: Denver Romance, MD;  [facility];  Service: Surgical Oncology    TONSILLECTOMY      UPPER GASTROINTESTINAL ENDOSCOPY      VASCULAR SURGERY      phlebitis many years ago    WRIST GANGLION EXCISION         Meds/Allergies:  Prior to Admission medications    Medication Sig Start Date End Date Taking?  Authorizing Provider   aspirin 81 mg chewable tablet 1 tablet (81 mg total) by Per J Tube route daily 1/20/19  Yes Tushar Hicks DO   FREESTYLE LITE test strip USE 1 STRIP TO CHECK GLUCOSE ONCE DAILY 5/20/19  Yes Historical Provider, MD   furosemide (LASIX) 20 mg tablet Take 1 tablet (20 mg total) by mouth every other day 9/23/19  Yes Deepti Cerrato MD   glucose monitoring kit (FREESTYLE) monitoring kit 1 each by Does not apply route 2 (two) times a day 3/30/19  Yes Grady Hernandez PA-C   insulin degludec (TRESIBA FLEXTOUCH) 100 units/mL injection pen Inject 2 Units under the skin daily at bedtime    Yes Historical Provider, MD   Lancets (FREESTYLE) lancets USE 1  TO CHECK GLUCOSE ONCE DAILY 19  Yes Historical Provider, MD   metFORMIN (GLUCOPHAGE) 500 mg tablet Take 500 mg by mouth daily 19  Yes Historical Provider, MD   omeprazole (PriLOSEC) 20 mg delayed release capsule Take 2 capsules (40 mg total) by mouth daily Please give through J tube 19  Yes Linwood Fowler,      I have reviewed home medications with patient personally  Allergies: No Known Allergies    Social History:  Marital Status: /Civil Union   Occupation: retired  Patient Pre-hospital Living Situation: home  Patient Pre-hospital Level of Mobility: walker  Patient Pre-hospital Diet Restrictions: diabetic  Substance Use History:     Social History     Substance and Sexual Activity   Alcohol Use Not Currently    Comment: 1 beer a day     Social History     Tobacco Use   Smoking Status Former Smoker    Packs/day: 0 50    Years: 4 00    Pack years: 2 00    Types: Cigarettes    Start date: 80    Last attempt to quit: Brad Never Years since quittin 0   Smokeless Tobacco Never Used   Tobacco Comment    quit about 20 years ago as of 2018     Social History     Substance and Sexual Activity   Drug Use No       Family History:  I have reviewed the patients family history    Physical Exam:   Vitals:   Blood Pressure: 125/62 (20 1537)  Pulse: 86 (20 1537)  Temperature: 97 9 °F (36 6 °C) (20 153)  Temp Source: Tympanic (20 1537)  Respirations: 16 (20 153)  Height: 5' 7" (170 2 cm) (20 110)  Weight - Scale: 53 5 kg (118 lb) (20 110)  SpO2: 96 % (20 153)    Physical Exam   Constitutional: He appears well-developed and well-nourished  He appears ill     Lethargic, chronically ill-appearing elderly  male   HENT:   Head: Normocephalic and atraumatic  Eyes: Conjunctivae and EOM are normal  Right eye exhibits no discharge  Left eye exhibits no discharge  Neck: Normal range of motion  No tracheal deviation present  Cardiovascular: Normal rate, regular rhythm and normal heart sounds  Exam reveals no gallop and no friction rub  No murmur heard  Pulmonary/Chest: Effort normal  No respiratory distress  He has no wheezes  He has rhonchi in the right upper field, the right middle field, the right lower field, the left upper field, the left middle field and the left lower field  He has no rales  Abdominal: Soft  Bowel sounds are normal  He exhibits no distension and no mass  There is no tenderness  There is no guarding  Musculoskeletal: Normal range of motion  He exhibits edema  He exhibits no tenderness or deformity  Lower extremity in vena dynes   Neurological:   Speech intact, moving extremities   Skin: Skin is warm and dry  No rash noted  No erythema  No pallor  Small sacral slit, boggy heels   Psychiatric: He has a normal mood and affect  His behavior is normal  Judgment and thought content normal    Nursing note and vitals reviewed  Additional Data:   Lab Results: I have personally reviewed pertinent reports        Results from last 7 days   Lab Units 01/04/20  1120   WBC Thousand/uL 13 70*   HEMOGLOBIN g/dL 11 7*   HEMATOCRIT % 36 5*   PLATELETS Thousands/uL 202   NEUTROS PCT % 89*   LYMPHS PCT % 5*   MONOS PCT % 6   EOS PCT % 0     Results from last 7 days   Lab Units 01/04/20  1120 12/31/19  1404   POTASSIUM mmol/L 4 1 4 4   CHLORIDE mmol/L 103 107   CO2 mmol/L 29 28   BUN mg/dL 22 15   CREATININE mg/dL 0 70 0 68   CALCIUM mg/dL 7 5* 7 7*   ALK PHOS U/L  --  133*   ALT U/L  --  43   AST U/L  --  30     Results from last 7 days   Lab Units 01/04/20  1120   INR  1 24     Results from last 7 days   Lab Units 01/04/20  1216 01/04/20  1108 01/04/20  1107   POC GLUCOSE mg/dl 239* <20* <20*     Results from last 7 days   Lab Units 12/31/19  1404   HEMOGLOBIN A1C % 5 8       Imaging: I have personally reviewed pertinent reports  XR chest 1 view portable   Final Result by Rina Chin MD (01/04 8620)      Left lower lobe consolidation and small effusion  Findings consistent with pneumonia  Workstation performed: [id number]             [employer]/[facility] Records Reviewed: Yes     ** Please Note: This note has been constructed using a voice recognition system   **

## 2020-01-04 NOTE — ASSESSMENT & PLAN NOTE
Lab Results   Component Value Date    HGBA1C 5 8 12/31/2019       Recent Labs     01/04/20  1107 01/04/20  1108 01/04/20  1216   POCGLU <20* <20* 239*       Blood Sugar Average: Last 72 hrs:  (P) 239   · On insulin 2 units at bedtime and metformin  · Will monitor blood sugars he had hypoglycemia on admission  · Patient will likely not need metformin at discharge

## 2020-01-04 NOTE — PLAN OF CARE
Problem: Potential for Falls  Goal: Patient will remain free of falls  Description  INTERVENTIONS:  - Assess patient frequently for physical needs  -  Identify cognitive and physical deficits and behaviors that affect risk of falls    -  [place] fall precautions as indicated by assessment   - Educate patient/family on patient safety including physical limitations  - Instruct patient to call for assistance with activity based on assessment  - Modify environment to reduce risk of injury  - Consider OT/PT consult to assist with strengthening/mobility  Outcome: Progressing     Problem: Prexisting or High Potential for Compromised Skin Integrity  Goal: Skin integrity is maintained or improved  Description  INTERVENTIONS:  - Identify patients at risk for skin breakdown  - Assess and monitor skin integrity  - Assess and monitor nutrition and hydration status  - Monitor labs   - Assess for incontinence   - Turn and reposition patient  - Assist with mobility/ambulation  - Relieve pressure over bony prominences  - Avoid friction and shearing  - Provide appropriate hygiene as needed including keeping skin clean and dry  - Evaluate need for skin moisturizer/barrier cream  - Collaborate with interdisciplinary team   - Patient/family teaching  - Consider wound care consult   Outcome: Progressing     Problem: RESPIRATORY - ADULT  Goal: Achieves optimal ventilation and oxygenation  Description  INTERVENTIONS:  - Assess for changes in respiratory status  - Assess for changes in mentation and behavior  - Position to facilitate oxygenation and minimize respiratory effort  - Oxygen administered by appropriate delivery if ordered  - Initiate smoking cessation education as indicated  - Encourage broncho-pulmonary hygiene including cough, deep breathe, Incentive Spirometry  - Assess the need for suctioning and aspirate as needed  - Assess and instruct to report SOB or any respiratory difficulty  - Respiratory Therapy support as indicated  Outcome: Progressing     Problem: PAIN - ADULT  Goal: Verbalizes/displays adequate comfort level or baseline comfort level  Description  Interventions:  - Encourage patient to monitor pain and request assistance  - Assess pain using appropriate pain scale  - Administer analgesics based on type and severity of pain and evaluate response  - Implement non-pharmacological measures as appropriate and evaluate response  - Consider cultural and social influences on pain and pain management  - Notify physician/advanced practitioner if interventions unsuccessful or patient reports new pain  Outcome: Progressing     Problem: INFECTION - ADULT  Goal: Absence or prevention of progression during hospitalization  Description  INTERVENTIONS:  - Assess and monitor for signs and symptoms of infection  - Monitor lab/diagnostic results  - Monitor all insertion sites, i e  indwelling lines, tubes, and drains  - Monitor endotracheal if appropriate and nasal secretions for changes in amount and color  - [place] appropriate cooling/warming therapies per order  - Administer medications as ordered  - Instruct and encourage patient and family to use good hand hygiene technique  - Identify and instruct in appropriate isolation precautions for identified infection/condition  Outcome: Progressing  Goal: Absence of fever/infection during neutropenic period  Description  INTERVENTIONS:  - Monitor WBC    Outcome: Progressing     Problem: SAFETY ADULT  Goal: Maintain or return to baseline ADL function  Description  INTERVENTIONS:  -  Assess patient's ability to carry out ADLs; assess patient's baseline for ADL function and identify physical deficits which impact ability to perform ADLs (bathing, care of mouth/teeth, toileting, grooming, dressing, etc )  - Assess/evaluate cause of self-care deficits   - Assess range of motion  - Assess patient's mobility; develop plan if impaired  - Assess patient's need for assistive devices and provide as appropriate  - Encourage maximum independence but intervene and supervise when necessary  - Involve family in performance of ADLs  - Assess for home care needs following discharge   - Consider OT consult to assist with ADL evaluation and planning for discharge  - Provide patient education as appropriate  Outcome: Progressing  Goal: Maintain or return mobility status to optimal level  Description  INTERVENTIONS:  - Assess patient's baseline mobility status (ambulation, transfers, stairs, etc )    - Identify cognitive and physical deficits and behaviors that affect mobility  - Identify mobility aids required to assist with transfers and/or ambulation (gait belt, sit-to-stand, lift, walker, cane, etc )  - [place] fall precautions as indicated by assessment  - Record patient progress and toleration of activity level on Mobility SBAR; progress patient to next Phase/Stage  - Instruct patient to call for assistance with activity based on assessment  - Consider rehabilitation consult to assist with strengthening/weightbearing, etc   Outcome: Progressing     Problem: DISCHARGE PLANNING  Goal: Discharge to home or other facility with appropriate resources  Description  INTERVENTIONS:  - Identify barriers to discharge w/patient and caregiver  - Arrange for needed discharge resources and transportation as appropriate  - Identify discharge learning needs (meds, wound care, etc )  - Arrange for interpretive services to assist at discharge as needed  - Refer to Case Management Department for coordinating discharge planning if the patient needs post-hospital services based on physician/advanced practitioner order or complex needs related to functional status, cognitive ability, or social support system  Outcome: Progressing     Problem: Knowledge Deficit  Goal: Patient/family/caregiver demonstrates understanding of disease process, treatment plan, medications, and discharge instructions  Description  Complete learning assessment and assess knowledge base    Interventions:  - Provide teaching at level of understanding  - Provide teaching via preferred learning methods  Outcome: Progressing

## 2020-01-04 NOTE — ASSESSMENT & PLAN NOTE
Malnutrition Findings:     chronically ill-appearing  Muscle and fat loss of the upper and lower extremities prominent sternocleidomastoid temporal wasting  Boggy heels       BMI Findings: Body mass index is 18 48 kg/m²     · Supplement  · Nutrition consult

## 2020-01-04 NOTE — RESPIRATORY THERAPY NOTE
RT Protocol Note  Roney Sabillon 82 y o  male MRN: [id number]  Unit/Bed#: -01 Encounter: [id number]    Assessment    Principal Problem:    Acute metabolic encephalopathy due to hypoglycemia  Active Problems:    Diabetes mellitus type 2    Chronic pancreatitis (HCC)    Severe protein-calorie malnutrition (HCC)    Gastroesophageal reflux disease without esophagitis    Ambulatory dysfunction    Lower extremity edema    RSV infection    Viral pneumonia    Vitamin D deficiency      Home Pulmonary Medications:  NA       Past Medical History:   Diagnosis Date    Cardiac disease     Chronic pancreatitis ([facility] )     Diabetes mellitus ([facility] )     GERD (gastroesophageal reflux disease)     Vitamin D deficient osteomalacia     Weight loss      Social History     Socioeconomic History    Marital status: /Civil Union     Spouse name: None    Number of children: None    Years of education: None    Highest education level: None   Occupational History    None   Social Needs    Financial resource strain: None    Food insecurity:     Worry: None     Inability: None    Transportation needs:     Medical: None     Non-medical: None   Tobacco Use    Smoking status: Former Smoker     Packs/day: 0 50     Years: 4 00     Pack years: 2 00     Types: Cigarettes     Start date:      Last attempt to quit:      Years since quittin 0    Smokeless tobacco: Never Used    Tobacco comment: quit about 20 years ago as of 2018   Substance and Sexual Activity    Alcohol use: Not Currently     Comment: 1 beer a day    Drug use: No    Sexual activity: None   Lifestyle    Physical activity:     Days per week: None     Minutes per session: None    Stress: None   Relationships    Social connections:     Talks on phone: None     Gets together: None     Attends Jainism service: None     Active member of club or organization: None     Attends meetings of clubs or organizations: None     Relationship status: None  Intimate partner violence:     Fear of current or ex partner: None     Emotionally abused: None     Physically abused: None     Forced sexual activity: None   Other Topics Concern    None   Social History Narrative    None       Subjective         Objective    Physical Exam:        Vitals:  Blood pressure 125/62, pulse 86, temperature 97 9 °F (36 6 °C), temperature source Tympanic, resp  rate 16, height 5' 7" (1 702 m), weight 53 5 kg (118 lb), SpO2 96 %  Imaging and other studies: I have personally reviewed pertinent reports              PlanNo distress /no pulmonary history

## 2020-01-05 LAB
EST. AVERAGE GLUCOSE BLD GHB EST-MCNC: 111 MG/DL
GLUCOSE SERPL-MCNC: 113 MG/DL (ref 65–140)
GLUCOSE SERPL-MCNC: 202 MG/DL (ref 65–140)
GLUCOSE SERPL-MCNC: 211 MG/DL (ref 65–140)
GLUCOSE SERPL-MCNC: 30 MG/DL (ref 65–140)
GLUCOSE SERPL-MCNC: 350 MG/DL (ref 65–140)
GLUCOSE SERPL-MCNC: <20 MG/DL (ref 65–140)
GLUCOSE SERPL-MCNC: <20 MG/DL (ref 65–140)
HBA1C MFR BLD: 5.5 % (ref 4.2–6.3)
L PNEUMO1 AG UR QL IA.RAPID: NEGATIVE
PROCALCITONIN SERPL-MCNC: 0.16 NG/ML
PROCALCITONIN SERPL-MCNC: 0.18 NG/ML
S PNEUM AG UR QL: NEGATIVE

## 2020-01-05 PROCEDURE — 84145 PROCALCITONIN (PCT): CPT | Performed by: NURSE PRACTITIONER

## 2020-01-05 PROCEDURE — 97163 PT EVAL HIGH COMPLEX 45 MIN: CPT

## 2020-01-05 PROCEDURE — 83036 HEMOGLOBIN GLYCOSYLATED A1C: CPT | Performed by: NURSE PRACTITIONER

## 2020-01-05 PROCEDURE — 82948 REAGENT STRIP/BLOOD GLUCOSE: CPT

## 2020-01-05 PROCEDURE — 99232 SBSQ HOSP IP/OBS MODERATE 35: CPT | Performed by: PHYSICIAN ASSISTANT

## 2020-01-05 PROCEDURE — 94668 MNPJ CHEST WALL SBSQ: CPT

## 2020-01-05 PROCEDURE — 94664 DEMO&/EVAL PT USE INHALER: CPT

## 2020-01-05 PROCEDURE — 97167 OT EVAL HIGH COMPLEX 60 MIN: CPT

## 2020-01-05 RX ORDER — DEXTROSE AND SODIUM CHLORIDE 5; .45 G/100ML; G/100ML
100 INJECTION, SOLUTION INTRAVENOUS CONTINUOUS
Status: DISCONTINUED | OUTPATIENT
Start: 2020-01-05 | End: 2020-01-06

## 2020-01-05 RX ORDER — DEXTROSE MONOHYDRATE 25 G/50ML
INJECTION, SOLUTION INTRAVENOUS
Status: COMPLETED
Start: 2020-01-05 | End: 2020-01-05

## 2020-01-05 RX ORDER — DEXTROSE MONOHYDRATE 25 G/50ML
50 INJECTION, SOLUTION INTRAVENOUS ONCE
Status: COMPLETED | OUTPATIENT
Start: 2020-01-05 | End: 2020-01-05

## 2020-01-05 RX ADMIN — INSULIN LISPRO 3 UNITS: 100 INJECTION, SOLUTION INTRAVENOUS; SUBCUTANEOUS at 12:22

## 2020-01-05 RX ADMIN — SODIUM CHLORIDE 75 ML/HR: 9 INJECTION, SOLUTION INTRAVENOUS at 05:13

## 2020-01-05 RX ADMIN — ENOXAPARIN SODIUM 40 MG: 40 INJECTION SUBCUTANEOUS at 08:38

## 2020-01-05 RX ADMIN — INSULIN LISPRO 1 UNITS: 100 INJECTION, SOLUTION INTRAVENOUS; SUBCUTANEOUS at 16:18

## 2020-01-05 RX ADMIN — DEXTROSE AND SODIUM CHLORIDE 100 ML/HR: 5; 450 INJECTION, SOLUTION INTRAVENOUS at 22:31

## 2020-01-05 RX ADMIN — DEXTROSE 50 % IN WATER (D50W) INTRAVENOUS SYRINGE 50 ML: at 07:08

## 2020-01-05 RX ADMIN — ERGOCALCIFEROL 50000 UNITS: 1.25 CAPSULE ORAL at 08:38

## 2020-01-05 RX ADMIN — AZITHROMYCIN MONOHYDRATE 500 MG: 500 INJECTION, POWDER, LYOPHILIZED, FOR SOLUTION INTRAVENOUS at 15:56

## 2020-01-05 RX ADMIN — PANTOPRAZOLE SODIUM 40 MG: 40 TABLET, DELAYED RELEASE ORAL at 05:13

## 2020-01-05 RX ADMIN — GUAIFENESIN 200 MG: 100 SOLUTION ORAL at 20:40

## 2020-01-05 RX ADMIN — DEXTROSE 50 % IN WATER (D50W) INTRAVENOUS SYRINGE 50 ML: at 06:20

## 2020-01-05 RX ADMIN — ASPIRIN 81 MG 81 MG: 81 TABLET ORAL at 08:38

## 2020-01-05 RX ADMIN — ACETAMINOPHEN 650 MG: 325 TABLET ORAL at 20:41

## 2020-01-05 RX ADMIN — CEFTRIAXONE 1000 MG: 1 INJECTION, SOLUTION INTRAVENOUS at 15:21

## 2020-01-05 RX ADMIN — DEXTROSE AND SODIUM CHLORIDE 100 ML/HR: 5; 450 INJECTION, SOLUTION INTRAVENOUS at 07:09

## 2020-01-05 NOTE — PROGRESS NOTES
Progress Note - Adry Gleason 1935, 82 y o  male MRN: [id number]    Unit/Bed#: -[place] Encounter: [id number]    Primary Care Provider: Roger Milner MD   Date and time admitted to hospital: 1/4/2020 11:12 AM        * Acute metabolic encephalopathy due to hypoglycemia  Assessment & Plan  · Family reported patient was disoriented, his blood sugar was less than 20  · EMS provided him 1 amp of D 48 and when he arrived in the ER, his blood sugars to less than 50 he received 2nd amp of D50  · Blood sugar improved  · Mentation improved    Viral pneumonia  Assessment & Plan  · Left lower lobe secondary to RSV  · Will start IV antibiotics  · Follow-up procalcitonin and blood cultures, initial procalcitonin 0 16 will repeat, blood cultures pending  · Strep pneumo Legionella antigen negative    RSV infection  Assessment & Plan  · Supportive care    Severe protein-calorie malnutrition ([address] )  Assessment & Plan  Malnutrition Findings:     chronically ill-appearing  Muscle and fat loss of the upper and lower extremities prominent sternocleidomastoid temporal wasting  Boggy heels       BMI Findings: Body mass index is 18 48 kg/m²  · Supplement  · Nutrition consult    Vitamin D deficiency  Assessment & Plan  · Start weekly ergocalciferol    Lower extremity edema  Assessment & Plan  · Status post IV Lasix in the emergency room  · Likely chronic secondary to malnutrition - check pre-albumin and CMP in a m   Continue supplements    Ambulatory dysfunction  Assessment & Plan  · PT/OT eval    Gastroesophageal reflux disease without esophagitis  Assessment & Plan  · Continue PPI    Chronic pancreatitis ([facility] )  Assessment & Plan  · Here follow GI as an outpatient    Diabetes mellitus type 2  Assessment & Plan  Lab Results   Component Value Date    HGBA1C 5 8 12/31/2019       Recent Labs     01/05/20  0611 01/05/20  0644 01/05/20  0701 01/05/20  0725   POCGLU 30* <20* <20* 211*       Blood Sugar Average: Last 72 hrs:  (P) 170 2   Recurrent hypoglycemia - patient currently on D5 IVF  n - continue to monitor  · Home meds insulin 2 units at bedtime and metformin  · Will monitor blood sugars he had hypoglycemia on admission  · Patient will likely not need metformin at discharge    VTE Pharmacologic Prophylaxis: Pharmacologic: Enoxaparin (Lovenox)    Patient Centered Rounds: I have performed bedside rounds with nursing staff today  Education and Discussions with Family / Patient: wife and son in law    Current Length of Stay: 1 day(s)    Current Patient Status: Inpatient   Certification Statement: The patient will continue to require additional inpatient hospital stay due to blood sugar monitoring, supportive care    Discharge Plan: hopeful d/c next 48 hours if improving home vs rehab depending needs    Code Status: Level 1 - Full Code    Subjective:   Patient seen in bed he is lethargic but is arousable  He reports feeling a little bit better today  He has been using the incentive spirometer and Acapella per nursing  He had a little bit of lunch    Objective:     Vitals:   Temp (24hrs), Av 5 °F (36 4 °C), Min:97 2 °F (36 2 °C), Max:97 9 °F (36 6 °C)    Temp:  [97 2 °F (36 2 °C)-97 9 °F (36 6 °C)] 97 4 °F (36 3 °C)  HR:  [] 76  Resp:  [12-18] 18  BP: ()/(59-88) 169/79  SpO2:  [92 %-96 %] 94 %  Body mass index is 18 48 kg/m²  Input and Output Summary (last 24 hours): Intake/Output Summary (Last 24 hours) at 2020 1107  Last data filed at 2020 0845  Gross per 24 hour   Intake 2360 ml   Output 950 ml   Net 1410 ml       Physical Exam:     Physical Exam   Constitutional: He appears well-developed and well-nourished  Frail elderly male lethargic but arousable   HENT:   Head: Normocephalic and atraumatic  Eyes: Conjunctivae and EOM are normal  Right eye exhibits no discharge  Left eye exhibits no discharge  Neck: Normal range of motion  No tracheal deviation present     Cardiovascular: Normal rate, regular rhythm and normal heart sounds  Exam reveals no gallop and no friction rub  No murmur heard  Pulmonary/Chest: Effort normal  No respiratory distress  He has no wheezes  He has rhonchi in the right middle field, the right lower field, the left middle field and the left lower field  He has no rales  Abdominal: Soft  Bowel sounds are normal  He exhibits no distension and no mass  There is no tenderness  There is no guarding  Musculoskeletal: Normal range of motion  He exhibits edema  He exhibits no tenderness or deformity  Lower extremities in vena dynes   Neurological:   Speech intact, moving extremities   Skin: Skin is warm and dry  No rash noted  No erythema  There is pallor  Skin protectors on heels   Psychiatric: He has a normal mood and affect  His behavior is normal  Judgment and thought content normal    Nursing note and vitals reviewed  Additional Data:   Labs:    Results from last 7 days   Lab Units 01/04/20  1120   WBC Thousand/uL 13 70*   HEMOGLOBIN g/dL 11 7*   HEMATOCRIT % 36 5*   PLATELETS Thousands/uL 202   NEUTROS PCT % 89*   LYMPHS PCT % 5*   MONOS PCT % 6   EOS PCT % 0     Results from last 7 days   Lab Units 01/04/20  1120 12/31/19  1404   POTASSIUM mmol/L 4 1 4 4   CHLORIDE mmol/L 103 107   CO2 mmol/L 29 28   BUN mg/dL 22 15   CREATININE mg/dL 0 70 0 68   CALCIUM mg/dL 7 5* 7 7*   ALK PHOS U/L  --  133*   ALT U/L  --  43   AST U/L  --  30     Results from last 7 days   Lab Units 01/04/20  1120   INR  1 24     Results from last 7 days   Lab Units 01/05/20  0725 01/05/20  0701 01/05/20  0644 01/05/20  0611 01/04/20 2013 01/04/20  1629 01/04/20  1216 01/04/20  1108 01/04/20  1107   POC GLUCOSE mg/dl 211* <20* <20* 30* 216* 155* 239* <20* <20*     Results from last 7 days   Lab Units 12/31/19  1404   HEMOGLOBIN A1C % 5 8       * I Have Reviewed All Lab Data Listed Above  * Additional Pertinent Lab Tests Reviewed:  [facility] Admission  Reviewed    Recent Cultures (last 7 days):     Results from last 7 days   Lab Units 01/04/20  1655 01/04/20  1654   BLOOD CULTURE   --  Received in Microbiology Lab  Culture in Progress  Received in Microbiology Lab  Culture in Progress  LEGIONELLA URINARY ANTIGEN  Negative  --        Last 24 Hours Medication List:     Current Facility-Administered Medications:  acetaminophen 650 mg Oral Q4H PRN Sangeetha A Meckes, CRNP    albuterol 2 5 mg Nebulization Q4H PRN Sangeetha A Meckes, CRNP    aspirin 81 mg Per J Tube Daily Livia Hess PA-C    cefTRIAXone 1,000 mg Intravenous Q24H Sangeetha A Meckes, CRNP    And        azithromycin 500 mg Intravenous Q24H Sangeetha A Meckes, CRNP    dextrose 5 % and sodium chloride 0 45 % 100 mL/hr Intravenous Continuous Melba Gallagher MD Last Rate: 100 mL/hr (01/05/20 0709)   dextrose        enoxaparin 40 mg Subcutaneous Daily Sangeetha A Meckes, CRNP    ergocalciferol 50,000 Units Oral Weekly Sangeetha A Meckes, CRNP    guaiFENesin 200 mg Oral Q4H PRN Sangeetha A Meckes, CRNP    insulin lispro 1-5 Units Subcutaneous TID AC Sangeetha A Meckes, CRNP    insulin lispro 1-5 Units Subcutaneous HS Sangeetha A Meckes, CRNP    ondansetron 4 mg Intravenous Q6H PRN Sangeetha A Meckes, CRNP    pantoprazole 40 mg Oral Early Morning Livia Hess PA-C    sodium chloride 75 mL/hr Intravenous Continuous Sangeetha A Meckes, CRNP Last Rate: 75 mL/hr (01/05/20 0513)        Today, Patient Was Seen By: Livia Hess PA-C    ** Please Note: Dictation voice to text software may have been used in the creation of this document   **

## 2020-01-05 NOTE — ASSESSMENT & PLAN NOTE
Lab Results   Component Value Date    HGBA1C 5 8 12/31/2019       Recent Labs     01/05/20  0611 01/05/20  0644 01/05/20  0701 01/05/20  0725   POCGLU 30* <20* <20* 211*       Blood Sugar Average: Last 72 hrs:  (P) 170 2   Recurrent hypoglycemia - patient currently on D5 IVF 1/2 n - continue to monitor  · Home meds insulin 2 units at bedtime and metformin  · Will monitor blood sugars he had hypoglycemia on admission  · Patient will likely not need metformin at discharge

## 2020-01-05 NOTE — ASSESSMENT & PLAN NOTE
· Family reported patient was disoriented, his blood sugar was less than 20  · EMS provided him 1 amp of D 50 and when he arrived in the ER, his blood sugars to less than 50 he received 2nd amp of D50  · Blood sugar improved  · Mentation improved

## 2020-01-05 NOTE — ASSESSMENT & PLAN NOTE
· Left lower lobe secondary to RSV  · Will start IV antibiotics  · Follow-up procalcitonin and blood cultures, initial procalcitonin 0 16 will repeat, blood cultures pending  · Strep pneumo Legionella antigen negative

## 2020-01-05 NOTE — PLAN OF CARE
Problem: OCCUPATIONAL THERAPY ADULT  Goal: Performs self-care activities at highest level of function for planned discharge setting  See evaluation for individualized goals  Description  Treatment Interventions: ADL retraining, Functional transfer training, UE strengthening/ROM, Endurance training, Equipment evaluation/education, Patient/family training, Compensatory technique education, Energy conservation, Activityengagement          See flowsheet documentation for full assessment, interventions and recommendations  Note:   Limitation: Decreased ADL status, Decreased UE strength, Decreased endurance, Decreased self-care trans, Decreased high-level ADLs  Prognosis: Good  Assessment: Pt is a 82 y o  male who was admitted to [address] [place] [address] on 1/4/2020 with Acute metabolic encephalopathy due to hypoglycemia  At this time, patient is also affected by the comorbidities of: DM2, chronic pancreatitis, malnutrition, GERD, LE edema, RSV, and viral pneumonia  Additionally, patient is affected by the following personal factors:steps to enter environment, difficulty performing ADLS and difficulty performing IADLS   Orders placed for OT evaluation/treatment with up and OOB as tolerated orders  Prior to admission, Patient reporting requiring assistance with ADLs/IADLs, ambulatory with RW and lives with wife in a one level house with 1 RADHA  Upon OT evaluation, patient requires minimum assist and moderate assist for UB ADLs and moderate assist and maximum assist for LB ADLs  Occupational performance is affected by the following deficits: decreased strength, decreased balance, decreased tolerance, impaired memory, impaired sequencing and impaired problem solving  Based on the following information, patient would benefit from continued skilled OT treatment while in the hospital to address deficits and maximize level of functional independence with ADL's and functional mobility   Occupational Performance areas to address include: eating, grooming, bathing/shower, toilet hygiene, dressing, functional mobility and clothing management  From OT standpoint, recommendation at time of d/c would be short term rehab       OT Discharge Recommendation: Short Term Rehab  OT - OK to Discharge: Yes(Once medically cleared )     Taye Suggs OT

## 2020-01-05 NOTE — ASSESSMENT & PLAN NOTE
· Status post IV Lasix in the emergency room  · Likely chronic secondary to malnutrition - check pre-albumin and CMP in a m   Continue supplements

## 2020-01-05 NOTE — PHYSICAL THERAPY NOTE
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    Esophagitis    Diabetes mellitus type 2    Weight loss    [facility] pancreatitis ([facility] )    Moderate protein-calorie malnutrition     Abnormal SPEP    Severe protein-calorie malnutrition (HCC)    Disorder of upper esophageal sphincter    Gastric ulcer    Pancreatic pseudocyst    Pneumothorax    Pancreatic fistula    Failure to thrive in adult    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[place] [address] )    Transaminitis    Gastroesophageal reflux disease without esophagitis    Bilateral complete vocal fold paralysis    Ambulatory dysfunction    Lower extremity edema    RSV infection    Viral pneumonia    Acute metabolic encephalopathy due to hypoglycemia    Vitamin D deficiency       Past Medical History  Past Medical History:   Diagnosis Date    Cardiac disease     Chronic pancreatitis ([place] [address] )     Diabetes mellitus ([place] [address] )     GERD (gastroesophageal reflux disease)     Vitamin D deficient osteomalacia     Weight loss        Past Surgical History  Past Surgical History:   Procedure Laterality Date    CT GUIDED CHEST TUBE  2/15/2019    GASTROSTOMY TUBE PLACEMENT N/A 1/14/2019    Procedure: INSERTION PEG TUBE;  Surgeon: Luanne Alfaro MD;  [facility];   Service: Gastroenterology    GASTROSTOMY TUBE PLACEMENT N/A 1/15/2019    Procedure: INSERTION JEJUNOSTOMY TUBE OPEN;  Surgeon: Tressia Pallas, MD;  [facility];  Service: Surgical Oncology    LAPAROTOMY N/A 11/19/2018    Procedure: LAPAROTOMY EXPLORATORY;  Surgeon: Tressia Pallas, MD;  [facility];  Service: Surgical Oncology    NE EDG US EXAM SURGICAL ALTER STOM DUODENUM/JEJUNUM N/A 9/21/2018    Procedure: LINEAR ENDOSCOPIC U/S;  Surgeon: Yaneli Mann Razia Waterman MD;  [facility]; Service: Gastroenterology    OK LAP,DIAGNOSTIC ABDOMEN N/A 11/19/2018    Procedure: pancreatic biopsy;  Surgeon: Femi Richey MD;  Location:  MAIN OR;  Service: Surgical Oncology    TONSILLECTOMY      UPPER GASTROINTESTINAL ENDOSCOPY      VASCULAR SURGERY      phlebitis many years ago    WRIST GANGLION EXCISION            01/05/20 0902   Note Type   Note type Eval/Treat   Pain Assessment   Pain Assessment No/denies pain   Pain Score No Pain   Home Living   Type of [address] One level;Performs ADLs on one level; Able to live on main level with bedroom/bathroom;Stairs to enter with rails  (1 RADHA)   Bathroom Shower/Tub Walk-in shower   Bathroom Toilet Raised   Bathroom Equipment Grab bars in shower;Grab bars around toilet   [facility]  (pt uses RW)   Prior Function   Level of [place] Needs assistance with ADLs and functional mobility; Needs assistance with IADLs  (pt able to amb in home c RW independently)   Lives With Spouse   Receives Help From Family   ADL Assistance Needs assistance  (pt needs A c dressing, bathing)   IADLs Needs assistance   Falls in the last 6 months 0   Vocational Retired   Comments pt drives   Restrictions/Precautions   [employer] Bearing Precautions Per Order No   Other Precautions Contact/isolation;Aspiration;Multiple lines; Chair Alarm; Bed Alarm;Droplet precautions   General   Family/Caregiver Present No   Cognition   Attention Attends with cues to redirect   Orientation Level Oriented X4   Memory Decreased recall of recent events;Decreased recall of precautions   Following Commands Follows one step commands without difficulty   RLE Assessment   RLE Assessment WFL  (grossly 3+/5 MMT)   LLE Assessment   LLE Assessment WFL  (grossly 3+/5 MMT)   Coordination   Movements are Fluid and Coordinated 1   Bed Mobility   Rolling R 4  Minimal assistance   Additional items Assist x 2;Bedrails; Increased time required;Verbal cues;LE management   Rolling L 4  Minimal assistance   Additional items Assist x 2;Bedrails; Increased time required;Verbal cues;LE management   Supine to Sit Unable to assess   Additional Comments PT deferred EOB and OOB transfer 2/2 pt c (+) shakiness, RN made aware   Transfers   Sit to Stand Unable to assess   Balance   Static Sitting   (DNT)   Endurance Deficit   Endurance Deficit Yes   Activity Tolerance   Activity Tolerance Patient limited by fatigue;Treatment limited secondary to medical complications (Comment)  (shivering when blankets were removed)   Nurse Made Aware RN made aware of outcomes    Assessment   Prognosis Guarded   Problem List Decreased strength;Decreased endurance;Decreased mobility; Decreased safety awareness   Assessment Pt is 82 y o  male seen for PT evaluation on 1/5/2020 s/p admit to [address] on 3/6/5546 w/ Acute metabolic encephalopathy due to hypoglycemia  PT consulted to assess pt's functional mobility and d/c needs  Order placed for PT eval and tx, w/ up and OOB as tolerated order  Performed at least 2 patient identifiers during session: Name and wristband  Comorbidities affecting pt's physical performance at time of assessment include: RSV, viral pneumonia, ambulatory dysfunction, GERD, chronic pancreatitis, DM type 2  PTA, pt was independent w/ all functional mobility w/ RW, ambulates household distances, has 1 RADHA, lives w/ wife in 1 level home and retired  Personal factors affecting pt at time of IE include: ambulating w/ assistive device, stairs to enter home, inability to navigate level surfaces w/o external assistance, unable to perform dynamic tasks in community, decreased initiation and engagement, unable to perform physical activity, limited insight into impairments, inability to perform IADLs and inability to perform ADLs   Please find objective findings from PT assessment regarding body systems outlined above with impairments and limitations including weakness, impaired balance, decreased endurance, decreased activity tolerance, decreased functional mobility tolerance, decreased safety awareness and fall risk, as well as mobility assessment (need for cueing for mobility technique)  The following objective measures performed on IE also reveal limitations: Barthel Index: 25/100  Pt's clinical presentation is currently unstable/unpredictable seen in pt's presentation of ongoing medical assessment  Pt to benefit from continued PT tx to address deficits as defined above and maximize level of functional independent mobility and consistency  From PT/mobility standpoint, recommendation at time of d/c would be STR pending progress in order to facilitate return to PLOF  Barriers to Discharge Inaccessible home environment   Goals   Patient Goals "to feel better"   [facility] Expiration Date 01/15/20   Short Term Goal #1 In 7-10 days: Increase bilateral LE strength 1/2 grade to facilitate independent mobility, Perform all bed mobility tasks with close S to decrease caregiver burden, Perform all transfers with close S to improve independence, Increase all balance 1/2 grade to decrease risk for falls, Tolerate 4 hr OOB to faciliate upright tolerance, Complete % of the time, PT provider will perform functional balance assessment to determine fall risk and PT to see and establish goals for ambulation, elevations when appropriate   PT Treatment Day 0   Plan   Treatment/Interventions Functional transfer training;LE strengthening/ROM; Therapeutic exercise; Endurance training;Patient/family training;Equipment eval/education; Bed mobility;Spoke to nursing;Continued evaluation   PT Frequency   (3-5x/wk)   Recommendation   Recommendation Short-term skilled PT   Equipment Recommended   (TBD pending OOB mobility)   Barthel Index   Feeding 5   Bathing 0   Grooming Score 0   Dressing Score 5   Bladder Score 5   Bowels Score 5   Toilet Use Score 5   Transfers (Bed/Chair) Score 0   Mobility (Level Surface) Score 0   Stairs Score 0   Barthel Index Score 25         Fritz Camacho, PT

## 2020-01-05 NOTE — PLAN OF CARE
Problem: PHYSICAL THERAPY ADULT  Goal: Performs mobility at highest level of function for planned discharge setting  See evaluation for individualized goals  Description  Treatment/Interventions: Functional transfer training, LE strengthening/ROM, Therapeutic exercise, Endurance training, Patient/family training, Equipment eval/education, Bed mobility, Spoke to nursing, Continued evaluation  Equipment Recommended: (TBD pending OOB mobility)       See flowsheet documentation for full assessment, interventions and recommendations  Note:   Prognosis: Guarded  Problem List: Decreased strength, Decreased endurance, Decreased mobility, Decreased safety awareness  Assessment: Pt is 82 y o  male seen for PT evaluation on 1/5/2020 s/p admit to [address] [place] [address] on 8/2/6803 w/ Acute metabolic encephalopathy due to hypoglycemia  PT consulted to assess pt's functional mobility and d/c needs  Order placed for PT eval and tx, w/ up and OOB as tolerated order  Performed at least 2 patient identifiers during session: Name and wristband  Comorbidities affecting pt's physical performance at time of assessment include: RSV, viral pneumonia, ambulatory dysfunction, GERD, chronic pancreatitis, DM type 2  PTA, pt was independent w/ all functional mobility w/ RW, ambulates household distances, has 1 RADHA, lives w/ wife in 1 level home and retired  Personal factors affecting pt at time of IE include: ambulating w/ assistive device, stairs to enter home, inability to navigate level surfaces w/o external assistance, unable to perform dynamic tasks in community, decreased initiation and engagement, unable to perform physical activity, limited insight into impairments, inability to perform IADLs and inability to perform ADLs   Please find objective findings from PT assessment regarding body systems outlined above with impairments and limitations including weakness, impaired balance, decreased endurance, decreased activity tolerance, decreased functional mobility tolerance, decreased safety awareness and fall risk, as well as mobility assessment (need for cueing for mobility technique)  The following objective measures performed on IE also reveal limitations: Barthel Index: 25/100  Pt's clinical presentation is currently unstable/unpredictable seen in pt's presentation of ongoing medical assessment  Pt to benefit from continued PT tx to address deficits as defined above and maximize level of functional independent mobility and consistency  From PT/mobility standpoint, recommendation at time of d/c would be STR pending progress in order to facilitate return to PLOF  Barriers to Discharge: Inaccessible home environment     Recommendation: Short-term skilled PT          See flowsheet documentation for full assessment

## 2020-01-05 NOTE — OCCUPATIONAL THERAPY NOTE
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    Esophagitis    Diabetes mellitus type 2    Weight loss    Chronic pancreatitis ([facility] )    Moderate protein-calorie malnutrition     Abnormal SPEP    Severe protein-calorie malnutrition (HCC)    Disorder of upper esophageal sphincter    Gastric ulcer    Pancreatic pseudocyst    Pneumothorax    Pancreatic fistula    Failure to thrive in adult    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HCC)    Transaminitis    Gastroesophageal reflux disease without esophagitis    Bilateral complete vocal fold paralysis    Ambulatory dysfunction    Lower extremity edema    RSV infection    Viral pneumonia    Acute metabolic encephalopathy due to hypoglycemia    Vitamin D deficiency       Past Medical History:   Diagnosis Date    Cardiac disease     Chronic pancreatitis (HCC)     Diabetes mellitus ([facility] )     GERD (gastroesophageal reflux disease)     Vitamin D deficient osteomalacia     Weight loss        Past Surgical History:   Procedure Laterality Date    CT GUIDED CHEST TUBE  2/15/2019    GASTROSTOMY TUBE PLACEMENT N/A 1/14/2019    Procedure: INSERTION PEG TUBE;  Surgeon: Codi Black MD;  [facility]; Service: Gastroenterology    GASTROSTOMY TUBE PLACEMENT N/A 1/15/2019    Procedure: INSERTION JEJUNOSTOMY TUBE OPEN;  Surgeon: Prabhakar Rae MD;  [facility];  Service: Surgical Oncology    LAPAROTOMY N/A 11/19/2018    Procedure: LAPAROTOMY EXPLORATORY;  Surgeon: Prabhakar Rae MD;  [facility];  Service: Surgical Oncology    TN EDG US EXAM SURGICAL ALTER STOM DUODENUM/JEJUNUM N/A 9/21/2018    Procedure: LINEAR ENDOSCOPIC U/S;  Surgeon: Mary Ellen Brooke MD;  [facility];   Service: Gastroenterology    TN LAP,DIAGNOSTIC ABDOMEN N/A 11/19/2018    Procedure: pancreatic biopsy;  Surgeon: Prabhakar Rae MD;  [facility];  Service: Surgical Oncology    TONSILLECTOMY      UPPER GASTROINTESTINAL ENDOSCOPY      VASCULAR SURGERY      phlebitis many years ago    WRIST GANGLION EXCISION          01/05/20 0919   Note Type   Note type Eval only   Restrictions/Precautions   Weight Bearing Precautions Per Order No   Other Precautions Contact/isolation;Cognitive; Bed Alarm;Multiple lines; Fall Risk;Aspiration   Pain Assessment   Pain Assessment No/denies pain   Pain Score No Pain   Home Living   Type of [address] level;Performs ADLs on one level; Able to live on main level with bedroom/bathroom;Stairs to enter with rails  (1 RADHA)   Bathroom Shower/Tub Walk-in shower   Bathroom Toilet Raised   Bathroom Equipment Grab bars in shower;Grab bars around toilet   [address]  (utilizes RW at baseline )   Prior Function   Level of [place] Needs assistance with ADLs and functional mobility; Needs assistance with IADLs  (pt able to amb in home c RW independently)   Lives With Spouse   Receives Help From Family   ADL Assistance Needs assistance  (pt needs A c dressing, bathing)   IADLs Needs assistance   Falls in the last 6 months 0   Vocational Retired   Comments (+) driving    Lifestyle   Autonomy Patient reporting requiring assistance with ADLs/IADLs, ambulatory with RW and lives with wife in a one level house with 1 RADHA   Reciprocal Relationships wife    ADL   Eating Assistance 5  Supervision/Setup   Grooming Assistance 5  Supervision/Setup   UB Bathing Assistance 4  Minimal Assistance   LB [address] 3  Moderate Assistance   [address] 3  Moderate Assistance    [facility] 2  Maximal [address]  1  Total Assistance   Bed Mobility   Rolling R 4  Minimal assistance   Additional items Assist x 2;Bedrails; Increased time required;Verbal cues;LE management   Rolling L 4  Minimal assistance   Additional items Assist x 2;Bedrails; Increased time required;Verbal cues;LE management   Functional Mobility   Additional Comments Further functional mobility was deferred at this time secondary to pt's medical status as pt was cold and fatigued throughout session    Activity Tolerance   Activity Tolerance Patient limited by fatigue;Treatment limited secondary to medical complications (Comment)  (shivering when blankets were removed )   Nurse Made Aware RN made aware of outcomes    RUE Assessment   RUE Assessment WFL  (grossly 3+/5 MMT)   LUE Assessment   LUE Assessment WFL  (grossly 3+/5 MMT)   Hand Function   Gross Motor Coordination Functional   Fine Motor Coordination Functional   Cognition   Overall Cognitive Status WFL   Arousal/Participation Alert; Responsive   Attention Within functional limits   Orientation Level Oriented X4   Following Commands Follows one step commands without difficulty   Comments Pt agreeable to OT eval    Assessment   Limitation Decreased ADL status; Decreased UE strength;Decreased endurance;Decreased self-care trans;Decreased high-level ADLs   Prognosis Good   Assessment Pt is a 82 y o  male who was admitted to [address] [place] [address] on 1/4/2020 with Acute metabolic encephalopathy due to hypoglycemia  At this time, patient is also affected by the comorbidities of: DM2, chronic pancreatitis, malnutrition, GERD, LE edema, RSV, and viral pneumonia  Additionally, patient is affected by the following personal factors:steps to enter environment, difficulty performing ADLS and difficulty performing IADLS   Orders placed for OT evaluation/treatment with up and OOB as tolerated orders  Prior to admission, Patient reporting requiring assistance with ADLs/IADLs, ambulatory with RW and lives with wife in a one level house with 1 RADHA  Upon OT evaluation, patient requires minimum assist and moderate assist for UB ADLs and moderate assist and maximum assist for LB ADLs   Occupational performance is affected by the following deficits: decreased strength, decreased balance, decreased tolerance, impaired memory, impaired sequencing and impaired problem solving  Based on the following information, patient would benefit from continued skilled OT treatment while in the hospital to address deficits and maximize level of functional independence with ADL's and functional mobility  Occupational Performance areas to address include: eating, grooming, bathing/shower, toilet hygiene, dressing, functional mobility and clothing management  From OT standpoint, recommendation at time of d/c would be short term rehab  Goals   Patient Goals to feel better    Plan   Treatment Interventions ADL retraining;Functional transfer training;UE strengthening/ROM; Endurance training;Equipment evaluation/education;Patient/family training; Compensatory technique education; Energy conservation; Activityengagement   Goal Expiration Date 01/15/20   OT Treatment Day 0   OT Frequency 3-5x/wk   Recommendation   OT Discharge Recommendation Short Term Rehab   OT - OK to Discharge Yes  (Once medically cleared )   Barthel Index   Feeding 5   Bathing 0   Grooming Score 0   Dressing Score 5   Bladder Score 5   Bowels Score 5   Toilet Use Score 5   Transfers (Bed/Chair) Score 0   Mobility (Level Surface) Score 0   Stairs Score 0   Barthel Index Score 25     GOALS:    *UB ADL with (S) for inc'd independence with self cares    *LB ADL with Min (A) using AE prn for inc'd independence with self cares    *Toileting with Min (A) for clothing management and hygiene for return to PLOF with personal care    *Participate in 10m UE therex to increase overall stamina/activity tolerance for purposeful tasks    *Bed mobility- (S) for inc'd independence to manage own comfort and initiate EOB & OOB purposeful tasks    *Patient will verbalize 3 safety awareness/ principles to prevent falls in the home setting       *Patient will verbalize and demonstrate use of energy conservation/deep breathing techniques and work simplification skills during functional activities with no verbal cues  *Patient will increase OOB/sitting tolerance to 2-4 hours per day to increase participation in self-care and leisure tasks with no s/s of exertion           Jacquelin Celaya MS, OTR/L

## 2020-01-06 LAB
ALBUMIN SERPL BCP-MCNC: 1.5 G/DL (ref 3.5–5.7)
ALP SERPL-CCNC: 125 U/L (ref 55–165)
ALT SERPL W P-5'-P-CCNC: 47 U/L (ref 7–52)
ANION GAP SERPL CALCULATED.3IONS-SCNC: 3 MMOL/L (ref 4–13)
AST SERPL W P-5'-P-CCNC: 73 U/L (ref 13–39)
ATRIAL RATE: 122 BPM
ATRIAL RATE: 122 BPM
ATRIAL RATE: 147 BPM
BILIRUB SERPL-MCNC: 0.5 MG/DL (ref 0.2–1)
BUN SERPL-MCNC: 15 MG/DL (ref 7–25)
CALCIUM SERPL-MCNC: 6.9 MG/DL (ref 8.6–10.5)
CHLORIDE SERPL-SCNC: 102 MMOL/L (ref 98–107)
CO2 SERPL-SCNC: 28 MMOL/L (ref 21–31)
CREAT SERPL-MCNC: 0.66 MG/DL (ref 0.7–1.3)
ERYTHROCYTE [DISTWIDTH] IN BLOOD BY AUTOMATED COUNT: 14.6 % (ref 11.5–14.5)
GFR SERPL CREATININE-BSD FRML MDRD: 90 ML/MIN/1.73SQ M
GLUCOSE SERPL-MCNC: 182 MG/DL (ref 65–140)
GLUCOSE SERPL-MCNC: 182 MG/DL (ref 65–140)
GLUCOSE SERPL-MCNC: 199 MG/DL (ref 65–140)
GLUCOSE SERPL-MCNC: 204 MG/DL (ref 65–140)
GLUCOSE SERPL-MCNC: 208 MG/DL (ref 65–140)
GLUCOSE SERPL-MCNC: 212 MG/DL (ref 65–99)
GLUCOSE SERPL-MCNC: 29 MG/DL (ref 65–140)
GLUCOSE SERPL-MCNC: <20 MG/DL (ref 65–140)
HCT VFR BLD AUTO: 32 % (ref 42–47)
HGB BLD-MCNC: 10.5 G/DL (ref 14–18)
MCH RBC QN AUTO: 32 PG (ref 26–34)
MCHC RBC AUTO-ENTMCNC: 32.7 G/DL (ref 31–37)
MCV RBC AUTO: 98 FL (ref 81–99)
PLATELET # BLD AUTO: 151 THOUSANDS/UL (ref 149–390)
PMV BLD AUTO: 10 FL (ref 8.6–11.7)
POTASSIUM SERPL-SCNC: 3.5 MMOL/L (ref 3.5–5.5)
PREALB SERPL-MCNC: 3 MG/DL (ref 18–40)
PROCALCITONIN SERPL-MCNC: 0.17 NG/ML
PROT SERPL-MCNC: 4.2 G/DL (ref 6.4–8.9)
QRS AXIS: -49 DEGREES
QRS AXIS: -57 DEGREES
QRS AXIS: -64 DEGREES
QRSD INTERVAL: 104 MS
QRSD INTERVAL: 74 MS
QRSD INTERVAL: 78 MS
QT INTERVAL: 292 MS
QT INTERVAL: 332 MS
QT INTERVAL: 368 MS
QTC INTERVAL: 442 MS
QTC INTERVAL: 461 MS
QTC INTERVAL: 472 MS
RBC # BLD AUTO: 3.27 MILLION/UL (ref 4.3–5.9)
SODIUM SERPL-SCNC: 133 MMOL/L (ref 134–143)
T WAVE AXIS: -2 DEGREES
T WAVE AXIS: -35 DEGREES
T WAVE AXIS: 58 DEGREES
VENTRICULAR RATE: 116 BPM
VENTRICULAR RATE: 157 BPM
VENTRICULAR RATE: 87 BPM
WBC # BLD AUTO: 6.5 THOUSAND/UL (ref 4.8–10.8)

## 2020-01-06 PROCEDURE — 84145 PROCALCITONIN (PCT): CPT | Performed by: PHYSICIAN ASSISTANT

## 2020-01-06 PROCEDURE — 99232 SBSQ HOSP IP/OBS MODERATE 35: CPT | Performed by: PHYSICIAN ASSISTANT

## 2020-01-06 PROCEDURE — 94664 DEMO&/EVAL PT USE INHALER: CPT

## 2020-01-06 PROCEDURE — 94760 N-INVAS EAR/PLS OXIMETRY 1: CPT

## 2020-01-06 PROCEDURE — 80053 COMPREHEN METABOLIC PANEL: CPT | Performed by: PHYSICIAN ASSISTANT

## 2020-01-06 PROCEDURE — 93010 ELECTROCARDIOGRAM REPORT: CPT | Performed by: INTERNAL MEDICINE

## 2020-01-06 PROCEDURE — 92610 EVALUATE SWALLOWING FUNCTION: CPT

## 2020-01-06 PROCEDURE — 97530 THERAPEUTIC ACTIVITIES: CPT

## 2020-01-06 PROCEDURE — 85027 COMPLETE CBC AUTOMATED: CPT | Performed by: PHYSICIAN ASSISTANT

## 2020-01-06 PROCEDURE — 94668 MNPJ CHEST WALL SBSQ: CPT

## 2020-01-06 PROCEDURE — 84134 ASSAY OF PREALBUMIN: CPT | Performed by: PHYSICIAN ASSISTANT

## 2020-01-06 PROCEDURE — 82948 REAGENT STRIP/BLOOD GLUCOSE: CPT

## 2020-01-06 RX ORDER — DEXTROSE MONOHYDRATE 25 G/50ML
INJECTION, SOLUTION INTRAVENOUS
Status: COMPLETED
Start: 2020-01-06 | End: 2020-01-06

## 2020-01-06 RX ORDER — SODIUM CHLORIDE FOR INHALATION 0.9 %
3 VIAL, NEBULIZER (ML) INHALATION
Status: DISCONTINUED | OUTPATIENT
Start: 2020-01-06 | End: 2020-01-10 | Stop reason: HOSPADM

## 2020-01-06 RX ORDER — LEVALBUTEROL 1.25 MG/.5ML
1.25 SOLUTION, CONCENTRATE RESPIRATORY (INHALATION)
Status: DISCONTINUED | OUTPATIENT
Start: 2020-01-06 | End: 2020-01-10 | Stop reason: HOSPADM

## 2020-01-06 RX ADMIN — DEXTROSE 50 % IN WATER (D50W) INTRAVENOUS SYRINGE 50 ML: at 03:55

## 2020-01-06 RX ADMIN — CEFTRIAXONE 1000 MG: 1 INJECTION, SOLUTION INTRAVENOUS at 15:29

## 2020-01-06 RX ADMIN — ENOXAPARIN SODIUM 40 MG: 40 INJECTION SUBCUTANEOUS at 09:19

## 2020-01-06 RX ADMIN — PANTOPRAZOLE SODIUM 40 MG: 40 TABLET, DELAYED RELEASE ORAL at 05:48

## 2020-01-06 RX ADMIN — AZITHROMYCIN MONOHYDRATE 500 MG: 500 INJECTION, POWDER, LYOPHILIZED, FOR SOLUTION INTRAVENOUS at 16:23

## 2020-01-06 RX ADMIN — ASPIRIN 81 MG 81 MG: 81 TABLET ORAL at 09:20

## 2020-01-06 NOTE — PLAN OF CARE
Problem: SLP ADULT - SWALLOWING, IMPAIRED  Goal: Initial SLP swallow eval performed  Outcome: Completed       Joel Lal MA, CCC/SLP  [id number]  [email or web address]  Available via [employer] text

## 2020-01-06 NOTE — ASSESSMENT & PLAN NOTE
· Family reported patient was disoriented, his blood sugar was less than 20  · EMS provided him 1 amp of D 50 and when he arrived in the ER, his blood sugars to less than 50 he received 2nd amp of D50  · Blood sugar improved  · Mentation improved but still has bouts of lethargy

## 2020-01-06 NOTE — ASSESSMENT & PLAN NOTE
Lab Results   Component Value Date    HGBA1C 5 5 01/05/2020       Recent Labs     01/06/20  0321 01/06/20  0411 01/06/20  0432 01/06/20  0659   POCGLU 29* <20* 182* 208*       Blood Sugar Average: Last 72 hrs:  (P) 175 [id number]   Recurrent hypoglycemia - patient blood sugar improved on D5, will discontinue for now  All insulin coverage has been discontinued also  Will order a bedtime snack

## 2020-01-06 NOTE — PHYSICAL THERAPY NOTE
Physical Therapy Treatment Note       01/06/20 0936   Pain Assessment   Pain Assessment No/denies pain   Pain Score No Pain   Restrictions/Precautions   Weight Bearing Precautions Per Order No   Other Precautions Contact/isolation;Droplet precautions;Aspiration; Chair Alarm; Bed Alarm; Fall Risk;Multiple lines   General   Chart Reviewed Yes   Response to Previous Treatment Patient reporting fatigue but able to participate  Family/Caregiver Present No   Cognition   Arousal/Participation Responsive; Cooperative   Attention Attends with cues to redirect   Orientation Level Oriented X4   Memory Decreased recall of recent events;Decreased recall of precautions   Following Commands Follows one step commands with increased time or repetition   Comments pt agreeable to PT session   Subjective   Subjective "I feel cold"   Bed Mobility   Supine to Sit 3  Moderate assistance   Additional items Assist x 2;HOB elevated; Increased time required;Verbal cues;LE management   Transfers   Sit to Stand 3  Moderate assistance   Additional items Assist x 2; Increased time required;Verbal cues   Stand to Sit 3  Moderate assistance   Additional items Assist x 2; Increased time required;Verbal cues;Armrests   Ambulation/Elevation   Gait pattern Improper Weight shift;Decreased foot clearance;Shuffling; Step to;Excessively slow   Gait Assistance 3  Moderate assist   Additional items Assist x 2;Verbal cues; Tactile cues   Assistive Device Rolling walker   Distance 3 ft from bed>recliner   Stair Management Assistance Not tested   Balance   Static Sitting Fair   Dynamic Sitting Fair -   Static Standing Poor +   Dynamic Standing Poor   Ambulatory Poor   Endurance Deficit   Endurance Deficit Yes   Activity Tolerance   Activity Tolerance Patient limited by fatigue;Treatment limited secondary to medical complications (Comment)  (shivering when blankets were removed)   Nurse Made Aware RN made aware of outcomes & verbalized pt appropriate for PT session Assessment   Prognosis Guarded   Problem List Decreased strength;Decreased endurance;Decreased mobility; Decreased safety awareness   Assessment Pt seen for PT treatment session this date with interventions consisting of gait training w/ emphasis on improving pt's ability to ambulate level surfaces x 3 ft with mod A of 2 provided by therapist with RW and therapeutic activity consisting of training: supine<>sit transfers, sit<>stand transfers and vc and tactile cues for static standing posture faciliation  Pt agreeable to PT treatment session upon arrival, pt found supine in bed w/ HOB elevated, in no apparent distress and responsive  In comparison to previous session, pt with improvements in tolerating OOB mobility, however limited 2/2 fatigue  Post session: pt returned back to recliner, chair alarm engaged, all needs in reach and RN notified of session findings/recommendations  Continue to recommend STR at time of d/c in order to maximize pt's functional independence and safety w/ mobility  Pt continues to be functioning below baseline level, and remains limited 2* factors listed above  PT will continue to see pt while here in order to address the deficits listed above and provide interventions consistent w/ POC in effort to achieve STGs  Barriers to Discharge Inaccessible home environment   Goals   Patient Goals "to feel better"   STG Expiration Date 01/15/20   Short Term Goal #1 goals remain appropriate   PT Treatment Day 1   Plan   Treatment/Interventions Functional transfer training;LE strengthening/ROM; Therapeutic exercise; Endurance training;Patient/family training;Equipment eval/education; Bed mobility;Spoke to nursing;Continued evaluation   Progress Slow progress, decreased activity tolerance   PT Frequency   (3-5x/wk)   Recommendation   Recommendation Short-term skilled PT   Equipment Recommended Walker  (continue RW use)   PT - OK to Discharge Yes  (when medically cleared, if to STR)       Abhay Cordoba PT    Time of PT treatment session: 4144-3444

## 2020-01-06 NOTE — NURSING NOTE
Pt BG checked at 0321 am  BG is 29  Pt given Dextrose and applesauce with 2 packets of sugar  BG rechecked @ 0411 with new reading 20 and then again at 0432 which measured 182  Hospitalist was notified  New orders noted  Pt shows no signs of distress and is now resting with call bell and belongings within reach

## 2020-01-06 NOTE — PLAN OF CARE
Problem: Potential for Falls  Goal: Patient will remain free of falls  Description  INTERVENTIONS:  - Assess patient frequently for physical needs  -  Identify cognitive and physical deficits and behaviors that affect risk of falls    -  [place] fall precautions as indicated by assessment   - Educate patient/family on patient safety including physical limitations  - Instruct patient to call for assistance with activity based on assessment  - Modify environment to reduce risk of injury  - Consider OT/PT consult to assist with strengthening/mobility  Outcome: Progressing     Problem: Prexisting or High Potential for Compromised Skin Integrity  Goal: Skin integrity is maintained or improved  Description  INTERVENTIONS:  - Identify patients at risk for skin breakdown  - Assess and monitor skin integrity  - Assess and monitor nutrition and hydration status  - Monitor labs   - Assess for incontinence   - Turn and reposition patient  - Assist with mobility/ambulation  - Relieve pressure over bony prominences  - Avoid friction and shearing  - Provide appropriate hygiene as needed including keeping skin clean and dry  - Evaluate need for skin moisturizer/barrier cream  - Collaborate with interdisciplinary team   - Patient/family teaching  - Consider wound care consult   Outcome: Progressing     Problem: RESPIRATORY - ADULT  Goal: Achieves optimal ventilation and oxygenation  Description  INTERVENTIONS:  - Assess for changes in respiratory status  - Assess for changes in mentation and behavior  - Position to facilitate oxygenation and minimize respiratory effort  - Oxygen administered by appropriate delivery if ordered  - Initiate smoking cessation education as indicated  - Encourage broncho-pulmonary hygiene including cough, deep breathe, Incentive Spirometry  - Assess the need for suctioning and aspirate as needed  - Assess and instruct to report SOB or any respiratory difficulty  - Respiratory Therapy support as indicated  Outcome: Progressing     Problem: PAIN - ADULT  Goal: Verbalizes/displays adequate comfort level or baseline comfort level  Description  Interventions:  - Encourage patient to monitor pain and request assistance  - Assess pain using appropriate pain scale  - Administer analgesics based on type and severity of pain and evaluate response  - Implement non-pharmacological measures as appropriate and evaluate response  - Consider cultural and social influences on pain and pain management  - Notify physician/advanced practitioner if interventions unsuccessful or patient reports new pain  Outcome: Progressing     Problem: INFECTION - ADULT  Goal: Absence or prevention of progression during hospitalization  Description  INTERVENTIONS:  - Assess and monitor for signs and symptoms of infection  - Monitor lab/diagnostic results  - Monitor all insertion sites, i e  indwelling lines, tubes, and drains  - Monitor endotracheal if appropriate and nasal secretions for changes in amount and color  - [place] appropriate cooling/warming therapies per order  - Administer medications as ordered  - Instruct and encourage patient and family to use good hand hygiene technique  - Identify and instruct in appropriate isolation precautions for identified infection/condition  Outcome: Progressing  Goal: Absence of fever/infection during neutropenic period  Description  INTERVENTIONS:  - Monitor WBC    Outcome: Progressing     Problem: SAFETY ADULT  Goal: Maintain or return to baseline ADL function  Description  INTERVENTIONS:  -  Assess patient's ability to carry out ADLs; assess patient's baseline for ADL function and identify physical deficits which impact ability to perform ADLs (bathing, care of mouth/teeth, toileting, grooming, dressing, etc )  - Assess/evaluate cause of self-care deficits   - Assess range of motion  - Assess patient's mobility; develop plan if impaired  - Assess patient's need for assistive devices and provide as appropriate  - Encourage maximum independence but intervene and supervise when necessary  - Involve family in performance of ADLs  - Assess for home care needs following discharge   - Consider OT consult to assist with ADL evaluation and planning for discharge  - Provide patient education as appropriate  Outcome: Progressing  Goal: Maintain or return mobility status to optimal level  Description  INTERVENTIONS:  - Assess patient's baseline mobility status (ambulation, transfers, stairs, etc )    - Identify cognitive and physical deficits and behaviors that affect mobility  - Identify mobility aids required to assist with transfers and/or ambulation (gait belt, sit-to-stand, lift, walker, cane, etc )  - [place] fall precautions as indicated by assessment  - Record patient progress and toleration of activity level on Mobility SBAR; progress patient to next Phase/Stage  - Instruct patient to call for assistance with activity based on assessment  - Consider rehabilitation consult to assist with strengthening/weightbearing, etc   Outcome: Progressing     Problem: DISCHARGE PLANNING  Goal: Discharge to home or other facility with appropriate resources  Description  INTERVENTIONS:  - Identify barriers to discharge w/patient and caregiver  - Arrange for needed discharge resources and transportation as appropriate  - Identify discharge learning needs (meds, wound care, etc )  - Arrange for interpretive services to assist at discharge as needed  - Refer to Case Management Department for coordinating discharge planning if the patient needs post-hospital services based on physician/advanced practitioner order or complex needs related to functional status, cognitive ability, or social support system  Outcome: Progressing     Problem: Knowledge Deficit  Goal: Patient/family/caregiver demonstrates understanding of disease process, treatment plan, medications, and discharge instructions  Description  Complete learning assessment and assess knowledge base    Interventions:  - Provide teaching at level of understanding  - Provide teaching via preferred learning methods  Outcome: Progressing

## 2020-01-06 NOTE — PLAN OF CARE
Problem: PHYSICAL THERAPY ADULT  Goal: Performs mobility at highest level of function for planned discharge setting  See evaluation for individualized goals  Description  Treatment/Interventions: Functional transfer training, LE strengthening/ROM, Therapeutic exercise, Endurance training, Patient/family training, Equipment eval/education, Bed mobility, Spoke to nursing, Continued evaluation  Equipment Recommended: (TBD pending OOB mobility)       See flowsheet documentation for full assessment, interventions and recommendations  Outcome: Progressing  Note:   Prognosis: Guarded  Problem List: Decreased strength, Decreased endurance, Decreased mobility, Decreased safety awareness  Assessment: Pt seen for PT treatment session this date with interventions consisting of gait training w/ emphasis on improving pt's ability to ambulate level surfaces x 3 ft with mod A of 2 provided by therapist with RW and therapeutic activity consisting of training: supine<>sit transfers, sit<>stand transfers and vc and tactile cues for static standing posture faciliation  Pt agreeable to PT treatment session upon arrival, pt found supine in bed w/ HOB elevated, in no apparent distress and responsive  In comparison to previous session, pt with improvements in tolerating OOB mobility, however limited 2/2 fatigue  Post session: pt returned back to recliner, chair alarm engaged, all needs in reach and RN notified of session findings/recommendations  Continue to recommend STR at time of d/c in order to maximize pt's functional independence and safety w/ mobility  Pt continues to be functioning below baseline level, and remains limited 2* factors listed above  PT will continue to see pt while here in order to address the deficits listed above and provide interventions consistent w/ POC in effort to achieve STGs    Barriers to Discharge: Inaccessible home environment     Recommendation: Short-term skilled PT     PT - OK to Discharge: Yes(when medically cleared, if to STR)    See flowsheet documentation for full assessment

## 2020-01-06 NOTE — ASSESSMENT & PLAN NOTE
· Present on admission with leukocytosis, neutrophils 89%, tachycardia ranging from , chest x-ray noting pneumonia and RSV positive

## 2020-01-06 NOTE — SOCIAL WORK
CM attempted to meet with pt at bedside but pt lethargic  CM spoke with pt daughter Luz Solorzano and Edwige Duarte  Pt lives with his spouse Cornel Bernabe in a 1 story house, ramp outside  Pt uses RW to ambulate  Pt requires assistance with ADLs which his spouse has been providing  Pt daughters provide transport  Pt PCP is Dr Christ Rios  He uses [facility] and family denies any barriers to obtaining his medications  Pt has a history of STR at [facility] 1/19  He also has a history of HHC through [facility] and D&A treatment history of was denied  A post acute care recommendation was made by your care team for STR  Discussed Freedom of Choice with caregiver  List of facilities given to caregiver via emailed  caregiver aware the list is custom filtered for them by preference  and that [facility] post acute providers are designated  Daughter chose [facility] as first choices as pt has been there before  Referral sent as requested  She will review the list and make additional choices  CM to follow  CM reviewed d/c planning process including the following: identifying help at home, patient preference for d/c planning needs, availability of treatment team to discuss questions or concerns patient and/or family may have regarding understanding medications and recognizing signs and symptoms once discharged  CM also encouraged patient to follow up with all recommended appointments after discharge  Patient advised of importance for patient and family to participate in managing patients medical well being

## 2020-01-06 NOTE — SPEECH THERAPY NOTE
Speech-Language Pathology Bedside Swallow Evaluation      Patient Name: Juan Clay    GPUXM'P Date: 1/6/2020     Problem List  Principal Problem:    Acute metabolic encephalopathy due to hypoglycemia  Active Problems:    Diabetes mellitus type 2    Chronic pancreatitis ([facility] )    Sepsis ([facility] )    Severe protein-calorie malnutrition ([facility] )    Gastroesophageal reflux disease without esophagitis    Ambulatory dysfunction    Lower extremity edema    RSV infection    Viral pneumonia    Vitamin D deficiency      Past Medical History  Past Medical History:   Diagnosis Date    Cardiac disease     Chronic pancreatitis ([facility] )     Diabetes mellitus ([place] [address] )     GERD (gastroesophageal reflux disease)     Vitamin D deficient osteomalacia     Weight loss        Past Surgical History  Past Surgical History:   Procedure Laterality Date    CT GUIDED CHEST TUBE  2/15/2019    GASTROSTOMY TUBE PLACEMENT N/A 1/14/2019    Procedure: INSERTION PEG TUBE;  Surgeon: Arcelia Salinas MD;  [facility]; Service: Gastroenterology    GASTROSTOMY TUBE PLACEMENT N/A 1/15/2019    Procedure: INSERTION JEJUNOSTOMY TUBE OPEN;  Surgeon: Susanna Stroud MD;  [facility];  Service: Surgical Oncology    LAPAROTOMY N/A 11/19/2018    Procedure: LAPAROTOMY EXPLORATORY;  Surgeon: Susanna Stroud MD;  [facility];  Service: Surgical Oncology    PA EDG US EXAM SURGICAL ALTER STOM DUODENUM/JEJUNUM N/A 9/21/2018    Procedure: LINEAR ENDOSCOPIC U/S;  Surgeon: Brenda Guidry MD;  [facility];   Service: Gastroenterology    PA LAP,DIAGNOSTIC ABDOMEN N/A 11/19/2018    Procedure: pancreatic biopsy;  Surgeon: Susanna Stroud MD;  [facility];  Service: Surgical Oncology    TONSILLECTOMY      UPPER GASTROINTESTINAL ENDOSCOPY      VASCULAR SURGERY      phlebitis many years ago    WRIST GANGLION EXCISION         Summary   Pt presented with  Even suspected s/s of penetration/aspiration with conservative textures of pureed and nectar consistencies  Overt delayed s/s of aspiration w/ thin liquids  Pt with ongoing malnutrition of which dysphagia is a contributing factor  Attempted to contact his wife to discuss results but unable to reach  Did have a conversation w/ his daughter Berenice Babcock who agrees her father appears to be generally weaker since October after he suffered a fall and is not sustaining his caloric intake  Myriam Rodríguez to have a discussion w/ medical team and the family re: goals of care  Pt did not wish to pursue a feeding tube, however Berenice Babcock is uncertain he knows which type he would be considering  Risk/s for Aspiration: suspected high    Recommended Diet: puree/level 1 diet and nectar thick liquids, should goal be ongoing oral feeds/pleasure, consider alternative nutrition/hydration   Recommended Form of Meds: crushed with puree   Aspiration precautions and swallowing strategies: upright posture, only feed when fully alert, slow rate of feeding, small bites/sips and allow time for pt to swallow 3-4x/bite:   Other Recommendations/Considerations:   Possible VBS if family wishes to pursue    Current Medical Status  Pt is a 82 y o  male who presented to [address] [facility] withdisoriented with low blood sugar  Patient with past medical history of GERD, weight loss with chronic pancreatitis and J-tube in place, diabetes mellitus on insulin presented to the emergency room with disorientation and low blood sugar   he had complaint of nasal congestion, cough for past week  No cp/sob  Pt w/ hx  Of dysphagia (severe pharyngeal stage in VBS April of last year)  Consult ordered and completed today 1/6/20  Pt is positive for RSV    Current Precautions:  Fall  Aspiration  Contact   Droplet    Past medical history:    Please see H&P for details    Special Studies:  Xray chest 1/4/20:Left lower lobe consolidation and small effusion  Findings consistent with pneumonia      Social/Education/Vocational Hx:  Pt lives with family    Swallow Information   Current Risks for Dysphagia & Aspiration: known history of dysphagia and known history of aspiration  Current Symptoms/Concerns: change in respiratory status  Current Diet: regular diet and thin liquids   Baseline Diet:  soup and pudding per staff  pt states he eats verything  will contact his wife  Baseline Assessment   Behavior/Cognition: alert and and oriented  Speech/Language Status: able to participate in basic conversation and able to follow commands  Patient Positioning: upright in recliner  Pain Status/Interventions/Response to Interventions:   No report of or nonverbal indications of pain  Just cold  Swallow Mechanism Exam  Facial: symmetrical  Labial: WFL  Lingual: WFL  Velum: symmetrical  Mandible: adequate ROM  Dentition: adequate  Vocal quality:hoarse and rough ( hx  Of loulou  VC paralysis)  Volitional Cough: NP   Respiratory Status: on RA       Consistencies Assessed and Performance   Consistencies Administered: thin liquids, nectar thick, puree and soft solids  Materials administered included  Water, thickened water, pudding and 1 bite of pancake  Oral Stage: moderate w/ soft solids, WFL w/ purees  Mastication was adequate with the materials administered today  Bolus formation and transfer were delayed/incomplete  Pt thought that he swallowed when he did not  Required pureed bolus to assist w/ transfer/swallow of soft solids  No overt s/s reduced oral control  Pharyngeal Stage: moderate  Swallow Mechanics:  Swallowing initiation appeared prompt  Laryngeal rise was palpated and  to be reduced  Pt  Swallows 3-4 times   Delayed coughing post pureed, thin and to lesser degree w/ nectar consistency    Penetration/aspiration suspected    Esophageal Concerns:  followed by GI    Strategies and Efficacy:  If remains po  Suggest dysphagia 1 textures and nectar consistency liquids as tolertaed    Summary and Recommendations (see above)    Results Reviewed with: patient, RN and family     Caregiver Education: initiated  I educated the daughter Monica Garcia  And the pt  to a lesser extent my concerns w/ his dysphagia and suspected aspiration and risk for ongoing malnutrition  Monica Garcia is a nurse  And is aware of his difficulties  She agrees to discuss persuance of possible peg, po feeds on conservative textures as tolerated for pleasure  Nursing was also informed of my decision to contact the family re: his swallow function  Caregivers would benefit from continued education and support  and education on  safer swallow strategies should he continue to eat despite risk  Treatment Recommended: yes    Frequency of treatment: 1-2 follow-up visits pending discussions    Patient Stated Goal: to eat    Dysphagia LTG per SLP  -Patient will demonstrate optimum safety and efficacy of oral intake and swallowing function without overt s/sx of penetration or aspiration for the highest appropriate diet level  Short Term Goals:  -Pt will tolerate Dysphagia 1/pureed diet and nectar thick thin liquid with  Reduced s/s of oral or pharyngeal dysphagia across 1-3 diagnostic session/s    -Patient will comply with a Video/Modified Barium Swallow /instrumental swallow study for more complete assessment of anatomy and physiology of the swallowing skills, to assess efficacy of treatment techniques so as to best guide treatment plan as indicated/requested      -Patient will demonstrate independent use of recommended safe swallowing strategies during a clinician assessed meal across three sessions     -Patients caregiver will demonstrate understanding, recall and use of recommended diet and (prompting for use of) compensatory strategies      -Speech Therapy Prognosis   Prognosis: fair    Prognosis Considerations: age and medically fragile status    Kaye GARCIA, [address], [facility]/SLP  [id number]  [email or web address]  Available via tiger text

## 2020-01-06 NOTE — OCCUPATIONAL THERAPY NOTE
Occupational Therapy Treatment Note      Juan Hernandez    1/6/2020    Principal Problem:    Acute metabolic encephalopathy due to hypoglycemia  Active Problems:    Diabetes mellitus type 2    Chronic pancreatitis (HCC)    Sepsis (HCC)    Severe protein-calorie malnutrition (HCC)    Gastroesophageal reflux disease without esophagitis    Ambulatory dysfunction    Lower extremity edema    RSV infection    Viral pneumonia    Vitamin D deficiency      Past Medical History:   Diagnosis Date    Cardiac disease     Chronic pancreatitis ([facility] [address] )     Diabetes mellitus ([facility] [place] [facility] )     GERD (gastroesophageal reflux disease)     Vitamin D deficient osteomalacia     Weight loss        Past Surgical History:   Procedure Laterality Date    CT GUIDED CHEST TUBE  2/15/2019    GASTROSTOMY TUBE PLACEMENT N/A 1/14/2019    Procedure: INSERTION PEG TUBE;  Surgeon: Anh Stark MD;  [facility]; Service: Gastroenterology    GASTROSTOMY TUBE PLACEMENT N/A 1/15/2019    Procedure: INSERTION JEJUNOSTOMY TUBE OPEN;  Surgeon: Debbie Barroso MD;  [facility];  Service: Surgical Oncology    LAPAROTOMY N/A 11/19/2018    Procedure: LAPAROTOMY EXPLORATORY;  Surgeon: Debbie Barroso MD;  [facility];  Service: Surgical Oncology    IA EDG US EXAM SURGICAL ALTER STOM DUODENUM/JEJUNUM N/A 9/21/2018    Procedure: LINEAR ENDOSCOPIC U/S;  Surgeon: Amaury Abarca MD;  [facility]; Service: Gastroenterology    IA LAP,DIAGNOSTIC ABDOMEN N/A 11/19/2018    Procedure: pancreatic biopsy;  Surgeon: Debbie Barroso MD;  [facility];  Service: Surgical Oncology    TONSILLECTOMY      UPPER GASTROINTESTINAL ENDOSCOPY      VASCULAR SURGERY      phlebitis many years ago    WRIST GANGLION EXCISION          01/06/20 0946   Restrictions/Precautions   Weight Bearing Precautions Per Order No   Other Precautions Contact/isolation;Droplet precautions; Chair Alarm; Bed Alarm;Aspiration;Multiple lines; Fall Risk   Pain Assessment   Pain Assessment No/denies pain   Pain Score No Pain   Bed Mobility   Rolling R 4  Minimal assistance   Additional items Assist x 2;Bedrails; Increased time required;Verbal cues;LE management   Rolling L 4  Minimal assistance   Additional items Assist x 2;Bedrails; Increased time required;Verbal cues;LE management   Supine to Sit 3  Moderate assistance   Additional items Assist x 2;HOB elevated; Increased time required;Verbal cues;LE management   Transfers   Sit to Stand 3  Moderate assistance   Additional items Assist x 2; Increased time required;Verbal cues   Stand to Sit 3  Moderate assistance   Additional items Assist x 2; Increased time required;Verbal cues;Armrests   Toilet transfer   (declined)   Cognition   Overall Cognitive Status WFL  (flat affect)   Arousal/Participation Responsive; Cooperative  (needed some encouragement to participate initially)   Attention Attends with cues to redirect   Orientation Level Oriented X4   Memory Decreased recall of recent events;Decreased recall of precautions   Following Commands Follows one step commands with increased time or repetition   Activity Tolerance   Activity Tolerance Patient limited by fatigue   Assessment   Assessment Pt agreed to tx session w/ some encouragement to get OOB, as approached  Bed mobility w/ Min A X 2 (side to side), Mod A X 2 (supine to sit)  Functiona transfers OOB to Chair w/ Mod A X 2, w/ VC's and increased time required  Pt deconditioned and fatigued  Continue OT Tx w/ established POC to increase level of safety and independence        Plan   Treatment Interventions ADL retraining;Functional transfer training;UE strengthening/ROM; Endurance training;Patient/family training;Equipment evaluation/education; Compensatory technique education; Energy conservation; Activityengagement   Goal Expiration Date 01/15/20   OT Treatment Day 0   OT Frequency 3-5x/wk   Recommendation   OT Discharge Recommendation Short Term Rehab   OT - OK to Discharge Yes  (when medically stable)   Abigail Heard, OT

## 2020-01-06 NOTE — PROGRESS NOTES
Progress Note - Joelle Orellana 1935, 82 y o  male MRN: [id number]    Unit/Bed#: -01 Encounter: [id number]    Primary Care Provider: Ayanna English MD   Date and time admitted to hospital: 1/4/2020 11:12 AM        * Acute metabolic encephalopathy due to hypoglycemia  Assessment & Plan  · Family reported patient was disoriented, his blood sugar was less than 20  · EMS provided him 1 amp of D 50 and when he arrived in the ER, his blood sugars to less than 50 he received 2nd amp of D50  · Blood sugar improved  · Mentation improved but still has bouts of lethargy    Viral pneumonia  Assessment & Plan  · Left lower lobe secondary to RSV  · Will start IV antibiotics  · Follow-up procalcitonin and blood cultures, initial procalcitonin 0 16 will repeat, blood cultures pending  · Strep pneumo Legionella antigen negative    RSV infection  Assessment & Plan  · Supportive care    Sepsis [facility])  Assessment & Plan  · Present on admission with leukocytosis, neutrophils 89%, tachycardia ranging from , chest x-ray noting pneumonia and RSV positive      Severe protein-calorie malnutrition ([address] )  Assessment & Plan  Malnutrition Findings:     chronically ill-appearing  Muscle and fat loss of the upper and lower extremities prominent sternocleidomastoid temporal wasting  Boggy heels       BMI Findings: Body mass index is 18 48 kg/m²     · Supplement  · Nutrition consult    Chronic pancreatitis [facility])  Assessment & Plan  · follow GI as an outpatient    Diabetes mellitus type 2  Assessment & Plan  Lab Results   Component Value Date    HGBA1C 5 5 01/05/2020       Recent Labs     01/06/20  0321 01/06/20  0411 01/06/20  0432 01/06/20  0659   POCGLU 29* <20* 182* 208*       Blood Sugar Average: Last 72 hrs:  (P) 175 [id number]   Recurrent hypoglycemia - patient blood sugar improved on D5, will discontinue for now  All insulin coverage has been discontinued also  Will order a bedtime snack      VTE Pharmacologic Prophylaxis: Pharmacologic: Enoxaparin (Lovenox)    Patient Centered Rounds: I have performed bedside rounds with nursing staff today  Discussions with Specialists or Other Care Team Provider:  Case management, care coordination team  Education and Discussions with Family / Patient: patient and wife    Current Length of Stay: 2 day(s)    Current Patient Status: Inpatient   Certification Statement: The patient will continue to require additional inpatient hospital stay due to Supportive care lab monitoring for his hypoglycemia    Discharge Plan:  Hopeful in the next 24 to 48 hours if medically stable    Code Status: Level 1 - Full Code    Subjective:   Patient seen in bed with wife at bedside  He reports he was up all morning until I got in the room  He still has a cough not able to produce much mucus  He does say he is using the [employer]  Objective:     Vitals:   Temp (24hrs), Av 2 °F (36 2 °C), Min:96 6 °F (35 9 °C), Max:97 6 °F (36 4 °C)    Temp:  [96 6 °F (35 9 °C)-97 6 °F (36 4 °C)] 96 6 °F (35 9 °C)  HR:  [76-77] 76  Resp:  [17-18] 18  BP: (123-148)/(74-76) 124/76  SpO2:  [93 %-96 %] 96 %  Body mass index is 18 48 kg/m²  Input and Output Summary (last 24 hours): Intake/Output Summary (Last 24 hours) at 2020 0959  Last data filed at 2020 0900  Gross per 24 hour   Intake 600 ml   Output 500 ml   Net 100 ml       Physical Exam:     Physical Exam   Constitutional: He appears well-developed and well-nourished  Frail elderly male, more alert today   HENT:   Head: Normocephalic and atraumatic  Eyes: Conjunctivae and EOM are normal  Right eye exhibits no discharge  Left eye exhibits no discharge  Neck: Normal range of motion  No tracheal deviation present  Cardiovascular: Normal rate, regular rhythm and normal heart sounds  Exam reveals no gallop and no friction rub  No murmur heard  Pulmonary/Chest: Effort normal  No respiratory distress  He has no wheezes   He has rhonchi (Diffuse)  He has no rales  Abdominal: Soft  Bowel sounds are normal  He exhibits no distension and no mass  There is no tenderness  There is no guarding  Musculoskeletal: Normal range of motion  He exhibits no edema, tenderness or deformity  Lower extremity in vena dynes   Neurological: He is alert  Speech intact, moving extremities   Skin: Skin is warm and dry  No rash noted  No erythema  There is pallor  Skin protectors on heel and buttocks   Psychiatric: He has a normal mood and affect  His behavior is normal    Nursing note and vitals reviewed  Additional Data:   Labs:    Results from last 7 days   Lab Units 01/06/20  0536 01/04/20  1120   WBC Thousand/uL 6 50 13 70*   HEMOGLOBIN g/dL 10 5* 11 7*   HEMATOCRIT % 32 0* 36 5*   PLATELETS Thousands/uL 151 202   NEUTROS PCT %  --  89*   LYMPHS PCT %  --  5*   MONOS PCT %  --  6   EOS PCT %  --  0     Results from last 7 days   Lab Units 01/06/20  0536   POTASSIUM mmol/L 3 5   CHLORIDE mmol/L 102   CO2 mmol/L 28   BUN mg/dL 15   CREATININE mg/dL 0 66*   CALCIUM mg/dL 6 9*   ALK PHOS U/L 125   ALT U/L 47   AST U/L 73*     Results from last 7 days   Lab Units 01/04/20  1120   INR  1 24     Results from last 7 days   Lab Units 01/06/20  0659 01/06/20  0432 01/06/20  0411 01/06/20  0321 01/05/20  2021 01/05/20  1603 01/05/20  1143 01/05/20  0725 01/05/20  0701 01/05/20  0644 01/05/20  0611 01/04/20  2013   POC GLUCOSE mg/dl 208* 182* <20* 29* 113 202* 350* 211* <20* <20* 30* 216*     Results from last 7 days   Lab Units 01/05/20  0521 12/31/19  1404   HEMOGLOBIN A1C % 5 5 5 8       * I Have Reviewed All Lab Data Listed Above  * Additional Pertinent Lab Tests Reviewed: [address] [facility] Admission  Reviewed    Recent Cultures (last 7 days):     Results from last 7 days   Lab Units 01/04/20  1655 01/04/20  1654   BLOOD CULTURE   --  No Growth at 24 hrs  No Growth at 24 hrs     LEGIONELLA URINARY ANTIGEN  Negative  --        Last 24 Hours Medication List:     Current Facility-Administered Medications:  acetaminophen 650 mg Oral Q4H PRN Sangeetha A Vale, CRNP    albuterol 2 5 mg Nebulization Q4H PRN Sangeetha A Vale, CRNP    aspirin 81 mg Per J Tube Daily Isabell Jack PA-C    cefTRIAXone 1,000 mg Intravenous Q24H Sangeetha A Vale, CRNP Last Rate: 1,000 mg (01/05/20 1521)   And        azithromycin 500 mg Intravenous Q24H Sangeethatremaine Collazo, CRNP Last Rate: 500 mg (01/05/20 1556)   enoxaparin 40 mg Subcutaneous Daily Sangeethatremaine Collazo, REZANP    ergocalciferol 50,000 Units Oral Weekly Sangeethatremaine Collazo, DEMI    guaiFENesin 200 mg Oral Q4H PRN Sangeetha A Vale, REZANP    ondansetron 4 mg Intravenous Q6H PRN Sangeetha A Vale, CRNP    pantoprazole 40 mg Oral Early Morning Isabell Jack PA-C    sodium chloride 75 mL/hr Intravenous Continuous Sangeetha Collazo, REZANP Last Rate: 75 mL/hr (01/05/20 0513)        Today, Patient Was Seen By: Isabell Jack PA-C    ** Please Note: Dictation voice to text software may have been used in the creation of this document   **

## 2020-01-07 LAB
GLUCOSE SERPL-MCNC: 160 MG/DL (ref 65–140)
GLUCOSE SERPL-MCNC: 175 MG/DL (ref 65–140)
GLUCOSE SERPL-MCNC: 200 MG/DL (ref 65–140)
GLUCOSE SERPL-MCNC: 93 MG/DL (ref 65–140)

## 2020-01-07 PROCEDURE — 92526 ORAL FUNCTION THERAPY: CPT

## 2020-01-07 PROCEDURE — 82948 REAGENT STRIP/BLOOD GLUCOSE: CPT

## 2020-01-07 PROCEDURE — 94640 AIRWAY INHALATION TREATMENT: CPT

## 2020-01-07 PROCEDURE — 97535 SELF CARE MNGMENT TRAINING: CPT

## 2020-01-07 PROCEDURE — 99232 SBSQ HOSP IP/OBS MODERATE 35: CPT | Performed by: INTERNAL MEDICINE

## 2020-01-07 PROCEDURE — 94760 N-INVAS EAR/PLS OXIMETRY 1: CPT

## 2020-01-07 RX ADMIN — SODIUM CHLORIDE 75 ML/HR: 9 INJECTION, SOLUTION INTRAVENOUS at 01:32

## 2020-01-07 RX ADMIN — LEVALBUTEROL HYDROCHLORIDE 1.25 MG: 1.25 SOLUTION, CONCENTRATE RESPIRATORY (INHALATION) at 07:09

## 2020-01-07 RX ADMIN — CEFTRIAXONE 1000 MG: 1 INJECTION, SOLUTION INTRAVENOUS at 15:26

## 2020-01-07 RX ADMIN — PANTOPRAZOLE SODIUM 40 MG: 40 TABLET, DELAYED RELEASE ORAL at 05:05

## 2020-01-07 RX ADMIN — ENOXAPARIN SODIUM 40 MG: 40 INJECTION SUBCUTANEOUS at 09:26

## 2020-01-07 RX ADMIN — ISODIUM CHLORIDE 3 ML: 0.03 SOLUTION RESPIRATORY (INHALATION) at 13:27

## 2020-01-07 RX ADMIN — LEVALBUTEROL HYDROCHLORIDE 1.25 MG: 1.25 SOLUTION, CONCENTRATE RESPIRATORY (INHALATION) at 21:25

## 2020-01-07 RX ADMIN — ASPIRIN 81 MG 81 MG: 81 TABLET ORAL at 09:26

## 2020-01-07 RX ADMIN — ISODIUM CHLORIDE 3 ML: 0.03 SOLUTION RESPIRATORY (INHALATION) at 21:26

## 2020-01-07 RX ADMIN — LEVALBUTEROL HYDROCHLORIDE 1.25 MG: 1.25 SOLUTION, CONCENTRATE RESPIRATORY (INHALATION) at 13:27

## 2020-01-07 RX ADMIN — ISODIUM CHLORIDE 3 ML: 0.03 SOLUTION RESPIRATORY (INHALATION) at 07:09

## 2020-01-07 NOTE — MALNUTRITION/BMI
This medical record reflects one or more clinical indicators suggestive of malnutrition and/or morbid obesity  Malnutrition Findings:      Degree of Malnutrition: Other severe protein calorie malnutrition  Malnutrition Characteristics: Fat loss, Muscle loss(related to dysphagia w/ decreased po intake as evidenced by severe muscle loss around temples, clavicle, severe fat loss around triceps)    BMI Findings:  BMI Classifications: Underweight < 18  5(Added Ensure pudding  Diet texture was modified to pureed with nectar thick liquids after speech therapy evaluation and po intake increased so far  )     Body mass index is 18 48 kg/m²  See Nutrition note dated 1/7/20 for additional details  Completed nutrition assessment is viewable in flowsheets

## 2020-01-07 NOTE — ASSESSMENT & PLAN NOTE
· Continue IV antibiotics  · Strep pneumo and urine Legionella negative  · Follow up cultures  · Trend procalcitonin

## 2020-01-07 NOTE — PLAN OF CARE
Problem: SLP ADULT - SWALLOWING, IMPAIRED  Goal: Advance to least restrictive diet without signs or symptoms of aspiration for planned discharge setting  See evaluation for individualized goals    Description  Patient will:    Outcome: Progressing

## 2020-01-07 NOTE — ASSESSMENT & PLAN NOTE
· Supplement  · Nutrition consult  · Swallow eval appreciated, patient on pureed diet with nectar thick liquids    · Discussed potential feeding tube with family who declined

## 2020-01-07 NOTE — PLAN OF CARE
Problem: OCCUPATIONAL THERAPY ADULT  Goal: Performs self-care activities at highest level of function for planned discharge setting  See evaluation for individualized goals  Description  Treatment Interventions: ADL retraining, Functional transfer training, UE strengthening/ROM, Endurance training, Equipment evaluation/education, Patient/family training, Compensatory technique education, Energy conservation, Activityengagement          See flowsheet documentation for full assessment, interventions and recommendations  Outcome: Progressing, slowly  Note:   Limitation: Decreased ADL status, Decreased UE strength, Decreased endurance, Decreased self-care trans, Decreased high-level ADLs  Prognosis: Good  Assessment: Patient participated in Skilled OT session this date with interventions consisting of ADL re training with the use of correct body mechnaics and  therapeutic activities to: increase activity tolerance   Patient semi-agreeable to OT treatment session, upon arrival patient was found reclined in bed/awakened for treatment  Patient requiring encouragement to participate, and occasional rest periods  Patient continues to be functioning below baseline level, occupational performance remains limited secondary to factors listed above and increased risk for falls and injury  From OT standpoint, recommendation at time of d/c would be Short Term Rehab  Patient to benefit from continued Occupational Therapy treatment while in the hospital to address deficits as defined above and maximize level of functional independence with ADLs and functional mobility        OT Discharge Recommendation: Short Term Rehab  OT - OK to Discharge: Yes(when medically stable)  KARINA Swift/EZE

## 2020-01-07 NOTE — ASSESSMENT & PLAN NOTE
· PT/OT eval  · Patient will need transition to SNF on discharge  · Case management working on placement

## 2020-01-07 NOTE — NURSING NOTE
Pt pulled out IV , same restarted in l forearm @ 75cc/hr,Pt confused to time and place, Voiding and incontinent of urine

## 2020-01-07 NOTE — SPEECH THERAPY NOTE
Speech Language/Pathology    Speech/Language Pathology Progress Note    Patient Name: Ryan Cline  VHTPN'L Date: 1/7/2020     Problem List  Principal Problem:    Acute metabolic encephalopathy due to hypoglycemia  Active Problems:    Diabetes mellitus type 2    Chronic pancreatitis (HCC)    Sepsis ([facility] )    Severe protein-calorie malnutrition (HCC)    Gastroesophageal reflux disease without esophagitis    Ambulatory dysfunction    Lower extremity edema    RSV infection    Viral pneumonia    Vitamin D deficiency       Past Medical History  Past Medical History:   Diagnosis Date    Cardiac disease     Chronic pancreatitis ([facility] )     Diabetes mellitus ([facility] )     GERD (gastroesophageal reflux disease)     Vitamin D deficient osteomalacia     Weight loss         Past Surgical History  Past Surgical History:   Procedure Laterality Date    CT GUIDED CHEST TUBE  2/15/2019    GASTROSTOMY TUBE PLACEMENT N/A 1/14/2019    Procedure: INSERTION PEG TUBE;  Surgeon: Earl Hartmann MD;  [facility]; Service: Gastroenterology    GASTROSTOMY TUBE PLACEMENT N/A 1/15/2019    Procedure: INSERTION JEJUNOSTOMY TUBE OPEN;  Surgeon: Marleny Case MD;  [facility];  Service: Surgical Oncology    LAPAROTOMY N/A 11/19/2018    Procedure: LAPAROTOMY EXPLORATORY;  Surgeon: Marleny Case MD;  [facility];  Service: Surgical Oncology    TN EDG US EXAM SURGICAL ALTER STOM DUODENUM/JEJUNUM N/A 9/21/2018    Procedure: LINEAR ENDOSCOPIC U/S;  Surgeon: Damian Davis MD;  [facility]; Service: Gastroenterology    TN LAP,DIAGNOSTIC ABDOMEN N/A 11/19/2018    Procedure: pancreatic biopsy;  Surgeon: Marleny Case MD;  [facility];  Service: Surgical Oncology    TONSILLECTOMY      UPPER GASTROINTESTINAL ENDOSCOPY      VASCULAR SURGERY      phlebitis many years ago    WRIST GANGLION EXCISION           Subjective:  Awake and alert sitting upright in bed  Objective:  Seen for swallow therapy  Training/assessing safety w/ being fed small bites/single sips w/ nectar thick and pureed foods  Pt encouraged to swallow 2-3x/bolus  Did not do this initially then did as trials progressed  No overt s/s during feeding 1/2 tsp  Of puree and cup sips of nectar  Some mild delayed coughing post session observed    Assessment:  Mor awake/alert today  Minimal possible delayed s/s of aspiration after feeding pt purees  (pudding) and nectar thick liquids  Pt refuses peg tube feeds  Plan/Recommendations:  Continue current diet as ordered  Staff to feed per aspiration precautions sign hung in room  ST to follow    Vlad Lechuga, CCC/SLP  [id number]  [email or web address]  Available via tiger text

## 2020-01-07 NOTE — NUTRITION
01/07/20 1458   Assessment   Timepoint Initial  (speech therapy evaluation)   Labs   List Completed Labs Other (Comment)  (1/7: Na 133, PAB 3, glucometer: 182, 93, 160  ergocalciferol 50,000 units weekly, ondansetron, pantoprazole, IVF: normal saline @ 50 ml/hr)   Feeding Route   PO With assist   Nutritional Supplement Ensure (specify type)  (pudding QID)   Swallow Other (Comment)  (per speech therapy 1/7: recommending pureed w/ NTL  Pt refusing PEG tube feedings)   Adequacy of Intake   Nutrition Modality PO  (dysphagia pureed, CCD2, NTL)   Intake Meals 0-25%;%  (B/L)   Intake Supplements 25-50%   Estimated Calorie Intake 50-75%   Estimated Protein Intake  50-75%   Estimated Fluid Intake 50-75%   Estimated calorie intake compared to estimated need per aide and wife- pt ate about 75% of lunch today which wife mostly fed him  Maybe now that diet is changed to pureed, po intake will improve  Per nursing- pt ate minimal amount at breakfast today on dental soft   Nutrition Prognosis   Nutrition Concerns Other (Comment)  (per chart:  Acute metabolic encephalopathy due to hypoglycemia, viral pneumonia, RSV, severe protein/calorie malnutriiton, sepsis)   Comorbid Concerns   (PMH: Chronic pancreatits)   Nutrition Precautions   (nursing skin assessment reviewed  1+ edema charted by nursing)   Nutrition Considerations Other (Comment)  (diet education not appropriate)   PES Statement   Problem Intake   Oral or Nutritional Support Intake (2) Inadequate oral intake NI-2 1   Related to Other (comment)  (swallowing difficulties)   As evidenced by: Temporal Wasting; Loss of muscle mass;Weight Loss   Patient Nutrition Goals   Goal meet PO needs   Goal Status initiated   Timeframe to complete goal by next f/u   Recommendations/Interventions   Summary per aide and wife- pt ate about 75% of lunch today which wife mostly fed him  Maybe now that diet is changed to pureed, po intake will continue to improve   Per nursing- pt ate minimal amount at breakfast today on dental soft   Malnutrition/BMI Present Yes   Degree of Malnutrition Other severe protein calorie malnutrition   Malnutrition Characteristics Fat loss;Muscle loss  (related to dysphagia w/ decreased po intake as evidenced by severe muscle loss around temples, clavicle, severe fat loss around triceps)   BMI Classifications Underweight < 18 5  (Added Ensure pudding  Diet texture was modified to pureed with nectar thick liquids after speech therapy evaluation and po intake increased so far  )   Interventions Supplement continue   Nutrition Recommendations Continue diet as ordered  (noted, pt seemed to eat more at lunch today on pureed diet  In speech note- it states pt does not want PEG tube feedings  Please clarify and note in progress notes   Will continue to monitor po intake vs  need for nutrition support)   Nutrition Complexity Risk   Nutrition complexity level High risk   Follow up date 01/10/20

## 2020-01-07 NOTE — SOCIAL WORK
I called Adrian Ramires at 17:15 to # [phone], she would like the pt at [facility] if possible, she will call me tomorrow with additional snf choices,cm will continue to follow, no d/c today as discussed at care coordination rounds, pt may need a peg tube,

## 2020-01-07 NOTE — ASSESSMENT & PLAN NOTE
· Present on admission with leukocytosis, neutrophils 89%, tachycardia ranging from , chest x-ray noting pneumonia and RSV positive  · Improving  · Continue treatment as above

## 2020-01-07 NOTE — PROGRESS NOTES
Progress Note - Fritz Blizzard 1935, 82 y o  male MRN: [id number]    Unit/Bed#: -01 Encounter: [id number]    Primary Care Provider: Damon Varghese MD   Date and time admitted to hospital: 1/4/2020 11:12 AM        Pneumonia  Assessment & Plan  · Continue IV antibiotics  · Strep pneumo and urine Legionella negative  · Follow up cultures  · Trend procalcitonin    RSV infection  Assessment & Plan  · Supportive care    Ambulatory dysfunction  Assessment & Plan  · PT/OT eval  · Patient will need transition to SNF on discharge  · Case management working on placement    Severe protein-calorie malnutrition ([facility] 75 )  Assessment & Plan  · Supplement  · Nutrition consult  · Swallow eval appreciated, patient on pureed diet with nectar thick liquids  · Discussed potential feeding tube with family who declined    Sepsis ([place] [address] )  Assessment & Plan  · Present on admission with leukocytosis, neutrophils 89%, tachycardia ranging from , chest x-ray noting pneumonia and RSV positive  · Improving  · Continue treatment as above      Chronic pancreatitis ([place] [address] )  Assessment & Plan  · follow GI as an outpatient    * Acute metabolic encephalopathy due to hypoglycemia  Assessment & Plan  · Improving  · Fingersticks have improved  · Will continue supportive care      VTE Pharmacologic Prophylaxis: Pharmacologic: Enoxaparin (Lovenox)    Patient Centered Rounds: I have performed bedside rounds with nursing staff today  Discussions with Specialists or Other Care Team Provider: yes  Education and Discussions with Family / Patient: yes    Current Length of Stay: 3 day(s)    Current Patient Status: Inpatient   Certification Statement: The patient will continue to require additional inpatient hospital stay due to Pneumonia    Discharge Plan:  Pending hospital course    Code Status: Level 1 - Full Code    Subjective:   Patient with poor p o  Intake  Swallow eval appreciated  Denies any chest pain    Still with shortness of breath and cough    Objective:     Vitals:   Temp (24hrs), Av 3 °F (36 3 °C), Min:96 9 °F (36 1 °C), Max:98 1 °F (36 7 °C)    Temp:  [96 9 °F (36 1 °C)-98 1 °F (36 7 °C)] 98 1 °F (36 7 °C)  HR:  [54-92] 92  Resp:  [17-18] 17  BP: (130-140)/(66-80) 140/66  SpO2:  [90 %-99 %] 90 %  Body mass index is 18 48 kg/m²  Input and Output Summary (last 24 hours): Intake/Output Summary (Last 24 hours) at 2020 1800  Last data filed at 2020 1035  Gross per 24 hour   Intake 0 ml   Output 125 ml   Net -125 ml       Physical Exam:     Physical Exam   Constitutional: No distress  Frail elderly male   HENT:   Head: Normocephalic and atraumatic  Eyes: Conjunctivae and EOM are normal    Neck: Normal range of motion  Neck supple  Cardiovascular: Normal rate and regular rhythm  Pulmonary/Chest: Effort normal    Bilateral rhonchi   Abdominal: Soft  He exhibits no distension  There is no tenderness  Musculoskeletal:   Generalized weakness   Neurological: He is alert  No cranial nerve deficit  Skin: Skin is warm and dry  Psychiatric: He has a normal mood and affect   His behavior is normal        Additional Data:     Labs:    Results from last 7 days   Lab Units 20  0536 20  1120   WBC Thousand/uL 6 50 13 70*   HEMOGLOBIN g/dL 10 5* 11 7*   HEMATOCRIT % 32 0* 36 5*   PLATELETS Thousands/uL 151 202   NEUTROS PCT %  --  89*   LYMPHS PCT %  --  5*   MONOS PCT %  --  6   EOS PCT %  --  0     Results from last 7 days   Lab Units 20  0536   POTASSIUM mmol/L 3 5   CHLORIDE mmol/L 102   CO2 mmol/L 28   BUN mg/dL 15   CREATININE mg/dL 0 66*   CALCIUM mg/dL 6 9*   ALK PHOS U/L 125   ALT U/L 47   AST U/L 73*     Results from last 7 days   Lab Units 20  1120   INR  1 24     Results from last 7 days   Lab Units 20  1615 20  1131 20  0629 20  1622 20  1125 20  0659 20  0432 20  0411 20  0321 20  2021 20  1603   POC GLUCOSE mg/dl 175* 160* 93 182* 204* 199* 208* 182* <20* 29* 113 202*     Results from last 7 days   Lab Units 01/05/20  0521   HEMOGLOBIN A1C % 5 5       * I Have Reviewed All Lab Data Listed Above  * Additional Pertinent Lab Tests Reviewed: Sigrid [facility] Admission  Reviewed    Imaging:  Imaging Reports Reviewed Today Include:  No new imaging    Recent Cultures (last 7 days):     Results from last 7 days   Lab Units 01/04/20  1655 01/04/20  1654   BLOOD CULTURE   --  No Growth at 48 hrs  No Growth at 48 hrs  LEGIONELLA URINARY ANTIGEN  Negative  --        Last 24 Hours Medication List:     Current Facility-Administered Medications:  acetaminophen 650 mg Oral Q4H PRN DEMI Murray    albuterol 2 5 mg Nebulization Q4H PRN DEMI Murray    aspirin 81 mg Per J Tube Daily Francesca Bonds PA-C    cefTRIAXone 1,000 mg Intravenous Q24H DEMI Murray Last Rate: 1,000 mg (01/07/20 1526)   enoxaparin 40 mg Subcutaneous Daily DEMI Murray    ergocalciferol 50,000 Units Oral Weekly DEMI Murray    guaiFENesin 200 mg Oral Q4H PRN DEMI Murray    levalbuterol 1 25 mg Nebulization TID Pushpa Park MD    And        sodium chloride 3 mL Nebulization TID Pushpa Park MD    ondansetron 4 mg Intravenous Q6H PRN DEMI Murray    pantoprazole 40 mg Oral Early Morning Francesca Bonds PA-C    sodium chloride 75 mL/hr Intravenous Continuous DEMI Murray Last Rate: 75 mL/hr (01/07/20 0132)        Today, Patient Was Seen By: Meghan Goldberg MD    ** Please Note: Dictation voice to text software may have been used in the creation of this document   **

## 2020-01-07 NOTE — OCCUPATIONAL THERAPY NOTE
01/07/20 6159   Restrictions/Precautions   Weight Bearing Precautions Per Order No   Other Precautions Contact/isolation;Droplet precautions; Bed Alarm;Aspiration;Multiple lines; Fall Risk   Lifestyle   Intrinsic Gratification enjoys watching sports on tv   Pain Assessment   Pain Assessment No/denies pain   ADL   Where Assessed Other (Comment)  (sitting in bed)   Grooming Assistance 5  Supervision/Setup   Grooming Deficit Setup;Verbal cueing   Grooming Comments patient agreeable to brushing teeth and washing face Only    UB Bathing Comments patient stated "I'm not going anywhere - I'm cold - I don't want to do that now" (any further bathing)   Bed Mobility   Additional Comments patient was prompted/instructed to bend knees to help with boosting/repositioning in bed, but failed to comply    Coordination   Gross Motor appears [facility] [place]   Dexterity appears WFL   Cognition   Overall Cognitive Status WFL   Arousal/Participation Responsive; Cooperative   Orientation Level   (patient stated "I don't know what day of the week it is"  )   Activity Tolerance   Activity Tolerance Patient limited by fatigue   Assessment   Assessment Patient participated in Skilled OT session this date with interventions consisting of ADL re training with the use of correct body mechnaics and  therapeutic activities to: increase activity tolerance   Patient semi-agreeable to OT treatment session, upon arrival patient was found reclined in bed/awakened for treatment  Patient requiring encouragement to participate, and occasional rest periods  Patient continues to be functioning below baseline level, occupational performance remains limited secondary to factors listed above and increased risk for falls and injury  From OT standpoint, recommendation at time of d/c would be Short Term Rehab     Patient to benefit from continued Occupational Therapy treatment while in the hospital to address deficits as defined above and maximize level of functional independence with ADLs and functional mobility  Plan   Treatment Interventions ADL retraining;Functional transfer training; Activityengagement   Goal Expiration Date 01/15/20   OT Treatment Day [address] [facility] KARINA Patten/L

## 2020-01-08 LAB
ALBUMIN SERPL BCP-MCNC: 1.6 G/DL (ref 3.5–5.7)
ALP SERPL-CCNC: 141 U/L (ref 55–165)
ALT SERPL W P-5'-P-CCNC: 50 U/L (ref 7–52)
ANION GAP SERPL CALCULATED.3IONS-SCNC: 5 MMOL/L (ref 4–13)
AST SERPL W P-5'-P-CCNC: 39 U/L (ref 13–39)
BASOPHILS # BLD AUTO: 0.1 THOUSANDS/ΜL (ref 0–0.1)
BASOPHILS NFR BLD AUTO: 1 % (ref 0–2)
BILIRUB SERPL-MCNC: 0.4 MG/DL (ref 0.2–1)
BUN SERPL-MCNC: 13 MG/DL (ref 7–25)
CALCIUM SERPL-MCNC: 7.1 MG/DL (ref 8.6–10.5)
CHLORIDE SERPL-SCNC: 107 MMOL/L (ref 98–107)
CO2 SERPL-SCNC: 25 MMOL/L (ref 21–31)
CREAT SERPL-MCNC: 0.56 MG/DL (ref 0.7–1.3)
EOSINOPHIL # BLD AUTO: 0.4 THOUSAND/ΜL (ref 0–0.61)
EOSINOPHIL NFR BLD AUTO: 5 % (ref 0–5)
ERYTHROCYTE [DISTWIDTH] IN BLOOD BY AUTOMATED COUNT: 15.1 % (ref 11.5–14.5)
GFR SERPL CREATININE-BSD FRML MDRD: 96 ML/MIN/1.73SQ M
GLUCOSE SERPL-MCNC: 135 MG/DL (ref 65–140)
GLUCOSE SERPL-MCNC: 151 MG/DL (ref 65–140)
GLUCOSE SERPL-MCNC: 190 MG/DL (ref 65–140)
GLUCOSE SERPL-MCNC: 73 MG/DL (ref 65–140)
GLUCOSE SERPL-MCNC: 97 MG/DL (ref 65–99)
GLUCOSE SERPL-MCNC: 99 MG/DL (ref 65–140)
HCT VFR BLD AUTO: 35.5 % (ref 42–47)
HGB BLD-MCNC: 11.4 G/DL (ref 14–18)
LYMPHOCYTES # BLD AUTO: 1.2 THOUSANDS/ΜL (ref 0.6–4.47)
LYMPHOCYTES NFR BLD AUTO: 16 % (ref 21–51)
MCH RBC QN AUTO: 32 PG (ref 26–34)
MCHC RBC AUTO-ENTMCNC: 32.2 G/DL (ref 31–37)
MCV RBC AUTO: 99 FL (ref 81–99)
MONOCYTES # BLD AUTO: 0.6 THOUSAND/ΜL (ref 0.17–1.22)
MONOCYTES NFR BLD AUTO: 8 % (ref 2–12)
NEUTROPHILS # BLD AUTO: 5.2 THOUSANDS/ΜL (ref 1.4–6.5)
NEUTS SEG NFR BLD AUTO: 70 % (ref 42–75)
PLATELET # BLD AUTO: 181 THOUSANDS/UL (ref 149–390)
PMV BLD AUTO: 9.6 FL (ref 8.6–11.7)
POTASSIUM SERPL-SCNC: 4.2 MMOL/L (ref 3.5–5.5)
PROT SERPL-MCNC: 4.5 G/DL (ref 6.4–8.9)
RBC # BLD AUTO: 3.57 MILLION/UL (ref 4.3–5.9)
SODIUM SERPL-SCNC: 137 MMOL/L (ref 134–143)
WBC # BLD AUTO: 7.4 THOUSAND/UL (ref 4.8–10.8)

## 2020-01-08 PROCEDURE — 94640 AIRWAY INHALATION TREATMENT: CPT

## 2020-01-08 PROCEDURE — 82948 REAGENT STRIP/BLOOD GLUCOSE: CPT

## 2020-01-08 PROCEDURE — 92526 ORAL FUNCTION THERAPY: CPT

## 2020-01-08 PROCEDURE — 97530 THERAPEUTIC ACTIVITIES: CPT

## 2020-01-08 PROCEDURE — 94760 N-INVAS EAR/PLS OXIMETRY 1: CPT

## 2020-01-08 PROCEDURE — 80053 COMPREHEN METABOLIC PANEL: CPT | Performed by: INTERNAL MEDICINE

## 2020-01-08 PROCEDURE — 99232 SBSQ HOSP IP/OBS MODERATE 35: CPT | Performed by: INTERNAL MEDICINE

## 2020-01-08 PROCEDURE — 85025 COMPLETE CBC W/AUTO DIFF WBC: CPT | Performed by: INTERNAL MEDICINE

## 2020-01-08 RX ORDER — POLYETHYLENE GLYCOL 3350 17 G/17G
17 POWDER, FOR SOLUTION ORAL ONCE
Status: COMPLETED | OUTPATIENT
Start: 2020-01-08 | End: 2020-01-08

## 2020-01-08 RX ADMIN — CEFTRIAXONE 1000 MG: 1 INJECTION, SOLUTION INTRAVENOUS at 14:34

## 2020-01-08 RX ADMIN — LEVALBUTEROL HYDROCHLORIDE 1.25 MG: 1.25 SOLUTION, CONCENTRATE RESPIRATORY (INHALATION) at 20:33

## 2020-01-08 RX ADMIN — LEVALBUTEROL HYDROCHLORIDE 1.25 MG: 1.25 SOLUTION, CONCENTRATE RESPIRATORY (INHALATION) at 14:35

## 2020-01-08 RX ADMIN — PANTOPRAZOLE SODIUM 40 MG: 40 TABLET, DELAYED RELEASE ORAL at 05:41

## 2020-01-08 RX ADMIN — POLYETHYLENE GLYCOL 3350 17 G: 17 POWDER, FOR SOLUTION ORAL at 12:41

## 2020-01-08 RX ADMIN — ISODIUM CHLORIDE 3 ML: 0.03 SOLUTION RESPIRATORY (INHALATION) at 20:33

## 2020-01-08 RX ADMIN — ENOXAPARIN SODIUM 40 MG: 40 INJECTION SUBCUTANEOUS at 09:05

## 2020-01-08 RX ADMIN — ISODIUM CHLORIDE 3 ML: 0.03 SOLUTION RESPIRATORY (INHALATION) at 14:09

## 2020-01-08 RX ADMIN — ASPIRIN 81 MG 81 MG: 81 TABLET ORAL at 09:05

## 2020-01-08 RX ADMIN — SODIUM CHLORIDE 75 ML/HR: 9 INJECTION, SOLUTION INTRAVENOUS at 09:06

## 2020-01-08 NOTE — PROGRESS NOTES
Progress Note - Tala Ibrahim 1935, 82 y o  male MRN: [id number]    Unit/Bed#: -Neva Encounter: [id number]    Primary Care Provider: Pj Graves MD   Date and time admitted to hospital: 2020 11:12 AM        Pneumonia  Assessment & Plan  · Continue IV antibiotics  · Strep pneumo and urine Legionella negative  · Follow up cultures  · Trend procalcitonin    RSV infection  Assessment & Plan  · Supportive care    Ambulatory dysfunction  Assessment & Plan  · PT/OT eval  · Patient will need transition to SNF on discharge  · Case management working on placement    Severe protein-calorie malnutrition ([facility] )  Assessment & Plan  · Supplement  · Nutrition consult  · Swallow eval appreciated, patient on pureed diet with nectar thick liquids  · Discussed potential feeding tube with family who declined    Chronic pancreatitis ([facility] 75 )  Assessment & Plan  · follow GI as an outpatient    * Acute metabolic encephalopathy due to hypoglycemia  Assessment & Plan  · Improving  · Fingersticks have improved  · Will continue supportive care      VTE Pharmacologic Prophylaxis: Pharmacologic: Enoxaparin (Lovenox)    Patient Centered Rounds: I have performed bedside rounds with nursing staff today  Discussions with Specialists or Other Care Team Provider: yes  Education and Discussions with Family / Patient: yes    Current Length of Stay: 4 day(s)    Current Patient Status: Inpatient   Certification Statement: The patient will continue to require additional inpatient hospital stay due to Pneumonia    Discharge Plan:  Pending hospital course    Code Status: Level 1 - Full Code    Subjective:   Overnight events noted  Patient denies any chest pain or shortness of breath    States he is feeling improved today    Objective:     Vitals:   Temp (24hrs), Av 1 °F (36 2 °C), Min:96 8 °F (36 °C), Max:97 5 °F (36 4 °C)    Temp:  [96 8 °F (36 °C)-97 5 °F (36 4 °C)] 97 5 °F (36 4 °C)  HR:  [51-64] 62  Resp:  [16-18] 18  BP: (119-150)/(71-80) 125/75  SpO2:  [92 %-99 %] 92 %  Body mass index is 18 48 kg/m²  Input and Output Summary (last 24 hours): Intake/Output Summary (Last 24 hours) at 1/8/2020 1816  Last data filed at 1/8/2020 1243  Gross per 24 hour   Intake 3038 75 ml   Output --   Net 3038 75 ml       Physical Exam:     Physical Exam   Constitutional: No distress  Frail elderly male   HENT:   Head: Normocephalic and atraumatic  Eyes: Conjunctivae and EOM are normal    Neck: Normal range of motion  Neck supple  Cardiovascular: Normal rate and regular rhythm  Pulmonary/Chest: Effort normal  He has rales  Abdominal: Soft  He exhibits no distension  There is no tenderness  Musculoskeletal:   Generalized weakness   Neurological: He is alert  No cranial nerve deficit  Skin: Skin is warm and dry  Psychiatric: He has a normal mood and affect  His behavior is normal        Additional Data:     Labs:    Results from last 7 days   Lab Units 01/08/20  0613   WBC Thousand/uL 7 40   HEMOGLOBIN g/dL 11 4*   HEMATOCRIT % 35 5*   PLATELETS Thousands/uL 181   NEUTROS PCT % 70   LYMPHS PCT % 16*   MONOS PCT % 8   EOS PCT % 5     Results from last 7 days   Lab Units 01/08/20  0613   POTASSIUM mmol/L 4 2   CHLORIDE mmol/L 107   CO2 mmol/L 25   BUN mg/dL 13   CREATININE mg/dL 0 56*   CALCIUM mg/dL 7 1*   ALK PHOS U/L 141   ALT U/L 50   AST U/L 39     Results from last 7 days   Lab Units 01/04/20  1120   INR  1 24     Results from last 7 days   Lab Units 01/08/20  1605 01/08/20  1424 01/08/20  1101 01/08/20  0625 01/07/20  2032 01/07/20  1615 01/07/20  1131 01/07/20  0629 01/06/20  2016 01/06/20  1622 01/06/20  1125 01/06/20  0659   POC GLUCOSE mg/dl 99 151* 135 73 200* 175* 160* 93 182* 204* 199* 208*     Results from last 7 days   Lab Units 01/05/20  0521   HEMOGLOBIN A1C % 5 5       * I Have Reviewed All Lab Data Listed Above  * Additional Pertinent Lab Tests Reviewed:  Sigrid [facility] Admission Reviewed    Imaging:  Imaging Reports Reviewed Today Include:  No new imaging    Recent Cultures (last 7 days):     Results from last 7 days   Lab Units 01/04/20  1655 01/04/20  1654   BLOOD CULTURE   --  No Growth at 72 hrs  No Growth at 72 hrs  LEGIONELLA URINARY ANTIGEN  Negative  --        Last 24 Hours Medication List:     Current Facility-Administered Medications:  acetaminophen 650 mg Oral Q4H PRN DEMI Murray    albuterol 2 5 mg Nebulization Q4H PRN DEMI Murray    aspirin 81 mg Per J Tube Daily Mathieu Mcnamara PA-C    cefTRIAXone 1,000 mg Intravenous Q24H DEMI Murray Last Rate: 1,000 mg (01/08/20 1434)   enoxaparin 40 mg Subcutaneous Daily DEMI Murray    ergocalciferol 50,000 Units Oral Weekly DEMI Murray    guaiFENesin 200 mg Oral Q4H PRN DEMI Murray    levalbuterol 1 25 mg Nebulization TID Jolly Calero MD    And        sodium chloride 3 mL Nebulization TID Jolly Calero MD    ondansetron 4 mg Intravenous Q6H PRN DEMI Murray    pantoprazole 40 mg Oral Early Morning Mathieu Mcnamara PA-C         Today, Patient Was Seen By: Noah Torres MD    ** Please Note: Dictation voice to text software may have been used in the creation of this document   **

## 2020-01-08 NOTE — SPEECH THERAPY NOTE
Asleep but arouseable  Congested cough audible  Recalls swallow strategies (slow rate of ingestion and 2-3 swallows per bites/sips) for safer intake  Nursing fed pt breakfast (approx  50%)  Declined feeding tube  Pt on safest diet, despite ongoing risk for aspiration and malnutrition  Risks explained to patient who understands and agrees w/ ongoing intake by mouth  No further ST interventions warranted at this time  ST to sign off  Nataliya GARCIA, [address], CCC/SLP  [id number]  [email or web address] [email or web address]  Available via tiger text

## 2020-01-08 NOTE — PLAN OF CARE
Problem: OCCUPATIONAL THERAPY ADULT  Goal: Performs self-care activities at highest level of function for planned discharge setting  See evaluation for individualized goals  Description  Treatment Interventions: ADL retraining, Functional transfer training, UE strengthening/ROM, Endurance training, Equipment evaluation/education, Patient/family training, Compensatory technique education, Energy conservation, Activityengagement          See flowsheet documentation for full assessment, interventions and recommendations  Outcome: Not Progressing  Note:   Limitation: Decreased ADL status, Decreased UE strength, Decreased endurance, Decreased self-care trans, Decreased high-level ADLs  Prognosis: Good  Assessment: Patient participated in Skilled OT session this date with interventions consisting of  therapeutic activities to: increase activity tolerance   Patient agreeable to OT treatment session, upon arrival patient was found supine in bed, in almost fetal postion - then post repositioning, he was propped to Left side per nurse  Patient requiring one step directives and maximum assistance with self-care secondary to deconditioned status and lack of willingness to participate  Patient continues to be functioning below baseline level, occupational performance remains limited secondary to factors listed above and increased risk for falls and injury  From OT standpoint, recommendation at time of d/c would be Short Term Rehab  Patient to benefit from continued Occupational Therapy treatment while in the hospital to address deficits as defined above and maximize level of functional independence with ADLs and functional mobility        OT Discharge Recommendation: Short Term Rehab  OT - OK to Discharge: Yes(when medically stable)     DARRYL Palomino

## 2020-01-08 NOTE — PHYSICAL THERAPY NOTE
Physical Therapy Cancellation Note      Attempted to see pt on this date at 5 for PT session  Pt denied to participate due to "feeling weak"  Spouse present  Will attempt to see pt at later time when appropriate       Janice Dejesus, PTA

## 2020-01-08 NOTE — PLAN OF CARE
Problem: SLP ADULT - SWALLOWING, IMPAIRED  Goal: Advance to least restrictive diet without signs or symptoms of aspiration for planned discharge setting  See evaluation for individualized goals    Description  Patient will:    Outcome: Adequate for Discharge

## 2020-01-08 NOTE — DISCHARGE INSTR - DIET
Dysphagia 1/Pureed diet  Nectar  Small bites/ sips  Slow intake  2-3 swallows /bites/sip  Aspiration precautions

## 2020-01-08 NOTE — OCCUPATIONAL THERAPY NOTE
01/08/20 0644   Restrictions/Precautions   Weight Bearing Precautions Per Order No   Other Precautions Contact/isolation;Droplet precautions; Fall Risk   Pain Assessment   Pain Assessment No/denies pain   ADL   Where Assessed   (declines any participation in self-care)   UB Bathing Comments states he's too weak for light UB self-care encouraged by therapist   Bed Mobility   Rolling R 3  Moderate assistance   Additional items Assist x 1;Bedrails; Increased time required;Verbal cues;LE management   Rolling L 3  Moderate assistance   Additional items Assist x 1;Bedrails; Increased time required;Verbal cues;LE management   Additional Comments assisted with above to facilitate completion of nursing care    Cognition   Overall Cognitive Status   (patient gives minimal verbal input/response)   Activity Tolerance   Activity Tolerance Patient limited by fatigue   Medical Staff Made Aware nurse Nancy present    Assessment   Assessment Patient participated in Skilled OT session this date with interventions consisting of  therapeutic activities to: increase activity tolerance   Patient agreeable to OT treatment session, upon arrival patient was found supine in bed, in almost fetal postion - then post repositioning, he was propped to Left side per nurse  Patient requiring one step directives and maximum assistance with self-care secondary to deconditioned status and lack of willingness to participate  Patient continues to be functioning below baseline level, occupational performance remains limited secondary to factors listed above and increased risk for falls and injury  From OT standpoint, recommendation at time of d/c would be Short Term Rehab  Patient to benefit from continued Occupational Therapy treatment while in the hospital to address deficits as defined above and maximize level of functional independence with ADLs and functional mobility      Plan   Treatment Interventions Functional transfer training;Patient/family training  (explained purpose of treatment interventions offered)   Goal Expiration Date 01/15/20   OT Treatment Day [address], [place]/L

## 2020-01-08 NOTE — SOCIAL WORK
Additional information was sent to Susanna, I placed a call to Arnaldo Franklin @ 10:55 am to # [phone] to get additional choices, she was to call me with additional choices, I was unable to leave a message I called his wife to # [phone] @11:00 am and I had to leave a message, I then called Joselyn Rina @ 11:03 to # [phone] and I had to leave a message, I then tried his wife to # [phone] and I was unable to leave a message, cm will continue to follow

## 2020-01-08 NOTE — SOCIAL WORK
I received a call from Marcos Hurt , I made her aware that [address] [place] [address] [facility] is unable tot accept the pt until Saturday, they have an out break of the flu, I also made her aware that [facility] they do not have any open beds and they are unsure when they will have a d/c , I did send to [facility] as requested by the family and I have not received an answer yet, I was given permission to send to [facility] and [facility], referrals were sent,

## 2020-01-09 LAB
GLUCOSE SERPL-MCNC: 128 MG/DL (ref 65–140)
GLUCOSE SERPL-MCNC: 140 MG/DL (ref 65–140)
GLUCOSE SERPL-MCNC: 148 MG/DL (ref 65–140)
GLUCOSE SERPL-MCNC: 90 MG/DL (ref 65–140)

## 2020-01-09 PROCEDURE — 97116 GAIT TRAINING THERAPY: CPT

## 2020-01-09 PROCEDURE — 94668 MNPJ CHEST WALL SBSQ: CPT

## 2020-01-09 PROCEDURE — 94640 AIRWAY INHALATION TREATMENT: CPT

## 2020-01-09 PROCEDURE — 99232 SBSQ HOSP IP/OBS MODERATE 35: CPT | Performed by: INTERNAL MEDICINE

## 2020-01-09 PROCEDURE — 97535 SELF CARE MNGMENT TRAINING: CPT

## 2020-01-09 PROCEDURE — 97530 THERAPEUTIC ACTIVITIES: CPT

## 2020-01-09 PROCEDURE — 82948 REAGENT STRIP/BLOOD GLUCOSE: CPT

## 2020-01-09 PROCEDURE — 94760 N-INVAS EAR/PLS OXIMETRY 1: CPT

## 2020-01-09 RX ADMIN — LEVALBUTEROL HYDROCHLORIDE 1.25 MG: 1.25 SOLUTION, CONCENTRATE RESPIRATORY (INHALATION) at 19:54

## 2020-01-09 RX ADMIN — LEVALBUTEROL HYDROCHLORIDE 1.25 MG: 1.25 SOLUTION, CONCENTRATE RESPIRATORY (INHALATION) at 14:29

## 2020-01-09 RX ADMIN — ISODIUM CHLORIDE 3 ML: 0.03 SOLUTION RESPIRATORY (INHALATION) at 07:30

## 2020-01-09 RX ADMIN — ISODIUM CHLORIDE 3 ML: 0.03 SOLUTION RESPIRATORY (INHALATION) at 14:30

## 2020-01-09 RX ADMIN — PANTOPRAZOLE SODIUM 40 MG: 40 TABLET, DELAYED RELEASE ORAL at 05:10

## 2020-01-09 RX ADMIN — LEVALBUTEROL HYDROCHLORIDE 1.25 MG: 1.25 SOLUTION, CONCENTRATE RESPIRATORY (INHALATION) at 07:30

## 2020-01-09 RX ADMIN — CEFTRIAXONE 1000 MG: 1 INJECTION, SOLUTION INTRAVENOUS at 15:48

## 2020-01-09 RX ADMIN — ENOXAPARIN SODIUM 40 MG: 40 INJECTION SUBCUTANEOUS at 09:03

## 2020-01-09 RX ADMIN — ISODIUM CHLORIDE 3 ML: 0.03 SOLUTION RESPIRATORY (INHALATION) at 19:54

## 2020-01-09 RX ADMIN — ASPIRIN 81 MG 81 MG: 81 TABLET ORAL at 09:04

## 2020-01-09 NOTE — PLAN OF CARE
Problem: OCCUPATIONAL THERAPY ADULT  Goal: Performs self-care activities at highest level of function for planned discharge setting  See evaluation for individualized goals  Description  Treatment Interventions: ADL retraining, Functional transfer training, UE strengthening/ROM, Endurance training, Equipment evaluation/education, Patient/family training, Compensatory technique education, Energy conservation, Activityengagement          See flowsheet documentation for full assessment, interventions and recommendations  1/9/2020 1001 by EDUARDA Montalvo  Outcome: Progressing / very slowly  Note:   Limitation: Decreased ADL status, Decreased UE strength, Decreased endurance, Decreased self-care trans, Decreased high-level ADLs  Prognosis: Good  Assessment: Patient participated in Skilled OT session this date with interventions consisting of ADL re training with the use of correct body mechnaics, safety awareness and fall prevention techniques and  therapeutic activities to: increase activity tolerance   Patient compliant with OT treatment session, upon arrival patient was found semi-reclined in bed - patient assisted to transfer to edge of bed but on this first attempt he tolerated approx  30 seconds then insisted to lie back down stating "I don't feel good" (answered "yes" to ? of feeling light-headed)  In comparison to previous session, patient with improvements (minimal) in activity level and cooperation  Patient requiring frequent rest periods and ocassional safety reminders, And direction and encouragement for Any ADL participation  Patient continues to be functioning below baseline level, occupational performance remains limited secondary to factors listed above and increased risk for falls and injury  From OT standpoint, recommendation at time of d/c would be Short Term Rehab     Patient to benefit from continued Occupational Therapy treatment while in the hospital to address deficits as defined above and maximize level of functional independence with ADLs and functional mobility        OT Discharge Recommendation: Short Term Rehab  OT - OK to Discharge: Yes(when medically stable)    KARINA Swift/L

## 2020-01-09 NOTE — NURSING NOTE
Ulcer noted on the pts right heel earlier, pic taken and charted, allevyn changed to pts sacrum and left heel, right heel left mo to allow area to dry d/t moist from the allevyn, wound consult placed and pt placed on the turn and reposition, pt presently in bed in no acute distress visiting with family, callbell in reach of the pt, heels elevated of the bed, rolled blanket placed in between legs

## 2020-01-09 NOTE — PHYSICAL THERAPY NOTE
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    Esophagitis    Diabetes mellitus type 2    Weight loss    [place]itis [place] [address] )    Sepsis ([place] [address] )    Pneumonia    Moderate protein-calorie malnutrition     Abnormal SPEP    Severe protein-calorie malnutrition ([place] [address] )    Disorder of upper esophageal sphincter    Gastric ulcer    Pancreatic pseudocyst    Pneumothorax    Pancreatic fistula    Failure to thrive in adult    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[address] )    Transaminitis    Gastroesophageal reflux disease without esophagitis    Bilateral complete vocal fold paralysis    Ambulatory dysfunction    Lower extremity edema    RSV infection    Viral pneumonia    Acute metabolic encephalopathy due to hypoglycemia    Vitamin D deficiency       Past Medical History  Past Medical History:   Diagnosis Date    Cardiac disease     Chronic pancreatitis ([place] [address] )     Diabetes mellitus ([place] [address] )     GERD (gastroesophageal reflux disease)     Vitamin D deficient osteomalacia     Weight loss        Past Surgical History  Past Surgical History:   Procedure Laterality Date    CT GUIDED CHEST TUBE  2/15/2019    GASTROSTOMY TUBE PLACEMENT N/A 1/14/2019    Procedure: INSERTION PEG TUBE;  Surgeon: Jordi eRcio MD;  [facility];   Service: Gastroenterology    GASTROSTOMY TUBE PLACEMENT N/A 1/15/2019    Procedure: INSERTION JEJUNOSTOMY TUBE OPEN;  Surgeon: Ramiro Hidalgo MD;  [facility];  Service: Surgical Oncology    LAPAROTOMY N/A 11/19/2018    Procedure: LAPAROTOMY EXPLORATORY;  Surgeon: Ramiro Hidalgo MD;  [facility];  Service: Surgical Oncology    [place] EDG US EXAM SURGICAL ALTER STOM DUODENUM/JEJUNUM N/A 9/21/2018 Procedure: LINEAR ENDOSCOPIC U/S;  Surgeon: Naz Connell MD;  [facility]; Service: Gastroenterology    [place] LAP,DIAGNOSTIC ABDOMEN N/A 11/19/2018    Procedure: pancreatic biopsy;  Surgeon: Danette Osborne MD;  [facility];  Service: Surgical Oncology    TONSILLECTOMY      UPPER GASTROINTESTINAL ENDOSCOPY      VASCULAR SURGERY      phlebitis many years ago    WRIST GANGLION EXCISION          01/09/20 1048   Pain Assessment   Pain Assessment No/denies pain   Pain Score No Pain   Restrictions/Precautions   Other Precautions Droplet precautions; Chair Alarm; Bed Alarm; Fall Risk;Cognitive   General   Chart Reviewed Yes   Response to Previous Treatment Patient unable to report, no changes reported from family or staff   Family/Caregiver Present No   Cognition   Overall Cognitive Status WFL   Arousal/Participation Responsive   Attention Attends with cues to redirect   Orientation Level Oriented to person;Oriented to place; Disoriented to time;Disoriented to situation   Memory Decreased recall of recent events;Decreased recall of precautions   Following Commands Follows one step commands with increased time or repetition   Comments Pt  agreeable to participate in PT session  Subjective   Subjective "Im just tired"   Bed Mobility   Additional Comments DNT bed mobility re:pt  was sitting OOB in chair prior/after session  Transfers   Sit to Stand 2  Maximal assistance   Additional items Assist x 2;Armrests; Increased time required;Verbal cues   Stand to Sit 3  Moderate assistance   Additional items Assist x 2;Armrests; Increased time required;Verbal cues   Stand pivot 3  Moderate assistance   Additional items Assist x 2; Increased time required;Verbal cues; Impulsive   Ambulation/Elevation   Gait pattern Improper Weight shift;Decreased foot clearance;Shuffling;Excessively slow   Gait Assistance 3  Moderate assist   Additional items Assist x 2;Verbal cues; Tactile cues   Assistive Device Rolling walker   Distance 5 feet x 2  (forward/backward)   Stair Management Assistance Not tested   Balance   Static Sitting Fair   Dynamic Sitting Fair -   Static Standing Fair -   Dynamic Standing Poor +   Ambulatory Poor +   Endurance Deficit   Endurance Deficit Yes   Activity Tolerance   Activity Tolerance Patient limited by fatigue   Nurse Made Aware yes, Vin Ferreira RN   Assessment   Prognosis Guarded   Problem List Decreased strength;Decreased endurance; Impaired balance;Decreased mobility; Decreased cognition; Impaired judgement;Decreased safety awareness   Assessment Pt seen for PT treatment session this date with interventions consisting of gait training w/ emphasis on improving pt's ability to ambulate level surfaces x 10 feet total with mod A of 2 provided by therapist with RW and therapeutic activity consisting of training: sit<>stand transfers, static sitting tolerance at EOB for 5 minutes w/ B UE support, static standing tolerance for <2 minutes w/ B UE support, vc and tactile cues for static sitting posture faciliation, vc and tactile cues for static standing posture faciliation and stand pivot transfers towards L direction  Pt agreeable to PT treatment session upon arrival, pt found supine in bed w/ HOB elevated, in no apparent distress and A&O x 2  In comparison to previous session, pt with improvements in standing static and dynamic balance  Post session: chair alarm engaged, all needs in reach and RN notified of session findings/recommendations Continue to recommend STR at time of d/c in order to maximize pt's functional independence and safety w/ mobility  Pt continues to be functioning below baseline level, and remains limited 2* factors listed above and including weakness, impaired balance,decreased endurance, decreased activity tolerance, gait dysfunction, decline from PLOF  PT will continue to see pt while here in order to address the deficits listed above and provide interventions consistent w/ POC in effort to achieve STGs  Barriers to Discharge Inaccessible home environment   Goals   Patient Goals get some rest   STG Expiration Date 01/15/20   Short Term Goal #1 STGs remain appropriate   PT Treatment Day 2   Plan   Treatment/Interventions Functional transfer training;LE strengthening/ROM; Therapeutic exercise; Endurance training;Cognitive reorientation;Patient/family training;Equipment eval/education; Bed mobility;Gait training;Spoke to nursing;Spoke to case management;OT   Progress Slow progress, decreased activity tolerance   PT Frequency Other (Comment)  (3-5x/wk)   Recommendation   Recommendation Short-term skilled PT   Equipment Recommended Walker   PT - OK to Discharge Yes  (if medically stable to STR)   Additional Comments Upon conclusion, pt  was resting OOB in chair w/chair alarm engaged and all needs within reach         Suzanne Gonzales, PT

## 2020-01-09 NOTE — PLAN OF CARE
Problem: PHYSICAL THERAPY ADULT  Goal: Performs mobility at highest level of function for planned discharge setting  See evaluation for individualized goals  Description  Treatment/Interventions: Functional transfer training, LE strengthening/ROM, Therapeutic exercise, Endurance training, Patient/family training, Equipment eval/education, Bed mobility, Spoke to nursing, Continued evaluation  Equipment Recommended: (TBD pending OOB mobility)       See flowsheet documentation for full assessment, interventions and recommendations  Outcome: Progressing  Note:   Prognosis: Guarded  Problem List: Decreased strength, Decreased endurance, Impaired balance, Decreased mobility, Decreased cognition, Impaired judgement, Decreased safety awareness  Assessment: Pt seen for PT treatment session this date with interventions consisting of gait training w/ emphasis on improving pt's ability to ambulate level surfaces x 10 feet total with mod A of 2 provided by therapist with RW and therapeutic activity consisting of training: sit<>stand transfers, static sitting tolerance at EOB for 5 minutes w/ B UE support, static standing tolerance for <2 minutes w/ B UE support, vc and tactile cues for static sitting posture faciliation, vc and tactile cues for static standing posture faciliation and stand pivot transfers towards L direction  Pt agreeable to PT treatment session upon arrival, pt found supine in bed w/ HOB elevated, in no apparent distress and A&O x 2  In comparison to previous session, pt with improvements in standing static and dynamic balance  Post session: chair alarm engaged, all needs in reach and RN notified of session findings/recommendations Continue to recommend STR at time of d/c in order to maximize pt's functional independence and safety w/ mobility   Pt continues to be functioning below baseline level, and remains limited 2* factors listed above and including weakness, impaired balance,decreased endurance, decreased activity tolerance, gait dysfunction, decline from PLOF  PT will continue to see pt while here in order to address the deficits listed above and provide interventions consistent w/ POC in effort to achieve STGs  Barriers to Discharge: Inaccessible home environment     Recommendation: Short-term skilled PT     PT - OK to Discharge: Yes(if medically stable to STR)    See flowsheet documentation for full assessment

## 2020-01-09 NOTE — PLAN OF CARE
Problem: Potential for Falls  Goal: Patient will remain free of falls  Description  INTERVENTIONS:  - Assess patient frequently for physical needs  -  Identify cognitive and physical deficits and behaviors that affect risk of falls    -  [place] fall precautions as indicated by assessment   - Educate patient/family on patient safety including physical limitations  - Instruct patient to call for assistance with activity based on assessment  - Modify environment to reduce risk of injury  - Consider OT/PT consult to assist with strengthening/mobility  Outcome: Progressing     Problem: Prexisting or High Potential for Compromised Skin Integrity  Goal: Skin integrity is maintained or improved  Description  INTERVENTIONS:  - Identify patients at risk for skin breakdown  - Assess and monitor skin integrity  - Assess and monitor nutrition and hydration status  - Monitor labs   - Assess for incontinence   - Turn and reposition patient  - Assist with mobility/ambulation  - Relieve pressure over bony prominences  - Avoid friction and shearing  - Provide appropriate hygiene as needed including keeping skin clean and dry  - Evaluate need for skin moisturizer/barrier cream  - Collaborate with interdisciplinary team   - Patient/family teaching  - Consider wound care consult   Outcome: Progressing     Problem: RESPIRATORY - ADULT  Goal: Achieves optimal ventilation and oxygenation  Description  INTERVENTIONS:  - Assess for changes in respiratory status  - Assess for changes in mentation and behavior  - Position to facilitate oxygenation and minimize respiratory effort  - Oxygen administered by appropriate delivery if ordered  - Initiate smoking cessation education as indicated  - Encourage broncho-pulmonary hygiene including cough, deep breathe, Incentive Spirometry  - Assess the need for suctioning and aspirate as needed  - Assess and instruct to report SOB or any respiratory difficulty  - Respiratory Therapy support as indicated  Outcome: Progressing     Problem: PAIN - ADULT  Goal: Verbalizes/displays adequate comfort level or baseline comfort level  Description  Interventions:  - Encourage patient to monitor pain and request assistance  - Assess pain using appropriate pain scale  - Administer analgesics based on type and severity of pain and evaluate response  - Implement non-pharmacological measures as appropriate and evaluate response  - Consider cultural and social influences on pain and pain management  - Notify physician/advanced practitioner if interventions unsuccessful or patient reports new pain  Outcome: Progressing     Problem: INFECTION - ADULT  Goal: Absence or prevention of progression during hospitalization  Description  INTERVENTIONS:  - Assess and monitor for signs and symptoms of infection  - Monitor lab/diagnostic results  - Monitor all insertion sites, i e  indwelling lines, tubes, and drains  - Monitor endotracheal if appropriate and nasal secretions for changes in amount and color  - [place] appropriate cooling/warming therapies per order  - Administer medications as ordered  - Instruct and encourage patient and family to use good hand hygiene technique  - Identify and instruct in appropriate isolation precautions for identified infection/condition  Outcome: Progressing  Goal: Absence of fever/infection during neutropenic period  Description  INTERVENTIONS:  - Monitor WBC    Outcome: Progressing     Problem: SAFETY ADULT  Goal: Maintain or return to baseline ADL function  Description  INTERVENTIONS:  -  Assess patient's ability to carry out ADLs; assess patient's baseline for ADL function and identify physical deficits which impact ability to perform ADLs (bathing, care of mouth/teeth, toileting, grooming, dressing, etc )  - Assess/evaluate cause of self-care deficits   - Assess range of motion  - Assess patient's mobility; develop plan if impaired  - Assess patient's need for assistive devices and provide as appropriate  - Encourage maximum independence but intervene and supervise when necessary  - Involve family in performance of ADLs  - Assess for home care needs following discharge   - Consider OT consult to assist with ADL evaluation and planning for discharge  - Provide patient education as appropriate  Outcome: Progressing  Goal: Maintain or return mobility status to optimal level  Description  INTERVENTIONS:  - Assess patient's baseline mobility status (ambulation, transfers, stairs, etc )    - Identify cognitive and physical deficits and behaviors that affect mobility  - Identify mobility aids required to assist with transfers and/or ambulation (gait belt, sit-to-stand, lift, walker, cane, etc )  - [place] fall precautions as indicated by assessment  - Record patient progress and toleration of activity level on Mobility SBAR; progress patient to next Phase/Stage  - Instruct patient to call for assistance with activity based on assessment  - Consider rehabilitation consult to assist with strengthening/weightbearing, etc   Outcome: Progressing     Problem: DISCHARGE PLANNING  Goal: Discharge to home or other facility with appropriate resources  Description  INTERVENTIONS:  - Identify barriers to discharge w/patient and caregiver  - Arrange for needed discharge resources and transportation as appropriate  - Identify discharge learning needs (meds, wound care, etc )  - Arrange for interpretive services to assist at discharge as needed  - Refer to Case Management Department for coordinating discharge planning if the patient needs post-hospital services based on physician/advanced practitioner order or complex needs related to functional status, cognitive ability, or social support system  Outcome: Progressing     Problem: Knowledge Deficit  Goal: Patient/family/caregiver demonstrates understanding of disease process, treatment plan, medications, and discharge instructions  Description  Complete learning assessment and assess knowledge base  Interventions:  - Provide teaching at level of understanding  - Provide teaching via preferred learning methods  Outcome: Progressing     Problem: Nutrition/Hydration-ADULT  Goal: Nutrient/Hydration intake appropriate for improving, restoring or maintaining nutritional needs  Description  Monitor and assess patient's nutrition/hydration status for malnutrition  Collaborate with interdisciplinary team and initiate plan and interventions as ordered  Monitor patient's weight and dietary intake as ordered or per policy  Utilize nutrition screening tool and intervene as necessary  Determine patient's food preferences and provide high-protein, high-caloric foods as appropriate       INTERVENTIONS:  - Monitor oral intake, urinary output, labs, and treatment plans  - Assess nutrition and hydration status and recommend course of action  - Evaluate amount of meals eaten  - Assist patient with eating if necessary   - Allow adequate time for meals  - Recommend/ encourage appropriate diets, oral nutritional supplements, and vitamin/mineral supplements  - Order, calculate, and assess calorie counts as needed  - Recommend, monitor, and adjust tube feedings and TPN/PPN based on assessed needs  - Assess need for intravenous fluids  - Provide specific nutrition/hydration education as appropriate  - Include patient/family/caregiver in decisions related to nutrition  Outcome: Progressing

## 2020-01-09 NOTE — OCCUPATIONAL THERAPY NOTE
Occupational Therapy Treatment Note      Elizabeth Griffith    1/9/2020    Principal Problem:    Acute metabolic encephalopathy due to hypoglycemia  Active Problems:    Diabetes mellitus type 2    Chronic pancreatitis (HCC)    Sepsis (HCC)    Pneumonia    Severe protein-calorie malnutrition (HCC)    Gastroesophageal reflux disease without esophagitis    Ambulatory dysfunction    Lower extremity edema    RSV infection    Viral pneumonia    Vitamin D deficiency      Past Medical History:   Diagnosis Date    Cardiac disease     [facility] [address] [facility]     Diabetes mellitus ([facility] )     GERD (gastroesophageal reflux disease)     Vitamin D deficient osteomalacia     Weight loss        Past Surgical History:   Procedure Laterality Date    CT GUIDED CHEST TUBE  2/15/2019    GASTROSTOMY TUBE PLACEMENT N/A 1/14/2019    Procedure: INSERTION PEG TUBE;  Surgeon: Jordi Recio MD;  [facility]; Service: Gastroenterology    GASTROSTOMY TUBE PLACEMENT N/A 1/15/2019    Procedure: INSERTION JEJUNOSTOMY TUBE OPEN;  Surgeon: Ramiro Hidalgo MD;  [facility];  Service: Surgical Oncology    LAPAROTOMY N/A 11/19/2018    Procedure: LAPAROTOMY EXPLORATORY;  Surgeon: Ramiro Hidalgo MD;  [facility];  Service: Surgical Oncology    [place] EDG US EXAM SURGICAL ALTER STOM DUODENUM/JEJUNUM N/A 9/21/2018    Procedure: LINEAR ENDOSCOPIC U/S;  Surgeon: Mariel Cramer MD;  [facility]; Service: Gastroenterology    [place] LAP,DIAGNOSTIC ABDOMEN N/A 11/19/2018    Procedure: pancreatic biopsy;  Surgeon: Ramiro Hidalgo MD;  [facility];  Service: Surgical Oncology    TONSILLECTOMY      UPPER GASTROINTESTINAL ENDOSCOPY      VASCULAR SURGERY      phlebitis many years ago    WRIST GANGLION EXCISION          01/09/20 1030   Restrictions/Precautions   Weight Bearing Precautions Per Order No   Other Precautions Contact/isolation;Droplet precautions; Chair Alarm; Bed Alarm; Fall Risk;Cognitive   Pain Assessment   Pain Assessment No/denies pain   Pain Score No Pain   Transfers   Sit to Stand 2  Maximal assistance   Additional items Assist x 2;Armrests; Increased time required;Verbal cues   Stand to Sit 3  Moderate assistance   Additional items Assist x 2;Armrests; Increased time required;Verbal cues   Stand pivot 3  Moderate assistance   Additional items Assist x 2; Increased time required;Verbal cues; Impulsive   Functional Mobility   Functional Mobility   (unable to safely complete at this time)   Cognition   Overall Cognitive Status [facility]   Arousal/Participation Arousable   Attention Attends with cues to redirect   Orientation Level Oriented to person;Oriented to place; Disoriented to time;Disoriented to situation   Memory Decreased recall of precautions;Decreased recall of recent events   Following Commands Follows one step commands with increased time or repetition   Activity Tolerance   Activity Tolerance Patient limited by fatigue   Assessment   Assessment Pt participated in functional transfers as approached this date  Max A X 2 to stand, Mod  A X 2 from stand to sit  VC's throughout  Pt unable to progress to functional mobility tasks, noting being 'too weak'  Refer to ADL note of this date for status with same  Continue OT tx with established POC to increase level of safety and independence  Plan   Treatment Interventions ADL retraining;Functional transfer training; Endurance training;Patient/family training;UE strengthening/ROM; Equipment evaluation/education; Activityengagement; Energy conservation   Goal Expiration Date 01/15/20   OT Treatment Day 3   OT Frequency 3-5x/wk   Recommendation   OT Discharge Recommendation Short Term Rehab   OT - OK to Discharge Desiree Greco OT

## 2020-01-09 NOTE — OCCUPATIONAL THERAPY NOTE
01/09/20 6548   Restrictions/Precautions   Weight Bearing Precautions Per Order No   Other Precautions Contact/isolation;Droplet precautions; Fall Risk   Lifestyle   Reciprocal Relationships reports wife helps him with his shoes and socks (demos  'd ability to reach only to ankles)    Pain Assessment   Pain Assessment No/denies pain   ADL   Where Assessed Chair  (patient only Partially cooperative )   Eating Assistance 3  Moderate Assistance   Eating Deficit Setup;Verbal cueing;Supervision/safety; Increased time to complete  (managed spoon-to-mouth, once it was placed in his hand)   Eating Comments patient ate 4 spoons for food, and drank most of orange juice this session - continually stated "I'm not hungry"    Grooming Comments patient wiped face and hands with washcloth provided - he refused further participation in bathing    LB Dressing Comments reaches to ankles - unable to doff/don socks (and shoes)    Toileting Comments has been using urinal with Assist     Bed Mobility   Supine to Sit 2  Maximal assistance   Additional items Assist x 1;HOB elevated; Bedrails; Increased time required;Verbal cues;LE management  (and much encouragement)   Sit to Supine 3  Moderate assistance   Additional items Assist x 1; Increased time required;Verbal cues;LE management   Transfers   Sit to Stand 3  Moderate assistance   Additional items Assist x 2; Increased time required;Verbal cues   Stand to Sit 3  Moderate assistance   Additional items Assist x 2;Armrests; Increased time required;Verbal cues   Stand pivot 3  Moderate assistance  (took few small steps to get to chair)   Additional items Assist x 2   Coordination   Gross Motor appears [facility]   Dexterity WFL for dominant Right   Cognition   Overall Cognitive Status WFL  (patient states "I want to go home"   )   Arousal/Participation Responsive   Attention Attends with cues to redirect   Following Commands Follows one step commands with increased time or repetition   Activity Tolerance   Activity Tolerance Patient limited by fatigue  (declines varied activities offered)   Assessment   Assessment Patient participated in Skilled OT session this date with interventions consisting of ADL re training with the use of correct body mechnaics, safety awareness and fall prevention techniques and  therapeutic activities to: increase activity tolerance   Patient compliant with OT treatment session, upon arrival patient was found semi-reclined in bed - patient assisted to transfer to edge of bed but on this first attempt he tolerated approx  30 seconds then insisted to lie back down stating "I don't feel good" (answered "yes" to ? of feeling light-headed)  In comparison to previous session, patient with improvements (minimal) in activity level and cooperation  Patient requiring frequent rest periods and ocassional safety reminders, And direction and encouragement for Any ADL participation  Patient continues to be functioning below baseline level, occupational performance remains limited secondary to factors listed above and increased risk for falls and injury  From OT standpoint, recommendation at time of d/c would be Short Term Rehab  Patient to benefit from continued Occupational Therapy treatment while in the hospital to address deficits as defined above and maximize level of functional independence with ADLs and functional mobility  Plan   Treatment Interventions ADL retraining;Functional transfer training;Patient/family training; Activityengagement   Goal Expiration Date 01/15/20   OT Treatment Day 81 [address], [place]/L

## 2020-01-09 NOTE — ASSESSMENT & PLAN NOTE
· Supplement  · Nutrition consult  · Swallow eval appreciated, patient on pureed diet with nectar thick liquids    · Discussed potential feeding tube with family/patient who declined

## 2020-01-09 NOTE — PROGRESS NOTES
Progress Note - Serena Ledezma 1935, 82 y o  male MRN: [id number]    Unit/Bed#: -01 Encounter: [id number]    Primary Care Provider: Tolu Briscoe MD   Date and time admitted to hospital: 2020 11:12 AM        Pneumonia  Assessment & Plan  · Continue IV antibiotics  · Strep pneumo and urine Legionella negative  · Procalcitonin stable  · Cultures negative to date    RSV infection  Assessment & Plan  · Supportive care    Ambulatory dysfunction  Assessment & Plan  · PT/OT eval  · Patient will need transition to SNF on discharge  · Case management working on placement    Severe protein-calorie malnutrition ([facility] )  Assessment & Plan  · Supplement  · Nutrition consult  · Swallow eval appreciated, patient on pureed diet with nectar thick liquids  · Discussed potential feeding tube with family/patient who declined    Sepsis ([facility] )  Assessment & Plan  · Present on admission with leukocytosis, neutrophils 89%, tachycardia ranging from , chest x-ray noting pneumonia and RSV positive  · Improving  · Continue treatment as above      * Acute metabolic encephalopathy due to hypoglycemia  Assessment & Plan  · Improving  · Fingersticks have improved  · Will continue supportive care      VTE Pharmacologic Prophylaxis: Pharmacologic: Enoxaparin (Lovenox)    Patient Centered Rounds: I have performed bedside rounds with nursing staff today  Discussions with Specialists or Other Care Team Provider: yes  Education and Discussions with Family / Patient: yes    Current Length of Stay: 5 day(s)    Current Patient Status: Inpatient   Certification Statement: The patient will continue to require additional inpatient hospital stay due to Pneumonia    Discharge Plan:  Pending hospital course    Code Status: Level 1 - Full Code    Subjective:   No overnight events noted  Patient pending placement  Still with decreased appetite and poor p o  Intake      Objective:     Vitals:   Temp (24hrs), Av 8 °F (36 °C), Min:96 °F (35 6 °C), Max:97 3 °F (36 3 °C)    Temp:  [96 °F (35 6 °C)-97 3 °F (36 3 °C)] 96 °F (35 6 °C)  HR:  [60-80] 80  Resp:  [18-19] 18  BP: (100-148)/(56-84) 100/56  SpO2:  [92 %-99 %] 99 %  Body mass index is 18 48 kg/m²  Input and Output Summary (last 24 hours): Intake/Output Summary (Last 24 hours) at 1/9/2020 1650  Last data filed at 1/8/2020 1700  Gross per 24 hour   Intake 360 ml   Output --   Net 360 ml       Physical Exam:     Physical Exam   Constitutional: No distress  Frail elderly male   HENT:   Head: Normocephalic and atraumatic  Eyes: Conjunctivae and EOM are normal    Neck: Normal range of motion  Neck supple  Cardiovascular: Normal rate and regular rhythm  Pulmonary/Chest: Effort normal  No respiratory distress  He has rales  Abdominal: Soft  He exhibits no distension  There is no tenderness  Musculoskeletal:   Generalized weakness   Neurological: He is alert  No cranial nerve deficit  Skin: Skin is warm and dry  Psychiatric: He has a normal mood and affect   His behavior is normal        Additional Data:     Labs:    Results from last 7 days   Lab Units 01/08/20  0613   WBC Thousand/uL 7 40   HEMOGLOBIN g/dL 11 4*   HEMATOCRIT % 35 5*   PLATELETS Thousands/uL 181   NEUTROS PCT % 70   LYMPHS PCT % 16*   MONOS PCT % 8   EOS PCT % 5     Results from last 7 days   Lab Units 01/08/20  0613   POTASSIUM mmol/L 4 2   CHLORIDE mmol/L 107   CO2 mmol/L 25   BUN mg/dL 13   CREATININE mg/dL 0 56*   CALCIUM mg/dL 7 1*   ALK PHOS U/L 141   ALT U/L 50   AST U/L 39     Results from last 7 days   Lab Units 01/04/20  1120   INR  1 24     Results from last 7 days   Lab Units 01/09/20  1621 01/09/20  1108 01/09/20  0601 01/08/20  2019 01/08/20  1605 01/08/20  1424 01/08/20  1101 01/08/20  0625 01/07/20  2032 01/07/20  1615 01/07/20  1131 01/07/20  0629   POC GLUCOSE mg/dl 140 128 90 190* 99 151* 135 73 200* 175* 160* 93     Results from last 7 days   Lab Units 01/05/20  0521   HEMOGLOBIN A1C % 5 5 * I Have Reviewed All Lab Data Listed Above  * Additional Pertinent Lab Tests Reviewed: [facility] 66 Admission  Reviewed    Imaging:  Imaging Reports Reviewed Today Include:  No new imaging    Recent Cultures (last 7 days):     Results from last 7 days   Lab Units 01/04/20  1655 01/04/20  1654   BLOOD CULTURE   --  No Growth After 4 Days  No Growth After 4 Days  LEGIONELLA URINARY ANTIGEN  Negative  --        Last 24 Hours Medication List:     Current Facility-Administered Medications:  acetaminophen 650 mg Oral Q4H PRN DEMI Murray    albuterol 2 5 mg Nebulization Q4H PRN DEMI Murray    aspirin 81 mg Per J Tube Daily Francesca Bonds PA-C    cefTRIAXone 1,000 mg Intravenous Q24H DEMI Murray Last Rate: 1,000 mg (01/09/20 1548)   enoxaparin 40 mg Subcutaneous Daily DEMI Murray    ergocalciferol 50,000 Units Oral Weekly DEMI Murray    guaiFENesin 200 mg Oral Q4H PRN DEMI Murray    levalbuterol 1 25 mg Nebulization TID Pushpa Park MD    And        sodium chloride 3 mL Nebulization TID Pushpa Park MD    ondansetron 4 mg Intravenous Q6H PRN DEMI Murray    pantoprazole 40 mg Oral Early Morning Francesca Bonds PA-C         Today, Patient Was Seen By: Meghan Goldberg MD    ** Please Note: Dictation voice to text software may have been used in the creation of this document   **

## 2020-01-09 NOTE — ASSESSMENT & PLAN NOTE
· Continue IV antibiotics  · Strep pneumo and urine Legionella negative  · Procalcitonin stable  · Cultures negative to date

## 2020-01-09 NOTE — PLAN OF CARE
Problem: Nutrition/Hydration-ADULT  Goal: Nutrient/Hydration intake appropriate for improving, restoring or maintaining nutritional needs  Description  Monitor and assess patient's nutrition/hydration status for malnutrition  Collaborate with interdisciplinary team and initiate plan and interventions as ordered  Monitor patient's weight and dietary intake as ordered or per policy  Utilize nutrition screening tool and intervene as necessary  Determine patient's food preferences and provide high-protein, high-caloric foods as appropriate  INTERVENTIONS:  - Monitor oral intake, urinary output, labs, and treatment plans  - Assess nutrition and hydration status and recommend course of action  - Evaluate amount of meals eaten  - Assist patient with eating if necessary   - Allow adequate time for meals  - Recommend/ encourage appropriate diets, oral nutritional supplements, and vitamin/mineral supplements  - Order, calculate, and assess calorie counts as needed  - Recommend, monitor, and adjust tube feedings and TPN/PPN based on assessed needs  - Assess need for intravenous fluids  - Provide specific nutrition/hydration education as appropriate  - Include patient/family/caregiver in decisions related to nutrition  Outcome: Not Progressing    Pt continues to have inadequate PO intake - includes fluids and energy intake  Per MD note, pt's family has declined tube feeds

## 2020-01-09 NOTE — PLAN OF CARE
Problem: Potential for Falls  Goal: Patient will remain free of falls  Description  INTERVENTIONS:  - Assess patient frequently for physical needs  -  Identify cognitive and physical deficits and behaviors that affect risk of falls    -  [place] fall precautions as indicated by assessment   - Educate patient/family on patient safety including physical limitations  - Instruct patient to call for assistance with activity based on assessment  - Modify environment to reduce risk of injury  - Consider OT/PT consult to assist with strengthening/mobility  Outcome: Progressing     Problem: Prexisting or High Potential for Compromised Skin Integrity  Goal: Skin integrity is maintained or improved  Description  INTERVENTIONS:  - Identify patients at risk for skin breakdown  - Assess and monitor skin integrity  - Assess and monitor nutrition and hydration status  - Monitor labs   - Assess for incontinence   - Turn and reposition patient  - Assist with mobility/ambulation  - Relieve pressure over bony prominences  - Avoid friction and shearing  - Provide appropriate hygiene as needed including keeping skin clean and dry  - Evaluate need for skin moisturizer/barrier cream  - Collaborate with interdisciplinary team   - Patient/family teaching  - Consider wound care consult   Outcome: Progressing     Problem: RESPIRATORY - ADULT  Goal: Achieves optimal ventilation and oxygenation  Description  INTERVENTIONS:  - Assess for changes in respiratory status  - Assess for changes in mentation and behavior  - Position to facilitate oxygenation and minimize respiratory effort  - Oxygen administered by appropriate delivery if ordered  - Initiate smoking cessation education as indicated  - Encourage broncho-pulmonary hygiene including cough, deep breathe, Incentive Spirometry  - Assess the need for suctioning and aspirate as needed  - Assess and instruct to report SOB or any respiratory difficulty  - Respiratory Therapy support as indicated  Outcome: Progressing     Problem: PAIN - ADULT  Goal: Verbalizes/displays adequate comfort level or baseline comfort level  Description  Interventions:  - Encourage patient to monitor pain and request assistance  - Assess pain using appropriate pain scale  - Administer analgesics based on type and severity of pain and evaluate response  - Implement non-pharmacological measures as appropriate and evaluate response  - Consider cultural and social influences on pain and pain management  - Notify physician/advanced practitioner if interventions unsuccessful or patient reports new pain  Outcome: Progressing     Problem: INFECTION - ADULT  Goal: Absence or prevention of progression during hospitalization  Description  INTERVENTIONS:  - Assess and monitor for signs and symptoms of infection  - Monitor lab/diagnostic results  - Monitor all insertion sites, i e  indwelling lines, tubes, and drains  - Monitor endotracheal if appropriate and nasal secretions for changes in amount and color  - [place] appropriate cooling/warming therapies per order  - Administer medications as ordered  - Instruct and encourage patient and family to use good hand hygiene technique  - Identify and instruct in appropriate isolation precautions for identified infection/condition  Outcome: Progressing  Goal: Absence of fever/infection during neutropenic period  Description  INTERVENTIONS:  - Monitor WBC    Outcome: Progressing     Problem: SAFETY ADULT  Goal: Maintain or return to baseline ADL function  Description  INTERVENTIONS:  -  Assess patient's ability to carry out ADLs; assess patient's baseline for ADL function and identify physical deficits which impact ability to perform ADLs (bathing, care of mouth/teeth, toileting, grooming, dressing, etc )  - Assess/evaluate cause of self-care deficits   - Assess range of motion  - Assess patient's mobility; develop plan if impaired  - Assess patient's need for assistive devices and provide as appropriate  - Encourage maximum independence but intervene and supervise when necessary  - Involve family in performance of ADLs  - Assess for home care needs following discharge   - Consider OT consult to assist with ADL evaluation and planning for discharge  - Provide patient education as appropriate  Outcome: Progressing  Goal: Maintain or return mobility status to optimal level  Description  INTERVENTIONS:  - Assess patient's baseline mobility status (ambulation, transfers, stairs, etc )    - Identify cognitive and physical deficits and behaviors that affect mobility  - Identify mobility aids required to assist with transfers and/or ambulation (gait belt, sit-to-stand, lift, walker, cane, etc )  - [place] fall precautions as indicated by assessment  - Record patient progress and toleration of activity level on Mobility SBAR; progress patient to next Phase/Stage  - Instruct patient to call for assistance with activity based on assessment  - Consider rehabilitation consult to assist with strengthening/weightbearing, etc   Outcome: Progressing     Problem: DISCHARGE PLANNING  Goal: Discharge to home or other facility with appropriate resources  Description  INTERVENTIONS:  - Identify barriers to discharge w/patient and caregiver  - Arrange for needed discharge resources and transportation as appropriate  - Identify discharge learning needs (meds, wound care, etc )  - Arrange for interpretive services to assist at discharge as needed  - Refer to Case Management Department for coordinating discharge planning if the patient needs post-hospital services based on physician/advanced practitioner order or complex needs related to functional status, cognitive ability, or social support system  Outcome: Progressing     Problem: Knowledge Deficit  Goal: Patient/family/caregiver demonstrates understanding of disease process, treatment plan, medications, and discharge instructions  Description  Complete learning assessment and assess knowledge base  Interventions:  - Provide teaching at level of understanding  - Provide teaching via preferred learning methods  Outcome: Progressing     Problem: Nutrition/Hydration-ADULT  Goal: Nutrient/Hydration intake appropriate for improving, restoring or maintaining nutritional needs  Description  Monitor and assess patient's nutrition/hydration status for malnutrition  Collaborate with interdisciplinary team and initiate plan and interventions as ordered  Monitor patient's weight and dietary intake as ordered or per policy  Utilize nutrition screening tool and intervene as necessary  Determine patient's food preferences and provide high-protein, high-caloric foods as appropriate       INTERVENTIONS:  - Monitor oral intake, urinary output, labs, and treatment plans  - Assess nutrition and hydration status and recommend course of action  - Evaluate amount of meals eaten  - Assist patient with eating if necessary   - Allow adequate time for meals  - Recommend/ encourage appropriate diets, oral nutritional supplements, and vitamin/mineral supplements  - Order, calculate, and assess calorie counts as needed  - Recommend, monitor, and adjust tube feedings and TPN/PPN based on assessed needs  - Assess need for intravenous fluids  - Provide specific nutrition/hydration education as appropriate  - Include patient/family/caregiver in decisions related to nutrition  Outcome: Progressing

## 2020-01-10 VITALS
HEART RATE: 62 BPM | HEIGHT: 67 IN | SYSTOLIC BLOOD PRESSURE: 153 MMHG | DIASTOLIC BLOOD PRESSURE: 83 MMHG | RESPIRATION RATE: 18 BRPM | OXYGEN SATURATION: 99 % | BODY MASS INDEX: 18.52 KG/M2 | TEMPERATURE: 96.7 F | WEIGHT: 118 LBS

## 2020-01-10 LAB
BACTERIA BLD CULT: NORMAL
BACTERIA BLD CULT: NORMAL
GLUCOSE SERPL-MCNC: 144 MG/DL (ref 65–140)
GLUCOSE SERPL-MCNC: 55 MG/DL (ref 65–140)
GLUCOSE SERPL-MCNC: 67 MG/DL (ref 65–140)
GLUCOSE SERPL-MCNC: 72 MG/DL (ref 65–140)
GLUCOSE SERPL-MCNC: 93 MG/DL (ref 65–140)
GLUCOSE SERPL-MCNC: 97 MG/DL (ref 65–140)

## 2020-01-10 PROCEDURE — 99239 HOSP IP/OBS DSCHRG MGMT >30: CPT | Performed by: INTERNAL MEDICINE

## 2020-01-10 PROCEDURE — 94760 N-INVAS EAR/PLS OXIMETRY 1: CPT

## 2020-01-10 PROCEDURE — 82948 REAGENT STRIP/BLOOD GLUCOSE: CPT

## 2020-01-10 PROCEDURE — 97110 THERAPEUTIC EXERCISES: CPT

## 2020-01-10 PROCEDURE — 94640 AIRWAY INHALATION TREATMENT: CPT

## 2020-01-10 RX ADMIN — ENOXAPARIN SODIUM 40 MG: 40 INJECTION SUBCUTANEOUS at 08:29

## 2020-01-10 RX ADMIN — CEFTRIAXONE 1000 MG: 1 INJECTION, SOLUTION INTRAVENOUS at 14:15

## 2020-01-10 RX ADMIN — LEVALBUTEROL HYDROCHLORIDE 1.25 MG: 1.25 SOLUTION, CONCENTRATE RESPIRATORY (INHALATION) at 13:45

## 2020-01-10 RX ADMIN — PANTOPRAZOLE SODIUM 40 MG: 40 TABLET, DELAYED RELEASE ORAL at 05:58

## 2020-01-10 RX ADMIN — LEVALBUTEROL HYDROCHLORIDE 1.25 MG: 1.25 SOLUTION, CONCENTRATE RESPIRATORY (INHALATION) at 07:12

## 2020-01-10 RX ADMIN — ISODIUM CHLORIDE 3 ML: 0.03 SOLUTION RESPIRATORY (INHALATION) at 13:45

## 2020-01-10 RX ADMIN — ISODIUM CHLORIDE 3 ML: 0.03 SOLUTION RESPIRATORY (INHALATION) at 07:12

## 2020-01-10 RX ADMIN — ASPIRIN 81 MG 81 MG: 81 TABLET ORAL at 08:29

## 2020-01-10 NOTE — NURSING NOTE
Report called to [facility] ([phone]) to staff member Logan Ford  Discharge summary/instructions faxed to facility ([phone])  Awaiting transport via [address] [place] [address] to Christie Leiva

## 2020-01-10 NOTE — ASSESSMENT & PLAN NOTE
· Improved  · Completed course of antibiotics  · Strep pneumo and urine Legionella negative  · Procalcitonin stable  · Cultures negative to date

## 2020-01-10 NOTE — PLAN OF CARE
Problem: Potential for Falls  Goal: Patient will remain free of falls  Description  INTERVENTIONS:  - Assess patient frequently for physical needs  -  Identify cognitive and physical deficits and behaviors that affect risk of falls    -  [place] fall precautions as indicated by assessment   - Educate patient/family on patient safety including physical limitations  - Instruct patient to call for assistance with activity based on assessment  - Modify environment to reduce risk of injury  - Consider OT/PT consult to assist with strengthening/mobility  Outcome: Progressing     Problem: Prexisting or High Potential for Compromised Skin Integrity  Goal: Skin integrity is maintained or improved  Description  INTERVENTIONS:  - Identify patients at risk for skin breakdown  - Assess and monitor skin integrity  - Assess and monitor nutrition and hydration status  - Monitor labs   - Assess for incontinence   - Turn and reposition patient  - Assist with mobility/ambulation  - Relieve pressure over bony prominences  - Avoid friction and shearing  - Provide appropriate hygiene as needed including keeping skin clean and dry  - Evaluate need for skin moisturizer/barrier cream  - Collaborate with interdisciplinary team   - Patient/family teaching  - Consider wound care consult   Outcome: Progressing     Problem: RESPIRATORY - ADULT  Goal: Achieves optimal ventilation and oxygenation  Description  INTERVENTIONS:  - Assess for changes in respiratory status  - Assess for changes in mentation and behavior  - Position to facilitate oxygenation and minimize respiratory effort  - Oxygen administered by appropriate delivery if ordered  - Initiate smoking cessation education as indicated  - Encourage broncho-pulmonary hygiene including cough, deep breathe, Incentive Spirometry  - Assess the need for suctioning and aspirate as needed  - Assess and instruct to report SOB or any respiratory difficulty  - Respiratory Therapy support as indicated  Outcome: Progressing     Problem: PAIN - ADULT  Goal: Verbalizes/displays adequate comfort level or baseline comfort level  Description  Interventions:  - Encourage patient to monitor pain and request assistance  - Assess pain using appropriate pain scale  - Administer analgesics based on type and severity of pain and evaluate response  - Implement non-pharmacological measures as appropriate and evaluate response  - Consider cultural and social influences on pain and pain management  - Notify physician/advanced practitioner if interventions unsuccessful or patient reports new pain  Outcome: Progressing     Problem: INFECTION - ADULT  Goal: Absence or prevention of progression during hospitalization  Description  INTERVENTIONS:  - Assess and monitor for signs and symptoms of infection  - Monitor lab/diagnostic results  - Monitor all insertion sites, i e  indwelling lines, tubes, and drains  - Monitor endotracheal if appropriate and nasal secretions for changes in amount and color  - [place] appropriate cooling/warming therapies per order  - Administer medications as ordered  - Instruct and encourage patient and family to use good hand hygiene technique  - Identify and instruct in appropriate isolation precautions for identified infection/condition  Outcome: Progressing  Goal: Absence of fever/infection during neutropenic period  Description  INTERVENTIONS:  - Monitor WBC    Outcome: Progressing     Problem: SAFETY ADULT  Goal: Maintain or return to baseline ADL function  Description  INTERVENTIONS:  -  Assess patient's ability to carry out ADLs; assess patient's baseline for ADL function and identify physical deficits which impact ability to perform ADLs (bathing, care of mouth/teeth, toileting, grooming, dressing, etc )  - Assess/evaluate cause of self-care deficits   - Assess range of motion  - Assess patient's mobility; develop plan if impaired  - Assess patient's need for assistive devices and provide as appropriate  - Encourage maximum independence but intervene and supervise when necessary  - Involve family in performance of ADLs  - Assess for home care needs following discharge   - Consider OT consult to assist with ADL evaluation and planning for discharge  - Provide patient education as appropriate  Outcome: Progressing  Goal: Maintain or return mobility status to optimal level  Description  INTERVENTIONS:  - Assess patient's baseline mobility status (ambulation, transfers, stairs, etc )    - Identify cognitive and physical deficits and behaviors that affect mobility  - Identify mobility aids required to assist with transfers and/or ambulation (gait belt, sit-to-stand, lift, walker, cane, etc )  - [place] fall precautions as indicated by assessment  - Record patient progress and toleration of activity level on Mobility SBAR; progress patient to next Phase/Stage  - Instruct patient to call for assistance with activity based on assessment  - Consider rehabilitation consult to assist with strengthening/weightbearing, etc   Outcome: Progressing     Problem: DISCHARGE PLANNING  Goal: Discharge to home or other facility with appropriate resources  Description  INTERVENTIONS:  - Identify barriers to discharge w/patient and caregiver  - Arrange for needed discharge resources and transportation as appropriate  - Identify discharge learning needs (meds, wound care, etc )  - Arrange for interpretive services to assist at discharge as needed  - Refer to Case Management Department for coordinating discharge planning if the patient needs post-hospital services based on physician/advanced practitioner order or complex needs related to functional status, cognitive ability, or social support system  Outcome: Progressing     Problem: Knowledge Deficit  Goal: Patient/family/caregiver demonstrates understanding of disease process, treatment plan, medications, and discharge instructions  Description  Complete learning assessment and assess knowledge base  Interventions:  - Provide teaching at level of understanding  - Provide teaching via preferred learning methods  Outcome: Progressing     Problem: Nutrition/Hydration-ADULT  Goal: Nutrient/Hydration intake appropriate for improving, restoring or maintaining nutritional needs  Description  Monitor and assess patient's nutrition/hydration status for malnutrition  Collaborate with interdisciplinary team and initiate plan and interventions as ordered  Monitor patient's weight and dietary intake as ordered or per policy  Utilize nutrition screening tool and intervene as necessary  Determine patient's food preferences and provide high-protein, high-caloric foods as appropriate       INTERVENTIONS:  - Monitor oral intake, urinary output, labs, and treatment plans  - Assess nutrition and hydration status and recommend course of action  - Evaluate amount of meals eaten  - Assist patient with eating if necessary   - Allow adequate time for meals  - Recommend/ encourage appropriate diets, oral nutritional supplements, and vitamin/mineral supplements  - Order, calculate, and assess calorie counts as needed  - Recommend, monitor, and adjust tube feedings and TPN/PPN based on assessed needs  - Assess need for intravenous fluids  - Provide specific nutrition/hydration education as appropriate  - Include patient/family/caregiver in decisions related to nutrition  Outcome: Progressing

## 2020-01-10 NOTE — NURSING NOTE
Pt BG is 57  Pt fed applesauce w/sugar  Rechecked with new reading of 69 Pt receiving respiratory treatment  Will provide more carbs when finished    Information given to RN in hand-off shift report  Pt call bell and belongings are within reach

## 2020-01-10 NOTE — DISCHARGE INSTR - OTHER ORDERS
Skin Care Plan:   1  Cleanse right heel unstageable wound with soap and water, pat dry, 3M No Sting to periwound, place Maxorb on wound bed, cover with Allevyn life silicone bordered clover shaped foam, akiko with T, date, change every 3 days or with soilage or dislodgement  2  EHOB waffle cushion to chair when patient OOB  3  Elevate heels off the bed with pillows   4  Turn and reposition every two hours  5  Hydraguard to left heel, buttocks and sacrum BID and PRN  6  Skin nourishing cream daily  7  Wound care follow up weekly  8   Recommend follow up with [address] [place] [address] on discharge from SNF for right heel pressure injury

## 2020-01-10 NOTE — ASSESSMENT & PLAN NOTE
· Supplement  · Swallow eval appreciated, patient on pureed diet with nectar thick liquids    · Discussed potential feeding tube with family/patient who declined

## 2020-01-10 NOTE — PLAN OF CARE
Problem: PHYSICAL THERAPY ADULT  Goal: Performs mobility at highest level of function for planned discharge setting  See evaluation for individualized goals  Description  Treatment/Interventions: Functional transfer training, LE strengthening/ROM, Therapeutic exercise, Endurance training, Patient/family training, Equipment eval/education, Bed mobility, Spoke to nursing, Continued evaluation  Equipment Recommended: (TBD pending OOB mobility)       See flowsheet documentation for full assessment, interventions and recommendations  Outcome: Progressing  Note:   Prognosis: Guarded  Problem List: Decreased strength, Decreased endurance, Impaired balance, Decreased mobility, Decreased cognition, Impaired judgement, Decreased safety awareness  Assessment: Pt seen for PT treatment session this date with interventions consisting of Therapeutic exercise consisting of: AAROM 2 sets of 10 reps B LE in supine position  Pt agreeable to PT treatment session upon arrival, pt found supine in bed w/ HOB elevated, in no apparent distress  In comparison to previous session, pt with no improvements as evidenced by pt refusing to perform bed mobility activities  Post session: pt returned BTB and all needs in reach Continue to recommend STR at time of d/c in order to maximize pt's functional independence and safety w/ mobility  Pt continues to be functioning below baseline level, and remains limited 2* factors listed above and including decreased strength, decreased mobility, decreased endurance  PT will continue to see pt while here in order to address the deficits listed above and provide interventions consistent w/ POC in effort to achieve STGs  Barriers to Discharge: Inaccessible home environment     Recommendation: Short-term skilled PT     PT - OK to Discharge: Yes(when med cleared if to STR)    See flowsheet documentation for full assessment

## 2020-01-10 NOTE — CONSULTS
Progress Note - Wound   Elizabeth Griffith 82 y o  male MRN: [id number]  Unit/Bed#: -01 Encounter: [id number]      Assessment:   Wound care consult for 82year old patient  Patient is reported to have a unstageable pressure injury to the right heel, hospital acquired  Patient is being discharged to [facility] this afternoon  Patient has been taking his home nutritional supplements and has a fair appetite eating approx  1/4 of his meals  His history includes DDM2, chronic pancreatitis, protein calorie malnutrition, and lower extremity edema  Patient's bilateral arms were wrapped in gauze, reported by primary RN to have weeping edema to bilateral upper extremities  Patient reported to be incontinent of bowel and bladder  Patient is awake and alert  1  Left forearm is firm and edematous, right forearm edema is without firmness  2  Left lower extremity pitting edema, from pretibial to the ankle  3  Bilateral buttocks blanchable erythema  4  HAC-right heel unstageable pressure injury  Over 50% of wound bed with slough, suspect stage 3 or stage 4 pressure injury   5  Left heel boggy with blanchable erythema    Plan:   1  Cleanse right heel unstageable wound with soap and water, pat dry, 3M No Sting to periwound, place Maxorb on wound bed, cover with Allevyn life silicone bordered clover shaped foam, akiko with T, date, change every 3 days or with soilage or dislodgement  2  EHOB waffle cushion to chair when patient OOB  3  Elevate heels off the bed with pillows   4  Turn and reposition every two hours  5  Hydraguard to left heel, buttocks and sacrum BID and PRN  6  Skin nourishing cream daily  7  Wound care follow up weekly      Wound 01/09/20 Pressure Injury Heel Right (Active)   Wound Image   1/10/2020 10:12 AM   Wound Description Yellow;Slough; White 1/10/2020 10:12 AM   Staging Unstagable 1/10/2020 10:12 AM   Cara-wound Assessment Erythema;Fragile 1/10/2020 10:12 AM   Wound Length (cm) 1 4 cm 1/10/2020 10:12 AM   Wound Width (cm) 0 5 cm 1/10/2020 10:12 AM   Wound Depth (cm) 0 1 1/10/2020 10:12 AM   Calculated Wound Area (cm^2) 0 7 cm^2 1/10/2020 10:12 AM   Calculated Wound Volume (cm^3) 0 07 cm^3 1/10/2020 10:12 AM   Drainage Amount None 1/10/2020 10:12 AM   Drainage Description Yellow 1/9/2020 10:00 AM   Treatments Cleansed 1/10/2020 10:12 AM   Dressing Calcium Alginate; Foam, Silicon (eg   Allevyn, etc) 1/10/2020 10:12 AM   Patient Tolerance Tolerated well 1/10/2020 10:12 AM   Dressing Status Removed 1/9/2020 10:00 AM     Reviewed plan of care with primary RN Presley Jesus  Recommendations written as orders  Questions or concerns contact Cait GARCIAN, RN

## 2020-01-10 NOTE — SOCIAL WORK
D/c order Svetlana Murray was made aware that the pt was being d/c at 3:30 pm, I spoke with Nick Armenta last evening and we discussed d/c for today, I called Santos Stock this morning she did give permission to send the pt w/c Jalyn Abernathy, she staetd she spoke with Jaylan Matter and everything was taken care of for the transport, pt was made aware of the d/c plan, rn was given the fax and report #, pt and family in agreement with the d/c and d/c plan to [address] their 1st choice,  D/c plan was discussed at care coordination rounds today,

## 2020-01-10 NOTE — PHYSICAL THERAPY NOTE
PHYSICAL THERAPY NOTE          Patient Name: Devin Garcia  GNSIC'B Date: 1/10/2020     01/10/20 1418   Pain Assessment   Pain Assessment No/denies pain   Pain Score No Pain   Restrictions/Precautions   Weight Bearing Precautions Per Order No   Other Precautions Contact/isolation; Bed Alarm;Multiple lines;Aspiration   General   Chart Reviewed Yes   Cognition   Overall Cognitive Status WFL   Bed Mobility   Rolling R Unable to assess  (attempted but pt refused)   Rolling L Unable to assess  (attempted but pt refused)   Activity Tolerance   Activity Tolerance Patient limited by fatigue   Exercises   Hip Flexion Supine;20 reps;AROM; Bilateral   Hip Abduction Supine;20 reps;AROM; Bilateral   Ankle Pumps Supine;20 reps;AROM; Bilateral   Assessment   Prognosis Guarded   Problem List Decreased strength;Decreased endurance; Impaired balance;Decreased mobility; Decreased cognition; Impaired judgement;Decreased safety awareness   Assessment Pt seen for PT treatment session this date with interventions consisting of Therapeutic exercise consisting of: AAROM 2 sets of 10 reps B LE in supine position  Pt agreeable to PT treatment session upon arrival, pt found supine in bed w/ HOB elevated, in no apparent distress  In comparison to previous session, pt with no improvements as evidenced by pt refusing to perform bed mobility activities  Post session: pt returned BTB and all needs in reach Continue to recommend STR at time of d/c in order to maximize pt's functional independence and safety w/ mobility  Pt continues to be functioning below baseline level, and remains limited 2* factors listed above and including decreased strength, decreased mobility, decreased endurance  PT will continue to see pt while here in order to address the deficits listed above and provide interventions consistent w/ POC in effort to achieve STGs     Barriers to Discharge Inaccessible home environment   Plan   Treatment/Interventions Functional transfer training;LE strengthening/ROM; Therapeutic exercise; Endurance training;Bed mobility;Gait training   Progress Slow progress, decreased activity tolerance   PT Frequency Other (Comment)  (3-5x/week)   Recommendation   Recommendation Short-term skilled PT   PT - OK to Discharge Yes  (when med cleared if to STR)     Amos Irizarry, PTA

## 2020-01-10 NOTE — ASSESSMENT & PLAN NOTE
· Left lower lobe infiltrate  · Completed course of antibiotics  · Procalcitonin stable  · Strep pneumo Legionella antigen negative

## 2020-01-10 NOTE — ASSESSMENT & PLAN NOTE
· Given episode of hypoglycemia and normal A1c  · Given the above findings with patient's poor p o   Intake diabetic medications have been discontinued  · Monitor fingersticks as outpatient and if significantly elevated consider resuming diabetic medications  · Fingersticks have been stable off of meds with peaks of 140s

## 2020-01-10 NOTE — ASSESSMENT & PLAN NOTE
· Patient will need transition to SNF on discharge  · Case management has arranged for patient to be transitioned to SNF which family is in agreement with

## 2020-01-10 NOTE — DISCHARGE SUMMARY
Discharge- Wan Santacruz 1935, 82 y o  male MRN: [id number]    Unit/Bed#: -01 Encounter: [id number]    Primary Care Provider: Lianna Arenas MD   Date and time admitted to hospital: 1/4/2020 11:12 AM      Sepsis [facility])  Assessment & Plan  · Present on admission with leukocytosis, neutrophils 89%, tachycardia ranging from , chest x-ray noting pneumonia and RSV positive  · Improved  · Patient completed course of antibiotics  · Follow up with PCP as outpatient      Pneumonia  Assessment & Plan  · Improved  · Completed course of antibiotics  · Strep pneumo and urine Legionella negative  · Procalcitonin stable  · Cultures negative to date    RSV infection  Assessment & Plan  · Improved    Ambulatory dysfunction  Assessment & Plan  · Patient will need transition to SNF on discharge  · Case management has arranged for patient to be transitioned to SNF which family is in agreement with    Severe protein-calorie malnutrition ([address] )  Assessment & Plan  · Supplement  · Swallow eval appreciated, patient on pureed diet with nectar thick liquids  · Discussed potential feeding tube with family/patient who declined    Diabetes mellitus type 2  Assessment & Plan  · Given episode of hypoglycemia and normal A1c  · Given the above findings with patient's poor p o   Intake diabetic medications have been discontinued  · Monitor fingersticks as outpatient and if significantly elevated consider resuming diabetic medications  · Fingersticks have been stable off of meds with peaks of 140s      * Acute metabolic encephalopathy due to hypoglycemia  Assessment & Plan  · Improved  · Fingersticks have improved  · Will continue supportive care      Discharging Physician / Practitioner: Meghan Goldberg MD  PCP: Lianna Arenas MD  Admission Date:   Admission Orders (From admission, onward)     Ordered        01/04/20 1409  Inpatient Admission (expected length of stay for this patient Order details is greater than two midnights)  Once Discharge Date: 01/10/20    Resolved Problems  Date Reviewed: 1/6/2020    None          Consultations During Hospital Stay:  · None    Procedures Performed:   · None    Significant Findings / Test Results:   · RSV/pneumonia    Incidental Findings:   · None     Test Results Pending at Discharge (will require follow up): · None     Outpatient Tests Requested:  · Routine labs with PCP    Complications:     None    Reason for Admission:  Pneumonia    Hospital Course:     Wan Santacruz is a 82 y o  male patient who originally presented to the hospital on 1/4/2020 due to altered mental status  Patient was found to be hypoglycemic and started on D5 drip  Patient's home diabetic medications were held  A1c was within normal limits  Fingersticks did improve and patient's mental status improved as well  Patient was also noted to have a pneumonia and RSV positive  Patient was started on IV antibiotics  Cultures remain negative and procalcitonin was within normal limits  Patient completed 5 day course of antibiotics and clinically improved  Patient also with concern for aspiration and swallow evaluation was obtained  Swallow therapy recommended potential PEG tube however patient and family refused  Patient was tolerating  Diet with nectar thick liquids  Case management arranged for patient to be transitioned to SNF for rehab  Please see above list of diagnoses and related plan for additional information  Condition at Discharge: stable     Discharge Day Visit / Exam:     Subjective:  No complaints at this time    Vitals: Blood Pressure: 153/83 (01/10/20 0622)  Pulse: 62 (01/10/20 0622)  Temperature: (!) 96 7 °F (35 9 °C) (01/10/20 0622)  Temp Source: Tympanic (01/10/20 0622)  Respirations: 18 (01/10/20 0622)  Height: 5' 7" (170 2 cm) (01/04/20 1106)  Weight - Scale: 53 5 kg (118 lb) (01/04/20 1106)  SpO2: 99 % (01/10/20 5869)     Exam:   Physical Exam   Constitutional: No distress     Frail elderly male   HENT:   Head: Normocephalic and atraumatic  Eyes: Conjunctivae and EOM are normal    Neck: Normal range of motion  Neck supple  Cardiovascular: Normal rate and regular rhythm  Pulmonary/Chest: Effort normal  No respiratory distress  He has rales  Abdominal: Soft  He exhibits no distension  There is no tenderness  Musculoskeletal: He exhibits edema  Generalized weakness   Neurological: He is alert  No cranial nerve deficit  Skin: Skin is warm and dry  Erythema noted on right heel   Psychiatric: He has a normal mood and affect  His behavior is normal        Discussion with Family:  Attempted to call daughter at the number provided in chart with no answer  Spoke to wife yesterday regarding patient's clinical condition  Discharge instructions/Information to patient and family:   See after visit summary for information provided to patient and family  Provisions for Follow-Up Care:  See after visit summary for information related to follow-up care and any pertinent home health orders  Disposition:     Other [facility] at [address] SN[address]:   · Not Applicable to this Patient - Not Applicable to this Patient    Planned Readmission:    no     Discharge Statement:  I spent 35 minutes discharging the patient  This time was spent on the day of discharge  I had direct contact with the patient on the day of discharge  Greater than 50% of the total time was spent examining patient, answering all patient questions, arranging and discussing plan of care with patient as well as directly providing post-discharge instructions  Additional time then spent on discharge activities  Discharge Medications:  See after visit summary for reconciled discharge medications provided to patient and family        ** Please Note: This note has been constructed using a voice recognition system **

## 2020-01-10 NOTE — ASSESSMENT & PLAN NOTE
· Present on admission with leukocytosis, neutrophils 89%, tachycardia ranging from , chest x-ray noting pneumonia and RSV positive  · Improved  · Patient completed course of antibiotics  · Follow up with PCP as outpatient

## 2020-01-23 NOTE — PHYSICIAN ADVISOR
Current patient class: Inpatient  The patient is currently on Hospital Day: 14      The patient was admitted to the hospital at 2256 on 1/1/19 for the following diagnosis:  Dehydration [E86 0]  Severe protein-calorie malnutrition ([facility] ) [E43]  Weakness [R53 1]  Bilateral pneumonia [J18 9]  Severe sepsis ([facility] ) [A41 9, R65 20]  Other chronic pancreatitis ([place] [address] ) [K86 1]  Type 2 diabetes mellitus with complication, without long-term current use of insulin ([place] [address] ) [E11 8]     CMS OUTLIER STAY REVIEW    After review of the relevant documentation, labs, vital signs and test results, the patient is appropriate for CONTINUED INPATIENT ADMISSION  The patient continues to remain hospitalized receiving acute medical care  The patient has surpassed the expected duration of stay, however given the clinical condition, need for further acute care management, the patient is appropriate to remain in an inpatient status  The patient still being actively managed, and does have unresolved medical issues requiring further hospitalization  This review is conducted at 12 day intervals, to help satisfy the requirements for significant outlier stay review as per CMS  Given the current condition of this patient, the patient satisfies this review was determination for continued inpatient stay  Rationale is as follows: The patient is an 61-year-old male who was admitted to the hospital on January 1, 2019 with acute hypoxic respiratory failure and bilateral pneumonia, bilateral pleural effusions  Urinary strep antigen was positive  He has dysphagia with history of aspiration and protein calorie malnutrition  He may require gastrostomy tube placement    The patient is being evaluated by Neurology as well given his dysphagia, hypophonia, dysarthria as the differential diagnosis per their note could include mid brain stroke versus motor neuron disease versus paraneoplastic disease etc   MRI showed extensive white matter disease Influenza Vaccination including several pontine hyperdensities  Please refer to their note for ongoing evaluation  The patient remains appropriate for acute hospital management given his severity of illness and ongoing medical necessity  The patients vitals on arrival were ED Triage Vitals   Temperature Pulse Respirations Blood Pressure SpO2   01/01/19 2108 01/01/19 2105 01/01/19 2105 01/01/19 2105 01/01/19 2105   (!) 97 4 °F (36 3 °C) 96 18 143/68 94 %      Temp Source Heart Rate Source Patient Position - Orthostatic VS BP Location FiO2 (%)   01/01/19 2108 01/01/19 2105 01/01/19 2105 01/01/19 2105 --   Oral Monitor Lying Right arm       Pain Score       01/01/19 2105       No Pain           Past Medical History:   Diagnosis Date    Diabetes mellitus ([facility] )     Type II    Weight loss      Past Surgical History:   Procedure Laterality Date    LAPAROTOMY N/A 11/19/2018    Procedure: LAPAROTOMY EXPLORATORY;  Surgeon: Giovanny Villar MD;  [facility];  Service: Surgical Oncology    CO EDG US EXAM SURGICAL ALTER STOM DUODENUM/JEJUNUM N/A 9/21/2018    Procedure: LINEAR ENDOSCOPIC U/S;  Surgeon: Eusebio Rivers MD;  Location: [facility];   Service: Gastroenterology    CO LAP,DIAGNOSTIC ABDOMEN N/A 11/19/2018    Procedure: pancreatic biopsy;  Surgeon: Giovanny Villar MD;  [facility];  Service: Surgical Oncology    TONSILLECTOMY      VASCULAR SURGERY      phlebitis many years ago    WRIST GANGLION EXCISION             Consults have been placed to:   IP CONSULT TO CASE MANAGEMENT  IP CONSULT TO NUTRITION SERVICES  IP CONSULT TO GASTROENTEROLOGY  IP CONSULT TO NEUROLOGY  IP CONSULT TO PULMONOLOGY  IP CONSULT TO CARDIOLOGY  IP CONSULT TO PHARMACY  IP CONSULT TO PHARMACY  IP CONSULT TO HEMATOLOGY  IP CONSULT TO GASTROENTEROLOGY    Vitals:    01/14/19 0056 01/14/19 0057 01/14/19 0740 01/14/19 1220   BP:   145/84 (!) 180/81   Pulse: 76 76 67 75   Resp:   18 20   Temp:   (!) 97 2 °F (36 2 °C) 98 1 °F (36 7 °C)   TempSrc:    Oral SpO2: 93% 94% 94% 91%   Weight:       Height:           Most recent labs:    Recent Labs      01/14/19 0527   WBC  11 93*   HGB  9 7*   HCT  31 5*   PLT  344   K  3 7   CALCIUM  7 8*   BUN  4*   CREATININE  0 36*       Scheduled Meds:  Current Facility-Administered Medications:  acetaminophen 650 mg Oral Q6H PRN Angeli Ge PA-C    albuterol 2 5 mg Nebulization Q4H PRN Ana María Hatch MD    aspirin 81 mg Oral Daily Justino Jacobs MD    atorvastatin 40 mg Oral Daily With Jaymie Reis MD    cholecalciferol 400 Units Oral Daily Corrine Cochran MD    dextrose 5 % and sodium chloride 0 45 % 50 mL/hr Intravenous Continuous Ignacio Solano PA-C Last Rate: 50 mL/hr (01/14/19 0543)   heparin (porcine) 5,000 Units Subcutaneous [facility] Juanito Rodriguez MD    insulin lispro 1-5 Units Subcutaneous Q6H [facility] DEMI Langford    lidocaine 1 patch Topical Daily DEMI [place]    melatonin 3 mg Oral HS Danii Ruiz DO    pancrelipase (Lip-Prot-Amyl) 24,000 Units Oral BID With Meals Corrine Cochran MD    pantoprazole 40 mg Intravenous Q24H [facility] Ryanne Hendrix MD    polyvinyl alcohol 1 drop Both Eyes PRN Ignacio Solano PA-C      Facility-Administered Medications Ordered in Other Encounters:  sodium chloride   Continuous PRN Sukhjinder Gunn CRNA     Continuous Infusions:  dextrose 5 % and sodium chloride 0 45 % 50 mL/hr Last Rate: 50 mL/hr (01/14/19 0543)     PRN Meds:   acetaminophen    albuterol    polyvinyl alcohol    Surgical procedures (if appropriate):  Procedure(s):  INSERTION PEG TUBE

## 2020-02-03 ENCOUNTER — HOSPITAL ENCOUNTER (INPATIENT)
Facility: HOSPITAL | Age: 85
LOS: 6 days | Discharge: HOSPICE | DRG: 438 | End: 2020-02-10
Attending: EMERGENCY MEDICINE | Admitting: INTERNAL MEDICINE
Payer: MEDICARE

## 2020-02-03 ENCOUNTER — TELEPHONE (OUTPATIENT)
Dept: PALLIATIVE MEDICINE | Facility: CLINIC | Age: 85
End: 2020-02-03

## 2020-02-03 ENCOUNTER — APPOINTMENT (EMERGENCY)
Dept: RADIOLOGY | Facility: HOSPITAL | Age: 85
DRG: 438 | End: 2020-02-03
Payer: MEDICARE

## 2020-02-03 DIAGNOSIS — F41.8 ANXIETY ABOUT HEALTH: Chronic | ICD-10-CM

## 2020-02-03 DIAGNOSIS — K20.90 ESOPHAGITIS: ICD-10-CM

## 2020-02-03 DIAGNOSIS — L89.609: ICD-10-CM

## 2020-02-03 DIAGNOSIS — E43 EDEMA DUE TO MALNUTRITION, DUE TO UNSPECIFIED MALNUTRITION TYPE (HCC): ICD-10-CM

## 2020-02-03 DIAGNOSIS — E44.0 MODERATE PROTEIN-CALORIE MALNUTRITION (HCC): ICD-10-CM

## 2020-02-03 DIAGNOSIS — R60.1 ANASARCA: ICD-10-CM

## 2020-02-03 DIAGNOSIS — R79.89 ELEVATED LFTS: ICD-10-CM

## 2020-02-03 DIAGNOSIS — R62.7 FAILURE TO THRIVE IN ADULT: ICD-10-CM

## 2020-02-03 DIAGNOSIS — K86.1 OTHER CHRONIC PANCREATITIS (HCC): Primary | ICD-10-CM

## 2020-02-03 PROBLEM — R74.8 ELEVATED LIVER ENZYMES: Status: ACTIVE | Noted: 2020-02-03

## 2020-02-03 LAB
ALBUMIN SERPL BCP-MCNC: 1.5 G/DL (ref 3.5–5)
ALP SERPL-CCNC: 477 U/L (ref 46–116)
ALT SERPL W P-5'-P-CCNC: 122 U/L (ref 12–78)
ANION GAP SERPL CALCULATED.3IONS-SCNC: 2 MMOL/L (ref 4–13)
APTT PPP: 30 SECONDS (ref 23–37)
AST SERPL W P-5'-P-CCNC: 157 U/L (ref 5–45)
BASOPHILS # BLD AUTO: 0.04 THOUSANDS/ΜL (ref 0–0.1)
BASOPHILS NFR BLD AUTO: 0 % (ref 0–1)
BILIRUB SERPL-MCNC: 0.36 MG/DL (ref 0.2–1)
BUN SERPL-MCNC: 29 MG/DL (ref 5–25)
CALCIUM SERPL-MCNC: 7.9 MG/DL (ref 8.3–10.1)
CHLORIDE SERPL-SCNC: 102 MMOL/L (ref 100–108)
CO2 SERPL-SCNC: 35 MMOL/L (ref 21–32)
CREAT SERPL-MCNC: 0.7 MG/DL (ref 0.6–1.3)
EOSINOPHIL # BLD AUTO: 0.13 THOUSAND/ΜL (ref 0–0.61)
EOSINOPHIL NFR BLD AUTO: 1 % (ref 0–6)
ERYTHROCYTE [DISTWIDTH] IN BLOOD BY AUTOMATED COUNT: 14.7 % (ref 11.6–15.1)
GFR SERPL CREATININE-BSD FRML MDRD: 87 ML/MIN/1.73SQ M
GLUCOSE SERPL-MCNC: 138 MG/DL (ref 65–140)
GLUCOSE SERPL-MCNC: 198 MG/DL (ref 65–140)
HCT VFR BLD AUTO: 37.8 % (ref 36.5–49.3)
HGB BLD-MCNC: 12 G/DL (ref 12–17)
IMM GRANULOCYTES # BLD AUTO: 0.07 THOUSAND/UL (ref 0–0.2)
IMM GRANULOCYTES NFR BLD AUTO: 1 % (ref 0–2)
INR PPP: 1.08 (ref 0.84–1.19)
LIPASE SERPL-CCNC: 13 U/L (ref 73–393)
LYMPHOCYTES # BLD AUTO: 0.97 THOUSANDS/ΜL (ref 0.6–4.47)
LYMPHOCYTES NFR BLD AUTO: 7 % (ref 14–44)
MCH RBC QN AUTO: 31.7 PG (ref 26.8–34.3)
MCHC RBC AUTO-ENTMCNC: 31.7 G/DL (ref 31.4–37.4)
MCV RBC AUTO: 100 FL (ref 82–98)
MONOCYTES # BLD AUTO: 0.7 THOUSAND/ΜL (ref 0.17–1.22)
MONOCYTES NFR BLD AUTO: 5 % (ref 4–12)
NEUTROPHILS # BLD AUTO: 11.2 THOUSANDS/ΜL (ref 1.85–7.62)
NEUTS SEG NFR BLD AUTO: 86 % (ref 43–75)
NRBC BLD AUTO-RTO: 0 /100 WBCS
PLATELET # BLD AUTO: 321 THOUSANDS/UL (ref 149–390)
PMV BLD AUTO: 11.2 FL (ref 8.9–12.7)
POTASSIUM SERPL-SCNC: 4 MMOL/L (ref 3.5–5.3)
PROT SERPL-MCNC: 5.8 G/DL (ref 6.4–8.2)
PROTHROMBIN TIME: 13.6 SECONDS (ref 11.6–14.5)
RBC # BLD AUTO: 3.78 MILLION/UL (ref 3.88–5.62)
SODIUM SERPL-SCNC: 139 MMOL/L (ref 136–145)
WBC # BLD AUTO: 13.11 THOUSAND/UL (ref 4.31–10.16)

## 2020-02-03 PROCEDURE — 82948 REAGENT STRIP/BLOOD GLUCOSE: CPT

## 2020-02-03 PROCEDURE — 85610 PROTHROMBIN TIME: CPT | Performed by: EMERGENCY MEDICINE

## 2020-02-03 PROCEDURE — 80053 COMPREHEN METABOLIC PANEL: CPT | Performed by: EMERGENCY MEDICINE

## 2020-02-03 PROCEDURE — 99220 PR INITIAL OBSERVATION CARE/DAY 70 MINUTES: CPT | Performed by: INTERNAL MEDICINE

## 2020-02-03 PROCEDURE — 1124F ACP DISCUSS-NO DSCNMKR DOCD: CPT | Performed by: INTERNAL MEDICINE

## 2020-02-03 PROCEDURE — 36415 COLL VENOUS BLD VENIPUNCTURE: CPT | Performed by: EMERGENCY MEDICINE

## 2020-02-03 PROCEDURE — 85730 THROMBOPLASTIN TIME PARTIAL: CPT | Performed by: EMERGENCY MEDICINE

## 2020-02-03 PROCEDURE — 83690 ASSAY OF LIPASE: CPT | Performed by: EMERGENCY MEDICINE

## 2020-02-03 PROCEDURE — 99285 EMERGENCY DEPT VISIT HI MDM: CPT | Performed by: EMERGENCY MEDICINE

## 2020-02-03 PROCEDURE — 99285 EMERGENCY DEPT VISIT HI MDM: CPT

## 2020-02-03 PROCEDURE — 85025 COMPLETE CBC W/AUTO DIFF WBC: CPT | Performed by: EMERGENCY MEDICINE

## 2020-02-03 PROCEDURE — 74177 CT ABD & PELVIS W/CONTRAST: CPT

## 2020-02-03 RX ORDER — BISACODYL 10 MG
10 SUPPOSITORY, RECTAL RECTAL DAILY PRN
COMMUNITY

## 2020-02-03 RX ORDER — BISACODYL 10 MG
10 SUPPOSITORY, RECTAL RECTAL DAILY PRN
Status: DISCONTINUED | OUTPATIENT
Start: 2020-02-03 | End: 2020-02-10 | Stop reason: HOSPADM

## 2020-02-03 RX ORDER — ACETAMINOPHEN 325 MG/1
650 TABLET ORAL EVERY 4 HOURS PRN
Status: DISCONTINUED | OUTPATIENT
Start: 2020-02-03 | End: 2020-02-10 | Stop reason: HOSPADM

## 2020-02-03 RX ORDER — SODIUM PHOSPHATE,MONO-DIBASIC 19G-7G/118
1 ENEMA (ML) RECTAL DAILY PRN
COMMUNITY

## 2020-02-03 RX ORDER — SODIUM CHLORIDE 450 MG/100ML
50 INJECTION, SOLUTION INTRAVENOUS CONTINUOUS
Status: DISCONTINUED | OUTPATIENT
Start: 2020-02-03 | End: 2020-02-04

## 2020-02-03 RX ORDER — MELATONIN
2000 DAILY
Status: DISCONTINUED | OUTPATIENT
Start: 2020-02-04 | End: 2020-02-05

## 2020-02-03 RX ORDER — ACETAMINOPHEN 325 MG/1
650 TABLET ORAL EVERY 4 HOURS PRN
COMMUNITY

## 2020-02-03 RX ORDER — DOCUSATE SODIUM 100 MG/1
100 CAPSULE, LIQUID FILLED ORAL 2 TIMES DAILY PRN
Status: DISCONTINUED | OUTPATIENT
Start: 2020-02-03 | End: 2020-02-07

## 2020-02-03 RX ORDER — BISACODYL 10 MG
10 SUPPOSITORY, RECTAL RECTAL DAILY PRN
Status: DISCONTINUED | OUTPATIENT
Start: 2020-02-03 | End: 2020-02-03

## 2020-02-03 RX ORDER — MAGNESIUM HYDROXIDE/ALUMINUM HYDROXICE/SIMETHICONE 120; 1200; 1200 MG/30ML; MG/30ML; MG/30ML
15 SUSPENSION ORAL EVERY 6 HOURS PRN
Status: DISCONTINUED | OUTPATIENT
Start: 2020-02-03 | End: 2020-02-10 | Stop reason: HOSPADM

## 2020-02-03 RX ORDER — PANTOPRAZOLE SODIUM 40 MG/1
40 TABLET, DELAYED RELEASE ORAL
Status: DISCONTINUED | OUTPATIENT
Start: 2020-02-04 | End: 2020-02-10 | Stop reason: HOSPADM

## 2020-02-03 RX ORDER — MELATONIN
2000 DAILY
COMMUNITY

## 2020-02-03 RX ORDER — ASPIRIN 81 MG/1
81 TABLET, CHEWABLE ORAL DAILY
Status: DISCONTINUED | OUTPATIENT
Start: 2020-02-04 | End: 2020-02-05

## 2020-02-03 RX ORDER — ONDANSETRON 2 MG/ML
4 INJECTION INTRAMUSCULAR; INTRAVENOUS EVERY 6 HOURS PRN
Status: DISCONTINUED | OUTPATIENT
Start: 2020-02-03 | End: 2020-02-10 | Stop reason: HOSPADM

## 2020-02-03 RX ADMIN — ACETAMINOPHEN 650 MG: 325 TABLET ORAL at 23:50

## 2020-02-03 RX ADMIN — IOHEXOL 100 ML: 350 INJECTION, SOLUTION INTRAVENOUS at 20:51

## 2020-02-03 NOTE — ED ATTENDING ATTESTATION
2/3/2020  IMargaret MD, saw and evaluated the patient  I have discussed the patient with the resident/non-physician practitioner and agree with the resident's/non-physician practitioner's findings, Plan of Care, and MDM as documented in the resident's/non-physician practitioner's note, except where noted  All available labs and Radiology studies were reviewed  I was present for key portions of any procedure(s) performed by the resident/non-physician practitioner and I was immediately available to provide assistance  At this point I agree with the current assessment done in the Emergency Department  I have conducted an independent evaluation of this patient a history and physical is as follows:   Pt has complicated GI history He has not been eating and is weak  He is diagnosed with failure to thrive and was slated for discharge home form NH  Pt was noted to have low hgb and sent to ed for eval  Pt denies blood in stool   Pt has ahd poor appetite some upper abd pain PE: alert heart reg lugns clear abd soft tender upper MDM; will check labs and ct  ED Course         Critical Care Time  Procedures

## 2020-02-03 NOTE — TELEPHONE ENCOUNTER
Received phone referral for patient to be seen by Palliative Care at [facility] from [facility]  Spoke with Blade Wilson the DON there who provided this nurse with diagnosis of failure to thrive for patient and reports he is actively dying and she would be surprised if he will live out the week  She reports he is going home tomorrow and she feels he needs Palliative Care  This nurse explained [address] and that Hospice will be able to support the patient and family with more frequent nursing services and aide support, medication and symptom management  Blade Wilson agreed this program sounded more appropriate at this time in the patient's medical journey  She will discuss with the family and explain he is Hospice appropriate at this time

## 2020-02-04 ENCOUNTER — APPOINTMENT (OUTPATIENT)
Dept: RADIOLOGY | Facility: HOSPITAL | Age: 85
DRG: 438 | End: 2020-02-04
Payer: MEDICARE

## 2020-02-04 LAB
ALBUMIN SERPL BCP-MCNC: 1.1 G/DL (ref 3.5–5)
ALP SERPL-CCNC: 320 U/L (ref 46–116)
ALT SERPL W P-5'-P-CCNC: 68 U/L (ref 12–78)
ANION GAP SERPL CALCULATED.3IONS-SCNC: 0 MMOL/L (ref 4–13)
AST SERPL W P-5'-P-CCNC: 50 U/L (ref 5–45)
BASOPHILS # BLD AUTO: 0.03 THOUSANDS/ΜL (ref 0–0.1)
BASOPHILS NFR BLD AUTO: 0 % (ref 0–1)
BILIRUB SERPL-MCNC: 0.42 MG/DL (ref 0.2–1)
BUN SERPL-MCNC: 28 MG/DL (ref 5–25)
CALCIUM SERPL-MCNC: 7.2 MG/DL (ref 8.3–10.1)
CHLORIDE SERPL-SCNC: 104 MMOL/L (ref 100–108)
CO2 SERPL-SCNC: 35 MMOL/L (ref 21–32)
CREAT SERPL-MCNC: 0.53 MG/DL (ref 0.6–1.3)
EOSINOPHIL # BLD AUTO: 0.3 THOUSAND/ΜL (ref 0–0.61)
EOSINOPHIL NFR BLD AUTO: 4 % (ref 0–6)
ERYTHROCYTE [DISTWIDTH] IN BLOOD BY AUTOMATED COUNT: 14.7 % (ref 11.6–15.1)
EST. AVERAGE GLUCOSE BLD GHB EST-MCNC: 123 MG/DL
GFR SERPL CREATININE-BSD FRML MDRD: 98 ML/MIN/1.73SQ M
GLUCOSE P FAST SERPL-MCNC: 97 MG/DL (ref 65–99)
GLUCOSE SERPL-MCNC: 109 MG/DL (ref 65–140)
GLUCOSE SERPL-MCNC: 125 MG/DL (ref 65–140)
GLUCOSE SERPL-MCNC: 144 MG/DL (ref 65–140)
GLUCOSE SERPL-MCNC: 168 MG/DL (ref 65–140)
GLUCOSE SERPL-MCNC: 208 MG/DL (ref 65–140)
GLUCOSE SERPL-MCNC: 62 MG/DL (ref 65–140)
GLUCOSE SERPL-MCNC: 75 MG/DL (ref 65–140)
GLUCOSE SERPL-MCNC: 75 MG/DL (ref 65–140)
GLUCOSE SERPL-MCNC: 97 MG/DL (ref 65–140)
HBA1C MFR BLD: 5.9 % (ref 4.2–6.3)
HCT VFR BLD AUTO: 30.7 % (ref 36.5–49.3)
HGB BLD-MCNC: 9.8 G/DL (ref 12–17)
IMM GRANULOCYTES # BLD AUTO: 0.03 THOUSAND/UL (ref 0–0.2)
IMM GRANULOCYTES NFR BLD AUTO: 0 % (ref 0–2)
LYMPHOCYTES # BLD AUTO: 1.13 THOUSANDS/ΜL (ref 0.6–4.47)
LYMPHOCYTES NFR BLD AUTO: 16 % (ref 14–44)
MAGNESIUM SERPL-MCNC: 1.7 MG/DL (ref 1.6–2.6)
MCH RBC QN AUTO: 31.7 PG (ref 26.8–34.3)
MCHC RBC AUTO-ENTMCNC: 31.9 G/DL (ref 31.4–37.4)
MCV RBC AUTO: 99 FL (ref 82–98)
MONOCYTES # BLD AUTO: 0.42 THOUSAND/ΜL (ref 0.17–1.22)
MONOCYTES NFR BLD AUTO: 6 % (ref 4–12)
NEUTROPHILS # BLD AUTO: 5.27 THOUSANDS/ΜL (ref 1.85–7.62)
NEUTS SEG NFR BLD AUTO: 74 % (ref 43–75)
NRBC BLD AUTO-RTO: 0 /100 WBCS
PHOSPHATE SERPL-MCNC: 2.5 MG/DL (ref 2.3–4.1)
PLATELET # BLD AUTO: 216 THOUSANDS/UL (ref 149–390)
PMV BLD AUTO: 11.1 FL (ref 8.9–12.7)
POTASSIUM SERPL-SCNC: 3.8 MMOL/L (ref 3.5–5.3)
PREALB SERPL-MCNC: 5.4 MG/DL (ref 18–40)
PROT SERPL-MCNC: 4.4 G/DL (ref 6.4–8.2)
RBC # BLD AUTO: 3.09 MILLION/UL (ref 3.88–5.62)
SODIUM SERPL-SCNC: 139 MMOL/L (ref 136–145)
TSH SERPL DL<=0.05 MIU/L-ACNC: 2.9 UIU/ML (ref 0.36–3.74)
WBC # BLD AUTO: 7.18 THOUSAND/UL (ref 4.31–10.16)

## 2020-02-04 PROCEDURE — 76705 ECHO EXAM OF ABDOMEN: CPT

## 2020-02-04 PROCEDURE — 83735 ASSAY OF MAGNESIUM: CPT | Performed by: INTERNAL MEDICINE

## 2020-02-04 PROCEDURE — 80053 COMPREHEN METABOLIC PANEL: CPT | Performed by: INTERNAL MEDICINE

## 2020-02-04 PROCEDURE — 83036 HEMOGLOBIN GLYCOSYLATED A1C: CPT | Performed by: INTERNAL MEDICINE

## 2020-02-04 PROCEDURE — 85025 COMPLETE CBC W/AUTO DIFF WBC: CPT | Performed by: INTERNAL MEDICINE

## 2020-02-04 PROCEDURE — 82948 REAGENT STRIP/BLOOD GLUCOSE: CPT

## 2020-02-04 PROCEDURE — 84134 ASSAY OF PREALBUMIN: CPT | Performed by: INTERNAL MEDICINE

## 2020-02-04 PROCEDURE — 99225 PR SBSQ OBSERVATION CARE/DAY 25 MINUTES: CPT | Performed by: PHYSICIAN ASSISTANT

## 2020-02-04 PROCEDURE — 84100 ASSAY OF PHOSPHORUS: CPT | Performed by: INTERNAL MEDICINE

## 2020-02-04 PROCEDURE — 84443 ASSAY THYROID STIM HORMONE: CPT | Performed by: INTERNAL MEDICINE

## 2020-02-04 PROCEDURE — 99222 1ST HOSP IP/OBS MODERATE 55: CPT | Performed by: INTERNAL MEDICINE

## 2020-02-04 PROCEDURE — NC001 PR NO CHARGE: Performed by: INTERNAL MEDICINE

## 2020-02-04 RX ORDER — DEXTROSE AND SODIUM CHLORIDE 5; .9 G/100ML; G/100ML
75 INJECTION, SOLUTION INTRAVENOUS CONTINUOUS
Status: DISCONTINUED | OUTPATIENT
Start: 2020-02-04 | End: 2020-02-07

## 2020-02-04 RX ADMIN — SODIUM CHLORIDE 50 ML/HR: 0.45 INJECTION, SOLUTION INTRAVENOUS at 03:40

## 2020-02-04 RX ADMIN — DEXTROSE AND SODIUM CHLORIDE 75 ML/HR: 5; .9 INJECTION, SOLUTION INTRAVENOUS at 16:50

## 2020-02-04 RX ADMIN — ENOXAPARIN SODIUM 40 MG: 40 INJECTION SUBCUTANEOUS at 08:49

## 2020-02-04 RX ADMIN — SODIUM CHLORIDE 50 ML/HR: 0.45 INJECTION, SOLUTION INTRAVENOUS at 00:45

## 2020-02-04 RX ADMIN — INSULIN LISPRO 1 UNITS: 100 INJECTION, SOLUTION INTRAVENOUS; SUBCUTANEOUS at 23:42

## 2020-02-04 RX ADMIN — PANTOPRAZOLE SODIUM 40 MG: 40 TABLET, DELAYED RELEASE ORAL at 05:28

## 2020-02-04 NOTE — ASSESSMENT & PLAN NOTE
· Elevated LFTs noted incidentally   · , , Alk phos 477  · Repeat LFT for this AM pending   · CT abdomen/pelvis no acute abnormalities - does have chronic changes in relation to chronic pancreatitis/pancreatic mass,  Has followed with Dr Irvin Crouch, mass was found to be benign   · RUQ US pending  · GI consult is pending   · Pt denies abdominal pain

## 2020-02-04 NOTE — SOCIAL WORK
CM discussed pt in care coordination rounds  CM confirmed following info w/ pt  Pt is admitted to [facility] from [facility]  Pt Wishes to return     Pt normally resides w/ wife only in a 1-story house w/ no steps inside and a ramp enter  Pt's wife Karrie Villela (c: [phone]) is primary contact  Pt uses a rolling walker at baseline to ambulate and requires 1x assist at baseline for performing his ADLS  Pt has no additional reported DME in home  [facility]  Pt is currently placed at [facility] since 1/19/19 for i/p rehab  Pt has no mental health issues nor D&A issues  Pt's PCP is Dr Sanam Masters, an independent practitioner located in [facility]  Pt uses [address] [place], [place] [address]way located in [facility] for his Rx needs  Pt is retired and receives [employer] as a source of income  Pt is enrolled in Medicare for healthcare coverage and also has supplemental insurance through [facility] for secondary coverage and Rx benefits  Pt does not have a legally pre-designated medical POA appointed for himself  Pt will require ambulance transport to be arranged to return to SNF at time of d/c     CM reviewed d/c planning process including the following: identifying help at home, patient preference for d/c planning needs, Discharge Lounge, [facility] Meds to Bed program, availability of treatment team to discuss questions or concerns patient and/or family may have regarding understanding medications and recognizing signs and symptoms once discharged  CM also encouraged patient to follow up with all recommended appointments after discharge  Patient advised of importance for patient and family to participate in managing patients medical well being  A post acute care recommendation was made by your care team for [facility]  Discussed Freedom of Choice with patient  Choice is to return/continue services

## 2020-02-04 NOTE — CONSULTS
Consult Note - Wound   Elizabeth Griffith 82 y o  male MRN: [id number]  Unit/Bed#: -01 Encounter: [id number]      History and Present Illness: Patient is a 82year old male patient admitted with elevated liver enzymes , moderate protein malnutrition , chronic pancreatitis , DM 2 and esophagitis   The patient is very frail thin man   Mod A of 2 for turning in the bed   Incontinent of bowel at the time of the assessment   Noted edema of the upper extremities and weeping of the arms of serous fluid   Patient reports he has not been able to walk and spends time in bed or the wheelchair   He is at a facility   Assessment Findings:   1  Left medial ankle - small DTI - POA intact purple area on the bony prominence of the ankle   2  Left heel red and blanchable   3  Right heel small unstageable area due to 100% yellow slough in the center of the wound bed   Top of the foot with scabbed areas   Patient reports it is from tape   The patient does have offloading boots from the facility   4  Left arm - partial thickness skin tear   5  Right and left arms with edema and weeping serous fluid   6  Cara rectal - MASD related to incontinence   7  Left lateral leg - noted 2 areas of dry scabbed wound area     Please review the plan of care listed below         Skin care plans:  1-Calazime to sacrum, buttocks TID and PRN  2-Hydraguard to bilateral heel BID and PRN  3-Elevate heels to offload pressure  4-Ehob cushion when out of bed  5-Turn/repoisiton q2h or when medically stable for pressure re-distribution on skin  6-Moisturize skin daily with skin nourishing cream  7  Allevyn foam to bilateral heels , Left heel akiko with a P and date , Right heel akiko with a T and date peel back and assess every shift then reapply change every 3 days   8   Left arm - cleanse with Normal saline then apply dermagran top with a ABD and edyta wrap change every other day         Vitals: Blood pressure 132/68, pulse 58, temperature (!) 97 2 °F (36 2 °C), temperature source Oral, resp  rate 18, SpO2 96 %  ,There is no height or weight on file to calculate BMI  Wound 01/09/20 Pressure Injury Heel Right (Active)   Wound Image   2/4/2020 11:20 AM   Wound Description Fragile;Slough 2/4/2020  3:00 PM   Staging Unstagable 2/4/2020  3:00 PM   Cara-wound Assessment Clean;Dry; Intact 2/4/2020  3:00 PM   Wound Length (cm) 0 8 cm 2/4/2020  3:00 PM   Wound Width (cm) 0 5 cm 2/4/2020  3:00 PM   Wound Depth (cm) 0 2/4/2020  4:30 AM   Calculated Wound Area (cm^2) 0 4 cm^2 2/4/2020  3:00 PM   Calculated Wound Volume (cm^3) 0 cm^3 2/4/2020  4:30 AM   Change in Wound Size % 100 2/4/2020  4:30 AM   Closure Open to air 2/4/2020  4:30 AM   Drainage Amount Scant 2/4/2020  3:00 PM   Drainage Description Serous 2/4/2020  3:00 PM   Treatments Cleansed;Site care 2/4/2020  3:00 PM   Dressing Foam, Silicon (eg  Allevyn, etc) 2/4/2020  3:00 PM   Patient Tolerance Tolerated well 2/4/2020  3:00 PM   Dressing Status Clean;Dry; Intact 2/4/2020  9:00 AM       Wound 02/04/20 Skin tear Arm Left;Mid (Active)   Wound Image   2/4/2020 11:28 AM   Wound Description Clean;Fragile;Pink 2/4/2020  3:00 PM   Cara-wound Assessment Clean;Dry; Intact;Fragile 2/4/2020  3:00 PM   Wound Length (cm) 1 5 cm 2/4/2020  3:00 PM   Wound Width (cm) 1 5 cm 2/4/2020  3:00 PM   Wound Depth (cm) 0 1 2/4/2020  3:00 PM   Calculated Wound Area (cm^2) 2 25 cm^2 2/4/2020  3:00 PM   Calculated Wound Volume (cm^3) 0 22 cm^3 2/4/2020  3:00 PM   Closure Unapproximated 2/4/2020  4:02 AM   Drainage Amount Small 2/4/2020  3:00 PM   Drainage Description Serous 2/4/2020  3:00 PM   Non-staged Wound Description Partial thickness 2/4/2020  3:00 PM   Treatments Cleansed;Site care 2/4/2020  3:00 PM   Dressing Dermagran gauze 2/4/2020  3:00 PM   Wound packed? No 2/4/2020  4:02 AM   Patient Tolerance Tolerated well 2/4/2020  3:00 PM   Dressing Status Clean;Dry; Intact 2/4/2020  4:02 AM       Wound 02/04/20 Pressure Injury Ankle Anterior;Right (Active)   Wound Image   2/4/2020 11:11 AM   Wound Description Clean;Dry; Intact; Light purple 2/4/2020  3:00 PM   Staging Deep Tissue Injury 2/4/2020  3:00 PM   Cara-wound Assessment Clean;Dry; Intact 2/4/2020  3:00 PM   Wound Length (cm) 0 5 cm 2/4/2020  3:00 PM   Wound Width (cm) 0 5 cm 2/4/2020  3:00 PM   Calculated Wound Area (cm^2) 0 25 cm^2 2/4/2020  3:00 PM   Drainage Amount None 2/4/2020  3:00 PM   Non-staged Wound Description Not applicable 7/9/6749  6:85 PM   Treatments Cleansed;Site care 2/4/2020  3:00 PM   Dressing Foam, Silicon (eg  Allevyn, etc) 2/4/2020  3:00 PM   Patient Tolerance Tolerated well 2/4/2020  3:00 PM       Wound 02/04/20 Pretibial Left (Active)   Wound Image   2/4/2020 11:24 AM   Wound Description Clean;Dry; Intact;Fragile 2/4/2020  3:00 PM   Cara-wound Assessment Clean;Dry; Intact 2/4/2020  3:00 PM   Wound Length (cm) 2 cm 2/4/2020  3:00 PM   Wound Width (cm) 1 cm 2/4/2020  3:00 PM   Calculated Wound Area (cm^2) 2 cm^2 2/4/2020  3:00 PM   Drainage Amount None 2/4/2020  3:00 PM   Non-staged Wound Description Partial thickness 2/4/2020  3:00 PM   Treatments Other (Comment) 2/4/2020  3:00 PM   Dressing Other (Comment) 2/4/2020  3:00 PM   Patient Tolerance Tolerated well 2/4/2020  3:00 PM       Wound care will follow weekly call with questions or concerns at [phone] or tiger gerardo Leiva RN BSN [facility]

## 2020-02-04 NOTE — CONSULTS
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, dm 2, significant weight loss, severe protein calorie malnutrition last seen in GI Clinic by Dr Danie King in December 2019 at which time patient was complaining of continuing weight loss  Currently a resident of nursing facility  Admitted  with failure to thrive, elevated LFTs and anemia  GI consulted for management of elevated LFTs, anemia and recommendations regarding prognosis  1   Elevated liver enzymes  liver synthetic function intact  Liver enzymes trending down this morning  Cholestatic elevation  Hepatitis-B and C screen negative in 2019, NORMA negative and ceruloplasmin normal IgG normal tests in past   Imaging so far does not show any significant liver or biliary pathology  Patient not fluid overloaded and echo was showing normal RV function the past  This is likely DILI which is resolving  Would continue to monitor liver enzymes to ensure down trending  2  Pancreatic fistula  Stable findings on current CT scan  Patient continues to lose weight due to early satiety which could be a consequence of pancreatic fistula  Patient refusing any feeding tube  He had J-tube placed in the past which has since been removed many months ago  Case discussed with Dr Danie King  Will discuss with patient and family about goals of care  Will pursue MRCP depending on discussion with family and patient  3    Significant weightloss/ failure to thrive  likely secondary to #2  Would recommend involving nutrition  and do protein supplementation  Would recommend involving palliative care for goals of care discussion        4   Severe protein calorie malnutrition  Same management as for # 3  Melina Palmer MD  Gastroenterology Fellow  [address]  Date: February 4, 2020    ______________________________________________________________________    HPI:  30-year-old with history of chronic pancreatitis and 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, dm 2, significant weight loss, severe protein calorie malnutrition last seen in GI Clinic by Dr Elias Salomon in December 2019 at which time patient was complaining of continuing weight loss  Currently a resident of nursing facility  Admitted  with failure to thrive, elevated LFTs and anemia  GI consulted for management of elevated LFTs, anemia and recommendations regarding prognosis  Currently, patient says that he continues to have early satiety and continue to lose weight  He denies any nausea, vomiting, heartburn, abdominal pain  His belly girth is not increasing in size  Bowel movements are regular  He is living at a skilled nursing facility  Labs show WBC count 13 elevated, HGB 12 normal, platelet normal,Cr 0 7, , , alk-phos 477, T bili 0 36,  INR 1 08  Chronically low albumin and total protein indicating malnutrition  There was acute elevation of LFTs admission which is now down trending with alk-phos 320, AST 50 and ALT 60   CT done yesterday shows simple cyst in the liver, no biliary dilatation, no gallstones, atrophic pancreas, stable main pancreatic duct dilation at 9 mm, and fistulous connection with greater curvature of the stomach  Patient was recently admitted to outside hospital with RSV pneumonia  At that time he refused feeding tube  He continues to refuse feeding tube per H&P not    REVIEW OF SYSTEMS:    CONSTITUTIONAL: Denies any fever, chills, rigors, and weight loss  HEENT: No earache or tinnitus  Denies hearing loss or visual disturbances  CARDIOVASCULAR: No chest pain or palpitations     RESPIRATORY: Denies any cough, hemoptysis, shortness of breath or dyspnea on exertion  GASTROINTESTINAL: As noted in the History of Present Illness  GENITOURINARY: No problems with urination  Denies any hematuria or dysuria  NEUROLOGIC: No dizziness or vertigo, denies headaches  MUSCULOSKELETAL: Denies any muscle or joint pain  SKIN: Denies skin rashes or itching  ENDOCRINE: Denies excessive thirst  Denies intolerance to heat or cold  PSYCHOSOCIAL: Denies depression or anxiety  Denies any recent memory loss  Historical Information   Past Medical History:   Diagnosis Date    Cardiac disease     Chronic pancreatitis ([place] [address] )     Diabetes mellitus ([place] [address] )     GERD (gastroesophageal reflux disease)     Vitamin D deficient osteomalacia     Weight loss      Past Surgical History:   Procedure Laterality Date    CT GUIDED CHEST TUBE  2/15/2019    GASTROSTOMY TUBE PLACEMENT N/A 1/14/2019    Procedure: INSERTION PEG TUBE;  Surgeon: Char Diego MD;  [facility]: [facility]; Service: Gastroenterology    GASTROSTOMY TUBE PLACEMENT N/A 1/15/2019    Procedure: INSERTION JEJUNOSTOMY TUBE OPEN;  Surgeon: Mckinley Michaels MD;  [facility];  Service: Surgical Oncology    LAPAROTOMY N/A 11/19/2018    Procedure: LAPAROTOMY EXPLORATORY;  Surgeon: Mckinley Michaels MD;  [facility];  Service: Surgical Oncology    CA EDG US EXAM SURGICAL ALTER STOM DUODENUM/JEJUNUM N/A 9/21/2018    Procedure: LINEAR ENDOSCOPIC U/S;  Surgeon: Jovan Brooks MD;  [facility];   Service: Gastroenterology    CA LAP,DIAGNOSTIC ABDOMEN N/A 11/19/2018    Procedure: pancreatic biopsy;  Surgeon: Mckinley Michaels MD;  [facility];  Service: Surgical Oncology    TONSILLECTOMY      UPPER GASTROINTESTINAL ENDOSCOPY      VASCULAR SURGERY      phlebitis many years ago    WRIST GANGLION EXCISION       Social History   Social History     Substance and Sexual Activity   Alcohol Use Not Currently    Frequency: Monthly or less    Drinks per session: 1 or 2    Binge frequency: Less than monthly    Comment: 1 beer a day, formerly     Social History     Substance and Sexual Activity   Drug Use No     Social History     Tobacco Use   Smoking Status Former Smoker    Packs/day: 0 50    Years: 4 00    Pack years: 2 00    Types: Cigarettes    Start date:     Last attempt to quit:     Years since quittin 1   Smokeless Tobacco Never Used   Tobacco Comment    quit about 20 years ago as of 2018     Family History   Problem Relation Age of Onset    No Known Problems Mother     No Known Problems Father     Pancreatic cancer Brother 61    Breast cancer Daughter 36        38s       Meds/Allergies     Medications Prior to Admission   Medication    acetaminophen (TYLENOL) 325 mg tablet    aspirin 81 mg chewable tablet    bisacodyl (DULCOLAX) 10 mg suppository    cholecalciferol (VITAMIN D3) 1,000 units tablet    furosemide (LASIX) 20 mg tablet    magnesium hydroxide (MILK OF MAGNESIA) 400 mg/5 mL oral suspension    Nutritional Supplements (BOOST PUDDING PO)    omeprazole (PriLOSEC) 20 mg delayed release capsule    pancrelipase, Lip-Prot-Amyl, (CREON) 6,000 units delayed release capsule    sodium phosphate-biphosphate (FLEET) 7-19 g 118 mL enema     Current Facility-Administered Medications   Medication Dose Route Frequency    acetaminophen (TYLENOL) tablet 650 mg  650 mg Oral Q4H PRN    aluminum-magnesium hydroxide-simethicone (MYLANTA) 200-200-20 mg/5 mL oral suspension 15 mL  15 mL Oral Q6H PRN    aspirin chewable tablet 81 mg  81 mg Per J Tube Daily    bisacodyl (DULCOLAX) rectal suppository 10 mg  10 mg Rectal Daily PRN    cholecalciferol (VITAMIN D3) tablet 2,000 Units  2,000 Units Oral Daily    docusate sodium (COLACE) capsule 100 mg  100 mg Oral BID PRN    enoxaparin (LOVENOX) subcutaneous injection 40 mg  40 mg Subcutaneous Daily    insulin lispro (HumaLOG) 100 units/mL subcutaneous injection 1-5 Units  1-5 Units Subcutaneous 4 times day  magnesium hydroxide (MILK OF MAGNESIA) 400 mg/5 mL oral suspension 30 mL  30 mL Oral Daily PRN    ondansetron (ZOFRAN) injection 4 mg  4 mg Intravenous Q6H PRN    pantoprazole (PROTONIX) EC tablet 40 mg  40 mg Oral Early Morning    sodium chloride infusion 0 45 %  50 mL/hr Intravenous Continuous       No Known Allergies        Objective     Blood pressure 111/78, pulse 63, temperature 97 6 °F (36 4 °C), temperature source Oral, resp  rate 16, SpO2 96 %  There is no height or weight on file to calculate BMI  Intake/Output Summary (Last 24 hours) at 2/4/2020 1043  Last data filed at 2/4/2020 0900  Gross per 24 hour   Intake 261 67 ml   Output 374 ml   Net -112 33 ml         PHYSICAL EXAM:      General Appearance:   Alert, cooperative, no distress   HEENT:   Normocephalic, atraumatic, anicteric      Neck:  Supple, symmetrical, trachea midline   Lungs:   Clear to auscultation bilaterally; no rales, rhonchi or wheezing; respirations unlabored    Heart[de-identified]   Regular rate and rhythm; no murmur, rub, or gallop     Abdomen:   Soft, non-tender, non-distended; normal bowel sounds; no masses, no organomegaly    Genitalia:   Deferred    Rectal:   Deferred    Extremities:  No cyanosis, clubbing or edema    Pulses:  2+ and symmetric all extremities    Skin:  No jaundice, rashes, or lesions    Lymph nodes:  No palpable cervical lymphadenopathy        Lab Results:   Admission on 02/03/2020   Component Date Value    WBC 02/03/2020 13 11*    RBC 02/03/2020 3 78*    Hemoglobin 02/03/2020 12 0     Hematocrit 02/03/2020 37 8     MCV 02/03/2020 100*    MCH 02/03/2020 31 7     MCHC 02/03/2020 31 7     RDW 02/03/2020 14 7     MPV 02/03/2020 11 2     Platelets 09/23/9509 321     nRBC 02/03/2020 0     Neutrophils Relative 02/03/2020 86*    Immat GRANS % 02/03/2020 1     Lymphocytes Relative 02/03/2020 7*    Monocytes Relative 02/03/2020 5     Eosinophils Relative 02/03/2020 1     Basophils Relative 02/03/2020 0     Neutrophils Absolute 02/03/2020 11 20*    Immature Grans Absolute 02/03/2020 0 07     Lymphocytes Absolute 02/03/2020 0 97     Monocytes Absolute 02/03/2020 0 70     Eosinophils Absolute 02/03/2020 0 13     Basophils Absolute 02/03/2020 0 04     Sodium 02/03/2020 139     Potassium 02/03/2020 4 0     Chloride 02/03/2020 102     CO2 02/03/2020 35*    ANION GAP 02/03/2020 2*    BUN 02/03/2020 29*    Creatinine 02/03/2020 0 70     Glucose 02/03/2020 198*    Calcium 02/03/2020 7 9*    AST 02/03/2020 157*    ALT 02/03/2020 122*    Alkaline Phosphatase 02/03/2020 477*    Total Protein 02/03/2020 5 8*    Albumin 02/03/2020 1 5*    Total Bilirubin 02/03/2020 0 36     eGFR 02/03/2020 87     Protime 02/03/2020 13 6     INR 02/03/2020 1 08     PTT 02/03/2020 30     Lipase 02/03/2020 13*    Hemoglobin A1C 02/04/2020 5 9     EAG 02/04/2020 123     POC Glucose 02/03/2020 138     TSH 3RD GENERATON 02/04/2020 2 900     Sodium 02/04/2020 139     Potassium 02/04/2020 3 8     Chloride 02/04/2020 104     CO2 02/04/2020 35*    ANION GAP 02/04/2020 0*    BUN 02/04/2020 28*    Creatinine 02/04/2020 0 53*    Glucose 02/04/2020 97     Glucose, Fasting 02/04/2020 97     Calcium 02/04/2020 7 2*    AST 02/04/2020 50*    ALT 02/04/2020 68     Alkaline Phosphatase 02/04/2020 320*    Total Protein 02/04/2020 4 4*    Albumin 02/04/2020 1 1*    Total Bilirubin 02/04/2020 0 42     eGFR 02/04/2020 98     Magnesium 02/04/2020 1 7     Phosphorus 02/04/2020 2 5     WBC 02/04/2020 7 18     RBC 02/04/2020 3 09*    Hemoglobin 02/04/2020 9 8*    Hematocrit 02/04/2020 30 7*    MCV 02/04/2020 99*    MCH 02/04/2020 31 7     MCHC 02/04/2020 31 9     RDW 02/04/2020 14 7     MPV 02/04/2020 11 1     Platelets 93/13/5972 216     nRBC 02/04/2020 0     Neutrophils Relative 02/04/2020 74     Immat GRANS % 02/04/2020 0     Lymphocytes Relative 02/04/2020 16     Monocytes Relative 02/04/2020 6     Eosinophils Relative 02/04/2020 4     Basophils Relative 02/04/2020 0     Neutrophils Absolute 02/04/2020 5 27     Immature Grans Absolute 02/04/2020 0 03     Lymphocytes Absolute 02/04/2020 1 13     Monocytes Absolute 02/04/2020 0 42     Eosinophils Absolute 02/04/2020 0 30     Basophils Absolute 02/04/2020 0 03     Prealbumin 02/04/2020 5 4*    POC Glucose 02/04/2020 109        Imaging Studies: I have personally reviewed pertinent imaging studies

## 2020-02-04 NOTE — PROGRESS NOTES
[facility]  Progress Note - Tammi Sarabia 1935, 82 y o  male MRN: [id number]    Unit/Bed#: CW2 216-01 Encounter: [id number]    Primary Care Provider: Kavon Espitia MD   Date and time admitted to hospital: 2/3/2020  4:38 PM    Addendum: family wishing to speak with palliative care  Consult placed  * Elevated liver enzymes  Assessment & Plan  · Elevated LFTs noted incidentally   · , , Alk phos 477  · Repeat LFT for this AM pending   · CT abdomen/pelvis no acute abnormalities - does have chronic changes in relation to chronic pancreatitis/pancreatic mass,  Has followed with Dr Stephan Ortega, mass was found to be benign   · RUQ US pending  · GI consult is pending   · Pt denies abdominal pain     Failure to thrive in adult  Assessment & Plan  · Pt with failure to thrive, limited PO intake, protein calorie malnutrition, deconditioned   · D/w pt family over phone - pt is not interested in feeding tube, however is interested in all other cares  D/w patient today if any procedures were to be required, he is agreeable to treatment otherwise if it is necessary   · Supportive care with IV fluids, nutritional supplementation/nutrition consult  · PT/OT evaluations     Moderate protein-calorie malnutrition   Assessment & Plan  Malnutrition Findings:   Patient refuses any form of artificial feedings even though temporary  Nutrition consult       BMI Findings: There is no height or weight on file to calculate BMI  Chronic pancreatitis [facility])  Assessment & Plan  Will continue to watch currently lipase is not elevated  Diabetes mellitus type 2  Assessment & Plan  Lab Results   Component Value Date    HGBA1C 5 9 02/04/2020       Recent Labs     02/03/20  2351 02/04/20  0600   POCGLU 138 109       Blood Sugar Average: Last 72 hrs:  (P) 123 5     · Very well controlled, A1C 5 9  · Diet controlled     Esophagitis  Assessment & Plan  · Will continue with Protonix        Anemia: Pt was sent in with hgb of 9 0, on admission was found to be 12  This AM 9 6  Lab this AM was drawn from IV site, so possible could be diluted with IV fluids  However lab from ED could also have been concentrated as pt likely dehydrated? Will continue to monitor H/H      VTE Pharmacologic Prophylaxis:   Pharmacologic: Enoxaparin (Lovenox)  Mechanical VTE Prophylaxis in Place: Yes    Patient Centered Rounds: I have performed bedside rounds with nursing staff today  Discussions with Specialists or Other Care Team Provider: RN, TIERRA     Education and Discussions with Family / Patient: patient, wife and son over phone  Attempted to contact daughter Robby Barbosa, no answer  Plan to meet with family in person when they visit this afternoon  Time Spent for Care: 20 minutes  More than 50% of total time spent on counseling and coordination of care as described above  Current Length of Stay: 0 day(s)    Current Patient Status: Observation   Certification Statement: The patient, admitted on an observation basis, will now require > 2 midnight hospital stay due to failure to thrive, elevated LFTs needing evaluation, anemia, needs GI eval, needs PT/OT for safe discharge plan    Discharge Plan: none yet  Not medically stable  Code Status: Level 1 - Full Code      Subjective:   Patient has no acute complaints  He denies pain or nausea  He is aware that he had abnormal labs  Discussion had with patient in case he were to require any procedures, which he is agreeable to  Discussed with his son over phone, the patient wants full cares aside from feeding tube at this time  Objective:     Vitals:   Temp (24hrs), Av 7 °F (36 5 °C), Min:97 4 °F (36 3 °C), Max:98 °F (36 7 °C)    Temp:  [97 4 °F (36 3 °C)-98 °F (36 7 °C)] 97 6 °F (36 4 °C)  HR:  [] 63  Resp:  [16-18] 16  BP: ()/(53-83) 111/78  SpO2:  [93 %-100 %] 96 %  There is no height or weight on file to calculate BMI       Input and Output Summary (last 24 hours): Intake/Output Summary (Last 24 hours) at 2/4/2020 0955  Last data filed at 2/4/2020 0723  Gross per 24 hour   Intake 261 67 ml   Output 274 ml   Net -12 33 ml       Physical Exam:     Physical Exam   Constitutional: No distress  Somnolent, but easily arousable  Chronically ill appearing, frail, elderly male  Cachectic  HENT:   Head: Normocephalic and atraumatic  Eyes: EOM are normal  No scleral icterus  Neck: Normal range of motion  Neck supple  Cardiovascular: Normal rate and regular rhythm  Pulmonary/Chest: Effort normal and breath sounds normal  No respiratory distress  He has no wheezes  Abdominal: Soft  Bowel sounds are normal  He exhibits no distension  There is no tenderness  Musculoskeletal: Normal range of motion  He exhibits edema (b/l upper extremity)  Neurological: He is alert  No cranial nerve deficit  forgetful   Skin: Skin is warm and dry  He is not diaphoretic  Psychiatric: He has a normal mood and affect  His behavior is normal    Vitals reviewed  Additional Data:     Labs:    Results from last 7 days   Lab Units 02/04/20  0848   WBC Thousand/uL 7 18   HEMOGLOBIN g/dL 9 8*   HEMATOCRIT % 30 7*   PLATELETS Thousands/uL 216   NEUTROS PCT % 74   LYMPHS PCT % 16   MONOS PCT % 6   EOS PCT % 4     Results from last 7 days   Lab Units 02/03/20  1847   SODIUM mmol/L 139   POTASSIUM mmol/L 4 0   CHLORIDE mmol/L 102   CO2 mmol/L 35*   BUN mg/dL 29*   CREATININE mg/dL 0 70   ANION GAP mmol/L 2*   CALCIUM mg/dL 7 9*   ALBUMIN g/dL 1 5*   TOTAL BILIRUBIN mg/dL 0 36   ALK PHOS U/L 477*   ALT U/L 122*   AST U/L 157*   GLUCOSE RANDOM mg/dL 198*     Results from last 7 days   Lab Units 02/03/20  1847   INR  1 08     Results from last 7 days   Lab Units 02/04/20  0600 02/03/20  2351   POC GLUCOSE mg/dl 109 138     Results from last 7 days   Lab Units 02/04/20  0849   HEMOGLOBIN A1C % 5 9               * I Have Reviewed All Lab Data Listed Above    * Additional Pertinent Lab Tests Reviewed: All Labs Within Last 24 Hours Reviewed    Imaging:    Imaging Reports Reviewed Today Include: none  Imaging Personally Reviewed by Myself Includes:  none    Recent Cultures (last 7 days):           Last 24 Hours Medication List:     Current Facility-Administered Medications:  acetaminophen 650 mg Oral Q4H PRN Jayyuki García, MD    aluminum-magnesium hydroxide-simethicone 15 mL Oral Q6H PRN Jay MD Ricky    aspirin 81 mg Per J Tube Daily Jay MD Ricky    bisacodyl 10 mg Rectal Daily PRN Jay Ricky, MD    cholecalciferol 2,000 Units Oral Daily Jay MD Ricky    docusate sodium 100 mg Oral BID PRN Jay MD Ricky    enoxaparin 40 mg Subcutaneous Daily Jay MD Ricky    insulin lispro 1-5 Units Subcutaneous 4 times day Jayyuki García MD    magnesium hydroxide 30 mL Oral Daily PRN Jay MD Ricky    ondansetron 4 mg Intravenous Q6H PRN Jayyuki García MD    pantoprazole 40 mg Oral Early Morning Jayyuki García MD    sodium chloride 50 mL/hr Intravenous Continuous Jayyuki García MD Last Rate: 50 mL/hr (02/04/20 0340)        Today, Patient Was Seen By: Edgardo Priest PA-C    ** Please Note: Dictation voice to text software may have been used in the creation of this document   **

## 2020-02-04 NOTE — NUTRITION
02/04/20 1442   Recommendations/Interventions   Nutrition Recommendations Continue diet as ordered  (Honey shakes TID, Ensure pudding TID, pt likes chocolate flavor)

## 2020-02-04 NOTE — ASSESSMENT & PLAN NOTE
· Pt with failure to thrive, limited PO intake, protein calorie malnutrition, deconditioned   · D/w pt family over phone - pt is not interested in feeding tube, however is interested in all other cares    D/w patient today if any procedures were to be required, he is agreeable to treatment otherwise if it is necessary   · Supportive care with IV fluids, nutritional supplementation/nutrition consult  · PT/OT evaluations

## 2020-02-04 NOTE — ASSESSMENT & PLAN NOTE
Lab Results   Component Value Date    HGBA1C 5 9 02/04/2020       Recent Labs     02/03/20  2351 02/04/20  0600   POCGLU 138 109       Blood Sugar Average: Last 72 hrs:  (P) 123 5     · Very well controlled, A1C 5 9  · Diet controlled

## 2020-02-04 NOTE — PLAN OF CARE
Problem: Potential for Falls  Goal: Patient will remain free of falls  Description  INTERVENTIONS:  - Assess patient frequently for physical needs  -  Identify cognitive and physical deficits and behaviors that affect risk of falls  -  [place] fall precautions as indicated by assessment   - Educate patient/family on patient safety including physical limitations  - Instruct patient to call for assistance with activity based on assessment  - Modify environment to reduce risk of injury  - Consider OT/PT consult to assist with strengthening/mobility  Outcome: Progressing     Problem: Prexisting or High Potential for Compromised Skin Integrity  Goal: Skin integrity is maintained or improved  Description  INTERVENTIONS:  - Identify patients at risk for skin breakdown  - Assess and monitor skin integrity  - Assess and monitor nutrition and hydration status  - Monitor labs   - Assess for incontinence   - Turn and reposition patient  - Assist with mobility/ambulation  - Relieve pressure over bony prominences  - Avoid friction and shearing  - Provide appropriate hygiene as needed including keeping skin clean and dry  - Evaluate need for skin moisturizer/barrier cream  - Collaborate with interdisciplinary team   - Patient/family teaching  - Consider wound care consult   Outcome: Progressing     Problem: Nutrition/Hydration-ADULT  Goal: Nutrient/Hydration intake appropriate for improving, restoring or maintaining nutritional needs  Description  Monitor and assess patient's nutrition/hydration status for malnutrition  Collaborate with interdisciplinary team and initiate plan and interventions as ordered  Monitor patient's weight and dietary intake as ordered or per policy  Utilize nutrition screening tool and intervene as necessary  Determine patient's food preferences and provide high-protein, high-caloric foods as appropriate       INTERVENTIONS:  - Monitor oral intake, urinary output, labs, and treatment plans  - Assess nutrition and hydration status and recommend course of action  - Evaluate amount of meals eaten  - Assist patient with eating if necessary   - Allow adequate time for meals  - Recommend/ encourage appropriate diets, oral nutritional supplements, and vitamin/mineral supplements  - Order, calculate, and assess calorie counts as needed  - Recommend, monitor, and adjust tube feedings and TPN/PPN based on assessed needs  - Assess need for intravenous fluids  - Provide specific nutrition/hydration education as appropriate  - Include patient/family/caregiver in decisions related to nutrition  Outcome: Progressing

## 2020-02-04 NOTE — ED PROVIDER NOTES
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  He has developed failure to thrive with protein calorie malnutrition and anasarca  On chart review it is notable that he was recommended hospice today though after talking with the patient has family is refusing this and wants maximum medical management with the exception of intubation or feeding tube placement  After the pacing in knowledge is generalized weakness  Denies any recent fevers chills coughs cold chest pain or lightheadedness  No palpitations  No abdominal pain nausea vomiting diarrhea constipation melena or hematochezia  Prior to Admission Medications   Prescriptions Last Dose Informant Patient Reported? Taking?    Nutritional Supplements (BOOST PUDDING PO) 2020 at Unknown time  Yes Yes   Sig: Take 1 Can by mouth daily   acetaminophen (TYLENOL) 325 mg tablet 2/3/2020 at Unknown time  Yes Yes   Sig: Take 650 mg by mouth every 4 (four) hours as needed for mild pain   aspirin 81 mg chewable tablet 2/3/2020 at Unknown time Care Giver No Yes   Si tablet (81 mg total) by Per J Tube route daily   bisacodyl (DULCOLAX) 10 mg suppository   Yes Yes   Sig: Insert 10 mg into the rectum daily as needed for constipation   cholecalciferol (VITAMIN D3) 1,000 units tablet 2/3/2020 at Unknown time  Yes Yes   Sig: Take 2,000 Units by mouth daily   furosemide (LASIX) 20 mg tablet 2/3/2020 at Unknown time  No Yes   Sig: Take 1 tablet (20 mg total) by mouth every other day   Patient taking differently: Take 40 mg by mouth daily    magnesium hydroxide (MILK OF MAGNESIA) 400 mg/5 mL oral suspension   Yes Yes   Sig: Take 30 mL by mouth daily as needed for constipation   omeprazole (PriLOSEC) 20 mg delayed release capsule 2/2/2020 at Unknown time Care Giver No Yes   Sig: Take 2 capsules (40 mg total) by mouth daily Please give through J tube   pancrelipase, Lip-Prot-Amyl, (CREON) 6,000 units delayed release capsule 2/3/2020 at Unknown time  Yes Yes   Sig: Take 6,000 units of lipase by mouth 3 (three) times a day with meals   sodium phosphate-biphosphate (FLEET) 7-19 g 118 mL enema   Yes Yes   Sig: Insert 1 enema into the rectum daily as needed (constipation)      Facility-Administered Medications: None       Past Medical History:   Diagnosis Date    Cardiac disease     [facility] )     Diabetes mellitus ([facility] )     GERD (gastroesophageal reflux disease)     Vitamin D deficient osteomalacia     Weight loss        Past Surgical History:   Procedure Laterality Date    CT GUIDED CHEST TUBE  2/15/2019    GASTROSTOMY TUBE PLACEMENT N/A 1/14/2019    Procedure: INSERTION PEG TUBE;  Surgeon: Flora Ashley MD;  [facility]: [facility]; Service: Gastroenterology    GASTROSTOMY TUBE PLACEMENT N/A 1/15/2019    Procedure: INSERTION JEJUNOSTOMY TUBE OPEN;  Surgeon: Kandy Crandall MD;  [facility];  Service: Surgical Oncology    LAPAROTOMY N/A 11/19/2018    Procedure: LAPAROTOMY EXPLORATORY;  Surgeon: Kandy Crandall MD;  [facility];  Service: Surgical Oncology    AR EDG US EXAM SURGICAL ALTER STOM DUODENUM/JEJUNUM N/A 9/21/2018    Procedure: LINEAR ENDOSCOPIC U/S;  Surgeon: Mike Neri MD;  [facility]: [facility];   Service: Gastroenterology    AR LAP,DIAGNOSTIC ABDOMEN N/A 11/19/2018    Procedure: pancreatic biopsy;  Surgeon: Kandy Crandall MD;  [facility];  Service: Surgical Oncology    TONSILLECTOMY      UPPER GASTROINTESTINAL ENDOSCOPY      VASCULAR SURGERY      phlebitis many years ago    WRIST GANGLION EXCISION         Family History Problem Relation Age of Onset    No Known Problems Mother     No Known Problems Father     Pancreatic cancer Brother 61    Breast cancer Daughter 36        38s     I have reviewed and agree with the history as documented  Social History     Tobacco Use    Smoking status: Former Smoker     Packs/day: 0 50     Years: 4 00     Pack years: 2 00     Types: Cigarettes     Start date:      Last attempt to quit:      Years since quittin 1    Smokeless tobacco: Never Used    Tobacco comment: quit about 20 years ago as of 2018   Substance Use Topics    Alcohol use: Not Currently     Frequency: Monthly or less     Drinks per session: 1 or 2     Binge frequency: Less than monthly     Comment: 1 beer a day, formerly    Drug use: No        Review of Systems   Constitutional: Positive for fatigue  Negative for chills and fever  HENT: Negative for congestion and sore throat  Eyes: Negative for visual disturbance  Respiratory: Negative for cough and shortness of breath  Cardiovascular: Negative for chest pain and palpitations  Gastrointestinal: Negative for abdominal pain, diarrhea, nausea and vomiting  Genitourinary: Negative for difficulty urinating and dysuria  Musculoskeletal: Negative for myalgias  Skin: Negative for rash  Neurological: Negative for weakness, light-headedness, numbness and headaches         Physical Exam  ED Triage Vitals   Temperature Pulse Respirations Blood Pressure SpO2   20 1641 20 1641 20 1641 20 1641 20 1641   98 °F (36 7 °C) 66 16 138/59 96 %      Temp Source Heart Rate Source Patient Position - Orthostatic VS BP Location FiO2 (%)   20 0712 20 2140 20 2140 20 2140 --   Oral Monitor Lying Right arm       Pain Score       20 1641       No Pain             Orthostatic Vital Signs  Vitals:    20 0500 20 0700 20 0712 20 1300   BP:   111/78 132/68   Pulse: 68 64 63 58 Patient Position - Orthostatic VS:   Lying Lying       Physical Exam   Constitutional: He is oriented to person, place, and time  He appears well-developed  No distress  HENT:   Head: Normocephalic and atraumatic  Mouth/Throat: Oropharynx is clear and moist    Eyes: Pupils are equal, round, and reactive to light  EOM are normal    Neck: Normal range of motion  Neck supple  Cardiovascular: Normal rate, regular rhythm, normal heart sounds and intact distal pulses  Pulmonary/Chest: Effort normal and breath sounds normal    Abdominal: Soft  Bowel sounds are normal  There is no tenderness  Genitourinary: Rectum normal, prostate normal and penis normal  Rectal exam shows guaiac negative stool  Musculoskeletal: Normal range of motion  He exhibits edema  Diffuse pitting edema in all dependent sites   Neurological: He is alert and oriented to person, place, and time  No cranial nerve deficit  Skin: Skin is warm and dry  Capillary refill takes less than 2 seconds  Nursing note and vitals reviewed        ED Medications  Medications   acetaminophen (TYLENOL) tablet 650 mg (650 mg Oral Given 2/3/20 3690)   aspirin chewable tablet 81 mg (81 mg Per J Tube Not Given 2/4/20 0840)   cholecalciferol (VITAMIN D3) tablet 2,000 Units (2,000 Units Oral Not Given 2/4/20 0840)   magnesium hydroxide (MILK OF MAGNESIA) 400 mg/5 mL oral suspension 30 mL (has no administration in time range)   pantoprazole (PROTONIX) EC tablet 40 mg (40 mg Oral Given 2/4/20 0528)   sodium chloride infusion 0 45 % (50 mL/hr Intravenous Rate/Dose Verify 2/4/20 1426)   docusate sodium (COLACE) capsule 100 mg (has no administration in time range)   ondansetron (ZOFRAN) injection 4 mg (has no administration in time range)   aluminum-magnesium hydroxide-simethicone (MYLANTA) 200-200-20 mg/5 mL oral suspension 15 mL (has no administration in time range)   enoxaparin (LOVENOX) subcutaneous injection 40 mg (40 mg Subcutaneous Given 2/4/20 4817) bisacodyl (DULCOLAX) rectal suppository 10 mg (has no administration in time range)   insulin lispro (HumaLOG) 100 units/mL subcutaneous injection 1-5 Units (has no administration in time range)   insulin lispro (HumaLOG) 100 units/mL subcutaneous injection 1-5 Units (has no administration in time range)   iohexol (OMNIPAQUE) 350 MG/ML injection (MULTI-DOSE) 100 mL (100 mL Intravenous Given 2/3/20 2051)       Diagnostic Studies  Results Reviewed     Procedure Component Value Units Date/Time    TSH, 3rd generation [[id number]]  (Normal) Collected:  02/04/20 0848    Lab Status:  Final result Specimen:  Blood from Arm, Left Updated:  02/04/20 1003     TSH 3RD GENERATON 2 900 uIU/mL     Narrative:       Patients undergoing fluorescein dye angiography may retain small amounts of fluorescein in the body for 48-72 hours post procedure  Samples containing fluorescein can produce falsely depressed TSH values  If the patient had this procedure,a specimen should be resubmitted post fluorescein clearance        Comprehensive metabolic panel [[id number]]  (Abnormal) Collected:  02/04/20 0848    Lab Status:  Final result Specimen:  Blood from Arm, Left Updated:  02/04/20 1003     Sodium 139 mmol/L      Potassium 3 8 mmol/L      Chloride 104 mmol/L      CO2 35 mmol/L      ANION GAP 0 mmol/L      BUN 28 mg/dL      Creatinine 0 53 mg/dL      Glucose 97 mg/dL      Glucose, Fasting 97 mg/dL      Calcium 7 2 mg/dL      AST 50 U/L      ALT 68 U/L      Alkaline Phosphatase 320 U/L      Total Protein 4 4 g/dL      Albumin 1 1 g/dL      Total Bilirubin 0 42 mg/dL      eGFR 98 ml/min/1 73sq m     Narrative:       [place] guidelines for Chronic Kidney Disease (CKD):     Stage 1 with normal or high GFR (GFR > 90 mL/min/1 73 square meters)    Stage 2 Mild CKD (GFR = 60-89 mL/min/1 73 square meters)    Stage 3A Moderate CKD (GFR = 45-59 mL/min/1 73 square meters)    Stage 3B Moderate CKD (GFR = 30-44 mL/min/1 73 square meters)    Stage 4 Severe CKD (GFR = 15-29 mL/min/1 73 square meters)    Stage 5 End Stage CKD (GFR <15 mL/min/1 73 square meters)  Note: GFR calculation is accurate only with a steady state creatinine    Prealbumin [[id number]]  (Abnormal) Collected:  02/04/20 0848    Lab Status:  Final result Specimen:  Blood from Arm, Left Updated:  02/04/20 1003     Prealbumin 5 4 mg/dL     Hemoglobin A1c w/EAG Estimation (Orders if not completed within the last 90 days) [[id number]] Collected:  02/04/20 0849    Lab Status:  Final result Specimen:  Blood from Arm, Left Updated:  02/04/20 0927     Hemoglobin A1C 5 9 %       mg/dl     Magnesium [[id number]]  (Normal) Collected:  02/04/20 0848    Lab Status:  Final result Specimen:  Blood from Arm, Left Updated:  02/04/20 0919     Magnesium 1 7 mg/dL     Phosphorus [[id number]]  (Normal) Collected:  02/04/20 0848    Lab Status:  Final result Specimen:  Blood from Arm, Left Updated:  02/04/20 0919     Phosphorus 2 5 mg/dL     CBC and differential [[id number]]  (Abnormal) Collected:  02/04/20 0848    Lab Status:  Final result Specimen:  Blood from Arm, Left Updated:  02/04/20 0916     WBC 7 18 Thousand/uL      RBC 3 09 Million/uL      Hemoglobin 9 8 g/dL      Hematocrit 30 7 %      MCV 99 fL      MCH 31 7 pg      MCHC 31 9 g/dL      RDW 14 7 %      MPV 11 1 fL      Platelets 257 Thousands/uL      nRBC 0 /100 WBCs      Neutrophils Relative 74 %      Immat GRANS % 0 %      Lymphocytes Relative 16 %      Monocytes Relative 6 %      Eosinophils Relative 4 %      Basophils Relative 0 %      Neutrophils Absolute 5 27 Thousands/µL      Immature Grans Absolute 0 03 Thousand/uL      Lymphocytes Absolute 1 13 Thousands/µL      Monocytes Absolute 0 42 Thousand/µL      Eosinophils Absolute 0 30 Thousand/µL      Basophils Absolute 0 03 Thousands/µL     Fingerstick Glucose (POCT) [[id number]]  (Normal) Collected:  02/03/20 2351    Lab Status:  Final result Updated:  02/03/20 2352     POC Glucose 138 mg/dl     Lipase [[id number]]  (Abnormal) Collected:  02/03/20 1926    Lab Status:  Final result Specimen:  Blood Updated:  02/03/20 2102     Lipase 13 u/L     Comprehensive metabolic panel [[id number]]  (Abnormal) Collected:  02/03/20 1847    Lab Status:  Final result Specimen:  Blood from Arm, Left Updated:  02/03/20 1924     Sodium 139 mmol/L      Potassium 4 0 mmol/L      Chloride 102 mmol/L      CO2 35 mmol/L      ANION GAP 2 mmol/L      BUN 29 mg/dL      Creatinine 0 70 mg/dL      Glucose 198 mg/dL      Calcium 7 9 mg/dL       U/L       U/L      Alkaline Phosphatase 477 U/L      Total Protein 5 8 g/dL      Albumin 1 5 g/dL      Total Bilirubin 0 36 mg/dL      eGFR 87 ml/min/1 73sq m     Narrative:       [place] guidelines for Chronic Kidney Disease (CKD):     Stage 1 with normal or high GFR (GFR > 90 mL/min/1 73 square meters)    Stage 2 Mild CKD (GFR = 60-89 mL/min/1 73 square meters)    Stage 3A Moderate CKD (GFR = 45-59 mL/min/1 73 square meters)    Stage 3B Moderate CKD (GFR = 30-44 mL/min/1 73 square meters)    Stage 4 Severe CKD (GFR = 15-29 mL/min/1 73 square meters)    Stage 5 End Stage CKD (GFR <15 mL/min/1 73 square meters)  Note: GFR calculation is accurate only with a steady state creatinine    Protime-INR [[id number]]  (Normal) Collected:  02/03/20 1847    Lab Status:  Final result Specimen:  Blood from Arm, Left Updated:  02/03/20 1910     Protime 13 6 seconds      INR 1 08    APTT [[id number]]  (Normal) Collected:  02/03/20 1847    Lab Status:  Final result Specimen:  Blood from Arm, Left Updated:  02/03/20 1910     PTT 30 seconds     CBC and differential [[id number]]  (Abnormal) Collected:  02/03/20 1847    Lab Status:  Final result Specimen:  Blood from Arm, Left Updated:  02/03/20 1901     WBC 13 11 Thousand/uL      RBC 3 78 Million/uL      Hemoglobin 12 0 g/dL      Hematocrit 37 8 %       fL      MCH 31 7 pg      MCHC 31 7 g/dL RDW 14 7 %      MPV 11 2 fL      Platelets 883 Thousands/uL      nRBC 0 /100 WBCs      Neutrophils Relative 86 %      Immat GRANS % 1 %      Lymphocytes Relative 7 %      Monocytes Relative 5 %      Eosinophils Relative 1 %      Basophils Relative 0 %      Neutrophils Absolute 11 20 Thousands/µL      Immature Grans Absolute 0 07 Thousand/uL      Lymphocytes Absolute 0 97 Thousands/µL      Monocytes Absolute 0 70 Thousand/µL      Eosinophils Absolute 0 13 Thousand/µL      Basophils Absolute 0 04 Thousands/µL                  US right upper quadrant with liver dopplers   Final Result by Sandra Escamilla MD (02/04 1126)         1  Markedly limited study secondary to bowel gas and patient's inability to hold breath  Pancreas and common bile duct are not well visualized  2   Small cyst in the right lobe of liver redemonstrated  3   Questionable mild to moderate right renal atrophy versus under measurement secondary to patient condition  No hydronephrosis  Workstation performed: [id number]         CT abdomen pelvis with contrast   Final Result by Saadia Culp MD (02/03 1047)      Etiology for patient's gastrointestinal bleed not identified on this examination  Stable findings of pancreatic ductal dilatation consistent with history of chronic pancreatitis as well as 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 stomach, which appears more prominent on this exam when comparing to    3/28/2019  Chronic left pleural effusion  Consolidative change at the left lower lobe likely to represent atelectasis  Underlying infiltrate should be excluded clinically              Workstation performed: [id number]               Procedures  Procedures      ED Course                               MDM  Number of Diagnoses or Management Options  Anasarca:   Elevated LFTs:   Failure to thrive in adult:   Diagnosis management comments: 59-year-old male presenting emergency department for evaluation of low hemoglobin  Repeat hemoglobin today is normal, however his CMP does noted elevation of his alk phos as well as his AST and ALT  He has known pancreatic disease and multiple complications in the past though I cannot find records of his blood test being this abnormal today  CT scan with IV contrast does not show any acute changes to explain these findings  His abdominal exam is benign he does not appear toxic  After discussion with the patient and his family we decided to admit for observation and GI consult  Disposition  Final diagnoses:   Elevated LFTs   Failure to thrive in adult   Anasarca     Time reflects when diagnosis was documented in both MDM as applicable and the Disposition within this note     Time User Action Codes Description Comment    2/3/2020 10:18 PM Aleks Shieldss Add [R94 5] Elevated LFTs     2/3/2020 10:18 PM Enrike Chery [R62 51] Failure to thrive (0-17)     2/3/2020 10:18 PM Janet Chery [R62 51] Failure to thrive (0-17)     2/3/2020 10:18 PM Enrike Chery [R62 7] Failure to thrive in adult     2/3/2020 10:18 PM Enrike Chery [R60 1] Anasarca     2/3/2020 10:42 PM Arvin Oliveira Add [E44 0] Moderate protein-calorie malnutrition      2/4/2020  5:40 AM Valeriy BROWN Add [L89 609] Pressure ulcer, heel       ED Disposition     ED Disposition Condition Date/Time Comment    Admit Stable Mon Feb 3, 2020 10:18 PM Case was discussed with AMADEO and the patient's admission status was agreed to be Admission Status: observation status to the service of Dr Masha Alberts           Follow-up Information    None         Current Discharge Medication List      CONTINUE these medications which have NOT CHANGED    Details   acetaminophen (TYLENOL) 325 mg tablet Take 650 mg by mouth every 4 (four) hours as needed for mild pain      aspirin 81 mg chewable tablet 1 tablet (81 mg total) by Per J Tube route daily  Refills: 0    Associated Diagnoses: Dysphagia, unspecified type      bisacodyl (DULCOLAX) 10 mg suppository Insert 10 mg into the rectum daily as needed for constipation      cholecalciferol (VITAMIN D3) 1,000 units tablet Take 2,000 Units by mouth daily      furosemide (LASIX) 20 mg tablet Take 1 tablet (20 mg total) by mouth every other day  Qty: 30 tablet, Refills: 5    Associated Diagnoses: Edema due to malnutrition, due to unspecified malnutrition type (HCC)      magnesium hydroxide (MILK OF MAGNESIA) 400 mg/5 mL oral suspension Take 30 mL by mouth daily as needed for constipation      Nutritional Supplements (BOOST PUDDING PO) Take 1 Can by mouth daily      omeprazole (PriLOSEC) 20 mg delayed release capsule Take 2 capsules (40 mg total) by mouth daily Please give through J tube  Qty: 30 capsule, Refills: 0    Associated Diagnoses: Esophagitis      pancrelipase, Lip-Prot-Amyl, (CREON) 6,000 units delayed release capsule Take 6,000 units of lipase by mouth 3 (three) times a day with meals      sodium phosphate-biphosphate (FLEET) 7-19 g 118 mL enema Insert 1 enema into the rectum daily as needed (constipation)           No discharge procedures on file  ED Provider  Attending physically available and evaluated Miguel Miranda I managed the patient along with the ED Attending      Electronically Signed by         Sadi Bautista MD  02/04/20 0614

## 2020-02-04 NOTE — ASSESSMENT & PLAN NOTE
Malnutrition Findings:   Patient refuses any form of artificial feedings even though temporary  At least we will have patient see nutrition in order to assess caloric/protein needs  Patient is on boost pudding and will do it twice a day  BMI Findings: There is no height or weight on file to calculate BMI

## 2020-02-04 NOTE — ASSESSMENT & PLAN NOTE
Lab Results   Component Value Date    HGBA1C 5 5 01/05/2020   Patient is not on any chronic insulin supplementation or hypoglycemic therapy  However, will check blood sugars every 6 hours while NPO with insulin sliding scale  Hemoglobin A1c  Noted is that current blood sugars at 198 despite poor appetite  No results for input(s): POCGLU in the last 72 hours      Blood Sugar Average: Last 72 hrs:

## 2020-02-04 NOTE — H&P
H&P- Qi Del Valle 1935, 82 y o  male MRN: [id number]    Unit/Bed#: ED 12 Encounter: [id number]    Primary Care Provider: Chema Todd MD   Date and time admitted to hospital: 2/3/2020  4:38 PM        * Elevated liver enzymes  Assessment & Plan  The patient is placed in hospital in order to see gastroenterology  Meanwhile patient is placed NPO with fluids on board  Recheck LFTs for tomorrow  So far CT scan of the abdomen did not show any acute abnormality however there were chronic changes in relation to the pancreatic duct and chronic pancreatitis  Will get right upper quadrant ultrasound  ERCP? MRCP? Moderate protein-calorie malnutrition   Assessment & Plan  Malnutrition Findings:   Patient refuses any form of artificial feedings even though temporary  At least we will have patient see nutrition in order to assess caloric/protein needs  Patient is on boost pudding and will do it twice a day  BMI Findings: There is no height or weight on file to calculate BMI  Chronic pancreatitis [facility])  Assessment & Plan  Will continue to watch currently lipase is not elevated  Diabetes mellitus type 2  Assessment & Plan  Lab Results   Component Value Date    HGBA1C 5 5 01/05/2020   Patient is not on any chronic insulin supplementation or hypoglycemic therapy  However, will check blood sugars every 6 hours while NPO with insulin sliding scale  Hemoglobin A1c  Noted is that current blood sugars at 198 despite poor appetite  No results for input(s): POCGLU in the last 72 hours  Blood Sugar Average: Last 72 hrs:      Esophagitis  Assessment & Plan  Will continue with Protonix  VTE Prophylaxis: Enoxaparin (Lovenox)  / sequential compression device   Code Status: Level 1 - Full Code as discussed with the patient in front of family  The patient however does not want a feeding tube under any circumstances    POLST: There is no POLST form on file for this patient (pre-hospital)    Anticipated Length of Stay:  Patient will be admitted on an Observation basis with an anticipated length of stay of  less than 2 midnights  Justification for Hospital Stay: Please see detailed plans noted above  Chief Complaint:     Increased liver enzymes  History of Present Illness:  Amanda Preston is a 82 y o  male who was sent by [facility] where he is a resident because of an incidental finding of hemoglobin being at 9  Patient denies any active bleeding  Also the patient has a history of chronic pancreatitis and 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 however with uncertain behavior  Reportedly the patient has been convinced in the past of palliative care/hospice  The patient was sent here by [facility] because of new onset anemia as noted  However here in the emergency room hemoglobin was found to be 12 0  According to the son and the wife, his hemoglobin stays approximately within 10 to 11  However a coincidental to this, he was found to have an LFT and alkaline phosphatase which is elevated and currently glucose is 198  Noted BUN creatinine at 29 and 0 70 which is a bit elevated from previous 130 56  However his AST ALT are elevated from 39 currently 157 and 50 to currently 122  Patient denies any nausea or vomiting or pain of the abdomen  No pain after eating  No diarrhea or change in color of stool  Currently, patient laying flat on bed and comfortable  However he is with a flat affect      Review of Systems:    Constitutional:  Denies fever or chills   Eyes:  Denies change in visual acuity   HENT:  Denies nasal congestion or sore throat   Respiratory:  Denies cough or shortness of breath   Cardiovascular:  Denies chest pain or edema   GI:  Denies abdominal pain, nausea, vomiting, bloody stools or diarrhea   :  Denies dysuria   Musculoskeletal:  Denies back pain or joint pain   Integument:  Denies rash   Neurologic:  Denies headache, focal weakness or sensory changes   Endocrine:  Denies polyuria or polydipsia   Lymphatic:  Denies swollen glands   Psychiatric:  Denies depression or anxiety     Past Medical and Surgical History:   Past Medical History:   Diagnosis Date    Cardiac disease     [facility] pancreat[facility] [address] [facility]     [facility]     GERD (gastroesophageal reflux disease)     Vitamin D deficient osteomalacia     Weight loss      Past Surgical History:   Procedure Laterality Date    CT GUIDED CHEST TUBE  2/15/2019    GASTROSTOMY TUBE PLACEMENT N/A 1/14/2019    Procedure: INSERTION PEG TUBE;  Surgeon: Codi Black MD;  [facility]; Service: Gastroenterology    GASTROSTOMY TUBE PLACEMENT N/A 1/15/2019    Procedure: INSERTION JEJUNOSTOMY TUBE OPEN;  Surgeon: Prabhakar Rae MD;  [facility];  Service: Surgical Oncology    LAPAROTOMY N/A 11/19/2018    Procedure: LAPAROTOMY EXPLORATORY;  Surgeon: Prabhakar Rae MD;  [facility];  Service: Surgical Oncology    SC EDG US EXAM SURGICAL ALTER STOM DUODENUM/JEJUNUM N/A 9/21/2018    Procedure: LINEAR ENDOSCOPIC U/S;  Surgeon: Mary Ellen Brooke MD;  [facility];   Service: Gastroenterology    SC LAP,DIAGNOSTIC ABDOMEN N/A 11/19/2018    Procedure: pancreatic biopsy;  Surgeon: Prabhakar Rae MD;  [facility];  Service: Surgical Oncology    TONSILLECTOMY      UPPER GASTROINTESTINAL ENDOSCOPY      VASCULAR SURGERY      phlebitis many years ago    WRIST GANGLION EXCISION         Meds/Allergies:    (Not in a hospital admission)  acetaminophen (TYLENOL) 325 mg tablet Take 650 mg by mouth every 4 (four) hours as needed for mild pain Bridgette Cabrera MD Reordered   Ordered as: acetaminophen (TYLENOL) tablet 650 mg - 650 mg, Oral, Every 4 hours PRN, mild pain, Starting Mon 2/3/20 at 2245   bisacodyl (DULCOLAX) 10 mg suppository Insert 10 mg into the rectum daily as needed for constipation Bridgette Cabrera MD Not Ordered   Ordered as: bisacodyl (DULCOLAX) rectal suppository 10 mg - 10 mg, Rectal, Daily PRN, constipation, Starting Mon 2/3/20 at 2245 (Discontinued)   cholecalciferol (VITAMIN D3) 1,000 units tablet Take 2,000 Units by mouth daily Olaf Leach MD Reordered   Ordered as: cholecalciferol (VITAMIN D3) tablet 2,000 Units - 2,000 Units, Oral, Daily, First dose on Tue 2/4/20 at 0900 25 mcg = 1000 units of cholecalciferol (Vitamin D3)    magnesium hydroxide (MILK OF MAGNESIA) 400 mg/5 mL oral suspension Take 30 mL by mouth daily as needed for constipation Olaf Leach MD Reordered   Ordered as: magnesium hydroxide (MILK OF MAGNESIA) 400 mg/5 mL oral suspension 30 mL - 30 mL, Oral, Daily PRN, constipation, Starting Mon 2/3/20 at 2245   Nutritional Supplements (BOOST PUDDING PO) Take 1 Can by mouth daily Olaf Leach MD Not Ordered   pancrelipase, Lip-Prot-Amyl, (CREON) 6,000 units delayed release capsule Take 6,000 units of lipase by mouth 3 (three) times a day with meals Olaf Leach MD Not Ordered   sodium phosphate-biphosphate (FLEET) 7-19 g 118 mL enema Insert 1 enema into the rectum daily as needed (constipation) Olaf Leach MD Not Ordered   aspirin 81 mg chewable tablet 1 tablet (81 mg total) by Per J Tube route daily Olaf Leach MD Reordered   Ordered as: aspirin chewable tablet 81 mg - 81 mg, Per J Tube, Daily, First dose on Tue 2/4/20 at 0900   furosemide (LASIX) 20 mg tablet Take 1 tablet (20 mg total) by mouth every other day Olaf Leach MD Not Ordered    Patient taking differently: Take 40 mg by mouth daily      omeprazole (PriLOSEC) 20 mg delayed release capsule Take 2 capsules (40 mg total) by mouth daily Please give through J tube Olaf Leach MD Reordered   Ordered as: pantoprazole (PROTONIX) EC tablet 40 mg - 40 mg, Oral, Daily (early morning), First dose on Tue 2/4/20 at 0600 Swallow whole; do not crush, chew or split   LOOK ALIKE SOUND ALIKE MED        Allergies: No Known Allergies  History:  Marital Status: /Civil Union   Occupation:  Used to work in construction; also became an  of insurance  Patient Pre-hospital Living Situation:  Lives at [facility]  Patient Pre-hospital Level of Mobility:  Needs assistance  Patient Pre-hospital Diet Restrictions:  Because of swallowing difficulties and now nutrition, he is on diabetic diet but needs dysphagia with thick nectar liquids; boost pudding once a day  Substance Use History:   Social History     Substance and Sexual Activity   Alcohol Use Not Currently    Comment: 1 beer a day     Social History     Tobacco Use   Smoking Status Former Smoker    Packs/day: 0 50    Years: 4 00    Pack years: 2 00    Types: Cigarettes    Start date:     Last attempt to quit: [place] Years since quittin 1   Smokeless Tobacco Never Used   Tobacco Comment    quit about 20 years ago as of 2018     Social History     Substance and Sexual Activity   Drug Use No       Family History:  Family History   Problem Relation Age of Onset    No Known Problems Mother     No Known Problems Father     Pancreatic cancer Brother 61    Breast cancer Daughter 36        38s       Physical Exam:     Vitals:   Blood Pressure: 97/53 (20 2140)  Pulse: 104 (20 2140)  Temperature: 98 °F (36 7 °C) (20 1641)  Respirations: 18 (20 2140)  SpO2: 93 % (20 2140)    Constitutional:  Well developed, sunken temples, thin, no acute distress, non-toxic appearance   Eyes:  PERRL, conjunctiva normal   HENT:  Atraumatic, external ears normal, nose normal, oropharynx moist, no pharyngeal exudates   Neck- normal range of motion, no tenderness, supple   Respiratory:  No respiratory distress, normal breath sounds, no rales, no wheezing   Cardiovascular:  Normal rate, normal rhythm, no murmurs, no gallops, no rubs   GI:  Soft, nondistended, flat, fair bowel sounds, nontender, no organomegaly, no mass, no rebound, no guarding   :  No costovertebral angle tenderness Musculoskeletal:  Dependent edema of upper and lower extremities and at the flank  no tenderness, no deformities  Back- no tenderness  Integument:  Well hydrated, no rash   Lymphatic:  No lymphadenopathy noted   Neurologic:  Alert &awake, communicative, CN 2-12 normal, normal motor function, normal sensory function, no focal deficits noted   Psychiatric:  Speech and behavior appropriate       Lab Results: I have personally reviewed pertinent reports  Results from last 7 days   Lab Units 02/03/20  1847   WBC Thousand/uL 13 11*   HEMOGLOBIN g/dL 12 0   HEMATOCRIT % 37 8   PLATELETS Thousands/uL 321   NEUTROS PCT % 86*   LYMPHS PCT % 7*   MONOS PCT % 5   EOS PCT % 1     Results from last 7 days   Lab Units 02/03/20  1847   POTASSIUM mmol/L 4 0   CHLORIDE mmol/L 102   CO2 mmol/L 35*   BUN mg/dL 29*   CREATININE mg/dL 0 70   CALCIUM mg/dL 7 9*   ALK PHOS U/L 477*   ALT U/L 122*   AST U/L 157*     Results from last 7 days   Lab Units 02/03/20  1847   INR  1 08           Imaging: I have personally reviewed pertinent reports  Ct Abdomen Pelvis With Contrast    Result Date: 2/3/2020  Narrative: CT ABDOMEN AND PELVIS WITH IV CONTRAST INDICATION:   gi bleed?   COMPARISON:  CT abdomen/pelvis 3/28/2019 TECHNIQUE:  CT examination of the abdomen and pelvis was performed  Axial, sagittal, and coronal 2D reformatted images were created from the source data and submitted for interpretation  Radiation dose length product (DLP) for this visit:  322 25 mGy-cm   This examination, like all CT scans performed in the [facility], was performed utilizing techniques to minimize radiation dose exposure, including the use of iterative  reconstruction and automated exposure control  IV Contrast:  100 mL of iohexol (OMNIPAQUE) Enteric Contrast:  Enteric contrast was not administered  FINDINGS: ABDOMEN LOWER CHEST:  Moderate sized left pleural effusion with consolidative change at the left lower lobe   LIVER/BILIARY TREE:  There are one or more hepatic simple cyst(s) present  No CT evidence of suspicious solid hepatic mass  Normal hepatic contours  No biliary dilatation  GALLBLADDER:  No calcified gallstones  No pericholecystic inflammatory change  SPLEEN:  Stable wedge-shaped scarring at the lateral surface  Numerous surgical clips at the splenic hilum as well as trace fluid at the splenic hilum  PANCREAS:  Atrophic pancreas  Stable main pancreatic ductal dilatation up to 9 mm consistent with a history of chronic pancreatitis  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 is again seen on this study and appears slightly more prominent than comparing to CT examination of 3/28/2019 (series 601, image 46) measuring up to 11 mm on this exam  ADRENAL GLANDS:  Unremarkable  KIDNEYS/URETERS:  One or more simple renal cyst(s) is noted  Otherwise unremarkable kidneys  No hydronephrosis  STOMACH AND BOWEL:  There is colonic diverticulosis without evidence of acute diverticulitis  APPENDIX:  No findings to suggest appendicitis  ABDOMINOPELVIC CAVITY:  Trace free fluid in dependent pelvis    No lymphadenopathy  VESSELS:  Unremarkable for patient's age  PELVIS REPRODUCTIVE ORGANS:  Unremarkable for patient's age  URINARY BLADDER:  Unremarkable  ABDOMINAL WALL/INGUINAL REGIONS:  Unremarkable  OSSEOUS STRUCTURES:  No acute fracture or destructive osseous lesion  Impression: Etiology for patient's gastrointestinal bleed not identified on this examination  Stable findings of pancreatic ductal dilatation consistent with history of chronic pancreatitis as well as 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 stomach, which appears more prominent on this exam when comparing to 3/28/2019  Chronic left pleural effusion  Consolidative change at the left lower lobe likely to represent atelectasis  Underlying infiltrate should be excluded clinically   Workstation performed: [id number]         ** Please Note: Dragon 360 Dictation voice to text software was used in the creation of this document   **

## 2020-02-04 NOTE — MALNUTRITION/BMI
This medical record reflects one or more clinical indicators suggestive of malnutrition    Malnutrition Findings:   Malnutrition type: Chronic illness  Degree of Malnutrition: Other severe protein calorie malnutrition  Malnutrition Characteristics: Inadequate energy, Fluid accumulation(evidenced by poor po intake > 1mo, fluid accumulation +3 LUE/RUE, treated w/diet and supplements  )    BMI Findings:  BMI Classifications: Underweight < 18 5     There is no height or weight on file to calculate BMI  See Nutrition note dated 02/04/20 for additional details  Completed nutrition assessment is viewable in the nutrition documentation

## 2020-02-04 NOTE — ASSESSMENT & PLAN NOTE
The patient is placed in hospital in order to see gastroenterology  Meanwhile patient is placed NPO with fluids on board  Recheck LFTs for tomorrow  So far CT scan of the abdomen did not show any acute abnormality however there were chronic changes in relation to the pancreatic duct and chronic pancreatitis  Will get right upper quadrant ultrasound  ERCP? MRCP?

## 2020-02-04 NOTE — ASSESSMENT & PLAN NOTE
Malnutrition Findings:   Patient refuses any form of artificial feedings even though temporary  Nutrition consult       BMI Findings: There is no height or weight on file to calculate BMI

## 2020-02-04 NOTE — PLAN OF CARE
Problem: Potential for Falls  Goal: Patient will remain free of falls  Description  INTERVENTIONS:  - Assess patient frequently for physical needs  -  Identify cognitive and physical deficits and behaviors that affect risk of falls  -  Hatch fall precautions as indicated by assessment   - Educate patient/family on patient safety including physical limitations  - Instruct patient to call for assistance with activity based on assessment  - Modify environment to reduce risk of injury  - Consider OT/PT consult to assist with strengthening/mobility  Outcome: Progressing     Problem: Prexisting or High Potential for Compromised Skin Integrity  Goal: Skin integrity is maintained or improved  Description  INTERVENTIONS:  - Identify patients at risk for skin breakdown  - Assess and monitor skin integrity  - Assess and monitor nutrition and hydration status  - Monitor labs   - Assess for incontinence   - Turn and reposition patient  - Assist with mobility/ambulation  - Relieve pressure over bony prominences  - Avoid friction and shearing  - Provide appropriate hygiene as needed including keeping skin clean and dry  - Evaluate need for skin moisturizer/barrier cream  - Collaborate with interdisciplinary team   - Patient/family teaching  - Consider wound care consult   Outcome: Progressing     Problem: Nutrition/Hydration-ADULT  Goal: Nutrient/Hydration intake appropriate for improving, restoring or maintaining nutritional needs  Description  Monitor and assess patient's nutrition/hydration status for malnutrition  Collaborate with interdisciplinary team and initiate plan and interventions as ordered  Monitor patient's weight and dietary intake as ordered or per policy  Utilize nutrition screening tool and intervene as necessary  Determine patient's food preferences and provide high-protein, high-caloric foods as appropriate       INTERVENTIONS:  - Monitor oral intake, urinary output, labs, and treatment plans  - Assess nutrition and hydration status and recommend course of action  - Evaluate amount of meals eaten  - Assist patient with eating if necessary   - Allow adequate time for meals  - Recommend/ encourage appropriate diets, oral nutritional supplements, and vitamin/mineral supplements  - Order, calculate, and assess calorie counts as needed  - Recommend, monitor, and adjust tube feedings and TPN/PPN based on assessed needs  - Assess need for intravenous fluids  - Provide specific nutrition/hydration education as appropriate  - Include patient/family/caregiver in decisions related to nutrition  Outcome: Progressing

## 2020-02-05 LAB
ALBUMIN SERPL BCP-MCNC: 1.1 G/DL (ref 3.5–5)
ALP SERPL-CCNC: 320 U/L (ref 46–116)
ALT SERPL W P-5'-P-CCNC: 58 U/L (ref 12–78)
ANION GAP SERPL CALCULATED.3IONS-SCNC: 5 MMOL/L (ref 4–13)
AST SERPL W P-5'-P-CCNC: 37 U/L (ref 5–45)
BASOPHILS # BLD AUTO: 0.05 THOUSANDS/ΜL (ref 0–0.1)
BASOPHILS NFR BLD AUTO: 1 % (ref 0–1)
BILIRUB SERPL-MCNC: 0.3 MG/DL (ref 0.2–1)
BUN SERPL-MCNC: 24 MG/DL (ref 5–25)
CALCIUM SERPL-MCNC: 7.4 MG/DL (ref 8.3–10.1)
CHLORIDE SERPL-SCNC: 107 MMOL/L (ref 100–108)
CO2 SERPL-SCNC: 31 MMOL/L (ref 21–32)
CREAT SERPL-MCNC: 0.62 MG/DL (ref 0.6–1.3)
EOSINOPHIL # BLD AUTO: 0.27 THOUSAND/ΜL (ref 0–0.61)
EOSINOPHIL NFR BLD AUTO: 3 % (ref 0–6)
ERYTHROCYTE [DISTWIDTH] IN BLOOD BY AUTOMATED COUNT: 14.9 % (ref 11.6–15.1)
GFR SERPL CREATININE-BSD FRML MDRD: 92 ML/MIN/1.73SQ M
GLUCOSE SERPL-MCNC: 107 MG/DL (ref 65–140)
GLUCOSE SERPL-MCNC: 111 MG/DL (ref 65–140)
GLUCOSE SERPL-MCNC: 84 MG/DL (ref 65–140)
HCT VFR BLD AUTO: 32.9 % (ref 36.5–49.3)
HGB BLD-MCNC: 10.3 G/DL (ref 12–17)
IMM GRANULOCYTES # BLD AUTO: 0.05 THOUSAND/UL (ref 0–0.2)
IMM GRANULOCYTES NFR BLD AUTO: 1 % (ref 0–2)
LYMPHOCYTES # BLD AUTO: 1.06 THOUSANDS/ΜL (ref 0.6–4.47)
LYMPHOCYTES NFR BLD AUTO: 10 % (ref 14–44)
MCH RBC QN AUTO: 31.7 PG (ref 26.8–34.3)
MCHC RBC AUTO-ENTMCNC: 31.3 G/DL (ref 31.4–37.4)
MCV RBC AUTO: 101 FL (ref 82–98)
MONOCYTES # BLD AUTO: 0.72 THOUSAND/ΜL (ref 0.17–1.22)
MONOCYTES NFR BLD AUTO: 7 % (ref 4–12)
NEUTROPHILS # BLD AUTO: 8.8 THOUSANDS/ΜL (ref 1.85–7.62)
NEUTS SEG NFR BLD AUTO: 78 % (ref 43–75)
NRBC BLD AUTO-RTO: 0 /100 WBCS
PLATELET # BLD AUTO: 268 THOUSANDS/UL (ref 149–390)
PMV BLD AUTO: 11.4 FL (ref 8.9–12.7)
POTASSIUM SERPL-SCNC: 3.8 MMOL/L (ref 3.5–5.3)
PROT SERPL-MCNC: 4.7 G/DL (ref 6.4–8.2)
RBC # BLD AUTO: 3.25 MILLION/UL (ref 3.88–5.62)
SODIUM SERPL-SCNC: 143 MMOL/L (ref 136–145)
WBC # BLD AUTO: 10.95 THOUSAND/UL (ref 4.31–10.16)

## 2020-02-05 PROCEDURE — 80053 COMPREHEN METABOLIC PANEL: CPT | Performed by: PHYSICIAN ASSISTANT

## 2020-02-05 PROCEDURE — 99232 SBSQ HOSP IP/OBS MODERATE 35: CPT | Performed by: PHYSICIAN ASSISTANT

## 2020-02-05 PROCEDURE — 99223 1ST HOSP IP/OBS HIGH 75: CPT | Performed by: INTERNAL MEDICINE

## 2020-02-05 PROCEDURE — 99232 SBSQ HOSP IP/OBS MODERATE 35: CPT | Performed by: INTERNAL MEDICINE

## 2020-02-05 PROCEDURE — 82948 REAGENT STRIP/BLOOD GLUCOSE: CPT

## 2020-02-05 PROCEDURE — 85025 COMPLETE CBC W/AUTO DIFF WBC: CPT | Performed by: PHYSICIAN ASSISTANT

## 2020-02-05 RX ADMIN — PANTOPRAZOLE SODIUM 40 MG: 40 TABLET, DELAYED RELEASE ORAL at 05:33

## 2020-02-05 RX ADMIN — DEXTROSE AND SODIUM CHLORIDE 75 ML/HR: 5; .9 INJECTION, SOLUTION INTRAVENOUS at 14:15

## 2020-02-05 NOTE — ASSESSMENT & PLAN NOTE
· With pancreatic fistula  · GI input appreciated  · Further intervention pending goals of care discussion

## 2020-02-05 NOTE — ASSESSMENT & PLAN NOTE
· Elevated LFTs noted incidentally   · , , Alk phos 477  · CT abdomen/pelvis no acute abnormalities - does have chronic changes in relation to chronic pancreatitis/pancreatic mass,  Has followed with Dr Luther Howell, mass was found to be benign   · RUQ US non diagnostic  · Monitor for now per GI

## 2020-02-05 NOTE — CONSULTS
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    Esophagitis    Diabetes mellitus type 2    Weight loss    [facility] [address] [facility]    Sepsis ([facility] [address] )    Pneumonia    Moderate protein-calorie malnutrition     Abnormal SPEP    Severe protein-calorie malnutrition (HCC)    Disorder of upper esophageal sphincter    Gastric ulcer    Pancreatic pseudocyst    Pneumothorax    Pancreatic fistula    Failure to thrive in adult    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HCC)    Transaminitis    Gastroesophageal reflux disease without esophagitis    Bilateral complete vocal fold paralysis    Ambulatory dysfunction    Lower extremity edema    RSV infection    Acute metabolic encephalopathy due to hypoglycemia    Vitamin D deficiency    Elevated liver enzymes      -  PALLIATIVE AND SUPPORTIVE CARE FAMILY CONFERENCE:    Time of Meetin    Participants:   Dr Kym Owen, MS-IV  Patient - Rachel Yañez  Patient's wife - Eddye Course  Patient's son - Dior Cramer  Via telephone - daughter Shawn Austin  Via telephone - daughter Jacoby Manning   Via telephone - daughter Bubba Booker (akhuong Samuels)    Patient Participation:   Declined to participate     Patient Support System: Wife and 4 children      Meeting Location: initially patient's room in 80, then son and other children via telephone in 5th floor activity room    Advanced Directive of POLST available: no    A family meeting was held for goals of care  This meeting was necessary for determine the appropriate course of treatment  Topics of Discussion:  Discussed patient's gradual decline and progressive malnutrition  Per family, patient was always very clear that he did not want artifical nutrition per NGT or PEG tube  Otherwise, patient had previously said he wants "everything else done"   Discussed that in the setting of inadequate intkae and compeonet of poor absorption, that nutrition status will continue to decline  Family interested in the difference between palliative andhospice care and what each would entail  Discussed in detail regarding both  Family also had questions regarding TPN as they said GI had mentioned it  Discussed that TPN is temporary, has a large amount of potential side effects, and likely will not change patient's overall prognosis  Patient's family requesting to speak with GI again to further go through potential options for treatment  Discussed all of this with each sister over hte telephone separately as they are all in different states  PLAN:  -keep patient level 1 FULL CODE with the only limit on cares being NO artifical nutrition via NGT,keofed or leg tube (potentially amenable to TPN per family since it is via IV, not via tube)  -discuss the options that GI has to offer the patient aside from PEG tube/NGT for artificial nutrition  -place hospice consult as family would like more information about hospice while considering next steps   -reconvene after GI discussion to further assess family's decisions        Time Involved in Meetin minutes, beginning at approximately  1200 and ending at approximately 1315  Code Status: FULL - Level 1   Decisional apparatus:  Patient is not competent on my exam today  If competence is lost, patient's substitute decision maker would default to wife (who has mild dementia), therefore 4 adult children Abimael Arora, Kyung Northern, Rashida) by [place] Act 169  Advance Directive / Living Will / POLST:  none     I have reviewed the patient's controlled substance dispensing history in the Prescription Drug Monitoring Program in compliance with the [facility] regulations before prescribing any controlled substances  We appreciate the invitation to be involved in this patient's care    We will continue to follow patient in order to provide guidance and psychosocial support to patient and family  Please do not hesitate to reach our on call provider through our clinic answering service at  should you have acute symptom control concerns  IDENTIFICATION:        Inpatient consult to Palliative Care     Performed by  Paulo Riley DO     Authorized by Patricio Ryan PA-C            Physician Requesting Consult: Rehana Pardo MD  Reason for Consult / Principal Problem: goals of care  Hx and PE limited by: patient does not want to participate in discussions    HISTORY OF PRESENT ILLNESS:       Siva Silva is a 82 y o  male who presents with chronic pancreatitis, h/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, severe protein calorie malnutrition (most recent albumin 1 1), diabetes who presented from SNF on 2/3 secondary to hgb drop per blood work at facility  Hgb was actually stable  But patient was found with transaminitis and worsening malnutiriton  Patient is clear that he does not want any form of artifical nutrition and per GI note, recommending palliative consultation  Upon my arrival to the room, started to discuss patient's clinical case and condition with patient as well as his son, Nicky Cruz and wife, Andrade Alexandra at which point patient promptly said, I don't want to make decisions anymore, I want my family to make my decisions for me, then closed his eyes  Review of Systems   Constitution: Positive for decreased appetite, malaise/fatigue and weight loss  Musculoskeletal: Negative for back pain  Gastrointestinal: Negative for abdominal pain, nausea and vomiting  Genitourinary: Negative for flank pain  Neurological: Positive for weakness         Past Medical History:   Diagnosis Date    Cardiac disease     Chronic pancreatitis ([address] )     Diabetes mellitus (HCC)     GERD (gastroesophageal reflux disease)     Vitamin D deficient osteomalacia     Weight loss      Past Surgical History:   Procedure Laterality Date    CT GUIDED CHEST TUBE 2/15/2019    GASTROSTOMY TUBE PLACEMENT N/A 2019    Procedure: INSERTION PEG TUBE;  Surgeon: Lenora Waterman MD;  [facility]; Service: Gastroenterology    GASTROSTOMY TUBE PLACEMENT N/A 1/15/2019    Procedure: INSERTION JEJUNOSTOMY TUBE OPEN;  Surgeon: Shari Romero MD;  [facility];  Service: Surgical Oncology    LAPAROTOMY N/A 2018    Procedure: LAPAROTOMY EXPLORATORY;  Surgeon: Shari Romero MD;  [facility];  Service: Surgical Oncology    MI EDG US EXAM SURGICAL ALTER STOM DUODENUM/JEJUNUM N/A 2018    Procedure: LINEAR ENDOSCOPIC U/S;  Surgeon: Nyla Wiggins MD;  [facility];   Service: Gastroenterology    MI LAP,DIAGNOSTIC ABDOMEN N/A 2018    Procedure: pancreatic biopsy;  Surgeon: Shari Romero MD;  [facility];  Service: Surgical Oncology    TONSILLECTOMY      UPPER GASTROINTESTINAL ENDOSCOPY      VASCULAR SURGERY      phlebitis many years ago    WRIST GANGLION EXCISION       Social History     Socioeconomic History    Marital status: /Civil Union     Spouse name: Not on file    Number of children: Not on file    Years of education: Not on file    Highest education level: Not on file   Occupational History    Not on file   Social Needs    Financial resource strain: Not on file    Food insecurity:     Worry: Not on file     Inability: Not on file    Transportation needs:     Medical: Not on file     Non-medical: Not on file   Tobacco Use    Smoking status: Former Smoker     Packs/day: 0 50     Years: 4 00     Pack years: 2 00     Types: Cigarettes     Start date:      Last attempt to quit:      Years since quittin 1    Smokeless tobacco: Never Used    Tobacco comment: quit about 20 years ago as of 2018   Substance and Sexual Activity    Alcohol use: Not Currently     Frequency: Monthly or less     Drinks per session: 1 or 2     Binge frequency: Less than monthly     Comment: 1 beer a day, formerly   [facility] Drug use: No    Sexual activity: Not Currently     Partners: Female   Lifestyle    Physical activity:     Days per week: Not on file     Minutes per session: Not on file    Stress: Not on file   Relationships    Social connections:     Talks on phone: Not on file     Gets together: Not on file     Attends Quaker service: Not on file     Active member of club or organization: Not on file     Attends meetings of clubs or organizations: Not on file     Relationship status: Not on file    Intimate partner violence:     Fear of current or ex partner: Not on file     Emotionally abused: Not on file     Physically abused: Not on file     Forced sexual activity: Not on file   Other Topics Concern    Not on file   Social History Narrative    Not on file     Family History   Problem Relation Age of Onset    No Known Problems Mother     No Known Problems Father     Pancreatic cancer Brother 61    Breast cancer Daughter 39        45s       MEDICATIONS / ALLERGIES:    all current active meds have been reviewed and current meds:   Current Facility-Administered Medications   Medication Dose Route Frequency    acetaminophen (TYLENOL) tablet 650 mg  650 mg Oral Q4H PRN    aluminum-magnesium hydroxide-simethicone (MYLANTA) 200-200-20 mg/5 mL oral suspension 15 mL  15 mL Oral Q6H PRN    bisacodyl (DULCOLAX) rectal suppository 10 mg  10 mg Rectal Daily PRN    dextrose 5 % and sodium chloride 0 9 % infusion  75 mL/hr Intravenous Continuous    docusate sodium (COLACE) capsule 100 mg  100 mg Oral BID PRN    enoxaparin (LOVENOX) subcutaneous injection 40 mg  40 mg Subcutaneous Daily    magnesium hydroxide (MILK OF MAGNESIA) 400 mg/5 mL oral suspension 30 mL  30 mL Oral Daily PRN    ondansetron (ZOFRAN) injection 4 mg  4 mg Intravenous Q6H PRN    pantoprazole (PROTONIX) EC tablet 40 mg  40 mg Oral Early Morning       No Known Allergies    OBJECTIVE:    Physical Exam  Physical Exam   Constitutional: He is oriented to person, place, and time  He appears well-developed  No distress  Frail appearing, thin, chronically ill appearing      HENT:   Head: Normocephalic and atraumatic  Right Ear: External ear normal    Left Ear: External ear normal    Nose: Nose normal    Mouth/Throat: Oropharynx is clear and moist  No oropharyngeal exudate  Eyes: Pupils are equal, round, and reactive to light  Conjunctivae and EOM are normal    Neck: Normal range of motion  Neck supple  Cardiovascular: Normal rate, regular rhythm, normal heart sounds and intact distal pulses  Exam reveals no gallop and no friction rub  No murmur heard  Pulmonary/Chest: Effort normal and breath sounds normal  No stridor  No respiratory distress  He has no wheezes  He has no rales  He exhibits no tenderness  Abdominal: Soft  Bowel sounds are normal  He exhibits no distension  There is no tenderness  Musculoskeletal: Normal range of motion  He exhibits no edema or tenderness  Neurological: He is alert and oriented to person, place, and time  Skin: Skin is warm and dry  He is not diaphoretic  There is pallor  Psychiatric:   Flat affect   Nursing note and vitals reviewed  Lab Results:   I have personally reviewed pertinent labs  , CBC:   Lab Results   Component Value Date    WBC 10 95 (H) 02/05/2020    HGB 10 3 (L) 02/05/2020    HCT 32 9 (L) 02/05/2020     (H) 02/05/2020     02/05/2020    MCH 31 7 02/05/2020    MCHC 31 3 (L) 02/05/2020    RDW 14 9 02/05/2020    MPV 11 4 02/05/2020    NRBC 0 02/05/2020   , CMP:   Lab Results   Component Value Date    SODIUM 143 02/05/2020    K 3 8 02/05/2020     02/05/2020    CO2 31 02/05/2020    BUN 24 02/05/2020    CREATININE 0 62 02/05/2020    CALCIUM 7 4 (L) 02/05/2020    AST 37 02/05/2020    ALT 58 02/05/2020    ALKPHOS 320 (H) 02/05/2020    EGFR 92 02/05/2020     Imaging Studies: reviewed pertinent   EKG, Pathology, and Other Studies: reviewed pertinent     Counseling / Coordination of Care  Total floor / unit time spent today 70 minutes  Greater than 50% of total time was spent with the patient and / or family counseling and / or coordination of care  A description of the counseling / coordination of care: care coordination, family meeting, psychosocial support

## 2020-02-05 NOTE — HOSPICE NOTE
Hospice referral received    Will contact Stacey bell in AM  [facility] aware

## 2020-02-05 NOTE — ASSESSMENT & PLAN NOTE
· Pt with failure to thrive, limited PO intake, protein calorie malnutrition, deconditioned   · Was at [facility] for STR due to return home  · Patient does not want any further intervention at this point, but his opinion appears to be changing  I don't think he has insight into full picture  Family also seems unsure how to proceed  They want him to be comfortable and take him home, but at the same time would consider further interventions  · Palliative Care consulted to help with clarification of goals of care

## 2020-02-05 NOTE — PROGRESS NOTES
Progress Note - Fritz Blizzard 1935, 82 y o  male MRN: [id number]    Unit/Bed#: -01 Encounter: [id number]    Primary Care Provider: Damon Varghese MD   Date and time admitted to hospital: 2/3/2020  4:38 PM        * Failure to thrive in adult  Assessment & Plan  · Pt with failure to thrive, limited PO intake, protein calorie malnutrition, deconditioned   · Was at [facility] for STR due to return home  · Patient does not want any further intervention at this point, but his opinion appears to be changing  I don't think he has insight into full picture  Family also seems unsure how to proceed  They want him to be comfortable and take him home, but at the same time would consider further interventions  · Palliative Care consulted to help with clarification of goals of care  Chronic pancreatitis ([address] )  Assessment & Plan  · With pancreatic fistula  · GI input appreciated  · Further intervention pending goals of care discussion    Elevated liver enzymes  Assessment & Plan  · Elevated LFTs noted incidentally   · , , Alk phos 477  · CT abdomen/pelvis no acute abnormalities - does have chronic changes in relation to chronic pancreatitis/pancreatic mass,  Has followed with Dr Monika Esocbar, mass was found to be benign   · RUQ US non diagnostic  · Monitor for now per GI     Esophagitis  Assessment & Plan  · Will continue with Protonix  Moderate protein-calorie malnutrition   Assessment & Plan  Malnutrition Findings:   Patient refuses any form of artificial feedings even though temporary  Nutrition consult  Degree of Malnutrition: Other severe protein calorie malnutrition    BMI Findings:  BMI Classifications: Underweight < 18 5     Body mass index is 18 72 kg/m²         Diabetes mellitus type 2  Assessment & Plan  Lab Results   Component Value Date    HGBA1C 5 9 02/04/2020       Recent Labs     02/04/20  2121 02/04/20  2339 02/05/20  0624 02/05/20  1132   POCGLU 208* 168* 111 107       Blood Sugar Average: Last 72 hrs:  (P) 876 [id number]     · Very well controlled, A1C 5 9  · Diet controlled   · Will discontinue insulin      VTE Pharmacologic Prophylaxis:   Pharmacologic: Enoxaparin (Lovenox)  Mechanical VTE Prophylaxis in Place: No Patient declines    Patient Centered Rounds: I have performed bedside rounds with nursing staff today  Discussions with Specialists or Other Care Team Provider: case management    Education and Discussions with Family / Patient: patient, son and wife at bedside    Time Spent for Care: 30 minutes  More than 50% of total time spent on counseling and coordination of care as described above  Current Length of Stay: 1 day(s)    Current Patient Status: Inpatient   Certification Statement: The patient will continue to require additional inpatient hospital stay due to goals of care discussion     Discharge Plan: Await goals of care discussion with family and patient    Code Status: Level 1 - Full Code      Subjective:   Patient does not offer complaints at this time  Objective:     Vitals:   Temp (24hrs), Av 6 °F (36 4 °C), Min:97 2 °F (36 2 °C), Max:97 9 °F (36 6 °C)    Temp:  [97 2 °F (36 2 °C)-97 9 °F (36 6 °C)] 97 8 °F (36 6 °C)  HR:  [58-90] 85  Resp:  [16-18] 17  BP: (104-132)/(68-75) 113/75  SpO2:  [91 %-96 %] 91 %  Body mass index is 18 72 kg/m²  Input and Output Summary (last 24 hours): Intake/Output Summary (Last 24 hours) at 2020 1204  Last data filed at 2020 0800  Gross per 24 hour   Intake 195 ml   Output 169 ml   Net 26 ml       Physical Exam:     Physical Exam   Constitutional: No distress  Thin, ill-appearing with muscle wasting  HENT:   Head: Normocephalic and atraumatic  Neck: Normal range of motion  Neck supple  Cardiovascular: Normal rate and regular rhythm  No murmur heard  Pulmonary/Chest: Effort normal and breath sounds normal  No respiratory distress  He has no wheezes  He has no rales  Abdominal: Soft   Bowel sounds are normal  He exhibits no distension  Musculoskeletal: He exhibits no edema  Neurological: He is alert  No cranial nerve deficit  Skin: Skin is warm and dry  No rash noted  Psychiatric: He has a normal mood and affect  Additional Data:     Labs:    Results from last 7 days   Lab Units 02/05/20  0441   WBC Thousand/uL 10 95*   HEMOGLOBIN g/dL 10 3*   HEMATOCRIT % 32 9*   PLATELETS Thousands/uL 268   NEUTROS PCT % 78*   LYMPHS PCT % 10*   MONOS PCT % 7   EOS PCT % 3     Results from last 7 days   Lab Units 02/05/20  0441   SODIUM mmol/L 143   POTASSIUM mmol/L 3 8   CHLORIDE mmol/L 107   CO2 mmol/L 31   BUN mg/dL 24   CREATININE mg/dL 0 62   ANION GAP mmol/L 5   CALCIUM mg/dL 7 4*   ALBUMIN g/dL 1 1*   TOTAL BILIRUBIN mg/dL 0 30   ALK PHOS U/L 320*   ALT U/L 58   AST U/L 37   GLUCOSE RANDOM mg/dL 84     Results from last 7 days   Lab Units 02/03/20  1847   INR  1 08     Results from last 7 days   Lab Units 02/05/20  1132 02/05/20  0624 02/04/20  2339 02/04/20  2121 02/04/20  1722 02/04/20  1617 02/04/20  1430 02/04/20  1230 02/04/20  1149 02/04/20  0600 02/03/20  2351   POC GLUCOSE mg/dl 107 111 168* 208* 144* 62* 125 75 75 109 138     Results from last 7 days   Lab Units 02/04/20  0849   HEMOGLOBIN A1C % 5 9               * I Have Reviewed All Lab Data Listed Above  * Additional Pertinent Lab Tests Reviewed:  [address] Admission Reviewed    Imaging:    Imaging Reports Reviewed Today Include: RUQ US  Imaging Personally Reviewed by Myself Includes:  none    Recent Cultures (last 7 days):           Last 24 Hours Medication List:     Current Facility-Administered Medications:  acetaminophen 650 mg Oral Q4H PRN Karina Barrientos MD    aluminum-magnesium hydroxide-simethicone 15 mL Oral Q6H PRN Karina Barrientos MD    aspirin 81 mg Per J Tube Daily Karina Barrientos MD    bisacodyl 10 mg Rectal Daily PRN Karina Barrientos MD    cholecalciferol 2,000 Units Oral Daily Karina Barrientos MD dextrose 5 % and sodium chloride 0 9 % 75 mL/hr Intravenous Continuous Lauryn Blunt PA-C Last Rate: 75 mL/hr (02/05/20 0907)   docusate sodium 100 mg Oral BID PRN Klaus Jefefry MD    enoxaparin 40 mg Subcutaneous Daily Klaus Jeffery MD    insulin lispro 1-5 Units Subcutaneous TID AC Lauryn Blunt PA-C    insulin lispro 1-5 Units Subcutaneous HS Lauryn Blunt PA-C    magnesium hydroxide 30 mL Oral Daily PRN Klaus Jeffery MD    ondansetron 4 mg Intravenous Q6H PRN Klaus Jeffery MD    pantoprazole 40 mg Oral Early Morning Klaus Jeffery MD         Today, Patient Was Seen By: Shivani Gerardo PA-C    ** Please Note: Dictation voice to text software may have been used in the creation of this document   **

## 2020-02-05 NOTE — SOCIAL WORK
CM received a hospice referral for pt  CM met pt's wife and son at bedside and informed of the above  Pt's wife and son preferred referral to  IP hospice, referral sent via [address]  Per pt's family, they have no definite decision at this time but just want more information from hospice liaison  A post acute care recommendation was made by your care team for IP vs Home Hospice  Discussed [place] [facility] with caregiver  List of facilities given to caregiver via in person  caregiver aware the list is custom filtered for them by zip code location and that [facility] post acute providers are designated

## 2020-02-05 NOTE — ASSESSMENT & PLAN NOTE
Malnutrition Findings:   Patient refuses any form of artificial feedings even though temporary  Nutrition consult  Degree of Malnutrition: Other severe protein calorie malnutrition    BMI Findings:  BMI Classifications: Underweight < 18 5     Body mass index is 18 72 kg/m²

## 2020-02-06 PROCEDURE — 99232 SBSQ HOSP IP/OBS MODERATE 35: CPT | Performed by: PHYSICIAN ASSISTANT

## 2020-02-06 PROCEDURE — 99232 SBSQ HOSP IP/OBS MODERATE 35: CPT | Performed by: INTERNAL MEDICINE

## 2020-02-06 RX ADMIN — ENOXAPARIN SODIUM 40 MG: 40 INJECTION SUBCUTANEOUS at 08:08

## 2020-02-06 RX ADMIN — DEXTROSE AND SODIUM CHLORIDE 75 ML/HR: 5; .9 INJECTION, SOLUTION INTRAVENOUS at 03:05

## 2020-02-06 RX ADMIN — DEXTROSE AND SODIUM CHLORIDE 75 ML/HR: 5; .9 INJECTION, SOLUTION INTRAVENOUS at 16:12

## 2020-02-06 NOTE — PROGRESS NOTES
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    Esophagitis    Diabetes mellitus type 2    Weight loss    [facility] [address] [facility]    Sepsis ([facility] [address] [facility]    Pneumonia    Moderate protein-calorie malnutrition     Abnormal SPEP    Severe protein-calorie malnutrition (HCC)    Disorder of upper esophageal sphincter    Gastric ulcer    Pancreatic pseudocyst    Pneumothorax    Pancreatic fistula    Failure to thrive in adult    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HCC)    Transaminitis    Gastroesophageal reflux disease without esophagitis    Bilateral complete vocal fold paralysis    Ambulatory dysfunction    Lower extremity edema    RSV infection    Acute metabolic encephalopathy due to hypoglycemia    Vitamin D deficiency    Elevated liver enzymes         Plan:  1  Goals -    - Family still deciding whether or not to pursue hospice or further disease directed therapy including a PEG tube   - If still not interested in a PEG tube they will likely consider hospice   - Reconvene tomorrow with sister Michael Sawyer   - Keep patient Level 1 - FULL CODE   - GI confirmed with family that TPN is not an option     Code Status: Full - Level 1   Decisional apparatus:  Patient is not competent on my exam today  If competence is lost, patient's substitute decision maker would default to his wife, Renaldo Calvert, by [place]  Advance Directive / Living Will / POLST:  none    Interval history:       Family met with GI yesterday afternoon and dicussed that [facility] was not a feasible [facility]  Hospice was consulted to discuss his options and was going to attempt to contact his son, Rashad Arizmendi, today  His wife stated that overnight he was frustrated about being in the hospital and she is concerned that he doesn't understand much of what is going on   She was also confused today and could not remember what decision the family made yesterday  Spoke to sonWarren () who said that the family decided yesterday to try and convince him one more time tonight to get a PEG tube for artificial nutrition and if he is still not amenable to that they will likely pursue hospice  MEDICATIONS / ALLERGIES:     all current active meds have been reviewed and current meds:   Current Facility-Administered Medications   Medication Dose Route Frequency    acetaminophen (TYLENOL) tablet 650 mg  650 mg Oral Q4H PRN    aluminum-magnesium hydroxide-simethicone (MYLANTA) 200-200-20 mg/5 mL oral suspension 15 mL  15 mL Oral Q6H PRN    bisacodyl (DULCOLAX) rectal suppository 10 mg  10 mg Rectal Daily PRN    dextrose 5 % and sodium chloride 0 9 % infusion  75 mL/hr Intravenous Continuous    docusate sodium (COLACE) capsule 100 mg  100 mg Oral BID PRN    enoxaparin (LOVENOX) subcutaneous injection 40 mg  40 mg Subcutaneous Daily    magnesium hydroxide (MILK OF MAGNESIA) 400 mg/5 mL oral suspension 30 mL  30 mL Oral Daily PRN    ondansetron (ZOFRAN) injection 4 mg  4 mg Intravenous Q6H PRN    pantoprazole (PROTONIX) EC tablet 40 mg  40 mg Oral Early Morning       No Known Allergies    OBJECTIVE:    Physical Exam  Physical Exam   Constitutional: Vital signs are normal  He appears cachectic  He is sleeping  He has a sickly appearance  Thin, frail appearing, drowsy   HENT:   Head: Normocephalic and atraumatic  Nose: Nose normal    Mouth/Throat: Oropharynx is clear and moist  No oropharyngeal exudate  Eyes: Pupils are equal, round, and reactive to light  Conjunctivae and EOM are normal    Cardiovascular: Normal rate, regular rhythm, normal heart sounds and intact distal pulses  Pulmonary/Chest: Effort normal and breath sounds normal  No stridor  No respiratory distress  He has no wheezes  He has no rales  Abdominal: Soft  Bowel sounds are normal  He exhibits no distension  There is no tenderness   There is no guarding  Musculoskeletal: He exhibits no edema, tenderness or deformity  Neurological: He is alert  GCS eye subscore is 4  GCS verbal subscore is 5  GCS motor subscore is 6  Sleepy, did not participate in conversation that I had with him and his wife   Skin: Skin is warm and dry  Lab Results: I have personally reviewed pertinent labs  Lab Results   Component Value Date    WBC 10 95 (H) 02/05/2020    HGB 10 3 (L) 02/05/2020    HCT 32 9 (L) 02/05/2020     (H) 02/05/2020     02/05/2020     Lab Results   Component Value Date    SODIUM 143 02/05/2020    K 3 8 02/05/2020     02/05/2020    CO2 31 02/05/2020    AGAP 5 02/05/2020    BUN 24 02/05/2020    CREATININE 0 62 02/05/2020    GLUC 84 02/05/2020    GLUF 97 02/04/2020    CALCIUM 7 4 (L) 02/05/2020    AST 37 02/05/2020    ALT 58 02/05/2020    ALKPHOS 320 (H) 02/05/2020    TP 4 7 (L) 02/05/2020    TBILI 0 30 02/05/2020    EGFR 92 02/05/2020     Imaging Studies: reviewed pertinent   EKG, Pathology, and Other Studies: reviewed pertinent    Counseling / Coordination of Care  Total floor / unit time spent today 30 minutes  Greater than 50% of total time was spent with the patient and / or family counseling and / or coordination of care   A description of the counseling / coordination of care: discussion of goals of care, discussion of options of hospice, support for family    19 Shannon English Student

## 2020-02-06 NOTE — ASSESSMENT & PLAN NOTE
· With pancreatic fistula  · GI input appreciated  · Further intervention pending goals of care discussion  · Patient does not want PEG tube

## 2020-02-06 NOTE — ASSESSMENT & PLAN NOTE
· Elevated LFTs noted incidentally  · Per GI, suspect DILI which is resolving  · CT abdomen/pelvis no acute abnormalities - does have chronic changes in relation to chronic pancreatitis/pancreatic mass,  Has followed with Dr Stephan Ortega, mass was found to be benign   · RUQ US non diagnostic  · Monitor for now per GI

## 2020-02-06 NOTE — ASSESSMENT & PLAN NOTE
Malnutrition Findings:     Nutrition consult  Degree of Malnutrition: Other severe protein calorie malnutrition    BMI Findings:  BMI Classifications: Underweight < 18 5     Body mass index is 18 72 kg/m²  Patient does not want a PEG tube and TPN is not an option in this circumstance

## 2020-02-06 NOTE — PROGRESS NOTES
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  · Was at [facility] for STR due to return home  · Family still deciding on options and weighing continuing medical care vs hospice care  Appropriate for comfort care per GI  Hospice liaison to discuss role of hospice and options with family hopefully today  · Palliative Care consult appreciated and continue to help with clarification of goals of care  Chronic pancreatitis ([facility] [address] )  Assessment & Plan  · With pancreatic fistula  · GI input appreciated  · Further intervention pending goals of care discussion  · Patient does not want PEG tube    Elevated liver enzymes  Assessment & Plan  · Elevated LFTs noted incidentally  · Per GI, suspect DILI which is resolving  · CT abdomen/pelvis no acute abnormalities - does have chronic changes in relation to chronic pancreatitis/pancreatic mass,  Has followed with Dr Maldonado Machado, mass was found to be benign   · RUQ US non diagnostic  · Monitor for now per GI     Esophagitis  Assessment & Plan  · Will continue with Protonix  Moderate protein-calorie malnutrition   Assessment & Plan  Malnutrition Findings:     Nutrition consult  Degree of Malnutrition: Other severe protein calorie malnutrition    BMI Findings:  BMI Classifications: Underweight < 18 5     Body mass index is 18 72 kg/m²  Patient does not want a PEG tube and TPN is not an option in this circumstance      Diabetes mellitus type 2  Assessment & Plan  Lab Results   Component Value Date    HGBA1C 5 9 02/04/2020       Recent Labs 20  2121 20  2339 20  0624 20  1132   POCGLU 208* 168* 111 107       Blood Sugar Average: Last 72 hrs:  (P) 013 [id number]     · Very well controlled, A1C 5 9  · Diet controlled   · Discontinued insulin      VTE Pharmacologic Prophylaxis:   Pharmacologic: Enoxaparin (Lovenox)  Mechanical VTE Prophylaxis in Place: Yes    Patient Centered Rounds: I have performed bedside rounds with nursing staff today  Discussions with Specialists or Other Care Team Provider: case management    Education and Discussions with Family / Patient: Wife at bedside    Time Spent for Care: 30 minutes  More than 50% of total time spent on counseling and coordination of care as described above  Current Length of Stay: 2 day(s)    Current Patient Status: Inpatient   Certification Statement: The patient will continue to require additional inpatient hospital stay due to see above    Discharge Plan: Await determination from family about goals of care    Code Status: Level 1 - Full Code      Subjective:   Patient resting  No events overnight  Objective:     Vitals:   Temp (24hrs), Av 5 °F (36 4 °C), Min:97 5 °F (36 4 °C), Max:97 5 °F (36 4 °C)    Temp:  [97 5 °F (36 4 °C)] 97 5 °F (36 4 °C)  HR:  [69-74] 74  Resp:  [18-19] 19  BP: ()/(55-74) 121/74  SpO2:  [92 %-97 %] 97 %  Body mass index is 18 72 kg/m²  Input and Output Summary (last 24 hours): Intake/Output Summary (Last 24 hours) at 2020 1213  Last data filed at 2020 0900  Gross per 24 hour   Intake 1491 25 ml   Output 60 ml   Net 1431 25 ml       Physical Exam:     Physical Exam   Constitutional: No distress  Thin with muscle wasting   HENT:   Head: Normocephalic and atraumatic  Neck: Normal range of motion  Neck supple  Cardiovascular: Normal rate and regular rhythm  No murmur heard  Pulmonary/Chest: Effort normal and breath sounds normal  No respiratory distress  He has no wheezes  He has no rales     Abdominal: Soft  Bowel sounds are normal  He exhibits no distension  Musculoskeletal: He exhibits no edema  Neurological: He is alert  No cranial nerve deficit  Skin: Skin is warm and dry  No rash noted  Psychiatric: He has a normal mood and affect  Additional Data:     Labs:    Results from last 7 days   Lab Units 02/05/20  0441   WBC Thousand/uL 10 95*   HEMOGLOBIN g/dL 10 3*   HEMATOCRIT % 32 9*   PLATELETS Thousands/uL 268   NEUTROS PCT % 78*   LYMPHS PCT % 10*   MONOS PCT % 7   EOS PCT % 3     Results from last 7 days   Lab Units 02/05/20  0441   SODIUM mmol/L 143   POTASSIUM mmol/L 3 8   CHLORIDE mmol/L 107   CO2 mmol/L 31   BUN mg/dL 24   CREATININE mg/dL 0 62   ANION GAP mmol/L 5   CALCIUM mg/dL 7 4*   ALBUMIN g/dL 1 1*   TOTAL BILIRUBIN mg/dL 0 30   ALK PHOS U/L 320*   ALT U/L 58   AST U/L 37   GLUCOSE RANDOM mg/dL 84     Results from last 7 days   Lab Units 02/03/20  1847   INR  1 08     Results from last 7 days   Lab Units 02/05/20  1132 02/05/20  0624 02/04/20  2339 02/04/20  2121 02/04/20  1722 02/04/20  1617 02/04/20  1430 02/04/20  1230 02/04/20  1149 02/04/20  0600 02/03/20  2351   POC GLUCOSE mg/dl 107 111 168* 208* 144* 62* 125 75 75 109 138     Results from last 7 days   Lab Units 02/04/20  0849   HEMOGLOBIN A1C % 5 9               * I Have Reviewed All Lab Data Listed Above    * Additional Pertinent Lab Tests Reviewed: No New Labs Available For Today    Imaging:    Imaging Reports Reviewed Today Include: none  Imaging Personally Reviewed by Myself Includes:  none    Recent Cultures (last 7 days):           Last 24 Hours Medication List:     Current Facility-Administered Medications:  acetaminophen 650 mg Oral Q4H PRN Lane Blanco MD    aluminum-magnesium hydroxide-simethicone 15 mL Oral Q6H PRN Lane Blanco MD    bisacodyl 10 mg Rectal Daily PRN Lane Blanco MD    dextrose 5 % and sodium chloride 0 9 % 75 mL/hr Intravenous Continuous Lauryn Blunt PA-C Last Rate: 75 mL/hr (02/06/20 0854)   docusate sodium 100 mg Oral BID PRN Shanika Palafox MD    enoxaparin 40 mg Subcutaneous Daily Shanika Palafox MD    magnesium hydroxide 30 mL Oral Daily PRN Shanika Palafox MD    ondansetron 4 mg Intravenous Q6H PRN Shanika Palafox MD    pantoprazole 40 mg Oral Early Morning Shanika Palafox MD         Today, Patient Was Seen By: Yenifer Daley PA-C    ** Please Note: Dictation voice to text software may have been used in the creation of this document   **

## 2020-02-06 NOTE — PLAN OF CARE
Problem: Potential for Falls  Goal: Patient will remain free of falls  Description  INTERVENTIONS:  - Assess patient frequently for physical needs  -  Identify cognitive and physical deficits and behaviors that affect risk of falls  -  Reading fall precautions as indicated by assessment   - Educate patient/family on patient safety including physical limitations  - Instruct patient to call for assistance with activity based on assessment  - Modify environment to reduce risk of injury  - Consider OT/PT consult to assist with strengthening/mobility  2/6/2020 0335 by Jude Johnson RN  Outcome: Progressing     Problem: Prexisting or High Potential for Compromised Skin Integrity  Goal: Skin integrity is maintained or improved  Description  INTERVENTIONS:  - Identify patients at risk for skin breakdown  - Assess and monitor skin integrity  - Assess and monitor nutrition and hydration status  - Monitor labs   - Assess for incontinence   - Turn and reposition patient  - Assist with mobility/ambulation  - Relieve pressure over bony prominences  - Avoid friction and shearing  - Provide appropriate hygiene as needed including keeping skin clean and dry  - Evaluate need for skin moisturizer/barrier cream  - Collaborate with interdisciplinary team   - Patient/family teaching  - Consider wound care consult   2/6/2020 0335 by Jude Johnson RN  Outcome: Not Progressing     Problem: Nutrition/Hydration-ADULT  Goal: Nutrient/Hydration intake appropriate for improving, restoring or maintaining nutritional needs  Description  Monitor and assess patient's nutrition/hydration status for malnutrition  Collaborate with interdisciplinary team and initiate plan and interventions as ordered  Monitor patient's weight and dietary intake as ordered or per policy  Utilize nutrition screening tool and intervene as necessary  Determine patient's food preferences and provide high-protein, high-caloric foods as appropriate  INTERVENTIONS:  - Monitor oral intake, urinary output, labs, and treatment plans  - Assess nutrition and hydration status and recommend course of action  - Evaluate amount of meals eaten  - Assist patient with eating if necessary   - Allow adequate time for meals  - Recommend/ encourage appropriate diets, oral nutritional supplements, and vitamin/mineral supplements  - Order, calculate, and assess calorie counts as needed  - Recommend, monitor, and adjust tube feedings and TPN/PPN based on assessed needs  - Assess need for intravenous fluids  - Provide specific nutrition/hydration education as appropriate  - Include patient/family/caregiver in decisions related to nutrition  2/6/2020 0335 by Mata Mcnair, RN  Outcome: Not Progressing

## 2020-02-06 NOTE — ASSESSMENT & PLAN NOTE
Lab Results   Component Value Date    HGBA1C 5 9 02/04/2020       Recent Labs     02/04/20  2121 02/04/20  2339 02/05/20  0624 02/05/20  1132   POCGLU 208* 168* 111 107       Blood Sugar Average: Last 72 hrs:  (P) 656 [id number]     · Very well controlled, A1C 5 9  · Diet controlled   · Discontinued insulin

## 2020-02-06 NOTE — ASSESSMENT & PLAN NOTE
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

## 2020-02-06 NOTE — PROGRESS NOTES
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, T2 DM admitted 1 day ago significant weight loss, severe protein calorie malnutrition, failure to thrive        Patient Active Problem List    Diagnosis Date Noted    Elevated liver enzymes 02/03/2020    RSV infection 01/04/2020    Acute metabolic encephalopathy due to hypoglycemia 01/04/2020    Vitamin D deficiency 01/04/2020    Ambulatory dysfunction 12/30/2019    Lower extremity edema 12/30/2019    Bilateral complete vocal fold paralysis 04/26/2019    Gastroesophageal reflux disease without esophagitis 03/11/2019    Transaminitis 02/16/2019    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/2019    Pneumothorax 02/14/2019    Pancreatic fistula 02/14/2019    Failure to thrive in adult 02/14/2019    Pancreatic pseudocyst 01/24/2019    Gastric ulcer 01/18/2019    Disorder of upper esophageal sphincter 01/15/2019    Abnormal SPEP 01/11/2019    Moderate protein-calorie malnutrition  01/05/2019    Sepsis ([address] ) 01/01/2019    Pneumonia 01/01/2019    Severe protein-calorie malnutrition ([address] ) 01/01/2019    Chronic pancreatitis ([facility] [address] ) 12/03/2018    Diabetes mellitus type 2 10/15/2018    Weight loss 10/15/2018    Esophagitis 10/10/2018    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     Significant weightloss/ failure to thrive  Severe protein calorie malnutrition  Multifactorial with chronic pancreatitis and sequelae/pancreatic insufficiency, pneumonia, ambulatory dysfunction, ?depression contributing to progressive decline over the past several months  Was at long term care facility before admission, where family was discussing home hospice  He does need long term nutrition but has poor PO intake, lack of appetite and is not likely meeting his nutritional needs as evidenced by hypoalbuminemia, macrocytosis, and progressive weight loss/cachexia   He did previously have surgical J tube placed, but this was accidentally removed  He is currently refusing feeding tubes, lab draws, and further imaging evaluation  At this time, there is no role for TPN, as risks >> benefits given refusal of long term enteral nutrition and limitation of short term benefit  I had a long discussion with the family including daughter who is in an RN) detailing above   Continue nutrition follow up and nutritional supplementation as recommended  Recommend close follow up with palliative care to discuss goals of care      Pancreatic fistula  Stable findings on current CT scan  Patient continues to lose weight due only partially contributed to by pancreatic insufficiency and chronic pancreatitis sequelae  Continue creon  He had J-tube placed in the past which was accidentally removed  Presentation and care discussed with advanced endoscopist  Further evaluation can be pursued with MRCP pending goals of care  Elevated liver enzymes  liver synthetic function intact  Liver enzymes trending down this morning  Cholestatic elevation  Hepatitis-B and C screen negative in 2019, NORMA negative and ceruloplasmin normal IgG normal tests in past   Imaging so far does not show any significant liver or biliary pathology  Patient not fluid overloaded and echo was showing normal RV function the past  This is likely DILI which is resolving  Would continue to monitor liver enzymes to ensure down trending     ______________________________________________________________________    Subjective:     Patient is an 59-year-old male with chronic pancreatitis complicated by 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, T2 DM presenting with significant weight loss, severe protein calorie malnutrition, failure to thrive    Family including son, daughter, wife her present at bedside and contribute to history  Daughter (RN) is available via phone  They met with palliative care today and have continued concerns regarding nutritional options  Medication Administration - last 24 hours from 02/04/2020 2228 to 02/05/2020 2228       Date/Time Order Dose Route Action Action by     02/05/2020 0851 aspirin chewable tablet 81 mg 81 mg Per J Tube Not Given Alie Ochoa RN     02/05/2020 0575 cholecalciferol (VITAMIN D3) tablet 2,000 Units 2,000 Units Oral Not Given Alie Ochoa RN     02/05/2020 0533 pantoprazole (PROTONIX) EC tablet 40 mg 40 mg Oral Given Laury Patel, RN     02/05/2020 6076 sodium chloride infusion 0 45 % 0 mL/hr Intravenous Stopped Alie Ochoa RN     02/05/2020 0851 enoxaparin (LOVENOX) subcutaneous injection 40 mg 40 mg Subcutaneous Not Given Alie Ochoa RN     02/05/2020 1135 insulin lispro (HumaLOG) 100 units/mL subcutaneous injection 1-5 Units 1 Units Subcutaneous Not Given Alie Ochoa RN     02/05/2020 9397 insulin lispro (HumaLOG) 100 units/mL subcutaneous injection 1-5 Units 1 Units Subcutaneous Not Given Alie Ochoa RN     02/04/2020 2342 insulin lispro (HumaLOG) 100 units/mL subcutaneous injection 1-5 Units 1 Units Subcutaneous Given Laury Patel, RN     02/05/2020 1515 dextrose 5 % and sodium chloride 0 9 % infusion 75 mL/hr Intravenous Rate/Dose Verify Teena Poole RN     02/05/2020 1415 dextrose 5 % and sodium chloride 0 9 % infusion 75 mL/hr Intravenous [address] Alie Ochoa RN     02/05/2020 3452 dextrose 5 % and sodium chloride 0 9 % infusion 75 mL/hr Intravenous Rate/Dose Verify Alie Ochoa RN          Objective:     Vitals: Blood pressure 98/55, pulse 69, temperature 97 5 °F (36 4 °C), temperature source Oral, resp  rate 18, weight 54 2 kg (119 lb 8 oz), SpO2 92 %  ,Body mass index is 18 72 kg/m²        Intake/Output Summary (Last 24 hours) at 2/5/2020 2228  Last data filed at 2/5/2020 1351  Gross per 24 hour   Intake 513 75 ml   Output 69 ml   Net 444 75 ml Physical Exam:   General Appearance: in no acute distress, appears disinterested in conversation, eyes closed throughout my 20+ mins in the room, but arousable to direct communcation  Abdomen: Soft, non-tender, non-distended; bowel sounds normal; no masses or no organomegaly    Invasive Devices     Peripheral Intravenous Line            Peripheral IV 02/03/20 Left Arm 2 days                Lab Results:    Results from last 7 days   Lab Units 02/05/20  0441 02/04/20  0848 02/03/20  1847   WBC Thousand/uL 10 95* 7 18 13 11*   HEMOGLOBIN g/dL 10 3* 9 8* 12 0   HEMATOCRIT % 32 9* 30 7* 37 8   PLATELETS Thousands/uL 268 216 321     Results from last 7 days   Lab Units 02/05/20  0441 02/04/20  0848 02/03/20  1847   SODIUM mmol/L 143 139 139   POTASSIUM mmol/L 3 8 3 8 4 0   CHLORIDE mmol/L 107 104 102   CO2 mmol/L 31 35* 35*   BUN mg/dL 24 28* 29*   CREATININE mg/dL 0 62 0 53* 0 70   CALCIUM mg/dL 7 4* 7 2* 7 9*   ALK PHOS U/L 320* 320* 477*   ALT U/L 58 68 122*   AST U/L 37 50* 157*     Lab Results   Component Value Date    INR 1 08 02/03/2020    INR 1 24 01/04/2020    INR 1 09 12/31/2019    PROTIME 13 6 02/03/2020    PROTIME 14 4 (H) 01/04/2020    PROTIME 13 7 12/31/2019       Imaging Studies:    Ct Abdomen Pelvis With Contrast    Result Date: 2/3/2020  Narrative: CT ABDOMEN AND PELVIS WITH IV CONTRAST INDICATION:   gi bleed?   COMPARISON:  CT abdomen/pelvis 3/28/2019 TECHNIQUE:  CT examination of the abdomen and pelvis was performed  Axial, sagittal, and coronal 2D reformatted images were created from the source data and submitted for interpretation  Radiation dose length product (DLP) for this visit:  322 25 mGy-cm   This examination, like all CT scans performed in the [facility], was performed utilizing techniques to minimize radiation dose exposure, including the use of iterative  reconstruction and automated exposure control   IV Contrast:  100 mL of iohexol (OMNIPAQUE) Enteric Contrast: Enteric contrast was not administered  FINDINGS: ABDOMEN LOWER CHEST:  Moderate sized left pleural effusion with consolidative change at the left lower lobe  LIVER/BILIARY TREE:  There are one or more hepatic simple cyst(s) present  No CT evidence of suspicious solid hepatic mass  Normal hepatic contours  No biliary dilatation  GALLBLADDER:  No calcified gallstones  No pericholecystic inflammatory change  SPLEEN:  Stable wedge-shaped scarring at the lateral surface  Numerous surgical clips at the splenic hilum as well as trace fluid at the splenic hilum  PANCREAS:  Atrophic pancreas  Stable main pancreatic ductal dilatation up to 9 mm consistent with a history of chronic pancreatitis  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 is again seen on this study and appears slightly more prominent than comparing to CT examination of 3/28/2019 (series 601, image 46) measuring up to 11 mm on this exam  ADRENAL GLANDS:  Unremarkable  KIDNEYS/URETERS:  One or more simple renal cyst(s) is noted  Otherwise unremarkable kidneys  No hydronephrosis  STOMACH AND BOWEL:  There is colonic diverticulosis without evidence of acute diverticulitis  APPENDIX:  No findings to suggest appendicitis  ABDOMINOPELVIC CAVITY:  Trace free fluid in dependent pelvis    No lymphadenopathy  VESSELS:  Unremarkable for patient's age  PELVIS REPRODUCTIVE ORGANS:  Unremarkable for patient's age  URINARY BLADDER:  Unremarkable  ABDOMINAL WALL/INGUINAL REGIONS:  Unremarkable  OSSEOUS STRUCTURES:  No acute fracture or destructive osseous lesion  Impression: Etiology for patient's gastrointestinal bleed not identified on this examination  Stable findings of pancreatic ductal dilatation consistent with history of chronic pancreatitis as well as 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 stomach, which appears more prominent on this exam when comparing to 3/28/2019   Chronic left pleural effusion  Consolidative change at the left lower lobe likely to represent atelectasis  Underlying infiltrate should be excluded clinically  Workstation performed: [id number]     Us Right Upper Quadrant With Liver Dopplers    Result Date: 2/4/2020  Narrative: RIGHT UPPER QUADRANT ULTRASOUND WITH LIVER DOPPLER INDICATION:     Elevated LFTs  History of diabetes  COMPARISON:  ; 2/3/2020; 12/14/2018 TECHNIQUE:   Real-time ultrasound of the right upper quadrant was performed with a curvilinear transducer with both volumetric sweeps and still imaging techniques  FINDINGS: PANCREAS:  Portions of the pancreas are obscured by bowel gas  Visualized portions of the pancreas are unremarkable  AORTA AND IVC:  Obscured by bowel gas  LIVER: Size:  Within normal range  The liver measures 12 8 cm in the midclavicular line  Contour:  Surface contour is smooth  Parenchyma:  Echogenicity and echotexture are within normal limits  A small 1 3 cm simple cyst is noted in the anterior aspect of the right lobe of liver, correlating to findings on prior imaging  No evidence of suspicious mass  LIVER DOPPLER: The main portal vein and right branch segments are patent and hepatopetal with normal spectral waveform  Right hepatic veins are patent  Spectral waveforms within normal limits  The hepatic artery is not visualized  Left portal and hepatic veins are poorly visualized as well  BILIARY: No gallbladder findings  No intrahepatic biliary dilatation  CBD is obscured by overlying bowel gas    No choledocholithiasis  KIDNEY: Right kidney measures 7 5 cm  No hydronephrosis ASCITES:   None  Impression: 1  Markedly limited study secondary to bowel gas and patient's inability to hold breath  Pancreas and common bile duct are not well visualized  2   Small cyst in the right lobe of liver redemonstrated  3   Questionable mild to moderate right renal atrophy versus under measurement secondary to patient condition    No hydronephrosis   Workstation performed: [id number]

## 2020-02-07 ENCOUNTER — PATIENT OUTREACH (OUTPATIENT)
Dept: CASE MANAGEMENT | Facility: HOSPITAL | Age: 85
End: 2020-02-07

## 2020-02-07 PROBLEM — Z71.89 GOALS OF CARE, COUNSELING/DISCUSSION: Status: ACTIVE | Noted: 2020-02-07

## 2020-02-07 LAB
ALBUMIN SERPL BCP-MCNC: 1 G/DL (ref 3.5–5)
ALP SERPL-CCNC: 262 U/L (ref 46–116)
ALT SERPL W P-5'-P-CCNC: 45 U/L (ref 12–78)
ANION GAP SERPL CALCULATED.3IONS-SCNC: 5 MMOL/L (ref 4–13)
AST SERPL W P-5'-P-CCNC: 28 U/L (ref 5–45)
BILIRUB SERPL-MCNC: 0.41 MG/DL (ref 0.2–1)
BUN SERPL-MCNC: 16 MG/DL (ref 5–25)
CALCIUM SERPL-MCNC: 7 MG/DL (ref 8.3–10.1)
CHLORIDE SERPL-SCNC: 117 MMOL/L (ref 100–108)
CO2 SERPL-SCNC: 28 MMOL/L (ref 21–32)
CREAT SERPL-MCNC: 0.56 MG/DL (ref 0.6–1.3)
ERYTHROCYTE [DISTWIDTH] IN BLOOD BY AUTOMATED COUNT: 15 % (ref 11.6–15.1)
GFR SERPL CREATININE-BSD FRML MDRD: 96 ML/MIN/1.73SQ M
GLUCOSE SERPL-MCNC: 152 MG/DL (ref 65–140)
GLUCOSE SERPL-MCNC: 160 MG/DL (ref 65–140)
HCT VFR BLD AUTO: 32 % (ref 36.5–49.3)
HGB BLD-MCNC: 10 G/DL (ref 12–17)
INR PPP: 1.22 (ref 0.84–1.19)
MAGNESIUM SERPL-MCNC: 1.8 MG/DL (ref 1.6–2.6)
MCH RBC QN AUTO: 31.6 PG (ref 26.8–34.3)
MCHC RBC AUTO-ENTMCNC: 31.3 G/DL (ref 31.4–37.4)
MCV RBC AUTO: 101 FL (ref 82–98)
PHOSPHATE SERPL-MCNC: 2.1 MG/DL (ref 2.3–4.1)
PLATELET # BLD AUTO: 218 THOUSANDS/UL (ref 149–390)
PMV BLD AUTO: 11 FL (ref 8.9–12.7)
POTASSIUM SERPL-SCNC: 3.2 MMOL/L (ref 3.5–5.3)
PREALB SERPL-MCNC: 5.3 MG/DL (ref 18–40)
PROT SERPL-MCNC: 4.3 G/DL (ref 6.4–8.2)
PROTHROMBIN TIME: 15 SECONDS (ref 11.6–14.5)
RBC # BLD AUTO: 3.16 MILLION/UL (ref 3.88–5.62)
SODIUM SERPL-SCNC: 150 MMOL/L (ref 136–145)
WBC # BLD AUTO: 7.43 THOUSAND/UL (ref 4.31–10.16)

## 2020-02-07 PROCEDURE — 80053 COMPREHEN METABOLIC PANEL: CPT | Performed by: FAMILY MEDICINE

## 2020-02-07 PROCEDURE — 99232 SBSQ HOSP IP/OBS MODERATE 35: CPT | Performed by: PHYSICIAN ASSISTANT

## 2020-02-07 PROCEDURE — 83735 ASSAY OF MAGNESIUM: CPT | Performed by: FAMILY MEDICINE

## 2020-02-07 PROCEDURE — 84100 ASSAY OF PHOSPHORUS: CPT | Performed by: FAMILY MEDICINE

## 2020-02-07 PROCEDURE — 82948 REAGENT STRIP/BLOOD GLUCOSE: CPT

## 2020-02-07 PROCEDURE — NC001 PR NO CHARGE: Performed by: FAMILY MEDICINE

## 2020-02-07 PROCEDURE — 85027 COMPLETE CBC AUTOMATED: CPT | Performed by: FAMILY MEDICINE

## 2020-02-07 PROCEDURE — 85610 PROTHROMBIN TIME: CPT | Performed by: FAMILY MEDICINE

## 2020-02-07 PROCEDURE — 84134 ASSAY OF PREALBUMIN: CPT | Performed by: FAMILY MEDICINE

## 2020-02-07 RX ORDER — POTASSIUM CHLORIDE 20 MEQ/1
40 TABLET, EXTENDED RELEASE ORAL ONCE
Status: COMPLETED | OUTPATIENT
Start: 2020-02-07 | End: 2020-02-07

## 2020-02-07 RX ORDER — DEXTROSE MONOHYDRATE 50 MG/ML
75 INJECTION, SOLUTION INTRAVENOUS CONTINUOUS
Status: DISCONTINUED | OUTPATIENT
Start: 2020-02-07 | End: 2020-02-10 | Stop reason: HOSPADM

## 2020-02-07 RX ORDER — LIDOCAINE 40 MG/G
CREAM TOPICAL DAILY PRN
Status: DISCONTINUED | OUTPATIENT
Start: 2020-02-07 | End: 2020-02-10 | Stop reason: HOSPADM

## 2020-02-07 RX ADMIN — DIBASIC SODIUM PHOSPHATE, MONOBASIC POTASSIUM PHOSPHATE AND MONOBASIC SODIUM PHOSPHATE 1 TABLET: 852; 155; 130 TABLET ORAL at 22:45

## 2020-02-07 RX ADMIN — PANTOPRAZOLE SODIUM 40 MG: 40 TABLET, DELAYED RELEASE ORAL at 05:23

## 2020-02-07 RX ADMIN — DEXTROSE AND SODIUM CHLORIDE 75 ML/HR: 5; .9 INJECTION, SOLUTION INTRAVENOUS at 18:11

## 2020-02-07 RX ADMIN — ENOXAPARIN SODIUM 40 MG: 40 INJECTION SUBCUTANEOUS at 08:31

## 2020-02-07 RX ADMIN — POTASSIUM CHLORIDE 40 MEQ: 1500 TABLET, EXTENDED RELEASE ORAL at 20:15

## 2020-02-07 RX ADMIN — DEXTROSE AND SODIUM CHLORIDE 75 ML/HR: 5; .9 INJECTION, SOLUTION INTRAVENOUS at 05:47

## 2020-02-07 RX ADMIN — DEXTROSE 75 ML/HR: 5 SOLUTION INTRAVENOUS at 20:16

## 2020-02-07 NOTE — PROGRESS NOTES
Chart reviewed; noted plan for family meeting today to discuss care goals: option of feeding tube vs hospice     If pt to have feeding tube recommend: Jevity 1 2 formula, start at 20ml/hr; gradual increase (as tolerated) by 10ml q 6 hrs to goal of 55ml/hr; will =1320 tf volume; ~1585kcal; 73g pro; ~1070ml total water; at goal flush with 125ml water q 6 hrs

## 2020-02-07 NOTE — ASSESSMENT & PLAN NOTE
Lab Results   Component Value Date    HGBA1C 5 9 02/04/2020       Recent Labs     02/04/20  2339 02/05/20  0624 02/05/20  1132 02/07/20  0626   POCGLU 168* 111 107 160*       Blood Sugar Average: Last 72 hrs:  (P) 122 [id number]     · Very well controlled, A1C 5 9  · Diet controlled   · Discontinued insulin

## 2020-02-07 NOTE — PLAN OF CARE
Problem: Nutrition/Hydration-ADULT  Goal: Nutrient/Hydration intake appropriate for improving, restoring or maintaining nutritional needs  Description  Monitor and assess patient's nutrition/hydration status for malnutrition  Collaborate with interdisciplinary team and initiate plan and interventions as ordered  Monitor patient's weight and dietary intake as ordered or per policy  Utilize nutrition screening tool and intervene as necessary  Determine patient's food preferences and provide high-protein, high-caloric foods as appropriate       INTERVENTIONS:  - Monitor oral intake, urinary output, labs, and treatment plans  - Assess nutrition and hydration status and recommend course of action  - Evaluate amount of meals eaten  - Assist patient with eating if necessary   - Allow adequate time for meals  - Recommend/ encourage appropriate diets, oral nutritional supplements, and vitamin/mineral supplements  - Order, calculate, and assess calorie counts as needed  - Recommend, monitor, and adjust tube feedings and TPN/PPN based on assessed needs  - Assess need for intravenous fluids  - Provide specific nutrition/hydration education as appropriate  - Include patient/family/caregiver in decisions related to nutrition  Outcome: Progressing  Note:   Noted plan for discussion on goals of care

## 2020-02-07 NOTE — CONSULTS
Consultation - Surgical Oncology  Devin Garcia 82 y o  male MRN: [id number]  Unit/Bed#: -01 Encounter: [id number]    Assessment/Plan     Assessment:  83 y/o M w/ hx of chronic pancreatitis w/ prior pseudocyst, chronic malnutrition, prior J-tube placement, now p/w failure to thrive, limited PO intake and lack of enteral access    Plan:  --Dr Rodolfo Bowie to hold discussion with pt and his family tomorrow morning  --Given pt's immunosuppressed state, pt is not an ideal surgical candidate, expressed he does not wish to have surgery, and has full capacity to make his own decisions based on my exam    History of Present Illness     HPI:  Devin Garcia is a 82 y o  male w/ of 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, s/p ex-lap, pancreatic biopsy of pancreatic tail masses (11/2018), failure to thrive, chronic malnutrition, previous J-tube placement (01/2019--[facility]) with subsequent removal at [facility] in 03/2019, who p/w transaminitis, failure to thrive  Pt currently a resident at [facility]  Upon arrival to the hospital on 2/4, GI team was consulted, and felt elevated LFTs were likely due to drug-induced liver injury and recommended against PEG and PEJ, due to previous unsuccessful attempts to transilluminate via endoscopy  Palliative Care team also consulted given pt has previously been considering [address] [place] [address] Avenue, also previously stating that he did not wish for any artificial nutrition via NGT or J-tube placement  Risks of nutritional status declining were explained to pt by Palliative Care, and pt expressed understanding   However, Palliative Care held a family meeting today with pt, and per Dr Bundy Lob note today, their team does not feel he lacks capacity to make medical decisions, but pt is now expressing that he "leave(s) it in the hands of his children " Pt's family members expressed that they desired a Surgical Oncology consult to evaluate for J-tube placement and does not wish for pt to receive Hospice care at this time  Of note, pt has Prealbumin of 5 4, Albumin of 1 1  When asked, pt verbally stated to myself several times "no, I do not want any surgery or feeding tubes " When coaxed by family members present in the room, however, he changes his mind stating "I'll just let my family decide " Pt currently denies any complaints  Inpatient Consult to Surgical Oncology     Performed by  Kaylee Alan MD     Authorized by Eunice Walton MD              Review of Systems   Constitutional: Positive for appetite change  Negative for activity change, chills and fever  HENT: Negative for congestion, sinus pressure and sinus pain  Respiratory: Negative for apnea, cough, chest tightness, shortness of breath and wheezing  Cardiovascular: Negative for chest pain, palpitations and leg swelling  Gastrointestinal: Negative for abdominal distention, abdominal pain, constipation, diarrhea, nausea and vomiting  Genitourinary: Negative for difficulty urinating and dysuria  Musculoskeletal: Negative for arthralgias, back pain and gait problem  Skin: Negative for color change, pallor, rash and wound  Neurological: Negative for dizziness, tremors, seizures, syncope, facial asymmetry and numbness  Psychiatric/Behavioral: Negative for agitation, behavioral problems and confusion  Historical Information   Past Medical History:   Diagnosis Date    Cardiac disease     Chronic pancreatitis ([facility] )     Diabetes mellitus ([facility] )     GERD (gastroesophageal reflux disease)     Vitamin D deficient osteomalacia     Weight loss      Past Surgical History:   Procedure Laterality Date    CT GUIDED CHEST TUBE  2/15/2019    GASTROSTOMY TUBE PLACEMENT N/A 1/14/2019    Procedure: INSERTION PEG TUBE;  Surgeon: Padmini Serrato MD;  [facility];   Service: Gastroenterology    GASTROSTOMY TUBE PLACEMENT N/A 1/15/2019    Procedure: INSERTION JEJUNOSTOMY TUBE OPEN;  Surgeon: Filomena Donato Dwight Ramirez MD;  [facility];  Service: Surgical Oncology    LAPAROTOMY N/A 2018    Procedure: LAPAROTOMY EXPLORATORY;  Surgeon: Miquel Mendez MD;  [facility];  Service: Surgical Oncology    ME EDG US EXAM SURGICAL ALTER STOM DUODENUM/JEJUNUM N/A 2018    Procedure: LINEAR ENDOSCOPIC U/S;  Surgeon: Arnold Carreno MD;  [facility];   Service: Gastroenterology    ME LAP,DIAGNOSTIC ABDOMEN N/A 2018    Procedure: pancreatic biopsy;  Surgeon: Miquel Mendez MD;  [facility];  Service: Surgical Oncology    TONSILLECTOMY      UPPER GASTROINTESTINAL ENDOSCOPY      VASCULAR SURGERY      phlebitis many years ago    WRIST GANGLION EXCISION       Social History   Social History     Substance and Sexual Activity   Alcohol Use Not Currently    Frequency: Monthly or less    Drinks per session: 1 or 2    Binge frequency: Less than monthly    Comment: 1 beer a day, formerly     Social History     Substance and Sexual Activity   Drug Use No     Social History     Tobacco Use   Smoking Status Former Smoker    Packs/day: 0 50    Years: 4 00    Pack years: 2 00    Types: Cigarettes    Start date:     Last attempt to quit:     Years since quittin 1   Smokeless Tobacco Never Used   Tobacco Comment    quit about 20 years ago as of 2018     Family History:   Family History   Problem Relation Age of Onset    No Known Problems Mother     No Known Problems Father     Pancreatic cancer Brother 61    Breast cancer Daughter 36        38s       Meds/Allergies   current meds:   Current Facility-Administered Medications   Medication Dose Route Frequency    acetaminophen (TYLENOL) tablet 650 mg  650 mg Oral Q4H PRN    aluminum-magnesium hydroxide-simethicone (MYLANTA) 200-200-20 mg/5 mL oral suspension 15 mL  15 mL Oral Q6H PRN    bisacodyl (DULCOLAX) rectal suppository 10 mg  10 mg Rectal Daily PRN    dextrose 5 % and sodium chloride 0 9 % infusion  75 mL/hr Intravenous Continuous    enoxaparin (LOVENOX) subcutaneous injection 40 mg  40 mg Subcutaneous Daily    lidocaine (LMX) 4 % cream   Topical Daily PRN    magnesium hydroxide (MILK OF MAGNESIA) 400 mg/5 mL oral suspension 30 mL  30 mL Oral Daily PRN    ondansetron (ZOFRAN) injection 4 mg  4 mg Intravenous Q6H PRN    pantoprazole (PROTONIX) EC tablet 40 mg  40 mg Oral Early Morning    and PTA meds:   Prior to Admission Medications   Prescriptions Last Dose Informant Patient Reported? Taking?    Nutritional Supplements (BOOST PUDDING PO) 2020 at Unknown time  Yes Yes   Sig: Take 1 Can by mouth daily   acetaminophen (TYLENOL) 325 mg tablet 2/3/2020 at Unknown time  Yes Yes   Sig: Take 650 mg by mouth every 4 (four) hours as needed for mild pain   aspirin 81 mg chewable tablet 2/3/2020 at Unknown time Care Giver No Yes   Si tablet (81 mg total) by Per J Tube route daily   bisacodyl (DULCOLAX) 10 mg suppository   Yes Yes   Sig: Insert 10 mg into the rectum daily as needed for constipation   cholecalciferol (VITAMIN D3) 1,000 units tablet 2/3/2020 at Unknown time  Yes Yes   Sig: Take 2,000 Units by mouth daily   furosemide (LASIX) 20 mg tablet 2/3/2020 at Unknown time  No Yes   Sig: Take 1 tablet (20 mg total) by mouth every other day   Patient taking differently: Take 40 mg by mouth daily    magnesium hydroxide (MILK OF MAGNESIA) 400 mg/5 mL oral suspension   Yes Yes   Sig: Take 30 mL by mouth daily as needed for constipation   omeprazole (PriLOSEC) 20 mg delayed release capsule 2020 at Unknown time Care Giver No Yes   Sig: Take 2 capsules (40 mg total) by mouth daily Please give through J tube   pancrelipase, Lip-Prot-Amyl, (CREON) 6,000 units delayed release capsule 2/3/2020 at Unknown time  Yes Yes   Sig: Take 6,000 units of lipase by mouth 3 (three) times a day with meals   sodium phosphate-biphosphate (FLEET) 7-19 g 118 mL enema   Yes Yes   Sig: Insert 1 enema into the rectum daily as needed (constipation)      Facility-Administered Medications: None     No Known Allergies    Objective   First Vitals:   Blood Pressure: 138/59 (02/03/20 1641)  Pulse: 66 (02/03/20 1641)  Temperature: 98 °F (36 7 °C) (02/03/20 1641)  Temp Source: Oral (02/04/20 4370)  Respirations: 16 (02/03/20 1641)  Height: 5' 7" (170 2 cm) (02/07/20 1306)  Weight - Scale: 54 2 kg (119 lb 8 oz) (02/05/20 0536)  SpO2: 96 % (02/03/20 1641)    Current Vitals:   Blood Pressure: 105/72 (02/07/20 1500)  Pulse: 71 (02/07/20 1500)  Temperature: 98 4 °F (36 9 °C) (02/07/20 1500)  Temp Source: Oral (02/07/20 1500)  Respirations: 18 (02/07/20 1500)  Height: 5' 7" (170 2 cm) (02/07/20 1306)  Weight - Scale: 54 2 kg (119 lb 8 oz) (02/05/20 0536)  SpO2: 94 % (02/07/20 1500)      Intake/Output Summary (Last 24 hours) at 2/7/2020 1756  Last data filed at 2/7/2020 1743  Gross per 24 hour   Intake 1268 75 ml   Output 200 ml   Net 1068 75 ml       Invasive Devices     Peripheral Intravenous Line            Peripheral IV 02/07/20 Dorsal (posterior); Right Forearm less than 1 day                Physical Exam   Constitutional: He is oriented to person, place, and time  He appears cachectic  No distress  Cachectic    HENT:   Head: Normocephalic and atraumatic  Eyes: EOM are normal    Neck: Normal range of motion  Neck supple  Cardiovascular: Normal rate and regular rhythm  Pulmonary/Chest: Effort normal and breath sounds normal  No stridor  No respiratory distress  He has no wheezes  Abdominal: Soft  He exhibits no distension  There is no tenderness  Musculoskeletal: Normal range of motion  Neurological: He is alert and oriented to person, place, and time  Skin: Skin is warm  He is not diaphoretic  Psychiatric: He has a normal mood and affect       Lab Results:   CBC: No results found for: WBC, HGB, HCT, MCV, PLT, ADJUSTEDWBC, MCH, MCHC, RDW, MPV, NRBC, CMP: No results found for: SODIUM, K, CL, CO2, ANIONGAP, BUN, CREATININE, GLUCOSE, CALCIUM, AST, ALT, ALKPHOS, PROT, BILITOT, EGFR, Coagulation:   Lab Results   Component Value Date    INR 1 22 (H) 02/07/2020   , Urinalysis: No results found for: Cathren Bristol, SPECGRAV, PHUR, LEUKOCYTESUR, NITRITE, PROTEINUA, GLUCOSEU, KETONESU, BILIRUBINUR, BLOODU, Amylase: No results found for: AMYLASE, Lipase: No results found for: LIPASE  Imaging:    US right upper quadrant with liver dopplers   Final Result by Axel Prado MD (02/04 1126)         1  Markedly limited study secondary to bowel gas and patient's inability to hold breath  Pancreas and common bile duct are not well visualized  2   Small cyst in the right lobe of liver redemonstrated  3   Questionable mild to moderate right renal atrophy versus under measurement secondary to patient condition  No hydronephrosis  Workstation performed: [id number]         CT abdomen pelvis with contrast   Final Result by Tona Doyle MD (02/03 3617)      Etiology for patient's gastrointestinal bleed not identified on this examination  Stable findings of pancreatic ductal dilatation consistent with history of chronic pancreatitis as well as 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 stomach, which appears more prominent on this exam when comparing to    3/28/2019  Chronic left pleural effusion  Consolidative change at the left lower lobe likely to represent atelectasis  Underlying infiltrate should be excluded clinically              Workstation performed: [id number]

## 2020-02-07 NOTE — ASSESSMENT & PLAN NOTE
· Appreciate palliative and hospice guidance  · Patient with failure to thrive; in prior statements, he did not desire PEG; TPN is not long-term  · Family meeting 02/07/2020 at 3:15 p m  Will follow outcome  · He would qualify for hospice if he does not desire further intervention

## 2020-02-07 NOTE — ASSESSMENT & PLAN NOTE
Malnutrition Findings:     Nutrition consult  Degree of Malnutrition: Other severe protein calorie malnutrition    BMI Findings:  BMI Classifications: Underweight < 18 5     Body mass index is 18 72 kg/m²  Patient does not want a PEG tube and TPN is not an option in this circumstance    Ongoing family discussion

## 2020-02-07 NOTE — PROGRESS NOTES
Progress Note - Bernardo Gray 1935, 82 y o  male MRN: [id number]  Unit/Bed#: -01 Encounter: [id number]  Primary Care Provider: Emiliano Echeverria MD   Date and time admitted to hospital: 2/3/2020  4:38 PM  Goals of care, counseling/discussion  Assessment & Plan  · Appreciate palliative and hospice guidance  · Patient with failure to thrive; in prior statements, he did not desire PEG; TPN is not long-term  · Family meeting 02/07/2020 at 3:15 p m  Will follow outcome  · He would qualify for hospice if he does not desire further intervention  * Failure to thrive in adult  Assessment & Plan  · Pt with failure to thrive, limited PO intake, protein calorie malnutrition, deconditioned in the setting of chronic pancreatitis complicated by a pancreatic pseudocyst with fistulization into the stomach  · Was at [facility] for STR due to return home  · Family still deciding on options and weighing continuing medical care vs hospice care  Appropriate for comfort care per GI  Hospice liaison to discussed role of hospice and options with family - family desired conversation with palliative before pursuing hospice  · Palliative Care consult appreciated and continue to help with clarification of goals of care  Pressure injury of heel, unstageable (HCC)  Assessment & Plan  Wound care  Offloading    Elevated liver enzymes  Assessment & Plan  · Elevated LFTs noted incidentally  · Per GI, suspect DILI which is resolving  · CT abdomen/pelvis no acute abnormalities - does have chronic changes in relation to chronic pancreatitis/pancreatic mass,  Has followed with Dr Marisa Butt, mass was found to be benign   · RUQ US non diagnostic  · Monitor for now per GI     Moderate protein-calorie malnutrition   Assessment & Plan  Malnutrition Findings:     Nutrition consult  Degree of Malnutrition: Other severe protein calorie malnutrition    BMI Findings:  BMI Classifications: Underweight < 18 5     Body mass index is 18 72 kg/m²  Patient does not want a PEG tube and TPN is not an option in this circumstance  Ongoing family discussion    Chronic pancreatitis [facility])  Assessment & Plan  · With pancreatic fistula  · GI input appreciated  · Further intervention pending goals of care discussion  · Patient does not want PEG tube    Diabetes mellitus type 2  Assessment & Plan  Lab Results   Component Value Date    HGBA1C 5 9 2020       Recent Labs     20  2339 20  0624 20  1132 20  0626   POCGLU 168* 111 107 160*       Blood Sugar Average: Last 72 hrs:  (P) 122 [id number]     · Very well controlled, A1C 5 9  · Diet controlled   · Discontinued insulin    Esophagitis  Assessment & Plan  · Will continue with Protonix  VTE Pharmacologic Prophylaxis:   Pharmacologic: Enoxaparin (Lovenox)  Mechanical VTE Prophylaxis in Place: Yes    Patient Centered Rounds: I have performed bedside rounds with nursing staff today  Discussions with Specialists or Other Care Team Provider:  Discussed with Nursing and Case Management, will discuss with palliative    Education and Discussions with Family / Patient:  Discussed with patient and patient's wife present at bedside  I declined to call patient's daughters at this time as there is a family meeting later today which I will follow    Time Spent for Care: 30 minutes  More than 50% of total time spent on counseling and coordination of care as described above  Current Length of Stay: 3 day(s)    Current Patient Status: Inpatient   Certification Statement: The patient will continue to require additional inpatient hospital stay due to Decision of hospice versus further intervention    Discharge Plan:  Pending may need hospice    Code Status: Level 1 - Full Code      Subjective:   Patient is resting  He wakens to provider's touch  Does not have complaints this time      Objective:     Vitals:   Temp (24hrs), Av 1 °F (36 7 °C), Min:97 9 °F (36 6 °C), Max:98 3 °F (36 8 °C)    Temp:  [97 9 °F (36 6 °C)-98 3 °F (36 8 °C)] 97 9 °F (36 6 °C)  HR:  [63-81] 81  Resp:  [18-20] 20  BP: (110-120)/(59-64) 120/59  SpO2:  [94 %] 94 %  Body mass index is 18 72 kg/m²  Input and Output Summary (last 24 hours): Intake/Output Summary (Last 24 hours) at 2/7/2020 1447  Last data filed at 2/7/2020 0547  Gross per 24 hour   Intake 2202 5 ml   Output 250 ml   Net 1952 5 ml       Physical Exam:     Physical Exam   Constitutional: He appears well-developed and well-nourished  No distress  Frail appearing  Pleasant  HENT:   Head: Normocephalic and atraumatic  Mouth/Throat: No oropharyngeal exudate  Cardiovascular: Regular rhythm  Exam reveals no gallop and no friction rub  No murmur heard  Pulmonary/Chest: Effort normal and breath sounds normal  No respiratory distress  He has no wheezes  He has no rales  He exhibits no tenderness  Abdominal: Soft  Bowel sounds are normal  He exhibits no distension  There is no tenderness  There is no rebound  Musculoskeletal: He exhibits no edema, tenderness or deformity  Skin: Skin is warm and dry  He is not diaphoretic  No erythema  No pallor  Psychiatric: He has a normal mood and affect  His behavior is normal    Nursing note and vitals reviewed        Additional Data:     Labs:    Results from last 7 days   Lab Units 02/05/20  0441   WBC Thousand/uL 10 95*   HEMOGLOBIN g/dL 10 3*   HEMATOCRIT % 32 9*   PLATELETS Thousands/uL 268   NEUTROS PCT % 78*   LYMPHS PCT % 10*   MONOS PCT % 7   EOS PCT % 3     Results from last 7 days   Lab Units 02/05/20  0441   SODIUM mmol/L 143   POTASSIUM mmol/L 3 8   CHLORIDE mmol/L 107   CO2 mmol/L 31   BUN mg/dL 24   CREATININE mg/dL 0 62   ANION GAP mmol/L 5   CALCIUM mg/dL 7 4*   ALBUMIN g/dL 1 1*   TOTAL BILIRUBIN mg/dL 0 30   ALK PHOS U/L 320*   ALT U/L 58   AST U/L 37   GLUCOSE RANDOM mg/dL 84     Results from last 7 days   Lab Units 02/03/20  1847   INR  1 08     Results from last 7 days   Lab Units 02/07/20  2970 02/05/20  1132 02/05/20  4511 02/04/20  2339 02/04/20  2121 02/04/20  1722 02/04/20  1617 02/04/20  1430 02/04/20  1230 02/04/20  1149 02/04/20  0600 02/03/20  2351   POC GLUCOSE mg/dl 160* 107 111 168* 208* 144* 62* 125 75 75 109 138     Results from last 7 days   Lab Units 02/04/20  0849   HEMOGLOBIN A1C % 5 9               * I Have Reviewed All Lab Data Listed Above  * Additional Pertinent Lab Tests Reviewed: All Labs Within Last 24 Hours Reviewed      Recent Cultures (last 7 days):           Last 24 Hours Medication List:     Current Facility-Administered Medications:  acetaminophen 650 mg Oral Q4H PRN Shena Su MD    aluminum-magnesium hydroxide-simethicone 15 mL Oral Q6H PRN Shena Su MD    bisacodyl 10 mg Rectal Daily PRN Shena Su MD    dextrose 5 % and sodium chloride 0 9 % 75 mL/hr Intravenous Continuous Lauryn Blunt PA-C Last Rate: 75 mL/hr (02/07/20 0547)   docusate sodium 100 mg Oral BID PRN Shena Su MD    enoxaparin 40 mg Subcutaneous Daily Shena Su MD    magnesium hydroxide 30 mL Oral Daily PRN Shena Su MD    ondansetron 4 mg Intravenous Q6H PRN Shena Su MD    pantoprazole 40 mg Oral Early Morning Shena Su MD         Today, Patient Was Seen By: Prabha Vail PA-C    ** Please Note: Dictation voice to text software may have been used in the creation of this document   **

## 2020-02-07 NOTE — PLAN OF CARE
Problem: Potential for Falls  Goal: Patient will remain free of falls  Description  INTERVENTIONS:  - Assess patient frequently for physical needs  -  Identify cognitive and physical deficits and behaviors that affect risk of falls  -  [place] fall precautions as indicated by assessment   - Educate patient/family on patient safety including physical limitations  - Instruct patient to call for assistance with activity based on assessment  - Modify environment to reduce risk of injury  - Consider OT/PT consult to assist with strengthening/mobility  Outcome: Progressing     Problem: Prexisting or High Potential for Compromised Skin Integrity  Goal: Skin integrity is maintained or improved  Description  INTERVENTIONS:  - Identify patients at risk for skin breakdown  - Assess and monitor skin integrity  - Assess and monitor nutrition and hydration status  - Monitor labs   - Assess for incontinence   - Turn and reposition patient  - Assist with mobility/ambulation  - Relieve pressure over bony prominences  - Avoid friction and shearing  - Provide appropriate hygiene as needed including keeping skin clean and dry  - Evaluate need for skin moisturizer/barrier cream  - Collaborate with interdisciplinary team   - Patient/family teaching  - Consider wound care consult   Outcome: Progressing     Problem: Nutrition/Hydration-ADULT  Goal: Nutrient/Hydration intake appropriate for improving, restoring or maintaining nutritional needs  Description  Monitor and assess patient's nutrition/hydration status for malnutrition  Collaborate with interdisciplinary team and initiate plan and interventions as ordered  Monitor patient's weight and dietary intake as ordered or per policy  Utilize nutrition screening tool and intervene as necessary  Determine patient's food preferences and provide high-protein, high-caloric foods as appropriate       INTERVENTIONS:  - Monitor oral intake, urinary output, labs, and treatment plans  - Assess nutrition and hydration status and recommend course of action  - Evaluate amount of meals eaten  - Assist patient with eating if necessary   - Allow adequate time for meals  - Recommend/ encourage appropriate diets, oral nutritional supplements, and vitamin/mineral supplements  - Order, calculate, and assess calorie counts as needed  - Recommend, monitor, and adjust tube feedings and TPN/PPN based on assessed needs  - Assess need for intravenous fluids  - Provide specific nutrition/hydration education as appropriate  - Include patient/family/caregiver in decisions related to nutrition  Outcome: Progressing

## 2020-02-07 NOTE — ASSESSMENT & PLAN NOTE
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

## 2020-02-07 NOTE — PROGRESS NOTES
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 [address] )    Pneumonia    Moderate protein-calorie malnutrition     Abnormal SPEP    Severe protein-calorie malnutrition (HCC)    Disorder of upper esophageal sphincter    Gastric ulcer    Pancreatic pseudocyst    Pneumothorax    Pancreatic fistula    Failure to thrive in adult    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HCC)    Transaminitis    Gastroesophageal reflux disease without esophagitis    Bilateral complete vocal fold paralysis    Ambulatory dysfunction    Lower extremity edema    RSV infection    Acute metabolic encephalopathy due to hypoglycemia    Vitamin D deficiency    Elevated liver enzymes         Plan:    1  Goals - Plan for family meeting today at 3:15 with daughter David Shoemaker and wife in person and other children by phone     - Family wants to discuss option of PEG or J-tube vs  Hospice   - At this time GI does not feel that TPN or other artificial nutrition are good options as it will not be a cure for his overall decline in functioning  Code Status: FULL - Level 1   Decisional apparatus:  Patient is not competent on my exam today  If competence is lost, patient's substitute decision maker would default to wife [place]  Advance Directive / Living Will / POLST:  none    Interval history:       Daughter Phoenix Kent confirmed again last night with Meredith Kevin that he did not want to make any more decisions about his care  This morning when I saw him he was not with his family and was awake  He stated that he still does not want a J tube but it is his family's decision now   He is uncomfortable in the hospital and in bed and wants to go home  He denies any pain  All other ROS negative  MEDICATIONS / ALLERGIES:     all current active meds have been reviewed and current meds:   Current Facility-Administered Medications   Medication Dose Route Frequency    acetaminophen (TYLENOL) tablet 650 mg  650 mg Oral Q4H PRN    aluminum-magnesium hydroxide-simethicone (MYLANTA) 200-200-20 mg/5 mL oral suspension 15 mL  15 mL Oral Q6H PRN    bisacodyl (DULCOLAX) rectal suppository 10 mg  10 mg Rectal Daily PRN    dextrose 5 % and sodium chloride 0 9 % infusion  75 mL/hr Intravenous Continuous    docusate sodium (COLACE) capsule 100 mg  100 mg Oral BID PRN    enoxaparin (LOVENOX) subcutaneous injection 40 mg  40 mg Subcutaneous Daily    magnesium hydroxide (MILK OF MAGNESIA) 400 mg/5 mL oral suspension 30 mL  30 mL Oral Daily PRN    ondansetron (ZOFRAN) injection 4 mg  4 mg Intravenous Q6H PRN    pantoprazole (PROTONIX) EC tablet 40 mg  40 mg Oral Early Morning       No Known Allergies    OBJECTIVE:    Physical Exam  Physical Exam   Constitutional: He appears lethargic  He appears cachectic  He appears ill  Drowsy, only briefly alert to answer questions   HENT:   Head: Normocephalic and atraumatic  Nose: Nose normal    Mouth/Throat: Oropharynx is clear and moist  No oropharyngeal exudate  Dried blood under R nare   Eyes: Conjunctivae and EOM are normal  Right eye exhibits no discharge  Left eye exhibits no discharge  No scleral icterus  Abdominal: Soft  He exhibits no distension  There is no tenderness  There is no guarding  Musculoskeletal: He exhibits no edema, tenderness or deformity  Neurological: He appears lethargic  He is disoriented  Oriented only to person, place, and month, not year or date  Skin: Skin is warm and dry  Lab Results: I have personally reviewed pertinent labs     Lab Results   Component Value Date    WBC 10 95 (H) 02/05/2020    HGB 10 3 (L) 02/05/2020    HCT 32 9 (L) 02/05/2020    MCV 101 (H) 02/05/2020     02/05/2020     Lab Results   Component Value Date    SODIUM 143 02/05/2020    K 3 8 02/05/2020     02/05/2020    CO2 31 02/05/2020    AGAP 5 02/05/2020    BUN 24 02/05/2020    CREATININE 0 62 02/05/2020    GLUC 84 02/05/2020    GLUF 97 02/04/2020    CALCIUM 7 4 (L) 02/05/2020    AST 37 02/05/2020    ALT 58 02/05/2020    ALKPHOS 320 (H) 02/05/2020    TP 4 7 (L) 02/05/2020    TBILI 0 30 02/05/2020    EGFR 92 02/05/2020       Imaging Studies: pertinent reviewed  EKG, Pathology, and Other Studies: pertinent reviewed    Counseling / Coordination of Care  Total floor / unit time spent today 25+ minutes  Greater than 50% of total time was spent with the patient and / or family counseling and / or coordination of care   A description of the counseling / coordination of care: discussion of goals of care, explaining risks vs benefits of artificial nutrition, planning for family meeting    [address] [facility], [place]

## 2020-02-07 NOTE — ASSESSMENT & PLAN NOTE
· Elevated LFTs noted incidentally  · Per GI, suspect DILI which is resolving  · CT abdomen/pelvis no acute abnormalities - does have chronic changes in relation to chronic pancreatitis/pancreatic mass,  Has followed with Dr Alverto Vickers, mass was found to be benign   · RUQ US non diagnostic  · Monitor for now per GI

## 2020-02-07 NOTE — TREATMENT PLAN
2/7/2020 5:28 PM -  Conducted family mtg at bedside with my medical student Verónica Gallardo, MS4  In attendance were family, including pt and his wife, and all children  Pura Azar and Rashida Braswell" were present in the room  Dennis, [address], and Bartolome Rico were present by phone  Of note, pt has deferred decision-making about his health to his children  Pt's wife is demented, and not an appropriate advocate for him  Reviewed his case, including h/o chronic pancreatitis with resultant exocrine and endocrine pancreas failure  Reviewed that, at this time, his malnutrition by itself is a terminal illness for which we would offer hospice  Reviewed further that the pt himself has voiced to multiple medical teammates that he does not wish to engage with aggressive/invasive cares, and he wishes only to go home, be peaceful, and die with dignity  Reviewed that -- while he may be depressed -- the medical team does NOT feel he lacks capacity to make a competent decision against feeding tubes and for hospice cares  However, after a family discussion last night, the pt today has volunteered on a number of occasions that he does not wish to fight his family on the decisions of medical cares for himself  Multiple times, in front of family, I attempted to legitimize his opinion to guide his own cares, and he does not seem to wish to access this power  Instead, he very clearly says "I leave it in the hands of my children"  As we discuss the potential options to address his depression, FTT, and chronic pancreatitis, we considered the following:    - PEG tube - not indicated, as he need post-pyloric feeds  This will not be offered, as per discussion with GI     - PEJ tube - not safe, and previous attempts to transilluminate via endoscopy were unsuccessful    This will not be re-attempted, per discussion with GI     - TPN - not likely a good risk, given his recent DILI and chronic pancreatitis, not to mention his immunosuppressed state, which makes his risk of overwhelming bacteremia and fungemia that much higher  Palliative and Supportive Care and GI are in agreement that this will not be offered nor managed by our teams     - Open jejunostomy - This is the only route left to consider enteral feeding, though we have not discussed with Dr Warren Crocker, who placed previous J tube  Even after identifying the surgical risks and the relatively high expectation of complications, the family wished unanimously to obtain a formal surgical consult  - Hospice - Identified to family that this would be offered to allow the pt to go home, and to have pain management, even given his dysphagia and poor PO intake  Family clearly wished not to access this mode of care at [facility] time  We have therefore placed nutritional marker labs to be gathered shortly, and on Monday, as well as CBC to track plts and anemia markers  A consult will also be sent to Dr Warren Crocker, per family request   Full medical cares will be continued until further notice; we will return on Monday  Anitha Denson MD  Palliative and Supportive Care  Clinic/Answering Service: [phone]  You can find me on [facility]! Counseling and Coordination of care    I spent 60+ total minutes with the patient today, more than 50% of which was spent in counseling, coordination of care, and other face-to-face interactions and cares on the floor  Counseled patient/family regarding above noted items

## 2020-02-08 LAB
ALBUMIN SERPL BCP-MCNC: 0.9 G/DL (ref 3.5–5)
ALP SERPL-CCNC: 230 U/L (ref 46–116)
ALT SERPL W P-5'-P-CCNC: 43 U/L (ref 12–78)
ANION GAP SERPL CALCULATED.3IONS-SCNC: 3 MMOL/L (ref 4–13)
AST SERPL W P-5'-P-CCNC: 28 U/L (ref 5–45)
BILIRUB SERPL-MCNC: 0.31 MG/DL (ref 0.2–1)
BUN SERPL-MCNC: 15 MG/DL (ref 5–25)
CALCIUM SERPL-MCNC: 7 MG/DL (ref 8.3–10.1)
CHLORIDE SERPL-SCNC: 114 MMOL/L (ref 100–108)
CO2 SERPL-SCNC: 27 MMOL/L (ref 21–32)
CREAT SERPL-MCNC: 0.55 MG/DL (ref 0.6–1.3)
GFR SERPL CREATININE-BSD FRML MDRD: 96 ML/MIN/1.73SQ M
GLUCOSE SERPL-MCNC: 159 MG/DL (ref 65–140)
GLUCOSE SERPL-MCNC: 186 MG/DL (ref 65–140)
POTASSIUM SERPL-SCNC: 3.8 MMOL/L (ref 3.5–5.3)
PROT SERPL-MCNC: 4.1 G/DL (ref 6.4–8.2)
SODIUM SERPL-SCNC: 144 MMOL/L (ref 136–145)

## 2020-02-08 PROCEDURE — 99223 1ST HOSP IP/OBS HIGH 75: CPT | Performed by: SURGERY

## 2020-02-08 PROCEDURE — 82948 REAGENT STRIP/BLOOD GLUCOSE: CPT

## 2020-02-08 PROCEDURE — 99232 SBSQ HOSP IP/OBS MODERATE 35: CPT | Performed by: INTERNAL MEDICINE

## 2020-02-08 PROCEDURE — 99232 SBSQ HOSP IP/OBS MODERATE 35: CPT | Performed by: PHYSICIAN ASSISTANT

## 2020-02-08 PROCEDURE — 80053 COMPREHEN METABOLIC PANEL: CPT | Performed by: PHYSICIAN ASSISTANT

## 2020-02-08 RX ADMIN — DIBASIC SODIUM PHOSPHATE, MONOBASIC POTASSIUM PHOSPHATE AND MONOBASIC SODIUM PHOSPHATE 1 TABLET: 852; 155; 130 TABLET ORAL at 09:46

## 2020-02-08 RX ADMIN — PANTOPRAZOLE SODIUM 40 MG: 40 TABLET, DELAYED RELEASE ORAL at 05:06

## 2020-02-08 RX ADMIN — DEXTROSE 75 ML/HR: 5 SOLUTION INTRAVENOUS at 09:55

## 2020-02-08 RX ADMIN — ENOXAPARIN SODIUM 40 MG: 40 INJECTION SUBCUTANEOUS at 09:46

## 2020-02-08 RX ADMIN — DEXTROSE 75 ML/HR: 5 SOLUTION INTRAVENOUS at 22:53

## 2020-02-08 NOTE — ASSESSMENT & PLAN NOTE
Malnutrition Findings:   Degree of Malnutrition: Other severe protein calorie malnutrition    BMI Findings:  BMI Classifications: Underweight < 18 5  Body mass index is 18 72 kg/m²  · Patient now transitioned to Level 4 comfort care and will be discharged home tomorrow with hospice  · Patient's family would like to continue with IVFs while here in the hospital   · Patient is not a candidate for surgical placement of a feeding tube

## 2020-02-08 NOTE — ASSESSMENT & PLAN NOTE
· Pt with failure to thrive, limited PO intake, protein calorie malnutrition, deconditioned in the setting of chronic pancreatitis complicated by a pancreatic pseudocyst with fistulization into the stomach  · Discharge to home hospice

## 2020-02-08 NOTE — PROGRESS NOTES
Progress Note - Elizabeth Griffith 1935, 82 y o  male MRN: [id number]  Unit/Bed#: -01 Encounter: [id number]  Primary Care Provider: Yun Rocha MD   Date and time admitted to hospital: 2/3/2020  4:38 PM    * Failure to thrive in adult  Assessment & Plan  · Pt with failure to thrive, limited PO intake, protein calorie malnutrition, deconditioned in the setting of chronic pancreatitis complicated by a pancreatic pseudocyst with fistulization into the stomach  · Discharge to home hospice  Moderate protein-calorie malnutrition   Assessment & Plan  Malnutrition Findings:   Degree of Malnutrition: Other severe protein calorie malnutrition    BMI Findings:  BMI Classifications: Underweight < 18 5  Body mass index is 18 72 kg/m²  · Patient now transitioned to Level 4 comfort care and will be discharged home tomorrow with hospice  · Patient's family would like to continue with IVFs while here in the hospital   · Patient is not a candidate for surgical placement of a feeding tube  Chronic pancreatitis [facility])  Assessment & Plan  · With pancreatic fistula  · Patient's family has opted for home hospice  No further recommendations at this time  Diabetes mellitus type 2  Assessment & Plan  Lab Results   Component Value Date    HGBA1C 5 9 02/04/2020     Recent Labs     02/07/20  0626 02/08/20  0618   POCGLU 160* 186*     Blood Sugar Average: Last 72 hrs:  (P) 141     · Very well controlled, A1C 5 9  · Diet controlled   · Discontinued insulin    VTE Pharmacologic Prophylaxis:   Pharmacologic: Patient is now comfort care  No AC is indicated  Mechanical: Mechanical VTE prophylaxis in place  Patient Centered Rounds: I have performed bedside rounds with nursing staff today  Discussions with Specialists or Other Care Team Provider: Surgical oncology  Education and Discussions with Family / Patient: Patient's daughter, Bladimir Jimenez, is at the bedside along with patient's wife    All questions answered to the best of my ability  Time Spent for Care: 20 minutes  More than 50% of total time spent on counseling and coordination of care as described above  Current Length of Stay: 4 day(s)  Current Patient Status: Inpatient   Certification Statement: The patient will continue to require additional inpatient hospital stay due to need for hospice liason meeting and DME arrangement  Discharge Plan: Patient will be discharged home tomorrow with home hospice  Family needs today to get things arranged at home  Code Status: Level 4 - Comfort Care    Subjective:   Patient currently denies any pain or SOB  He does not have an appetite but is trying to eat some lunch  Objective:   Vitals:   Temp (24hrs), Av 3 °F (36 8 °C), Min:98 1 °F (36 7 °C), Max:98 4 °F (36 9 °C)    Temp:  [98 1 °F (36 7 °C)-98 4 °F (36 9 °C)] 98 1 °F (36 7 °C)  HR:  [71-79] 79  Resp:  [18] 18  BP: (105-124)/(64-72) 124/64  SpO2:  [94 %-97 %] 97 %  Body mass index is 18 72 kg/m²  Input and Output Summary (last 24 hours): Intake/Output Summary (Last 24 hours) at 2020 1225  Last data filed at 2020 0955  Gross per 24 hour   Intake 1388 75 ml   Output 245 ml   Net 1143 75 ml       Physical Exam:     Physical Exam   Constitutional: He appears cachectic  He appears ill  HENT:   Head: Normocephalic and atraumatic  Mouth/Throat: Oropharynx is clear and moist and mucous membranes are normal    Eyes: No scleral icterus  Cardiovascular: Normal rate and regular rhythm  No murmur heard  Pulmonary/Chest: Breath sounds normal  He has no wheezes  He has no rales  He exhibits no tenderness  Abdominal: Soft  Bowel sounds are normal  He exhibits no distension  There is no tenderness  Musculoskeletal: Normal range of motion  He exhibits no edema  Skin: Skin is warm and dry  No rash noted  Psychiatric: He has a normal mood and affect  Vitals reviewed      Additional Data:   Labs:  Results from last 7 days   Lab Units 20  2153 02/05/20  0441   WBC Thousand/uL 7 43 10 95*   HEMOGLOBIN g/dL 10 0* 10 3*   HEMATOCRIT % 32 0* 32 9*   PLATELETS Thousands/uL 218 268   NEUTROS PCT %  --  78*   LYMPHS PCT %  --  10*   MONOS PCT %  --  7   EOS PCT %  --  3     Results from last 7 days   Lab Units 02/08/20  0447   POTASSIUM mmol/L 3 8   CHLORIDE mmol/L 114*   CO2 mmol/L 27   BUN mg/dL 15   CREATININE mg/dL 0 55*   CALCIUM mg/dL 7 0*   ALK PHOS U/L 230*   ALT U/L 43   AST U/L 28     Results from last 7 days   Lab Units 02/07/20  1738   INR  1 22*       * I Have Reviewed All Lab Data Listed Above  * Additional Pertinent Lab Tests Reviewed: No New Labs Available For Today    Imaging:    Imaging Reports Reviewed Today Include: None new    Cultures:   Blood Culture:   Lab Results   Component Value Date    BLOODCX No Growth After 5 Days  01/04/2020    BLOODCX No Growth After 5 Days  01/04/2020    BLOODCX No Growth After 5 Days  01/10/2019    BLOODCX No Growth After 5 Days  01/10/2019    BLOODCX No Growth After 5 Days  01/01/2019    BLOODCX No Growth After 5 Days   01/01/2019     Urine Culture: No results found for: URINECX  Sputum Culture: No components found for: SPUTUMCX  Wound Culture: No results found for: WOUNDCULT    Last 24 Hours Medication List:     Current Facility-Administered Medications:  acetaminophen 650 mg Oral Q4H PRN Eula Sterling MD    aluminum-magnesium hydroxide-simethicone 15 mL Oral Q6H PRN Eula Sterling MD    bisacodyl 10 mg Rectal Daily PRN Eula Sterling MD    dextrose 75 mL/hr Intravenous Continuous Lauryncristel Blunt PA-C Last Rate: 75 mL/hr (02/08/20 0955)   lidocaine  Topical Daily PRN Anitha Denson MD    magnesium hydroxide 30 mL Oral Daily PRN Eula Sterling MD    ondansetron 4 mg Intravenous Q6H PRN Eula Sterling MD    pantoprazole 40 mg Oral Early Morning Eula Sterling MD         Today, Patient Was Seen By: Leandrew Schilder, PA-C    ** Please Note: Dragon 360 Dictation voice to text software may have been used in the creation of this document   **

## 2020-02-08 NOTE — ASSESSMENT & PLAN NOTE
· With pancreatic fistula  · Patient's family has opted for home hospice  No further recommendations at this time

## 2020-02-08 NOTE — PROGRESS NOTES
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    Esophagitis    Diabetes mellitus type 2    Weight loss    Chronic pancreatitis (HCC)    Sepsis ([facility] [address] )    Pneumonia    Moderate protein-calorie malnutrition     Abnormal SPEP    Severe protein-calorie malnutrition (HCC)    Disorder of upper esophageal sphincter    Gastric ulcer    Pancreatic pseudocyst    Pneumothorax    Pancreatic fistula    Failure to thrive in adult    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HCC)    Transaminitis    Gastroesophageal reflux disease without esophagitis    Bilateral complete vocal fold paralysis    Ambulatory dysfunction    Lower extremity edema    RSV infection    Acute metabolic encephalopathy due to hypoglycemia    Vitamin D deficiency    Elevated liver enzymes    Pressure injury of heel, unstageable (HCC)    Goals of care, counseling/discussion    Elevated LFTs         Plan:  1  Symptom management - PRN use only   -     2  Goals - After meeting with Dr Nirali Rucker, decision made to not pursue feeding tube  They would like to focus on comfort care and have him return to his home near [facility] [facility]  - consult placed to  for assistance in arranging hospice and discharge planning  - changed to Level 4 code status    Interval history:       Family requesting to speak with [facility] to discuss comfort cares  Patient resting, no PO intake this AM but ate "a lot" for dinner last evening       MEDICATIONS / ALLERGIES:     all current active meds have been reviewed and current meds:   Current Facility-Administered Medications   Medication Dose Route Frequency    acetaminophen (TYLENOL) tablet 650 mg  650 mg Oral Q4H PRN    aluminum-magnesium hydroxide-simethicone (MYLANTA) 200-200-20 mg/5 mL oral suspension 15 mL  15 mL Oral Q6H PRN    bisacodyl (DULCOLAX) rectal suppository 10 mg  10 mg Rectal Daily PRN    dextrose 5 % infusion  75 mL/hr Intravenous Continuous    enoxaparin (LOVENOX) subcutaneous injection 40 mg  40 mg Subcutaneous Daily    lidocaine (LMX) 4 % cream   Topical Daily PRN    magnesium hydroxide (MILK OF MAGNESIA) 400 mg/5 mL oral suspension 30 mL  30 mL Oral Daily PRN    ondansetron (ZOFRAN) injection 4 mg  4 mg Intravenous Q6H PRN    pantoprazole (PROTONIX) EC tablet 40 mg  40 mg Oral Early Morning    potassium-sodium phosphateS (K-PHOS,PHOSPHA 250) -250 mg tablet 1 tablet  1 tablet Oral 4x Daily (with meals and at bedtime)       No Known Allergies    OBJECTIVE:    Physical Exam  Physical Exam   Constitutional: No distress  Frail and cachetic elderly male   HENT:   Head: Normocephalic and atraumatic  Right Ear: External ear normal    Left Ear: External ear normal    Nose: Nose normal    Eyes: EOM are normal  Right eye exhibits no discharge  Left eye exhibits no discharge  No scleral icterus  Cardiovascular: Normal rate, regular rhythm and intact distal pulses  Pulmonary/Chest: Effort normal and breath sounds normal  No respiratory distress  Abdominal: Soft  Bowel sounds are normal  He exhibits no distension  Musculoskeletal: He exhibits no edema  Skin: Skin is warm and dry  There is pallor  Nursing note and vitals reviewed  Lab Results:   I have personally reviewed pertinent labs  , CBC:   Lab Results   Component Value Date    WBC 7 43 02/07/2020    HGB 10 0 (L) 02/07/2020    HCT 32 0 (L) 02/07/2020     (H) 02/07/2020     02/07/2020    MCH 31 6 02/07/2020    MCHC 31 3 (L) 02/07/2020    RDW 15 0 02/07/2020    MPV 11 0 02/07/2020   , CMP:   Lab Results   Component Value Date    SODIUM 144 02/08/2020    K 3 8 02/08/2020     (H) 02/08/2020    CO2 27 02/08/2020    BUN 15 02/08/2020    CREATININE 0 55 (L) 02/08/2020    CALCIUM 7 0 (L) 02/08/2020    AST 28 02/08/2020    ALT 43 02/08/2020    ALKPHOS 230 (H) 02/08/2020    EGFR 96 02/08/2020 Imaging Studies: reviewed  EKG, Pathology, and Other Studies: reviewd    Counseling / Coordination of Care  Total floor / unit time spent today 35+ minutes  Greater than 50% of total time was spent with the patient and / or family counseling and / or coordination of care  A description of the counseling / coordination of care: goals of care, hospice care, end of life care, supportive listening

## 2020-02-08 NOTE — ASSESSMENT & PLAN NOTE
Lab Results   Component Value Date    HGBA1C 5 9 02/04/2020     Recent Labs     02/07/20  0626 02/08/20  0618   POCGLU 160* 186*     Blood Sugar Average: Last 72 hrs:  (P) 141     · Very well controlled, A1C 5 9  · Diet controlled   · Discontinued insulin

## 2020-02-08 NOTE — PLAN OF CARE
Problem: Potential for Falls  Goal: Patient will remain free of falls  Description  INTERVENTIONS:  - Assess patient frequently for physical needs  -  Identify cognitive and physical deficits and behaviors that affect risk of falls  -  [place] fall precautions as indicated by assessment   - Educate patient/family on patient safety including physical limitations  - Instruct patient to call for assistance with activity based on assessment  - Modify environment to reduce risk of injury  - Consider OT/PT consult to assist with strengthening/mobility  Outcome: Progressing     Problem: Prexisting or High Potential for Compromised Skin Integrity  Goal: Skin integrity is maintained or improved  Description  INTERVENTIONS:  - Identify patients at risk for skin breakdown  - Assess and monitor skin integrity  - Assess and monitor nutrition and hydration status  - Monitor labs   - Assess for incontinence   - Turn and reposition patient  - Assist with mobility/ambulation  - Relieve pressure over bony prominences  - Avoid friction and shearing  - Provide appropriate hygiene as needed including keeping skin clean and dry  - Evaluate need for skin moisturizer/barrier cream  - Collaborate with interdisciplinary team   - Patient/family teaching  - Consider wound care consult   Outcome: Progressing     Problem: Nutrition/Hydration-ADULT  Goal: Nutrient/Hydration intake appropriate for improving, restoring or maintaining nutritional needs  Description  Monitor and assess patient's nutrition/hydration status for malnutrition  Collaborate with interdisciplinary team and initiate plan and interventions as ordered  Monitor patient's weight and dietary intake as ordered or per policy  Utilize nutrition screening tool and intervene as necessary  Determine patient's food preferences and provide high-protein, high-caloric foods as appropriate       INTERVENTIONS:  - Monitor oral intake, urinary output, labs, and treatment plans  - Assess nutrition and hydration status and recommend course of action  - Evaluate amount of meals eaten  - Assist patient with eating if necessary   - Allow adequate time for meals  - Recommend/ encourage appropriate diets, oral nutritional supplements, and vitamin/mineral supplements  - Order, calculate, and assess calorie counts as needed  - Recommend, monitor, and adjust tube feedings and TPN/PPN based on assessed needs  - Assess need for intravenous fluids  - Provide specific nutrition/hydration education as appropriate  - Include patient/family/caregiver in decisions related to nutrition  Outcome: Not Progressing

## 2020-02-09 PROBLEM — F41.8 ANXIETY ABOUT HEALTH: Chronic | Status: ACTIVE | Noted: 2020-02-07

## 2020-02-09 PROBLEM — E16.2 ACUTE METABOLIC ENCEPHALOPATHY DUE TO HYPOGLYCEMIA: Status: RESOLVED | Noted: 2020-01-04 | Resolved: 2020-02-09

## 2020-02-09 PROBLEM — D37.8 NEOPLASM OF UNCERTAIN BEHAVIOR OF TAIL OF PANCREAS: Status: RESOLVED | Noted: 2018-09-19 | Resolved: 2020-02-09

## 2020-02-09 PROBLEM — R63.4 WEIGHT LOSS: Status: RESOLVED | Noted: 2018-10-15 | Resolved: 2020-02-09

## 2020-02-09 PROBLEM — A41.9 SEPSIS (HCC): Status: RESOLVED | Noted: 2019-01-01 | Resolved: 2020-02-09

## 2020-02-09 PROBLEM — J18.9 PNEUMONIA: Status: RESOLVED | Noted: 2019-01-01 | Resolved: 2020-02-09

## 2020-02-09 PROBLEM — E43 SEVERE PROTEIN-CALORIE MALNUTRITION (HCC): Chronic | Status: ACTIVE | Noted: 2019-01-05

## 2020-02-09 PROBLEM — J93.9 PNEUMOTHORAX: Status: RESOLVED | Noted: 2019-02-14 | Resolved: 2020-02-09

## 2020-02-09 PROBLEM — K21.9 GASTROESOPHAGEAL REFLUX DISEASE WITHOUT ESOPHAGITIS: Status: RESOLVED | Noted: 2019-03-11 | Resolved: 2020-02-09

## 2020-02-09 PROBLEM — R74.01 TRANSAMINITIS: Status: RESOLVED | Noted: 2019-02-16 | Resolved: 2020-02-09

## 2020-02-09 PROBLEM — J38.02 BILATERAL COMPLETE VOCAL FOLD PARALYSIS: Status: RESOLVED | Noted: 2019-04-26 | Resolved: 2020-02-09

## 2020-02-09 PROBLEM — B33.8 RSV INFECTION: Status: RESOLVED | Noted: 2020-01-04 | Resolved: 2020-02-09

## 2020-02-09 PROBLEM — G93.41 ACUTE METABOLIC ENCEPHALOPATHY DUE TO HYPOGLYCEMIA: Status: RESOLVED | Noted: 2020-01-04 | Resolved: 2020-02-09

## 2020-02-09 PROCEDURE — 99232 SBSQ HOSP IP/OBS MODERATE 35: CPT | Performed by: PHYSICIAN ASSISTANT

## 2020-02-09 RX ORDER — MORPHINE SULFATE 100 MG/5ML
5 SOLUTION ORAL EVERY 4 HOURS PRN
Qty: 60 ML | Refills: 0 | Status: SHIPPED | OUTPATIENT
Start: 2020-02-09

## 2020-02-09 RX ORDER — OMEPRAZOLE 20 MG/1
20 CAPSULE, DELAYED RELEASE ORAL 2 TIMES DAILY
Qty: 30 CAPSULE | Refills: 3 | Status: SHIPPED | OUTPATIENT
Start: 2020-02-09

## 2020-02-09 RX ORDER — HALOPERIDOL 2 MG/ML
1 SOLUTION ORAL EVERY 6 HOURS PRN
Qty: 30 ML | Refills: 1 | Status: SHIPPED | OUTPATIENT
Start: 2020-02-09

## 2020-02-09 RX ORDER — LORAZEPAM 2 MG/ML
0.5 CONCENTRATE ORAL EVERY 2 HOUR PRN
Qty: 30 ML | Refills: 1 | Status: SHIPPED | OUTPATIENT
Start: 2020-02-09

## 2020-02-09 RX ORDER — ONDANSETRON 4 MG/1
4 TABLET, FILM COATED ORAL EVERY 4 HOURS PRN
Qty: 20 TABLET | Refills: 1 | Status: SHIPPED | OUTPATIENT
Start: 2020-02-09

## 2020-02-09 RX ADMIN — DEXTROSE 75 ML/HR: 5 SOLUTION INTRAVENOUS at 12:30

## 2020-02-09 NOTE — ASSESSMENT & PLAN NOTE
Lab Results   Component Value Date    HGBA1C 5 9 02/04/2020     Recent Labs     02/07/20  0626 02/08/20  0618   POCGLU 160* 186*     Blood Sugar Average: Last 72 hrs:  (P) 173     · Very well controlled, A1C 5 9  · Diet controlled   · Discontinued insulin

## 2020-02-09 NOTE — ASSESSMENT & PLAN NOTE
· Pt with failure to thrive, limited PO intake, protein calorie malnutrition, deconditioned in the setting of chronic pancreatitis complicated by a pancreatic pseudocyst with fistulization into the stomach  · Discharge to home hospice on Monday

## 2020-02-09 NOTE — PLAN OF CARE
Problem: Potential for Falls  Goal: Patient will remain free of falls  Description  INTERVENTIONS:  - Assess patient frequently for physical needs  -  Identify cognitive and physical deficits and behaviors that affect risk of falls    -  [place] fall precautions as indicated by assessment   - Educate patient/family on patient safety including physical limitations  - Instruct patient to call for assistance with activity based on assessment  - Modify environment to reduce risk of injury  - Consider OT/PT consult to assist with strengthening/mobility  Outcome: Progressing

## 2020-02-09 NOTE — ASSESSMENT & PLAN NOTE
Malnutrition Findings:   Degree of Malnutrition: Other severe protein calorie malnutrition    BMI Findings:  BMI Classifications: Underweight < 18 5  Body mass index is 18 72 kg/m²  · Patient was transitioned to Level 4 comfort care yesterday but family requested that he remain level 3 while here  · Patient's family would like to continue with IVFs while here in the hospital   · Patient is not a candidate for surgical placement of a feeding tube

## 2020-02-09 NOTE — HOSPICE NOTE
Met with pts spouse and dtr [place], pt not open his eyes or participate in the discussion  Discussed home hospice in detail  Discussed that DME could not be delivered to pts home until Monday 2 10 2020  Family reporting that they are coordinating private pay caregivers to assist pt and his spouse upon pts return home  Discussed DNR status with pts spouse as requested by rico bhakta, as pts spouse reports pt has stated that he would want CPR if necessary  Encouraged family to discuss this more in detail and to update bedside nurse if they wish to have pts code status changed back to full code  Hospital bed, half rails, NPPR mattress, 2 bed pads, OBT and transport wheelchair ordered from [employer] for delivery on Monday 2 10 2020, requested DME delivery no later than noon  Weekday hospice liaison to follow up with DME company Monday 2 10 2020,  to confirm delivery time and communicate to case management team so that transport can be arranged for pt to dc to home on Monday 2 10 2020 and be seen by the home hospice team on Monday 2 10 2020 for start of home hospice services  Will ask that the palliative care team write symptom management scripts for pt to dc home with  Rico bhakta has this liaisons contact information if any additional questions  Above communicated to pts bedside nurse

## 2020-02-09 NOTE — PROGRESS NOTES
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  · Discharge to home hospice on Monday  Severe protein-calorie malnutrition ([address] )  Assessment & Plan  Malnutrition Findings:   Degree of Malnutrition: Other severe protein calorie malnutrition    BMI Findings:  BMI Classifications: Underweight < 18 5  Body mass index is 18 72 kg/m²  · Patient was transitioned to Level 4 comfort care yesterday but family requested that he remain level 3 while here  · Patient's family would like to continue with IVFs while here in the hospital   · Patient is not a candidate for surgical placement of a feeding tube  Chronic pancreatitis [facility])  Assessment & Plan  · With pancreatic fistula  · Patient's family has opted for home hospice  No further recommendations at this time  Diabetes mellitus type 2  Assessment & Plan  Lab Results   Component Value Date    HGBA1C 5 9 02/04/2020     Recent Labs     02/07/20  0626 02/08/20  0618   POCGLU 160* 186*     Blood Sugar Average: Last 72 hrs:  (P) 173     · Very well controlled, A1C 5 9  · Diet controlled   · Discontinued insulin      VTE Pharmacologic Prophylaxis:   Pharmacologic: No AC given patient is comfort care  Mechanical: Mechanical VTE prophylaxis in place  Patient Centered Rounds: I have performed bedside rounds with nursing staff today  Discussions with Specialists or Other Care Team Provider: None  Education and Discussions with Family / Patient: Patient's wife and daughter are at the bedside  All questions answered to the best of my ability    Time Spent for Care: 20 minutes  More than 50% of total time spent on counseling and coordination of care as described above  Current Length of Stay: 5 day(s)  Current Patient Status: Inpatient   Certification Statement: Remain in the hospital till tomorrow when patient will be discharged home with hospice  Discharge Plan: Patient will be discharged on Monday after hospice is able to deliver DME to the home  Code Status: Level 3 - DNAR and DNI    Subjective:   Patient is currently sleeping  According to the family, he had no complaints earlier today  Objective:   Vitals:   Temp (24hrs), Av °F (36 7 °C), Min:97 8 °F (36 6 °C), Max:98 2 °F (36 8 °C)    Temp:  [97 8 °F (36 6 °C)-98 2 °F (36 8 °C)] 98 2 °F (36 8 °C)  HR:  [67-69] 69  Resp:  [15-18] 15  BP: (133-136)/(62-66) 136/66  SpO2:  [94 %-95 %] 94 %  Body mass index is 18 72 kg/m²  Input and Output Summary (last 24 hours): Intake/Output Summary (Last 24 hours) at 2020 1157  Last data filed at 2020 0854  Gross per 24 hour   Intake 100 ml   Output    Net 100 ml       Physical Exam:     Physical Exam   Constitutional: He appears cachectic  He is sleeping  HENT:   Head: Normocephalic and atraumatic  Pulmonary/Chest: Breath sounds normal  He has no wheezes  He has no rales  He exhibits no tenderness  Abdominal: Soft  He exhibits no distension  Bowel sounds are decreased  Musculoskeletal: Normal range of motion  He exhibits no edema  Skin: Skin is warm and dry  No rash noted  Vitals reviewed      Additional Data:   Labs:  Results from last 7 days   Lab Units 20  1738 20  0441   WBC Thousand/uL 7 43 10 95*   HEMOGLOBIN g/dL 10 0* 10 3*   HEMATOCRIT % 32 0* 32 9*   PLATELETS Thousands/uL 218 268   NEUTROS PCT %  --  78*   LYMPHS PCT %  --  10*   MONOS PCT %  --  7   EOS PCT %  --  3     Results from last 7 days   Lab Units 20  0447   POTASSIUM mmol/L 3 8   CHLORIDE mmol/L 114*   CO2 mmol/L 27   BUN mg/dL 15   CREATININE mg/dL 0 55*   CALCIUM mg/dL 7 0*   ALK PHOS U/L 230*   ALT U/L 43   AST U/L 28     Results from last 7 days   Lab Units 02/07/20  1738   INR  1 22*       * I Have Reviewed All Lab Data Listed Above  * Additional Pertinent Lab Tests Reviewed: No New Labs Available For Today    Imaging:    Imaging Reports Reviewed Today Include: None new    Cultures:   Blood Culture:   Lab Results   Component Value Date    BLOODCX No Growth After 5 Days  01/04/2020    BLOODCX No Growth After 5 Days  01/04/2020    BLOODCX No Growth After 5 Days  01/10/2019    BLOODCX No Growth After 5 Days  01/10/2019    BLOODCX No Growth After 5 Days  01/01/2019    BLOODCX No Growth After 5 Days  01/01/2019     Urine Culture: No results found for: URINECX  Sputum Culture: No components found for: SPUTUMCX  Wound Culture: No results found for: WOUNDCULT    Last 24 Hours Medication List:     Current Facility-Administered Medications:  acetaminophen 650 mg Oral Q4H PRN Melody Baldwin MD    aluminum-magnesium hydroxide-simethicone 15 mL Oral Q6H PRN Melody Baldwin MD    bisacodyl 10 mg Rectal Daily PRN Melody Baldwin MD    dextrose 75 mL/hr Intravenous Continuous Lauryn Blunt PA-C Last Rate: 75 mL/hr (02/08/20 2253)   lidocaine  Topical Daily PRN Eunice Walton MD    magnesium hydroxide 30 mL Oral Daily PRN Melody Baldwin MD    ondansetron 4 mg Intravenous Q6H PRN Melody Baldwin MD    pantoprazole 40 mg Oral Early Morning Melody Baldwin MD         Today, Patient Was Seen By: Vee Montemayor PA-C    ** Please Note: Dragon 360 Dictation voice to text software may have been used in the creation of this document   **

## 2020-02-09 NOTE — TREATMENT PLAN
2/9/2020 10:02 AM -  Reviewed case and developments in plan of care with hospice liaison  Will send comfort meds, and pre-reconcile his regimen, for anticipated D/C to home tomorrow with same-day open  Local [facility] will be used to improve family's ability to obtain appropriate meds  Melquiades Whittaker MD  Palliative and Supportive Care  Clinic/Answering Service: [phone]  You can find me on [employer]!

## 2020-02-10 VITALS
OXYGEN SATURATION: 98 % | HEART RATE: 55 BPM | TEMPERATURE: 97.8 F | DIASTOLIC BLOOD PRESSURE: 77 MMHG | RESPIRATION RATE: 18 BRPM | HEIGHT: 67 IN | BODY MASS INDEX: 18.75 KG/M2 | WEIGHT: 119.5 LBS | SYSTOLIC BLOOD PRESSURE: 122 MMHG

## 2020-02-10 PROCEDURE — 99238 HOSP IP/OBS DSCHRG MGMT 30/<: CPT | Performed by: NURSE PRACTITIONER

## 2020-02-10 RX ADMIN — DEXTROSE 75 ML/HR: 5 SOLUTION INTRAVENOUS at 02:13

## 2020-02-10 NOTE — HOSPICE NOTE
Called [facility] as requested  Spoke with aCmpos Cuello who reports equipment will be delivered between 8 and 12 today  TT CM [place] and requested transport for 12pm  Dr Carole Dean escripted [place] [id number] [facility] team aware of pt and need for open today

## 2020-02-10 NOTE — ASSESSMENT & PLAN NOTE
Malnutrition Findings:   Degree of Malnutrition: Other severe protein calorie malnutrition    BMI Findings:  BMI Classifications: Underweight < 18 5  Body mass index is 18 72 kg/m²  · Patient was transitioned to Level 4 comfort care yesterday but family requested that he remain level 3 while here and her hospice team, patient will be a full code  This will need to be addressed on an ongoing basis with the family given the fact that he is going home with hospice and he will not survive a code  · Patient is not a candidate for surgical placement of a feeding tube

## 2020-02-10 NOTE — TRANSPORTATION MEDICAL NECESSITY
Section I - General Information    Name of Patient: Tala Ibrahim                 : 1935    Medicare #: [id number]  Transport Date: 02/10/20 (PCS is valid for round trips on this date and for all repetitive trips in the 60-day range as noted below )  Origin: [address]: [address] [place] [address] [facility]  Is the pt's stay covered under Medicare Part A (PPS/DRG)   [x]     Closest appropriate facility? If no, why is transport to more distant facility required? Yes  If hospice pt, is this transport related to pt's terminal illness? Yes       Section II - Medical Necessity Questionnaire  Ambulance transportation is medically necessary only if other means of transport are contraindicated or would be potentially harmful to the patient  To meet this requirement, the patient must either be "bed confined" or suffer from a condition such that transport by means other than ambulance is contraindicated by the patient's condition  The following questions must be answered by the medical professional signing below for this form to be valid:    1)  Describe the MEDICAL CONDITION (physical and/or mental) of this patient AT [address] [place] [address] that requires the patient to be transported in an ambulance and why transport by other means is contraindicated by the patient's condition:  Failure to thrive in adult    2) Is the patient "bed confined" as defined below? Yes  To be "be confined" the patient must satisfy all three of the following conditions: (1) unable to get up from bed without Assistance; AND (2) unable to ambulate; AND (3) unable to sit in a chair or wheelchair  3) Can this patient safely be transported by car or wheelchair van (i e , seated during transport without a medical attendant or monitoring)?    No    4) In addition to completing questions 1-3 above, please check any of the following conditions that apply*:   *Note: supporting documentation for any boxes checked must be maintained in the patient's medical records  If hosp-hosp transfer, describe services needed at 2nd facility not available at 1st facility? Patient is confused  Moderate/severe pain on movement   Unable to tolerate seated position for time needed to transport   Other(specify) max of 2 assist, for home hospice, high fall risk      Section III - Signature of Physician or Healthcare Professional  I certify that the above information is true and correct based on my evaluation of this patient, and represent that the patient requires transport by ambulance and that other forms of transport are contraindicated  I understand that this information will be used by the Centers for Medicare and Medicaid Services (CMS) to support the determination of medical necessity for ambulance services, and I represent that I have personal knowledge of the patient's condition at time of transport  []  If this box is checked, I also certify that the patient is physically or mentally incapable of signing the ambulance service's claim and that the institution with which I am affiliated has furnished care, services, or assistance to the patient  My signature below is made on behalf of the patient pursuant to 42 CFR §424 36(b)(4)  In accordance with 42 CFR §424 37, the specific reason(s) that the patient is physically or mentally incapable of signing the claim form is as follows:    Annmarie Cortes of Physician* or Healthcare Professional______________________________________________________________  Signature Date 02/10/20 (For scheduled repetitive transports, this form is not valid for transports performed more than 60 days after this date)    Printed Name & Credentials of Physician or Healthcare Professional (MD, DO, RN, etc )______________________DEJA Silva RN__________  *Form must be signed by patient's attending physician for scheduled, repetitive transports   For non-repetitive, unscheduled ambulance transports, if unable to obtain the signature of the attending physician, any of the following may sign (choose appropriate option below)  [] Physician Assistant []  Clinical Nurse Specialist []  Registered Nurse  []  Nurse Practitioner  [x] Discharge Planner

## 2020-02-10 NOTE — PLAN OF CARE
Problem: Potential for Falls  Goal: Patient will remain free of falls  Description  INTERVENTIONS:  - Assess patient frequently for physical needs  -  Identify cognitive and physical deficits and behaviors that affect risk of falls  -  [place] fall precautions as indicated by assessment   - Educate patient/family on patient safety including physical limitations  - Instruct patient to call for assistance with activity based on assessment  - Modify environment to reduce risk of injury  - Consider OT/PT consult to assist with strengthening/mobility  Outcome: Progressing     Problem: Prexisting or High Potential for Compromised Skin Integrity  Goal: Skin integrity is maintained or improved  Description  INTERVENTIONS:  - Identify patients at risk for skin breakdown  - Assess and monitor skin integrity  - Assess and monitor nutrition and hydration status  - Monitor labs   - Assess for incontinence   - Turn and reposition patient  - Assist with mobility/ambulation  - Relieve pressure over bony prominences  - Avoid friction and shearing  - Provide appropriate hygiene as needed including keeping skin clean and dry  - Evaluate need for skin moisturizer/barrier cream  - Collaborate with interdisciplinary team   - Patient/family teaching  - Consider wound care consult   Outcome: Progressing     Problem: Nutrition/Hydration-ADULT  Goal: Nutrient/Hydration intake appropriate for improving, restoring or maintaining nutritional needs  Description  Monitor and assess patient's nutrition/hydration status for malnutrition  Collaborate with interdisciplinary team and initiate plan and interventions as ordered  Monitor patient's weight and dietary intake as ordered or per policy  Utilize nutrition screening tool and intervene as necessary  Determine patient's food preferences and provide high-protein, high-caloric foods as appropriate       INTERVENTIONS:  - Monitor oral intake, urinary output, labs, and treatment plans  - Assess nutrition and hydration status and recommend course of action  - Evaluate amount of meals eaten  - Assist patient with eating if necessary   - Allow adequate time for meals  - Recommend/ encourage appropriate diets, oral nutritional supplements, and vitamin/mineral supplements  - Order, calculate, and assess calorie counts as needed  - Recommend, monitor, and adjust tube feedings and TPN/PPN based on assessed needs  - Assess need for intravenous fluids  - Provide specific nutrition/hydration education as appropriate  - Include patient/family/caregiver in decisions related to nutrition  Outcome: Progressing     Problem: DISCHARGE PLANNING  Goal: Discharge to home or other facility with appropriate resources  Description  INTERVENTIONS:  - Identify barriers to discharge w/patient and caregiver  - Arrange for needed discharge resources and transportation as appropriate  - Identify discharge learning needs (meds, wound care, etc )  - Arrange for interpretive services to assist at discharge as needed  - Refer to Case Management Department for coordinating discharge planning if the patient needs post-hospital services based on physician/advanced practitioner order or complex needs related to functional status, cognitive ability, or social support system  Outcome: Progressing     Problem: Knowledge Deficit  Goal: Patient/family/caregiver demonstrates understanding of disease process, treatment plan, medications, and discharge instructions  Description  Complete learning assessment and assess knowledge base    Interventions:  - Provide teaching at level of understanding  - Provide teaching via preferred learning methods  Outcome: Progressing

## 2020-02-10 NOTE — DISCHARGE SUMMARY
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  · Discharge to home hospice on Monday  Chronic pancreatitis [facility])  Assessment & Plan  · With pancreatic fistula  · Patient's family has opted for home hospice  No further recommendations at this time  Diabetes mellitus type 2  Assessment & Plan  Lab Results   Component Value Date    HGBA1C 5 9 02/04/2020     Recent Labs     02/08/20  0618   POCGLU 186*     Blood Sugar Average: Last 72 hrs:  (P) 173     · Very well controlled, A1C 5 9  · Diet controlled   · Discontinued insulin    Pancreatic pseudocyst  Assessment & Plan  · Patient is not a surgical candidate and cannot have a feeding tube placed secondary to fistula into the stomach  · Going home with hospice    Severe protein-calorie malnutrition [facility])  Assessment & Plan  Malnutrition Findings:   Degree of Malnutrition: Other severe protein calorie malnutrition    BMI Findings:  BMI Classifications: Underweight < 18 5  Body mass index is 18 72 kg/m²  · Patient was transitioned to Level 4 comfort care yesterday but family requested that he remain level 3 while here and her hospice team, patient will be a full code  This will need to be addressed on an ongoing basis with the family given the fact that he is going home with hospice and he will not survive a code  · Patient is not a candidate for surgical placement of a feeding tube      Pressure injury of heel, unstageable [facility])  Assessment & Plan  · Wound care  · [facility]        Discharging Physician / Practitioner: Sabino Mott DEMI  PCP: Ronna Royal MD  Admission Date:   Admission Orders (From admission, onward)     Ordered        02/04/20 1002  Inpatient Admission  Once         02/03/20 2219  Place in Observation  Once                   Discharge Date: 02/10/20    Resolved Problems  Date Reviewed: 2/10/2020    None          Consultations During Hospital Stay:  · Palliative care    Procedures Performed:   · Right upper quadrant ultrasound which was limited study and did not show the pancreas of the common bile  · CT scan of the abdomen pelvis showed pancreatic ductal dilatation consistent rate with history of chronic pancreatitis as well as fistulous connection of the pancreatic ductal system with the greater curvature of the stomach which appears more prominent than exam on March 28, 2019  Patient also has a chronic left pleural effusion  Significant Findings / Test Results:   · Failure to thrive secondary to pancreatic pseudocyst which now has a fistula communicating to the stomach and the patient is non operative candidate    Incidental Findings:   · None     Test Results Pending at Discharge (will require follow up): · None     Outpatient Tests Requested:  · None    Complications:  Failure to thrive and severe protein calorie malnutrition    Reason for Admission:  Weakness and failure to thrive secondary to protein calorie malnutrition    Hospital Course:     Genie Key is a 82 y o  male patient who originally presented to the hospital on 2/3/2020 due to weakness, failure to thrive and protein calorie malnutrition  Patient has chronic pancreatitis and has 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  Due to this fistula, patient is not a candidate for a feeding tube  The patient was seen by palliative care and ultimately the family has decided on hospice  It seems as though there needs to be ongoing discussion about realistic goals however because the patient is being discharged as a full code    He is a level 3 DNR while in the hospital   The patient will be seen by home hospice and this should be an ongoing discussion  He should not be coded and he should not be sent back to the hospital   He should be kept comfortable  Please see above list of diagnoses and related plan for additional information  Condition at Discharge: Terminal     Discharge Day Visit / Exam:     Subjective:  Offers no complaints of pain  He does not have an appetite  He has no nausea or vomiting to speak of this morning but just an overall feeling of not wanting to eat  He is tired and he wants to go home  Vitals: Blood Pressure: 122/77 (02/10/20 0644)  Pulse: 55 (02/10/20 0644)  Temperature: 97 8 °F (36 6 °C) (02/10/20 0644)  Temp Source: Oral (02/10/20 0644)  Respirations: 18 (02/10/20 0644)  Height: 5' 7" (170 2 cm) (02/07/20 1306)  Weight - Scale: 54 2 kg (119 lb 8 oz) (02/05/20 0536)  SpO2: 98 % (02/10/20 0644)  Exam:   Physical Exam   Constitutional: He is oriented to person, place, and time  No distress  Chronically ill-appearing   HENT:   Head: Normocephalic and atraumatic  Eyes: Pupils are equal, round, and reactive to light  EOM are normal    Neck: Normal range of motion  Neck supple  Cardiovascular: Normal rate and regular rhythm  Pulmonary/Chest: Effort normal and breath sounds normal    Abdominal: Soft  Bowel sounds are normal  He exhibits no distension  Musculoskeletal: Normal range of motion  He exhibits no edema  Neurological: He is alert and oriented to person, place, and time  Skin: Skin is warm and dry  Capillary refill takes less than 2 seconds  Nursing note and vitals reviewed  Discussion with Family:  The discharge plan has been discussed with the patient's family    Discharge instructions/Information to patient and family:   See after visit summary for information provided to patient and family        Provisions for Follow-Up Care:  See after visit summary for information related to follow-up care and any pertinent home health orders  Disposition:     Home with VNA Services (Reminder: Complete face to face encounter)    For Discharges to [facility] SNF:   · Not Applicable to this Patient - Not Applicable to this Patient    Planned Readmission:  No     Discharge Statement:  I spent 30 minutes discharging the patient  This time was spent on the day of discharge  I had direct contact with the patient on the day of discharge  Greater than 50% of the total time was spent examining patient, answering all patient questions, arranging and discussing plan of care with patient as well as directly providing post-discharge instructions  Additional time then spent on discharge activities  Discharge Medications:  See after visit summary for reconciled discharge medications provided to patient and family        ** Please Note: This note has been constructed using a voice recognition system **

## 2020-02-10 NOTE — ASSESSMENT & PLAN NOTE
· Patient is not a surgical candidate and cannot have a feeding tube placed secondary to fistula into the stomach    · Going home with hospice

## 2020-02-10 NOTE — SOCIAL WORK
CM was informed by Julian Gusman, [address] liaison that pt is appropriate for home hospice and that  [facility] accepted pt  CM set up transportation with [facility] at 12 noon today  CM informed flr RN, pt, pt's dtr Agnieszka Arriaga and Brooke Honeycutt of the above  CMN done, copy paced in MR bin and pt's chart

## 2020-02-10 NOTE — ASSESSMENT & PLAN NOTE
Lab Results   Component Value Date    HGBA1C 5 9 02/04/2020     Recent Labs     02/08/20  0618   POCGLU 186*     Blood Sugar Average: Last 72 hrs:  (P) 173     · Very well controlled, A1C 5 9  · Diet controlled   · Discontinued insulin

## 2020-02-24 ENCOUNTER — PATIENT OUTREACH (OUTPATIENT)
Dept: CASE MANAGEMENT | Facility: HOSPITAL | Age: 85
End: 2020-02-24

## 2020-04-16 ENCOUNTER — TELEPHONE (OUTPATIENT)
Dept: GASTROENTEROLOGY | Facility: MEDICAL CENTER | Age: 85
End: 2020-04-16

## 2020-06-30 NOTE — PROGRESS NOTES
Continue Regimen: Gentamicin once daily in the morning \\nUse air donut to sit on Progress Note - Surgical Oncology  Vickie Thomas 82 y o  male MRN: [id number]  Unit/Bed#: [facility] 913-01 Encounter: [id number]    Assessment:  83M with dysphagia and malnutrition, s/p open J tube placement 1/15  Plan:  - NPO  - tube feeds to start at trickle through J tube today  - IVF, additional 500cc bolus this AM for marginal UOP  - prn pain control  - f/u EGD bx  - SQH/SCDs  - rest per primary      Subjective/Objective   Subjective: pain controlled    Objective:    Blood pressure 123/72, pulse 88, temperature (!) 97 3 °F (36 3 °C), temperature source Axillary, resp  rate 20, height 5' 7" (1 702 m), weight 51 6 kg (113 lb 12 1 oz), SpO2 99 %  ,Body mass index is 17 82 kg/m²  I/O last 24 hours:   In: 2241 3 [I V :2241 3]  Out: 1075 [Urine:1075]    Invasive Devices     Peripheral Intravenous Line            Peripheral IV 01/12/19 Right Antecubital 3 days    Peripheral IV 01/15/19 Left Arm less than 1 day          Drain            Gastrostomy/Enterostomy Jejunostomy 14 Fr  LLQ less than 1 day                Physical Exam:   NAD, alert and oriented x3  Normocephalic, atraumatic  MMM, EOMI, PERRLA  Norm resp effort on 2LNC  RRR  Abd soft, appropriately tender, ND, J tube in place cdi  No calf tenderness or peripheral edema  Motor/sensation intact in distal extremities  CN grossly intact  -rash/lesions      Lab, Imaging and other studies:  Lab Results   Component Value Date    WBC 11 03 (H) 01/15/2019    HGB 10 4 (L) 01/15/2019    HCT 33 7 (L) 01/15/2019    MCV 99 (H) 01/15/2019     01/15/2019      Lab Results   Component Value Date    GLUCOSE 202 (H) 01/10/2019    CALCIUM 7 6 (L) 01/15/2019    K 3 9 01/15/2019    CO2 30 01/15/2019     01/15/2019    BUN 3 (L) 01/15/2019    CREATININE 0 44 (L) 01/15/2019       VTE Pharmacologic Prophylaxis: Heparin  VTE Mechanical Prophylaxis: sequential compression device Detail Level: Detailed

## 2020-07-09 NOTE — HOSPICE NOTE
LATE ENTRY; made a call to son Evelyne Pena  As requested, discussed hospice support in detail  Alfred Miguel  States they are going to try 1 more time to convince patient to have a PEG placed  He requested I call his sisters and review hospice services with them as well  I called daughter Gabi Viramontes and she requested a written account of hospice support so she could share it with her sisters  Email sent to Pilar Simmons aware  Report received. Assumed care. Pt sitting up in chair. A/O x4. VSS. Responds appropriately. Denies chest pain and SOB. Assessment complete. Discussed POC, , pt verbalizes understanding. Explained importance of calling before getting OOB. Call light and belongings within reach. Bed alarm on. Bed in the lowest position. Treaded socks in place. Hourly rounding in progress.

## 2020-10-16 NOTE — TELEPHONE ENCOUNTER
I have called the patient and spoke with his wife and discussed with them that we do not have the results of the biopsies yet  Please call cytology to find out an update on the results    Thank you [Dear  ___] : Dear  [unfilled], [Consult Letter:] : I had the pleasure of evaluating your patient, [unfilled]. [( Thank you for referring [unfilled] for consultation for _____ )] : Thank you for referring [unfilled] for consultation for [unfilled] [Please see my note below.] : Please see my note below. [Sincerely,] : Sincerely, [FreeTextEntry3] : Sayra Bingham DO

## 2021-03-11 NOTE — ASSESSMENT & PLAN NOTE
Lab Results   Component Value Date    HGBA1C 5 9 02/04/2020       Recent Labs     02/04/20  2121 02/04/20  2339 02/05/20  0624 02/05/20  1132   POCGLU 208* 168* 111 107       Blood Sugar Average: Last 72 hrs:  (P) 039 [id number]     · Very well controlled, A1C 5 9  · Diet controlled   · Will discontinue insulin Secondary Defect Length (In Cm): 0

## 2021-06-28 NOTE — PERIOPERATIVE NURSING NOTE
PCa pump started, patient teaching completed, patient verbalized understanding, not having pain at this time, so did not push button for PCA dose  Skin assessment done scattered bruising noted and Abd surgical incisions noted.  No other skin issues at this time

## 2023-01-18 NOTE — ASSESSMENT & PLAN NOTE
- evolving sepsis as evidenced by fever, tachypnea, leukocytosis, and previous lactic acidosis  - presumed source of bilateral streptococcal pneumonia - positive urine streptococcal Ag noted - blood cultures from 1/1 are negative and repeat cultures from 1/10 negative  - MRSA screen negative  - encourage incentive spirometry - maintain oxygenation    - RSV/Influenza PCR negative   - appreciate pulmonology input who have discontinued antibiotics on 1/12 as procalcitonin normalized   - remains NPO due to dysphagia (see plan below)   - transferred out of the ICU on 1/11 Possible steroids, ophthalmology and neurology consultation Consideration of Admission/Observation

## 2023-04-13 NOTE — ASSESSMENT & PLAN NOTE
Problem: Pain  Goal: #Acceptable pain level achieved/maintained at rest using NRS/Faces  Description: This goal is used for patients who can self-report.  Acceptable means the level is at or below the identified comfort/function goal.  Outcome: Outcome Not Met, Continue to Monitor  Goal: # Acceptable pain level achieved/maintained with activity using NRS/Faces  Description: This goal is used for patients who can self-report and are not achieving acceptable pain control during activity.  Outcome: Outcome Not Met, Continue to Monitor  Goal: Acceptable pain/comfort level is achieved/maintained at rest (based on Pain Behaviors Scale)  Description: This goal is used for patients who are not able to self-report pain and are assessed for pain using the Pain Behaviors Scale  Outcome: Outcome Not Met, Continue to Monitor  Goal: Acceptable pain/comfort level is achieved/maintained at rest based on PAINAID scale (Dementia)  Description: This goal is used for patients who are not able to self-report pain, have dementia, and assessed using the PAINAD scale.  Outcome: Outcome Not Met, Continue to Monitor     Problem: At Risk for Falls  Goal: # Patient does not fall  Outcome: Outcome Not Met, Continue to Monitor  Goal: # Takes action to control fall-related risks  Outcome: Outcome Not Met, Continue to Monitor  Goal: # Verbalizes understanding of fall risk/precautions  Description: Document education using the patient education activity  Outcome: Outcome Not Met, Continue to Monitor     Problem: At Risk for Injury Due to Fall  Goal: # Patient does not fall  Outcome: Outcome Not Met, Continue to Monitor  Goal: # Takes action to control condition specific risks  Outcome: Outcome Not Met, Continue to Monitor  Goal: # Verbalizes understanding of fall-related injury personal risks  Description: Document education using the patient education activity  Outcome: Outcome Not Met, Continue to Monitor     Problem: VTE, Risk for  Goal: # No  briefly  Now NSR  Appreciate cardio input - flutter in setting of sepsis hence cont to monitor, if needed can use BB  Will hold off on BB for now while on Mestinon and w/ nl HR  Check echo - EF of 60% with grade 1 diastolic dysfunction  hold off AC due to brief episode, CHADS-Vasc 3    Per neuro, dysphagia symptoms are not TIA/CVA related s/s of VTE  Outcome: Outcome Not Met, Continue to Monitor  Goal: # Verbalizes understanding of VTE risk factors and prevention  Description: Document education using the patient education activity.   Outcome: Outcome Not Met, Continue to Monitor  Goal: Demonstrates ability to administer injectable anticoagulants if ordered for d/c  Description: Document education using the patient education activity.  Outcome: Outcome Not Met, Continue to Monitor     Problem: Pressure Injury, Risk for  Goal: # Skin remains intact  Outcome: Outcome Not Met, Continue to Monitor  Goal: No new pressure injury (PI) development  Outcome: Outcome Not Met, Continue to Monitor  Goal: # Verbalizes understanding of PI risk factors and prevention strategies  Description: Document education using the patient education activity.   Outcome: Outcome Not Met, Continue to Monitor     Problem: Impaired Physical Mobility  Goal: # Bed mobility, ambulation, and ADLs are maintained or returned to baseline during hospitalization  Outcome: Outcome Not Met, Continue to Monitor     Problem: Breathing Pattern Ineffective  Goal: Air exchange is effective, demonstrated by Sp02 sat of greater then or = 92% (or as ordered)  Outcome: Outcome Not Met, Continue to Monitor  Goal: Respiratory pattern is quiet and regular without report of SOB  Outcome: Outcome Not Met, Continue to Monitor  Goal: Breathing pattern demonstrates minimal apnea during sleep with appropriate use of airway pressure support devices  Outcome: Outcome Not Met, Continue to Monitor  Goal: Verbalizes/demonstrates effective breathing management strategies  Description: Document education using the patient education activity.   Outcome: Outcome Not Met, Continue to Monitor

## 2024-08-26 NOTE — QUICK NOTE
Patient noted to have blood glucose level of 20 via fingerstick  Patient was given dextrose, orange juice  Patient is alert in speaking  Will monitor and recheck blood sugar level  [NO] : No [FreeTextEntry1] : well

## (undated) DEVICE — MEDI-VAC YANK SUCT HNDL W/TPRD BULBOUS TIP: Brand: CARDINAL HEALTH

## (undated) DEVICE — ENDOPATH XCEL UNIVERSAL TROCAR STABLILITY SLEEVES: Brand: ENDOPATH XCEL

## (undated) DEVICE — SUT PDS II 1 CTX 36 IN Z371T

## (undated) DEVICE — 3M™ TEGADERM™ TRANSPARENT FILM DRESSING FRAME STYLE, 1628, 6 IN X 8 IN (15 CM X 20 CM), 10/CT 8CT/CASE: Brand: 3M™ TEGADERM™

## (undated) DEVICE — 3000CC GUARDIAN II: Brand: GUARDIAN

## (undated) DEVICE — VESSEL LOOPS X-RAY DETECTABLE: Brand: DEROYAL

## (undated) DEVICE — BETHLEHEM MAJOR GENERAL PACK: Brand: CARDINAL HEALTH

## (undated) DEVICE — SUT SILK 3-0 SH CR/8 18 IN C013D

## (undated) DEVICE — LIGACLIP MCA MULTIPLE CLIP APPLIERS, 20 MEDIUM CLIPS: Brand: LIGACLIP

## (undated) DEVICE — CHLORAPREP HI-LITE 26ML ORANGE

## (undated) DEVICE — SUT ETHILON 3-0 FS-1 18 IN 663G

## (undated) DEVICE — SUT VICRYL 0 UR-6 27 IN J603H

## (undated) DEVICE — INTENDED FOR TISSUE SEPARATION, AND OTHER PROCEDURES THAT REQUIRE A SHARP SURGICAL BLADE TO PUNCTURE OR CUT.: Brand: BARD-PARKER SAFETY BLADES SIZE 15, STERILE

## (undated) DEVICE — SUT MONOCRYL 4-0 PS-2 18 IN Y496G

## (undated) DEVICE — PLUMEPEN PRO 10FT

## (undated) DEVICE — [HIGH FLOW INSUFFLATOR,  DO NOT USE IF PACKAGE IS DAMAGED,  KEEP DRY,  KEEP AWAY FROM SUNLIGHT,  PROTECT FROM HEAT AND RADIOACTIVE SOURCES.]: Brand: PNEUMOSURE

## (undated) DEVICE — ANTI-FOG SOLUTION WITH FOAM PAD: Brand: DEVON

## (undated) DEVICE — INVIEW CLEAR LEGGINGS: Brand: CONVERTORS

## (undated) DEVICE — SUT SILK 2-0 SH CR/8 18 IN C012D

## (undated) DEVICE — 2000CC GUARDIAN II: Brand: GUARDIAN

## (undated) DEVICE — SUT PROLENE 4-0 RB-1/RB-1 36 IN 8557H

## (undated) DEVICE — TUBING SMOKE EVAC W/FILTRATION DEVICE PLUMEPORT ACTIV

## (undated) DEVICE — SUT SILK 2-0 18 IN A185H

## (undated) DEVICE — SUT VICRYL 3-0 SH 27 IN J416H

## (undated) DEVICE — TRAY FOLEY 16FR URIMETER SURESTEP

## (undated) DEVICE — SPONGE DRAIN 4 X 4

## (undated) DEVICE — GAUZE SPONGES,16 PLY: Brand: CURITY

## (undated) DEVICE — GLOVE INDICATOR PI UNDERGLOVE SZ 8 BLUE

## (undated) DEVICE — TRU-CUT NEEDLE BIOPSY SOFT TISSUE 14G X 15CM: Brand: TRU-CUT

## (undated) DEVICE — SUT VICRYL 2-0 D-SPECIAL 27 IN D8114

## (undated) DEVICE — GLOVE SRG BIOGEL ECLIPSE 8

## (undated) DEVICE — ENDOSCOPIC ULTRASOUND ASPIRATION NEEDLE: Brand: EXPECT SLIMLINE SL

## (undated) DEVICE — SUT PROLENE 3-0 SH 36 IN 8522H

## (undated) DEVICE — JACKSON-PRATT 100CC BULB RESERVOIR: Brand: CARDINAL HEALTH

## (undated) DEVICE — INTENDED FOR TISSUE SEPARATION, AND OTHER PROCEDURES THAT REQUIRE A SHARP SURGICAL BLADE TO PUNCTURE OR CUT.: Brand: BARD-PARKER SAFETY BLADES SIZE 11, STERILE

## (undated) DEVICE — SUT ETHILON 3-0 FSLX 30 IN 1673H

## (undated) DEVICE — JP CHANNEL DRAIN 19FR, FULL FLUTES: Brand: JACKSON-PRATT

## (undated) DEVICE — PAD GROUNDING ADULT

## (undated) DEVICE — NEEDLE 25G X 1 1/2

## (undated) DEVICE — SYRINGE 10ML LL

## (undated) DEVICE — SUT SILK 2-0 TIES 144 IN LA55G

## (undated) DEVICE — HARMONIC 1100 SHEARS, 36CM SHAFT LENGTH: Brand: HARMONIC

## (undated) DEVICE — INTENDED FOR TISSUE SEPARATION, AND OTHER PROCEDURES THAT REQUIRE A SHARP SURGICAL BLADE TO PUNCTURE OR CUT.: Brand: BARD-PARKER SAFETY BLADES SIZE 10, STERILE

## (undated) DEVICE — SUT VICRYL 0 REEL 54 IN J287G

## (undated) DEVICE — SPONGE LAP 18 X 18 IN STRL RFD

## (undated) DEVICE — SUT MONOCRYL 4-0 PS-2 27 IN Y426H

## (undated) DEVICE — ADHESIVE SKN CLSR HISTOACRYL FLEX 0.5ML LF

## (undated) DEVICE — INSUFLATION TUBING INSUFLOW (LEXION)

## (undated) DEVICE — MONOPTY® DISPOSABLE CORE BIOPSY INSTRUMENT, 22MM PENETRATION DEPTH, 18G X 20CM: Brand: MONOPTY

## (undated) DEVICE — TELFA NON-ADHERENT ABSORBENT DRESSING: Brand: TELFA

## (undated) DEVICE — PROXIMATE SKIN STAPLERS (35 WIDE) CONTAINS 35 STAINLESS STEEL STAPLES (FIXED HEAD): Brand: PROXIMATE

## (undated) DEVICE — HARMONIC 1100 SHEARS, 20CM SHAFT LENGTH: Brand: HARMONIC

## (undated) DEVICE — STRL COTTON TIP APPLCTR 6IN PK: Brand: CARDINAL HEALTH

## (undated) DEVICE — ENDOPATH XCEL BLADELESS TROCARS WITH STABILITY SLEEVES: Brand: ENDOPATH XCEL

## (undated) DEVICE — SUT PROLENE 2-0 SH 36 IN 8523H

## (undated) DEVICE — FLOSEAL HEMOSTATIC MATRIX, 10 ML: Brand: FLOSEAL

## (undated) DEVICE — TUBE FEEDING MIC JEJUNAL 14FR

## (undated) DEVICE — IRRIG ENDO FLO TUBING

## (undated) DEVICE — ENDOPATH PNEUMONEEDLE INSUFFLATION NEEDLES WITH LUER LOCK CONNECTORS 120MM: Brand: ENDOPATH

## (undated) DEVICE — ACCESS PLATFORM FOR MINIMALLY INVASIVE SURGERY.: Brand: GELPORT® LAPAROSCOPIC  SYSTEM

## (undated) DEVICE — CHLORAPREP HI-LITE 10.5ML ORANGE